# Patient Record
Sex: FEMALE | Race: WHITE | NOT HISPANIC OR LATINO | Employment: OTHER | URBAN - METROPOLITAN AREA
[De-identification: names, ages, dates, MRNs, and addresses within clinical notes are randomized per-mention and may not be internally consistent; named-entity substitution may affect disease eponyms.]

---

## 2017-01-03 ENCOUNTER — OFFICE VISIT (OUTPATIENT)
Dept: INTERNAL MEDICINE | Age: 77
End: 2017-01-03

## 2017-01-03 VITALS
HEIGHT: 67 IN | SYSTOLIC BLOOD PRESSURE: 140 MMHG | BODY MASS INDEX: 25.22 KG/M2 | OXYGEN SATURATION: 97 % | TEMPERATURE: 95.8 F | WEIGHT: 160.7 LBS | DIASTOLIC BLOOD PRESSURE: 70 MMHG | HEART RATE: 86 BPM

## 2017-01-03 DIAGNOSIS — R60.0 LOCALIZED EDEMA: Primary | ICD-10-CM

## 2017-01-03 DIAGNOSIS — G62.9 PERIPHERAL POLYNEUROPATHY: ICD-10-CM

## 2017-01-03 PROCEDURE — 99213 OFFICE O/P EST LOW 20 MIN: CPT | Performed by: INTERNAL MEDICINE

## 2017-01-03 RX ORDER — DILTIAZEM HYDROCHLORIDE 120 MG/1
120 CAPSULE, COATED, EXTENDED RELEASE ORAL DAILY
Qty: 90 CAPSULE | Refills: 3 | COMMUNITY
Start: 2017-01-03 | End: 2017-01-12 | Stop reason: DRUGHIGH

## 2017-01-03 RX ORDER — NORTRIPTYLINE HYDROCHLORIDE 10 MG/1
10 CAPSULE ORAL NIGHTLY
Qty: 30 CAPSULE | Refills: 3 | Status: SHIPPED | OUTPATIENT
Start: 2017-01-03 | End: 2017-02-02 | Stop reason: SDUPTHER

## 2017-01-03 RX ORDER — TRIAMTERENE AND HYDROCHLOROTHIAZIDE 75; 50 MG/1; MG/1
1 TABLET ORAL DAILY
COMMUNITY
Start: 2017-01-03 | End: 2017-01-16 | Stop reason: SDUPTHER

## 2017-01-03 NOTE — MR AVS SNAPSHOT
Reanna Higgins   1/3/2017 3:20 PM   Office Visit    Dept Phone:  [phone]   Encounter #:  [id number]    Provider:  Dylan Gannon MD   Department:  [facility] PRIMARY CARE                Your Full Care Plan              Today's Medication Changes          These changes are accurate as of: 1/3/17  3:48 PM.  If you have any questions, ask your nurse or doctor.               Medication(s)that have changed:     * diltiaZEM  MG 24 hr capsule   Commonly known as:  CARDIZEM CD   Take 1 capsule by mouth Daily.   What changed:  Another medication with the same name was removed. Continue taking this medication, and follow the directions you see here.   Changed by:  Maribeth Hameed MD       * CARDIZEM  MG 24 hr capsule   Generic drug:  diltiaZEM CD   Take 1 capsule by mouth Daily.   What changed:  Another medication with the same name was removed. Continue taking this medication, and follow the directions you see here.   Changed by:  Dylan Gannon MD       nortriptyline 10 MG capsule   Commonly known as:  PAMELOR   Take 1 capsule by mouth Every Night.   What changed:    - medication strength  - how much to take   Changed by:  Dylan Gannon MD       triamterene-hydrochlorothiazide 75-50 MG per tablet   Commonly known as:  MAXZIDE   Take 1 tablet by mouth Daily.   What changed:    - how much to take  - Another medication with the same name was removed. Continue taking this medication, and follow the directions you see here.   Changed by:  Dylan Gannon MD       * Notice:  This list has 2 medication(s) that are the same as other medications prescribed for you. Read the directions carefully, and ask your doctor or other care provider to review them with you.         Where to Get Your Medications      These medications were sent to [facility] [address] [place] [facility] [place], [place] - [address] [place] [address] [phone] [facility] [phone] [place] [address]    Phone:  [phone]     nortriptyline 10 MG capsule                  Your Updated Medication List          This list is accurate as of: 1/3/17  3:48 PM.  Always use your most recent med list.                cholecalciferol 1000 UNITS tablet   Commonly known as:  VITAMIN D3       cycloSPORINE 0.05 % ophthalmic emulsion   Commonly known as:  RESTASIS       * diltiaZEM  MG 24 hr capsule   Commonly known as:  CARDIZEM CD   Take 1 capsule by mouth Daily.       * CARDIZEM  MG 24 hr capsule   Generic drug:  diltiaZEM CD       GLUCOSAMINE COMPLEX PO       metoprolol succinate XL 25 MG 24 hr tablet   Commonly known as:  TOPROL-XL   Take 1 tablet by mouth Daily.       MULTIVITAL-M tablet       nortriptyline 10 MG capsule   Commonly known as:  PAMELOR   Take 1 capsule by mouth Every Night.       omeprazole 40 MG capsule   Commonly known as:  priLOSEC   TAKE ONE CAPSULE BY MOUTH ONE TIME DAILY       pravastatin 40 MG tablet   Commonly known as:  PRAVACHOL   TAKE ONE TABLET BY MOUTH EVERY OTHER DAY       PROBIOTIC DAILY PO       triamterene-hydrochlorothiazide 75-50 MG per tablet   Commonly known as:  MAXZIDE       vitamin E 400 UNIT capsule       * Notice:  This list has 2 medication(s) that are the same as other medications prescribed for you. Read the directions carefully, and ask your doctor or other care provider to review them with you.            You Were Diagnosed With        Codes Comments    Localized edema    -  Primary ICD-10-CM: R60.0  ICD-9-CM: 782.3     Peripheral polyneuropathy     ICD-10-CM: G62.9  ICD-9-CM: 356.9       Instructions     None    Patient Instructions History      Upcoming Appointments     Visit Type Date Time Department    OFFICE VISIT 1/3/2017  3:20 PM [facility]    SUBSEQUENT MEDICARE WELLNESS 5/22/2017  9:00 AM [facility]    OFFICE VISIT 5/31/2017  9:00 AM [facility] Signup     Our records [facility] that [facility]. If you would like to sign up for [employer], please email [email or web address] or call [phone] to obtain an activation code.             Other Info from Your Visit           Your Appointments     May 22, 2017  9:00 AM EDT   Subsequent Medicare Wellness with Dylan Gannon MD   [facility] PRIMARY CARE (--)    [address]  [facility] [address] [phone]            May 31, 2017  9:00 AM EDT   Office Visit with Torsten Benson Jr., MD   [facility] HEART SPECIALISTS (--)    [address]  [facility] [address] [phone]           Arrive 15 minutes prior to appointment.              Allergies     Ancef [Cefazolin]  Other (See Comments)    Hypotension/bradycardia    Codeine      Sulfa Antibiotics        Reason for Visit     Edema bilateral feet and legs; off and on for about 2 weeks; better by last friday, began again yesterday      Vital Signs     Blood Pressure Pulse Temperature Height Weight Oxygen Saturation    140/70 86 95.8 °F (35.4 °C) 67\" (170.2 cm) 160 lb 11.2 oz (72.9 kg) 97%    Body Mass Index Smoking Status                25.17 kg/m2 Former Smoker          Problems and Diagnoses Noted     Accumulation of fluid in tissues    Problem with function of peripheral nerve        "

## 2017-01-03 NOTE — PROGRESS NOTES
"Reanna Higgins / 76 y.o. / female  01/03/2017    VITALS    Visit Vitals   • /70   • Pulse 86   • Temp 95.8 °F (35.4 °C)   • Ht 67\" (170.2 cm)   • Wt 160 lb 11.2 oz (72.9 kg)   • SpO2 97%   • BMI 25.17 kg/m2     BP Readings from Last 3 Encounters:   01/03/17 140/70   11/30/16 144/74   10/18/16 155/70     Wt Readings from Last 3 Encounters:   01/03/17 160 lb 11.2 oz (72.9 kg)   11/30/16 152 lb (68.9 kg)   10/18/16 152 lb (68.9 kg)      Body mass index is 25.17 kg/(m^2).    CC:  Main reason(s) for today's visit: Edema (bilateral feet and legs; off and on for about 2 weeks; better by last friday, began again yesterday)      HPI:     She notes intermittent edema over the last week.  She notes no recent immobilization, no recent change in diet or salt intake.  Of note medication was increased by cardiology (diltiazem) from 240 mg per day to 360 mg per day at her last visit November 30, 2016.  Notes no leg pain but she does have some heaviness., Swelling today is improved compared to yesterday.  Swelling is improved today compared to yesterday .  Patient is leaving for [place] in one week, and would like to have this resolved before she leaves the area.  She'll be gone for 2 months . She has no physician in the [place] area     Constipation noted with use of nortriptyline.  Patient has tried using medication every other day and that has helped, but as far as improving his symptoms of the neuropathy, she see no significant improvement.  We did discuss the utility of using 10 mg every day as opposed to 25 mg every other day.    ______________________________________________________    ASSESSMENT & PLAN:    1. Localized edema    2. Peripheral polyneuropathy      No orders of the defined types were placed in this encounter.      Summary/Discussion:     ·   #1  localized edema I suspect is due to the use of the Cardizem.  I see no evidence of significant congestive heart failure based on physical exam or history..  I " recommend patient increase the diuretic back to tablet a day, and contact me by the end of the week with an update on her symptoms.    2 peripheral neuropathy remains symptomatic.  Change medication from 25 mg every other day to 10 mg daily.  Patient will follow-up by phone with me in approximately one month with an update on her symptoms.    No Follow-up on file.    Future Appointments  Date Time Provider Department Center   5/22/2017 9:00 AM MD [facility] None   5/31/2017 9:00 AM MD [facility] Gaston Jr[facility] None       ______________________________________________________    REVIEW OF SYSTEMS    Review of Systems   Constitutional: Negative for appetite change.   Cardiovascular:        No PND noted   Gastrointestinal: Negative for abdominal pain.   Genitourinary: Positive for frequency.        Nocturia ×1-2, this is not a new complaint.         PHYSICAL EXAMINATION    Physical Exam   Constitutional: She is oriented to person, place, and time. She appears well-developed and well-nourished. No distress.   Neck: No JVD present.   Cardiovascular: Normal rate, regular rhythm, normal heart sounds and intact distal pulses.  Exam reveals no gallop and no friction rub.    No murmur heard.  Pulmonary/Chest: Effort normal. She has no wheezes. She has rales in the right lower field.   Few fine crackles noted at the right base, did not clear with coughing.   Abdominal: There is no hepatosplenomegaly.   Musculoskeletal: She exhibits no edema.   Neurological: She is alert and oriented to person, place, and time.   Skin: Skin is warm and dry. No rash noted.   Psychiatric: She has a normal mood and affect. Her behavior is normal. Judgment and thought content normal.   Nursing note and vitals reviewed.      REVIEWED DATA:    Labs:   Lab Results   Component Value Date     10/11/2016    K 4.0 10/11/2016    AST 21 06/21/2016    ALT 16 06/21/2016    BUN 16 10/11/2016    CREATININE 0.79 10/11/2016     CREATININE 0.73 10/10/2016    CREATININE 0.74 10/07/2016    EGFRIFNONA 70 10/11/2016    EGFRIFAFRI >60 06/10/2015       Lab Results   Component Value Date     (H) 09/28/2016     (H) 06/10/2015    HGBA1C 5.90 (H) 09/28/2016       Lab Results   Component Value Date     (H) 07/07/2016     (H) 11/24/2015     (H) 09/14/2015    HDL 52 07/07/2016    TRIG 95 07/07/2016       Lab Results   Component Value Date    TSH 3.150 09/28/2016          Lab Results   Component Value Date    WBC 6.06 10/11/2016    HGB 13.3 10/11/2016     10/11/2016        Imaging:        Medical Tests:        Summary of old records / correspondence / consultant report:        Request outside records:          ALLERGIES:    Ancef [cefazolin]; Codeine; and Sulfa antibiotics    MEDICATIONS:    Current Outpatient Prescriptions on File Prior to Visit   Medication Sig Dispense Refill   • [place]-Glucosamine-Vit D (GLUCOSAMINE COMPLEX PO) Take 1 tablet by mouth Daily.     • cholecalciferol (VITAMIN D3) 1000 UNITS tablet Take 2,000 Units by mouth daily.     • cycloSPORINE (RESTASIS) 0.05 % ophthalmic emulsion Administer 1 drop to both eyes 2 (Two) Times a Day.     • diltiazem CD (CARDIZEM CD) 240 MG 24 hr capsule Take 1 capsule by mouth Daily. 60 capsule 2   • metoprolol succinate XL (TOPROL-XL) 25 MG 24 hr tablet Take 1 tablet by mouth Daily. 30 tablet 11   • Multiple Vitamins-Minerals (MULTIVITAL-M) tablet Take 1 tablet by mouth daily.     • omeprazole (priLOSEC) 40 MG capsule TAKE ONE CAPSULE BY MOUTH ONE TIME DAILY 90 capsule 1   • pravastatin (PRAVACHOL) 40 MG tablet TAKE ONE TABLET BY MOUTH EVERY OTHER DAY 45 tablet 1   • Probiotic Product (PROBIOTIC DAILY PO) Take 1 tablet by mouth Daily.     • triamterene-hydrochlorothiazide (MAXZIDE) 75-50 MG per tablet Take 0.5 tablets by mouth Daily.     • [DISCONTINUED] diltiaZEM CD (CARDIZEM CD) 120 MG 24 hr capsule Take 1 capsule by mouth Every Night. 30 capsule 6   •  [DISCONTINUED] nortriptyline (PAMELOR) 25 MG capsule Take 1 capsule by mouth Every Night. 30 capsule 1   • vitamin E 400 UNIT capsule Take 400 Units by mouth Daily.     • [DISCONTINUED] triamterene-hydrochlorothiazide (MAXZIDE) 75-50 MG per tablet TAKE 1 TABLET BY MOUTH DAILY. 30 tablet 1     No current facility-administered medications on file prior to visit.        [facility]    The following portions of the patient's history were reviewed and updated as appropriate: Allergies / Current Medications / Past Medical History / Surgical History / Social History / Family History    PROBLEM LIST:    Patient Active Problem List   Diagnosis   • Postartificial menopausal syndrome   • Colon polyp   • Gastroesophageal reflux disease   • Essential hypertension   • Hypercholesterolemia   • Ovarian failure   • Mitral and aortic valve disease   • Heart valve disease   • Abnormal electrocardiogram   • Routine health maintenance   • Syncope, effort   • Anxiety   • Peripheral neuropathy   • Syncope   • Palpitations   • Localized edema       PAST MEDICAL HX:    Past Medical History   Diagnosis Date   • Abdominal bruit      Ultrasound Normal 2015   • Abdominal pain    • Acute upper respiratory infection    • Arthralgia of multiple sites    • Carpal tunnel syndrome    • Carpal tunnel syndrome    • Choledocholithiasis    • Cystic fibroadenosis of breast    • Earache    • Gastritis    • Hypercholesterolemia    • Osteoarthritis of both hands    • Palpitations    • Peripheral neuropathy    • Sea sickness    • Second degree AV block, Mobitz type I    • Trigger finger        PAST SURGICAL HX:    Past Surgical History   Procedure Laterality Date   • Cataract extraction       NOVEMBER AND DECEMBER 2014   • Cholecystectomy     • Knee arthroscopy Bilateral 04/2014     MENISCAL TEAR   • Shoulder surgery Right      ROTATOR CUFF SURGERY JUNE 18, 2015   • Breast cyst excision Bilateral    • Tonsillectomy     • Trigger finger release       both hands   •  Cardiac electrophysiology procedure N/A 10/10/2016     Procedure: Pacemaker DC new  [place];  Surgeon: Wilfrido Hameed MD;  Location: [facility] INVASIVE LOCATION;  Service:    • Pacemaker implantation  10/10/2016       SOCIAL HX:    Social History     Social History   • Marital status:      Spouse name: N/A   • Number of children: N/A   • Years of education: N/A     Social History Main Topics   • Smoking status: Former Smoker     Years: 15.00   • Smokeless tobacco: Never Used      Comment: quit age 30   • Alcohol use Yes      Comment: RARE   • Drug use: No   • Sexual activity: Defer     Other Topics Concern   • None     Social History Narrative       FAMILY HX:    Family History   Problem Relation Age of Onset   • Thyroid disease Daughter    • Heart disease Brother

## 2017-01-09 ENCOUNTER — TELEPHONE (OUTPATIENT)
Dept: INTERNAL MEDICINE | Age: 77
End: 2017-01-09

## 2017-01-12 ENCOUNTER — TELEPHONE (OUTPATIENT)
Dept: CARDIOLOGY | Facility: CLINIC | Age: 77
End: 2017-01-12

## 2017-01-12 RX ORDER — DILTIAZEM HYDROCHLORIDE 360 MG/1
360 CAPSULE, EXTENDED RELEASE ORAL DAILY
Qty: 90 CAPSULE | Refills: 3 | Status: SHIPPED | OUTPATIENT
Start: 2017-01-12 | End: 2017-05-22

## 2017-01-16 ENCOUNTER — TELEPHONE (OUTPATIENT)
Dept: INTERNAL MEDICINE | Age: 77
End: 2017-01-16

## 2017-01-16 RX ORDER — TRIAMTERENE AND HYDROCHLOROTHIAZIDE 75; 50 MG/1; MG/1
1 TABLET ORAL DAILY
Qty: 30 TABLET | Refills: 2 | Status: SHIPPED | OUTPATIENT
Start: 2017-01-16 | End: 2017-01-17 | Stop reason: SDUPTHER

## 2017-01-16 RX ORDER — METOPROLOL SUCCINATE 25 MG/1
25 TABLET, EXTENDED RELEASE ORAL DAILY
Qty: 90 TABLET | Refills: 3 | Status: SHIPPED | OUTPATIENT
Start: 2017-01-16 | End: 2017-05-31 | Stop reason: SDUPTHER

## 2017-01-16 NOTE — TELEPHONE ENCOUNTER
Called and left message for patient with information and if any questions to please call the office.

## 2017-01-17 ENCOUNTER — TELEPHONE (OUTPATIENT)
Dept: INTERNAL MEDICINE | Age: 77
End: 2017-01-17

## 2017-01-17 RX ORDER — TRIAMTERENE AND HYDROCHLOROTHIAZIDE 75; 50 MG/1; MG/1
1 TABLET ORAL DAILY
Qty: 90 TABLET | Refills: 0 | Status: SHIPPED | OUTPATIENT
Start: 2017-01-17 | End: 2017-05-31 | Stop reason: SDUPTHER

## 2017-02-02 ENCOUNTER — TELEPHONE (OUTPATIENT)
Dept: INTERNAL MEDICINE | Age: 77
End: 2017-02-02

## 2017-02-02 DIAGNOSIS — G62.9 PERIPHERAL POLYNEUROPATHY: Primary | ICD-10-CM

## 2017-02-02 RX ORDER — NORTRIPTYLINE HYDROCHLORIDE 10 MG/1
10 CAPSULE ORAL NIGHTLY
Qty: 90 CAPSULE | Refills: 0 | Status: SHIPPED | OUTPATIENT
Start: 2017-02-02 | End: 2017-05-03 | Stop reason: SDUPTHER

## 2017-03-13 RX ORDER — DILTIAZEM HYDROCHLORIDE 240 MG/1
CAPSULE, EXTENDED RELEASE ORAL
Qty: 90 CAPSULE | Refills: 0 | Status: SHIPPED | OUTPATIENT
Start: 2017-03-13 | End: 2017-05-31 | Stop reason: SDUPTHER

## 2017-04-07 ENCOUNTER — TELEPHONE (OUTPATIENT)
Dept: INTERNAL MEDICINE | Age: 77
End: 2017-04-07

## 2017-04-07 DIAGNOSIS — N95.1 MENOPAUSAL STATE: Primary | ICD-10-CM

## 2017-04-07 NOTE — TELEPHONE ENCOUNTER
Request by the patient is to schedule a bone density study.  Her last one was on 06/15/2015.  My experience is that insurance companies and especially Medicare will not cover a bone density study if it is ordered and done any sooner than 2 years from the previous one.  Because of this I would suggest that she contact Dr. Gannon sometime after Bel 15 of this year and get him to schedule it then.

## 2017-04-19 ENCOUNTER — CLINICAL SUPPORT NO REQUIREMENTS (OUTPATIENT)
Dept: CARDIOLOGY | Facility: CLINIC | Age: 77
End: 2017-04-19

## 2017-04-19 DIAGNOSIS — Z45.018 FITTING AND ADJUSTMENT OF CARDIAC PACEMAKER: Primary | ICD-10-CM

## 2017-04-19 PROCEDURE — 93280 PM DEVICE PROGR EVAL DUAL: CPT | Performed by: INTERNAL MEDICINE

## 2017-04-22 ENCOUNTER — TELEPHONE (OUTPATIENT)
Dept: CARDIOLOGY | Facility: CLINIC | Age: 77
End: 2017-04-22

## 2017-04-23 NOTE — TELEPHONE ENCOUNTER
Patient typed a letter and delivered it to my office.  Here is my reply.      Status of heart is good, given normal stress test, unremarkable echo, and treatment of arrhythmia (tachy-lyn syndrome leading to syncope) with pacemaker and cardizem.      Last Cardiolite stress test in August 2016 was normal ejection fraction 69%. Last echo in October 2016 showed LVEF 61% with mild LVH, mild mitral regurgitation and mild tricuspid regurgitation. These do not need to be repeated.     Her only episode of syncope occurred when she was on [employer]. Told she can go roller skating again.     Two-week Holter monitor prior to pacemaker showed frequent episodes of type I second-degree AV block with 2-1 conduction resulting in pauses +2.1 seconds long. She was symptomatic with palpitations

## 2017-05-03 DIAGNOSIS — G62.9 PERIPHERAL POLYNEUROPATHY: ICD-10-CM

## 2017-05-03 RX ORDER — NORTRIPTYLINE HYDROCHLORIDE 10 MG/1
CAPSULE ORAL
Qty: 90 CAPSULE | Refills: 0 | Status: SHIPPED | OUTPATIENT
Start: 2017-05-03 | End: 2017-05-22

## 2017-05-16 DIAGNOSIS — K21.9 GASTROESOPHAGEAL REFLUX DISEASE WITHOUT ESOPHAGITIS: Primary | ICD-10-CM

## 2017-05-16 RX ORDER — OMEPRAZOLE 40 MG/1
CAPSULE, DELAYED RELEASE ORAL
Qty: 90 CAPSULE | Refills: 0 | OUTPATIENT
Start: 2017-05-16

## 2017-05-22 ENCOUNTER — OFFICE VISIT (OUTPATIENT)
Dept: INTERNAL MEDICINE | Age: 77
End: 2017-05-22

## 2017-05-22 VITALS
DIASTOLIC BLOOD PRESSURE: 70 MMHG | HEIGHT: 67 IN | HEART RATE: 72 BPM | BODY MASS INDEX: 24.45 KG/M2 | SYSTOLIC BLOOD PRESSURE: 140 MMHG | OXYGEN SATURATION: 96 % | WEIGHT: 155.8 LBS | TEMPERATURE: 98.2 F

## 2017-05-22 DIAGNOSIS — E78.00 HYPERCHOLESTEROLEMIA: ICD-10-CM

## 2017-05-22 DIAGNOSIS — I10 ESSENTIAL HYPERTENSION: ICD-10-CM

## 2017-05-22 DIAGNOSIS — Z00.00 MEDICARE ANNUAL WELLNESS VISIT, SUBSEQUENT: Primary | ICD-10-CM

## 2017-05-22 DIAGNOSIS — K21.9 GASTROESOPHAGEAL REFLUX DISEASE WITHOUT ESOPHAGITIS: ICD-10-CM

## 2017-05-22 PROBLEM — R73.09 ELEVATED GLUCOSE: Status: ACTIVE | Noted: 2017-05-22

## 2017-05-22 LAB
ALBUMIN SERPL-MCNC: 4.5 G/DL (ref 3.5–5.2)
ALBUMIN/GLOB SERPL: 1.6 G/DL
ALP SERPL-CCNC: 87 U/L (ref 39–117)
ALT SERPL-CCNC: 16 U/L (ref 1–33)
APPEARANCE UR: CLEAR
AST SERPL-CCNC: 19 U/L (ref 1–32)
BACTERIA #/AREA URNS HPF: ABNORMAL /HPF
BASOPHILS # BLD AUTO: 0.02 10*3/MM3 (ref 0–0.2)
BASOPHILS NFR BLD AUTO: 0.3 % (ref 0–1.5)
BILIRUB SERPL-MCNC: 0.4 MG/DL (ref 0.1–1.2)
BILIRUB UR QL STRIP: NEGATIVE
BUN SERPL-MCNC: 17 MG/DL (ref 8–23)
BUN/CREAT SERPL: 17.7 (ref 7–25)
CALCIUM SERPL-MCNC: 9.8 MG/DL (ref 8.6–10.5)
CASTS URNS MICRO: ABNORMAL
CHLORIDE SERPL-SCNC: 100 MMOL/L (ref 98–107)
CHOLEST SERPL-MCNC: 209 MG/DL (ref 0–200)
CO2 SERPL-SCNC: 27.3 MMOL/L (ref 22–29)
COLOR UR: YELLOW
CREAT SERPL-MCNC: 0.96 MG/DL (ref 0.57–1)
EOSINOPHIL # BLD AUTO: 0.09 10*3/MM3 (ref 0–0.7)
EOSINOPHIL NFR BLD AUTO: 1.5 % (ref 0.3–6.2)
EPI CELLS #/AREA URNS HPF: ABNORMAL /HPF
ERYTHROCYTE [DISTWIDTH] IN BLOOD BY AUTOMATED COUNT: 12.9 % (ref 11.7–13)
GLOBULIN SER CALC-MCNC: 2.8 GM/DL
GLUCOSE SERPL-MCNC: 116 MG/DL (ref 65–99)
GLUCOSE UR QL: NEGATIVE
HCT VFR BLD AUTO: 41.5 % (ref 35.6–45.5)
HDLC SERPL-MCNC: 47 MG/DL (ref 40–60)
HGB BLD-MCNC: 13.9 G/DL (ref 11.9–15.5)
HGB UR QL STRIP: NEGATIVE
IMM GRANULOCYTES # BLD: 0 10*3/MM3 (ref 0–0.03)
IMM GRANULOCYTES NFR BLD: 0 % (ref 0–0.5)
KETONES UR QL STRIP: NEGATIVE
LDLC SERPL CALC-MCNC: 119 MG/DL (ref 0–100)
LEUKOCYTE ESTERASE UR QL STRIP: ABNORMAL
LYMPHOCYTES # BLD AUTO: 1.7 10*3/MM3 (ref 0.9–4.8)
LYMPHOCYTES NFR BLD AUTO: 28.5 % (ref 19.6–45.3)
MCH RBC QN AUTO: 29.8 PG (ref 26.9–32)
MCHC RBC AUTO-ENTMCNC: 33.5 G/DL (ref 32.4–36.3)
MCV RBC AUTO: 89.1 FL (ref 80.5–98.2)
MONOCYTES # BLD AUTO: 0.33 10*3/MM3 (ref 0.2–1.2)
MONOCYTES NFR BLD AUTO: 5.5 % (ref 5–12)
NEUTROPHILS # BLD AUTO: 3.82 10*3/MM3 (ref 1.9–8.1)
NEUTROPHILS NFR BLD AUTO: 64.2 % (ref 42.7–76)
NITRITE UR QL STRIP: NEGATIVE
PH UR STRIP: 6.5 [PH] (ref 5–8)
PLATELET # BLD AUTO: 239 10*3/MM3 (ref 140–500)
POTASSIUM SERPL-SCNC: 3.8 MMOL/L (ref 3.5–5.2)
PROT SERPL-MCNC: 7.3 G/DL (ref 6–8.5)
PROT UR QL STRIP: NEGATIVE
RBC # BLD AUTO: 4.66 10*6/MM3 (ref 3.9–5.2)
RBC #/AREA URNS HPF: ABNORMAL /HPF
SODIUM SERPL-SCNC: 142 MMOL/L (ref 136–145)
SP GR UR: 1.02 (ref 1–1.03)
TRIGL SERPL-MCNC: 213 MG/DL (ref 0–150)
UROBILINOGEN UR STRIP-MCNC: ABNORMAL MG/DL
VLDLC SERPL CALC-MCNC: 42.6 MG/DL (ref 5–40)
WBC # BLD AUTO: 5.96 10*3/MM3 (ref 4.5–10.7)
WBC #/AREA URNS HPF: ABNORMAL /HPF

## 2017-05-22 PROCEDURE — G0439 PPPS, SUBSEQ VISIT: HCPCS | Performed by: INTERNAL MEDICINE

## 2017-05-22 PROCEDURE — 99214 OFFICE O/P EST MOD 30 MIN: CPT | Performed by: INTERNAL MEDICINE

## 2017-05-22 PROCEDURE — 96160 PT-FOCUSED HLTH RISK ASSMT: CPT | Performed by: INTERNAL MEDICINE

## 2017-05-22 RX ORDER — OMEPRAZOLE 40 MG/1
CAPSULE, DELAYED RELEASE ORAL
Qty: 90 CAPSULE | Refills: 1 | Status: SHIPPED | OUTPATIENT
Start: 2017-05-22 | End: 2017-11-13 | Stop reason: SDUPTHER

## 2017-05-22 RX ORDER — MELATONIN
1000 DAILY
COMMUNITY
Start: 2017-05-22

## 2017-05-22 RX ORDER — PRAVASTATIN SODIUM 40 MG
40 TABLET ORAL 3 TIMES WEEKLY
Qty: 45 TABLET | Refills: 1 | COMMUNITY
Start: 2017-05-22 | End: 2017-09-20

## 2017-05-31 ENCOUNTER — OFFICE VISIT (OUTPATIENT)
Dept: CARDIOLOGY | Facility: CLINIC | Age: 77
End: 2017-05-31

## 2017-05-31 VITALS
DIASTOLIC BLOOD PRESSURE: 69 MMHG | WEIGHT: 158 LBS | BODY MASS INDEX: 24.75 KG/M2 | SYSTOLIC BLOOD PRESSURE: 149 MMHG | HEART RATE: 85 BPM

## 2017-05-31 DIAGNOSIS — R60.0 LOCALIZED EDEMA: ICD-10-CM

## 2017-05-31 DIAGNOSIS — I34.0 NON-RHEUMATIC MITRAL REGURGITATION: ICD-10-CM

## 2017-05-31 DIAGNOSIS — I10 ESSENTIAL HYPERTENSION: Primary | ICD-10-CM

## 2017-05-31 DIAGNOSIS — R55 SYNCOPE, UNSPECIFIED SYNCOPE TYPE: ICD-10-CM

## 2017-05-31 DIAGNOSIS — R94.31 ABNORMAL ELECTROCARDIOGRAM: ICD-10-CM

## 2017-05-31 DIAGNOSIS — F41.9 ANXIETY: ICD-10-CM

## 2017-05-31 DIAGNOSIS — R00.2 PALPITATIONS: ICD-10-CM

## 2017-05-31 DIAGNOSIS — E78.00 HYPERCHOLESTEROLEMIA: ICD-10-CM

## 2017-05-31 PROBLEM — C76.0: Status: ACTIVE | Noted: 2017-05-31

## 2017-05-31 PROCEDURE — 99214 OFFICE O/P EST MOD 30 MIN: CPT | Performed by: INTERNAL MEDICINE

## 2017-05-31 PROCEDURE — 93000 ELECTROCARDIOGRAM COMPLETE: CPT | Performed by: INTERNAL MEDICINE

## 2017-05-31 RX ORDER — METOPROLOL SUCCINATE 25 MG/1
50 TABLET, EXTENDED RELEASE ORAL DAILY
Qty: 90 TABLET | Refills: 3 | Status: SHIPPED | OUTPATIENT
Start: 2017-05-31 | End: 2017-05-31 | Stop reason: SDUPTHER

## 2017-05-31 RX ORDER — DILTIAZEM HYDROCHLORIDE 120 MG/1
120 CAPSULE, COATED, EXTENDED RELEASE ORAL DAILY
Qty: 30 CAPSULE | Refills: 5 | Status: SHIPPED | OUTPATIENT
Start: 2017-05-31 | End: 2017-06-26 | Stop reason: SDUPTHER

## 2017-05-31 RX ORDER — METOPROLOL SUCCINATE 25 MG/1
50 TABLET, EXTENDED RELEASE ORAL DAILY
Qty: 180 TABLET | Refills: 3 | Status: SHIPPED | OUTPATIENT
Start: 2017-05-31 | End: 2017-09-20

## 2017-05-31 RX ORDER — TRIAMTERENE AND HYDROCHLOROTHIAZIDE 75; 50 MG/1; MG/1
TABLET ORAL
Qty: 30 TABLET | Refills: 2 | Status: SHIPPED | OUTPATIENT
Start: 2017-05-31 | End: 2017-09-03 | Stop reason: SDUPTHER

## 2017-06-09 DIAGNOSIS — E78.00 HYPERCHOLESTEROLEMIA: Primary | ICD-10-CM

## 2017-06-09 RX ORDER — PRAVASTATIN SODIUM 40 MG
TABLET ORAL
Qty: 45 TABLET | Refills: 1 | Status: SHIPPED | OUTPATIENT
Start: 2017-06-09 | End: 2017-12-02 | Stop reason: SDUPTHER

## 2017-06-16 ENCOUNTER — HOSPITAL ENCOUNTER (OUTPATIENT)
Dept: BONE DENSITY | Facility: HOSPITAL | Age: 77
Discharge: HOME | End: 2017-06-16
Admitting: INTERNAL MEDICINE

## 2017-06-16 DIAGNOSIS — N95.1 MENOPAUSAL STATE: ICD-10-CM

## 2017-06-16 PROCEDURE — 77080 DXA BONE DENSITY AXIAL: CPT

## 2017-06-17 PROBLEM — M85.80 OSTEOPENIA: Status: ACTIVE | Noted: 2017-06-17

## 2017-06-20 ENCOUNTER — PATIENT MESSAGE (OUTPATIENT)
Dept: INTERNAL MEDICINE | Age: 77
End: 2017-06-20

## 2017-06-26 ENCOUNTER — TELEPHONE (OUTPATIENT)
Dept: CARDIOLOGY | Facility: CLINIC | Age: 77
End: 2017-06-26

## 2017-06-26 RX ORDER — DILTIAZEM HYDROCHLORIDE 120 MG/1
120 CAPSULE, COATED, EXTENDED RELEASE ORAL DAILY
Qty: 90 CAPSULE | Refills: 3 | Status: SHIPPED | OUTPATIENT
Start: 2017-06-26 | End: 2017-10-31

## 2017-07-07 ENCOUNTER — TELEPHONE (OUTPATIENT)
Dept: INTERNAL MEDICINE | Age: 77
End: 2017-07-07

## 2017-07-07 DIAGNOSIS — Z95.0 HX OF CARDIAC PACEMAKER: ICD-10-CM

## 2017-07-07 DIAGNOSIS — I08.0 MITRAL AND AORTIC VALVE DISEASE: Primary | ICD-10-CM

## 2017-07-07 NOTE — TELEPHONE ENCOUNTER
"----- Message from Reanna Higgins sent at 7/7/2017 10:23 AM EDT -----  Regarding: Non-Urgent Medical Question  Contact: [phone]  Good morning---just received a letter from my cardiologists, Kelley Hanna and Dr. Hameed---[facility]--------------------------it seems this practice is going to be dissolved  as of Sept. 1, 2017.     There is one other doctor in this practice, that I have never seen or even heard of, and I could remain with him, however  they are still undecided about \"when and where\" he will be, as far as an office.      Since I MUST have a cardiologist, as I am a recipient of a pacemaker, as of last Oct. 10, 2016.    I would like to have Dr. Gannon's opinion  in this matter as to what \"should I do, moving forward\" in my health care.    Thanks in advance for your help in this matter,    Reanna Higgins  "

## 2017-08-16 ENCOUNTER — CLINICAL SUPPORT NO REQUIREMENTS (OUTPATIENT)
Dept: CARDIOLOGY | Facility: CLINIC | Age: 77
End: 2017-08-16

## 2017-08-16 DIAGNOSIS — Z95.0 CARDIAC PACEMAKER: Primary | ICD-10-CM

## 2017-08-16 PROCEDURE — 93288 INTERROG EVL PM/LDLS PM IP: CPT | Performed by: INTERNAL MEDICINE

## 2017-09-05 RX ORDER — TRIAMTERENE AND HYDROCHLOROTHIAZIDE 75; 50 MG/1; MG/1
TABLET ORAL
Qty: 90 TABLET | Refills: 0 | Status: SHIPPED | OUTPATIENT
Start: 2017-09-05 | End: 2017-12-04 | Stop reason: SDUPTHER

## 2017-09-20 ENCOUNTER — OFFICE VISIT (OUTPATIENT)
Dept: INTERNAL MEDICINE | Age: 77
End: 2017-09-20

## 2017-09-20 VITALS
HEART RATE: 85 BPM | OXYGEN SATURATION: 98 % | BODY MASS INDEX: 24.01 KG/M2 | SYSTOLIC BLOOD PRESSURE: 138 MMHG | TEMPERATURE: 98.2 F | WEIGHT: 153 LBS | DIASTOLIC BLOOD PRESSURE: 60 MMHG | HEIGHT: 67 IN

## 2017-09-20 DIAGNOSIS — F41.9 ANXIETY: Primary | ICD-10-CM

## 2017-09-20 PROCEDURE — 99213 OFFICE O/P EST LOW 20 MIN: CPT | Performed by: INTERNAL MEDICINE

## 2017-09-20 RX ORDER — LORAZEPAM 0.5 MG/1
TABLET ORAL
Qty: 4 TABLET | Refills: 0 | Status: SHIPPED | OUTPATIENT
Start: 2017-09-20 | End: 2017-10-31

## 2017-09-20 RX ORDER — SCOLOPAMINE TRANSDERMAL SYSTEM 1 MG/1
1 PATCH, EXTENDED RELEASE TRANSDERMAL
Qty: 1 PATCH | Refills: 0 | Status: SHIPPED | OUTPATIENT
Start: 2017-09-20 | End: 2017-10-31

## 2017-09-20 NOTE — PROGRESS NOTES
Subjective   Reanna Higgins is a 77 y.o. female.     History of Present Illness patient will be leaving on Thursday September for a trip to the [facility].  She is phobic regarding air travel and would like medication to eliminate this issue.  May have taken any Valium type medication with a trip to [place] several years ago.    In addition to this she notes over the past 3 months.  The darkness at night.  She is concerned with the onset of winter with darkness arriving earlier in the day.  She has never been on any psychotropic medication in the past.  We did discuss the possibility of using Zoloft on a daily basis only during the winter months, and when springtime arrives next year we can discontinue this medication.  She seems comfortable with this.  I will not start this now she'll be leaving on a cruise.  When she returns home, she can start this medication 50 mg once a day in the morning.  She is aware of the potential side effects include sexual dysfunction along with appetite stimulation.            The following portions of the patient's history were reviewed and updated as appropriate: allergies, current medications, past medical history, past social history and problem list.    Review of Systems   Psychiatric/Behavioral: Negative for sleep disturbance. The patient is nervous/anxious.        Objective   Physical Exam   Constitutional: She is oriented to person, place, and time. She appears well-developed and well-nourished. No distress.   Neurological: She is alert and oriented to person, place, and time.   Skin: Skin is warm and dry. She is not diaphoretic.   Psychiatric: She has a normal mood and affect. Her behavior is normal. Judgment and thought content normal.   Nursing note and vitals reviewed.      Assessment/Plan   Reanna was seen today for anxiety.    Diagnoses and all orders for this visit:    Anxiety  -     LORazepam (ATIVAN) 0.5 MG tablet; One tablet 30 minutes before plane flight, may  repeat times 1 in 4-6 hours.  -     sertraline (ZOLOFT) 50 MG tablet; Take 1 tablet by mouth Daily.      1 anxiety, will treat her preflight anxiety with Ativan as above.  We'll check [place] report, no contract for urine drug screen will be completed since this is a one-time short-term prescription..  Prescribed 4 tablets with no refill.  [place] report #[id number] data today shows no entries  for the past 12 months.    Patient has new onset of fear of the darkness.  When she returns from her trip, we will begin Zoloft at 50 mg once a day she'll contact me in one month with an update on her symptoms if this is hopeful or not.  I suspect we can discontinue this medication in the springtime of 2018     This visit involved 100% counseling and coordination of care.  Total face-to-face time 15 minutes.

## 2017-09-25 DIAGNOSIS — N32.89 BLADDER SPASM: Primary | ICD-10-CM

## 2017-09-25 RX ORDER — OXYBUTYNIN CHLORIDE 5 MG/1
10 TABLET, EXTENDED RELEASE ORAL DAILY
Qty: 30 TABLET | Refills: 1 | Status: SHIPPED | OUTPATIENT
Start: 2017-09-25 | End: 2018-06-05 | Stop reason: ALTCHOICE

## 2017-10-30 ENCOUNTER — TELEPHONE (OUTPATIENT)
Dept: CARDIOLOGY | Facility: CLINIC | Age: 77
End: 2017-10-30

## 2017-10-30 NOTE — TELEPHONE ENCOUNTER
This is a pt that is new to you with first apt jaswinder for 5/2018 with you.  See my below note about alert from her pacemaker due to VT.    Alert transmission received 10/29/17 and reviewed 10/30/17.  Alert is for vt episodes. There are a total of 3 vt episodes recorded, all on 10/28/17.  First one was at 1008 and egm shows 34 beats of nst vt with retrograde va conduction, broke then immediately followed by another 23 beats (egm starts and ends in vt, so unsure if pt was in vt prior to egm recording and after egm quit recording).  The 2nd episode occurred at 1013 and episode 2 and 3 overlap.  Looking at these 2 egms together 94 beats vt with, followed by 15, 3 and 4 beats of vt.  The last beat on egm shows ap/vs, but can't see the initiation of the vt.  All vt has retrograde va conduction and rate of vt is 140's.  She has never had her pacemaker checked in our office and she has yet to be seen by Dr. Murillo with her first apt in our office [facility] for 5/2018.  She does have a remote pacer check [facility] for next month.  Per device diagnostics she had her pacer checked last in 8/2017.  These are the first vt episodes that have ever been recorded by her pacer that was implanted 10/2016.  I called pt and she denied any unusual symptoms, stating she always has palpations.  She is not due for for a remote until 3 months from now, but she will be in [place] 1/11/18-3/9/18, so I jaswinder her next remote for 1/9/18 before she leaves.  She does not have a landline in [place].  I offered her Get Connected number with [employer] to see about a cellular adapter and she is not interested at this time.   Rhythm at time of transmission is as/vs 60's with occ atrial pacing at 60 (FFRW are noted).  All auto testing is wnl and lead trending is stable.  There is 1 atr, 3 sec and shows ffrw and not af.  Charges:  [id number], [id number].  kBR

## 2017-10-30 NOTE — TELEPHONE ENCOUNTER
Dr. Murillo pt, he is out, so sent to Dr. Kirk.  Dr. Kirk medical assistant said she would likely not have time to address this Dr. Murillo pt in a timely manner and suggest that it go to an MD in [place] area [place].  I verbally reviewed with Dr. Mccullough and he said she should be seen today and if no one had any openings that he would work her in.  She was worked in for 2pm, but I called her and man who answered said she would not be home until around 4pm today and he did not have any other number to reach her.  I let scheduling know and she removed today's apt.  I asked that he have the pt call me, so I could discuss getting her into the office to see cardiologist in the next 1-2 days.

## 2017-10-31 ENCOUNTER — OFFICE VISIT (OUTPATIENT)
Dept: CARDIOLOGY | Facility: CLINIC | Age: 77
End: 2017-10-31

## 2017-10-31 VITALS
DIASTOLIC BLOOD PRESSURE: 70 MMHG | BODY MASS INDEX: 23.48 KG/M2 | HEIGHT: 67 IN | WEIGHT: 149.6 LBS | HEART RATE: 69 BPM | SYSTOLIC BLOOD PRESSURE: 150 MMHG

## 2017-10-31 DIAGNOSIS — I47.20 VENTRICULAR TACHYCARDIA (HCC): ICD-10-CM

## 2017-10-31 DIAGNOSIS — R00.2 PALPITATIONS: Primary | ICD-10-CM

## 2017-10-31 PROCEDURE — 93000 ELECTROCARDIOGRAM COMPLETE: CPT | Performed by: INTERNAL MEDICINE

## 2017-10-31 PROCEDURE — 99215 OFFICE O/P EST HI 40 MIN: CPT | Performed by: INTERNAL MEDICINE

## 2017-10-31 RX ORDER — METOPROLOL TARTRATE 50 MG/1
50 TABLET, FILM COATED ORAL 2 TIMES DAILY
Qty: 60 TABLET | Refills: 6 | Status: SHIPPED | OUTPATIENT
Start: 2017-10-31 | End: 2017-12-21 | Stop reason: DRUGHIGH

## 2017-10-31 RX ORDER — METOPROLOL TARTRATE 50 MG/1
50 TABLET, FILM COATED ORAL DAILY
COMMUNITY
End: 2017-10-31

## 2017-10-31 NOTE — PROGRESS NOTES
Date of Office Visit: 10/31/2017  Encounter Provider: Francis Mccullough MD  Place of Service: [facility] CARDIOLOGY  Patient Name: Reanna Higgins  :1940  [id number]    Chief Complaint   Patient presents with   • Palpitations   :     HPI: Reanna Higgins is a 77 y.o. female  This is a lady who was urgently worked in to see me today because of ventricular tachycardia on her pacemaker monitoring.  We have never seen this lady before.  She was with another cardiologist in town who has left the [facility] practice.  Somehow, her monitoring was switched over to us and she had 90 beats of ventricular tachycardia on Saturday.  In speaking with her, she does not really remember any particular episode on Saturday, but she says she has daily palpitations.  They may last for up to one to five hours at a time.  Nothing seems to precipitate them.  Nothing seems to make it better or worse.  She has worn a Holter in the past and she had a brief period of 2:1 AV block and a 2.1 second pause and that was treated because she had a syncopal episode with a pacemaker a year ago.  That has not solved her palpitation issue, which continues onward.  She is very active.  She will have these palpitations with activity and at rest.  They wake her up at nighttime.  She has not had any more syncopal episodes and no shortness of breath or shortness of breath.  She does not have diabetes or hyperlipidemia.  She does not smoke.  The rest of her review of systems really is pretty negative.  She does not have any other significant past medical history for the most part.        Past Medical History:   Diagnosis Date   • Abdominal bruit     Ultrasound Normal    • Abdominal pain    • Acute upper respiratory infection    • Anxiety    • Arthralgia of multiple sites    • Cancer     skin   • Carpal tunnel syndrome    • Cataract    • Choledocholithiasis    • Cystic fibroadenosis of breast    • Earache    • Gastritis    •  GERD (gastroesophageal reflux disease)    • Hypercholesterolemia    • Hypertension    • Osteoarthritis of both hands    • Palpitations    • Peripheral neuropathy    • Sea sickness    • Second degree AV block, Mobitz type I    • Syncope    • Trigger finger        Past Surgical History:   Procedure Laterality Date   • BREAST CYST EXCISION Bilateral    • CARDIAC ELECTROPHYSIOLOGY PROCEDURE N/A 10/10/2016    Procedure: Pacemaker DC new  [place];  Surgeon: Wilfrido Hameed MD;  Location: [facility] INVASIVE LOCATION;  Service:    • CATARACT EXTRACTION      NOVEMBER AND DECEMBER 2014   • CHOLECYSTECTOMY     • HYSTERECTOMY     • KNEE ARTHROSCOPY Bilateral 04/2014    MENISCAL TEAR   • PACEMAKER IMPLANTATION  10/10/2016   • SHOULDER SURGERY Right     ROTATOR CUFF SURGERY JUNE 18, 2015   • TONSILLECTOMY     • TRIGGER FINGER RELEASE      both hands       Social History     Social History   • Marital status:      Spouse name: N/A   • Number of children: 2   • Years of education: N/A     Occupational History   • Retired      Social History Main Topics   • Smoking status: Former Smoker     Packs/day: 0.50     Years: 15.00     Quit date: 1970   • Smokeless tobacco: Never Used      Comment: quit age 30   • Alcohol use Yes      Comment: RARE   • Drug use: No   • Sexual activity: Defer     Other Topics Concern   • Not on file     Social History Narrative       Family History   Problem Relation Age of Onset   • Thyroid disease Daughter    • Heart disease Brother    • Hypertension Mother    • Hypertension Father    • Alcohol abuse Father    • Depression Father    • Anxiety disorder Father    • Diabetes Father        Review of Systems   Constitution: Negative for decreased appetite, fever, malaise/fatigue and weight loss.   HENT: Negative for nosebleeds.    Eyes: Negative for double vision.   Cardiovascular: Negative for chest pain, claudication, cyanosis, dyspnea on exertion, irregular heartbeat, leg swelling, near-syncope,  "orthopnea, palpitations, paroxysmal nocturnal dyspnea and syncope.   Respiratory: Negative for cough, hemoptysis and shortness of breath.    Hematologic/Lymphatic: Negative for bleeding problem.   Skin: Negative for rash.   Musculoskeletal: Negative for falls and myalgias.   Gastrointestinal: Negative for hematochezia, jaundice, melena, nausea and vomiting.   Genitourinary: Negative for hematuria.   Neurological: Negative for dizziness and seizures.   Psychiatric/Behavioral: Negative for altered mental status and memory loss.       Allergies   Allergen Reactions   • Ancef [Cefazolin] Other (See Comments)     Hypotension/bradycardia   • Codeine    • Sulfa Antibiotics          Current Outpatient Prescriptions:   •  BIOTIN 5000 PO, Take  by mouth., Disp: , Rfl:   •  Boswellia-Glucosamine-Vit D (GLUCOSAMINE COMPLEX PO), Take 1 tablet by mouth Daily., Disp: , Rfl:   •  cholecalciferol (VITAMIN D3) 1000 UNITS tablet, Take 1 tablet by mouth Daily., Disp: , Rfl:   •  cycloSPORINE (RESTASIS) 0.05 % ophthalmic emulsion, Administer 1 drop to both eyes 2 (Two) Times a Day., Disp: , Rfl:   •  metoprolol tartrate (LOPRESSOR) 50 MG tablet, Take 1 tablet by mouth 2 (Two) Times a Day., Disp: 60 tablet, Rfl: 6  •  Multiple Vitamins-Minerals (MULTIVITAL-M) tablet, Take 1 tablet by mouth daily., Disp: , Rfl:   •  omeprazole (priLOSEC) 40 MG capsule, TAKE ONE CAPSULE BY MOUTH ONE TIME DAILY, Disp: 90 capsule, Rfl: 1  •  oxybutynin XL (DITROPAN-XL) 5 MG 24 hr tablet, Take 2 tablets by mouth Daily., Disp: 30 tablet, Rfl: 1  •  pravastatin (PRAVACHOL) 40 MG tablet, TAKE ONE TABLET BY MOUTH EVERY OTHER DAY, Disp: 45 tablet, Rfl: 1  •  triamterene-hydrochlorothiazide (MAXZIDE) 75-50 MG per tablet, TAKE 1 TABLET BY MOUTH DAILY., Disp: 90 tablet, Rfl: 0     Objective:     Vitals:    10/31/17 1035   BP: 150/70   Pulse: 69   Weight: 149 lb 9.6 oz (67.9 kg)   Height: 67\" (170.2 cm)     Body mass index is 23.43 kg/(m^2).    Physical Exam "   Constitutional: She is oriented to person, place, and time. She appears well-developed and well-nourished.   HENT:   Head: Normocephalic.   Eyes: No scleral icterus.   Neck: No JVD present. No thyromegaly present.   Cardiovascular: Normal rate, regular rhythm and normal heart sounds.  Exam reveals no gallop and no friction rub.    No murmur heard.  Pulmonary/Chest: Effort normal and breath sounds normal. She has no wheezes. She has no rales.   Abdominal: Soft. There is no hepatosplenomegaly. There is no tenderness.   Musculoskeletal: Normal range of motion. She exhibits no edema.   Lymphadenopathy:     She has no cervical adenopathy.   Neurological: She is alert and oriented to person, place, and time.   Skin: Skin is warm and dry. No rash noted.   Psychiatric: She has a normal mood and affect.         ECG 12 Lead  Date/Time: 10/31/2017 11:25 AM  Performed by: SHAWANDA DUGGAN  Authorized by: SHAWANDA DUGGAN   Comparison: compared with previous ECG   Similar to previous ECG  Rhythm: sinus rhythm  Other findings: LVH  Comments: ns ST-T wave abnormality             Assessment:       Diagnosis Plan   1. Palpitations  Basic Metabolic Panel    Holter Monitor - 24 Hour    Ambulatory Referral to Cardiac Electrophysiology   2. Ventricular tachycardia  Basic Metabolic Panel    Holter Monitor - 24 Hour    Ambulatory Referral to Cardiac Electrophysiology          Plan:       This is a lady with palpitations of unclear etiology.  She had ventricular tachycardia on a monitor.  She had syncope.  She had a pacemaker implanted.  She had a normal echo and a normal stress test last year.  She really is a fairly low risk for coronary disease and does not give any symptoms to suggest that.  At this point I am stopping her diltiazem and am going to put her on 50 of metoprolol scheduled twice a day.  I am going to check her electrolytes.  I am going to put a Holter monitor on her but we need the arrhythmia service to see her and try to  help sort this out.  I do not suspect a traditional malignant ventricular tachycardia; if anything, I wonder about a RVOT ventricular tachycardia.          As always, it has been a pleasure to participate in your patient's care.      Sincerely,       Francis Mccullough MD

## 2017-11-01 ENCOUNTER — TELEPHONE (OUTPATIENT)
Dept: INTERNAL MEDICINE | Age: 77
End: 2017-11-01

## 2017-11-01 ENCOUNTER — APPOINTMENT (OUTPATIENT)
Dept: LAB | Facility: HOSPITAL | Age: 77
End: 2017-11-01
Attending: INTERNAL MEDICINE

## 2017-11-01 LAB
ANION GAP SERPL CALCULATED.3IONS-SCNC: 13.3 MMOL/L
BUN BLD-MCNC: 24 MG/DL (ref 8–23)
BUN/CREAT SERPL: 27.9 (ref 7–25)
CALCIUM SPEC-SCNC: 9.6 MG/DL (ref 8.6–10.5)
CHLORIDE SERPL-SCNC: 97 MMOL/L (ref 98–107)
CO2 SERPL-SCNC: 28.7 MMOL/L (ref 22–29)
CREAT BLD-MCNC: 0.86 MG/DL (ref 0.57–1)
GFR SERPL CREATININE-BSD FRML MDRD: 64 ML/MIN/1.73
GLUCOSE BLD-MCNC: 107 MG/DL (ref 65–99)
POTASSIUM BLD-SCNC: 3.6 MMOL/L (ref 3.5–5.2)
SODIUM BLD-SCNC: 139 MMOL/L (ref 136–145)

## 2017-11-01 PROCEDURE — 36415 COLL VENOUS BLD VENIPUNCTURE: CPT | Performed by: INTERNAL MEDICINE

## 2017-11-01 PROCEDURE — 80048 BASIC METABOLIC PNL TOTAL CA: CPT | Performed by: INTERNAL MEDICINE

## 2017-11-01 NOTE — TELEPHONE ENCOUNTER
Pt called asking for a pneumonia shot. Pt would be in the area and was asking if she could have this done at 3p today.  You can reach the pt at # [phone]  Thanks SP

## 2017-11-01 NOTE — TELEPHONE ENCOUNTER
My records show that she has received both pneumonia shots, consequently no additional shots are indicated.

## 2017-11-13 RX ORDER — OMEPRAZOLE 40 MG/1
CAPSULE, DELAYED RELEASE ORAL
Qty: 90 CAPSULE | Refills: 1 | Status: SHIPPED | OUTPATIENT
Start: 2017-11-13 | End: 2018-04-29 | Stop reason: SDUPTHER

## 2017-11-20 ENCOUNTER — OFFICE VISIT (OUTPATIENT)
Dept: INTERNAL MEDICINE | Age: 77
End: 2017-11-20

## 2017-11-20 VITALS
BODY MASS INDEX: 24.08 KG/M2 | HEIGHT: 67 IN | OXYGEN SATURATION: 96 % | HEART RATE: 74 BPM | TEMPERATURE: 98.5 F | DIASTOLIC BLOOD PRESSURE: 56 MMHG | WEIGHT: 153.4 LBS | SYSTOLIC BLOOD PRESSURE: 132 MMHG

## 2017-11-20 DIAGNOSIS — R30.0 DYSURIA: ICD-10-CM

## 2017-11-20 DIAGNOSIS — R10.30 LOWER ABDOMINAL PAIN: Primary | ICD-10-CM

## 2017-11-20 LAB
BILIRUB BLD-MCNC: NEGATIVE MG/DL
CLARITY, POC: CLEAR
COLOR UR: YELLOW
GLUCOSE UR STRIP-MCNC: NEGATIVE MG/DL
KETONES UR QL: ABNORMAL
LEUKOCYTE EST, POC: NEGATIVE
NITRITE UR-MCNC: NEGATIVE MG/ML
PH UR: 1 [PH] (ref 5–8)
PROT UR STRIP-MCNC: NEGATIVE MG/DL
RBC # UR STRIP: NEGATIVE /UL
SP GR UR: 1.02 (ref 1–1.03)
UROBILINOGEN UR QL: NORMAL

## 2017-11-20 PROCEDURE — 81003 URINALYSIS AUTO W/O SCOPE: CPT | Performed by: INTERNAL MEDICINE

## 2017-11-20 PROCEDURE — 99213 OFFICE O/P EST LOW 20 MIN: CPT | Performed by: INTERNAL MEDICINE

## 2017-11-20 NOTE — PROGRESS NOTES
Subjective   Reanna Higgins is a 77 y.o. female.     History of Present Illness patient presents with pressure in the hypogastric region since September of this year.  She was treated with antibiotics and then with oxybutynin for bladder spasm.  She denied any dysuria, she does note urinary frequency of these once an hour over the past several months.  She denied odor or blood in the urine, there has been no low back pain nausea, vomiting and fever, chills.  She does have chronic swelling which is due to menopause.    The following portions of the patient's history were reviewed and updated as appropriate: allergies, current medications, past medical history, past social history and problem list.    Review of Systems   Constitutional: Negative for appetite change.   Genitourinary: Negative for vaginal discharge and vaginal pain.       Objective   Physical Exam   Constitutional: She is oriented to person, place, and time. She appears well-developed and well-nourished. No distress.   Abdominal: Soft. There is no tenderness.   No suprapubic tenderness noted, urinary bladder was not distended.   Musculoskeletal:   No CVAT noted   Neurological: She is alert and oriented to person, place, and time.   Skin: Skin is warm and dry.   Psychiatric: She has a normal mood and affect. Her behavior is normal. Judgment and thought content normal.   Nursing note and vitals reviewed.      Assessment/Plan   Reanna was seen today for urinary tract infection.    Diagnoses and all orders for this visit:    Lower abdominal pain  -     Urine Culture - Urine, Urine, Clean Catch    Dysuria  -     POCT urinalysis dipstick, automated      Number-one bladder spasm, no evidence of infection on urinalysis.  Patient is currently taking anti-spasmodic medication without significant improvement, but she already has significant dry mouth.  At this point I suggest that we culture the urine from today to be sure there were not missing an infection.  If  the culture is positive, will treat with antibiotics.  If the culture is negative, would consider increasing the oxybutynin 15 mg per day.  Patient is aware that that will make a dry mouth worse.  Since she has not seen a urologist, I suggested that as the next step in the evaluation process as well.  Me by Wednesday 22 November if she has not heard back regarding the results of the urine culture.

## 2017-11-22 LAB
BACTERIA UR CULT: NORMAL
BACTERIA UR CULT: NORMAL

## 2017-12-02 DIAGNOSIS — E78.00 HYPERCHOLESTEROLEMIA: ICD-10-CM

## 2017-12-04 RX ORDER — TRIAMTERENE AND HYDROCHLOROTHIAZIDE 75; 50 MG/1; MG/1
TABLET ORAL
Qty: 90 TABLET | Refills: 0 | Status: SHIPPED | OUTPATIENT
Start: 2017-12-04 | End: 2018-03-11 | Stop reason: SDUPTHER

## 2017-12-04 RX ORDER — PRAVASTATIN SODIUM 40 MG
TABLET ORAL
Qty: 45 TABLET | Refills: 1 | Status: SHIPPED | OUTPATIENT
Start: 2017-12-04 | End: 2018-06-05 | Stop reason: SDUPTHER

## 2017-12-08 ENCOUNTER — CLINICAL SUPPORT NO REQUIREMENTS (OUTPATIENT)
Dept: CARDIOLOGY | Facility: CLINIC | Age: 77
End: 2017-12-08

## 2017-12-08 ENCOUNTER — OFFICE VISIT (OUTPATIENT)
Dept: CARDIOLOGY | Facility: CLINIC | Age: 77
End: 2017-12-08

## 2017-12-08 VITALS
WEIGHT: 150 LBS | BODY MASS INDEX: 23.54 KG/M2 | HEART RATE: 65 BPM | DIASTOLIC BLOOD PRESSURE: 68 MMHG | SYSTOLIC BLOOD PRESSURE: 144 MMHG | HEIGHT: 67 IN

## 2017-12-08 DIAGNOSIS — I47.1 SVT (SUPRAVENTRICULAR TACHYCARDIA) (HCC): Primary | ICD-10-CM

## 2017-12-08 DIAGNOSIS — I47.1 SVT (SUPRAVENTRICULAR TACHYCARDIA) (HCC): ICD-10-CM

## 2017-12-08 DIAGNOSIS — Z95.0 CARDIAC PACEMAKER IN SITU: ICD-10-CM

## 2017-12-08 DIAGNOSIS — I44.1 AV BLOCK, MOBITZ 1: ICD-10-CM

## 2017-12-08 DIAGNOSIS — I49.5 SICK SINUS SYNDROME (HCC): Primary | ICD-10-CM

## 2017-12-08 PROBLEM — I47.10 SVT (SUPRAVENTRICULAR TACHYCARDIA): Status: ACTIVE | Noted: 2017-12-08

## 2017-12-08 PROBLEM — I47.20 VENTRICULAR TACHYCARDIA (HCC): Status: RESOLVED | Noted: 2017-10-31 | Resolved: 2017-12-08

## 2017-12-08 PROCEDURE — 99204 OFFICE O/P NEW MOD 45 MIN: CPT | Performed by: NURSE PRACTITIONER

## 2017-12-08 PROCEDURE — 93000 ELECTROCARDIOGRAM COMPLETE: CPT | Performed by: NURSE PRACTITIONER

## 2017-12-08 PROCEDURE — 93280 PM DEVICE PROGR EVAL DUAL: CPT | Performed by: INTERNAL MEDICINE

## 2017-12-08 NOTE — PROGRESS NOTES
Date of Office Visit: 2017  Encounter Provider: LUIS A Saba  Place of Service: [facility] CARDIOLOGY  Patient Name: Reanna Higgins  :1940    Chief Complaint   Patient presents with   • Pacemaker Check   • Rapid Heart Rate     noted on pacemaker check   :     HPI: Reanna Higgins is a 77 y.o. female who is a new patient referred by Dr. Mccullough for what was thought to be VT on a pacemaker check. She was a previous patient of Dr. Headley . Dr. Mccullough saw her for the first time on 10/31/17 due to a pacer check diagnostics showed VT. She has a past medical history of palpitations, one episode of syncope and a monitor that showed intermittent 21 AV block and a 2 second pause for which she underwent permanent pacemaker implantation in .  She also has a history of hypertension and hyperlipidemia.  She has not had any further syncopal episodes since her pacemaker was implanted but she has continued to have palpitations.  On  her pacemaker diagnostics show that she had 3 episodes of tachycardia that it labeled as ventricular tachycardia, they were all less than a minute in duration, HR max 146 bpm and she was asymptomatic.  She was put in to see Dr. Mccullough on .  He switched her diltiazem to metoprolol and repeated a Holter monitor.  Her Holter showed sinus rhythm with just some rare APCs and PVCs.  She had a nuclear stress test in 2016 which was negative for ischemia and showed normal LV systolic function with an EF of 69%.  She had an echocardiogram in 2016 which showed normal LV systolic function with an EF of 60%, mild to moderate septal hypertrophy, mildly dilated RV, moderate to severe MR with grade 1 diastolic dysfunction.  Dr. Mccullough referred her to the EP service for further evaluation.    She is doing well she says that she has started to metoprolol her palpitations have significantly decreased.  She denies any chest pain, shortness  of breath, dizziness, edema or near syncope.    Maker interrogation today shows normal pacemaker testing and function with no further episodes.          Past Medical History:   Diagnosis Date   • Abdominal bruit     Ultrasound Normal 2015   • Abdominal pain    • Acute upper respiratory infection    • Anxiety    • Arthralgia of multiple sites    • Cancer     skin   • Carpal tunnel syndrome    • Cataract    • Choledocholithiasis    • Cystic fibroadenosis of breast    • Earache    • Gastritis    • GERD (gastroesophageal reflux disease)    • Hypercholesterolemia    • Hypertension    • Osteoarthritis of both hands    • Palpitations    • Peripheral neuropathy    • Sea sickness    • Second degree AV block, Mobitz type I    • Syncope    • Trigger finger        Past Surgical History:   Procedure Laterality Date   • BREAST CYST EXCISION Bilateral    • CARDIAC ELECTROPHYSIOLOGY PROCEDURE N/A 10/10/2016    Procedure: Pacemaker DC new  [place];  Surgeon: Wilfrido Hameed MD;  Location: [facility] INVASIVE LOCATION;  Service:    • CATARACT EXTRACTION      NOVEMBER AND DECEMBER 2014   • CHOLECYSTECTOMY     • HYSTERECTOMY     • KNEE ARTHROSCOPY Bilateral 04/2014    MENISCAL TEAR   • PACEMAKER IMPLANTATION  10/10/2016   • SHOULDER SURGERY Right     ROTATOR CUFF SURGERY JUNE 18, 2015   • TONSILLECTOMY     • TRIGGER FINGER RELEASE      both hands       Social History     Social History   • Marital status:      Spouse name: N/A   • Number of children: 2   • Years of education: N/A     Occupational History   • Retired      Social History Main Topics   • Smoking status: Former Smoker     Packs/day: 0.50     Years: 15.00     Quit date: 1970   • Smokeless tobacco: Never Used      Comment: quit age 30   • Alcohol use Yes      Comment: RARE   • Drug use: No   • Sexual activity: Defer     Other Topics Concern   • Not on file     Social History Narrative       Family History   Problem Relation Age of Onset   • Thyroid disease  Daughter    • Heart disease Brother    • Hypertension Mother    • Hypertension Father    • Alcohol abuse Father    • Depression Father    • Anxiety disorder Father    • Diabetes Father        Review of Systems   Constitution: Negative for chills, fever, malaise/fatigue, weight gain and weight loss.   HENT: Negative for ear pain, hearing loss, nosebleeds and sore throat.    Eyes: Negative for double vision, pain and visual disturbance.   Cardiovascular: Positive for palpitations. Negative for chest pain, dyspnea on exertion, irregular heartbeat, leg swelling, near-syncope, orthopnea, paroxysmal nocturnal dyspnea and syncope.   Respiratory: Negative for cough, shortness of breath, sleep disturbances due to breathing, snoring and wheezing.    Endocrine: Negative for cold intolerance, heat intolerance and polyuria.   Skin: Negative for itching and rash.   Musculoskeletal: Negative for joint pain, joint swelling and myalgias.   Gastrointestinal: Negative for abdominal pain, diarrhea, melena, nausea and vomiting.   Genitourinary: Negative for frequency, hematuria and hesitancy.   Neurological: Negative for excessive daytime sleepiness, headaches, light-headedness, numbness, paresthesias and seizures.   Psychiatric/Behavioral: Negative for altered mental status and depression.   Allergic/Immunologic: Negative.    All other systems reviewed and are negative.      Allergies   Allergen Reactions   • Ancef [Cefazolin] Other (See Comments)     Hypotension/bradycardia   • Codeine    • Sulfa Antibiotics          Current Outpatient Prescriptions:   •  BIOTIN 5000 PO, Take  by mouth., Disp: , Rfl:   •  Boswellia-Glucosamine-Vit D (GLUCOSAMINE COMPLEX PO), Take 1 tablet by mouth Daily., Disp: , Rfl:   •  cholecalciferol (VITAMIN D3) 1000 UNITS tablet, Take 1 tablet by mouth Daily., Disp: , Rfl:   •  cycloSPORINE (RESTASIS) 0.05 % ophthalmic emulsion, Administer 1 drop to both eyes 2 (Two) Times a Day., Disp: , Rfl:   •  metoprolol  "tartrate (LOPRESSOR) 50 MG tablet, Take 1 tablet by mouth 2 (Two) Times a Day., Disp: 60 tablet, Rfl: 6  •  Multiple Vitamins-Minerals (MULTIVITAL-M) tablet, Take 1 tablet by mouth daily., Disp: , Rfl:   •  omeprazole (priLOSEC) 40 MG capsule, TAKE ONE CAPSULE BY MOUTH ONE TIME DAILY, Disp: 90 capsule, Rfl: 1  •  oxybutynin XL (DITROPAN-XL) 5 MG 24 hr tablet, Take 2 tablets by mouth Daily., Disp: 30 tablet, Rfl: 1  •  pravastatin (PRAVACHOL) 40 MG tablet, TAKE ONE TABLET BY MOUTH EVERY OTHER DAY, Disp: 45 tablet, Rfl: 1  •  triamterene-hydrochlorothiazide (MAXZIDE) 75-50 MG per tablet, TAKE 1 TABLET BY MOUTH DAILY., Disp: 90 tablet, Rfl: 0     Objective:     Vitals:    12/08/17 0955   BP: 144/68   Pulse: 65   Weight: 68 kg (150 lb)   Height: 170.2 cm (67.01\")     Body mass index is 23.49 kg/(m^2).    PHYSICAL EXAM:    Vitals Reviewed.   General Appearance: No acute distress, well developed and well nourished.   Eyes: Conjunctiva and lids: No erythema, swelling, or discharge. Sclera non-icteric.   HENT: Atraumatic, normocephalic. External eyes, ears, and nose normal. No hearing loss noted. Mucous membranes normal. Lips not cyanotic. Neck supple with no tenderness.  Respiratory: No signs of respiratory distress. Respiration rhythm and depth normal.   Clear to auscultation. No rales, crackles, rhonchi, or wheezing auscultated.   Cardiovascular:  Jugular Venous Pressure: Normal  Heart Rate and Rhythm: Normal, Heart Sounds: Normal S1 and S2. No S3 or S4 noted.  Murmurs: No murmurs noted. No rubs, thrills, or gallops.   Arterial Pulses:  Posterior tibialis and dorsalis pedis pulses normal.   Lower Extremities: No edema noted.  Gastrointestinal:  Abdomen soft, non-distended, non-tender. Normal bowel sounds.  Musculoskeletal: Normal movement of extremities  Skin and Nails: General appearance normal. No pallor, cyanosis, diaphoresis. Skin temperature normal. No clubbing of fingernails.   Psychiatric: Patient alert and " oriented to person, place, and time. Speech and behavior appropriate. Normal mood and affect.       ECG 12 Lead  Date/Time: 12/8/2017 2:09 PM  Performed by: THEODORE KRAUSE  Authorized by: THEODORE KRAUSE   Comparison: compared with previous ECG from 10/31/2017  Similar to previous ECG  Rhythm: sinus rhythm  Conduction: 1st degree  Other findings: LVH  Clinical impression: non-specific ECG  Comments: Non-specific ST -T wave abnormality              Assessment:       Diagnosis Plan   1. SVT (supraventricular tachycardia)     2. AV block, Mobitz 1     3. Cardiac pacemaker in situ            Plan:       1. SVT. Dr. Adrian and I saw Ms. Higgins. We reviewed the pacemaker diagnostics and think it is SVT and not VT. Agree with changing her to metoprolol and she feels better on it as her palpitations have significantly decreased.    2. AV block, s/p PPM. Normal device function and testing. Remote check in 3 months.    Return to see Dr. Mccullough in 6 months or sooner should the need arise.     As always, it has been a pleasure to participate in your patient's care.      Sincerely,         LUIS A Mon

## 2017-12-20 ENCOUNTER — TELEPHONE (OUTPATIENT)
Dept: CARDIOLOGY | Facility: CLINIC | Age: 77
End: 2017-12-20

## 2017-12-20 NOTE — TELEPHONE ENCOUNTER
Pt's pharmacy called to see about changing pt's Metoprolol from Tartrate back to Succiunate. They said she has told them she is having episodes of palpitations since the med change. I called pt and verified with her that she is having the episodes.......Celeste

## 2017-12-20 NOTE — TELEPHONE ENCOUNTER
I did not changer her from succinate to tartrate-she came to me on tartrate from [facility]-not sure when or who had her on succinate I think in my note he changed her from diltiazem to metoprolol?? Not sure the real story- can you check to see if they meant diltiazem and who may have ordered the succinate??

## 2017-12-20 NOTE — TELEPHONE ENCOUNTER
Dr. Mccullough from what I can tell it looks like you were the one that changed the pt's Metoprolol. Can you please see message below and advise?.......Celeste

## 2017-12-21 RX ORDER — METOPROLOL SUCCINATE 50 MG/1
50 TABLET, EXTENDED RELEASE ORAL 2 TIMES DAILY
Qty: 180 TABLET | Refills: 3 | Status: SHIPPED | OUTPATIENT
Start: 2017-12-21 | End: 2018-12-08 | Stop reason: SDUPTHER

## 2017-12-21 RX ORDER — METOPROLOL SUCCINATE 50 MG/1
50 TABLET, EXTENDED RELEASE ORAL DAILY
Qty: 90 TABLET | Refills: 3 | Status: SHIPPED | OUTPATIENT
Start: 2017-12-21 | End: 2017-12-21 | Stop reason: SDUPTHER

## 2018-02-15 NOTE — TELEPHONE ENCOUNTER
----- Message from Lucie Ge sent at 6/23/2017  9:24 AM EDT -----  Regarding: refill  Contact: [phone]   place patient above on Diltiazem 120 mg Trail for  30 days and states for patient to call in office to see how patient is doing then patient should be able to get prescription for 90 day supply if approved    Please call patient at the number above, patient pharmacy is listed below along with number.     [facility]   [phone]   Potential for Falls     Patient will remain free of falls Progressing

## 2018-03-12 RX ORDER — TRIAMTERENE AND HYDROCHLOROTHIAZIDE 75; 50 MG/1; MG/1
TABLET ORAL
Qty: 90 TABLET | Refills: 0 | Status: SHIPPED | OUTPATIENT
Start: 2018-03-12 | End: 2018-06-15 | Stop reason: SDUPTHER

## 2018-04-30 RX ORDER — OMEPRAZOLE 40 MG/1
CAPSULE, DELAYED RELEASE ORAL
Qty: 90 CAPSULE | Refills: 0 | Status: SHIPPED | OUTPATIENT
Start: 2018-04-30 | End: 2018-08-08 | Stop reason: SDUPTHER

## 2018-06-05 ENCOUNTER — OFFICE VISIT (OUTPATIENT)
Dept: INTERNAL MEDICINE | Age: 78
End: 2018-06-05

## 2018-06-05 VITALS
DIASTOLIC BLOOD PRESSURE: 70 MMHG | WEIGHT: 155.8 LBS | OXYGEN SATURATION: 98 % | HEIGHT: 67 IN | HEART RATE: 60 BPM | SYSTOLIC BLOOD PRESSURE: 142 MMHG | BODY MASS INDEX: 24.45 KG/M2 | TEMPERATURE: 97.8 F

## 2018-06-05 DIAGNOSIS — Z00.00 MEDICARE ANNUAL WELLNESS VISIT, SUBSEQUENT: Primary | ICD-10-CM

## 2018-06-05 DIAGNOSIS — K21.9 GASTROESOPHAGEAL REFLUX DISEASE WITHOUT ESOPHAGITIS: ICD-10-CM

## 2018-06-05 DIAGNOSIS — E78.00 HYPERCHOLESTEROLEMIA: Primary | ICD-10-CM

## 2018-06-05 DIAGNOSIS — Z23 ENCOUNTER FOR IMMUNIZATION: ICD-10-CM

## 2018-06-05 DIAGNOSIS — I10 ESSENTIAL HYPERTENSION: ICD-10-CM

## 2018-06-05 DIAGNOSIS — E78.00 HYPERCHOLESTEROLEMIA: ICD-10-CM

## 2018-06-05 LAB
ALBUMIN SERPL-MCNC: 4.2 G/DL (ref 3.5–5.2)
ALBUMIN/GLOB SERPL: 1.7 G/DL
ALP SERPL-CCNC: 69 U/L (ref 39–117)
ALT SERPL-CCNC: 14 U/L (ref 1–33)
APPEARANCE UR: CLEAR
AST SERPL-CCNC: 15 U/L (ref 1–32)
BACTERIA #/AREA URNS HPF: NORMAL /HPF
BASOPHILS # BLD AUTO: 0.02 10*3/MM3 (ref 0–0.2)
BASOPHILS NFR BLD AUTO: 0.4 % (ref 0–1.5)
BILIRUB SERPL-MCNC: 0.3 MG/DL (ref 0.1–1.2)
BILIRUB UR QL STRIP: NEGATIVE
BUN SERPL-MCNC: 18 MG/DL (ref 8–23)
BUN/CREAT SERPL: 18.6 (ref 7–25)
CALCIUM SERPL-MCNC: 9.7 MG/DL (ref 8.6–10.5)
CASTS URNS MICRO: NORMAL
CHLORIDE SERPL-SCNC: 100 MMOL/L (ref 98–107)
CHOLEST SERPL-MCNC: 213 MG/DL (ref 0–200)
CO2 SERPL-SCNC: 28.9 MMOL/L (ref 22–29)
COLOR UR: YELLOW
CREAT SERPL-MCNC: 0.97 MG/DL (ref 0.57–1)
EOSINOPHIL # BLD AUTO: 0.13 10*3/MM3 (ref 0–0.7)
EOSINOPHIL NFR BLD AUTO: 2.5 % (ref 0.3–6.2)
EPI CELLS #/AREA URNS HPF: NORMAL /HPF
ERYTHROCYTE [DISTWIDTH] IN BLOOD BY AUTOMATED COUNT: 12.8 % (ref 11.7–13)
GFR SERPLBLD CREATININE-BSD FMLA CKD-EPI: 55 ML/MIN/1.73
GFR SERPLBLD CREATININE-BSD FMLA CKD-EPI: 67 ML/MIN/1.73
GLOBULIN SER CALC-MCNC: 2.5 GM/DL
GLUCOSE SERPL-MCNC: 105 MG/DL (ref 65–99)
GLUCOSE UR QL: NEGATIVE
HCT VFR BLD AUTO: 42.9 % (ref 35.6–45.5)
HDLC SERPL-MCNC: 47 MG/DL (ref 40–60)
HGB BLD-MCNC: 13.7 G/DL (ref 11.9–15.5)
HGB UR QL STRIP: NEGATIVE
IMM GRANULOCYTES # BLD: 0 10*3/MM3 (ref 0–0.03)
IMM GRANULOCYTES NFR BLD: 0 % (ref 0–0.5)
KETONES UR QL STRIP: NEGATIVE
LDLC SERPL CALC-MCNC: 139 MG/DL (ref 0–100)
LEUKOCYTE ESTERASE UR QL STRIP: ABNORMAL
LYMPHOCYTES # BLD AUTO: 2.07 10*3/MM3 (ref 0.9–4.8)
LYMPHOCYTES NFR BLD AUTO: 40.2 % (ref 19.6–45.3)
MCH RBC QN AUTO: 29.8 PG (ref 26.9–32)
MCHC RBC AUTO-ENTMCNC: 31.9 G/DL (ref 32.4–36.3)
MCV RBC AUTO: 93.5 FL (ref 80.5–98.2)
MONOCYTES # BLD AUTO: 0.45 10*3/MM3 (ref 0.2–1.2)
MONOCYTES NFR BLD AUTO: 8.7 % (ref 5–12)
NEUTROPHILS # BLD AUTO: 2.48 10*3/MM3 (ref 1.9–8.1)
NEUTROPHILS NFR BLD AUTO: 48.2 % (ref 42.7–76)
NITRITE UR QL STRIP: NEGATIVE
PH UR STRIP: 7.5 [PH] (ref 5–8)
PLATELET # BLD AUTO: 221 10*3/MM3 (ref 140–500)
POTASSIUM SERPL-SCNC: 4.1 MMOL/L (ref 3.5–5.2)
PROT SERPL-MCNC: 6.7 G/DL (ref 6–8.5)
PROT UR QL STRIP: NEGATIVE
RBC # BLD AUTO: 4.59 10*6/MM3 (ref 3.9–5.2)
RBC #/AREA URNS HPF: NORMAL /HPF
SODIUM SERPL-SCNC: 143 MMOL/L (ref 136–145)
SP GR UR: 1.01 (ref 1–1.03)
TRIGL SERPL-MCNC: 137 MG/DL (ref 0–150)
UROBILINOGEN UR STRIP-MCNC: ABNORMAL MG/DL
VLDLC SERPL CALC-MCNC: 27.4 MG/DL (ref 5–40)
WBC # BLD AUTO: 5.15 10*3/MM3 (ref 4.5–10.7)
WBC #/AREA URNS HPF: NORMAL /HPF

## 2018-06-05 PROCEDURE — G0009 ADMIN PNEUMOCOCCAL VACCINE: HCPCS | Performed by: INTERNAL MEDICINE

## 2018-06-05 PROCEDURE — 99214 OFFICE O/P EST MOD 30 MIN: CPT | Performed by: INTERNAL MEDICINE

## 2018-06-05 PROCEDURE — 96160 PT-FOCUSED HLTH RISK ASSMT: CPT | Performed by: INTERNAL MEDICINE

## 2018-06-05 PROCEDURE — 90732 PPSV23 VACC 2 YRS+ SUBQ/IM: CPT | Performed by: INTERNAL MEDICINE

## 2018-06-05 PROCEDURE — G0439 PPPS, SUBSEQ VISIT: HCPCS | Performed by: INTERNAL MEDICINE

## 2018-06-05 RX ORDER — PRAVASTATIN SODIUM 40 MG
80 TABLET ORAL EVERY OTHER DAY
Qty: 45 TABLET | Refills: 1
Start: 2018-06-05 | End: 2018-06-15 | Stop reason: DRUGHIGH

## 2018-06-05 NOTE — PROGRESS NOTES
QUICK REFERENCE INFORMATION:  The ABCs of the Annual Wellness Visit    Subsequent Medicare Wellness Visit    HEALTH RISK ASSESSMENT    1940    Recent Hospitalizations:  No hospitalization(s) within the last year..        Current Medical Providers:  Patient Care Team:  Dylan Gannon MD as PCP - General  Dylan Gannon MD as PCP - Family Medicine  To Rivera MD as Consulting Physician (General Surgery)  Torsten Benson Jr., MD as Consulting Physician (Cardiology)  Arsenio Witt MD as Consulting Physician (Ophthalmology)  Brown Beckman MD as Consulting Physician (Orthopedic Surgery)        Smoking Status:  History   Smoking Status   • Former Smoker   • Packs/day: 0.50   • Years: 15.00   • Quit date: 1970   Smokeless Tobacco   • Never Used     Comment: quit age 30       Alcohol Consumption:  History   Alcohol Use   • Yes     Comment: RARE       Depression Screen:   PHQ-2/PHQ-9 Depression Screening 6/5/2018   Little interest or pleasure in doing things 0   Feeling down, depressed, or hopeless 0   Total Score 0       Health Habits and Functional and Cognitive Screening:  Functional & Cognitive Status 6/5/2018   Do you have difficulty preparing food and eating? No   Do you have difficulty bathing yourself, getting dressed or grooming yourself? No   Do you have difficulty using the toilet? No   Do you have difficulty moving around from place to place? No   Do you have trouble with steps or getting out of a bed or a chair? No   In the past year have you fallen or experienced a near fall? No   Do you need help using the phone?  No   Are you deaf or do you have serious difficulty hearing?  No   Do you need help with transportation? No   Do you need help shopping? No   Do you need help preparing meals?  No   Do you need help with housework?  No   Do you need help with laundry? No   Do you need help taking your medications? No   Do you need help managing money? No   Do you ever drive or ride in a car  without wearing a seat belt? No           Does the patient have evidence of cognitive impairment? No    Aspirin use counseling: There is no clinical evidence that beginning primary prophylaxis with aspirin in this age group is clinically beneficial.    Recent Lab Results:  CMP:  Lab Results   Component Value Date     (H) 05/22/2017    BUN 24 (H) 11/01/2017    CREATININE 0.86 11/01/2017    EGFRIFNONA 64 11/01/2017    EGFRIFAFRI 68 05/22/2017    BCR 27.9 (H) 11/01/2017     11/01/2017    K 3.6 11/01/2017    CO2 28.7 11/01/2017    CALCIUM 9.6 11/01/2017    PROTENTOTREF 7.3 05/22/2017    ALBUMIN 4.50 05/22/2017    LABGLOBREF 2.8 05/22/2017    LABIL2 1.6 05/22/2017    BILITOT 0.4 05/22/2017    ALKPHOS 87 05/22/2017    AST 19 05/22/2017    ALT 16 05/22/2017     Lipid Panel:  Lab Results   Component Value Date    TRIG 213 (H) 05/22/2017    HDL 47 05/22/2017    VLDL 42.6 (H) 05/22/2017     HbA1c:  Lab Results   Component Value Date    HGBA1C 5.90 (H) 09/28/2016       Visual Acuity:  No exam data present    Age-appropriate Screening Schedule:  Refer to the list below for future screening recommendations based on patient's age, sex and/or medical conditions. Orders for these recommended tests are listed in the plan section. The patient has been provided with a written plan.    Health Maintenance   Topic Date Due   • ZOSTER VACCINE (2 of 3) 03/20/2010   • PNEUMOCOCCAL VACCINES (65+ LOW/MEDIUM RISK) (2 of 2 - PPSV23) 11/24/2016   • INFLUENZA VACCINE  08/01/2018   • MAMMOGRAM  05/05/2019   • LIPID PANEL  06/05/2019   • DXA SCAN  06/16/2019   • PAP SMEAR  06/26/2019   • TDAP/TD VACCINES (2 - Td) 09/15/2022        Subjective   History of Present Illness    Reanna Higgins is a 78 y.o. female who presents for an Subsequent Wellness Visit.    The following portions of the patient's history were reviewed and updated as appropriate: allergies, current medications, past family history, past medical history, past social  history, past surgical history and problem list.    Outpatient Medications Prior to Visit   Medication Sig Dispense Refill   • BIOTIN 5000 PO Take  by mouth.     • Boswellia-Glucosamine-Vit D (GLUCOSAMINE COMPLEX PO) Take 1 tablet by mouth Daily.     • cholecalciferol (VITAMIN D3) 1000 UNITS tablet Take 1 tablet by mouth Daily.     • cycloSPORINE (RESTASIS) 0.05 % ophthalmic emulsion Administer 1 drop to both eyes 2 (Two) Times a Day.     • metoprolol succinate XL (TOPROL-XL) 50 MG 24 hr tablet Take 1 tablet by mouth 2 (Two) Times a Day. 180 tablet 3   • Multiple Vitamins-Minerals (MULTIVITAL-M) tablet Take 1 tablet by mouth daily.     • omeprazole (priLOSEC) 40 MG capsule TAKE ONE CAPSULE BY MOUTH ONE TIME DAILY 90 capsule 0   • pravastatin (PRAVACHOL) 40 MG tablet TAKE ONE TABLET BY MOUTH EVERY OTHER DAY 45 tablet 1   • triamterene-hydrochlorothiazide (MAXZIDE) 75-50 MG per tablet TAKE 1 TABLET BY MOUTH DAILY. 90 tablet 0   • oxybutynin XL (DITROPAN-XL) 5 MG 24 hr tablet Take 2 tablets by mouth Daily. 30 tablet 1     No facility-administered medications prior to visit.        Patient Active Problem List   Diagnosis   • Postartificial menopausal syndrome   • Colon polyp   • Gastroesophageal reflux disease   • Essential hypertension   • Hypercholesterolemia   • Ovarian failure   • Mitral and aortic valve disease   • Heart valve disease   • Abnormal electrocardiogram   • Routine health maintenance   • Syncope, effort   • Anxiety   • Peripheral neuropathy   • Syncope   • Palpitations   • Localized edema   • Elevated glucose   • Malignant neoplasm of cheek   • Non-rheumatic mitral regurgitation   • Osteopenia   • Bladder spasm   • SVT (supraventricular tachycardia)   • AV block, Mobitz 1   • Cardiac pacemaker in situ       Advance Care Planning:  has an advance directive - a copy has been provided and is in file    Identification of Risk Factors:  Risk factors include: None noted.    Review of Systems    Compared to  "one year ago, the patient feels her physical health is the same.  Compared to one year ago, the patient feels her mental health is the same.    Objective     Physical Exam    Vitals:    06/05/18 0757   BP: 142/70   Pulse: 60   Temp: 97.8 °F (36.6 °C)   SpO2: 98%   Weight: 70.7 kg (155 lb 12.8 oz)   Height: 170.2 cm (67.01\")   PainSc: 0-No pain       Patient's Body mass index is 24.4 kg/m². BMI is within normal parameters. No follow-up required.      Assessment/Plan   Patient Self-Management and Personalized Health Advice  The patient has been provided with information about: NONE Noted and preventive services including:   · Patient will complete her hepatitis and shingles series in November of this year..    Visit Diagnoses:    ICD-10-CM ICD-9-CM   1. Medicare annual wellness visit, subsequent Z00.00 V70.0   2. Hypercholesterolemia E78.00 272.0   3. Essential hypertension I10 401.9   4. Encounter for immunization Z23 V03.89       Orders Placed This Encounter   Procedures   • Pneumococcal Polysaccharide Vaccine 23-Valent Greater Than or Equal To 3yo Subcutaneous / IM       Outpatient Encounter Prescriptions as of 6/5/2018   Medication Sig Dispense Refill   • BIOTIN 5000 PO Take  by mouth.     • Boswellia-Glucosamine-Vit D (GLUCOSAMINE COMPLEX PO) Take 1 tablet by mouth Daily.     • cholecalciferol (VITAMIN D3) 1000 UNITS tablet Take 1 tablet by mouth Daily.     • cycloSPORINE (RESTASIS) 0.05 % ophthalmic emulsion Administer 1 drop to both eyes 2 (Two) Times a Day.     • metoprolol succinate XL (TOPROL-XL) 50 MG 24 hr tablet Take 1 tablet by mouth 2 (Two) Times a Day. 180 tablet 3   • Multiple Vitamins-Minerals (MULTIVITAL-M) tablet Take 1 tablet by mouth daily.     • omeprazole (priLOSEC) 40 MG capsule TAKE ONE CAPSULE BY MOUTH ONE TIME DAILY 90 capsule 0   • pravastatin (PRAVACHOL) 40 MG tablet TAKE ONE TABLET BY MOUTH EVERY OTHER DAY 45 tablet 1   • triamterene-hydrochlorothiazide (MAXZIDE) 75-50 MG per tablet TAKE " 1 TABLET BY MOUTH DAILY. 90 tablet 0   • [DISCONTINUED] oxybutynin XL (DITROPAN-XL) 5 MG 24 hr tablet Take 2 tablets by mouth Daily. 30 tablet 1     No facility-administered encounter medications on file as of 6/5/2018.        Reviewed use of high risk medication in the elderly: yes  Reviewed for potential of harmful drug interactions in the elderly: yes    Follow Up:  1 year    An After Visit Summary and PPPS with all of these plans were given to the patient.

## 2018-06-05 NOTE — PATIENT INSTRUCTIONS
Medicare Wellness  Personal Prevention Plan of Service     Date of Office Visit:  2018  Encounter Provider:  Dylan Gannon MD  Place of Service:  [facility]  Patient Name: Reanna Higgins  :  1940    As part of the Medicare Wellness portion of your visit today, we are providing you with this personalized preventive plan of services (PPPS). This plan is based upon recommendations of the [place] Preventive Services Task Force (USPSTF) and the Advisory Committee on Immunization Practices (ACIP).    This lists the preventive care services that should be considered, and provides dates of when you are due. Items listed as completed are up-to-date and do not require any further intervention.    Health Maintenance   Topic Date Due   • ZOSTER VACCINE (2 of 3) 2010   • PNEUMOCOCCAL VACCINES (65+ LOW/MEDIUM RISK) (2 of 2 - PPSV23) 2016   • INFLUENZA VACCINE  2018   • MAMMOGRAM  2019   • MEDICARE ANNUAL WELLNESS  2019   • LIPID PANEL  2019   • DXA SCAN  2019   • PAP SMEAR  2019   • TDAP/TD VACCINES (2 - Td) 09/15/2022       Orders Placed This Encounter   Procedures   • Pneumococcal Polysaccharide Vaccine 23-Valent Greater Than or Equal To 1yo Subcutaneous / IM   • Comprehensive Metabolic Panel   • Lipid Panel   • Urinalysis With / Microscopic If Indicated (No Culture) - Urine, Clean Catch   • CBC & Differential     Order Specific Question:   Manual Differential     Answer:   No       No Follow-up on file.

## 2018-06-05 NOTE — PROGRESS NOTES
Subjective   Reanna Higgins is a 78 y.o. female.     History of Present Illness     Patient presents this morning for annual wellness visit.    Health maintenance: Last ophthalmology visit April 27 of this year, dentist March 26 of this year.  Exercise: Patient rides her bike 5 miles 5 days a week, skates twice weekly.    Hyperlipidemia: Patient's compliant with pravastatin 40 mg a day, and dietary fat restriction she is fasting for lab work today.    Hypertension: She is compliant with medication, exercise, dietary salt restriction, and home blood pressure monitoring.  Pressure has ranged from a low of 120/53 to a high of 142/62 over the past 3 months.  There are no medication side effects noted.    Gastroesophageal reflux disease.  Over the past month or so patient has had some reflux and regurgitation of food and fluids.  These seem to occur with ingestion of carbonated beverages.  We will continue to observe this, and if symptoms persist, we'll change medication from omeprazole to 1 times.  Patient was advised carbonated beverages in the cells may precipitate episodes of reflux.    Immunization update, patient will receive her Pneumovax today to complete her series.    The following portions of the patient's history were reviewed and updated as appropriate: allergies, current medications, past family history, past medical history, past social history, past surgical history and problem list.    Review of Systems   Constitutional: Negative.    HENT: Negative.    Eyes: Negative.    Respiratory: Negative.    Cardiovascular: Positive for palpitations.        Patient is taking metoprolol to control palpitations, sees cardiology on   Gastrointestinal: Negative.    Endocrine: Negative.    Genitourinary: Negative.    Musculoskeletal: Negative.    Skin: Negative.    Allergic/Immunologic: Negative for immunocompromised state.   Neurological: Negative.    Hematological: Negative.    Psychiatric/Behavioral: Negative.         Objective   Physical Exam   Constitutional: She is oriented to person, place, and time. She appears well-developed and well-nourished. No distress.   HENT:   Head: Normocephalic and atraumatic.   Right Ear: Tympanic membrane, external ear and ear canal normal.   Left Ear: Tympanic membrane, external ear and ear canal normal.   Mouth/Throat: Uvula is midline, oropharynx is clear and moist and mucous membranes are normal.   Eyes: Conjunctivae and EOM are normal. Pupils are equal, round, and reactive to light.   Neck: Normal range of motion. Neck supple. No JVD present. Carotid bruit is not present. No thyromegaly present.   Cardiovascular: Normal rate, regular rhythm, normal heart sounds and intact distal pulses.  Exam reveals no gallop and no friction rub.    No murmur heard.  Pulmonary/Chest: Effort normal and breath sounds normal. She has no wheezes. She has no rales.   Abdominal: Soft. Bowel sounds are normal. There is no hepatosplenomegaly. There is no tenderness.   Genitourinary:   Genitourinary Comments: defer   Musculoskeletal: Normal range of motion. She exhibits no edema or deformity.   Lymphadenopathy:     She has no cervical adenopathy.   Neurological: She is alert and oriented to person, place, and time. She has normal strength and normal reflexes. No cranial nerve deficit or sensory deficit. Coordination normal.   Skin: Skin is warm and dry. No rash noted.   Psychiatric: She has a normal mood and affect. Her speech is normal and behavior is normal. Judgment and thought content normal. Cognition and memory are normal.   Nursing note and vitals reviewed.      Assessment/Plan   Reanna was seen today for annual exam.    Diagnoses and all orders for this visit:    Medicare annual wellness visit, subsequent  -     CBC & Differential    Hypercholesterolemia  -     Lipid Panel    Essential hypertension  -     Comprehensive Metabolic Panel  -     Urinalysis With / Microscopic If Indicated (No Culture) -  Urine, Clean Catch    Encounter for immunization  -     Pneumococcal Polysaccharide Vaccine 23-Valent Greater Than or Equal To 3yo Subcutaneous / IM    Gastroesophageal reflux disease without esophagitis      Number-one subsequent annual Medicare wellness evaluation, clinically stable see comments above and below.  Patient will be asked to get an CBC today but will need to sign an ABN since is a noncovered benefit.  Repeat exam one year.    Hyperlipidemia on medication, status to be determined.  Plan: Continue meds, check lipids as above follow-up results online and adjust treatment if indicated.    #3 hypertension good control at home, today's eyes minimally elevated, I will continue same medication and have patient come back in about 4 months for blood pressure check.    Immunization update Pneumovax today.    #5 esophageal reflux, patient will observe for continued worsening of symptoms while avoiding carbonated beverages.  She will let me know if this is a problem and then if so we will need to change to Protonix since she's been on omeprazole for quite a few years.

## 2018-06-06 NOTE — PROGRESS NOTES
Here are your laboratory results. Alll are  acceptable with the exception of the lipid panel. I would suggest that we increase to Pravachol to 80 mg every other day and  repeat the lab in two months.

## 2018-06-07 ENCOUNTER — OFFICE VISIT (OUTPATIENT)
Dept: INTERNAL MEDICINE | Age: 78
End: 2018-06-07

## 2018-06-07 VITALS
SYSTOLIC BLOOD PRESSURE: 140 MMHG | TEMPERATURE: 98 F | OXYGEN SATURATION: 96 % | HEIGHT: 67 IN | BODY MASS INDEX: 24.61 KG/M2 | HEART RATE: 65 BPM | DIASTOLIC BLOOD PRESSURE: 56 MMHG | WEIGHT: 156.8 LBS

## 2018-06-07 DIAGNOSIS — L03.114 CELLULITIS OF LEFT UPPER EXTREMITY: Primary | ICD-10-CM

## 2018-06-07 PROCEDURE — 99213 OFFICE O/P EST LOW 20 MIN: CPT | Performed by: INTERNAL MEDICINE

## 2018-06-07 RX ORDER — AZITHROMYCIN 250 MG/1
TABLET, FILM COATED ORAL
Qty: 6 TABLET | Refills: 0 | Status: SHIPPED | OUTPATIENT
Start: 2018-06-07 | End: 2018-06-15

## 2018-06-07 NOTE — PROGRESS NOTES
"[facility]  QI KRAUSE MD      Reanna Higgins / 78 y.o. / female  06/07/2018      ASSESSMENT & PLAN:    Problem List Items Addressed This Visit     None      Visit Diagnoses     Cellulitis of left upper extremity    -  Primary    Relevant Medications    azithromycin (ZITHROMAX Z-SHAWN) 250 MG tablet        No orders of the defined types were placed in this encounter.      Summary/Discussion:  Number-one cellulitis left upper extremity, patient does not appear toxic, vital signs are stable and she is afebrile.  Recommend treatment with local heat 3-4 times a day for 20 minutes, will cover with Z-Shawn since she is allergic to penicillin and sulfa she will contact us on Monday with an update or sooner if condition worsens..     Return if symptoms worsen or fail to improve.    ____________________________________________________________________    VITALS    Visit Vitals  /56   Pulse 65   Temp 98 °F (36.7 °C)   Ht 170.2 cm (67.01\")   Wt 71.1 kg (156 lb 12.8 oz)   SpO2 96%   BMI 24.55 kg/m²       BP Readings from Last 3 Encounters:   06/07/18 140/56   06/05/18 142/70   12/08/17 144/68     Wt Readings from Last 3 Encounters:   06/07/18 71.1 kg (156 lb 12.8 oz)   06/05/18 70.7 kg (155 lb 12.8 oz)   12/08/17 68 kg (150 lb)      Body mass index is 24.55 kg/m².    CC:  Main reason(s) for today's visit: Arm Pain (L deltoid area)      HPI:     Patient was last seen in our office on June 5.  That time she received a Pneumovax shot to the left upper extremity.  Partially 24 hours afterwards, patient noted discomfort and redness developing over the left arm.  No discharge was noted.    Patient Care Team:  Qi Krause MD as PCP - General  Qi Krause MD as PCP - Family Medicine  To Rivera MD as Consulting Physician (General Surgery)  Torsten Benson Jr., MD as Consulting Physician (Cardiology)  Arsenio Witt MD as Consulting Physician (Ophthalmology)  Brown Beckman MD as Consulting " Physician (Orthopedic Surgery)  ____________________________________________________________________    REVIEW OF SYSTEMS    Review of Systems   Constitutional: Negative for appetite change, chills, diaphoresis and fever.         PHYSICAL EXAMINATION    Physical Exam   Constitutional: She appears well-developed and well-nourished. No distress.   Skin: Skin is warm and dry. She is not diaphoretic. There is erythema.        Erythema noted   Psychiatric: She has a normal mood and affect. Her behavior is normal. Judgment and thought content normal.   Nursing note and vitals reviewed.        REVIEWED DATA:    Labs:     Lab Results   Component Value Date     06/05/2018    K 4.1 06/05/2018    AST 15 06/05/2018    ALT 14 06/05/2018    BUN 18 06/05/2018    CREATININE 0.97 06/05/2018    CREATININE 0.86 11/01/2017    CREATININE 0.96 05/22/2017    EGFRIFNONA 55 (L) 06/05/2018    EGFRIFAFRI 67 06/05/2018       Lab Results   Component Value Date    HGBA1C 5.90 (H) 09/28/2016    GLUCOSE 107 (H) 11/01/2017    GLUCOSE 96 10/11/2016    GLUCOSE 148 (H) 10/10/2016       Lab Results   Component Value Date     (H) 06/05/2018     (H) 05/22/2017     (H) 07/07/2016    HDL 47 06/05/2018    TRIG 137 06/05/2018       Lab Results   Component Value Date    TSH 3.150 09/28/2016       Lab Results   Component Value Date    WBC 5.96 05/22/2017    HGB 13.7 06/05/2018    HGB 13.9 05/22/2017    HGB 13.3 10/11/2016     06/05/2018       No results found for: PSA      Imaging:         Medical Tests:         Summary of old records / correspondence / consultant report:         Request outside records:         ALLERGIES  Allergies   Allergen Reactions   • Ancef [Cefazolin] Other (See Comments)     Hypotension/bradycardia   • Codeine    • Sulfa Antibiotics         MEDICATIONS  Current Outpatient Prescriptions   Medication Sig Dispense Refill   • azithromycin (ZITHROMAX Z-DAWOOD) 250 MG tablet Take 2 tablets the first day, then 1  tablet daily for 4 days. 6 tablet 0   • BIOTIN 5000 PO Take  by mouth.     • Boswellia-Glucosamine-Vit D (GLUCOSAMINE COMPLEX PO) Take 1 tablet by mouth Daily.     • cholecalciferol (VITAMIN D3) 1000 UNITS tablet Take 1 tablet by mouth Daily.     • cycloSPORINE (RESTASIS) 0.05 % ophthalmic emulsion Administer 1 drop to both eyes 2 (Two) Times a Day.     • metoprolol succinate XL (TOPROL-XL) 50 MG 24 hr tablet Take 1 tablet by mouth 2 (Two) Times a Day. 180 tablet 3   • Multiple Vitamins-Minerals (MULTIVITAL-M) tablet Take 1 tablet by mouth daily.     • omeprazole (priLOSEC) 40 MG capsule TAKE ONE CAPSULE BY MOUTH ONE TIME DAILY 90 capsule 0   • pravastatin (PRAVACHOL) 40 MG tablet Take 2 tablets by mouth Every Other Day. 45 tablet 1   • triamterene-hydrochlorothiazide (MAXZIDE) 75-50 MG per tablet TAKE 1 TABLET BY MOUTH DAILY. 90 tablet 0     No current facility-administered medications for this visit.        PFSH:     The following portions of the patient's history were reviewed and updated as appropriate: Allergies / Current Medications / Past Medical History / Surgical History / Social History / Family History    PROBLEM LIST   Patient Active Problem List   Diagnosis   • Postartificial menopausal syndrome   • Colon polyp   • Gastroesophageal reflux disease   • Essential hypertension   • Hypercholesterolemia   • Ovarian failure   • Mitral and aortic valve disease   • Heart valve disease   • Abnormal electrocardiogram   • Routine health maintenance   • Syncope, effort   • Anxiety   • Peripheral neuropathy   • Syncope   • Palpitations   • Localized edema   • Elevated glucose   • Malignant neoplasm of cheek   • Non-rheumatic mitral regurgitation   • Osteopenia   • Bladder spasm   • SVT (supraventricular tachycardia)   • AV block, Mobitz 1   • Cardiac pacemaker in situ       PAST MEDICAL HISTORY  Past Medical History:   Diagnosis Date   • Abdominal bruit     Ultrasound Normal 2015   • Abdominal pain    • Acute upper  respiratory infection    • Anxiety    • Arthralgia of multiple sites    • Cancer     skin   • Carpal tunnel syndrome    • Cataract    • Choledocholithiasis    • Cystic fibroadenosis of breast    • Earache    • Gastritis    • GERD (gastroesophageal reflux disease)    • Hypercholesterolemia    • Hypertension    • Osteoarthritis of both hands    • Palpitations    • Peripheral neuropathy    • Sea sickness    • Second degree AV block, Mobitz type I    • Syncope    • Trigger finger        SURGICAL HISTORY  Past Surgical History:   Procedure Laterality Date   • BREAST CYST EXCISION Bilateral    • CARDIAC ELECTROPHYSIOLOGY PROCEDURE N/A 10/10/2016    Procedure: Pacemaker DC new  [place];  Surgeon: Wilfrido Hameed MD;  Location: [facility] INVASIVE LOCATION;  Service:    • CATARACT EXTRACTION      NOVEMBER AND DECEMBER 2014   • CHOLECYSTECTOMY     • HYSTERECTOMY     • KNEE ARTHROSCOPY Bilateral 04/2014    MENISCAL TEAR   • PACEMAKER IMPLANTATION  10/10/2016   • SHOULDER SURGERY Right     ROTATOR CUFF SURGERY JUNE 18, 2015   • TONSILLECTOMY     • TRIGGER FINGER RELEASE      both hands       SOCIAL HISTORY  Social History     Social History   • Marital status:    • Number of children: 2     Occupational History   • Retired      Social History Main Topics   • Smoking status: Former Smoker     Packs/day: 0.50     Years: 15.00     Quit date: 1970   • Smokeless tobacco: Never Used      Comment: quit age 30   • Alcohol use Yes      Comment: RARE   • Drug use: No   • Sexual activity: Defer     Other Topics Concern   • Not on file       FAMILY HISTORY  Family History   Problem Relation Age of Onset   • Thyroid disease Daughter    • Heart disease Brother    • Hypertension Mother    • Hypertension Father    • Alcohol abuse Father    • Depression Father    • Anxiety disorder Father    • Diabetes Father        There are no preventive care reminders to display for this patient.    Overdue health maintenance interventions   reviewed with patient.    Dictated utilizing Dragon voice recognition software

## 2018-06-15 ENCOUNTER — CLINICAL SUPPORT NO REQUIREMENTS (OUTPATIENT)
Dept: CARDIOLOGY | Facility: CLINIC | Age: 78
End: 2018-06-15

## 2018-06-15 ENCOUNTER — OFFICE VISIT (OUTPATIENT)
Dept: CARDIOLOGY | Facility: CLINIC | Age: 78
End: 2018-06-15

## 2018-06-15 VITALS
BODY MASS INDEX: 24.67 KG/M2 | WEIGHT: 157.2 LBS | DIASTOLIC BLOOD PRESSURE: 76 MMHG | HEIGHT: 67 IN | SYSTOLIC BLOOD PRESSURE: 146 MMHG | HEART RATE: 64 BPM

## 2018-06-15 DIAGNOSIS — I47.1 SVT (SUPRAVENTRICULAR TACHYCARDIA) (HCC): Primary | ICD-10-CM

## 2018-06-15 DIAGNOSIS — I49.5 SICK SINUS SYNDROME (HCC): Primary | ICD-10-CM

## 2018-06-15 DIAGNOSIS — I10 ESSENTIAL HYPERTENSION: ICD-10-CM

## 2018-06-15 PROCEDURE — 99213 OFFICE O/P EST LOW 20 MIN: CPT | Performed by: INTERNAL MEDICINE

## 2018-06-15 PROCEDURE — 93000 ELECTROCARDIOGRAM COMPLETE: CPT | Performed by: INTERNAL MEDICINE

## 2018-06-15 PROCEDURE — 93280 PM DEVICE PROGR EVAL DUAL: CPT | Performed by: INTERNAL MEDICINE

## 2018-06-15 RX ORDER — PRAVASTATIN SODIUM 40 MG
40 TABLET ORAL DAILY
COMMUNITY
End: 2018-08-09 | Stop reason: SDUPTHER

## 2018-06-15 RX ORDER — TRIAMTERENE AND HYDROCHLOROTHIAZIDE 75; 50 MG/1; MG/1
TABLET ORAL
Qty: 90 TABLET | Refills: 0 | Status: SHIPPED | OUTPATIENT
Start: 2018-06-15 | End: 2018-09-09 | Stop reason: SDUPTHER

## 2018-06-15 NOTE — PROGRESS NOTES
Date of Office Visit: 10/31/2017  Encounter Provider: Francis Mccullough MD  Place of Service: [facility]  Patient Name: Reanna Higgins  :1940  [id number]    Chief Complaint   Patient presents with   • Palpitations   :     HPI: Reanna Higgins is a 78 y.o. female  She is a lady who has a pacemaker implanted.  When we saw her the first time, and there was a concern she may have had VT.  She was evaluated by the arrhythmia service and they felt it was a short run of SVT.  We have put her on beta blockade and she is here for followup.  She is doing well.  She is not really having palpitations.  No PND.  No orthopnea.  No other symptoms.  She is very active; roller skates and other activity without limitation.              Past Medical History:   Diagnosis Date   • Abdominal bruit     Ultrasound Normal    • Abdominal pain    • Acute upper respiratory infection    • Anxiety    • Arthralgia of multiple sites    • Cancer     skin   • Carpal tunnel syndrome    • Cataract    • Choledocholithiasis    • Cystic fibroadenosis of breast    • Earache    • Gastritis    • GERD (gastroesophageal reflux disease)    • Hypercholesterolemia    • Hypertension    • Osteoarthritis of both hands    • Palpitations    • Peripheral neuropathy    • Sea sickness    • Second degree AV block, Mobitz type I    • Syncope    • Trigger finger        Past Surgical History:   Procedure Laterality Date   • BREAST CYST EXCISION Bilateral    • CARDIAC ELECTROPHYSIOLOGY PROCEDURE N/A 10/10/2016    Procedure: Pacemaker DC erlin SHERMAN;  Surgeon: Wilfrido Hameed MD;  Location: [facility] INVASIVE LOCATION;  Service:    • CATARACT EXTRACTION      NOVEMBER AND 2014   • CHOLECYSTECTOMY     • HYSTERECTOMY     • KNEE ARTHROSCOPY Bilateral 2014    MENISCAL TEAR   • PACEMAKER IMPLANTATION  10/10/2016   • SHOULDER SURGERY Right     ROTATOR CUFF SURGERY 2015   • TONSILLECTOMY     • TRIGGER FINGER  RELEASE      both hands       Social History     Social History   • Marital status:      Spouse name: N/A   • Number of children: 2   • Years of education: N/A     Occupational History   • Retired      Social History Main Topics   • Smoking status: Former Smoker     Packs/day: 0.50     Years: 15.00     Quit date: 1970   • Smokeless tobacco: Never Used      Comment: quit age 30   • Alcohol use Yes      Comment: RARE   • Drug use: No   • Sexual activity: Defer     Other Topics Concern   • Not on file     Social History Narrative   • No narrative on file       Family History   Problem Relation Age of Onset   • Thyroid disease Daughter    • Heart disease Brother    • Hypertension Mother    • Hypertension Father    • Alcohol abuse Father    • Depression Father    • Anxiety disorder Father    • Diabetes Father        Review of Systems   Constitution: Negative for decreased appetite, fever, malaise/fatigue and weight loss.   HENT: Negative for nosebleeds.    Eyes: Negative for double vision.   Cardiovascular: Negative for chest pain, claudication, cyanosis, dyspnea on exertion, irregular heartbeat, leg swelling, near-syncope, orthopnea, palpitations, paroxysmal nocturnal dyspnea and syncope.   Respiratory: Negative for cough, hemoptysis and shortness of breath.    Hematologic/Lymphatic: Negative for bleeding problem.   Skin: Negative for rash.   Musculoskeletal: Negative for falls and myalgias.   Gastrointestinal: Negative for hematochezia, jaundice, melena, nausea and vomiting.   Genitourinary: Negative for hematuria.   Neurological: Negative for dizziness and seizures.   Psychiatric/Behavioral: Negative for altered mental status and memory loss.       Allergies   Allergen Reactions   • Ancef [Cefazolin] Other (See Comments)     Hypotension/bradycardia   • Codeine    • Sulfa Antibiotics          Current Outpatient Prescriptions:   •  Boswellia-Glucosamine-Vit D (GLUCOSAMINE COMPLEX PO), Take 1 tablet by mouth  "Daily., Disp: , Rfl:   •  cholecalciferol (VITAMIN D3) 1000 UNITS tablet, Take 1 tablet by mouth Daily., Disp: , Rfl:   •  cycloSPORINE (RESTASIS) 0.05 % ophthalmic emulsion, Administer 1 drop to both eyes 2 (Two) Times a Day., Disp: , Rfl:   •  metoprolol succinate XL (TOPROL-XL) 50 MG 24 hr tablet, Take 1 tablet by mouth 2 (Two) Times a Day., Disp: 180 tablet, Rfl: 3  •  Multiple Vitamins-Minerals (MULTIVITAL-M) tablet, Take 1 tablet by mouth daily., Disp: , Rfl:   •  omeprazole (priLOSEC) 40 MG capsule, TAKE ONE CAPSULE BY MOUTH ONE TIME DAILY, Disp: 90 capsule, Rfl: 0  •  pravastatin (PRAVACHOL) 40 MG tablet, Take 40 mg by mouth Daily., Disp: , Rfl:   •  triamterene-hydrochlorothiazide (MAXZIDE) 75-50 MG per tablet, TAKE 1 TABLET BY MOUTH DAILY., Disp: 90 tablet, Rfl: 0     Objective:     Vitals:    06/15/18 1403   BP: 146/76   Pulse: 64   Weight: 71.3 kg (157 lb 3.2 oz)   Height: 170.2 cm (67\")     Body mass index is 24.62 kg/m².    Physical Exam   Constitutional: She is oriented to person, place, and time. She appears well-developed and well-nourished.   HENT:   Head: Normocephalic.   Eyes: No scleral icterus.   Neck: No JVD present. No thyromegaly present.   Cardiovascular: Normal rate, regular rhythm and normal heart sounds.  Exam reveals no gallop and no friction rub.    No murmur heard.  Pulmonary/Chest: Effort normal and breath sounds normal. She has no wheezes. She has no rales.       Abdominal: Soft. There is no hepatosplenomegaly. There is no tenderness.   Musculoskeletal: Normal range of motion. She exhibits no edema.   Lymphadenopathy:     She has no cervical adenopathy.   Neurological: She is alert and oriented to person, place, and time.   Skin: Skin is warm and dry. No rash noted.   Psychiatric: She has a normal mood and affect.         ECG 12 Lead  Date/Time: 6/15/2018 2:36 PM  Performed by: SHAWANDA DUGGAN  Authorized by: SHAWANDA DUGGAN   Comparison: compared with previous ECG   Similar to " previous ECG  Rhythm: sinus rhythm  Clinical impression: abnormal ECG  Comments: ns ST-T wave abnormality                 Assessment:       Diagnosis Plan   1. SVT (supraventricular tachycardia)     2. Essential hypertension            Plan:         I think she is doing very well.  I am not going to make any changes.  She is on metoprolol and her rhythm is stable.  I will have her come see Kamila Vicente in a year and see me in two.            As always, it has been a pleasure to participate in your patient's care.      Sincerely,       Francis Mccullough MD

## 2018-06-29 ENCOUNTER — TELEPHONE (OUTPATIENT)
Dept: CARDIOLOGY | Facility: CLINIC | Age: 78
End: 2018-06-29

## 2018-06-29 NOTE — TELEPHONE ENCOUNTER
----- Message from Reanna Higgins sent at 6/28/2018 10:09 PM EDT -----  Regarding: Non-Urgent Medical Question  Contact: [phone]  As of the last 2 days I have plugged my pacemaker device into a cell [employer] connection.  I am wondering if your office is receiving my data from my pacemaker.  In other words, I am trying to find out if this new device is transmitting to your office, properly?  Thanks, Reanna Higgins

## 2018-08-06 ENCOUNTER — RESULTS ENCOUNTER (OUTPATIENT)
Dept: INTERNAL MEDICINE | Age: 78
End: 2018-08-06

## 2018-08-06 DIAGNOSIS — E78.00 HYPERCHOLESTEROLEMIA: ICD-10-CM

## 2018-08-08 LAB
ALT SERPL-CCNC: 19 U/L (ref 1–33)
AST SERPL-CCNC: 18 U/L (ref 1–32)
CHOLEST SERPL-MCNC: 191 MG/DL (ref 0–200)
HDLC SERPL-MCNC: 42 MG/DL (ref 40–60)
LDLC SERPL CALC-MCNC: 116 MG/DL (ref 0–100)
TRIGL SERPL-MCNC: 166 MG/DL (ref 0–150)
VLDLC SERPL CALC-MCNC: 33.2 MG/DL (ref 5–40)

## 2018-08-08 RX ORDER — OMEPRAZOLE 40 MG/1
CAPSULE, DELAYED RELEASE ORAL
Qty: 90 CAPSULE | Refills: 1 | Status: SHIPPED | OUTPATIENT
Start: 2018-08-08 | End: 2019-01-15 | Stop reason: SDUPTHER

## 2018-08-09 RX ORDER — PRAVASTATIN SODIUM 40 MG
TABLET ORAL
Qty: 72 TABLET | Refills: 1 | Status: SHIPPED | OUTPATIENT
Start: 2018-08-09 | End: 2019-02-14 | Stop reason: SDUPTHER

## 2018-08-09 NOTE — PROGRESS NOTES
Liver tests/ lipid profile  Both within acceptable ranges. Continue all meds as they are currently being prescribed.Follow up with Dr Ganonn if any problems occur or persist or as per his discretionary advce regarding your next scheduled visit. If there are any questions or concerns I would encourage you to direct them to Dr Gannon during normal office hours.

## 2018-09-10 RX ORDER — TRIAMTERENE AND HYDROCHLOROTHIAZIDE 75; 50 MG/1; MG/1
TABLET ORAL
Qty: 90 TABLET | Refills: 1 | Status: SHIPPED | OUTPATIENT
Start: 2018-09-10 | End: 2019-03-15 | Stop reason: SDUPTHER

## 2018-09-12 DIAGNOSIS — E78.00 HYPERCHOLESTEROLEMIA: ICD-10-CM

## 2018-09-12 RX ORDER — PRAVASTATIN SODIUM 40 MG
TABLET ORAL
Qty: 45 TABLET | Refills: 0 | Status: SHIPPED | OUTPATIENT
Start: 2018-09-12 | End: 2018-12-28 | Stop reason: SDUPTHER

## 2018-09-18 ENCOUNTER — CLINICAL SUPPORT NO REQUIREMENTS (OUTPATIENT)
Dept: CARDIOLOGY | Facility: CLINIC | Age: 78
End: 2018-09-18

## 2018-09-18 DIAGNOSIS — I49.5 SICK SINUS SYNDROME (HCC): Primary | ICD-10-CM

## 2018-09-18 PROCEDURE — 93296 REM INTERROG EVL PM/IDS: CPT | Performed by: INTERNAL MEDICINE

## 2018-09-18 PROCEDURE — 93294 REM INTERROG EVL PM/LDLS PM: CPT | Performed by: INTERNAL MEDICINE

## 2018-11-21 ENCOUNTER — CLINICAL SUPPORT NO REQUIREMENTS (OUTPATIENT)
Dept: CARDIOLOGY | Facility: CLINIC | Age: 78
End: 2018-11-21

## 2018-11-21 DIAGNOSIS — I49.5 SICK SINUS SYNDROME (HCC): Primary | ICD-10-CM

## 2018-12-10 RX ORDER — METOPROLOL SUCCINATE 50 MG/1
50 TABLET, EXTENDED RELEASE ORAL 2 TIMES DAILY
Qty: 180 TABLET | Refills: 3 | Status: SHIPPED | OUTPATIENT
Start: 2018-12-10 | End: 2019-07-16

## 2018-12-11 ENCOUNTER — CLINICAL SUPPORT (OUTPATIENT)
Dept: INTERNAL MEDICINE | Age: 78
End: 2018-12-11

## 2018-12-11 DIAGNOSIS — Z23 NEED FOR SHINGLES VACCINE: Primary | ICD-10-CM

## 2018-12-11 PROCEDURE — 90750 HZV VACC RECOMBINANT IM: CPT | Performed by: INTERNAL MEDICINE

## 2018-12-11 PROCEDURE — 90471 IMMUNIZATION ADMIN: CPT | Performed by: INTERNAL MEDICINE

## 2018-12-20 ENCOUNTER — CLINICAL SUPPORT NO REQUIREMENTS (OUTPATIENT)
Dept: CARDIOLOGY | Facility: CLINIC | Age: 78
End: 2018-12-20

## 2018-12-20 DIAGNOSIS — I49.5 SICK SINUS SYNDROME (HCC): Primary | ICD-10-CM

## 2018-12-20 PROCEDURE — 93280 PM DEVICE PROGR EVAL DUAL: CPT | Performed by: INTERNAL MEDICINE

## 2018-12-21 ENCOUNTER — CLINICAL SUPPORT NO REQUIREMENTS (OUTPATIENT)
Dept: CARDIOLOGY | Facility: CLINIC | Age: 78
End: 2018-12-21

## 2018-12-21 DIAGNOSIS — I49.5 SICK SINUS SYNDROME (HCC): Primary | ICD-10-CM

## 2018-12-28 ENCOUNTER — OFFICE VISIT (OUTPATIENT)
Dept: INTERNAL MEDICINE | Age: 78
End: 2018-12-28

## 2018-12-28 VITALS
DIASTOLIC BLOOD PRESSURE: 62 MMHG | SYSTOLIC BLOOD PRESSURE: 126 MMHG | OXYGEN SATURATION: 97 % | HEIGHT: 67 IN | BODY MASS INDEX: 24.77 KG/M2 | TEMPERATURE: 98.6 F | HEART RATE: 89 BPM | WEIGHT: 157.8 LBS

## 2018-12-28 DIAGNOSIS — J01.90 ACUTE NON-RECURRENT SINUSITIS, UNSPECIFIED LOCATION: Primary | ICD-10-CM

## 2018-12-28 DIAGNOSIS — T36.95XA ANTIBIOTIC-INDUCED YEAST INFECTION: ICD-10-CM

## 2018-12-28 DIAGNOSIS — B37.9 ANTIBIOTIC-INDUCED YEAST INFECTION: ICD-10-CM

## 2018-12-28 PROCEDURE — 99213 OFFICE O/P EST LOW 20 MIN: CPT | Performed by: NURSE PRACTITIONER

## 2018-12-28 RX ORDER — AZITHROMYCIN 250 MG/1
TABLET, FILM COATED ORAL
Qty: 6 TABLET | Refills: 0 | Status: SHIPPED | OUTPATIENT
Start: 2018-12-28 | End: 2019-01-09

## 2018-12-28 RX ORDER — FLUCONAZOLE 150 MG/1
150 TABLET ORAL ONCE
Qty: 1 TABLET | Refills: 0 | Status: SHIPPED | OUTPATIENT
Start: 2018-12-28 | End: 2018-12-28

## 2018-12-28 NOTE — PATIENT INSTRUCTIONS

## 2018-12-28 NOTE — PROGRESS NOTES
Subjective   Reanna Higgins is a 78 y.o. female.     URI    This is a new problem. The current episode started 1 to 4 weeks ago. The problem has been gradually worsening. There has been no fever. Associated symptoms include congestion, coughing (productive) and rhinorrhea. Pertinent negatives include no ear pain, headaches, joint pain, sinus pain, sore throat or swollen glands. Treatments tried: Mucinex, cough syrup, salt water gargles.        The following portions of the patient's history were reviewed and updated as appropriate: allergies, current medications, past family history, past medical history, past social history, past surgical history and problem list.    Review of Systems   Constitutional: Positive for fatigue. Negative for chills and fever.   HENT: Positive for congestion and rhinorrhea. Negative for ear pain and sore throat.    Respiratory: Positive for cough (productive). Negative for shortness of breath.    Musculoskeletal: Negative for joint pain.       Objective   Physical Exam   Constitutional: She is oriented to person, place, and time. Vital signs are normal. She appears well-developed and well-nourished. She is active and cooperative. She does not appear ill. No distress.   HENT:   Head: Normocephalic and atraumatic.   Right Ear: Hearing, tympanic membrane, external ear and ear canal normal.   Left Ear: Hearing, external ear and ear canal normal. A middle ear effusion is present.   Nose: Nose normal. Right sinus exhibits no maxillary sinus tenderness and no frontal sinus tenderness. Left sinus exhibits no maxillary sinus tenderness and no frontal sinus tenderness.   Mouth/Throat: Uvula is midline, oropharynx is clear and moist and mucous membranes are normal. No tonsillar exudate.   Cardiovascular: Normal rate, regular rhythm and normal heart sounds.   No murmur heard.  Pulmonary/Chest: Effort normal and breath sounds normal. No respiratory distress. She has no wheezes. She has no rales.    Lymphadenopathy:        Head (right side): No submental, no submandibular, no tonsillar, no preauricular, no posterior auricular and no occipital adenopathy present.        Head (left side): No submental, no submandibular, no tonsillar, no preauricular, no posterior auricular and no occipital adenopathy present.     She has no cervical adenopathy.   Neurological: She is alert and oriented to person, place, and time.   Skin: Skin is warm, dry and intact.   Psychiatric: She has a normal mood and affect. Her speech is normal and behavior is normal. Thought content normal.   Nursing note and vitals reviewed.        Assessment/Plan   Problems Addressed this Visit     None      Visit Diagnoses     Acute non-recurrent sinusitis, unspecified location    -  Primary    Relevant Medications    azithromycin (ZITHROMAX Z-DAWOOD) 250 MG tablet    Antibiotic-induced yeast infection        Relevant Medications    fluconazole (DIFLUCAN) 150 MG tablet        1. Acute non-recurrent sinusitis, unspecified location  Discussed treatment with flonase, Azithromycin, increased PO fluids. Instructed to follow up if no improvement in 5 days, sooner if needed.     - azithromycin (ZITHROMAX Z-DAWOOD) 250 MG tablet; Take 2 tablets the first day, then 1 tablet daily for 4 days.  Dispense: 6 tablet; Refill: 0    2. Antibiotic-induced yeast infection  History of yeast infection from antibiotics, instructed not to use unless absolutely necessary after finishing antibiotic.     - fluconazole (DIFLUCAN) 150 MG tablet; Take 1 tablet by mouth 1 (One) Time for 1 dose.  Dispense: 1 tablet; Refill: 0

## 2019-01-04 ENCOUNTER — TELEPHONE (OUTPATIENT)
Dept: INTERNAL MEDICINE | Age: 79
End: 2019-01-04

## 2019-01-04 NOTE — TELEPHONE ENCOUNTER
Regarding: Referral Request  Contact: [phone]  ----- Message from Mychart, Generic sent at 1/4/2019  9:15 AM EST -----    I am a new patient of Dr. Li------I have been diagnosis with nephropathy in 2016, having been seen by Dr. Silvestre for EMG testing------this should be noted in my records.  No treatment was given.  I started out with numbness in 2 toes, but now both feet are affected and I am suffering sleepless nights, with terrible pain, burning, and feel like my legs are being affected, also.  I will   be leaving town Jan. 31 - Mar. 1 I feel I cannot wait until March 4--for my first visit with Dr. Li.  My questions is: if this something Dr. Li will treat or will I need to get another doctor to talk care and address this ongoing condition-----I definitely cannot wait until March 4 to see a doctor for this problem.  Sincerely, Reanna Higgins

## 2019-01-09 ENCOUNTER — OFFICE VISIT (OUTPATIENT)
Dept: INTERNAL MEDICINE | Age: 79
End: 2019-01-09

## 2019-01-09 VITALS
DIASTOLIC BLOOD PRESSURE: 62 MMHG | HEART RATE: 70 BPM | WEIGHT: 160 LBS | SYSTOLIC BLOOD PRESSURE: 134 MMHG | HEIGHT: 67 IN | TEMPERATURE: 97.8 F | OXYGEN SATURATION: 99 % | BODY MASS INDEX: 25.11 KG/M2

## 2019-01-09 DIAGNOSIS — G62.9 PERIPHERAL POLYNEUROPATHY: Primary | ICD-10-CM

## 2019-01-09 DIAGNOSIS — R94.6 ABNORMAL THYROID FUNCTION TEST: ICD-10-CM

## 2019-01-09 DIAGNOSIS — F40.243 PHOBIA, FLYING: ICD-10-CM

## 2019-01-09 DIAGNOSIS — G25.81 RLS (RESTLESS LEGS SYNDROME): ICD-10-CM

## 2019-01-09 PROCEDURE — 99214 OFFICE O/P EST MOD 30 MIN: CPT | Performed by: INTERNAL MEDICINE

## 2019-01-09 RX ORDER — GABAPENTIN 100 MG/1
100 CAPSULE ORAL 3 TIMES DAILY
Qty: 90 CAPSULE | Refills: 0 | Status: SHIPPED | OUTPATIENT
Start: 2019-01-09 | End: 2019-03-04 | Stop reason: SDUPTHER

## 2019-01-09 RX ORDER — LORAZEPAM 0.5 MG/1
TABLET ORAL
Qty: 8 TABLET | Refills: 0 | Status: SHIPPED | OUTPATIENT
Start: 2019-01-09 | End: 2020-01-30 | Stop reason: SDUPTHER

## 2019-01-09 NOTE — PROGRESS NOTES
[facility] [place]  FRANK HEMPHILL M.D.      Reanna Higgins / 78 y.o. / female  01/09/2019      ASSESSMENT & PLAN:    Problem List Items Addressed This Visit        High    Peripheral neuropathy - Primary (Chronic)    Current Assessment & Plan     Start gabapentin 100 mg and titrate as needed/tolerated up to 300 mg TID.   Rx sent electronically for #90 capsules, no refills. Consent / agreement signed. [place] requested. Follow-up in March.          Relevant Medications    gabapentin (NEURONTIN) 100 MG capsule    Other Relevant Orders    Basic Metabolic Panel    TSH+Free T4    RLS (restless legs syndrome)    Current Assessment & Plan     Possible RLS. Will try gabapentin. Consider pramipexole if needed.          Relevant Medications    gabapentin (NEURONTIN) 100 MG capsule       Low    Phobia, flying    Current Assessment & Plan     Consent / agreement signed. [place] requested.   Lorazepam 0.5 mg PRN #8, no refills.          Relevant Medications    LORazepam (ATIVAN) 0.5 MG tablet      Other Visit Diagnoses     Abnormal thyroid function test        Relevant Orders    TSH+Free T4           Summary/Discussion:      Return for Next scheduled follow up.    ____________________________________________________________________    MEDICATIONS  Current Outpatient Medications on File Prior to Visit   Medication Sig   • Boswellia-Glucosamine-Vit D (GLUCOSAMINE COMPLEX PO) Take 1 tablet by mouth Daily.   • cholecalciferol (VITAMIN D3) 1000 UNITS tablet Take 1 tablet by mouth Daily.   • cycloSPORINE (RESTASIS) 0.05 % ophthalmic emulsion Administer 1 drop to both eyes 2 (Two) Times a Day.   • metoprolol succinate XL (TOPROL-XL) 50 MG 24 hr tablet TAKE 1 TABLET BY MOUTH 2 (TWO) TIMES A DAY.   • Multiple Vitamins-Minerals (MULTIVITAL-M) tablet Take 1 tablet by mouth daily.   • omeprazole (priLOSEC) 40 MG capsule TAKE ONE CAPSULE BY MOUTH ONE TIME DAILY   • pravastatin (PRAVACHOL) 40 MG tablet Take two tablets every other day.   •  "triamterene-hydrochlorothiazide (MAXZIDE) 75-50 MG per tablet TAKE 1 TABLET BY MOUTH DAILY.         VITALS    Visit Vitals  /62   Pulse 70   Temp 97.8 °F (36.6 °C)   Ht 170.2 cm (67.01\")   Wt 72.6 kg (160 lb)   SpO2 99%   BMI 25.05 kg/m²       BP Readings from Last 3 Encounters:   01/09/19 134/62   12/28/18 126/62   06/15/18 146/76     Wt Readings from Last 3 Encounters:   01/09/19 72.6 kg (160 lb)   12/28/18 71.6 kg (157 lb 12.8 oz)   06/15/18 71.3 kg (157 lb 3.2 oz)      Body mass index is 25.05 kg/m².      CC:  Main reason(s) for today's visit: Foot Pain      HPI:     Complains of 2 years history of pain of her feet bilaterally. Initially started with two of her toes but now has progressed to involve the entire feet and at times lower legs. Complains of severe burning discomfort/pain that is particularly worse when sleeping or resting. Improves with walking/moving. Interferes with sleep. Previous B12 level was fine. Has never tried any specific therapy.     Patient Care Team:  Ted Li MD as PCP - General (Internal Medicine)  Dylan Gannon MD as PCP - Family Medicine  [place] MD as Consulting Physician (General Surgery)  Torsten Benson Jr., MD as Consulting Physician (Cardiology)  Arsenio Witt MD as Consulting Physician (Ophthalmology)  Brown Beckman MD as Consulting Physician (Orthopedic Surgery)  ____________________________________________________________________      REVIEW OF SYSTEMS    Review of Systems   Constitutional: Negative.    Cardiovascular: Negative for leg swelling (no change/new).        No claudications of legs   Endocrine: Positive for cold intolerance.   Neurological:        Peripheral neuropathy of feet   Psychiatric/Behavioral: The patient is nervous/anxious (phobia flying).          PHYSICAL EXAMINATION    Physical Exam   Constitutional: No distress.   Cardiovascular:   Pulses:       Dorsalis pedis pulses are 2+ on the right side, and 2+ on the left " side.        Posterior tibial pulses are 2+ on the right side, and 2+ on the left side.   Neurological: She is alert. She has normal strength. She displays normal reflexes. She exhibits normal muscle tone.   Reflex Scores:       Patellar reflexes are 2+ on the right side and 2+ on the left side.       Achilles reflexes are 2+ on the right side and 2+ on the left side.  Psychiatric: Judgment and thought content normal. Her mood appears anxious. Cognition and memory are normal.         REVIEWED DATA:    Labs:   Lab Results   Component Value Date    TSH 3.150 09/28/2016     Lab Results   Component Value Date    RKNTQHMM93 715 09/28/2016      Lab Results   Component Value Date     06/05/2018    K 4.1 06/05/2018    AST 18 08/08/2018    ALT 19 08/08/2018    BUN 18 06/05/2018    CREATININE 0.97 06/05/2018    CREATININE 0.86 11/01/2017    CREATININE 0.96 05/22/2017        Imaging:        Medical Tests:       Summary of old records / correspondence / consultant report:        Request outside records:       ALLERGIES  Allergies   Allergen Reactions   • Ancef [Cefazolin] Other (See Comments)     Hypotension/bradycardia   • Codeine    • Penicillins Other (See Comments)     Hypotension (Ancef allergy)    • Sulfa Antibiotics         PFSH:     The following portions of the patient's history were reviewed and updated as appropriate: Allergies / Current Medications / Past Medical History / Surgical History / Social History / Family History    PROBLEM LIST   Patient Active Problem List   Diagnosis   • Colon polyp   • Gastroesophageal reflux disease   • Essential hypertension   • Hypercholesterolemia   • Mitral and aortic valve disease   • Heart valve disease   • Anxiety   • Peripheral neuropathy   • Elevated glucose   • Malignant neoplasm of cheek (CMS/HCC)   • Non-rheumatic mitral regurgitation   • Osteopenia   • SVT (supraventricular tachycardia) (CMS/HCC)   • AV block, Mobitz 1   • Cardiac pacemaker in situ   • RLS (restless  legs syndrome)   • Phobia, flying       PAST MEDICAL HISTORY  Past Medical History:   Diagnosis Date   • Cancer (CMS/HCC)     skin   • Carpal tunnel syndrome    • Cataract    • Cystic fibroadenosis of breast    • GERD (gastroesophageal reflux disease)    • Hypercholesterolemia    • Hypertension    • Osteoarthritis of both hands    • Peripheral neuropathy    • Second degree AV block, Mobitz type I        SURGICAL HISTORY  Past Surgical History:   Procedure Laterality Date   • BREAST CYST EXCISION Bilateral    • CARDIAC ELECTROPHYSIOLOGY PROCEDURE N/A 10/10/2016    Procedure: Pacemaker DC new  [place];  Surgeon: Wilfrido Hameed MD;  Location: [facility] INVAS[facility];  Service:    • CATARACT EXTRACTION      NOVEMBER AND 2014   • CHOLECYSTECTOMY     • HYSTERECTOMY     • KNEE ARTHROSCOPY Bilateral 2014    MENISCAL TEAR   • PACEMAKER IMPLANTATION  10/10/2016   • SHOULDER SURGERY Right     ROTATOR CUFF SURGERY 2015   • TONSILLECTOMY     • TRIGGER FINGER RELEASE      both hands       SOCIAL HISTORY  Social History     Socioeconomic History   • Marital status:      Spouse name: Not on file   • Number of children: 2   • Years of education: Not on file   • Highest education level: Not on file   Occupational History   • Occupation: Retired   Tobacco Use   • Smoking status: Former Smoker     Packs/day: 0.50     Years: 15.00     Pack years: 7.50     Last attempt to quit: 1970     Years since quittin.0   • Smokeless tobacco: Never Used   • Tobacco comment: quit age 30   Substance and Sexual Activity   • Alcohol use: Yes     Comment: RARE   • Drug use: No   • Sexual activity: Defer       FAMILY HISTORY  Family History   Problem Relation Age of Onset   • Thyroid disease Daughter    • Heart disease Brother    • Hypertension Mother    • Hypertension Father    • Alcohol abuse Father    • Depression Father    • Anxiety disorder Father    • Diabetes Father          **Lore Disclaimer:   Much  of this encounter note is an electronic transcription/translation of spoken language to printed text. The electronic translation of spoken language may permit erroneous, or at times, nonsensical words or phrases to be inadvertently transcribed. Although I have reviewed the note for such errors, some may still exist.

## 2019-01-09 NOTE — ASSESSMENT & PLAN NOTE
Start gabapentin 100 mg and titrate as needed/tolerated up to 300 mg TID.   Rx sent electronically for #90 capsules, no refills. Consent / agreement signed. [place] requested. Follow-up in March.

## 2019-01-10 LAB
BUN SERPL-MCNC: 20 MG/DL (ref 8–23)
BUN/CREAT SERPL: 24.1 (ref 7–25)
CALCIUM SERPL-MCNC: 9.7 MG/DL (ref 8.6–10.5)
CHLORIDE SERPL-SCNC: 100 MMOL/L (ref 98–107)
CO2 SERPL-SCNC: 27.1 MMOL/L (ref 22–29)
CREAT SERPL-MCNC: 0.83 MG/DL (ref 0.57–1)
GLUCOSE SERPL-MCNC: 145 MG/DL (ref 65–99)
POTASSIUM SERPL-SCNC: 3.8 MMOL/L (ref 3.5–5.2)
SODIUM SERPL-SCNC: 141 MMOL/L (ref 136–145)
T4 FREE SERPL-MCNC: 1.15 NG/DL (ref 0.93–1.7)
TSH SERPL DL<=0.005 MIU/L-ACNC: 3.22 MIU/ML (ref 0.27–4.2)

## 2019-01-15 RX ORDER — OMEPRAZOLE 40 MG/1
CAPSULE, DELAYED RELEASE ORAL
Qty: 90 CAPSULE | Refills: 1 | Status: SHIPPED | OUTPATIENT
Start: 2019-01-15 | End: 2019-07-16 | Stop reason: SDUPTHER

## 2019-02-14 RX ORDER — PRAVASTATIN SODIUM 40 MG
TABLET ORAL
Qty: 72 TABLET | Refills: 0 | Status: SHIPPED | OUTPATIENT
Start: 2019-02-14 | End: 2019-06-26 | Stop reason: SDUPTHER

## 2019-03-04 ENCOUNTER — OFFICE VISIT (OUTPATIENT)
Dept: INTERNAL MEDICINE | Age: 79
End: 2019-03-04

## 2019-03-04 VITALS
HEART RATE: 64 BPM | BODY MASS INDEX: 25.11 KG/M2 | SYSTOLIC BLOOD PRESSURE: 124 MMHG | DIASTOLIC BLOOD PRESSURE: 64 MMHG | WEIGHT: 160 LBS | TEMPERATURE: 98.1 F | OXYGEN SATURATION: 99 % | HEIGHT: 67 IN

## 2019-03-04 DIAGNOSIS — E78.00 HYPERCHOLESTEROLEMIA: ICD-10-CM

## 2019-03-04 DIAGNOSIS — G25.81 RLS (RESTLESS LEGS SYNDROME): ICD-10-CM

## 2019-03-04 DIAGNOSIS — I10 ESSENTIAL HYPERTENSION: Chronic | ICD-10-CM

## 2019-03-04 DIAGNOSIS — Z51.81 THERAPEUTIC DRUG MONITORING: Primary | ICD-10-CM

## 2019-03-04 DIAGNOSIS — G62.9 PERIPHERAL POLYNEUROPATHY: ICD-10-CM

## 2019-03-04 PROCEDURE — 99214 OFFICE O/P EST MOD 30 MIN: CPT | Performed by: INTERNAL MEDICINE

## 2019-03-04 RX ORDER — GABAPENTIN 100 MG/1
200 CAPSULE ORAL 3 TIMES DAILY
Qty: 180 CAPSULE | Refills: 0 | Status: SHIPPED | OUTPATIENT
Start: 2019-03-04 | End: 2019-04-02 | Stop reason: SDUPTHER

## 2019-03-04 NOTE — ASSESSMENT & PLAN NOTE
Titrate gabapentin gradually as needed up to 100 mg caps 2 TID.   Rx sent for #180, no refill electronically.

## 2019-03-04 NOTE — PROGRESS NOTES
[facility] [place] [facility]  FRANK HEMPHILL M.D.      Reanna Higgins / 79 y.o. / female  03/04/2019      ASSESSMENT & PLAN:    Problem List Items Addressed This Visit        High    Peripheral neuropathy (Chronic)    Overview     **Approved for electronic prescription for gabapentin on 1.9.19**          Current Assessment & Plan     Titrate gabapentin gradually as needed up to 100 mg caps 2 TID.   Rx sent for #180, no refill electronically.          Relevant Medications    gabapentin (NEURONTIN) 100 MG capsule    RLS (restless legs syndrome) (Chronic)    Overview     **Approved for electronic prescription for gabapentin on 1.9.19**          Current Assessment & Plan     Gabapentin is helping. Titrate gradually as needed/tolerated.   Refer to section on peripheral neuropathy.          Relevant Medications    gabapentin (NEURONTIN) 100 MG capsule       Medium    Essential hypertension (Chronic)    Overview     Stable. Continue triamterene/hctz 75/50 qAM and metoprolol XL 50 mg BID.          Relevant Medications    triamterene-hydrochlorothiazide (MAXZIDE) 75-50 MG per tablet    metoprolol succinate XL (TOPROL-XL) 50 MG 24 hr tablet       Low    Hypercholesterolemia (Chronic)    Overview     Continue pravastatin 40 mg qHS.   Could not tolerate higher dose.          Relevant Medications    pravastatin (PRAVACHOL) 40 MG tablet      Other Visit Diagnoses     Therapeutic drug monitoring    -  Primary    Relevant Orders    Compliance Drug Analysis, Ur - Urine, Clean Catch        Orders Placed This Encounter   Procedures   • Compliance Drug Analysis, Ur - Urine, Clean Catch     New Medications Ordered This Visit   Medications   • gabapentin (NEURONTIN) 100 MG capsule     Sig: Take 2 capsules by mouth 3 (Three) Times a Day.     Dispense:  180 capsule     Refill:  0       Summary/Discussion:  ·     Return for Next scheduled follow up.    ____________________________________________________________________    MEDICATIONS  Current  "Outpatient Medications   Medication Sig Dispense Refill   • [place]-Glucosamine-Vit D (GLUCOSAMINE COMPLEX PO) Take 1 tablet by mouth Daily.     • cholecalciferol (VITAMIN D3) 1000 UNITS tablet Take 1 tablet by mouth Daily.     • cycloSPORINE (RESTASIS) 0.05 % ophthalmic emulsion Administer 1 drop to both eyes 2 (Two) Times a Day.     • gabapentin (NEURONTIN) 100 MG capsule Take 2 capsules by mouth daily 180 capsule 0   • LORazepam (ATIVAN) 0.5 MG tablet One tablet 30 minutes before plane flight, may repeat times 1 in 4-6 hours. 8 tablet 0   • metoprolol succinate XL (TOPROL-XL) 50 MG 24 hr tablet TAKE 1 TABLET BY MOUTH 2 (TWO) TIMES A DAY. 180 tablet 3   • Multiple Vitamins-Minerals (MULTIVITAL-M) tablet Take 1 tablet by mouth daily.     • omeprazole (priLOSEC) 40 MG capsule TAKE ONE CAPSULE BY MOUTH ONE TIME DAILY 90 capsule 1   • pravastatin (PRAVACHOL) 40 MG tablet Take two tablets every other day. 72 tablet 0   • triamterene-hydrochlorothiazide (MAXZIDE) 75-50 MG per tablet TAKE 1 TABLET BY MOUTH DAILY. 90 tablet 1     No current facility-administered medications for this visit.           VITALS:    Visit Vitals  /64   Pulse 64   Temp 98.1 °F (36.7 °C)   Ht 170.2 cm (67.01\")   Wt 72.6 kg (160 lb)   SpO2 99%   BMI 25.05 kg/m²       BP Readings from Last 3 Encounters:   03/04/19 124/64   01/09/19 134/62   12/28/18 126/62     Wt Readings from Last 3 Encounters:   03/04/19 72.6 kg (160 lb)   01/09/19 72.6 kg (160 lb)   12/28/18 71.6 kg (157 lb 12.8 oz)      Body mass index is 25.05 kg/m².    CC:  Main reason(s) for today's visit: Hypertension and Hyperlipidemia      HPI:     Gabapentin seems to help with RLS, taking 2 capsules in the evening. Denies significant side effects. Ongoing peripheral neuropathy discomfort.     Chronic essential hypertension:  Since prior visit: compliant with medication(s) and denies significant problems with medication(s).  Most recent in-office blood pressure readings:   BP " Readings from Last 3 Encounters:   03/04/19 124/64   01/09/19 134/62   12/28/18 126/62     Chronic hyperlipidemia:  Current therapy include pravastatin, denies problems with medication, could not tolerate higher dose in the past.    Most recent labs:   Lab Results   Component Value Date     (H) 08/08/2018     (H) 06/05/2018     (H) 05/22/2017    HDL 42 08/08/2018    HDL 47 06/05/2018    HDL 47 05/22/2017    TRIG 166 (H) 08/08/2018         Patient Care Team:  Ted Li MD as PCP - General (Internal Medicine)  Miguel, To Elkins MD as Consulting Physician (General Surgery)  Torsten Benson Jr., MD as Consulting Physician (Cardiology)  Arsenio Witt MD as Consulting Physician (Ophthalmology)  Brown Beckman MD as Consulting Physician (Orthopedic Surgery)    ____________________________________________________________________    REVIEW OF SYSTEMS    Review of Systems  Constitutional neg  Resp neg  CV neg  Neuro no change      PHYSICAL EXAMINATION    Physical Exam  Constitutional  No distress  Cardiovascular Rate  normal . Rhythm: regular . Heart sounds:  Normal  Pulmonary/Chest  Effort normal. Breath sounds:  Normal  Psychiatric  Alert. Judgment and thought content normal. Mood normal    REVIEWED DATA:    Labs:     Lab Results   Component Value Date     01/09/2019    K 3.8 01/09/2019    AST 18 08/08/2018    ALT 19 08/08/2018    BUN 20 01/09/2019    CREATININE 0.83 01/09/2019    CREATININE 0.97 06/05/2018    CREATININE 0.86 11/01/2017    EGFRIFNONA 66 01/09/2019    EGFRIFAFRI 80 01/09/2019       Lab Results   Component Value Date    HGBA1C 5.90 (H) 09/28/2016    GLUCOSE 107 (H) 11/01/2017    GLUCOSE 96 10/11/2016    GLUCOSE 148 (H) 10/10/2016       Lab Results   Component Value Date     (H) 08/08/2018     (H) 06/05/2018     (H) 05/22/2017    HDL 42 08/08/2018    HDL 47 06/05/2018    HDL 47 05/22/2017    TRIG 166 (H) 08/08/2018    TRIG 137 06/05/2018     TRIG 213 (H) 05/22/2017       Lab Results   Component Value Date    TSH 3.220 01/09/2019    FREET4 1.15 01/09/2019       Lab Results   Component Value Date    WBC 5.15 06/05/2018    HGB 13.7 06/05/2018    HGB 13.9 05/22/2017    HGB 13.3 10/11/2016     06/05/2018       Lab Results   Component Value Date    PROTEIN Negative 06/05/2018    GLUCOSEU Negative 06/05/2018    BLOODU Negative 06/05/2018    NITRITEU Negative 06/05/2018    LEUKOCYTESUR Trace (A) 06/05/2018       Imaging:         Medical Tests:         Summary of old records / correspondence / consultant report:         Request outside records:         ALLERGIES  Allergies   Allergen Reactions   • Ancef [Cefazolin] Other (See Comments)     Hypotension/bradycardia   • Codeine    • Penicillins Other (See Comments)     Hypotension (Ancef allergy)    • Sulfa Antibiotics         PFSH:     The following portions of the patient's history were reviewed and updated as appropriate: Allergies / Current Medications / Past Medical History / Surgical History / Social History / Family History    PROBLEM LIST   Patient Active Problem List   Diagnosis   • Colon polyp   • Gastroesophageal reflux disease   • Essential hypertension   • Hypercholesterolemia   • Mitral and aortic valve disease   • Heart valve disease   • Anxiety   • Peripheral neuropathy   • Elevated glucose   • Malignant neoplasm of cheek (CMS/HCC)   • Non-rheumatic mitral regurgitation   • Osteopenia   • SVT (supraventricular tachycardia) (CMS/HCC)   • AV block, Mobitz 1   • Cardiac pacemaker in situ   • RLS (restless legs syndrome)   • Phobia, flying       PAST MEDICAL HISTORY  Past Medical History:   Diagnosis Date   • Cancer (CMS/HCC)     skin   • Carpal tunnel syndrome    • Cataract    • Cystic fibroadenosis of breast    • GERD (gastroesophageal reflux disease)    • Hypercholesterolemia    • Hypertension    • Osteoarthritis of both hands    • Peripheral neuropathy    • Second degree AV block, Mobitz type  I        SURGICAL HISTORY  Past Surgical History:   Procedure Laterality Date   • BREAST CYST EXCISION Bilateral    • CARDIAC ELECTROPHYSIOLOGY PROCEDURE N/A 10/10/2016    Procedure: Pacemaker DC new  [place];  Surgeon: Wilfrido Hameed MD;  Location: [facility] INVASIVE LOC[facility];  Service:    • CATARACT EXTRACTION      NOVEMBER AND 2014   • CHOLECYSTECTOMY     • HYSTERECTOMY     • KNEE ARTHROSCOPY Bilateral 2014    MENISCAL TEAR   • PACEMAKER IMPLANTATION  10/10/2016   • SHOULDER SURGERY Right     ROTATOR CUFF SURGERY 2015   • TONSILLECTOMY     • TRIGGER FINGER RELEASE      both hands       SOCIAL HISTORY  Social History     Socioeconomic History   • Marital status:      Spouse name: Not on file   • Number of children: 2   • Years of education: Not on file   • Highest education level: Not on file   Occupational History   • Occupation: Retired   Tobacco Use   • Smoking status: Former Smoker     Packs/day: 0.50     Years: 15.00     Pack years: 7.50     Last attempt to quit: 1970     Years since quittin.2   • Smokeless tobacco: Never Used   • Tobacco comment: quit age 30   Substance and Sexual Activity   • Alcohol use: Yes     Comment: RARE   • Drug use: No   • Sexual activity: Defer       FAMILY HISTORY  Family History   Problem Relation Age of Onset   • Thyroid disease Daughter    • Heart disease Brother    • Hypertension Mother    • Hypertension Father    • Alcohol abuse Father    • Depression Father    • Anxiety disorder Father    • Diabetes Father    • Neuropathy Neg Hx          **Dragon Disclaimer:   Much of this encounter note is an electronic transcription/translation of spoken language to printed text. The electronic translation of spoken language may permit erroneous, or at times, nonsensical words or phrases to be inadvertently transcribed. Although I have reviewed the note for such errors, some may still exist.       Template created by Arabella Li MD

## 2019-03-04 NOTE — ASSESSMENT & PLAN NOTE
Gabapentin is helping. Titrate gradually as needed/tolerated.   Refer to section on peripheral neuropathy.

## 2019-03-15 RX ORDER — TRIAMTERENE AND HYDROCHLOROTHIAZIDE 75; 50 MG/1; MG/1
1 TABLET ORAL DAILY
Qty: 90 TABLET | Refills: 1 | Status: SHIPPED | OUTPATIENT
Start: 2019-03-15 | End: 2019-07-19 | Stop reason: HOSPADM

## 2019-03-26 ENCOUNTER — CLINICAL SUPPORT NO REQUIREMENTS (OUTPATIENT)
Dept: CARDIOLOGY | Facility: CLINIC | Age: 79
End: 2019-03-26

## 2019-03-26 DIAGNOSIS — I49.5 SICK SINUS SYNDROME (HCC): Primary | ICD-10-CM

## 2019-03-26 PROCEDURE — 93296 REM INTERROG EVL PM/IDS: CPT | Performed by: INTERNAL MEDICINE

## 2019-03-26 PROCEDURE — 93294 REM INTERROG EVL PM/LDLS PM: CPT | Performed by: INTERNAL MEDICINE

## 2019-04-01 ENCOUNTER — TELEPHONE (OUTPATIENT)
Dept: INTERNAL MEDICINE | Age: 79
End: 2019-04-01

## 2019-04-01 NOTE — TELEPHONE ENCOUNTER
Venita from from [facility] wanted to inquire about the Gabapentin refill that they sent multiple request for.

## 2019-04-02 DIAGNOSIS — G25.81 RLS (RESTLESS LEGS SYNDROME): ICD-10-CM

## 2019-04-02 DIAGNOSIS — G62.9 PERIPHERAL POLYNEUROPATHY: ICD-10-CM

## 2019-04-02 RX ORDER — GABAPENTIN 100 MG/1
200 CAPSULE ORAL 3 TIMES DAILY
Qty: 180 CAPSULE | Refills: 0 | Status: SHIPPED | OUTPATIENT
Start: 2019-04-02 | End: 2019-06-10

## 2019-04-02 NOTE — TELEPHONE ENCOUNTER
LOV 03.04.19  NOV 06.10.19  CONTRACT 01.09.19  CURRY 01.21.19  UDS * ORDERED FOR OV 03.04.19 BUT NOT COMPLETED   LAST RF 03.04.19 #180 NO REFILLS       Previously Escribed Prescription, approved & documented in chart.

## 2019-04-02 NOTE — TELEPHONE ENCOUNTER
Regarding: Non-Urgent Medical Question  Contact: [phone]  ----- Message from Mychart, Generic sent at 4/2/2019 10:04 AM EDT -----    Had some added thought concerning my taking Gabapentin-----------I am  guessing it may be helping, some, but my feet still continue to have very painful burning, several times a day, restless legs seem to still be a problem, especially while sitting in a resting position, like watching TV-------my feelings are, my problems are getting worse instead of attaining very much relief, that I had hoped for.

## 2019-04-02 NOTE — TELEPHONE ENCOUNTER
Regarding: Prescription Question  Contact: [phone]  ----- Message from Mychart, Generic sent at 4/2/2019  9:57 AM EDT -----    I was just reviewing the side effects of taking the drug, Gabapentin------and been on this drug since January ---per Dr. Li, I am now taking 3 capsules, daily.  On March 18, 2019, about 11:45 PM---after going to be I started with an attack of vertigo, that lasted about 1 hour. Finally went to sleep, noted in the morning a slight effect, but was able to do everything I normally that day, and have not any re-occurrences of this vertigo, since.  I have NEVER had vertigo in my lifetime.    Reanna Higgins 1940

## 2019-04-02 NOTE — TELEPHONE ENCOUNTER
Call patient:    Verify exactly how much gabapentin she is taking currently. If she is taking gabapentin 100 mg TID, have her titrate it up gradually higher to 2 capsules three times daily. That is still a low dose. Update me on how this works for her in couple weeks.     (REMINDER: Update med list, maintain dx association, and update change on CURRENT ASSESSMENT/PLAN)

## 2019-04-04 ENCOUNTER — PATIENT MESSAGE (OUTPATIENT)
Dept: INTERNAL MEDICINE | Age: 79
End: 2019-04-04

## 2019-04-04 ENCOUNTER — TELEPHONE (OUTPATIENT)
Dept: INTERNAL MEDICINE | Age: 79
End: 2019-04-04

## 2019-04-04 NOTE — TELEPHONE ENCOUNTER
Regarding: Non-Urgent Medical Question  Contact: [phone]  ----- Message from Mychart, Generic sent at 4/4/2019  9:18 AM EDT -----    Dr. Li, since I have some extra Gabapentin caps----I thought I would add 1 one to my daily intake of 3 caps. if this doesn't help, I will call for an appt. to see you, concerning my  pain in feet from nephropathy.    Thanks, Reanna Higgins 1940

## 2019-05-08 ENCOUNTER — TELEPHONE (OUTPATIENT)
Dept: INTERNAL MEDICINE | Age: 79
End: 2019-05-08

## 2019-05-08 ENCOUNTER — PATIENT MESSAGE (OUTPATIENT)
Dept: INTERNAL MEDICINE | Age: 79
End: 2019-05-08

## 2019-05-08 NOTE — TELEPHONE ENCOUNTER
Talk to her about DEXA. It has to be at least 1 day past 2 years for the DEXA. She may contact us and we can place the order for her.

## 2019-05-08 NOTE — TELEPHONE ENCOUNTER
Regarding: Non-Urgent Medical Question  Contact: [phone]  ----- Message from Mychart, Generic sent at 5/8/2019  8:10 AM EDT -----    I had my last dexascan June 16--2017--------since I get one every two years, I would like to get that scheduled. Do I need orders from this office to get the testing?  Do I have to wait the exact time, 2 years, from the last testing?  If I get the orders for the test I would like to make my own appointment, please.  Thanks, Reanna Higgins 1940

## 2019-05-17 DIAGNOSIS — M85.80 OSTEOPENIA, UNSPECIFIED LOCATION: Primary | ICD-10-CM

## 2019-05-17 DIAGNOSIS — Z78.0 POSTMENOPAUSAL: ICD-10-CM

## 2019-06-10 ENCOUNTER — OFFICE VISIT (OUTPATIENT)
Dept: INTERNAL MEDICINE | Age: 79
End: 2019-06-10

## 2019-06-10 VITALS
TEMPERATURE: 98.6 F | DIASTOLIC BLOOD PRESSURE: 68 MMHG | BODY MASS INDEX: 25.31 KG/M2 | SYSTOLIC BLOOD PRESSURE: 136 MMHG | HEIGHT: 67 IN | HEART RATE: 69 BPM | WEIGHT: 161.3 LBS | OXYGEN SATURATION: 95 %

## 2019-06-10 DIAGNOSIS — K21.9 GASTROESOPHAGEAL REFLUX DISEASE, ESOPHAGITIS PRESENCE NOT SPECIFIED: ICD-10-CM

## 2019-06-10 DIAGNOSIS — R73.09 ELEVATED HEMOGLOBIN A1C: ICD-10-CM

## 2019-06-10 DIAGNOSIS — E55.9 VITAMIN D DEFICIENCY: ICD-10-CM

## 2019-06-10 DIAGNOSIS — I10 ESSENTIAL HYPERTENSION: Chronic | ICD-10-CM

## 2019-06-10 DIAGNOSIS — G62.9 PERIPHERAL POLYNEUROPATHY: ICD-10-CM

## 2019-06-10 DIAGNOSIS — Z00.00 MEDICARE ANNUAL WELLNESS VISIT, SUBSEQUENT: Primary | ICD-10-CM

## 2019-06-10 DIAGNOSIS — E78.00 HYPERCHOLESTEROLEMIA: Chronic | ICD-10-CM

## 2019-06-10 DIAGNOSIS — G25.81 RLS (RESTLESS LEGS SYNDROME): ICD-10-CM

## 2019-06-10 DIAGNOSIS — Z23 NEED FOR VACCINATION FOR STREP PNEUMONIAE: ICD-10-CM

## 2019-06-10 LAB
25(OH)D3+25(OH)D2 SERPL-MCNC: 68.4 NG/ML (ref 30–100)
ALBUMIN SERPL-MCNC: 4.7 G/DL (ref 3.5–5.2)
ALBUMIN/GLOB SERPL: 2.1 G/DL
ALP SERPL-CCNC: 74 U/L (ref 39–117)
ALT SERPL-CCNC: 19 U/L (ref 1–33)
APPEARANCE UR: CLEAR
AST SERPL-CCNC: 20 U/L (ref 1–32)
BACTERIA #/AREA URNS HPF: NORMAL /HPF
BASOPHILS # BLD AUTO: 0.04 10*3/MM3 (ref 0–0.2)
BASOPHILS NFR BLD AUTO: 0.7 % (ref 0–1.5)
BILIRUB SERPL-MCNC: 0.4 MG/DL (ref 0.2–1.2)
BILIRUB UR QL STRIP: NEGATIVE
BUN SERPL-MCNC: 17 MG/DL (ref 8–23)
BUN/CREAT SERPL: 19.5 (ref 7–25)
CALCIUM SERPL-MCNC: 10.2 MG/DL (ref 8.6–10.5)
CASTS URNS MICRO: NORMAL
CHLORIDE SERPL-SCNC: 101 MMOL/L (ref 98–107)
CHOLEST SERPL-MCNC: 200 MG/DL (ref 0–200)
CHOLEST/HDLC SERPL: 4 {RATIO}
CO2 SERPL-SCNC: 30.3 MMOL/L (ref 22–29)
COLOR UR: YELLOW
CREAT SERPL-MCNC: 0.87 MG/DL (ref 0.57–1)
EOSINOPHIL # BLD AUTO: 0.08 10*3/MM3 (ref 0–0.4)
EOSINOPHIL NFR BLD AUTO: 1.4 % (ref 0.3–6.2)
EPI CELLS #/AREA URNS HPF: NORMAL /HPF
ERYTHROCYTE [DISTWIDTH] IN BLOOD BY AUTOMATED COUNT: 12.7 % (ref 12.3–15.4)
GLOBULIN SER CALC-MCNC: 2.2 GM/DL
GLUCOSE SERPL-MCNC: 117 MG/DL (ref 65–99)
GLUCOSE UR QL: NEGATIVE
HBA1C MFR BLD: 6.2 % (ref 4.8–5.6)
HCT VFR BLD AUTO: 44.5 % (ref 34–46.6)
HDLC SERPL-MCNC: 50 MG/DL (ref 40–60)
HGB BLD-MCNC: 14.2 G/DL (ref 12–15.9)
HGB UR QL STRIP: NEGATIVE
IMM GRANULOCYTES # BLD AUTO: 0.01 10*3/MM3 (ref 0–0.05)
IMM GRANULOCYTES NFR BLD AUTO: 0.2 % (ref 0–0.5)
KETONES UR QL STRIP: NEGATIVE
LDLC SERPL CALC-MCNC: 124 MG/DL (ref 0–100)
LEUKOCYTE ESTERASE UR QL STRIP: ABNORMAL
LYMPHOCYTES # BLD AUTO: 1.68 10*3/MM3 (ref 0.7–3.1)
LYMPHOCYTES NFR BLD AUTO: 30 % (ref 19.6–45.3)
MCH RBC QN AUTO: 28.6 PG (ref 26.6–33)
MCHC RBC AUTO-ENTMCNC: 31.9 G/DL (ref 31.5–35.7)
MCV RBC AUTO: 89.7 FL (ref 79–97)
MONOCYTES # BLD AUTO: 0.36 10*3/MM3 (ref 0.1–0.9)
MONOCYTES NFR BLD AUTO: 6.4 % (ref 5–12)
NEUTROPHILS # BLD AUTO: 3.43 10*3/MM3 (ref 1.7–7)
NEUTROPHILS NFR BLD AUTO: 61.3 % (ref 42.7–76)
NITRITE UR QL STRIP: NEGATIVE
NRBC BLD AUTO-RTO: 0 /100 WBC (ref 0–0.2)
PH UR STRIP: 6.5 [PH] (ref 5–8)
PLATELET # BLD AUTO: 225 10*3/MM3 (ref 140–450)
POTASSIUM SERPL-SCNC: 4 MMOL/L (ref 3.5–5.2)
PROT SERPL-MCNC: 6.9 G/DL (ref 6–8.5)
PROT UR QL STRIP: NEGATIVE
RBC # BLD AUTO: 4.96 10*6/MM3 (ref 3.77–5.28)
RBC #/AREA URNS HPF: NORMAL /HPF
SODIUM SERPL-SCNC: 143 MMOL/L (ref 136–145)
SP GR UR: 1.03 (ref 1–1.03)
T4 FREE SERPL-MCNC: 1.02 NG/DL (ref 0.93–1.7)
TRIGL SERPL-MCNC: 129 MG/DL (ref 0–150)
TSH SERPL DL<=0.005 MIU/L-ACNC: 4.29 MIU/ML (ref 0.27–4.2)
UROBILINOGEN UR STRIP-MCNC: ABNORMAL MG/DL
VLDLC SERPL CALC-MCNC: 25.8 MG/DL
WBC # BLD AUTO: 5.6 10*3/MM3 (ref 3.4–10.8)
WBC #/AREA URNS HPF: NORMAL /HPF

## 2019-06-10 PROCEDURE — G0439 PPPS, SUBSEQ VISIT: HCPCS | Performed by: INTERNAL MEDICINE

## 2019-06-10 PROCEDURE — 96160 PT-FOCUSED HLTH RISK ASSMT: CPT | Performed by: INTERNAL MEDICINE

## 2019-06-10 PROCEDURE — 99213 OFFICE O/P EST LOW 20 MIN: CPT | Performed by: INTERNAL MEDICINE

## 2019-06-10 RX ORDER — LANOLIN ALCOHOL/MO/W.PET/CERES
1000 CREAM (GRAM) TOPICAL DAILY
COMMUNITY
End: 2019-12-12

## 2019-06-10 RX ORDER — LANOLIN ALCOHOL/MO/W.PET/CERES
1500 CREAM (GRAM) TOPICAL
COMMUNITY
End: 2019-06-10 | Stop reason: SDUPTHER

## 2019-06-10 RX ORDER — GABAPENTIN 100 MG/1
200 CAPSULE ORAL 3 TIMES DAILY
Qty: 180 CAPSULE | Refills: 2 | Status: SHIPPED | OUTPATIENT
Start: 2019-06-10 | End: 2019-07-16

## 2019-06-10 NOTE — ASSESSMENT & PLAN NOTE
Lab Results   Component Value Date    HGBA1C 5.90 (H) 09/28/2016      Maintain a low sugar/starch/carbohydrate diet and exercise regularly.

## 2019-06-10 NOTE — PROGRESS NOTES
"06/10/2019      MEDICARE ANNUAL WELLNESS VISIT     Reanna Higgins is a 79 y.o. female who presents for Medicare Wellness-subsequent; Peripheral Neuropathy; Hypertension; and Hyperlipidemia      Patient's general assessment of her health since a year ago:     - Compared to one year ago, she feels her physical health is about the same without significant change.    - Compared to one year ago, she feels her mental health is about the same without significant change.      HPI for other active medical problems:     Peripheral neuropathy: she is interested in seeing a neurologist for this problem. Taking gabapentin at 100 mg qid without significant side effects. Has not tried to go higher on the dose.     RLS: seems little better on gabapentin.     Chronic essential hypertension:  Since prior visit: compliant with medication(s) and denies significant problems with medication(s).  Most recent in-office blood pressure readings:   BP Readings from Last 3 Encounters:   06/10/19 136/68   03/04/19 124/64   01/09/19 134/62     Chronic hyperlipidemia:  Current therapy include pravastatin, denies problems with medication.    Most recent labs:   Lab Results   Component Value Date     (H) 08/08/2018     (H) 06/05/2018     (H) 05/22/2017    HDL 42 08/08/2018    HDL 47 06/05/2018    HDL 47 05/22/2017    TRIG 166 (H) 08/08/2018          * The required components of Health Risk Assessment (HRA) that were completed by the patient and/or my staff are contained within this note and in the scanned documents titled \"Health Risk Assessment\" within the media section of the patient's chart in [employer].       HISTORY    Recent Hospitalizations:    Recent hospitalization?:     If YES, location, date, and diagnoses:     · Location:   · Date:   · Principle Discharge Dx:   · Secondary Dx:       Patient Care Team:    Patient Care Team:  Ted Li MD as PCP - General (Internal Medicine)  To Rivera MD as Consulting " Physician (General Surgery)  Torsten Benson Jr., MD as Consulting Physician (Cardiology)  Arsenio Witt MD as Consulting Physician (Ophthalmology)  Brown Beckman MD as Consulting Physician (Orthopedic Surgery)      Allergies:  Ancef [cefazolin]; Codeine; Penicillins; and Sulfa antibiotics    Medications:  Current Outpatient Medications   Medication Sig Dispense Refill   • Boswellia-Glucosamine-Vit D (GLUCOSAMINE COMPLEX PO) Take 1 tablet by mouth Daily.     • cholecalciferol (VITAMIN D3) 1000 UNITS tablet Take 1 tablet by mouth Daily.     • cycloSPORINE (RESTASIS) 0.05 % ophthalmic emulsion Administer 1 drop to both eyes 2 (Two) Times a Day.     • gabapentin (NEURONTIN) 100 MG capsule Take 2 capsules by mouth 3 (Three) Times a Day. 180 capsule 2   • LORazepam (ATIVAN) 0.5 MG tablet One tablet 30 minutes before plane flight, may repeat times 1 in 4-6 hours. 8 tablet 0   • metoprolol succinate XL (TOPROL-XL) 50 MG 24 hr tablet TAKE 1 TABLET BY MOUTH 2 (TWO) TIMES A DAY. 180 tablet 3   • Multiple Vitamins-Minerals (MULTIVITAL-M) tablet Take 1 tablet by mouth daily.     • omeprazole (priLOSEC) 40 MG capsule TAKE ONE CAPSULE BY MOUTH ONE TIME DAILY 90 capsule 1   • pravastatin (PRAVACHOL) 40 MG tablet Take two tablets every other day. 72 tablet 0   • triamterene-hydrochlorothiazide (MAXZIDE) 75-50 MG per tablet Take 1 tablet by mouth Daily. 90 tablet 1   • vitamin B-12 (CYANOCOBALAMIN) 1000 MCG tablet Take 1,000 mcg by mouth Daily.       No current facility-administered medications for this visit.         PFSH:     The following portions of the patient's history were reviewed and updated as appropriate: Allergies / Current Medications / Past Medical History / Surgical History / Social History / Family History    Problem List:  Patient Active Problem List   Diagnosis   • Colon polyp   • Gastroesophageal reflux disease   • Essential hypertension   • Hypercholesterolemia   • Mitral and aortic valve disease   •  Heart valve disease   • Anxiety   • Peripheral neuropathy   • Malignant neoplasm of cheek (CMS/HCC)   • Non-rheumatic mitral regurgitation   • Osteopenia   • SVT (supraventricular tachycardia) (CMS/HCC)   • AV block, Mobitz 1   • Cardiac pacemaker in situ   • RLS (restless legs syndrome)   • Phobia, flying   • Elevated hemoglobin A1c       Past Medical History:  Past Medical History:   Diagnosis Date   • Cancer (CMS/HCC)     skin   • Carpal tunnel syndrome    • Cataract    • Cystic fibroadenosis of breast    • GERD (gastroesophageal reflux disease)    • Hypercholesterolemia    • Hypertension    • Osteoarthritis of both hands    • Peripheral neuropathy    • Second degree AV block, Mobitz type I        Past Surgical History:  Past Surgical History:   Procedure Laterality Date   • BREAST CYST EXCISION Bilateral    • CARDIAC ELECTROPHYSIOLOGY PROCEDURE N/A 10/10/2016    Procedure: Pacemaker DC new  [facility];  Surgeon: Wilfrido Hameed MD;  Location: 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)   Tobacco Use   • Smoking status: Former Smoker     Packs/day: 0.50     Years: 15.00     Pack years: 7.50     Last attempt to quit: 1970     Years since quittin.4   • Smokeless tobacco: Never Used   • Tobacco comment: quit age 30   Substance and Sexual Activity   • Alcohol use: Yes     Frequency: Monthly or less     Drinks per  "session: 1 or 2   • Drug use: No   • Sexual activity: No   Lifestyle   • Physical activity:     Days per week: 5 days     Minutes per session: 30 min   • Stress: Not at all       Family History:  Family History   Problem Relation Age of Onset   • Thyroid disease Daughter    • Lung cancer Brother    • Hypertension Mother    • Aortic stenosis Mother    • Coronary artery disease Mother          78 (smoker)   • Hypertension Father    • Depression Father    • Anxiety disorder Father    • Diabetes Father    • Heart failure Father    • Cirrhosis Father         alcohol   • Alcohol abuse Sister    • Lupus Sister    • Heart disease Sister    • Diabetes type II Sister    • Alcohol abuse Brother    • Drug abuse Brother    • Heart disease Brother    • Cancer Brother         bladder (smoker)   • Heart disease Brother    • Breast cancer Paternal Aunt    • Neuropathy Neg Hx    • Colon cancer Neg Hx    • Dementia Neg Hx          PATIENT ASSESSMENT    Vitals:  /68   Pulse 69   Temp 98.6 °F (37 °C)   Ht 170.2 cm (67.01\")   Wt 73.2 kg (161 lb 4.8 oz)   SpO2 95%   BMI 25.26 kg/m²   BP Readings from Last 3 Encounters:   06/10/19 136/68   19 124/64   19 134/62     Wt Readings from Last 3 Encounters:   06/10/19 73.2 kg (161 lb 4.8 oz)   19 72.6 kg (160 lb)   19 72.6 kg (160 lb)      Body mass index is 25.26 kg/m².    There were no vitals filed for this visit.      Review of Systems:    Review of Systems  Constitutional neg  Resp neg  CV neg   GI neg   Neuro burning discomfort of feet/hands; RLS little better  Psych stable mood     Physical Exam:    Physical Exam   Neurological: She is alert. She has normal strength. Gait normal.   Reflex Scores:       Bicep reflexes are 2+ on the right side and 2+ on the left side.       Brachioradialis reflexes are 2+ on the right side and 2+ on the left side.       Patellar reflexes are 2+ on the right side and 2+ on the left side.    Constitutional  No " distress  Cardiovascular Rate  normal . Rhythm: regular . Heart sounds:  normal  Pulmonary/Chest  Effort normal. Breath sounds:  Normal   Psychiatric  Alert. Judgment and thought content normal. Mood normal     Reviewed Data:    Labs:   Lab Results   Component Value Date     01/09/2019    K 3.8 01/09/2019    CALCIUM 9.7 01/09/2019    AST 18 08/08/2018    ALT 19 08/08/2018    BUN 20 01/09/2019    CREATININE 0.83 01/09/2019    CREATININE 0.97 06/05/2018    CREATININE 0.86 11/01/2017    EGFRIFNONA 66 01/09/2019    EGFRIFAFRI 80 01/09/2019       Lab Results   Component Value Date     (H) 01/09/2019     (H) 06/05/2018     (H) 05/22/2017    HGBA1C 5.90 (H) 09/28/2016       Lab Results   Component Value Date     (H) 08/08/2018     (H) 06/05/2018     (H) 05/22/2017    HDL 42 08/08/2018    TRIG 166 (H) 08/08/2018       Lab Results   Component Value Date    TSH 3.220 01/09/2019    FREET4 1.15 01/09/2019          Lab Results   Component Value Date    WBC 5.15 06/05/2018    HGB 13.7 06/05/2018    HGB 13.9 05/22/2017    HGB 13.3 10/11/2016     06/05/2018              Lab Results   Component Value Date    [id number] 715 09/28/2016             Imaging:          Medical Tests:          Screening for Glaucoma:  Previous screening for glaucoma?: Yes      Hearing Loss Screen:  Finger Rub Hearing Test (right ear): passed  Finger Rub Hearing Test (left ear): passed      Urinary Incontinence Screen:  Episodes of urinary incontinence? : No      Depression Screen:  PHQ-2/PHQ-9 Depression Screening 6/10/2019   Little interest or pleasure in doing things 0   Feeling down, depressed, or hopeless 0   Total Score 0        PHQ-2: 0 (Not depressed)    PHQ-9: 0 (Negative screening for depression)       FUNCTIONAL, FALL RISK, & COGNITIVE SCREENING (Components below):    DATA:    Functional & Cognitive Status 6/10/2019   Do you have difficulty preparing food and eating? No   Do you have  difficulty bathing yourself, getting dressed or grooming yourself? No   Do you have difficulty using the toilet? No   Do you have difficulty moving around from place to place? No   Do you have trouble with steps or getting out of a bed or a chair? No   In the past year have you fallen or experienced a near fall? No   Do you need help using the phone?  No   Are you deaf or do you have serious difficulty hearing?  No   Do you need help with transportation? No   Do you need help shopping? No   Do you need help preparing meals?  No   Do you need help with housework?  No   Do you need help with laundry? No   Do you need help taking your medications? No   Do you need help managing money? No   Do you ever drive or ride in a car without wearing a seat belt? No   Have you felt unusual stress, anger or loneliness in the last month? No         A) Assessment of Functional Ability:  (Assessment of ability to perform ADL's (showering/bathing, using toilet, dressing, feeding self, moving self around) and IADL's (use telephone, shop, prepare food, housekeep, do laundry, transport independently, take medications independently, and handle finances)    Degree of functional impairment: MILD (based on assessment noted above)      B) Assessment of Fall Risk:       AMB Fall Risk Assessment 1/9/2019   Fallen in past 6 months 0--> No   Mental Status 0--> no mental status change   Mobility 0--> No mobility issues   Medications 0--> No meds   Total Fall Risk Score 2     STEADI Fall Risk Assessment was completed, and patient is at LOW risk for falls.     Need for further evaluation of gait, strength, and balance? : No    Timed Up and Go (TUG):   (>= 12 seconds indicates high risk for falling)    Observable abnormalities included: Normal gait pattern       C. Assessment of Cognitive Function:    Mini-Cog Test:     1) Registration (3 objects): Yes   2) Number of objects recalled: 3   3) Clock Draw: Passed? : N/A       Further evaluation  required? : No      COUNSELING    A. Identification of Health Risk Factors:    Risk factors include: cardiovascular risk factors and polypharmacy      B. Age-Appropriate Screening Schedule:  (Refer to the list below for future screening recommendations based on patient's age, sex and/or medical conditions. Orders for these recommended tests are listed in the plan section. The patient has been provided with a written plan)    Health Maintenance Topics  Health Maintenance   Topic Date Due   • DXA SCAN  06/16/2019   • INFLUENZA VACCINE  08/01/2019   • LIPID PANEL  08/08/2019   • MAMMOGRAM  04/30/2020   • COLONOSCOPY  11/14/2020   • TDAP/TD VACCINES (3 - Td) 09/15/2022   • PNEUMOCOCCAL VACCINES (65+ LOW/MEDIUM RISK)  Completed   • ZOSTER VACCINE  Completed       Health Maintenance Topics Due or Over-Due  There are no preventive care reminders to display for this patient.      C. Advanced Care Planning:    has an advanced directive which is on file and has a medical power of       D. Patient Self-Management and Personalized Health Advice:    She has been provided with personalized counseling/information (including brochures/handouts) about:     -- reducing risk for cardiovascular disease (heart, stroke, vascular), fall prevention and management of chronic pain with focus on minimizing use of narcotic pain medications      She has been recommended for the following preventative services which has been performed today, will be ordered today or ordered/performed on upcoming follow-up visit:     -- nutrition counseling provided, exercise counseling provided, counseling for cardiovascular disease risk reduction, fall risk assessment / plan of care completed, urinary incontinence assessment done, screening for diabetes performed (current/reviewed labs/lab ordered), vaccination for Prevnar-13 administered (or recommended at pharmacy)      E. Miscellaneous Items:    -Aspirin use counseling: Does not need ASA (and  currently is not on it)    -Discussed BMI with her. The BMI is in the acceptable range    -Reviewed use of high risk medication in the elderly: YES    -Reviewed for potential of harmful drug interactions in the elderly: YES      IV. WRAP-UP    Assessment & Plan:    1) MEDICARE ANNUAL WELLNESS VISIT    2) OTHER MEDICAL CONDITIONS ADDRESSED TODAY:              Problem List Items Addressed This Visit        High    Peripheral neuropathy (Chronic)    Overview     **Approved for electronic prescription for gabapentin on 1.9.19**          Current Assessment & Plan     Placed referral for neurologist. Recommended trying to titrate up on gabapentin.   New Rx refill sent to pharmacy electronically for 100 mg two TID #180, 2 refills.     Reviewed latest CURRY . Reviewed most recent UDS or ordered UDS. Patient is having medication refilled at expected intervals. Using medication appropriately without evidence of unusual increased use, abuse, or diverting.          Relevant Medications    gabapentin (NEURONTIN) 100 MG capsule    Other Relevant Orders    Ambulatory Referral to Neurology    TSH+Free T4    CBC & Differential       Medium    Essential hypertension (Chronic)    Overview     Stable. Continue triamterene/hctz 75/50 qAM and metoprolol XL 50 mg BID.          Relevant Medications    metoprolol succinate XL (TOPROL-XL) 50 MG 24 hr tablet    triamterene-hydrochlorothiazide (MAXZIDE) 75-50 MG per tablet    Other Relevant Orders    TSH+Free T4    Urinalysis With Microscopic If Indicated (No Culture) - Urine, Clean Catch    RLS (restless legs syndrome) (Chronic)    Overview     **Approved for electronic prescription for gabapentin on 1.9.19**          Relevant Medications    gabapentin (NEURONTIN) 100 MG capsule    Elevated hemoglobin A1c    Current Assessment & Plan     Lab Results   Component Value Date    HGBA1C 5.90 (H) 09/28/2016      Maintain a low sugar/starch/carbohydrate diet and exercise regularly.           Relevant Orders    Hemoglobin A1c       Low    Hypercholesterolemia (Chronic)    Overview     Continue pravastatin 40 mg qHS.   Could not tolerate higher dose.          Relevant Medications    pravastatin (PRAVACHOL) 40 MG tablet    Other Relevant Orders    Lipid Panel With / Chol / HDL Ratio    Comprehensive Metabolic Panel       Unprioritized    Gastroesophageal reflux disease    Relevant Medications    omeprazole (priLOSEC) 40 MG capsule      Other Visit Diagnoses     Medicare annual wellness visit, subsequent    -  Primary    Vitamin D deficiency        Relevant Orders    Vitamin D 25 Hydroxy    Need for vaccination for Strep pneumoniae        Relevant Orders    Pneumococcal Conjugate Vaccine 13-Valent All                    Orders Placed This Encounter   Procedures   • Pneumococcal Conjugate Vaccine 13-Valent All   • Hemoglobin A1c   • Lipid Panel With / Chol / HDL Ratio   • Comprehensive Metabolic Panel   • TSH+Free T4   • Urinalysis With Microscopic If Indicated (No Culture) - Urine, Clean Catch   • Vitamin D 25 Hydroxy   • Ambulatory Referral to Neurology   • CBC & Differential       Discussion / Summary:        Medications as of TODAY:              Current Outpatient Medications   Medication Sig Dispense Refill   • Boswellia-Glucosamine-Vit D (GLUCOSAMINE COMPLEX PO) Take 1 tablet by mouth Daily.     • cholecalciferol (VITAMIN D3) 1000 UNITS tablet Take 1 tablet by mouth Daily.     • cycloSPORINE (RESTASIS) 0.05 % ophthalmic emulsion Administer 1 drop to both eyes 2 (Two) Times a Day.     • gabapentin (NEURONTIN) 100 MG capsule Take 2 capsules by mouth 3 (Three) Times a Day. 180 capsule 2   • LORazepam (ATIVAN) 0.5 MG tablet One tablet 30 minutes before plane flight, may repeat times 1 in 4-6 hours. 8 tablet 0   • metoprolol succinate XL (TOPROL-XL) 50 MG 24 hr tablet TAKE 1 TABLET BY MOUTH 2 (TWO) TIMES A DAY. 180 tablet 3   • Multiple Vitamins-Minerals (MULTIVITAL-M) tablet Take 1 tablet by mouth daily.      • omeprazole (priLOSEC) 40 MG capsule TAKE ONE CAPSULE BY MOUTH ONE TIME DAILY 90 capsule 1   • pravastatin (PRAVACHOL) 40 MG tablet Take two tablets every other day. 72 tablet 0   • triamterene-hydrochlorothiazide (MAXZIDE) 75-50 MG per tablet Take 1 tablet by mouth Daily. 90 tablet 1   • vitamin B-12 (CYANOCOBALAMIN) 1000 MCG tablet Take 1,000 mcg by mouth Daily.       No current facility-administered medications for this visit.          FOLLOW-UP:            Return in about 6 months (around 12/10/2019) for Reassess chronic medical problems.              Future Appointments   Date Time Provider Department Center   6/19/2019  8:30 AM [facility] IN OFFICE, [facility] None   6/19/2019  9:00 AM Lindsey Farrell, LU[facility] A [facility] None   6/19/2019  3:00 PM Sadie Shine MD [facility] None   6/20/2019  2:00 PM [facility] 1  [facility]   12/9/2019  9:00 AM Ted Li MD [facility] None         ______________________________________________________________________      A printed After Visit Summary (AVS) including the Personalized Prevention  Plan Services (PPPS) was given to the patient at check-out today.     Educational Handouts    Strong & Stable / Balance / Physical Activity / Pain / Depression /  Forgetfulness / Healthy Eating / Alcohol / Smoking /  Medicine / Sexuality / Scams     **Dragon Disclaimer:   Much of this encounter note is an electronic transcription/translation of spoken language to printed text. The electronic translation of spoken language may permit erroneous, or at times, nonsensical words or phrases to be inadvertently transcribed. Although I have reviewed the note for such errors, some may still exist.     This template was created by Arabella Li MD.

## 2019-06-10 NOTE — ASSESSMENT & PLAN NOTE
Placed referral for neurologist. Recommended trying to titrate up on gabapentin.   New Rx refill sent to pharmacy electronically for 100 mg two TID #180, 2 refills.     Reviewed latest CURRY . Reviewed most recent UDS or ordered UDS. Patient is having medication refilled at expected intervals. Using medication appropriately without evidence of unusual increased use, abuse, or diverting.

## 2019-06-11 DIAGNOSIS — R73.03 PREDIABETES: Primary | ICD-10-CM

## 2019-06-11 DIAGNOSIS — E03.8 SUBCLINICAL HYPOTHYROIDISM: ICD-10-CM

## 2019-06-11 RX ORDER — LEVOTHYROXINE SODIUM 0.03 MG/1
25 TABLET ORAL DAILY
Qty: 30 TABLET | Refills: 3 | Status: SHIPPED | OUTPATIENT
Start: 2019-06-11 | End: 2019-09-07 | Stop reason: SDUPTHER

## 2019-06-11 RX ORDER — METFORMIN HYDROCHLORIDE 500 MG/1
500 TABLET, EXTENDED RELEASE ORAL
Qty: 30 TABLET | Refills: 2 | Status: SHIPPED | OUTPATIENT
Start: 2019-06-11 | End: 2019-07-19 | Stop reason: HOSPADM

## 2019-06-11 NOTE — PROGRESS NOTES
Call patient with her test result(s) and mail the results to her if MyChart is NOT active.    1. A1c for sugar average is higher than last time and is in prediabetes range. Start metformin  mg 1 tablet with supper daily (link to Dx: Prediabetes)  2. Thyroid is marginally low. Start levothyroxine 25 mcg qAM (linke to Dx: Subclinical hypothyroidism)  3. LDL cholesterol is a bit higher but HDL good cholesterol is better. Continue pravastatin.     ** Above two problems could contribute to neuropathy. Will see if treating them helps your symptoms.     See me in 3 months for these two issues. Also keep future appointments.       (REMINDER: Update med list, maintain dx association, and update change on CURRENT ASSESSMENT/PLAN)

## 2019-06-12 ENCOUNTER — TELEPHONE (OUTPATIENT)
Dept: INTERNAL MEDICINE | Age: 79
End: 2019-06-12

## 2019-06-19 ENCOUNTER — CLINICAL SUPPORT NO REQUIREMENTS (OUTPATIENT)
Dept: CARDIOLOGY | Facility: CLINIC | Age: 79
End: 2019-06-19

## 2019-06-19 ENCOUNTER — OFFICE VISIT (OUTPATIENT)
Dept: CARDIOLOGY | Facility: CLINIC | Age: 79
End: 2019-06-19

## 2019-06-19 VITALS
HEART RATE: 60 BPM | OXYGEN SATURATION: 100 % | DIASTOLIC BLOOD PRESSURE: 80 MMHG | SYSTOLIC BLOOD PRESSURE: 160 MMHG | WEIGHT: 158.4 LBS | BODY MASS INDEX: 24.86 KG/M2 | HEIGHT: 67 IN

## 2019-06-19 DIAGNOSIS — Z95.0 CARDIAC PACEMAKER IN SITU: ICD-10-CM

## 2019-06-19 DIAGNOSIS — I49.5 SICK SINUS SYNDROME (HCC): Primary | ICD-10-CM

## 2019-06-19 DIAGNOSIS — I10 ESSENTIAL HYPERTENSION: Primary | Chronic | ICD-10-CM

## 2019-06-19 DIAGNOSIS — I47.1 SVT (SUPRAVENTRICULAR TACHYCARDIA) (HCC): ICD-10-CM

## 2019-06-19 PROCEDURE — 99214 OFFICE O/P EST MOD 30 MIN: CPT | Performed by: NURSE PRACTITIONER

## 2019-06-19 PROCEDURE — 93000 ELECTROCARDIOGRAM COMPLETE: CPT | Performed by: NURSE PRACTITIONER

## 2019-06-19 PROCEDURE — 93280 PM DEVICE PROGR EVAL DUAL: CPT | Performed by: INTERNAL MEDICINE

## 2019-06-19 NOTE — PROGRESS NOTES
Date of Office Visit: 2019  Encounter Provider: LUIS A Hurt  Place of Service: [facility] CARDIOLOGY  Patient Name: Reanna Higgins  :1940    Chief Complaint   Patient presents with   • Follow-up     Yearly    :     HPI: Reanna Higgins is a 79 y.o. female, new to me, who presents today for follow-up.  Old records have been obtained and reviewed by me.  She is a patient of Dr. Mccullough's with a past cardiac history significant for palpitations, paroxysmal SVT, and hypertension.  In , she had an episode of syncope and intermittent 2-1 AV block along with a 2-second pause for which she underwent permanent pacemaker implantation.  She also underwent a nuclear stress test at that time which revealed normal LV function with an EF of 69% and no evidence of ischemia.  In 2016, an echocardiogram revealed mild LVH, an estimated EF of 61%, and no significant valvular abnormalities.  She was first evaluated by Dr. Mccullough in 2017 after her pacemaker diagnostics revealed 3 episodes of what was labeled as ventricular tachycardia.  Dr. Mccullough switched her diltiazem to metoprolol and repeated a Holter monitor which revealed sinus rhythm with just some rare APCs and PVCs.  She was evaluated by the electrophysiology team who felt that the rhythm was SVT and not VT.  Changing her to metoprolol helped improve her symptoms and her palpitations significantly decreased.  She was last seen in the office on 2018 at which time she was doing well with no complaints of angina or heart failure.  No changes were made to her medical regimen and she was instructed to follow-up in 1 year.  She had an in clinic pacemaker check today which revealed normal dual-chamber pacemaker function, presenting EGM of sinus AP/VS at 60 bpm.  Since the last remote check on 3/26/2018 there have been 0 episodes.   Over the past year she has been doing well from a cardiac standpoint.  She  denies any chest pain, edema, dizziness, or syncope.  She does get a little short of breath with heavy exertion.  However, she is a very active lady and exercises almost every day.  She rides a stationary bike for 20 minutes 5 times a week and roller skates 2 days a week and is able to do the these things without any difficulty.  She is very mindful of her diet.  She recently got diagnosed with prediabetes and hypothyroidism and seems pretty upset about it.  Her PCP placed her on metformin and levothyroxine.    Past Medical History:   Diagnosis Date   • Cancer (CMS/HCC)     skin   • Carpal tunnel syndrome    • Cataract    • Cystic fibroadenosis of breast    • GERD (gastroesophageal reflux disease)    • Hypercholesterolemia    • Hypertension    • Osteoarthritis of both hands    • Peripheral neuropathy    • Second degree AV block, Mobitz type I        Past Surgical History:   Procedure Laterality Date   • BREAST CYST EXCISION Bilateral    • CARDIAC ELECTROPHYSIOLOGY PROCEDURE N/A 10/10/2016    Procedure: Pacemaker DC new  [employer];  Surgeon: Wilfrido Hameed MD;  Location: [facility] INVASIVE LOCATION;  Service:    • CATARACT EXTRACTION      NOVEMBER AND 2014   • CHOLECYSTECTOMY     • HYSTERECTOMY     • KNEE ARTHROSCOPY Bilateral 2014    MENISCAL TEAR   • PACEMAKER IMPLANTATION  10/10/2016   • SHOULDER SURGERY Right     ROTATOR CUFF SURGERY 2015   • TONSILLECTOMY     • TRIGGER FINGER RELEASE      both hands       Social History     Socioeconomic History   • Marital status:      Spouse name: Rishabh*   • Number of children: 2   • Years of education: Not on file   • Highest education level: Not on file   Occupational History   • 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)   Tobacco Use   • Smoking status: Former Smoker     Packs/day: 0.50     Years: 15.00     Pack years: 7.50     Last attempt to quit: 1970     Years since quittin.4   • Smokeless tobacco: Never Used   • Tobacco comment:  quit age 30   Substance and Sexual Activity   • Alcohol use: Yes     Alcohol/week: 1.2 oz     Types: 1 Glasses of wine, 1 Cans of beer per week     Frequency: Monthly or less     Drinks per session: 1 or 2     Comment: rare glass of wine or beer   • Drug use: No   • Sexual activity: No   Lifestyle   • Physical activity:     Days per week: 5 days     Minutes per session: 30 min   • Stress: Not at all       Family History   Problem Relation Age of Onset   • Thyroid disease Daughter    • Lung cancer Brother    • Hypertension Mother    • Aortic stenosis Mother    • Coronary artery disease Mother          78 (smoker)   • Hypertension Father    • Depression Father    • Anxiety disorder Father    • Diabetes Father    • Heart failure Father    • Cirrhosis Father         alcohol   • Alcohol abuse Sister    • Lupus Sister    • Heart disease Sister    • Diabetes type II Sister    • Alcohol abuse Brother    • Drug abuse Brother    • Heart disease Brother    • Cancer Brother         bladder (smoker)   • Heart disease Brother    • Breast cancer Paternal Aunt    • Neuropathy Neg Hx    • Colon cancer Neg Hx    • Dementia Neg Hx        Review of Systems   Constitution: Negative for chills, fever and malaise/fatigue.   Cardiovascular: Positive for dyspnea on exertion and palpitations. Negative for chest pain, leg swelling, near-syncope, orthopnea, paroxysmal nocturnal dyspnea and syncope.   Respiratory: Negative for cough and shortness of breath.    Musculoskeletal: Negative for joint pain, joint swelling and myalgias.   Gastrointestinal: Negative for abdominal pain, diarrhea, melena, nausea and vomiting.   Genitourinary: Negative for frequency and hematuria.   Neurological: Positive for numbness. Negative for light-headedness, paresthesias and seizures.   Allergic/Immunologic: Negative.    All other systems reviewed and are negative.      Allergies   Allergen Reactions   • Ancef [Cefazolin] Other (See Comments)      "Hypotension/bradycardia   • Codeine    • Penicillins Other (See Comments)     Hypotension (Ancef allergy)    • Sulfa Antibiotics          Current Outpatient Medications:   •  Boswellia-Glucosamine-Vit D (GLUCOSAMINE COMPLEX PO), Take 1 tablet by mouth Daily., Disp: , Rfl:   •  cholecalciferol (VITAMIN D3) 1000 UNITS tablet, Take 1 tablet by mouth Daily., Disp: , Rfl:   •  cycloSPORINE (RESTASIS) 0.05 % ophthalmic emulsion, Administer 1 drop to both eyes 2 (Two) Times a Day., Disp: , Rfl:   •  gabapentin (NEURONTIN) 100 MG capsule, Take 2 capsules by mouth 3 (Three) Times a Day., Disp: 180 capsule, Rfl: 2  •  levothyroxine (SYNTHROID, LEVOTHROID) 25 MCG tablet, Take 1 tablet by mouth Daily., Disp: 30 tablet, Rfl: 3  •  LORazepam (ATIVAN) 0.5 MG tablet, One tablet 30 minutes before plane flight, may repeat times 1 in 4-6 hours., Disp: 8 tablet, Rfl: 0  •  metFORMIN ER (GLUCOPHAGE-XR) 500 MG 24 hr tablet, Take 1 tablet by mouth Daily With Breakfast., Disp: 30 tablet, Rfl: 2  •  metoprolol succinate XL (TOPROL-XL) 50 MG 24 hr tablet, TAKE 1 TABLET BY MOUTH 2 (TWO) TIMES A DAY., Disp: 180 tablet, Rfl: 3  •  Multiple Vitamins-Minerals (MULTIVITAL-M) tablet, Take 1 tablet by mouth daily., Disp: , Rfl:   •  omeprazole (priLOSEC) 40 MG capsule, TAKE ONE CAPSULE BY MOUTH ONE TIME DAILY, Disp: 90 capsule, Rfl: 1  •  pravastatin (PRAVACHOL) 40 MG tablet, Take two tablets every other day., Disp: 72 tablet, Rfl: 0  •  triamterene-hydrochlorothiazide (MAXZIDE) 75-50 MG per tablet, Take 1 tablet by mouth Daily., Disp: 90 tablet, Rfl: 1  •  vitamin B-12 (CYANOCOBALAMIN) 1000 MCG tablet, Take 1,000 mcg by mouth Daily., Disp: , Rfl:       Objective:     Vitals:    06/19/19 0858 06/19/19 0859   BP: 152/68 160/80   BP Location: Right arm Left arm   Pulse: 60    SpO2: 100%    Weight: 71.8 kg (158 lb 6.4 oz)    Height: 170.2 cm (67\")      Body mass index is 24.81 kg/m².    PHYSICAL EXAM:    Physical Exam   Constitutional: She is oriented " to person, place, and time. She appears well-developed and well-nourished. No distress.   HENT:   Head: Normocephalic and atraumatic.   Eyes: Pupils are equal, round, and reactive to light.   Neck: No JVD present. No thyromegaly present.   Cardiovascular: Normal rate, regular rhythm, normal heart sounds and intact distal pulses.   No murmur heard.  Pulmonary/Chest: Effort normal and breath sounds normal. No respiratory distress.   Abdominal: Soft. Bowel sounds are normal. She exhibits no distension. There is no splenomegaly or hepatomegaly. There is no tenderness.   Musculoskeletal: Normal range of motion. She exhibits no edema.   Neurological: She is alert and oriented to person, place, and time.   Skin: Skin is warm and dry. She is not diaphoretic. No erythema.   Psychiatric: She has a normal mood and affect. Her behavior is normal. Judgment normal.         ECG 12 Lead  Date/Time: 6/19/2019 9:02 AM  Performed by: Lindsey Farrell APRN  Authorized by: Lindsey Farrell APRN   Comparison: compared with previous ECG from 6/15/2018  Similar to previous ECG  Rhythm: sinus rhythm and paced  Rate: normal  BPM: 60  Conduction: 1st degree AV block    Clinical impression: abnormal EKG  Comments: Indication: Permanent pacemaker              Assessment:       Diagnosis Plan   1. Essential hypertension     2. SVT (supraventricular tachycardia) (CMS/HCC)     3. Cardiac pacemaker in situ  ECG 12 Lead     Orders Placed This Encounter   Procedures   • ECG 12 Lead     This order was created via procedure documentation          Plan:       1.  Hypertension.  Her blood pressure is elevated today.  However, she reports that at home and is normally in the 120s to 130 systolic and she has not taken any of her medications yet today.  I am not going to make any changes.      Overall from a cardiac standpoint I think she is doing well.  She denies any symptoms of angina or heart failure.  I am not going to make any changes to her  medical regimen and she will follow-up with Dr. Mccullough in 1 year or sooner if needed.      As always, it has been a pleasure to participate in your patient's care.      Sincerely,         LUIS A Parikh

## 2019-06-20 ENCOUNTER — HOSPITAL ENCOUNTER (OUTPATIENT)
Dept: BONE DENSITY | Facility: HOSPITAL | Age: 79
Discharge: HOME | End: 2019-06-20
Admitting: INTERNAL MEDICINE

## 2019-06-20 DIAGNOSIS — Z78.0 POSTMENOPAUSAL: ICD-10-CM

## 2019-06-20 DIAGNOSIS — M85.80 OSTEOPENIA, UNSPECIFIED LOCATION: ICD-10-CM

## 2019-06-20 PROCEDURE — 77080 DXA BONE DENSITY AXIAL: CPT

## 2019-06-26 RX ORDER — PRAVASTATIN SODIUM 40 MG
TABLET ORAL
Qty: 72 TABLET | Refills: 0 | Status: SHIPPED | OUTPATIENT
Start: 2019-06-26 | End: 2019-07-16

## 2019-07-03 ENCOUNTER — DOCUMENTATION (OUTPATIENT)
Dept: CARDIOLOGY | Facility: CLINIC | Age: 79
End: 2019-07-03

## 2019-07-03 ENCOUNTER — TELEPHONE (OUTPATIENT)
Dept: CARDIOLOGY | Facility: CLINIC | Age: 79
End: 2019-07-03

## 2019-07-03 NOTE — TELEPHONE ENCOUNTER
[facility] Dept left message for Jaimie requesting cardiac clearance for patient. She was last seen in the office by Lindsey 6/19/19.     Clearance/EKG has been faxed to [phone]

## 2019-07-16 ENCOUNTER — APPOINTMENT (OUTPATIENT)
Dept: CT IMAGING | Facility: HOSPITAL | Age: 79
End: 2019-07-16

## 2019-07-16 ENCOUNTER — TELEPHONE (OUTPATIENT)
Dept: INTERNAL MEDICINE | Age: 79
End: 2019-07-16

## 2019-07-16 ENCOUNTER — HOSPITAL ENCOUNTER (INPATIENT)
Facility: HOSPITAL | Age: 79
LOS: 3 days | Discharge: HOME | End: 2019-07-19
Attending: EMERGENCY MEDICINE | Admitting: HOSPITALIST

## 2019-07-16 DIAGNOSIS — K85.90 ACUTE PANCREATITIS WITHOUT INFECTION OR NECROSIS, UNSPECIFIED PANCREATITIS TYPE: Primary | ICD-10-CM

## 2019-07-16 DIAGNOSIS — K21.9 GASTROESOPHAGEAL REFLUX DISEASE, ESOPHAGITIS PRESENCE NOT SPECIFIED: ICD-10-CM

## 2019-07-16 LAB
ALBUMIN SERPL-MCNC: 4 G/DL (ref 3.5–5.2)
ALBUMIN/GLOB SERPL: 1.4 G/DL
ALP SERPL-CCNC: 69 U/L (ref 39–117)
ALT SERPL W P-5'-P-CCNC: 45 U/L (ref 1–33)
ANION GAP SERPL CALCULATED.3IONS-SCNC: 12.7 MMOL/L (ref 5–15)
AST SERPL-CCNC: 87 U/L (ref 1–32)
BACTERIA UR QL AUTO: ABNORMAL /HPF
BASOPHILS # BLD AUTO: 0.01 10*3/MM3 (ref 0–0.2)
BASOPHILS NFR BLD AUTO: 0.2 % (ref 0–1.5)
BILIRUB SERPL-MCNC: 0.2 MG/DL (ref 0.2–1.2)
BILIRUB UR QL STRIP: NEGATIVE
BUN BLD-MCNC: 19 MG/DL (ref 8–23)
BUN/CREAT SERPL: 25.3 (ref 7–25)
CALCIUM SPEC-SCNC: 9.6 MG/DL (ref 8.6–10.5)
CHLORIDE SERPL-SCNC: 98 MMOL/L (ref 98–107)
CLARITY UR: CLEAR
CO2 SERPL-SCNC: 28.3 MMOL/L (ref 22–29)
COLOR UR: YELLOW
CREAT BLD-MCNC: 0.75 MG/DL (ref 0.57–1)
DEPRECATED RDW RBC AUTO: 40.3 FL (ref 37–54)
EOSINOPHIL # BLD AUTO: 0.05 10*3/MM3 (ref 0–0.4)
EOSINOPHIL NFR BLD AUTO: 1 % (ref 0.3–6.2)
ERYTHROCYTE [DISTWIDTH] IN BLOOD BY AUTOMATED COUNT: 12.4 % (ref 12.3–15.4)
GFR SERPL CREATININE-BSD FRML MDRD: 74 ML/MIN/1.73
GLOBULIN UR ELPH-MCNC: 2.8 GM/DL
GLUCOSE BLD-MCNC: 139 MG/DL (ref 65–99)
GLUCOSE UR STRIP-MCNC: NEGATIVE MG/DL
HCT VFR BLD AUTO: 39 % (ref 34–46.6)
HGB BLD-MCNC: 12.8 G/DL (ref 12–15.9)
HGB UR QL STRIP.AUTO: NEGATIVE
HYALINE CASTS UR QL AUTO: ABNORMAL /LPF
IMM GRANULOCYTES # BLD AUTO: 0 10*3/MM3 (ref 0–0.05)
IMM GRANULOCYTES NFR BLD AUTO: 0 % (ref 0–0.5)
KETONES UR QL STRIP: NEGATIVE
LEUKOCYTE ESTERASE UR QL STRIP.AUTO: ABNORMAL
LIPASE SERPL-CCNC: 2594 U/L (ref 13–60)
LYMPHOCYTES # BLD AUTO: 1.45 10*3/MM3 (ref 0.7–3.1)
LYMPHOCYTES NFR BLD AUTO: 29.4 % (ref 19.6–45.3)
MCH RBC QN AUTO: 29.1 PG (ref 26.6–33)
MCHC RBC AUTO-ENTMCNC: 32.8 G/DL (ref 31.5–35.7)
MCV RBC AUTO: 88.6 FL (ref 79–97)
MONOCYTES # BLD AUTO: 0.15 10*3/MM3 (ref 0.1–0.9)
MONOCYTES NFR BLD AUTO: 3 % (ref 5–12)
NEUTROPHILS # BLD AUTO: 3.27 10*3/MM3 (ref 1.7–7)
NEUTROPHILS NFR BLD AUTO: 66.4 % (ref 42.7–76)
NITRITE UR QL STRIP: NEGATIVE
NRBC BLD AUTO-RTO: 0 /100 WBC (ref 0–0.2)
PH UR STRIP.AUTO: 7.5 [PH] (ref 5–8)
PLATELET # BLD AUTO: 198 10*3/MM3 (ref 140–450)
PMV BLD AUTO: 11.5 FL (ref 6–12)
POTASSIUM BLD-SCNC: 3.4 MMOL/L (ref 3.5–5.2)
PROT SERPL-MCNC: 6.8 G/DL (ref 6–8.5)
PROT UR QL STRIP: NEGATIVE
RBC # BLD AUTO: 4.4 10*6/MM3 (ref 3.77–5.28)
RBC # UR: ABNORMAL /HPF
REF LAB TEST METHOD: ABNORMAL
SODIUM BLD-SCNC: 139 MMOL/L (ref 136–145)
SP GR UR STRIP: 1.02 (ref 1–1.03)
SQUAMOUS #/AREA URNS HPF: ABNORMAL /HPF
TROPONIN T SERPL-MCNC: <0.01 NG/ML (ref 0–0.03)
UROBILINOGEN UR QL STRIP: ABNORMAL
WBC NRBC COR # BLD: 4.93 10*3/MM3 (ref 3.4–10.8)
WBC UR QL AUTO: ABNORMAL /HPF

## 2019-07-16 PROCEDURE — 84484 ASSAY OF TROPONIN QUANT: CPT | Performed by: EMERGENCY MEDICINE

## 2019-07-16 PROCEDURE — 93005 ELECTROCARDIOGRAM TRACING: CPT | Performed by: EMERGENCY MEDICINE

## 2019-07-16 PROCEDURE — 85025 COMPLETE CBC W/AUTO DIFF WBC: CPT | Performed by: EMERGENCY MEDICINE

## 2019-07-16 PROCEDURE — 93005 ELECTROCARDIOGRAM TRACING: CPT

## 2019-07-16 PROCEDURE — 93010 ELECTROCARDIOGRAM REPORT: CPT | Performed by: INTERNAL MEDICINE

## 2019-07-16 PROCEDURE — 80053 COMPREHEN METABOLIC PANEL: CPT | Performed by: EMERGENCY MEDICINE

## 2019-07-16 PROCEDURE — 25010000002 IOPAMIDOL 61 % SOLUTION: Performed by: EMERGENCY MEDICINE

## 2019-07-16 PROCEDURE — 25010000002 LORAZEPAM PER 2 MG: Performed by: EMERGENCY MEDICINE

## 2019-07-16 PROCEDURE — 83690 ASSAY OF LIPASE: CPT | Performed by: EMERGENCY MEDICINE

## 2019-07-16 PROCEDURE — 99284 EMERGENCY DEPT VISIT MOD MDM: CPT

## 2019-07-16 PROCEDURE — 81001 URINALYSIS AUTO W/SCOPE: CPT | Performed by: EMERGENCY MEDICINE

## 2019-07-16 PROCEDURE — 74177 CT ABD & PELVIS W/CONTRAST: CPT

## 2019-07-16 RX ORDER — PRAVASTATIN SODIUM 40 MG
40 TABLET ORAL
COMMUNITY
End: 2019-09-12

## 2019-07-16 RX ORDER — METOPROLOL SUCCINATE 50 MG/1
50 TABLET, EXTENDED RELEASE ORAL 2 TIMES DAILY
Status: DISCONTINUED | OUTPATIENT
Start: 2019-07-17 | End: 2019-07-19 | Stop reason: HOSPADM

## 2019-07-16 RX ORDER — METOPROLOL SUCCINATE 50 MG/1
50 TABLET, EXTENDED RELEASE ORAL 2 TIMES DAILY
COMMUNITY
End: 2019-10-11

## 2019-07-16 RX ORDER — GABAPENTIN 100 MG/1
200 CAPSULE ORAL NIGHTLY
COMMUNITY
End: 2019-09-12 | Stop reason: SDUPTHER

## 2019-07-16 RX ORDER — LORAZEPAM 2 MG/ML
0.5 INJECTION INTRAMUSCULAR ONCE
Status: COMPLETED | OUTPATIENT
Start: 2019-07-16 | End: 2019-07-16

## 2019-07-16 RX ORDER — GABAPENTIN 100 MG/1
100 CAPSULE ORAL 3 TIMES DAILY
COMMUNITY
End: 2019-09-12 | Stop reason: SDUPTHER

## 2019-07-16 RX ORDER — LORAZEPAM 2 MG/ML
1 INJECTION INTRAMUSCULAR ONCE
Status: DISCONTINUED | OUTPATIENT
Start: 2019-07-16 | End: 2019-07-16

## 2019-07-16 RX ORDER — GABAPENTIN 100 MG/1
200 CAPSULE ORAL NIGHTLY
Status: DISCONTINUED | OUTPATIENT
Start: 2019-07-17 | End: 2019-07-19 | Stop reason: HOSPADM

## 2019-07-16 RX ORDER — CYCLOSPORINE 0.5 MG/ML
1 EMULSION OPHTHALMIC 2 TIMES DAILY
Status: DISCONTINUED | OUTPATIENT
Start: 2019-07-17 | End: 2019-07-19 | Stop reason: HOSPADM

## 2019-07-16 RX ORDER — LEVOTHYROXINE SODIUM 0.03 MG/1
25 TABLET ORAL DAILY
Status: DISCONTINUED | OUTPATIENT
Start: 2019-07-17 | End: 2019-07-19 | Stop reason: HOSPADM

## 2019-07-16 RX ORDER — OMEPRAZOLE 40 MG/1
40 CAPSULE, DELAYED RELEASE ORAL DAILY
Qty: 90 CAPSULE | Refills: 1 | Status: SHIPPED | OUTPATIENT
Start: 2019-07-16 | End: 2020-01-28

## 2019-07-16 RX ORDER — SODIUM CHLORIDE 9 MG/ML
125 INJECTION, SOLUTION INTRAVENOUS CONTINUOUS
Status: DISCONTINUED | OUTPATIENT
Start: 2019-07-16 | End: 2019-07-19 | Stop reason: HOSPADM

## 2019-07-16 RX ORDER — ACETAMINOPHEN 325 MG/1
650 TABLET ORAL EVERY 4 HOURS PRN
Status: DISCONTINUED | OUTPATIENT
Start: 2019-07-16 | End: 2019-07-19 | Stop reason: HOSPADM

## 2019-07-16 RX ORDER — SODIUM PHOSPHATE,MONO-DIBASIC 19G-7G/118
1 ENEMA (ML) RECTAL DAILY
COMMUNITY
End: 2019-07-19 | Stop reason: HOSPADM

## 2019-07-16 RX ORDER — SODIUM CHLORIDE 0.9 % (FLUSH) 0.9 %
10 SYRINGE (ML) INJECTION AS NEEDED
Status: DISCONTINUED | OUTPATIENT
Start: 2019-07-16 | End: 2019-07-16

## 2019-07-16 RX ORDER — GABAPENTIN 100 MG/1
100 CAPSULE ORAL 3 TIMES DAILY
Status: DISCONTINUED | OUTPATIENT
Start: 2019-07-17 | End: 2019-07-19 | Stop reason: HOSPADM

## 2019-07-16 RX ADMIN — IOPAMIDOL 85 ML: 612 INJECTION, SOLUTION INTRAVENOUS at 21:49

## 2019-07-16 RX ADMIN — LORAZEPAM 0.5 MG: 2 INJECTION INTRAMUSCULAR; INTRAVENOUS at 21:11

## 2019-07-16 RX ADMIN — LORAZEPAM 0.5 MG: 2 INJECTION INTRAMUSCULAR; INTRAVENOUS at 21:33

## 2019-07-16 RX ADMIN — SODIUM CHLORIDE 125 ML/HR: 9 INJECTION, SOLUTION INTRAVENOUS at 21:17

## 2019-07-16 NOTE — TELEPHONE ENCOUNTER
Pt is requesting a rx refill of Omeprazole 40mg.    [facility]/ [address] #[phone].

## 2019-07-16 NOTE — ED TRIAGE NOTES
Pt reports upper abdominal pain but it went away. Pt reports now she feels drained. Pt reports generalized weakness.

## 2019-07-16 NOTE — ED PROVIDER NOTES
EMERGENCY DEPARTMENT ENCOUNTER    CHIEF COMPLAINT  Chief Complaint: Abd pain  History given by: Pt  History limited by: none  Room Number: 37/37  PMD: Ted Li MD      HPI:  Pt is a 79 y.o. female who presents complaining of diffuse upper abd pain that began 45 minutes ago just after eating. She states the pain lasted 10 minutes and feels similar to her gallbladder issues in the past. She denies eating any new foods. Pt reports nausea and generalized weakness but denies vomiting, diarrhea, chest pain, SOA, or constipation. Pt states she is scheduled to see her PCP in two days. Pt reports Hx of hysterectomy and cholecystectomy.     Duration:  10 minutes  Onset: gradual  Timing: constant  Location: upper abd  Radiation: none  Quality: pain  Intensity/Severity: moderate  Progression: resolved  Associated Symptoms: nausea, generalized weakness  Aggravating Factors: none  Alleviating Factors: none  Previous Episodes: none  Treatment before arrival: none    PAST MEDICAL HISTORY  Active Ambulatory Problems     Diagnosis Date Noted   • Colon polyp 03/18/2016   • Gastroesophageal reflux disease 03/18/2016   • Essential hypertension 03/18/2016   • Hypercholesterolemia 03/18/2016   • Mitral and aortic valve disease 03/18/2016   • Heart valve disease 03/18/2016   • Anxiety 07/07/2016   • Peripheral neuropathy    • Malignant neoplasm of cheek (CMS/HCC) 05/31/2017   • Non-rheumatic mitral regurgitation 05/31/2017   • Osteopenia 06/17/2017   • SVT (supraventricular tachycardia) (CMS/HCC) 12/08/2017   • AV block, Mobitz 1 12/08/2017   • Cardiac pacemaker in situ 12/08/2017   • RLS (restless legs syndrome) 01/09/2019   • Phobia, flying 01/09/2019   • Elevated hemoglobin A1c 06/10/2019     Resolved Ambulatory Problems     Diagnosis Date Noted   • Abdominal bruit 03/18/2016   • Gastric polyp 03/18/2016   • Ventricular tachycardia (CMS/HCC) 10/31/2017     Past Medical History:   Diagnosis Date   • Cancer (CMS/HCC)    • Carpal  tunnel syndrome    • Cataract    • Cystic fibroadenosis of breast    • GERD (gastroesophageal reflux disease)    • Hypercholesterolemia    • Hypertension    • Osteoarthritis of both hands    • Peripheral neuropathy    • Second degree AV block, Mobitz type I        PAST SURGICAL HISTORY  Past Surgical History:   Procedure Laterality Date   • BREAST CYST EXCISION Bilateral    • CARDIAC ELECTROPHYSIOLOGY PROCEDURE N/A 10/10/2016    Procedure: Pacemaker DC erlin SHERMAN;  Surgeon: Wilfrido Hameed MD;  Location: [facility] INVASIVE LOCATION;  Service:    • CATARACT EXTRACTION      NOVEMBER AND 2014   • CHOLECYSTECTOMY     • HYSTERECTOMY     • KNEE ARTHROSCOPY Bilateral 2014    MENISCAL TEAR   • PACEMAKER IMPLANTATION  10/10/2016   • SHOULDER SURGERY Right     ROTATOR CUFF SURGERY 2015   • TONSILLECTOMY     • TRIGGER FINGER RELEASE      both hands       FAMILY HISTORY  Family History   Problem Relation Age of Onset   • Thyroid disease Daughter    • Lung cancer Brother    • Hypertension Mother    • Aortic stenosis Mother    • Coronary artery disease Mother          78 (smoker)   • Hypertension Father    • Depression Father    • Anxiety disorder Father    • Diabetes Father    • Heart failure Father    • Cirrhosis Father         alcohol   • Alcohol abuse Sister    • Lupus Sister    • Heart disease Sister    • Diabetes type II Sister    • Alcohol abuse Brother    • Drug abuse Brother    • Heart disease Brother    • Cancer Brother         bladder (smoker)   • Heart disease Brother    • Breast cancer Paternal Aunt    • Neuropathy Neg Hx    • Colon cancer Neg Hx    • Dementia Neg Hx        SOCIAL HISTORY  Social History     Socioeconomic History   • Marital status:      Spouse name: Rishabh*   • Number of children: 2   • Years of education: Not on file   • Highest education level: Not on file   Occupational History   • 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)   Tobacco Use   • Smoking  status: Former Smoker     Packs/day: 0.50     Years: 15.00     Pack years: 7.50     Last attempt to quit: 1970     Years since quittin.5   • Smokeless tobacco: Never Used   • Tobacco comment: quit age 30   Substance and Sexual Activity   • Alcohol use: Yes     Alcohol/week: 1.2 oz     Types: 1 Glasses of wine, 1 Cans of beer per week     Frequency: Monthly or less     Drinks per session: 1 or 2     Comment: rare glass of wine or beer   • Drug use: No   • Sexual activity: No   Lifestyle   • Physical activity:     Days per week: 5 days     Minutes per session: 30 min   • Stress: Not at all       ALLERGIES  Ancef [cefazolin]; Codeine; Penicillins; and Sulfa antibiotics    REVIEW OF SYSTEMS  Review of Systems   Constitutional: Negative for fever.   HENT: Negative for sore throat.    Eyes: Negative.    Respiratory: Negative for cough and shortness of breath.    Cardiovascular: Negative for chest pain.   Gastrointestinal: Positive for abdominal pain and nausea. Negative for diarrhea and vomiting.   Genitourinary: Negative for dysuria.   Musculoskeletal: Negative for neck pain.   Skin: Negative for rash.   Allergic/Immunologic: Negative.    Neurological: Positive for weakness (generalized). Negative for numbness and headaches.   Hematological: Negative.    Psychiatric/Behavioral: Negative.    All other systems reviewed and are negative.      PHYSICAL EXAM  ED Triage Vitals [19 1856]   Temp Heart Rate Resp BP SpO2   97.6 °F (36.4 °C) 63 18 -- 98 %      Temp src Heart Rate Source Patient Position BP Location FiO2 (%)   Tympanic Monitor -- -- --       Physical Exam   Constitutional: She is oriented to person, place, and time. No distress.   HENT:   Head: Normocephalic and atraumatic.   Eyes: EOM are normal. Pupils are equal, round, and reactive to light.   Neck: Normal range of motion. Neck supple.   Cardiovascular: Normal rate, regular rhythm and normal heart sounds.   Pulmonary/Chest: Effort normal and breath  sounds normal. No respiratory distress. She has no wheezes. She has no rales.   Abdominal: Soft. She exhibits no distension. There is tenderness (mild upper). There is no rebound and no guarding.   Musculoskeletal: Normal range of motion. She exhibits no edema.   Neurological: She is alert and oriented to person, place, and time. She has normal sensation and normal strength.   Skin: Skin is warm and dry. No rash noted.   Psychiatric: Mood and affect normal.   Nursing note and vitals reviewed.      LAB RESULTS  Lab Results (last 24 hours)     Procedure Component Value Units Date/Time    Urinalysis With Microscopic If Indicated (No Culture) - Urine, Clean Catch [[id number]]  (Abnormal) Collected:  07/16/19 1959    Specimen:  Urine, Clean Catch Updated:  07/16/19 2028     Color, UA Yellow     Appearance, UA Clear     pH, UA 7.5     Specific Gravity, UA 1.016     Glucose, UA Negative     Ketones, UA Negative     Bilirubin, UA Negative     Blood, UA Negative     Protein, UA Negative     Leuk Esterase, UA Moderate (2+)     Nitrite, UA Negative     Urobilinogen, UA 1.0 E.U./dL    Urinalysis, Microscopic Only - Urine, Clean Catch [[id number]]  (Abnormal) Collected:  07/16/19 1959    Specimen:  Urine, Clean Catch Updated:  07/16/19 2028     RBC, UA 0-2 /HPF      WBC, UA 3-5 /HPF      Bacteria, UA None Seen /HPF      Squamous Epithelial Cells, UA 0-2 /HPF      Hyaline Casts, UA None Seen /LPF      Methodology Automated Microscopy    CBC & Differential [[id number]] Collected:  07/16/19 2006    Specimen:  Blood Updated:  07/16/19 2018    Narrative:       The following orders were created for panel order CBC & Differential.  Procedure                               Abnormality         Status                     ---------                               -----------         ------                     CBC Auto Differential[[id number]]        Abnormal            Final result                 Please view results for these tests on the  individual orders.    Comprehensive Metabolic Panel [[id number]]  (Abnormal) Collected:  07/16/19 2006    Specimen:  Blood Updated:  07/16/19 2042     Glucose 139 mg/dL      BUN 19 mg/dL      Creatinine 0.75 mg/dL      Sodium 139 mmol/L      Potassium 3.4 mmol/L      Chloride 98 mmol/L      CO2 28.3 mmol/L      Calcium 9.6 mg/dL      Total Protein 6.8 g/dL      Albumin 4.00 g/dL      ALT (SGPT) 45 U/L      AST (SGOT) 87 U/L      Alkaline Phosphatase 69 U/L      Total Bilirubin 0.2 mg/dL      eGFR Non African Amer 74 mL/min/1.73      Globulin 2.8 gm/dL      A/G Ratio 1.4 g/dL      BUN/Creatinine Ratio 25.3     Anion Gap 12.7 mmol/L     Narrative:       GFR Normal >60  Chronic Kidney Disease <60  Kidney Failure <15    Lipase [[id number]]  (Abnormal) Collected:  07/16/19 2006    Specimen:  Blood Updated:  07/16/19 2055     Lipase 2,594 U/L     Troponin [[id number]]  (Normal) Collected:  07/16/19 2006    Specimen:  Blood Updated:  07/16/19 2059     Troponin T <0.010 ng/mL     Narrative:       Troponin T Reference Range:  <= 0.03 ng/mL-   Negative for AMI  >0.03 ng/mL-     Abnormal for myocardial necrosis.  Clinicians would have to utilize clinical acumen, EKG, Troponin and serial changes to determine if it is an Acute Myocardial Infarction or myocardial injury due to an underlying chronic condition.     CBC Auto Differential [[id number]]  (Abnormal) Collected:  07/16/19 2006    Specimen:  Blood Updated:  07/16/19 2018     WBC 4.93 10*3/mm3      RBC 4.40 10*6/mm3      Hemoglobin 12.8 g/dL      Hematocrit 39.0 %      MCV 88.6 fL      MCH 29.1 pg      MCHC 32.8 g/dL      RDW 12.4 %      RDW-SD 40.3 fl      MPV 11.5 fL      Platelets 198 10*3/mm3      Neutrophil % 66.4 %      Lymphocyte % 29.4 %      Monocyte % 3.0 %      Eosinophil % 1.0 %      Basophil % 0.2 %      Immature Grans % 0.0 %      Neutrophils, Absolute 3.27 10*3/mm3      Lymphocytes, Absolute 1.45 10*3/mm3      Monocytes, Absolute 0.15 10*3/mm3      Eosinophils,  Absolute 0.05 10*3/mm3      Basophils, Absolute 0.01 10*3/mm3      Immature Grans, Absolute 0.00 10*3/mm3      nRBC 0.0 /100 WBC           I ordered the above labs and reviewed the results    RADIOLOGY  CT Abdomen Pelvis With Contrast    (Results Pending)        I ordered the above noted radiological studies. Interpreted by radiologist.. Reviewed by me in PACS.       PROCEDURES  Procedures    EKG          EKG time: 1900  Rhythm/Rate: atrial paced, 60  P waves and AR: nml  QRS, axis: nml axis, PRWP anteriorly  ST and T waves: nonspecific changes    Interpreted Contemporaneously by me, independently viewed  No sig changes compared to prior 10/7/16      PROGRESS AND CONSULTS       Labs and CT ordered.    Given IV Ativan for sedation for CT at patient request.     Labs consistent with pancreatitis.     Call placed to [facility] NP returned and will admit for continued care.    Discussed all results and need for admission with patient and spouse.  They agree with admission  .   MEDICAL DECISION MAKING  Results were reviewed/discussed with the patient and they were also made aware of online access. Pt also made aware that some labs, such as cultures, will not be resulted during ER visit and follow up with PMD is necessary.     MDM  Number of Diagnoses or Management Options     Amount and/or Complexity of Data Reviewed  Tests in the medicine section of CPT®: ordered and reviewed (See EKG results in procedure. )           DIAGNOSIS  Final diagnoses:   Acute pancreatitis without infection or necrosis, unspecified pancreatitis type       DISPOSITION  admission    Latest Documented Vital Signs:  As of 10:16 PM  BP- 134/75 HR- 63 Temp- 97.6 °F (36.4 °C) (Tympanic) O2 sat- 97%    --  Documentation assistance provided by sony Rainey for Dr. Zuñiga.  Information recorded by the sony was done at my direction and has been verified and validated by me.          Dylan Rainey  07/16/19 2016       Jovan Zuñiga,  MD  07/16/19 3121

## 2019-07-17 LAB
ALBUMIN SERPL-MCNC: 3.7 G/DL (ref 3.5–5.2)
ALBUMIN/GLOB SERPL: 1.5 G/DL
ALP SERPL-CCNC: 64 U/L (ref 39–117)
ALT SERPL W P-5'-P-CCNC: 32 U/L (ref 1–33)
ANION GAP SERPL CALCULATED.3IONS-SCNC: 9.2 MMOL/L (ref 5–15)
AST SERPL-CCNC: 36 U/L (ref 1–32)
BILIRUB SERPL-MCNC: 0.4 MG/DL (ref 0.2–1.2)
BUN BLD-MCNC: 15 MG/DL (ref 8–23)
BUN/CREAT SERPL: 21.4 (ref 7–25)
CALCIUM SPEC-SCNC: 9.1 MG/DL (ref 8.6–10.5)
CHLORIDE SERPL-SCNC: 102 MMOL/L (ref 98–107)
CHOLEST SERPL-MCNC: 136 MG/DL (ref 0–200)
CO2 SERPL-SCNC: 27.8 MMOL/L (ref 22–29)
CREAT BLD-MCNC: 0.7 MG/DL (ref 0.57–1)
DEPRECATED RDW RBC AUTO: 41.1 FL (ref 37–54)
ERYTHROCYTE [DISTWIDTH] IN BLOOD BY AUTOMATED COUNT: 12.5 % (ref 12.3–15.4)
GFR SERPL CREATININE-BSD FRML MDRD: 80 ML/MIN/1.73
GLOBULIN UR ELPH-MCNC: 2.5 GM/DL
GLUCOSE BLD-MCNC: 103 MG/DL (ref 65–99)
HCT VFR BLD AUTO: 36.8 % (ref 34–46.6)
HDLC SERPL-MCNC: 39 MG/DL (ref 40–60)
HGB BLD-MCNC: 12 G/DL (ref 12–15.9)
LDLC SERPL CALC-MCNC: 78 MG/DL (ref 0–100)
LDLC/HDLC SERPL: 2.01 {RATIO}
LIPASE SERPL-CCNC: 739 U/L (ref 13–60)
MCH RBC QN AUTO: 29.3 PG (ref 26.6–33)
MCHC RBC AUTO-ENTMCNC: 32.6 G/DL (ref 31.5–35.7)
MCV RBC AUTO: 89.8 FL (ref 79–97)
PLATELET # BLD AUTO: 187 10*3/MM3 (ref 140–450)
PMV BLD AUTO: 11.5 FL (ref 6–12)
POTASSIUM BLD-SCNC: 3.2 MMOL/L (ref 3.5–5.2)
PROT SERPL-MCNC: 6.2 G/DL (ref 6–8.5)
RBC # BLD AUTO: 4.1 10*6/MM3 (ref 3.77–5.28)
SODIUM BLD-SCNC: 139 MMOL/L (ref 136–145)
TRIGL SERPL-MCNC: 92 MG/DL (ref 0–150)
TRIGL SERPL-MCNC: 94 MG/DL (ref 0–150)
VLDLC SERPL-MCNC: 18.8 MG/DL (ref 5–40)
WBC NRBC COR # BLD: 4.18 10*3/MM3 (ref 3.4–10.8)

## 2019-07-17 PROCEDURE — 83690 ASSAY OF LIPASE: CPT | Performed by: HOSPITALIST

## 2019-07-17 PROCEDURE — 80061 LIPID PANEL: CPT | Performed by: HOSPITALIST

## 2019-07-17 PROCEDURE — 99223 1ST HOSP IP/OBS HIGH 75: CPT | Performed by: INTERNAL MEDICINE

## 2019-07-17 PROCEDURE — 80053 COMPREHEN METABOLIC PANEL: CPT | Performed by: HOSPITALIST

## 2019-07-17 PROCEDURE — 84478 ASSAY OF TRIGLYCERIDES: CPT | Performed by: HOSPITALIST

## 2019-07-17 PROCEDURE — 25010000002 ENOXAPARIN PER 10 MG: Performed by: HOSPITALIST

## 2019-07-17 PROCEDURE — 85027 COMPLETE CBC AUTOMATED: CPT | Performed by: HOSPITALIST

## 2019-07-17 RX ADMIN — GABAPENTIN 100 MG: 100 CAPSULE ORAL at 08:16

## 2019-07-17 RX ADMIN — GABAPENTIN 200 MG: 100 CAPSULE ORAL at 22:03

## 2019-07-17 RX ADMIN — METOPROLOL SUCCINATE 50 MG: 50 TABLET, FILM COATED, EXTENDED RELEASE ORAL at 02:01

## 2019-07-17 RX ADMIN — GABAPENTIN 100 MG: 100 CAPSULE ORAL at 18:23

## 2019-07-17 RX ADMIN — METOPROLOL SUCCINATE 50 MG: 50 TABLET, FILM COATED, EXTENDED RELEASE ORAL at 22:02

## 2019-07-17 RX ADMIN — CYCLOSPORINE 1 DROP: 0.5 EMULSION OPHTHALMIC at 08:16

## 2019-07-17 RX ADMIN — SODIUM CHLORIDE 125 ML/HR: 9 INJECTION, SOLUTION INTRAVENOUS at 09:15

## 2019-07-17 RX ADMIN — LEVOTHYROXINE SODIUM 25 MCG: 25 TABLET ORAL at 08:16

## 2019-07-17 RX ADMIN — GABAPENTIN 200 MG: 100 CAPSULE ORAL at 02:01

## 2019-07-17 RX ADMIN — METOPROLOL SUCCINATE 50 MG: 50 TABLET, FILM COATED, EXTENDED RELEASE ORAL at 08:16

## 2019-07-17 RX ADMIN — CYCLOSPORINE 1 DROP: 0.5 EMULSION OPHTHALMIC at 02:01

## 2019-07-17 RX ADMIN — SODIUM CHLORIDE 125 ML/HR: 9 INJECTION, SOLUTION INTRAVENOUS at 16:58

## 2019-07-17 RX ADMIN — SODIUM CHLORIDE 125 ML/HR: 9 INJECTION, SOLUTION INTRAVENOUS at 00:26

## 2019-07-17 RX ADMIN — ENOXAPARIN SODIUM 40 MG: 40 INJECTION SUBCUTANEOUS at 02:01

## 2019-07-17 RX ADMIN — GABAPENTIN 100 MG: 100 CAPSULE ORAL at 12:48

## 2019-07-17 NOTE — PROGRESS NOTES
Clinical Pharmacy Services: Medication History    Reanna Higgins is a 79 y.o. female presenting to [facility] for   Chief Complaint   Patient presents with   • Abdominal Pain       She  has a past medical history of Cancer (CMS/[facility]), Carpal tunnel syndrome, Cataract, Cystic fibroadenosis of breast, GERD (gastroesophageal reflux disease), Hypercholesterolemia, Hypertension, Osteoarthritis of both hands, Peripheral neuropathy, and Second degree AV block, Mobitz type I.    Allergies as of 07/16/2019 - Reviewed 07/16/2019   Allergen Reaction Noted   • Ancef [cefazolin] Other (See Comments) 10/10/2016   • Codeine  03/16/2016   • Penicillins Other (See Comments) 12/28/2018   • Sulfa antibiotics  03/16/2016       Medication information was obtained from: Patient, medication list  Pharmacy and Phone Number: [facility] [phone]    Prior to Admission Medications     Prescriptions Last Dose Informant Patient Reported? Taking?    cholecalciferol (VITAMIN D3) 1000 UNITS tablet  Self Yes Yes    Take 1 tablet by mouth Daily.    cycloSPORINE (RESTASIS) 0.05 % ophthalmic emulsion  Self Yes Yes    Administer 1 drop to both eyes 2 (Two) Times a Day.    gabapentin (NEURONTIN) 100 MG capsule  Self Yes Yes    Take 100 mg by mouth 3 (Three) Times a Day.    gabapentin (NEURONTIN) 100 MG capsule  Self Yes Yes    Take 200 mg by mouth Every Night.    glucosamine-chondroitin 500-400 MG capsule capsule  Self Yes Yes    Take 1 capsule by mouth Daily.    levothyroxine (SYNTHROID, LEVOTHROID) 25 MCG tablet  Self No Yes    Take 1 tablet by mouth Daily.    metFORMIN ER (GLUCOPHAGE-XR) 500 MG 24 hr tablet  Self No Yes    Take 1 tablet by mouth Daily With Breakfast.    metoprolol succinate XL (TOPROL-XL) 50 MG 24 hr tablet  Self Yes Yes    Take 50 mg by mouth 2 (Two) Times a Day.    Multiple Vitamins-Minerals (CENTRUM SILVER 50+WOMEN) tablet  Self Yes Yes    Take 1 tablet by mouth Daily.    omeprazole (priLOSEC) 40 MG capsule  Self No Yes     Take 1 capsule by mouth Daily.    pravastatin (PRAVACHOL) 40 MG tablet  Self Yes Yes    Take 40 mg by mouth 4 (Four) Times a Week. Mon, Wed, Fri, Sat    triamterene-hydrochlorothiazide (MAXZIDE) 75-50 MG per tablet  Self No Yes    Take 1 tablet by mouth Daily.    vitamin B-12 (CYANOCOBALAMIN) 1000 MCG tablet  Self Yes Yes    Take 1,000 mcg by mouth Daily.    LORazepam (ATIVAN) 0.5 MG tablet  Self No No    One tablet 30 minutes before plane flight, may repeat times 1 in 4-6 hours.            Medication notes: None    This medication list is complete to the best of my knowledge as of 7/16/2019    Please call if questions.    Linda Arteaga, Medication History Technician  7/16/2019 9:33 PM

## 2019-07-17 NOTE — NURSING NOTE
"Pt expressed concern that she was feeling \"weak\" due to not eating. Nurse collected glucometer supplies and returned to room. Pt refused blood sugar check. Educated, pt still refused.   "

## 2019-07-17 NOTE — H&P
HISTORY AND PHYSICAL   [facility]        Patient Identification:  Name: Reanna Higgins  Age: 79 y.o.  Sex: female  :  1940  MRN: [id number]                     Primary Care Physician: Ted Li MD    Chief Complaint:  Abdominal pain    History of Present Illness:         The patient is an 79-year-old white female with history of skin cancer, GERD, hypertension, hyperlipidemia, osteoarthritis and pacemaker for heart block who is admitted with about a month history of having some upper abdominal pain intermittently with some nausea but no vomiting.  She been told recently she had prediabetes and was started on some metformin which she thought was upsetting her stomach.  She has been losing some weight.  She has not had any chest pain or shortness of air.  The patient was evaluated in the ER and felt to have pancreatitis and admitted for further evaluation treatment.  She had previous cholecystectomy for stones in .  The patient was started on some IV fluids and given some pain and nausea medicine and admitted for further work-up.    Past Medical History:  Past Medical History:   Diagnosis Date   • Cancer (CMS/HCC)     skin   • Carpal tunnel syndrome    • Cataract    • Cystic fibroadenosis of breast    • GERD (gastroesophageal reflux disease)    • Hypercholesterolemia    • Hypertension    • Osteoarthritis of both hands    • Peripheral neuropathy    • Second degree AV block, Mobitz type I      Past Surgical History:  Past Surgical History:   Procedure Laterality Date   • BREAST CYST EXCISION Bilateral    • CARDIAC ELECTROPHYSIOLOGY PROCEDURE N/A 10/10/2016    Procedure: Pacemaker JOHN SHERMAN;  Surgeon: Wilfrido Hameed MD;  Location: [facility] INVASIVE LOCATION;  Service:    • CATARACT EXTRACTION      NOVEMBER AND 2014   • CHOLECYSTECTOMY     • HYSTERECTOMY     • KNEE ARTHROSCOPY Bilateral 2014    MENISCAL TEAR   • PACEMAKER IMPLANTATION  10/10/2016   • SHOULDER SURGERY  Right     ROTATOR CUFF SURGERY JUNE 18, 2015   • TONSILLECTOMY     • TRIGGER FINGER RELEASE      both hands      Home Meds:  Medications Prior to Admission   Medication Sig Dispense Refill Last Dose   • cholecalciferol (VITAMIN D3) 1000 UNITS tablet Take 1 tablet by mouth Daily.   Taking   • cycloSPORINE (RESTASIS) 0.05 % ophthalmic emulsion Administer 1 drop to both eyes 2 (Two) Times a Day.   Taking   • gabapentin (NEURONTIN) 100 MG capsule Take 100 mg by mouth 3 (Three) Times a Day.      • gabapentin (NEURONTIN) 100 MG capsule Take 200 mg by mouth Every Night.      • glucosamine-chondroitin 500-400 MG capsule capsule Take 1 capsule by mouth Daily.      • levothyroxine (SYNTHROID, LEVOTHROID) 25 MCG tablet Take 1 tablet by mouth Daily. 30 tablet 3 Taking   • metFORMIN ER (GLUCOPHAGE-XR) 500 MG 24 hr tablet Take 1 tablet by mouth Daily With Breakfast. 30 tablet 2 Taking   • metoprolol succinate XL (TOPROL-XL) 50 MG 24 hr tablet Take 50 mg by mouth 2 (Two) Times a Day.      • Multiple Vitamins-Minerals (CENTRUM SILVER 50+WOMEN) tablet Take 1 tablet by mouth Daily.      • omeprazole (priLOSEC) 40 MG capsule Take 1 capsule by mouth Daily. 90 capsule 1    • pravastatin (PRAVACHOL) 40 MG tablet Take 40 mg by mouth 4 (Four) Times a Week. Mon, Wed, Fri, Sat      • triamterene-hydrochlorothiazide (MAXZIDE) 75-50 MG per tablet Take 1 tablet by mouth Daily. 90 tablet 1 Taking   • vitamin B-12 (CYANOCOBALAMIN) 1000 MCG tablet Take 1,000 mcg by mouth Daily.   Taking   • LORazepam (ATIVAN) 0.5 MG tablet One tablet 30 minutes before plane flight, may repeat times 1 in 4-6 hours. 8 tablet 0 Taking     Current meds    Current Facility-Administered Medications:   •  acetaminophen (TYLENOL) tablet 650 mg, 650 mg, Oral, Q4H PRN, Lyle Holm MD  •  cycloSPORINE (RESTASIS) 0.05 % ophthalmic emulsion 1 drop, 1 drop, Both Eyes, BID, Lyle Holm MD, 1 drop at 07/17/19 0816  •  enoxaparin (LOVENOX) syringe 40 mg, 40 mg, Subcutaneous,  Q24H, Lyle Holm MD, 40 mg at 07/17/19 0201  •  gabapentin (NEURONTIN) capsule 100 mg, 100 mg, Oral, TID, Lyle Holm MD, 100 mg at 07/17/19 1248  •  gabapentin (NEURONTIN) capsule 200 mg, 200 mg, Oral, Nightly, Lyle Holm MD, 200 mg at 07/17/19 0201  •  levothyroxine (SYNTHROID, LEVOTHROID) tablet 25 mcg, 25 mcg, Oral, Daily, Lyle Holm MD, 25 mcg at 07/17/19 0816  •  metoprolol succinate XL (TOPROL-XL) 24 hr tablet 50 mg, 50 mg, Oral, BID, Lyle Holm MD, 50 mg at 07/17/19 0816  •  sodium chloride 0.9 % infusion, 125 mL/hr, Intravenous, Continuous, Jovan Zuñiga MD, Last Rate: 125 mL/hr at 07/17/19 0915, 125 mL/hr at 07/17/19 0915  Allergies:  Allergies   Allergen Reactions   • Ancef [Cefazolin] Other (See Comments)     Hypotension/bradycardia   • Codeine    • Penicillins Other (See Comments)     Hypotension (Ancef allergy)    • Sulfa Antibiotics      Immunizations:  Immunization History   Administered Date(s) Administered   • DTaP 06/08/2012   • Flu Mist 10/23/2012, 10/27/2015   • Flu Vaccine High Dose PF 65YR+ 10/23/2017   • Flu Vaccine Quad PF >18YRS 10/20/2016   • Hepatitis A 05/08/2018, 11/08/2018   • Influenza TIV (IM) 11/10/2011   • Influenza, Unspecified 10/15/2018   • Pneumococcal Polysaccharide (PPSV23) 06/05/2018   • Pneumococcal, Unspecified 04/01/2003, 05/23/2008   • Shingrix 05/08/2018, 12/11/2018   • Td, Not Adsorbed 04/01/2002   • Tdap 06/08/2012, 09/15/2012   • Zostavax 01/01/2008, 01/23/2010     Social History:   Social History     Social History Narrative   • Not on file     Social History     Socioeconomic History   • Marital status:      Spouse name: Rishabh*   • Number of children: 2   • Years of education: Not on file   • Highest education level: Not on file   Occupational History   • 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)   Tobacco Use   • Smoking status: Former Smoker     Packs/day: 0.50     Years: 15.00     Pack years: 7.50     Last attempt to quit: 1970      Years since quittin.5   • Smokeless tobacco: Never Used   • Tobacco comment: quit age 30   Substance and Sexual Activity   • Alcohol use: Yes     Alcohol/week: 1.2 oz     Types: 1 Glasses of wine, 1 Cans of beer per week     Frequency: Monthly or less     Drinks per session: 1 or 2     Comment: rare glass of wine or beer   • Drug use: No   • Sexual activity: No   Lifestyle   • Physical activity:     Days per week: 5 days     Minutes per session: 30 min   • Stress: Not at all       Family History:  Family History   Problem Relation Age of Onset   • Thyroid disease Daughter    • Lung cancer Brother    • Hypertension Mother    • Aortic stenosis Mother    • Coronary artery disease Mother          78 (smoker)   • Hypertension Father    • Depression Father    • Anxiety disorder Father    • Diabetes Father    • Heart failure Father    • Cirrhosis Father         alcohol   • Alcohol abuse Sister    • Lupus Sister    • Heart disease Sister    • Diabetes type II Sister    • Alcohol abuse Brother    • Drug abuse Brother    • Heart disease Brother    • Cancer Brother         bladder (smoker)   • Heart disease Brother    • Breast cancer Paternal Aunt    • Neuropathy Neg Hx    • Colon cancer Neg Hx    • Dementia Neg Hx         Review of Systems  See history of present illness and past medical history.  Patient denies headache, dizziness, syncope, falls, trauma, change in vision, change in hearing, change in taste, changes in weight, changes in appetite, focal weakness, numbness, or paresthesia.  Patient denies chest pain, palpitations, dyspnea, orthopnea, PND, cough, sinus pressure, rhinorrhea, epistaxis, hemoptysis,  vomiting, hematemesis, diarrhea, constipation or hematchezia.  Denies cold or heat intolerance, polydipsia, polyuria, polyphagia. Denies hematuria, pyuria, dysuria, hesitancy, frequency or urgency.  Denies fever, chills, sweats, night sweats.  Denies missing any routine medications. Remainder of ROS is  "negative.    Objective:  tMax 24 hrs: Temp (24hrs), Av.7 °F (36.5 °C), Min:97.5 °F (36.4 °C), Max:98.2 °F (36.8 °C)    Vitals Ranges:   Temp:  [97.5 °F (36.4 °C)-98.2 °F (36.8 °C)] 98.2 °F (36.8 °C)  Heart Rate:  [59-70] 59  Resp:  [8-18] 16  BP: (109-144)/(52-75) 144/69      Exam:  /69 (BP Location: Left arm, Patient Position: Lying)   Pulse 59   Temp 98.2 °F (36.8 °C) (Oral)   Resp 16   Ht 170.2 cm (67\")   Wt 69.6 kg (153 lb 6.4 oz)   SpO2 94%   BMI 24.03 kg/m²     General Appearance:    Alert, cooperative, no distress, appears stated age   Head:    Normocephalic, without obvious abnormality, atraumatic   Eyes:    PERRL, conjunctiva/corneas clear, EOM's intact, both eyes   Ears:    Normal external ear canals, both ears   Nose:   Nares normal, septum midline, mucosa normal, no drainage    or sinus tenderness   Throat:   Lips, mucosa, and tongue normal   Neck:   Supple, symmetrical, trachea midline, no adenopathy;     thyroid:  no enlargement/tenderness/nodules; no carotid    bruit or JVD   Back:     Symmetric, no curvature, ROM normal, no CVA tenderness   Lungs:     Clear to auscultation bilaterally, respirations unlabored   Chest Wall:    No tenderness or deformity    Heart:    Regular rate and rhythm, S1 and S2 normal, no murmur, rub   or gallop   Abdomen:     Soft, mild upper abdominal tenderness, bowel sounds active all four quadrants,     no masses, no hepatomegaly, no splenomegaly   Extremities:   Extremities normal, atraumatic, no cyanosis or edema   Pulses:   2+ and symmetric all extremities   Skin:   Skin color, texture, turgor normal, no rashes or lesions   Lymph nodes:   Cervical, supraclavicular, and axillary nodes normal   Neurologic:   CNII-XII intact, normal strength, sensation intact throughout      .    Data Review:  Lab Results (last 72 hours)     Procedure Component Value Units Date/Time    Lipase [[id number]]  (Abnormal) Collected:  19 0458    Specimen:  Blood Updated:  " 07/17/19 0608     Lipase 739 U/L     Comprehensive Metabolic Panel [[id number]]  (Abnormal) Collected:  07/17/19 0458    Specimen:  Blood Updated:  07/17/19 0603     Glucose 103 mg/dL      BUN 15 mg/dL      Creatinine 0.70 mg/dL      Sodium 139 mmol/L      Potassium 3.2 mmol/L      Chloride 102 mmol/L      CO2 27.8 mmol/L      Calcium 9.1 mg/dL      Total Protein 6.2 g/dL      Albumin 3.70 g/dL      ALT (SGPT) 32 U/L      AST (SGOT) 36 U/L      Alkaline Phosphatase 64 U/L      Total Bilirubin 0.4 mg/dL      eGFR Non African Amer 80 mL/min/1.73      Globulin 2.5 gm/dL      A/G Ratio 1.5 g/dL      BUN/Creatinine Ratio 21.4     Anion Gap 9.2 mmol/L     Narrative:       GFR Normal >60  Chronic Kidney Disease <60  Kidney Failure <15    Lipid Panel [[id number]]  (Abnormal) Collected:  07/17/19 0458    Specimen:  Blood Updated:  07/17/19 0601     Total Cholesterol 136 mg/dL      Triglycerides 94 mg/dL      HDL Cholesterol 39 mg/dL      LDL Cholesterol  78 mg/dL      VLDL Cholesterol 18.8 mg/dL      LDL/HDL Ratio 2.01    Narrative:       Cholesterol Reference Ranges  (U.S. Department of Health and Human Services ATP III Classifications)    Desirable          <200 mg/dL  Borderline High    200-239 mg/dL  High Risk          >240 mg/dL      Triglyceride Reference Ranges  (U.S. Department of Health and Human Services ATP III Classifications)    Normal           <150 mg/dL  Borderline High  150-199 mg/dL  High             200-499 mg/dL  Very High        >500 mg/dL    HDL Reference Ranges  (U.S. Department of Health and Human Services ATP III Classifcations)    Low     <40 mg/dl (major risk factor for CHD)  High    >60 mg/dl ('negative' risk factor for CHD)        LDL Reference Ranges  (U.S. Department of Health and Human Services ATP III Classifcations)    Optimal          <100 mg/dL  Near Optimal     100-129 mg/dL  Borderline High  130-159 mg/dL  High             160-189 mg/dL  Very High        >189 mg/dL    CBC (No Diff)  [[id number]]  (Normal) Collected:  07/17/19 0458    Specimen:  Blood Updated:  07/17/19 0539     WBC 4.18 10*3/mm3      RBC 4.10 10*6/mm3      Hemoglobin 12.0 g/dL      Hematocrit 36.8 %      MCV 89.8 fL      MCH 29.3 pg      MCHC 32.6 g/dL      RDW 12.5 %      RDW-SD 41.1 fl      MPV 11.5 fL      Platelets 187 10*3/mm3     Troponin [[id number]]  (Normal) Collected:  07/16/19 2006    Specimen:  Blood Updated:  07/16/19 2059     Troponin T <0.010 ng/mL     Narrative:       Troponin T Reference Range:  <= 0.03 ng/mL-   Negative for AMI  >0.03 ng/mL-     Abnormal for myocardial necrosis.  Clinicians would have to utilize clinical acumen, EKG, Troponin and serial changes to determine if it is an Acute Myocardial Infarction or myocardial injury due to an underlying chronic condition.     Lipase [[id number]]  (Abnormal) Collected:  07/16/19 2006    Specimen:  Blood Updated:  07/16/19 2055     Lipase 2,594 U/L     Comprehensive Metabolic Panel [[id number]]  (Abnormal) Collected:  07/16/19 2006    Specimen:  Blood Updated:  07/16/19 2042     Glucose 139 mg/dL      BUN 19 mg/dL      Creatinine 0.75 mg/dL      Sodium 139 mmol/L      Potassium 3.4 mmol/L      Chloride 98 mmol/L      CO2 28.3 mmol/L      Calcium 9.6 mg/dL      Total Protein 6.8 g/dL      Albumin 4.00 g/dL      ALT (SGPT) 45 U/L      AST (SGOT) 87 U/L      Alkaline Phosphatase 69 U/L      Total Bilirubin 0.2 mg/dL      eGFR Non African Amer 74 mL/min/1.73      Globulin 2.8 gm/dL      A/G Ratio 1.4 g/dL      BUN/Creatinine Ratio 25.3     Anion Gap 12.7 mmol/L     Narrative:       GFR Normal >60  Chronic Kidney Disease <60  Kidney Failure <15    Urinalysis With Microscopic If Indicated (No Culture) - Urine, Clean Catch [[id number]]  (Abnormal) Collected:  07/16/19 1959    Specimen:  Urine, Clean Catch Updated:  07/16/19 2028     Color, UA Yellow     Appearance, UA Clear     pH, UA 7.5     Specific Gravity, UA 1.016     Glucose, UA Negative     Ketones, UA Negative      Bilirubin, UA Negative     Blood, UA Negative     Protein, UA Negative     Leuk Esterase, UA Moderate (2+)     Nitrite, UA Negative     Urobilinogen, UA 1.0 E.U./dL    Urinalysis, Microscopic Only - Urine, Clean Catch [[id number]]  (Abnormal) Collected:  07/16/19 1959    Specimen:  Urine, Clean Catch Updated:  07/16/19 2028     RBC, UA 0-2 /HPF      WBC, UA 3-5 /HPF      Bacteria, UA None Seen /HPF      Squamous Epithelial Cells, UA 0-2 /HPF      Hyaline Casts, UA None Seen /LPF      Methodology Automated Microscopy    CBC & Differential [[id number]] Collected:  07/16/19 2006    Specimen:  Blood Updated:  07/16/19 2018    Narrative:       The following orders were created for panel order CBC & Differential.  Procedure                               Abnormality         Status                     ---------                               -----------         ------                     CBC Auto Differential[[id number]]        Abnormal            Final result                 Please view results for these tests on the individual orders.    CBC Auto Differential [[id number]]  (Abnormal) Collected:  07/16/19 2006    Specimen:  Blood Updated:  07/16/19 2018     WBC 4.93 10*3/mm3      RBC 4.40 10*6/mm3      Hemoglobin 12.8 g/dL      Hematocrit 39.0 %      MCV 88.6 fL      MCH 29.1 pg      MCHC 32.8 g/dL      RDW 12.4 %      RDW-SD 40.3 fl      MPV 11.5 fL      Platelets 198 10*3/mm3      Neutrophil % 66.4 %      Lymphocyte % 29.4 %      Monocyte % 3.0 %      Eosinophil % 1.0 %      Basophil % 0.2 %      Immature Grans % 0.0 %      Neutrophils, Absolute 3.27 10*3/mm3      Lymphocytes, Absolute 1.45 10*3/mm3      Monocytes, Absolute 0.15 10*3/mm3      Eosinophils, Absolute 0.05 10*3/mm3      Basophils, Absolute 0.01 10*3/mm3      Immature Grans, Absolute 0.00 10*3/mm3      nRBC 0.0 /100 WBC                    Imaging Results (all)     Procedure Component Value Units Date/Time    CT Abdomen Pelvis With Contrast [[id number]] Collected:   07/16/19 2248     Updated:  07/16/19 2258    Narrative:       CT OF THE ABDOMEN AND PELVIS WITH CONTRAST     HISTORY: Upper abdominal pain and nausea     COMPARISON: None available.     TECHNIQUE: Axial CT imaging was obtained from the dome the diaphragm to  the symphysis pubis. IV contrast was administered.     FINDINGS:  Images through the lung bases demonstrate some bibasilar atelectasis.  Atelectatic The gallbladder is surgically absent. No focal hepatic  lesions are seen. The spleen appears unremarkable, as are the adrenal  glands. The pancreas is within normal limits. The antrum of the stomach  appears somewhat thick walled and distended. Correlation with any  evidence of gastritis is recommended. There is some mild soft tissue  stranding adjacent to the distal stomach and proximal duodenum. Again,  correlation with any evidence of gastritis/peptic ulcer disease is  recommended. Tiny cortical cyst is seen within the right kidney. Left  kidney appears unremarkable. No distal ureteral or bladder stones are  seen. Uterus is surgically absent. There is no evidence of mechanical  small bowel obstruction. There is atherosclerotic involvement of the  abdominal aorta. The appendix is not clearly identified, but I don't see  dilated tubular structure within the right lower quadrant suggest acute  appendicitis. No free fluid or adenopathy is seen within the pelvis. No  aggressive osseous abnormalities are seen.       Impression:       Somewhat distended and thick-walled appearance to the antrum of the  stomach. There may be some adjacent perigastric soft tissue stranding.  Correlation with any evidence of gastritis/peptic ulcer disease is  recommended. No pneumatosis, free air, or free fluid is seen.     Radiation dose reduction techniques were utilized, including automated  exposure control and exposure modulation based on body size.     This report was finalized on 7/16/2019 10:54 PM by Dr. Umu Ziegler M.D.            Past Medical History:   Diagnosis Date   • Cancer (CMS/HCC)     skin   • Carpal tunnel syndrome    • Cataract    • Cystic fibroadenosis of breast    • GERD (gastroesophageal reflux disease)    • Hypercholesterolemia    • Hypertension    • Osteoarthritis of both hands    • Peripheral neuropathy    • Second degree AV block, Mobitz type I        Assessment:  Active Hospital Problems    Diagnosis  POA   • **Acute pancreatitis without infection or necrosis [K85.90]  Yes   • Elevated hemoglobin A1c [R73.09]  Yes   • SVT (supraventricular tachycardia) (CMS/HCC) [I47.1]  Yes   • Essential hypertension [I10]  Yes   • Hypercholesterolemia [E78.00]  Yes   • Gastroesophageal reflux disease [K21.9]  Yes   • Heart valve disease [I38]  Yes      Resolved Hospital Problems   No resolved problems to display.       Plan:  The patient is admitted to the hospital and will give some IV fluid for hydration and some pain and nausea medicine.  Will consult her gastroenterologist for further evaluation treatment.    Nelson Pineda MD  7/17/2019  2:56 PM

## 2019-07-17 NOTE — CONSULTS
[facility]              Initial Inpatient Consult Note  Referring Provider: Miriam  Reason for Consultation: Abdominal pain    Subjective     History of present illness: Over the past month the patient simply has not felt well, really cannot put her finger on exactly why but has had a loss of her sense of well-being.  However, on the day of admission she experienced onset of upper abdominal pain which was moderately severe and depressive.  She was brought in through the emergency department at which time she was noted to have an elevated lipase highly suggestive of pancreatitis, but also a CT scan which demonstrated a normal pancreas and an abnormal looking stomach.  The patient has no history of pancreatitis.  She rarely if ever consumes alcohol.  Her lipids are under good control.  The only changes she has had to her medications recently have been the addition of thyroid supplements and metformin.  She does take thiazide diuretics.  Has a history of neuropathy, this evidently is considered idiopathic.  She is feeling better today and is having no particular abdominal discomfort other than some mild residual ache primarily in the left upper quadrant.    Past Medical History:  Past Medical History:   Diagnosis Date   • Cancer (CMS/HCC)     skin   • Carpal tunnel syndrome    • Cataract    • Cystic fibroadenosis of breast    • GERD (gastroesophageal reflux disease)    • Hypercholesterolemia    • Hypertension    • Osteoarthritis of both hands    • Peripheral neuropathy    • Second degree AV block, Mobitz type I        Past Surgical History:  Past Surgical History:   Procedure Laterality Date   • BREAST CYST EXCISION Bilateral    • CARDIAC ELECTROPHYSIOLOGY PROCEDURE N/A 10/10/2016    Procedure: Pacemaker DC new  [place];  Surgeon: Wilfrido Hameed MD;  Location: [facility] INVASIVE LOCATION;  Service:    • CATARACT EXTRACTION      NOVEMBER AND DECEMBER 2014   • CHOLECYSTECTOMY      • HYSTERECTOMY     • KNEE ARTHROSCOPY Bilateral 2014    MENISCAL TEAR   • PACEMAKER IMPLANTATION  10/10/2016   • SHOULDER SURGERY Right     ROTATOR CUFF SURGERY 2015   • TONSILLECTOMY     • TRIGGER FINGER RELEASE      both hands        Social History:   Social History     Tobacco Use   • Smoking status: Former Smoker     Packs/day: 0.50     Years: 15.00     Pack years: 7.50     Last attempt to quit: 1970     Years since quittin.5   • Smokeless tobacco: Never Used   • Tobacco comment: quit age 30   Substance Use Topics   • Alcohol use: Yes     Alcohol/week: 1.2 oz     Types: 1 Glasses of wine, 1 Cans of beer per week     Frequency: Monthly or less     Drinks per session: 1 or 2     Comment: rare glass of wine or beer        Family History:  Family History   Problem Relation Age of Onset   • Thyroid disease Daughter    • Lung cancer Brother    • Hypertension Mother    • Aortic stenosis Mother    • Coronary artery disease Mother          78 (smoker)   • Hypertension Father    • Depression Father    • Anxiety disorder Father    • Diabetes Father    • Heart failure Father    • Cirrhosis Father         alcohol   • Alcohol abuse Sister    • Lupus Sister    • Heart disease Sister    • Diabetes type II Sister    • Alcohol abuse Brother    • Drug abuse Brother    • Heart disease Brother    • Cancer Brother         bladder (smoker)   • Heart disease Brother    • Breast cancer Paternal Aunt    • Neuropathy Neg Hx    • Colon cancer Neg Hx    • Dementia Neg Hx        Home Meds:  Medications Prior to Admission   Medication Sig Dispense Refill Last Dose   • cholecalciferol (VITAMIN D3) 1000 UNITS tablet Take 1 tablet by mouth Daily.   Taking   • cycloSPORINE (RESTASIS) 0.05 % ophthalmic emulsion Administer 1 drop to both eyes 2 (Two) Times a Day.   Taking   • gabapentin (NEURONTIN) 100 MG capsule Take 100 mg by mouth 3 (Three) Times a Day.      • gabapentin (NEURONTIN) 100 MG capsule Take 200 mg by mouth  Every Night.      • glucosamine-chondroitin 500-400 MG capsule capsule Take 1 capsule by mouth Daily.      • levothyroxine (SYNTHROID, LEVOTHROID) 25 MCG tablet Take 1 tablet by mouth Daily. 30 tablet 3 Taking   • metFORMIN ER (GLUCOPHAGE-XR) 500 MG 24 hr tablet Take 1 tablet by mouth Daily With Breakfast. 30 tablet 2 Taking   • metoprolol succinate XL (TOPROL-XL) 50 MG 24 hr tablet Take 50 mg by mouth 2 (Two) Times a Day.      • Multiple Vitamins-Minerals (CENTRUM SILVER 50+WOMEN) tablet Take 1 tablet by mouth Daily.      • omeprazole (priLOSEC) 40 MG capsule Take 1 capsule by mouth Daily. 90 capsule 1    • pravastatin (PRAVACHOL) 40 MG tablet Take 40 mg by mouth 4 (Four) Times a Week. Mon, Wed, Fri, Sat      • triamterene-hydrochlorothiazide (MAXZIDE) 75-50 MG per tablet Take 1 tablet by mouth Daily. 90 tablet 1 Taking   • vitamin B-12 (CYANOCOBALAMIN) 1000 MCG tablet Take 1,000 mcg by mouth Daily.   Taking   • LORazepam (ATIVAN) 0.5 MG tablet One tablet 30 minutes before plane flight, may repeat times 1 in 4-6 hours. 8 tablet 0 Taking       Current Meds:     cycloSPORINE 1 drop Both Eyes BID   enoxaparin 40 mg Subcutaneous Q24H   gabapentin 100 mg Oral TID   gabapentin 200 mg Oral Nightly   levothyroxine 25 mcg Oral Daily   metoprolol succinate XL 50 mg Oral BID       Allergies:  Allergies   Allergen Reactions   • Ancef [Cefazolin] Other (See Comments)     Hypotension/bradycardia   • Codeine    • Penicillins Other (See Comments)     Hypotension (Ancef allergy)    • Sulfa Antibiotics        Review of Systems  Pertinent items are noted in HPI, all other systems reviewed and negative    Objective     Vital Signs  Temp:  [97.5 °F (36.4 °C)-98.2 °F (36.8 °C)] 98.2 °F (36.8 °C)  Heart Rate:  [59-70] 59  Resp:  [8-18] 16  BP: (109-144)/(52-75) 144/69    Physical Exam:     General Appearance:    Alert, cooperative, in no acute distress   Head:    Normocephalic, without obvious abnormality, atraumatic   Eyes:             Lids and lashes normal, conjunctivae and sclerae normal, no   icterus, no pallor, corneas clear, PERRLA   Ears:    Ears appear intact with no abnormalities noted   Throat:   No oral lesions, no thrush, oral mucosa moist   Neck:   No adenopathy, supple, trachea midline, no thyromegaly, no     carotid bruit, no JVD   Back:     No kyphosis present, no scoliosis present, no skin lesions,       erythema or scars, no tenderness to percussion or                   palpation,   range of motion normal   Lungs:     Clear to auscultation,respirations regular, even and                   unlabored    Heart:    Regular rhythm and normal rate, normal S1 and S2, no            murmur, no gallop, no rub, no click   Breast Exam:    Deferred   Abdomen:     Normal bowel sounds, no masses, no organomegaly, soft        non-tender, non-distended, no guarding, no rebound                 tenderness   Genitalia:    Deferred   Extremities:   Moves all extremities well, no edema, no cyanosis, no              redness   Pulses:   Pulses palpable and equal bilaterally   Skin:   No bleeding, bruising or rash   Lymph nodes:   No palpable adenopathy   Neurologic:   Cranial nerves 2 - 12 grossly intact, sensation intact, DTR        present and equal bilaterally       Results Review:   I reviewed the patient's new clinical results.    WBC No results found for: WBCS   HGB Hemoglobin   Date Value Ref Range Status   07/17/2019 12.0 12.0 - 15.9 g/dL Final   07/16/2019 12.8 12.0 - 15.9 g/dL Final      HCT Hematocrit   Date Value Ref Range Status   07/17/2019 36.8 34.0 - 46.6 % Final   07/16/2019 39.0 34.0 - 46.6 % Final      Platelets No results found for: LABPLAT   MCV MCV   Date Value Ref Range Status   07/17/2019 89.8 79.0 - 97.0 fL Final   07/16/2019 88.6 79.0 - 97.0 fL Final          Sodium Sodium   Date Value Ref Range Status   07/17/2019 139 136 - 145 mmol/L Final   07/16/2019 139 136 - 145 mmol/L Final      Potassium Potassium   Date Value Ref Range  Status   07/17/2019 3.2 (L) 3.5 - 5.2 mmol/L Final   07/16/2019 3.4 (L) 3.5 - 5.2 mmol/L Final      Chloride Chloride   Date Value Ref Range Status   07/17/2019 102 98 - 107 mmol/L Final   07/16/2019 98 98 - 107 mmol/L Final      CO2 CO2   Date Value Ref Range Status   07/17/2019 27.8 22.0 - 29.0 mmol/L Final   07/16/2019 28.3 22.0 - 29.0 mmol/L Final      BUN BUN   Date Value Ref Range Status   07/17/2019 15 8 - 23 mg/dL Final   07/16/2019 19 8 - 23 mg/dL Final      Creatinine Creatinine   Date Value Ref Range Status   07/17/2019 0.70 0.57 - 1.00 mg/dL Final   07/16/2019 0.75 0.57 - 1.00 mg/dL Final      Calcium Calcium   Date Value Ref Range Status   07/17/2019 9.1 8.6 - 10.5 mg/dL Final   07/16/2019 9.6 8.6 - 10.5 mg/dL Final      Albumin Albumin   Date Value Ref Range Status   07/17/2019 3.70 3.50 - 5.20 g/dL Final   07/16/2019 4.00 3.50 - 5.20 g/dL Final      AST  ALT  PT/INR:   AST (SGOT)   Date Value Ref Range Status   07/17/2019 36 (H) 1 - 32 U/L Final   07/16/2019 87 (H) 1 - 32 U/L Final     ALT (SGPT)   Date Value Ref Range Status   07/17/2019 32 1 - 33 U/L Final   07/16/2019 45 (H) 1 - 33 U/L Final     No results found for: PROTIME/No results found for: INR         Imaging Results (last 72 hours)     Procedure Component Value Units Date/Time    CT Abdomen Pelvis With Contrast [[id number]] Collected:  07/16/19 2248     Updated:  07/16/19 2258    Narrative:       CT OF THE ABDOMEN AND PELVIS WITH CONTRAST     HISTORY: Upper abdominal pain and nausea     COMPARISON: None available.     TECHNIQUE: Axial CT imaging was obtained from the dome the diaphragm to  the symphysis pubis. IV contrast was administered.     FINDINGS:  Images through the lung bases demonstrate some bibasilar atelectasis.  Atelectatic The gallbladder is surgically absent. No focal hepatic  lesions are seen. The spleen appears unremarkable, as are the adrenal  glands. The pancreas is within normal limits. The antrum of the stomach  appears  somewhat thick walled and distended. Correlation with any  evidence of gastritis is recommended. There is some mild soft tissue  stranding adjacent to the distal stomach and proximal duodenum. Again,  correlation with any evidence of gastritis/peptic ulcer disease is  recommended. Tiny cortical cyst is seen within the right kidney. Left  kidney appears unremarkable. No distal ureteral or bladder stones are  seen. Uterus is surgically absent. There is no evidence of mechanical  small bowel obstruction. There is atherosclerotic involvement of the  abdominal aorta. The appendix is not clearly identified, but I don't see  dilated tubular structure within the right lower quadrant suggest acute  appendicitis. No free fluid or adenopathy is seen within the pelvis. No  aggressive osseous abnormalities are seen.       Impression:       Somewhat distended and thick-walled appearance to the antrum of the  stomach. There may be some adjacent perigastric soft tissue stranding.  Correlation with any evidence of gastritis/peptic ulcer disease is  recommended. No pneumatosis, free air, or free fluid is seen.     Radiation dose reduction techniques were utilized, including automated  exposure control and exposure modulation based on body size.     This report was finalized on 7/16/2019 10:54 PM by Dr. Umu Ziegler M.D.             Assessment/Plan       Acute pancreatitis without infection or necrosis    Gastroesophageal reflux disease    Essential hypertension    Hypercholesterolemia    Heart valve disease    SVT (supraventricular tachycardia) (CMS/HCC)    Elevated hemoglobin A1c      This is an interesting presentation.  A lipase that is elevated to the extent seen on admission is almost always seen with pancreatitis, yet she has no evidence of such on a CT scan.  The absence of gross biliary ductal dilation and with very unimpressive looking liver chemistries would tend to rule against primary duct stones.  The abnormal  stomach noted on the CT scan of course is very relevant and needs to be further evaluated with an EGD which will be performed tomorrow.  One thing also to keep in the mind is a possibility of chronic mesenteric ischemia, given the severity of her vascular calcifications noted on CT scan.    I discussed the patients findings and my recommendations with patient and family    Ricky Chew MD  [facility]  07/17/19  6:40 PM

## 2019-07-18 ENCOUNTER — ANESTHESIA (OUTPATIENT)
Dept: GASTROENTEROLOGY | Facility: HOSPITAL | Age: 79
End: 2019-07-18

## 2019-07-18 ENCOUNTER — ANESTHESIA EVENT (OUTPATIENT)
Dept: GASTROENTEROLOGY | Facility: HOSPITAL | Age: 79
End: 2019-07-18

## 2019-07-18 LAB
ALBUMIN SERPL-MCNC: 3.7 G/DL (ref 3.5–5.2)
ALBUMIN/GLOB SERPL: 1.4 G/DL
ALP SERPL-CCNC: 64 U/L (ref 39–117)
ALT SERPL W P-5'-P-CCNC: 26 U/L (ref 1–33)
ANION GAP SERPL CALCULATED.3IONS-SCNC: 12.6 MMOL/L (ref 5–15)
AST SERPL-CCNC: 28 U/L (ref 1–32)
BASOPHILS # BLD AUTO: 0.02 10*3/MM3 (ref 0–0.2)
BASOPHILS NFR BLD AUTO: 0.4 % (ref 0–1.5)
BILIRUB SERPL-MCNC: 0.6 MG/DL (ref 0.2–1.2)
BUN BLD-MCNC: 11 MG/DL (ref 8–23)
BUN/CREAT SERPL: 16.2 (ref 7–25)
CALCIUM SPEC-SCNC: 9 MG/DL (ref 8.6–10.5)
CHLORIDE SERPL-SCNC: 105 MMOL/L (ref 98–107)
CO2 SERPL-SCNC: 23.4 MMOL/L (ref 22–29)
CREAT BLD-MCNC: 0.68 MG/DL (ref 0.57–1)
DEPRECATED RDW RBC AUTO: 41.2 FL (ref 37–54)
EOSINOPHIL # BLD AUTO: 0.07 10*3/MM3 (ref 0–0.4)
EOSINOPHIL NFR BLD AUTO: 1.5 % (ref 0.3–6.2)
ERYTHROCYTE [DISTWIDTH] IN BLOOD BY AUTOMATED COUNT: 12.6 % (ref 12.3–15.4)
GFR SERPL CREATININE-BSD FRML MDRD: 83 ML/MIN/1.73
GLOBULIN UR ELPH-MCNC: 2.7 GM/DL
GLUCOSE BLD-MCNC: 69 MG/DL (ref 65–99)
HCT VFR BLD AUTO: 38.3 % (ref 34–46.6)
HGB BLD-MCNC: 12.4 G/DL (ref 12–15.9)
IMM GRANULOCYTES # BLD AUTO: 0.01 10*3/MM3 (ref 0–0.05)
IMM GRANULOCYTES NFR BLD AUTO: 0.2 % (ref 0–0.5)
LIPASE SERPL-CCNC: 77 U/L (ref 13–60)
LYMPHOCYTES # BLD AUTO: 1.91 10*3/MM3 (ref 0.7–3.1)
LYMPHOCYTES NFR BLD AUTO: 41.1 % (ref 19.6–45.3)
MCH RBC QN AUTO: 28.8 PG (ref 26.6–33)
MCHC RBC AUTO-ENTMCNC: 32.4 G/DL (ref 31.5–35.7)
MCV RBC AUTO: 88.9 FL (ref 79–97)
MONOCYTES # BLD AUTO: 0.35 10*3/MM3 (ref 0.1–0.9)
MONOCYTES NFR BLD AUTO: 7.5 % (ref 5–12)
NEUTROPHILS # BLD AUTO: 2.29 10*3/MM3 (ref 1.7–7)
NEUTROPHILS NFR BLD AUTO: 49.3 % (ref 42.7–76)
NRBC BLD AUTO-RTO: 0 /100 WBC (ref 0–0.2)
PLATELET # BLD AUTO: 204 10*3/MM3 (ref 140–450)
PMV BLD AUTO: 12.4 FL (ref 6–12)
POTASSIUM BLD-SCNC: 3.9 MMOL/L (ref 3.5–5.2)
PROT SERPL-MCNC: 6.4 G/DL (ref 6–8.5)
RBC # BLD AUTO: 4.31 10*6/MM3 (ref 3.77–5.28)
SODIUM BLD-SCNC: 141 MMOL/L (ref 136–145)
WBC NRBC COR # BLD: 4.65 10*3/MM3 (ref 3.4–10.8)

## 2019-07-18 PROCEDURE — 80053 COMPREHEN METABOLIC PANEL: CPT | Performed by: HOSPITALIST

## 2019-07-18 PROCEDURE — 83690 ASSAY OF LIPASE: CPT | Performed by: HOSPITALIST

## 2019-07-18 PROCEDURE — 0DB68ZX EXCISION OF STOMACH, VIA NATURAL OR ARTIFICIAL OPENING ENDOSCOPIC, DIAGNOSTIC: ICD-10-PCS | Performed by: INTERNAL MEDICINE

## 2019-07-18 PROCEDURE — 88305 TISSUE EXAM BY PATHOLOGIST: CPT | Performed by: INTERNAL MEDICINE

## 2019-07-18 PROCEDURE — 43239 EGD BIOPSY SINGLE/MULTIPLE: CPT | Performed by: INTERNAL MEDICINE

## 2019-07-18 PROCEDURE — 25010000002 PROPOFOL 10 MG/ML EMULSION: Performed by: ANESTHESIOLOGY

## 2019-07-18 PROCEDURE — 85025 COMPLETE CBC W/AUTO DIFF WBC: CPT | Performed by: HOSPITALIST

## 2019-07-18 RX ORDER — PANTOPRAZOLE SODIUM 40 MG/10ML
40 INJECTION, POWDER, LYOPHILIZED, FOR SOLUTION INTRAVENOUS
Status: DISCONTINUED | OUTPATIENT
Start: 2019-07-18 | End: 2019-07-19 | Stop reason: HOSPADM

## 2019-07-18 RX ORDER — LIDOCAINE HYDROCHLORIDE 20 MG/ML
INJECTION, SOLUTION INFILTRATION; PERINEURAL AS NEEDED
Status: DISCONTINUED | OUTPATIENT
Start: 2019-07-18 | End: 2019-07-18 | Stop reason: SURG

## 2019-07-18 RX ORDER — SODIUM CHLORIDE 9 MG/ML
30 INJECTION, SOLUTION INTRAVENOUS CONTINUOUS PRN
Status: DISCONTINUED | OUTPATIENT
Start: 2019-07-18 | End: 2019-07-19 | Stop reason: HOSPADM

## 2019-07-18 RX ORDER — PROPOFOL 10 MG/ML
VIAL (ML) INTRAVENOUS AS NEEDED
Status: DISCONTINUED | OUTPATIENT
Start: 2019-07-18 | End: 2019-07-18 | Stop reason: SURG

## 2019-07-18 RX ORDER — 0.9 % SODIUM CHLORIDE 0.9 %
10 VIAL (ML) INJECTION EVERY 12 HOURS SCHEDULED
Status: DISCONTINUED | OUTPATIENT
Start: 2019-07-18 | End: 2019-07-18 | Stop reason: HOSPADM

## 2019-07-18 RX ORDER — SODIUM CHLORIDE, SODIUM LACTATE, POTASSIUM CHLORIDE, CALCIUM CHLORIDE 600; 310; 30; 20 MG/100ML; MG/100ML; MG/100ML; MG/100ML
INJECTION, SOLUTION INTRAVENOUS CONTINUOUS PRN
Status: DISCONTINUED | OUTPATIENT
Start: 2019-07-18 | End: 2019-07-18 | Stop reason: SURG

## 2019-07-18 RX ADMIN — SODIUM CHLORIDE, POTASSIUM CHLORIDE, SODIUM LACTATE AND CALCIUM CHLORIDE: 600; 310; 30; 20 INJECTION, SOLUTION INTRAVENOUS at 11:10

## 2019-07-18 RX ADMIN — GABAPENTIN 200 MG: 100 CAPSULE ORAL at 22:15

## 2019-07-18 RX ADMIN — CYCLOSPORINE 1 DROP: 0.5 EMULSION OPHTHALMIC at 08:13

## 2019-07-18 RX ADMIN — PROPOFOL 160 MG: 10 INJECTION, EMULSION INTRAVENOUS at 11:10

## 2019-07-18 RX ADMIN — GABAPENTIN 100 MG: 100 CAPSULE ORAL at 08:14

## 2019-07-18 RX ADMIN — METOPROLOL SUCCINATE 50 MG: 50 TABLET, FILM COATED, EXTENDED RELEASE ORAL at 22:14

## 2019-07-18 RX ADMIN — PANTOPRAZOLE SODIUM 40 MG: 40 INJECTION, POWDER, FOR SOLUTION INTRAVENOUS at 18:48

## 2019-07-18 RX ADMIN — SODIUM CHLORIDE 30 ML/HR: 9 INJECTION, SOLUTION INTRAVENOUS at 10:46

## 2019-07-18 RX ADMIN — METOPROLOL SUCCINATE 50 MG: 50 TABLET, FILM COATED, EXTENDED RELEASE ORAL at 08:14

## 2019-07-18 RX ADMIN — LIDOCAINE HYDROCHLORIDE 80 MG: 20 INJECTION, SOLUTION INFILTRATION; PERINEURAL at 11:10

## 2019-07-18 RX ADMIN — GABAPENTIN 100 MG: 100 CAPSULE ORAL at 18:48

## 2019-07-18 RX ADMIN — CYCLOSPORINE 1 DROP: 0.5 EMULSION OPHTHALMIC at 22:14

## 2019-07-18 RX ADMIN — SODIUM CHLORIDE 125 ML/HR: 9 INJECTION, SOLUTION INTRAVENOUS at 00:48

## 2019-07-18 RX ADMIN — SODIUM CHLORIDE, PRESERVATIVE FREE 10 ML: 5 INJECTION INTRAVENOUS at 10:45

## 2019-07-18 RX ADMIN — LEVOTHYROXINE SODIUM 25 MCG: 25 TABLET ORAL at 08:14

## 2019-07-18 RX ADMIN — GABAPENTIN 100 MG: 100 CAPSULE ORAL at 13:48

## 2019-07-18 NOTE — PLAN OF CARE
Problem: Patient Care Overview  Goal: Plan of Care Review  Outcome: Ongoing (interventions implemented as appropriate)      Problem: Pain, Acute (Adult)  Goal: Acceptable Pain Control/Comfort Level  Outcome: Ongoing (interventions implemented as appropriate)

## 2019-07-18 NOTE — PLAN OF CARE
Problem: Patient Care Overview  Goal: Plan of Care Review  Outcome: Ongoing (interventions implemented as appropriate)   07/18/19 1383   Coping/Psychosocial   Plan of Care Reviewed With patient;spouse   Plan of Care Review   Progress improving   OTHER   Outcome Summary VSS. Went for EGD today. Clear liquids ordered. Pt to be NPO after MN. Remains A&Ox4. Pt very anxious to be discharged. Will CTM.       Problem: Fall Risk (Adult)  Goal: Absence of Fall  Outcome: Ongoing (interventions implemented as appropriate)      Problem: Pain, Acute (Adult)  Goal: Acceptable Pain Control/Comfort Level  Outcome: Ongoing (interventions implemented as appropriate)      Problem: Skin Injury Risk (Adult)  Goal: Identify Related Risk Factors and Signs and Symptoms  Outcome: Ongoing (interventions implemented as appropriate)    Goal: Skin Health and Integrity  Outcome: Ongoing (interventions implemented as appropriate)

## 2019-07-18 NOTE — ANESTHESIA PREPROCEDURE EVALUATION
Anesthesia Evaluation     NPO Solid Status: > 8 hours  NPO Liquid Status: > 4 hours           Airway   Mallampati: II  TM distance: >3 FB  Neck ROM: full  no difficulty expected  Dental - normal exam     Pulmonary - normal exam   (+) a smoker Former,   Cardiovascular - normal exam    (+) hypertension, valvular problems/murmurs MR, dysrhythmias, hyperlipidemia,       Neuro/Psych  (+) numbness, psychiatric history Anxiety,     GI/Hepatic/Renal/Endo    (+)  GERD,      Musculoskeletal     Abdominal  - normal exam   Substance History      OB/GYN          Other   (+) arthritis   history of cancer                    Anesthesia Plan    ASA 3     MAC     Anesthetic plan, all risks, benefits, and alternatives have been provided, discussed and informed consent has been obtained with: patient.

## 2019-07-18 NOTE — PROGRESS NOTES
"DAILY PROGRESS NOTE  [facility]    Patient Identification:  Name: Reanna Higgins  Age: 79 y.o.  Sex: female  :  1940  MRN: [id number]         Primary Care Physician: Ted Li MD    Subjective:  Interval History:She feels better.    Objective:    Scheduled Meds:  cycloSPORINE 1 drop Both Eyes BID   enoxaparin 40 mg Subcutaneous Q24H   gabapentin 100 mg Oral TID   gabapentin 200 mg Oral Nightly   levothyroxine 25 mcg Oral Daily   metoprolol succinate XL 50 mg Oral BID     Continuous Infusions:  sodium chloride 125 mL/hr Last Rate: 125 mL/hr (19 0048)   sodium chloride 30 mL/hr Last Rate: 30 mL/hr (19 1046)       Vital signs in last 24 hours:  Temp:  [97.5 °F (36.4 °C)-98.2 °F (36.8 °C)] 97.9 °F (36.6 °C)  Heart Rate:  [59-76] 63  Resp:  [12-18] 18  BP: (120-149)/(44-67) 125/62    Intake/Output:    Intake/Output Summary (Last 24 hours) at 2019 1532  Last data filed at 2019 1125  Gross per 24 hour   Intake 1350 ml   Output 1900 ml   Net -550 ml       Exam:  /62 (BP Location: Left arm, Patient Position: Lying)   Pulse 63   Temp 97.9 °F (36.6 °C) (Oral)   Resp 18   Ht 170.2 cm (67\")   Wt 69.6 kg (153 lb 6.4 oz)   SpO2 97%   BMI 24.03 kg/m²     General Appearance:    Alert, cooperative, no distress   Head:    Normocephalic, without obvious abnormality, atraumatic   Eyes:       Throat:   Lips, tongue, gums normal   Neck:   Supple, symmetrical, trachea midline, no JVD   Lungs:     Clear to auscultation bilaterally, respirations unlabored   Chest Wall:    No tenderness or deformity    Heart:    Regular rate and rhythm, S1 and S2 normal, no murmur,no  Rub or gallop   Abdomen:     Soft, non-tender, bowel sounds active, no masses, no organomegaly    Extremities:   Extremities normal, atraumatic, no cyanosis or edema   Pulses:      Skin:   Skin is warm and dry,  no rashes or palpable lesions   Neurologic:   no focal deficits noted      Lab Results (last 72 hours)     " Procedure Component Value Units Date/Time    Tissue Pathology Exam [[id number]] Collected:  07/18/19 1123    Specimen:  Tissue from Stomach Updated:  07/18/19 1201    Comprehensive Metabolic Panel [[id number]] Collected:  07/18/19 0619    Specimen:  Blood from Arm, Right Updated:  07/18/19 0740     Glucose 69 mg/dL      BUN 11 mg/dL      Creatinine 0.68 mg/dL      Sodium 141 mmol/L      Potassium 3.9 mmol/L      Chloride 105 mmol/L      CO2 23.4 mmol/L      Calcium 9.0 mg/dL      Total Protein 6.4 g/dL      Albumin 3.70 g/dL      ALT (SGPT) 26 U/L      AST (SGOT) 28 U/L      Comment: Specimen hemolyzed.  Results may be affected.        Alkaline Phosphatase 64 U/L      Total Bilirubin 0.6 mg/dL      eGFR Non African Amer 83 mL/min/1.73      Globulin 2.7 gm/dL      A/G Ratio 1.4 g/dL      BUN/Creatinine Ratio 16.2     Anion Gap 12.6 mmol/L     Narrative:       GFR Normal >60  Chronic Kidney Disease <60  Kidney Failure <15    Lipase [[id number]]  (Abnormal) Collected:  07/18/19 0619    Specimen:  Blood from Arm, Right Updated:  07/18/19 0734     Lipase 77 U/L     CBC & Differential [[id number]] Collected:  07/18/19 0619    Specimen:  Blood Updated:  07/18/19 0701    Narrative:       The following orders were created for panel order CBC & Differential.  Procedure                               Abnormality         Status                     ---------                               -----------         ------                     CBC Auto Differential[[id number]]        Abnormal            Final result                 Please view results for these tests on the individual orders.    CBC Auto Differential [[id number]]  (Abnormal) Collected:  07/18/19 0619    Specimen:  Blood from Arm, Right Updated:  07/18/19 0701     WBC 4.65 10*3/mm3      RBC 4.31 10*6/mm3      Hemoglobin 12.4 g/dL      Hematocrit 38.3 %      MCV 88.9 fL      MCH 28.8 pg      MCHC 32.4 g/dL      RDW 12.6 %      RDW-SD 41.2 fl      MPV 12.4 fL      Platelets 204  10*3/mm3      Neutrophil % 49.3 %      Lymphocyte % 41.1 %      Monocyte % 7.5 %      Eosinophil % 1.5 %      Basophil % 0.4 %      Immature Grans % 0.2 %      Neutrophils, Absolute 2.29 10*3/mm3      Lymphocytes, Absolute 1.91 10*3/mm3      Monocytes, Absolute 0.35 10*3/mm3      Eosinophils, Absolute 0.07 10*3/mm3      Basophils, Absolute 0.02 10*3/mm3      Immature Grans, Absolute 0.01 10*3/mm3      nRBC 0.0 /100 WBC     Triglycerides [[id number]]  (Normal) Collected:  07/17/19 0458    Specimen:  Blood Updated:  07/17/19 1526     Triglycerides 92 mg/dL     Lipase [[id number]]  (Abnormal) Collected:  07/17/19 0458    Specimen:  Blood Updated:  07/17/19 0608     Lipase 739 U/L     Comprehensive Metabolic Panel [[id number]]  (Abnormal) Collected:  07/17/19 0458    Specimen:  Blood Updated:  07/17/19 0603     Glucose 103 mg/dL      BUN 15 mg/dL      Creatinine 0.70 mg/dL      Sodium 139 mmol/L      Potassium 3.2 mmol/L      Chloride 102 mmol/L      CO2 27.8 mmol/L      Calcium 9.1 mg/dL      Total Protein 6.2 g/dL      Albumin 3.70 g/dL      ALT (SGPT) 32 U/L      AST (SGOT) 36 U/L      Alkaline Phosphatase 64 U/L      Total Bilirubin 0.4 mg/dL      eGFR Non African Amer 80 mL/min/1.73      Globulin 2.5 gm/dL      A/G Ratio 1.5 g/dL      BUN/Creatinine Ratio 21.4     Anion Gap 9.2 mmol/L     Narrative:       GFR Normal >60  Chronic Kidney Disease <60  Kidney Failure <15    Lipid Panel [[id number]]  (Abnormal) Collected:  07/17/19 0458    Specimen:  Blood Updated:  07/17/19 0601     Total Cholesterol 136 mg/dL      Triglycerides 94 mg/dL      HDL Cholesterol 39 mg/dL      LDL Cholesterol  78 mg/dL      VLDL Cholesterol 18.8 mg/dL      LDL/HDL Ratio 2.01    Narrative:       Cholesterol Reference Ranges  (U.S. Department of Health and Human Services ATP III Classifications)    Desirable          <200 mg/dL  Borderline High    200-239 mg/dL  High Risk          >240 mg/dL      Triglyceride Reference Ranges  (U.S. Department of  Health and Human Services ATP III Classifications)    Normal           <150 mg/dL  Borderline High  150-199 mg/dL  High             200-499 mg/dL  Very High        >500 mg/dL    HDL Reference Ranges  (U.S. Department of Health and Human Services ATP III Classifcations)    Low     <40 mg/dl (major risk factor for CHD)  High    >60 mg/dl ('negative' risk factor for CHD)        LDL Reference Ranges  (U.S. Department of Health and Human Services ATP III Classifcations)    Optimal          <100 mg/dL  Near Optimal     100-129 mg/dL  Borderline High  130-159 mg/dL  High             160-189 mg/dL  Very High        >189 mg/dL    CBC (No Diff) [[id number]]  (Normal) Collected:  07/17/19 0458    Specimen:  Blood Updated:  07/17/19 0539     WBC 4.18 10*3/mm3      RBC 4.10 10*6/mm3      Hemoglobin 12.0 g/dL      Hematocrit 36.8 %      MCV 89.8 fL      MCH 29.3 pg      MCHC 32.6 g/dL      RDW 12.5 %      RDW-SD 41.1 fl      MPV 11.5 fL      Platelets 187 10*3/mm3     Troponin [[id number]]  (Normal) Collected:  07/16/19 2006    Specimen:  Blood Updated:  07/16/19 2059     Troponin T <0.010 ng/mL     Narrative:       Troponin T Reference Range:  <= 0.03 ng/mL-   Negative for AMI  >0.03 ng/mL-     Abnormal for myocardial necrosis.  Clinicians would have to utilize clinical acumen, EKG, Troponin and serial changes to determine if it is an Acute Myocardial Infarction or myocardial injury due to an underlying chronic condition.     Lipase [[id number]]  (Abnormal) Collected:  07/16/19 2006    Specimen:  Blood Updated:  07/16/19 2055     Lipase 2,594 U/L     Comprehensive Metabolic Panel [[id number]]  (Abnormal) Collected:  07/16/19 2006    Specimen:  Blood Updated:  07/16/19 2042     Glucose 139 mg/dL      BUN 19 mg/dL      Creatinine 0.75 mg/dL      Sodium 139 mmol/L      Potassium 3.4 mmol/L      Chloride 98 mmol/L      CO2 28.3 mmol/L      Calcium 9.6 mg/dL      Total Protein 6.8 g/dL      Albumin 4.00 g/dL      ALT (SGPT) 45 U/L       AST (SGOT) 87 U/L      Alkaline Phosphatase 69 U/L      Total Bilirubin 0.2 mg/dL      eGFR Non African Amer 74 mL/min/1.73      Globulin 2.8 gm/dL      A/G Ratio 1.4 g/dL      BUN/Creatinine Ratio 25.3     Anion Gap 12.7 mmol/L     Narrative:       GFR Normal >60  Chronic Kidney Disease <60  Kidney Failure <15    Urinalysis With Microscopic If Indicated (No Culture) - Urine, Clean Catch [[id number]]  (Abnormal) Collected:  07/16/19 1959    Specimen:  Urine, Clean Catch Updated:  07/16/19 2028     Color, UA Yellow     Appearance, UA Clear     pH, UA 7.5     Specific Gravity, UA 1.016     Glucose, UA Negative     Ketones, UA Negative     Bilirubin, UA Negative     Blood, UA Negative     Protein, UA Negative     Leuk Esterase, UA Moderate (2+)     Nitrite, UA Negative     Urobilinogen, UA 1.0 E.U./dL    Urinalysis, Microscopic Only - Urine, Clean Catch [[id number]]  (Abnormal) Collected:  07/16/19 1959    Specimen:  Urine, Clean Catch Updated:  07/16/19 2028     RBC, UA 0-2 /HPF      WBC, UA 3-5 /HPF      Bacteria, UA None Seen /HPF      Squamous Epithelial Cells, UA 0-2 /HPF      Hyaline Casts, UA None Seen /LPF      Methodology Automated Microscopy    CBC & Differential [[id number]] Collected:  07/16/19 2006    Specimen:  Blood Updated:  07/16/19 2018    Narrative:       The following orders were created for panel order CBC & Differential.  Procedure                               Abnormality         Status                     ---------                               -----------         ------                     CBC Auto Differential[[id number]]        Abnormal            Final result                 Please view results for these tests on the individual orders.    CBC Auto Differential [[id number]]  (Abnormal) Collected:  07/16/19 2006    Specimen:  Blood Updated:  07/16/19 2018     WBC 4.93 10*3/mm3      RBC 4.40 10*6/mm3      Hemoglobin 12.8 g/dL      Hematocrit 39.0 %      MCV 88.6 fL      MCH 29.1 pg      MCHC 32.8  g/dL      RDW 12.4 %      RDW-SD 40.3 fl      MPV 11.5 fL      Platelets 198 10*3/mm3      Neutrophil % 66.4 %      Lymphocyte % 29.4 %      Monocyte % 3.0 %      Eosinophil % 1.0 %      Basophil % 0.2 %      Immature Grans % 0.0 %      Neutrophils, Absolute 3.27 10*3/mm3      Lymphocytes, Absolute 1.45 10*3/mm3      Monocytes, Absolute 0.15 10*3/mm3      Eosinophils, Absolute 0.05 10*3/mm3      Basophils, Absolute 0.01 10*3/mm3      Immature Grans, Absolute 0.00 10*3/mm3      nRBC 0.0 /100 WBC         Data Review:  Results from last 7 days   Lab Units 07/18/19 0619 07/17/19 0458 07/16/19 2006   SODIUM mmol/L 141 139 139   POTASSIUM mmol/L 3.9 3.2* 3.4*   CHLORIDE mmol/L 105 102 98   CO2 mmol/L 23.4 27.8 28.3   BUN mg/dL 11 15 19   CREATININE mg/dL 0.68 0.70 0.75   GLUCOSE mg/dL 69 103* 139*   CALCIUM mg/dL 9.0 9.1 9.6     Results from last 7 days   Lab Units 07/18/19 0619 07/17/19 0458 07/16/19 2006   WBC 10*3/mm3 4.65 4.18 4.93   HEMOGLOBIN g/dL 12.4 12.0 12.8   HEMATOCRIT % 38.3 36.8 39.0   PLATELETS 10*3/mm3 204 187 198             Lab Results   Lab Value Date/Time    TROPONINT <0.010 07/16/2019 2006    TROPONINT <0.010 06/21/2016 1607     Results from last 7 days   Lab Units 07/17/19 0458   CHOLESTEROL mg/dL 136   TRIGLYCERIDES mg/dL 92  94   HDL CHOL mg/dL 39*   LDL CHOL mg/dL 78     Results from last 7 days   Lab Units 07/18/19 0619 07/17/19 0458 07/16/19 2006   ALK PHOS U/L 64 64 69   BILIRUBIN mg/dL 0.6 0.4 0.2   ALT (SGPT) U/L 26 32 45*   AST (SGOT) U/L 28 36* 87*             No results found for: POCGLU        Past Medical History:   Diagnosis Date   • Cancer (CMS/HCC)     skin   • Carpal tunnel syndrome    • Cataract    • Cystic fibroadenosis of breast    • GERD (gastroesophageal reflux disease)    • Hypercholesterolemia    • Hypertension    • Osteoarthritis of both hands    • Peripheral neuropathy    • Second degree AV block, Mobitz type I        Assessment:  Active Hospital Problems     Diagnosis  POA   • **Acute pancreatitis without infection or necrosis [K85.90]  Yes   • Elevated hemoglobin A1c [R73.09]  Yes   • SVT (supraventricular tachycardia) (CMS/HCC) [I47.1]  Yes   • Essential hypertension [I10]  Yes   • Hypercholesterolemia [E78.00]  Yes   • Gastroesophageal reflux disease [K21.9]  Yes   • Heart valve disease [I38]  Yes      Resolved Hospital Problems   No resolved problems to display.       Plan:  EGD report noted. Await BX. To get mesenteric doppler US    Nelson Pineda MD  7/18/2019  3:32 PM

## 2019-07-18 NOTE — ANESTHESIA POSTPROCEDURE EVALUATION
"Patient: Reanna Higgins    Procedure Summary     Date:  07/18/19 Room / Location:   [facility]    Anesthesia Start:  1107 Anesthesia Stop:  1129    Procedure:  ESOPHAGOGASTRODUODENOSCOPY with biopsy (N/A Esophagus) Diagnosis:       Gastroesophageal reflux disease, esophagitis presence not specified      (Gastroesophageal reflux disease, esophagitis presence not specified [K21.9])    Surgeon:  Ricky Chew MD Provider:  Arsenio Mueller MD    Anesthesia Type:  MAC ASA Status:  3          Anesthesia Type: MAC  Last vitals  BP   120/44 (07/18/19 1149)   Temp   36.7 °C (98 °F) (07/18/19 1041)   Pulse   63 (07/18/19 1149)   Resp   14 (07/18/19 1149)     SpO2   98 % (07/18/19 1149)     Post Anesthesia Care and Evaluation    Patient location during evaluation: bedside  Patient participation: complete - patient participated  Level of consciousness: awake and alert  Pain management: adequate  Airway patency: patent  Anesthetic complications: No anesthetic complications    Cardiovascular status: acceptable  Respiratory status: acceptable  Hydration status: acceptable    Comments: /44 (BP Location: Left arm, Patient Position: Lying)   Pulse 63   Temp 36.7 °C (98 °F) (Oral)   Resp 14   Ht 170.2 cm (67\")   Wt 69.6 kg (153 lb 6.4 oz)   SpO2 98%   BMI 24.03 kg/m²       "

## 2019-07-18 NOTE — PLAN OF CARE
Problem: Patient Care Overview  Goal: Plan of Care Review   07/18/19 0224   Coping/Psychosocial   Plan of Care Reviewed With patient   Plan of Care Review   Progress improving   OTHER   Outcome Summary Up ad chaka in room/gait steady/On IVF's/VSS/NPO except ice sips with meds/Scheduled for EGD on Thursday/has dsg to right hand from previous carpal tunnel surgery/POD on monitor/for am labs/will continue to monitor

## 2019-07-18 NOTE — PROGRESS NOTES
Brief (courtesy) procedure note:    Procedure: EGD    Pre-op diagnosis: Abnormal CT.    Post-op diagnosis: Mild gastritis, biopsied.     Recommendations: Mesenteric duplex.     Please refer to the Provation-generated note for photos and further details.     iRcky Chew MD

## 2019-07-18 NOTE — PROGRESS NOTES
Discharge Planning Assessment  [facility]     Patient Name: Reanna Higgins  MRN: [id number]  Today's Date: 7/18/2019    Admit Date: 7/16/2019    Discharge Needs Assessment     Row Name 07/18/19 1508       Living Environment    Lives With  spouse    Current Living Arrangements  home/apartment/condo    Primary Care Provided by  self    Family Caregiver if Needed  spouse    Quality of Family Relationships  involved;supportive;helpful    Able to Return to Prior Arrangements  yes       Resource/Environmental Concerns    Resource/Environmental Concerns  none       Transition Planning    Patient/Family Anticipates Transition to  home with family    Patient/Family Anticipated Services at Transition  none    Transportation Anticipated  family or friend will provide;car, drives self       Discharge Needs Assessment    Readmission Within the Last 30 Days  no previous admission in last 30 days    Equipment Currently Used at Home  none    Equipment Needed After Discharge  none        Discharge Plan     Row Name 07/18/19 1509       Plan    Plan  Plan is to return home with her  upon DC.  Denies needs at this time.     Plan Comments  Spoke with pt at bedside.  Facesheet and pharmacy info confirmed.  Pt lives in a house with her , is IADLs and can drive.  She has home DME including a walker and BSC if needed, but does not use them.  No hx of HH or SNF.  Advance directive is on file. ........sk        Destination      No service coordination in this encounter.      Durable Medical Equipment      No service coordination in this encounter.      Dialysis/Infusion      No service coordination in this encounter.      Home Medical Care      No service coordination in this encounter.      Therapy      No service coordination in this encounter.      Community Resources      No service coordination in this encounter.          Demographic Summary     Row Name 07/18/19 1508       General Information    Admission Type   inpatient    Arrived From  home    Referral Source  admission list    Reason for Consult  discharge planning    Preferred Language  English        Functional Status     Row Name 07/18/19 1508       Functional Status    Usual Activity Tolerance  good    Current Activity Tolerance  good       Functional Status, IADL    Medications  independent    Meal Preparation  independent    Housekeeping  independent    Laundry  independent    Shopping  independent       Mental Status    General Appearance WDL  WDL       Mental Status Summary    Recent Changes in Mental Status/Cognitive Functioning  no changes       Employment/    Employment Status  retired        Psychosocial    No documentation.       Abuse/Neglect    No documentation.       Legal    No documentation.       Substance Abuse    No documentation.       Patient Forms    No documentation.           Neida Foy RN

## 2019-07-19 ENCOUNTER — APPOINTMENT (OUTPATIENT)
Dept: CARDIOLOGY | Facility: HOSPITAL | Age: 79
End: 2019-07-19

## 2019-07-19 VITALS
SYSTOLIC BLOOD PRESSURE: 132 MMHG | BODY MASS INDEX: 24.08 KG/M2 | OXYGEN SATURATION: 95 % | WEIGHT: 153.4 LBS | RESPIRATION RATE: 18 BRPM | DIASTOLIC BLOOD PRESSURE: 65 MMHG | HEIGHT: 67 IN | HEART RATE: 60 BPM | TEMPERATURE: 98.2 F

## 2019-07-19 PROBLEM — K55.1 MESENTERIC ISCHEMIA DUE TO ARTERIAL INSUFFICIENCY: Status: ACTIVE | Noted: 2019-07-19

## 2019-07-19 LAB
ALBUMIN SERPL-MCNC: 3.6 G/DL (ref 3.5–5.2)
ALBUMIN/GLOB SERPL: 1.6 G/DL
ALP SERPL-CCNC: 58 U/L (ref 39–117)
ALT SERPL W P-5'-P-CCNC: 31 U/L (ref 1–33)
ANION GAP SERPL CALCULATED.3IONS-SCNC: 8.6 MMOL/L (ref 5–15)
AST SERPL-CCNC: 25 U/L (ref 1–32)
BASOPHILS # BLD AUTO: 0.02 10*3/MM3 (ref 0–0.2)
BASOPHILS NFR BLD AUTO: 0.4 % (ref 0–1.5)
BH CV VAS SMA 1ST PP TIME: 15 MIN
BH CV VAS SMA 2ND PP TIME: 30 MIN
BH CV VAS SMA 3RD PP TIME: 45 MIN
BH CV VAS SMA AORTA EDV: 0 CM/S
BH CV VAS SMA AORTA PSV: 104 CM/S
BH CV VAS SMA CELIAC DIST EDV: 58 CM/S
BH CV VAS SMA CELIAC DIST PSV: 279 CM/S
BH CV VAS SMA CELIAC MID EDV: 46 CM/S
BH CV VAS SMA CELIAC MID PSV: 261 CM/S
BH CV VAS SMA CELIAC ORIGIN EDV: 30 CM/S
BH CV VAS SMA CELIAC ORIGIN PSV: 179 CM/S
BH CV VAS SMA CELIAC PROX EDV: 62 CM/S
BH CV VAS SMA CELIAC PROX PSV: 375 CM/S
BH CV VAS SMA HEPATIC EDV: 19 CM/S
BH CV VAS SMA HEPATIC PSV: 105 CM/S
BH CV VAS SMA ORIGIN EDV: 0 CM/S
BH CV VAS SMA ORIGIN PSV: 276 CM/S
BH CV VAS SMA SMA DIST EDV: 0 CM/S
BH CV VAS SMA SMA DIST PSV: 193 CM/S
BH CV VAS SMA SMA MID EDV: 0 CM/S
BH CV VAS SMA SMA MID PSV: 214 CM/S
BH CV VAS SMA SMA PROX EDV: 26 CM/S
BH CV VAS SMA SMA PROX PSV: 293 CM/S
BH CV VAS SMA SPLENIC EDV: 27 CM/S
BH CV VAS SMA SPLENIC PSV: 107 CM/S
BILIRUB SERPL-MCNC: 0.5 MG/DL (ref 0.2–1.2)
BUN BLD-MCNC: 9 MG/DL (ref 8–23)
BUN/CREAT SERPL: 13.6 (ref 7–25)
CALCIUM SPEC-SCNC: 8.7 MG/DL (ref 8.6–10.5)
CHLORIDE SERPL-SCNC: 108 MMOL/L (ref 98–107)
CO2 SERPL-SCNC: 25.4 MMOL/L (ref 22–29)
CREAT BLD-MCNC: 0.66 MG/DL (ref 0.57–1)
CYTO UR: NORMAL
DEPRECATED RDW RBC AUTO: 41.7 FL (ref 37–54)
EOSINOPHIL # BLD AUTO: 0.09 10*3/MM3 (ref 0–0.4)
EOSINOPHIL NFR BLD AUTO: 1.8 % (ref 0.3–6.2)
ERYTHROCYTE [DISTWIDTH] IN BLOOD BY AUTOMATED COUNT: 12.6 % (ref 12.3–15.4)
GFR SERPL CREATININE-BSD FRML MDRD: 86 ML/MIN/1.73
GLOBULIN UR ELPH-MCNC: 2.2 GM/DL
GLUCOSE BLD-MCNC: 79 MG/DL (ref 65–99)
HCT VFR BLD AUTO: 35.1 % (ref 34–46.6)
HGB BLD-MCNC: 11.5 G/DL (ref 12–15.9)
IMM GRANULOCYTES # BLD AUTO: 0.01 10*3/MM3 (ref 0–0.05)
IMM GRANULOCYTES NFR BLD AUTO: 0.2 % (ref 0–0.5)
LAB AP CASE REPORT: NORMAL
LIPASE SERPL-CCNC: 45 U/L (ref 13–60)
LYMPHOCYTES # BLD AUTO: 1.88 10*3/MM3 (ref 0.7–3.1)
LYMPHOCYTES NFR BLD AUTO: 38.5 % (ref 19.6–45.3)
MCH RBC QN AUTO: 29.5 PG (ref 26.6–33)
MCHC RBC AUTO-ENTMCNC: 32.8 G/DL (ref 31.5–35.7)
MCV RBC AUTO: 90 FL (ref 79–97)
MONOCYTES # BLD AUTO: 0.38 10*3/MM3 (ref 0.1–0.9)
MONOCYTES NFR BLD AUTO: 7.8 % (ref 5–12)
NEUTROPHILS # BLD AUTO: 2.5 10*3/MM3 (ref 1.7–7)
NEUTROPHILS NFR BLD AUTO: 51.3 % (ref 42.7–76)
NRBC BLD AUTO-RTO: 0 /100 WBC (ref 0–0.2)
PATH REPORT.FINAL DX SPEC: NORMAL
PATH REPORT.GROSS SPEC: NORMAL
PLATELET # BLD AUTO: 179 10*3/MM3 (ref 140–450)
PMV BLD AUTO: 12.4 FL (ref 6–12)
POTASSIUM BLD-SCNC: 3.6 MMOL/L (ref 3.5–5.2)
PROT SERPL-MCNC: 5.8 G/DL (ref 6–8.5)
RBC # BLD AUTO: 3.9 10*6/MM3 (ref 3.77–5.28)
SODIUM BLD-SCNC: 142 MMOL/L (ref 136–145)
WBC NRBC COR # BLD: 4.88 10*3/MM3 (ref 3.4–10.8)

## 2019-07-19 PROCEDURE — 99232 SBSQ HOSP IP/OBS MODERATE 35: CPT | Performed by: INTERNAL MEDICINE

## 2019-07-19 PROCEDURE — 83690 ASSAY OF LIPASE: CPT | Performed by: HOSPITALIST

## 2019-07-19 PROCEDURE — 80053 COMPREHEN METABOLIC PANEL: CPT | Performed by: HOSPITALIST

## 2019-07-19 PROCEDURE — 85025 COMPLETE CBC W/AUTO DIFF WBC: CPT | Performed by: HOSPITALIST

## 2019-07-19 PROCEDURE — 25010000002 ENOXAPARIN PER 10 MG: Performed by: HOSPITALIST

## 2019-07-19 PROCEDURE — 93975 VASCULAR STUDY: CPT

## 2019-07-19 RX ORDER — ASPIRIN 81 MG/1
81 TABLET ORAL DAILY
Start: 2019-07-19 | End: 2019-09-12 | Stop reason: SDUPTHER

## 2019-07-19 RX ADMIN — GABAPENTIN 100 MG: 100 CAPSULE ORAL at 08:53

## 2019-07-19 RX ADMIN — LEVOTHYROXINE SODIUM 25 MCG: 25 TABLET ORAL at 08:53

## 2019-07-19 RX ADMIN — GABAPENTIN 100 MG: 100 CAPSULE ORAL at 12:27

## 2019-07-19 RX ADMIN — METOPROLOL SUCCINATE 50 MG: 50 TABLET, FILM COATED, EXTENDED RELEASE ORAL at 08:53

## 2019-07-19 RX ADMIN — SODIUM CHLORIDE 125 ML/HR: 9 INJECTION, SOLUTION INTRAVENOUS at 02:12

## 2019-07-19 RX ADMIN — PANTOPRAZOLE SODIUM 40 MG: 40 INJECTION, POWDER, FOR SOLUTION INTRAVENOUS at 07:00

## 2019-07-19 RX ADMIN — ENOXAPARIN SODIUM 40 MG: 40 INJECTION SUBCUTANEOUS at 01:03

## 2019-07-19 RX ADMIN — CYCLOSPORINE 1 DROP: 0.5 EMULSION OPHTHALMIC at 08:53

## 2019-07-19 NOTE — PROGRESS NOTES
[facility]     Inpatient Follow Up Note    Patient Identification:  Name: Reanna Higgins  Age: 79 y.o.  Sex: female  :  1940  MRN: [id number]    Information from:patient and family     CC: Abdominal pain    History:   Patient is free of symptoms at this point.  She is having no diarrhea or abdominal pain and is tolerating a light diet well.  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  It was stressed to the patient that the significance of this is not clear but in my opinion it requires further evaluation.    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]   • Non-rheumatic mitral regurgitation [I34.0]   • Peripheral neuropathy [G62.9]   • Anxiety [F41.9]   • Colon polyp [K63.5]   • Gastroesophageal reflux disease [K21.9]   • Essential hypertension [I10]   • Hypercholesterolemia [E78.00]   • 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]   • Heart valve disease [I38]     Current Meds:  MAR Reviewed  Scheduled Meds:  cycloSPORINE 1 drop Both Eyes BID   enoxaparin 40 mg Subcutaneous Q24H   gabapentin 100 mg Oral TID   gabapentin 200 mg Oral Nightly   levothyroxine 25 mcg Oral Daily   metoprolol succinate XL 50 mg Oral BID   pantoprazole 40 mg Intravenous BID AC     Continuous Infusions:  sodium chloride 125 mL/hr Last Rate: 125 mL/hr (19 0212)   sodium chloride 30 mL/hr Last Rate: 30 mL/hr (19 1046)     PRN Meds:.•  acetaminophen  •  sodium  "chloride  Allergies:  Allergies   Allergen Reactions   • Ancef [Cefazolin] Other (See Comments)     Hypotension/bradycardia   • Codeine    • Penicillins Other (See Comments)     Hypotension (Ancef allergy)    • Sulfa Antibiotics        Intake/Output:     Intake/Output Summary (Last 24 hours) at 2019 1728  Last data filed at 2019 1404  Gross per 24 hour   Intake 1257 ml   Output 1903 ml   Net -646 ml     New allergies/reactions:  None    Physical Exam:  Vitals:   Temp (24hrs), Av.1 °F (36.7 °C), Min:97.4 °F (36.3 °C), Max:98.6 °F (37 °C)    Temp:  [97.4 °F (36.3 °C)-98.6 °F (37 °C)] 98.2 °F (36.8 °C)  Heart Rate:  [60] 60  Resp:  [18] 18  BP: (107-135)/(57-65) 132/65  /65 (BP Location: Left arm, Patient Position: Lying)   Pulse 60   Temp 98.2 °F (36.8 °C) (Oral)   Resp 18   Ht 170.2 cm (67\")   Wt 69.6 kg (153 lb 6.4 oz)   SpO2 95%   BMI 24.03 kg/m²     Exam:  NAD  PERRLA. Sclerae and conjunctivae normal  HENT: external inspection normal. Hearing intact.  No respiratory distress.  Alert, oriented, normal affect.         DATA:  Radiology and Labs:   Recent Results (from the past 24 hour(s))   Comprehensive Metabolic Panel    Collection Time: 19  5:38 AM   Result Value Ref Range    Glucose 79 65 - 99 mg/dL    BUN 9 8 - 23 mg/dL    Creatinine 0.66 0.57 - 1.00 mg/dL    Sodium 142 136 - 145 mmol/L    Potassium 3.6 3.5 - 5.2 mmol/L    Chloride 108 (H) 98 - 107 mmol/L    CO2 25.4 22.0 - 29.0 mmol/L    Calcium 8.7 8.6 - 10.5 mg/dL    Total Protein 5.8 (L) 6.0 - 8.5 g/dL    Albumin 3.60 3.50 - 5.20 g/dL    ALT (SGPT) 31 1 - 33 U/L    AST (SGOT) 25 1 - 32 U/L    Alkaline Phosphatase 58 39 - 117 U/L    Total Bilirubin 0.5 0.2 - 1.2 mg/dL    eGFR Non African Amer 86 >60 mL/min/1.73    Globulin 2.2 gm/dL    A/G Ratio 1.6 g/dL    BUN/Creatinine Ratio 13.6 7.0 - 25.0    Anion Gap 8.6 5.0 - 15.0 mmol/L   Lipase    Collection Time: 19  5:38 AM   Result Value Ref Range    Lipase 45 13 - 60 U/L "   CBC Auto Differential    Collection Time: 07/19/19  5:38 AM   Result Value Ref Range    WBC 4.88 3.40 - 10.80 10*3/mm3    RBC 3.90 3.77 - 5.28 10*6/mm3    Hemoglobin 11.5 (L) 12.0 - 15.9 g/dL    Hematocrit 35.1 34.0 - 46.6 %    MCV 90.0 79.0 - 97.0 fL    MCH 29.5 26.6 - 33.0 pg    MCHC 32.8 31.5 - 35.7 g/dL    RDW 12.6 12.3 - 15.4 %    RDW-SD 41.7 37.0 - 54.0 fl    MPV 12.4 (H) 6.0 - 12.0 fL    Platelets 179 140 - 450 10*3/mm3    Neutrophil % 51.3 42.7 - 76.0 %    Lymphocyte % 38.5 19.6 - 45.3 %    Monocyte % 7.8 5.0 - 12.0 %    Eosinophil % 1.8 0.3 - 6.2 %    Basophil % 0.4 0.0 - 1.5 %    Immature Grans % 0.2 0.0 - 0.5 %    Neutrophils, Absolute 2.50 1.70 - 7.00 10*3/mm3    Lymphocytes, Absolute 1.88 0.70 - 3.10 10*3/mm3    Monocytes, Absolute 0.38 0.10 - 0.90 10*3/mm3    Eosinophils, Absolute 0.09 0.00 - 0.40 10*3/mm3    Basophils, Absolute 0.02 0.00 - 0.20 10*3/mm3    Immature Grans, Absolute 0.01 0.00 - 0.05 10*3/mm3    nRBC 0.0 0.0 - 0.2 /100 WBC   Duplex Mesenteric Complete CAR    Collection Time: 07/19/19 10:38 AM   Result Value Ref Range    SMA origin EDV 0 cm/s    SMA origin  cm/s    SMA Aorta  cm/s    SMA Aorta EDV 0 cm/s    SMA Celiac Origin  cm/s    SMA Celiac Origin EDV 30 cm/s    SMA Celiac Prox  cm/s    SMA Celiac Prox EDV 62 cm/s    SMA Celiac Mid  cm/s    SMA Celiac Mid EDV 46 cm/s    SMA Celiac Dist  cm/s    SMA Celiac Dist EDV 58 cm/s    SMA SMA Prox  cm/s    SMA SMA Prox EDV 26 cm/s    SMA SMA Mid  cm/s    SMA SMA Mid EDV 0 cm/s    SMA SMA Dist  cm/s    SMA SMA Dist EDV 0 cm/s    SMA Hepatic  cm/s    SMA Hepatic EDV 19 cm/s    SMA Splenic  cm/s    SMA Splenic EDV 27 cm/s    SMA 1st PP time 15 min    SMA 2nd PP time 30 min    SMA 3rd PP time 45 min       Assessment:   Problem List:     Acute pancreatitis without infection or necrosis    Gastroesophageal reflux disease    Essential hypertension    Hypercholesterolemia     Heart valve disease    SVT (supraventricular tachycardia) (CMS/HCC)    Elevated hemoglobin A1c    Patient had abdominal pain and high lipase suggestive of pancreatitis but her pancreas appeared completely normal on CT scan.  On the other hand, heavy arterial calcifications were noted and a duplex study suggests at least some measure of obstruction to to the major arteries of the gut.    Plan:   Vascular surgery consult.  The patient is anxious to go home today and already knows 1 of the vascular surgeons so I think this is a reasonable request on her part.  She promises to follow through and get further evaluation.  Otherwise she is to keep her diet light and stay away from fatty foods.      Ricky Chew MD  [facility]  7/19/2019

## 2019-07-19 NOTE — PROGRESS NOTES
"DAILY PROGRESS NOTE  [facility]    Patient Identification:  Name: Reanna Higgins  Age: 79 y.o.  Sex: female  :  1940  MRN: [id number]         Primary Care Physician: Ted Li MD    Subjective:  Interval History:She feels better.    Objective:    Scheduled Meds:    cycloSPORINE 1 drop Both Eyes BID   enoxaparin 40 mg Subcutaneous Q24H   gabapentin 100 mg Oral TID   gabapentin 200 mg Oral Nightly   levothyroxine 25 mcg Oral Daily   metoprolol succinate XL 50 mg Oral BID   pantoprazole 40 mg Intravenous BID AC     Continuous Infusions:    sodium chloride 125 mL/hr Last Rate: 125 mL/hr (19 0212)   sodium chloride 30 mL/hr Last Rate: 30 mL/hr (19 1046)       Vital signs in last 24 hours:  Temp:  [97.4 °F (36.3 °C)-98.6 °F (37 °C)] 98.2 °F (36.8 °C)  Heart Rate:  [60] 60  Resp:  [18] 18  BP: (107-135)/(57-65) 132/65    Intake/Output:    Intake/Output Summary (Last 24 hours) at 2019 1556  Last data filed at 2019 1404  Gross per 24 hour   Intake 1497 ml   Output 1903 ml   Net -406 ml       Exam:  /65 (BP Location: Left arm, Patient Position: Lying)   Pulse 60   Temp 98.2 °F (36.8 °C) (Oral)   Resp 18   Ht 170.2 cm (67\")   Wt 69.6 kg (153 lb 6.4 oz)   SpO2 95%   BMI 24.03 kg/m²     General Appearance:    Alert, cooperative, no distress   Head:    Normocephalic, without obvious abnormality, atraumatic   Eyes:       Throat:   Lips, tongue, gums normal   Neck:   Supple, symmetrical, trachea midline, no JVD   Lungs:     Clear to auscultation bilaterally, respirations unlabored   Chest Wall:    No tenderness or deformity    Heart:    Regular rate and rhythm, S1 and S2 normal, no murmur,no  Rub or gallop   Abdomen:     Soft, non-tender, bowel sounds active, no masses, no organomegaly    Extremities:   Extremities normal, atraumatic, no cyanosis or edema   Pulses:      Skin:   Skin is warm and dry,  no rashes or palpable lesions   Neurologic:   no focal deficits noted "      Lab Results (last 72 hours)     Procedure Component Value Units Date/Time    Tissue Pathology Exam [[id number]] Collected:  07/18/19 1123    Specimen:  Tissue from Stomach Updated:  07/18/19 1201    Comprehensive Metabolic Panel [[id number]] Collected:  07/18/19 0619    Specimen:  Blood from Arm, Right Updated:  07/18/19 0740     Glucose 69 mg/dL      BUN 11 mg/dL      Creatinine 0.68 mg/dL      Sodium 141 mmol/L      Potassium 3.9 mmol/L      Chloride 105 mmol/L      CO2 23.4 mmol/L      Calcium 9.0 mg/dL      Total Protein 6.4 g/dL      Albumin 3.70 g/dL      ALT (SGPT) 26 U/L      AST (SGOT) 28 U/L      Comment: Specimen hemolyzed.  Results may be affected.        Alkaline Phosphatase 64 U/L      Total Bilirubin 0.6 mg/dL      eGFR Non African Amer 83 mL/min/1.73      Globulin 2.7 gm/dL      A/G Ratio 1.4 g/dL      BUN/Creatinine Ratio 16.2     Anion Gap 12.6 mmol/L     Narrative:       GFR Normal >60  Chronic Kidney Disease <60  Kidney Failure <15    Lipase [[id number]]  (Abnormal) Collected:  07/18/19 0619    Specimen:  Blood from Arm, Right Updated:  07/18/19 0734     Lipase 77 U/L     CBC & Differential [[id number]] Collected:  07/18/19 0619    Specimen:  Blood Updated:  07/18/19 0701    Narrative:       The following orders were created for panel order CBC & Differential.  Procedure                               Abnormality         Status                     ---------                               -----------         ------                     CBC Auto Differential[[id number]]        Abnormal            Final result                 Please view results for these tests on the individual orders.    CBC Auto Differential [[id number]]  (Abnormal) Collected:  07/18/19 0619    Specimen:  Blood from Arm, Right Updated:  07/18/19 0701     WBC 4.65 10*3/mm3      RBC 4.31 10*6/mm3      Hemoglobin 12.4 g/dL      Hematocrit 38.3 %      MCV 88.9 fL      MCH 28.8 pg      MCHC 32.4 g/dL      RDW 12.6 %      RDW-SD 41.2 fl       MPV 12.4 fL      Platelets 204 10*3/mm3      Neutrophil % 49.3 %      Lymphocyte % 41.1 %      Monocyte % 7.5 %      Eosinophil % 1.5 %      Basophil % 0.4 %      Immature Grans % 0.2 %      Neutrophils, Absolute 2.29 10*3/mm3      Lymphocytes, Absolute 1.91 10*3/mm3      Monocytes, Absolute 0.35 10*3/mm3      Eosinophils, Absolute 0.07 10*3/mm3      Basophils, Absolute 0.02 10*3/mm3      Immature Grans, Absolute 0.01 10*3/mm3      nRBC 0.0 /100 WBC     Triglycerides [[id number]]  (Normal) Collected:  07/17/19 0458    Specimen:  Blood Updated:  07/17/19 1526     Triglycerides 92 mg/dL     Lipase [[id number]]  (Abnormal) Collected:  07/17/19 0458    Specimen:  Blood Updated:  07/17/19 0608     Lipase 739 U/L     Comprehensive Metabolic Panel [[id number]]  (Abnormal) Collected:  07/17/19 0458    Specimen:  Blood Updated:  07/17/19 0603     Glucose 103 mg/dL      BUN 15 mg/dL      Creatinine 0.70 mg/dL      Sodium 139 mmol/L      Potassium 3.2 mmol/L      Chloride 102 mmol/L      CO2 27.8 mmol/L      Calcium 9.1 mg/dL      Total Protein 6.2 g/dL      Albumin 3.70 g/dL      ALT (SGPT) 32 U/L      AST (SGOT) 36 U/L      Alkaline Phosphatase 64 U/L      Total Bilirubin 0.4 mg/dL      eGFR Non African Amer 80 mL/min/1.73      Globulin 2.5 gm/dL      A/G Ratio 1.5 g/dL      BUN/Creatinine Ratio 21.4     Anion Gap 9.2 mmol/L     Narrative:       GFR Normal >60  Chronic Kidney Disease <60  Kidney Failure <15    Lipid Panel [[id number]]  (Abnormal) Collected:  07/17/19 0458    Specimen:  Blood Updated:  07/17/19 0601     Total Cholesterol 136 mg/dL      Triglycerides 94 mg/dL      HDL Cholesterol 39 mg/dL      LDL Cholesterol  78 mg/dL      VLDL Cholesterol 18.8 mg/dL      LDL/HDL Ratio 2.01    Narrative:       Cholesterol Reference Ranges  (U.S. Department of Health and Human Services ATP III Classifications)    Desirable          <200 mg/dL  Borderline High    200-239 mg/dL  High Risk          >240 mg/dL      Triglyceride  Reference Ranges  (U.S. Department of Health and Human Services ATP III Classifications)    Normal           <150 mg/dL  Borderline High  150-199 mg/dL  High             200-499 mg/dL  Very High        >500 mg/dL    HDL Reference Ranges  (U.S. Department of Health and Human Services ATP III Classifcations)    Low     <40 mg/dl (major risk factor for CHD)  High    >60 mg/dl ('negative' risk factor for CHD)        LDL Reference Ranges  (U.S. Department of Health and Human Services ATP III Classifcations)    Optimal          <100 mg/dL  Near Optimal     100-129 mg/dL  Borderline High  130-159 mg/dL  High             160-189 mg/dL  Very High        >189 mg/dL    CBC (No Diff) [[id number]]  (Normal) Collected:  07/17/19 0458    Specimen:  Blood Updated:  07/17/19 0539     WBC 4.18 10*3/mm3      RBC 4.10 10*6/mm3      Hemoglobin 12.0 g/dL      Hematocrit 36.8 %      MCV 89.8 fL      MCH 29.3 pg      MCHC 32.6 g/dL      RDW 12.5 %      RDW-SD 41.1 fl      MPV 11.5 fL      Platelets 187 10*3/mm3     Troponin [[id number]]  (Normal) Collected:  07/16/19 2006    Specimen:  Blood Updated:  07/16/19 2059     Troponin T <0.010 ng/mL     Narrative:       Troponin T Reference Range:  <= 0.03 ng/mL-   Negative for AMI  >0.03 ng/mL-     Abnormal for myocardial necrosis.  Clinicians would have to utilize clinical acumen, EKG, Troponin and serial changes to determine if it is an Acute Myocardial Infarction or myocardial injury due to an underlying chronic condition.     Lipase [[id number]]  (Abnormal) Collected:  07/16/19 2006    Specimen:  Blood Updated:  07/16/19 2055     Lipase 2,594 U/L     Comprehensive Metabolic Panel [[id number]]  (Abnormal) Collected:  07/16/19 2006    Specimen:  Blood Updated:  07/16/19 2042     Glucose 139 mg/dL      BUN 19 mg/dL      Creatinine 0.75 mg/dL      Sodium 139 mmol/L      Potassium 3.4 mmol/L      Chloride 98 mmol/L      CO2 28.3 mmol/L      Calcium 9.6 mg/dL      Total Protein 6.8 g/dL      Albumin  4.00 g/dL      ALT (SGPT) 45 U/L      AST (SGOT) 87 U/L      Alkaline Phosphatase 69 U/L      Total Bilirubin 0.2 mg/dL      eGFR Non African Amer 74 mL/min/1.73      Globulin 2.8 gm/dL      A/G Ratio 1.4 g/dL      BUN/Creatinine Ratio 25.3     Anion Gap 12.7 mmol/L     Narrative:       GFR Normal >60  Chronic Kidney Disease <60  Kidney Failure <15    Urinalysis With Microscopic If Indicated (No Culture) - Urine, Clean Catch [[id number]]  (Abnormal) Collected:  07/16/19 1959    Specimen:  Urine, Clean Catch Updated:  07/16/19 2028     Color, UA Yellow     Appearance, UA Clear     pH, UA 7.5     Specific Gravity, UA 1.016     Glucose, UA Negative     Ketones, UA Negative     Bilirubin, UA Negative     Blood, UA Negative     Protein, UA Negative     Leuk Esterase, UA Moderate (2+)     Nitrite, UA Negative     Urobilinogen, UA 1.0 E.U./dL    Urinalysis, Microscopic Only - Urine, Clean Catch [[id number]]  (Abnormal) Collected:  07/16/19 1959    Specimen:  Urine, Clean Catch Updated:  07/16/19 2028     RBC, UA 0-2 /HPF      WBC, UA 3-5 /HPF      Bacteria, UA None Seen /HPF      Squamous Epithelial Cells, UA 0-2 /HPF      Hyaline Casts, UA None Seen /LPF      Methodology Automated Microscopy    CBC & Differential [[id number]] Collected:  07/16/19 2006    Specimen:  Blood Updated:  07/16/19 2018    Narrative:       The following orders were created for panel order CBC & Differential.  Procedure                               Abnormality         Status                     ---------                               -----------         ------                     CBC Auto Differential[[id number]]        Abnormal            Final result                 Please view results for these tests on the individual orders.    CBC Auto Differential [[id number]]  (Abnormal) Collected:  07/16/19 2006    Specimen:  Blood Updated:  07/16/19 2018     WBC 4.93 10*3/mm3      RBC 4.40 10*6/mm3      Hemoglobin 12.8 g/dL      Hematocrit 39.0 %      MCV 88.6  fL      MCH 29.1 pg      MCHC 32.8 g/dL      RDW 12.4 %      RDW-SD 40.3 fl      MPV 11.5 fL      Platelets 198 10*3/mm3      Neutrophil % 66.4 %      Lymphocyte % 29.4 %      Monocyte % 3.0 %      Eosinophil % 1.0 %      Basophil % 0.2 %      Immature Grans % 0.0 %      Neutrophils, Absolute 3.27 10*3/mm3      Lymphocytes, Absolute 1.45 10*3/mm3      Monocytes, Absolute 0.15 10*3/mm3      Eosinophils, Absolute 0.05 10*3/mm3      Basophils, Absolute 0.01 10*3/mm3      Immature Grans, Absolute 0.00 10*3/mm3      nRBC 0.0 /100 WBC         Data Review:  Results from last 7 days   Lab Units 07/19/19 0538 07/18/19 0619 07/17/19 0458   SODIUM mmol/L 142 141 139   POTASSIUM mmol/L 3.6 3.9 3.2*   CHLORIDE mmol/L 108* 105 102   CO2 mmol/L 25.4 23.4 27.8   BUN mg/dL 9 11 15   CREATININE mg/dL 0.66 0.68 0.70   GLUCOSE mg/dL 79 69 103*   CALCIUM mg/dL 8.7 9.0 9.1     Results from last 7 days   Lab Units 07/19/19 0538 07/18/19 0619 07/17/19 0458   WBC 10*3/mm3 4.88 4.65 4.18   HEMOGLOBIN g/dL 11.5* 12.4 12.0   HEMATOCRIT % 35.1 38.3 36.8   PLATELETS 10*3/mm3 179 204 187             Lab Results   Lab Value Date/Time    TROPONINT <0.010 07/16/2019 2006    TROPONINT <0.010 06/21/2016 1607     Results from last 7 days   Lab Units 07/17/19  0458   CHOLESTEROL mg/dL 136   TRIGLYCERIDES mg/dL 92  94   HDL CHOL mg/dL 39*   LDL CHOL mg/dL 78     Results from last 7 days   Lab Units 07/19/19 0538 07/18/19 0619 07/17/19  0458   ALK PHOS U/L 58 64 64   BILIRUBIN mg/dL 0.5 0.6 0.4   ALT (SGPT) U/L 31 26 32   AST (SGOT) U/L 25 28 36*     Imaging Results (last 24 hours)     ** No results found for the last 24 hours. **                  No results found for: POCGLU        Past Medical History:   Diagnosis Date   • Cancer (CMS/HCC)     skin   • Carpal tunnel syndrome    • Cataract    • Cystic fibroadenosis of breast    • GERD (gastroesophageal reflux disease)    • Hypercholesterolemia    • Hypertension    • Osteoarthritis of both hands     • Peripheral neuropathy    • Second degree AV block, Mobitz type I        Assessment:  Active Hospital Problems    Diagnosis  POA   • **Acute pancreatitis without infection or necrosis [K85.90]  Yes   • Elevated hemoglobin A1c [R73.09]  Yes   • SVT (supraventricular tachycardia) (CMS/HCC) [I47.1]  Yes   • Essential hypertension [I10]  Yes   • Hypercholesterolemia [E78.00]  Yes   • Gastroesophageal reflux disease [K21.9]  Yes   • Heart valve disease [I38]  Yes      Resolved Hospital Problems   No resolved problems to display.       Plan:  EGD report noted. Await BX.  mesenteric doppler US Report noted. Await plans from GI. 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

## 2019-07-19 NOTE — PLAN OF CARE
Problem: Patient Care Overview  Goal: Plan of Care Review  Outcome: Ongoing (interventions implemented as appropriate)   07/19/19 0122   Coping/Psychosocial   Plan of Care Reviewed With patient   Plan of Care Review   Progress improving   OTHER   Outcome Summary Had EGD on Thursday-per report essentially normal/Was on Clears unill MN then NPO for Mesenteric doppler test/Up ad chaka without distress/gait steady/Up in chair earlier/Upset having diarrhea, but has been NPO then on Clears/emotional support given/will continue to monitor/On IV Protonix        Problem: Pain, Acute (Adult)  Goal: Acceptable Pain Control/Comfort Level  Outcome: Ongoing (interventions implemented as appropriate)      Problem: Anxiety (Adult)  Goal: Reduction/Resolution  Outcome: Ongoing (interventions implemented as appropriate)

## 2019-07-19 NOTE — DISCHARGE SUMMARY
PHYSICIAN DISCHARGE SUMMARY                                                                        [facility]    Patient Identification:  Name: Reanna Higgins  Age: 79 y.o.  Sex: female  :  1940  MRN: [id number]  Primary Care Physician: Ted Li MD    Admit date: 2019  Discharge date and time:2019  Discharged Condition: good    Discharge Diagnoses:  Active Hospital Problems    Diagnosis  POA   • **Mesenteric ischemia due to arterial insufficiency (CMS/HCC) [K55.059]  Unknown   • Acute pancreatitis without infection or necrosis [K85.90]  Yes   • Elevated hemoglobin A1c [R73.09]  Yes   • SVT (supraventricular tachycardia) (CMS/HCC) [I47.1]  Yes   • Essential hypertension [I10]  Yes   • Hypercholesterolemia [E78.00]  Yes   • Gastroesophageal reflux disease [K21.9]  Yes   • Heart valve disease [I38]  Yes      Resolved Hospital Problems   No resolved problems to display.          PMHX:   Past Medical History:   Diagnosis Date   • Cancer (CMS/HCC)     skin   • Carpal tunnel syndrome    • Cataract    • Cystic fibroadenosis of breast    • GERD (gastroesophageal reflux disease)    • Hypercholesterolemia    • Hypertension    • Osteoarthritis of both hands    • Peripheral neuropathy    • Second degree AV block, Mobitz type I      PSHX:   Past Surgical History:   Procedure Laterality Date   • BILATERAL OOPHORECTOMY Bilateral    • BREAST CYST EXCISION Bilateral     4 BREAST SURGERIES   • CARDIAC ELECTROPHYSIOLOGY PROCEDURE N/A 10/10/2016    Procedure: Pacemaker JOHN SHERMAN;  Surgeon: Wilfrido Hameed MD;  Location: [facility] INVASIVE LOCATION;  Service:    • CATARACT EXTRACTION      NOVEMBER AND 2014   • CHOLECYSTECTOMY     • HYSTERECTOMY     • KNEE ARTHROSCOPY Bilateral 2014    MENISCAL TEAR   • PACEMAKER IMPLANTATION  10/10/2016   • SHOULDER SURGERY Right     ROTATOR CUFF SURGERY 2015   •  TONSILLECTOMY     • TRIGGER FINGER RELEASE      both hands       Hospital Course: Reanna Higgins  is an 79-year-old white female with history of skin cancer, GERD, hypertension, hyperlipidemia, osteoarthritis and pacemaker for heart block who is admitted with about a month history of having some upper abdominal pain intermittently with some nausea but no vomiting. She been told recently she had prediabetes and was started on some metformin which she thought was upsetting her stomach. She has been losing some weight. She has not had any chest pain or shortness of air. The patient was evaluated in the ER and felt to have pancreatitis and admitted for further evaluation treatment. She had previous cholecystectomy for stones in 2012. The patient was started on some IV fluids and given some pain and nausea medicine and admitted for further work-up.          The patient was admitted to the hospital for further work-up and seen by GI medicine.  EGD suggested some gastritis but biopsy was negative.  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  GI felt patient was probably having some GI ischemia due to vascular narrowing.  The patient was feeling better and her pain was not severe and she looked well enough to go home.  She is going to follow-up with Dr. Velazco for further evaluation as outpatient.  We will also have her start on a baby aspirin daily for general purposes with suspected vascular disease.    Consults:     Consults     Date and Time Order Name Status Description    7/17/2019 1502 Inpatient Gastroenterology Consult Completed     7/16/2019 3287 [facility] (on-call MD unless specified) Details Completed         Results from last 7 days   Lab Units 07/19/19  0538   WBC 10*3/mm3 4.88   HEMOGLOBIN g/dL 11.5*   HEMATOCRIT % 35.1   PLATELETS 10*3/mm3 179     Results from last 7 days   Lab Units 07/19/19  0538   SODIUM  mmol/L 142   POTASSIUM mmol/L 3.6   CHLORIDE mmol/L 108*   CO2 mmol/L 25.4   BUN mg/dL 9   CREATININE mg/dL 0.66   GLUCOSE mg/dL 79   CALCIUM mg/dL 8.7     Significant Diagnostic Studies:   Lab Results   Component Value Date    WBC 4.88 07/19/2019    HGB 11.5 (L) 07/19/2019    HCT 35.1 07/19/2019     07/19/2019     Lab Results   Component Value Date     07/19/2019    K 3.6 07/19/2019     (H) 07/19/2019    CO2 25.4 07/19/2019    BUN 9 07/19/2019    CREATININE 0.66 07/19/2019    GLUCOSE 79 07/19/2019     Lab Results   Component Value Date    CALCIUM 8.7 07/19/2019     Lab Results   Component Value Date    AST 25 07/19/2019    ALT 31 07/19/2019    ALKPHOS 58 07/19/2019     No results found for: APTT, INR  Lab Results   Component Value Date    COLORU Yellow 07/16/2019    CLARITYU Clear 07/16/2019    PHUR 7.5 07/16/2019    GLUCOSEU Negative 07/16/2019    KETONESU Negative 07/16/2019    BLOODU Negative 07/16/2019    LEUKOCYTESUR Moderate (2+) (A) 07/16/2019    BILIRUBINUR Negative 07/16/2019    UROBILINOGEN 1.0 E.U./dL 07/16/2019    RBCUA 0-2 07/16/2019    WBCUA 3-5 (A) 07/16/2019    BACTERIA None Seen 07/16/2019     Lab Results   Component Value Date    TROPONINT <0.010 07/16/2019     No components found for: HGBA1C;2  No components found for: TSH;2  Imaging Results (all)     Procedure Component Value Units Date/Time    CT Abdomen Pelvis With Contrast [[id number]] Collected:  07/16/19 2248     Updated:  07/16/19 2258    Narrative:       CT OF THE ABDOMEN AND PELVIS WITH CONTRAST     HISTORY: Upper abdominal pain and nausea     COMPARISON: None available.     TECHNIQUE: Axial CT imaging was obtained from the dome the diaphragm to  the symphysis pubis. IV contrast was administered.     FINDINGS:  Images through the lung bases demonstrate some bibasilar atelectasis.  Atelectatic The gallbladder is surgically absent. No focal hepatic  lesions are seen. The spleen appears unremarkable, as are the  adrenal  glands. The pancreas is within normal limits. The antrum of the stomach  appears somewhat thick walled and distended. Correlation with any  evidence of gastritis is recommended. There is some mild soft tissue  stranding adjacent to the distal stomach and proximal duodenum. Again,  correlation with any evidence of gastritis/peptic ulcer disease is  recommended. Tiny cortical cyst is seen within the right kidney. Left  kidney appears unremarkable. No distal ureteral or bladder stones are  seen. Uterus is surgically absent. There is no evidence of mechanical  small bowel obstruction. There is atherosclerotic involvement of the  abdominal aorta. The appendix is not clearly identified, but I don't see  dilated tubular structure within the right lower quadrant suggest acute  appendicitis. No free fluid or adenopathy is seen within the pelvis. No  aggressive osseous abnormalities are seen.       Impression:       Somewhat distended and thick-walled appearance to the antrum of the  stomach. There may be some adjacent perigastric soft tissue stranding.  Correlation with any evidence of gastritis/peptic ulcer disease is  recommended. No pneumatosis, free air, or free fluid is seen.     Radiation dose reduction techniques were utilized, including automated  exposure control and exposure modulation based on body size.     This report was finalized on 7/16/2019 10:54 PM by Dr. Umu Ziegler M.D.           Lab Results (last 7 days)     Procedure Component Value Units Date/Time    Tissue Pathology Exam [[id number]] Collected:  07/18/19 1123    Specimen:  Tissue from Stomach Updated:  07/19/19 1221     Case Report --     Surgical Pathology Report                         Case: [id number]                                  Authorizing Provider:  Ricky Chew MD       Collected:           07/18/2019 11:23 AM          Ordering Location:     [facility]  Received:            07/18/2019 12:01 PM                                  ENDO SUITES                                                                  Pathologist:           David Sharpe MD                                                     Specimen:    Stomach, antrum                                                                             Final Diagnosis --     1. Gastric Antral Biopsy:   A. Benign gastric mucosa with no evidence of active gastritis nor neoplasm identified.   B. No metaplastic nor dysplastic change is identified.   C. Negative for Helicobacter.    silva/viral        Gross Description --     1.  The specimen is received in formalin labeled with the patient's name and further designated 'gastric antrum biopsy' are multiple small fragments of gray-tan tissue. The specimen is submitted for embedding as received.         Microscopic Description --     Performed, incorporated in diagnosis.       Comprehensive Metabolic Panel [[id number]]  (Abnormal) Collected:  07/19/19 0538    Specimen:  Blood Updated:  07/19/19 0648     Glucose 79 mg/dL      BUN 9 mg/dL      Creatinine 0.66 mg/dL      Sodium 142 mmol/L      Potassium 3.6 mmol/L      Chloride 108 mmol/L      CO2 25.4 mmol/L      Calcium 8.7 mg/dL      Total Protein 5.8 g/dL      Albumin 3.60 g/dL      ALT (SGPT) 31 U/L      AST (SGOT) 25 U/L      Alkaline Phosphatase 58 U/L      Total Bilirubin 0.5 mg/dL      eGFR Non African Amer 86 mL/min/1.73      Globulin 2.2 gm/dL      A/G Ratio 1.6 g/dL      BUN/Creatinine Ratio 13.6     Anion Gap 8.6 mmol/L     Narrative:       GFR Normal >60  Chronic Kidney Disease <60  Kidney Failure <15    Lipase [[id number]]  (Normal) Collected:  07/19/19 0538    Specimen:  Blood Updated:  07/19/19 0648     Lipase 45 U/L     CBC & Differential [[id number]] Collected:  07/19/19 0538    Specimen:  Blood Updated:  07/19/19 0629    Narrative:       The following orders were created for panel order CBC & Differential.  Procedure                               Abnormality          Status                     ---------                               -----------         ------                     CBC Auto Differential[[id number]]        Abnormal            Final result                 Please view results for these tests on the individual orders.    CBC Auto Differential [[id number]]  (Abnormal) Collected:  07/19/19 0538    Specimen:  Blood Updated:  07/19/19 0629     WBC 4.88 10*3/mm3      RBC 3.90 10*6/mm3      Hemoglobin 11.5 g/dL      Hematocrit 35.1 %      MCV 90.0 fL      MCH 29.5 pg      MCHC 32.8 g/dL      RDW 12.6 %      RDW-SD 41.7 fl      MPV 12.4 fL      Platelets 179 10*3/mm3      Neutrophil % 51.3 %      Lymphocyte % 38.5 %      Monocyte % 7.8 %      Eosinophil % 1.8 %      Basophil % 0.4 %      Immature Grans % 0.2 %      Neutrophils, Absolute 2.50 10*3/mm3      Lymphocytes, Absolute 1.88 10*3/mm3      Monocytes, Absolute 0.38 10*3/mm3      Eosinophils, Absolute 0.09 10*3/mm3      Basophils, Absolute 0.02 10*3/mm3      Immature Grans, Absolute 0.01 10*3/mm3      nRBC 0.0 /100 WBC     Comprehensive Metabolic Panel [[id number]] Collected:  07/18/19 0619    Specimen:  Blood from Arm, Right Updated:  07/18/19 0740     Glucose 69 mg/dL      BUN 11 mg/dL      Creatinine 0.68 mg/dL      Sodium 141 mmol/L      Potassium 3.9 mmol/L      Chloride 105 mmol/L      CO2 23.4 mmol/L      Calcium 9.0 mg/dL      Total Protein 6.4 g/dL      Albumin 3.70 g/dL      ALT (SGPT) 26 U/L      AST (SGOT) 28 U/L      Comment: Specimen hemolyzed.  Results may be affected.        Alkaline Phosphatase 64 U/L      Total Bilirubin 0.6 mg/dL      eGFR Non African Amer 83 mL/min/1.73      Globulin 2.7 gm/dL      A/G Ratio 1.4 g/dL      BUN/Creatinine Ratio 16.2     Anion Gap 12.6 mmol/L     Narrative:       GFR Normal >60  Chronic Kidney Disease <60  Kidney Failure <15    Lipase [[id number]]  (Abnormal) Collected:  07/18/19 0619    Specimen:  Blood from Arm, Right Updated:  07/18/19 0734     Lipase 77 U/L     CBC &  Differential [[id number]] Collected:  07/18/19 0619    Specimen:  Blood Updated:  07/18/19 0701    Narrative:       The following orders were created for panel order CBC & Differential.  Procedure                               Abnormality         Status                     ---------                               -----------         ------                     CBC Auto Differential[[id number]]        Abnormal            Final result                 Please view results for these tests on the individual orders.    CBC Auto Differential [[id number]]  (Abnormal) Collected:  07/18/19 0619    Specimen:  Blood from Arm, Right Updated:  07/18/19 0701     WBC 4.65 10*3/mm3      RBC 4.31 10*6/mm3      Hemoglobin 12.4 g/dL      Hematocrit 38.3 %      MCV 88.9 fL      MCH 28.8 pg      MCHC 32.4 g/dL      RDW 12.6 %      RDW-SD 41.2 fl      MPV 12.4 fL      Platelets 204 10*3/mm3      Neutrophil % 49.3 %      Lymphocyte % 41.1 %      Monocyte % 7.5 %      Eosinophil % 1.5 %      Basophil % 0.4 %      Immature Grans % 0.2 %      Neutrophils, Absolute 2.29 10*3/mm3      Lymphocytes, Absolute 1.91 10*3/mm3      Monocytes, Absolute 0.35 10*3/mm3      Eosinophils, Absolute 0.07 10*3/mm3      Basophils, Absolute 0.02 10*3/mm3      Immature Grans, Absolute 0.01 10*3/mm3      nRBC 0.0 /100 WBC     Triglycerides [[id number]]  (Normal) Collected:  07/17/19 0458    Specimen:  Blood Updated:  07/17/19 1526     Triglycerides 92 mg/dL     Lipase [[id number]]  (Abnormal) Collected:  07/17/19 0458    Specimen:  Blood Updated:  07/17/19 0608     Lipase 739 U/L     Comprehensive Metabolic Panel [[id number]]  (Abnormal) Collected:  07/17/19 0458    Specimen:  Blood Updated:  07/17/19 0603     Glucose 103 mg/dL      BUN 15 mg/dL      Creatinine 0.70 mg/dL      Sodium 139 mmol/L      Potassium 3.2 mmol/L      Chloride 102 mmol/L      CO2 27.8 mmol/L      Calcium 9.1 mg/dL      Total Protein 6.2 g/dL      Albumin 3.70 g/dL      ALT (SGPT) 32 U/L      AST  (SGOT) 36 U/L      Alkaline Phosphatase 64 U/L      Total Bilirubin 0.4 mg/dL      eGFR Non African Amer 80 mL/min/1.73      Globulin 2.5 gm/dL      A/G Ratio 1.5 g/dL      BUN/Creatinine Ratio 21.4     Anion Gap 9.2 mmol/L     Narrative:       GFR Normal >60  Chronic Kidney Disease <60  Kidney Failure <15    Lipid Panel [[id number]]  (Abnormal) Collected:  07/17/19 0458    Specimen:  Blood Updated:  07/17/19 0601     Total Cholesterol 136 mg/dL      Triglycerides 94 mg/dL      HDL Cholesterol 39 mg/dL      LDL Cholesterol  78 mg/dL      VLDL Cholesterol 18.8 mg/dL      LDL/HDL Ratio 2.01    Narrative:       Cholesterol Reference Ranges  (U.S. Department of Health and Human Services ATP III Classifications)    Desirable          <200 mg/dL  Borderline High    200-239 mg/dL  High Risk          >240 mg/dL      Triglyceride Reference Ranges  (U.S. Department of Health and Human Services ATP III Classifications)    Normal           <150 mg/dL  Borderline High  150-199 mg/dL  High             200-499 mg/dL  Very High        >500 mg/dL    HDL Reference Ranges  (U.S. Department of Health and Human Services ATP III Classifcations)    Low     <40 mg/dl (major risk factor for CHD)  High    >60 mg/dl ('negative' risk factor for CHD)        LDL Reference Ranges  (U.S. Department of Health and Human Services ATP III Classifcations)    Optimal          <100 mg/dL  Near Optimal     100-129 mg/dL  Borderline High  130-159 mg/dL  High             160-189 mg/dL  Very High        >189 mg/dL    CBC (No Diff) [[id number]]  (Normal) Collected:  07/17/19 0458    Specimen:  Blood Updated:  07/17/19 0539     WBC 4.18 10*3/mm3      RBC 4.10 10*6/mm3      Hemoglobin 12.0 g/dL      Hematocrit 36.8 %      MCV 89.8 fL      MCH 29.3 pg      MCHC 32.6 g/dL      RDW 12.5 %      RDW-SD 41.1 fl      MPV 11.5 fL      Platelets 187 10*3/mm3     Troponin [[id number]]  (Normal) Collected:  07/16/19 2006    Specimen:  Blood Updated:  07/16/19 2059      Troponin T <0.010 ng/mL     Narrative:       Troponin T Reference Range:  <= 0.03 ng/mL-   Negative for AMI  >0.03 ng/mL-     Abnormal for myocardial necrosis.  Clinicians would have to utilize clinical acumen, EKG, Troponin and serial changes to determine if it is an Acute Myocardial Infarction or myocardial injury due to an underlying chronic condition.     Lipase [[id number]]  (Abnormal) Collected:  07/16/19 2006    Specimen:  Blood Updated:  07/16/19 2055     Lipase 2,594 U/L     Comprehensive Metabolic Panel [[id number]]  (Abnormal) Collected:  07/16/19 2006    Specimen:  Blood Updated:  07/16/19 2042     Glucose 139 mg/dL      BUN 19 mg/dL      Creatinine 0.75 mg/dL      Sodium 139 mmol/L      Potassium 3.4 mmol/L      Chloride 98 mmol/L      CO2 28.3 mmol/L      Calcium 9.6 mg/dL      Total Protein 6.8 g/dL      Albumin 4.00 g/dL      ALT (SGPT) 45 U/L      AST (SGOT) 87 U/L      Alkaline Phosphatase 69 U/L      Total Bilirubin 0.2 mg/dL      eGFR Non African Amer 74 mL/min/1.73      Globulin 2.8 gm/dL      A/G Ratio 1.4 g/dL      BUN/Creatinine Ratio 25.3     Anion Gap 12.7 mmol/L     Narrative:       GFR Normal >60  Chronic Kidney Disease <60  Kidney Failure <15    Urinalysis With Microscopic If Indicated (No Culture) - Urine, Clean Catch [[id number]]  (Abnormal) Collected:  07/16/19 1959    Specimen:  Urine, Clean Catch Updated:  07/16/19 2028     Color, UA Yellow     Appearance, UA Clear     pH, UA 7.5     Specific Gravity, UA 1.016     Glucose, UA Negative     Ketones, UA Negative     Bilirubin, UA Negative     Blood, UA Negative     Protein, UA Negative     Leuk Esterase, UA Moderate (2+)     Nitrite, UA Negative     Urobilinogen, UA 1.0 E.U./dL    Urinalysis, Microscopic Only - Urine, Clean Catch [[id number]]  (Abnormal) Collected:  07/16/19 1959    Specimen:  Urine, Clean Catch Updated:  07/16/19 2028     RBC, UA 0-2 /HPF      WBC, UA 3-5 /HPF      Bacteria, UA None Seen /HPF      Squamous Epithelial  "Cells, UA 0-2 /HPF      Hyaline Casts, UA None Seen /LPF      Methodology Automated Microscopy    CBC & Differential [[id number]] Collected:  07/16/19 2006    Specimen:  Blood Updated:  07/16/19 2018    Narrative:       The following orders were created for panel order CBC & Differential.  Procedure                               Abnormality         Status                     ---------                               -----------         ------                     CBC Auto Differential[[id number]]        Abnormal            Final result                 Please view results for these tests on the individual orders.    CBC Auto Differential [[id number]]  (Abnormal) Collected:  07/16/19 2006    Specimen:  Blood Updated:  07/16/19 2018     WBC 4.93 10*3/mm3      RBC 4.40 10*6/mm3      Hemoglobin 12.8 g/dL      Hematocrit 39.0 %      MCV 88.6 fL      MCH 29.1 pg      MCHC 32.8 g/dL      RDW 12.4 %      RDW-SD 40.3 fl      MPV 11.5 fL      Platelets 198 10*3/mm3      Neutrophil % 66.4 %      Lymphocyte % 29.4 %      Monocyte % 3.0 %      Eosinophil % 1.0 %      Basophil % 0.2 %      Immature Grans % 0.0 %      Neutrophils, Absolute 3.27 10*3/mm3      Lymphocytes, Absolute 1.45 10*3/mm3      Monocytes, Absolute 0.15 10*3/mm3      Eosinophils, Absolute 0.05 10*3/mm3      Basophils, Absolute 0.01 10*3/mm3      Immature Grans, Absolute 0.00 10*3/mm3      nRBC 0.0 /100 WBC         /65 (BP Location: Left arm, Patient Position: Lying)   Pulse 60   Temp 98.2 °F (36.8 °C) (Oral)   Resp 18   Ht 170.2 cm (67\")   Wt 69.6 kg (153 lb 6.4 oz)   SpO2 95%   BMI 24.03 kg/m²     Discharge Exam:  General Appearance:    Alert, cooperative, no distress                          Head:    Normocephalic, without obvious abnormality, atraumatic                          Eyes:                            Throat:   Lips, tongue, gums normal                          Neck:   Supple, symmetrical, trachea midline, no JVD                        Lungs:  "    Clear to auscultation bilaterally, respirations unlabored                Chest Wall:    No tenderness or deformity                        Heart:    Regular rate and rhythm, S1 and S2 normal, no murmur,no  Rub                                       or gallop                  Abdomen:     Soft, non-tender, bowel sounds active, no masses, no                                                        organomegaly                  Extremities:   Extremities normal, atraumatic, no cyanosis or edema                             Skin:   Skin is warm and dry,  no rashes or palpable lesions                  Neurologic:   no focal deficits noted     Disposition:  Home    Patient Instructions:      Discharge Medications      New Medications      Instructions Start Date   aspirin 81 MG EC tablet   81 mg, Oral, Daily         Continue These Medications      Instructions Start Date   CENTRUM SILVER 50+WOMEN tablet   1 tablet, Oral, Daily      cholecalciferol 1000 units tablet  Commonly known as:  VITAMIN D3   1,000 Units, Oral, Daily      cycloSPORINE 0.05 % ophthalmic emulsion  Commonly known as:  RESTASIS   1 drop, Both Eyes, 2 Times Daily      gabapentin 100 MG capsule  Commonly known as:  NEURONTIN   100 mg, Oral, 3 Times Daily      gabapentin 100 MG capsule  Commonly known as:  NEURONTIN   200 mg, Oral, Nightly      levothyroxine 25 MCG tablet  Commonly known as:  SYNTHROID, LEVOTHROID   25 mcg, Oral, Daily      LORazepam 0.5 MG tablet  Commonly known as:  ATIVAN   One tablet 30 minutes before plane flight, may repeat times 1 in 4-6 hours.      metoprolol succinate XL 50 MG 24 hr tablet  Commonly known as:  TOPROL-XL   50 mg, Oral, 2 Times Daily      omeprazole 40 MG capsule  Commonly known as:  priLOSEC   40 mg, Oral, Daily      pravastatin 40 MG tablet  Commonly known as:  PRAVACHOL   40 mg, Oral, 4 Times Weekly, Mon, Wed, Fri, Sat      vitamin B-12 1000 MCG tablet  Commonly known as:  CYANOCOBALAMIN   1,000 mcg, Oral, Daily          Stop These Medications    glucosamine-chondroitin 500-400 MG capsule capsule     metFORMIN  MG 24 hr tablet  Commonly known as:  GLUCOPHAGE-XR     triamterene-hydrochlorothiazide 75-50 MG per tablet  Commonly known as:  MAXZIDE          Future Appointments   Date Time Provider Department Center   8/26/2019  8:30 AM [facility] None   9/3/2019  3:30 PM Ted Li MD [facility] None   9/23/2019 12:00 AM [facility] None   12/9/2019  9:00 AM Ted Li MD [facility] None   6/15/2020  9:00 AM Ted Li MD [facility] None     Follow-up Information     Ted Li MD Follow up in 1 week(s).    Specialty:  Internal Medicine  Contact information:  [address]  [facility] [address]  [place] [address]  [phone]             Ricky Chew MD Follow up.    Specialty:  Gastroenterology  Contact information:  [address]  [facility] [address]  [place] [address]  [phone]             Joaquin Velazco MD Follow up.    Specialty:  Vascular Surgery  Contact information:  [address]  [facility] [address]  [place] [address]  [phone]                 Discharge Order (From admission, onward)    Start     Ordered    07/19/19 1738  Discharge patient  Once     Expected Discharge Date:  07/19/19    Discharge Disposition:  Home or Self Care    Physician of Record for Attribution - Please select from Treatment Team:  NELSON PINEDA [[id number]]    Review needed by CMO to determine Physician of Record:  No       Question Answer Comment   Physician of Record for Attribution - Please select from Treatment Team NELSON PINEDA    Review needed by CMO to determine Physician of Record No        07/19/19 1739          Total time spent discharging patient including evaluation,post hospitalization follow up,  medication and post hospitalization instructions and education total time exceeds 30 minutes.    Signed:  Nelson Pineda MD  7/19/2019  5:42 PM

## 2019-07-20 ENCOUNTER — READMISSION MANAGEMENT (OUTPATIENT)
Dept: CALL CENTER | Facility: HOSPITAL | Age: 79
End: 2019-07-20

## 2019-07-20 NOTE — OUTREACH NOTE
Prep Survey      Responses   Facility patient discharged from?  [facility]   Is patient eligible?  Yes   Discharge diagnosis  Acute pancreatitis Mesenteric ischemia    Does the patient have one of the following disease processes/diagnoses(primary or secondary)?  Other   Does the patient have Home health ordered?  No   Is there a DME ordered?  No   Prep survey completed?  Yes          Yessi Randall RN

## 2019-07-22 ENCOUNTER — TELEPHONE (OUTPATIENT)
Dept: CARDIOLOGY | Facility: CLINIC | Age: 79
End: 2019-07-22

## 2019-07-22 ENCOUNTER — TRANSCRIBE ORDERS (OUTPATIENT)
Dept: ADMINISTRATIVE | Facility: HOSPITAL | Age: 79
End: 2019-07-22

## 2019-07-22 ENCOUNTER — READMISSION MANAGEMENT (OUTPATIENT)
Dept: CALL CENTER | Facility: HOSPITAL | Age: 79
End: 2019-07-22

## 2019-07-22 ENCOUNTER — HOSPITAL ENCOUNTER (OUTPATIENT)
Dept: CT IMAGING | Facility: HOSPITAL | Age: 79
Discharge: HOME | End: 2019-07-22
Admitting: SURGERY

## 2019-07-22 DIAGNOSIS — R10.13 EPIGASTRIC PAIN: Primary | ICD-10-CM

## 2019-07-22 DIAGNOSIS — R10.13 EPIGASTRIC PAIN: ICD-10-CM

## 2019-07-22 DIAGNOSIS — K55.1 MESENTERIC ARTERY STENOSIS (HCC): ICD-10-CM

## 2019-07-22 PROCEDURE — 82565 ASSAY OF CREATININE: CPT

## 2019-07-22 PROCEDURE — 74174 CTA ABD&PLVS W/CONTRAST: CPT

## 2019-07-22 PROCEDURE — 25010000002 IOPAMIDOL 61 % SOLUTION: Performed by: SURGERY

## 2019-07-22 RX ADMIN — IOPAMIDOL 95 ML: 612 INJECTION, SOLUTION INTRAVENOUS at 12:50

## 2019-07-22 NOTE — OUTREACH NOTE
Medical Week 1 Survey      Responses   Facility patient discharged from?  [facility]   Does the patient have one of the following disease processes/diagnoses(primary or secondary)?  Other   Is there a successful TCM telephone encounter documented?  No   Week 1 attempt successful?  Yes   Call start time  0919   Call end time  0926   Discharge diagnosis  Acute pancreatitis Mesenteric ischemia    Meds reviewed with patient/caregiver?  Yes   Is the patient having any side effects they believe may be caused by any medication additions or changes?  No   Does the patient have all medications ordered at discharge?  Yes   Is the patient taking all medications as directed (includes completed medication regime)?  Yes   Does the patient have a primary care provider?   Yes   Does the patient have an appointment with their PCP within 7 days of discharge?  Greater than 7 days   Comments regarding PCP  Tello Borja - has an appt in Sept with PCP- encouraged her to see PCP sooner.    Nursing Interventions  Verified appointment date/time/provider, Educated patient on importance of making appointment, Advised patient to make appointment   Has the patient kept scheduled appointments due by today?  N/A   Comments  Has CT of abdomen scheduled this am at [facility]. (07/22/2019)   Has home health visited the patient within 72 hours of discharge?  N/A   Has all DME been delivered?  No   Psychosocial issues?  No   Comments  Patient report sshe is feeling better. Going for CT scan today 07/22/2019. She denies chest pain or SOB.     Did the patient receive a copy of their discharge instructions?  Yes   Nursing interventions  Reviewed instructions with patient   What is the patient's perception of their health status since discharge?  Improving   Is the patient/caregiver able to teach back signs and symptoms related to disease process for when to call PCP?  Yes   Is the patient/caregiver able to teach back signs and symptoms related  to disease process for when to call 911?  Yes   Is the patient/caregiver able to teach back the hierarchy of who to call/visit for symptoms/problems? PCP, Specialist, Home health nurse, Urgent Care, ED, 911  Yes   Week 1 call completed?  Yes          Justin Mccoy RN

## 2019-07-22 NOTE — TELEPHONE ENCOUNTER
I spoke with the patient and she states that she was in the ED last week and they did an ECG. She stated that Dr Velazco stated that he has some concerns about her ECG and that she needed to follow up with you and discuss the ECG with you.

## 2019-07-23 LAB — CREAT BLDA-MCNC: 0.7 MG/DL (ref 0.6–1.3)

## 2019-07-26 ENCOUNTER — OFFICE VISIT (OUTPATIENT)
Dept: INTERNAL MEDICINE | Age: 79
End: 2019-07-26

## 2019-07-26 ENCOUNTER — TELEPHONE (OUTPATIENT)
Dept: INTERNAL MEDICINE | Age: 79
End: 2019-07-26

## 2019-07-26 VITALS
WEIGHT: 155 LBS | DIASTOLIC BLOOD PRESSURE: 70 MMHG | OXYGEN SATURATION: 98 % | HEART RATE: 64 BPM | TEMPERATURE: 96.6 F | SYSTOLIC BLOOD PRESSURE: 130 MMHG | BODY MASS INDEX: 24.33 KG/M2 | HEIGHT: 67 IN

## 2019-07-26 DIAGNOSIS — K55.1 MESENTERIC ISCHEMIA DUE TO ARTERIAL INSUFFICIENCY (HCC): Primary | ICD-10-CM

## 2019-07-26 DIAGNOSIS — E78.00 HYPERCHOLESTEROLEMIA: Chronic | ICD-10-CM

## 2019-07-26 DIAGNOSIS — I10 ESSENTIAL HYPERTENSION: Chronic | ICD-10-CM

## 2019-07-26 PROCEDURE — 99214 OFFICE O/P EST MOD 30 MIN: CPT | Performed by: INTERNAL MEDICINE

## 2019-07-26 NOTE — PROGRESS NOTES
"[facility] [place] INTERNAL MEDICINE  FRANK LI M.D.    Reanna Higgins / 79 y.o. / female  07/26/2019      CC:     Chief Complaint   Patient presents with   • Hospital follow up     Mesenteric ischemia due to arterial insufficiency         VITALS    Visit Vitals  /70   Pulse 64   Temp 96.6 °F (35.9 °C) (Temporal)   Ht 170.1 cm (66.98\")   Wt 70.3 kg (155 lb)   SpO2 98%   BMI 24.29 kg/m²       BP Readings from Last 3 Encounters:   07/26/19 130/70   07/19/19 132/65   06/19/19 160/80     Wt Readings from Last 3 Encounters:   07/26/19 70.3 kg (155 lb)   07/17/19 69.6 kg (153 lb 6.4 oz)   06/19/19 71.8 kg (158 lb 6.4 oz)      Body mass index is 24.29 kg/m².    HPI:     Date of admission/discharge: 7/16-7/19  Hospital: [facility]  Principle Dx: Mesenteric ischemia  Secondary Dx: Acute pancreatitis, dilated CBC  History prior to hospitalization: GI problems leading 1 month leading up; thought was related to start of metformin. No vomiting, diarrhea, blood in stool. Had nausea with upper abdominal discomfort.   Evaluation/Treatment: EGD: gastritis. Mesenteric duplex showed >70% stenosis of celiac/SMA. CT abdomen did not show inflammation of pancreas but noted dilation of CBT and arranged for outpatient evaluation by GI. Discharged on ASA 81 mg qd.   Course: denies abdominal pain or vomiting. BM's are normal. Saw vascular but no further intervention recommended. She is taking ASA 81 mg qd and takes pravastatin ? 20 or 40 mg four days of week.     Patient Care Team:  Ted Li MD as PCP - General (Internal Medicine)  To Rivera MD as Consulting Physician (General Surgery)  Torsten Benson Jr., MD as Consulting Physician (Cardiology)  Arsenio Witt MD as Consulting Physician (Ophthalmology)  Brown Beckman MD as Consulting Physician (Orthopedic Surgery)  ____________________________________________________________________    ASSESSMENT & PLAN:    Problem List Items Addressed This Visit        " High    Mesenteric ischemia due to arterial insufficiency (CMS/HCC) - Primary (Chronic)    Current Assessment & Plan     This is a new problem to this examiner.   Continue ASA 81 mg qd. Continue pravastatin for goal LDL of < 70.             Medium    Essential hypertension (Chronic)    Current Assessment & Plan     Triamterene/hctz was discontinued in the hospital. On metoprolol XL 50 mg BID and blood pressure remains stable. Monitor home blood pressure readings.           Relevant Medications    metoprolol succinate XL (TOPROL-XL) 50 MG 24 hr tablet    Hypercholesterolemia (Chronic)    Current Assessment & Plan     Verify current dose of pravastatin (20 or 40 mg). If taking 40 mg, take it every day (has been taking medication 4 days of week). If taking 20 mg, increase to 40 mg 4 days a week. Recheck labs on 2 months follow-up.          Relevant Medications    pravastatin (PRAVACHOL) 40 MG tablet         No orders of the defined types were placed in this encounter.      Summary/Discussion:      Return in about 2 months (around 9/26/2019) for Reassess today's problem(s).    ____________________________________________________________________    REVIEW OF SYSTEMS    Review of Systems  No fever  Had isolated episode of anterior chest discomfort few days ago (she was instructed to call Dr. Mccullough's office about this today)  Denies shortness of breath  Denies melena or blood in stool. No active nausea/vomiting/diarrhea. Mild left epigastric discomfort.       PHYSICAL EXAMINATION    Physical Exam   Constitutional: No distress.   Cardiovascular: Normal rate and regular rhythm.   Pulmonary/Chest: Effort normal.   Abdominal: Soft. She exhibits no pulsatile midline mass.         REVIEWED DATA:    Labs:   Hospital Outpatient Visit on 07/22/2019   Component Date Value Ref Range Status   • Creatinine 07/22/2019 0.70  0.60 - 1.30 mg/dL Final    Serial Number: [id number]:  [id number]   Admission on 07/16/2019, Discharged on  07/19/2019   No results displayed because visit has over 200 results.          Imaging:   Ct Angiogram Abdomen Pelvis With & Without Contrast    Result Date: 7/24/2019  Narrative: CT ANGIOGRAM OF THE ABDOMEN AND PELVIS WITH AND WITHOUT CONTRAST  HISTORY: To evaluate mesenteric arteries. Abdominal pain.  TECHNIQUE: Axial CT images of the abdomen and pelvis were obtained in the angiographic phase following administration of intravenous contrast. Coronal, sagittal and 3D volume rendered images were then obtained.  COMPARISON: CT abdomen and pelvis from 07/16/2019.  FINDINGS: The abdominal aorta is well opacified with intravenous contrast. Moderate calcified atherosclerotic plaque is seen throughout the abdominal aorta and its branches. There is severe narrowing of the celiac axis approximately 1.1 cm following its origin after it dips down and the findings are likely related to compression by the median arcuate ligament. There is a large ostial calcification at the origin of the superior mesenteric artery causing mild narrowing. Eccentric focal noncalcified plaque seen essentially along the superior margin of the superior mesenteric artery just following its origin causing mild luminal narrowing. Following this, the superior mesenteric artery demonstrates normal caliber. The inferior mesenteric artery is patent. Ostial calcification at the origin of bilateral renal arteries with no significant narrowing on the right and mild narrowing on the left. Bilateral common iliac arteries show normal origin without significant stenosis. Bilateral external iliac artery, common femoral and visualized superficial femoral artery do not demonstrate any significant luminal narrowing.  Angiographic phase evaluation of the liver is unremarkable. The gallbladder is surgically absent. There is dilatation of the main pancreatic duct within the head of the pancreas, which measures up to 6 mm.  Bilateral adrenal gland and kidneys are normal.  The urinary bladder is partially distended and normal. The uterus is not identified and is likely surgically absent. No evidence of bowel obstruction. Moderate amount of formed stool is seen within the colon. No ascites is seen. There is no pathological retroperitoneal lymphadenopathy.      Impression: 1. Severe luminal narrowing of the celiac axis about 1 cm following its origin, likely secondary to compression by median arcuate ligament. Following this, it returns to normal caliber. Focal eccentric noncalcified plaque causing mild luminal narrowing of the superior mesenteric artery at its origin following which it regains normal caliber. 2. Dilatation of the pancreatic duct within the head of the pancreas. This should be further evaluated by MRCP.  Radiation dose reduction techniques were utilized, including automated exposure control and exposure modulation based on body size.  This report was finalized on 7/24/2019 9:18 AM by Dr. Fatuma Grayson M.D.      Ct Abdomen Pelvis With Contrast    Result Date: 7/16/2019  Narrative: CT OF THE ABDOMEN AND PELVIS WITH CONTRAST  HISTORY: Upper abdominal pain and nausea  COMPARISON: None available.  TECHNIQUE: Axial CT imaging was obtained from the dome the diaphragm to the symphysis pubis. IV contrast was administered.  FINDINGS: Images through the lung bases demonstrate some bibasilar atelectasis. Atelectatic The gallbladder is surgically absent. No focal hepatic lesions are seen. The spleen appears unremarkable, as are the adrenal glands. The pancreas is within normal limits. The antrum of the stomach appears somewhat thick walled and distended. Correlation with any evidence of gastritis is recommended. There is some mild soft tissue stranding adjacent to the distal stomach and proximal duodenum. Again, correlation with any evidence of gastritis/peptic ulcer disease is recommended. Tiny cortical cyst is seen within the right kidney. Left kidney appears unremarkable. No  distal ureteral or bladder stones are seen. Uterus is surgically absent. There is no evidence of mechanical small bowel obstruction. There is atherosclerotic involvement of the abdominal aorta. The appendix is not clearly identified, but I don't see dilated tubular structure within the right lower quadrant suggest acute appendicitis. No free fluid or adenopathy is seen within the pelvis. No aggressive osseous abnormalities are seen.      Impression: Somewhat distended and thick-walled appearance to the antrum of the stomach. There may be some adjacent perigastric soft tissue stranding. Correlation with any evidence of gastritis/peptic ulcer disease is recommended. No pneumatosis, free air, or free fluid is seen.  Radiation dose reduction techniques were utilized, including automated exposure control and exposure modulation based on body size.  This report was finalized on 7/16/2019 10:54 PM by Dr. Umu Ziegler M.D.         Medical Tests:        Summary of old records / correspondence / consultant report:   DC summary re: issues addressed on HPI    Request outside records:       ALLERGIES  Allergies   Allergen Reactions   • Ancef [Cefazolin] Other (See Comments)     Hypotension/bradycardia   • Codeine    • Penicillins Other (See Comments)     Hypotension (Ancef allergy)    • Sulfa Antibiotics         MEDICATIONS   Current Outpatient Medications   Medication Sig Dispense Refill   • aspirin 81 MG EC tablet Take 1 tablet by mouth Daily.     • cholecalciferol (VITAMIN D3) 1000 UNITS tablet Take 1 tablet by mouth Daily.     • cycloSPORINE (RESTASIS) 0.05 % ophthalmic emulsion Administer 1 drop to both eyes 2 (Two) Times a Day.     • gabapentin (NEURONTIN) 100 MG capsule Take 100 mg by mouth 3 (Three) Times a Day.     • gabapentin (NEURONTIN) 100 MG capsule Take 200 mg by mouth Every Night.     • levothyroxine (SYNTHROID, LEVOTHROID) 25 MCG tablet Take 1 tablet by mouth Daily. 30 tablet 3   • LORazepam (ATIVAN) 0.5 MG  tablet One tablet 30 minutes before plane flight, may repeat times 1 in 4-6 hours. 8 tablet 0   • metoprolol succinate XL (TOPROL-XL) 50 MG 24 hr tablet Take 50 mg by mouth 2 (Two) Times a Day.     • Multiple Vitamins-Minerals (CENTRUM SILVER 50+WOMEN) tablet Take 1 tablet by mouth Daily.     • omeprazole (priLOSEC) 40 MG capsule Take 1 capsule by mouth Daily. 90 capsule 1   • pravastatin (PRAVACHOL) 40 MG tablet Take 40 mg by mouth 4 (Four) Times a Week. Mon, Wed, Fri, Sat     • vitamin B-12 (CYANOCOBALAMIN) 1000 MCG tablet Take 1,000 mcg by mouth Daily.       No current facility-administered medications for this visit.        Current outpatient and discharge medications have been reconciled for the patient.  Reviewed by: Ted Li MD       [facility]:     The following portions of the patient's history were reviewed and updated as appropriate: Allergies / Current Medications / Past Medical History / Surgical History / Social History / Family History    PROBLEM LIST   Patient Active Problem List   Diagnosis   • Colon polyp   • Gastroesophageal reflux disease   • Essential hypertension   • Hypercholesterolemia   • Mitral and aortic valve disease   • Heart valve disease   • Anxiety   • Peripheral neuropathy   • Malignant neoplasm of cheek (CMS/HCC)   • Non-rheumatic mitral regurgitation   • Osteopenia   • SVT (supraventricular tachycardia) (CMS/HCC)   • AV block, Mobitz 1   • Cardiac pacemaker in situ   • RLS (restless legs syndrome)   • Phobia, flying   • Elevated hemoglobin A1c   • Acute pancreatitis without infection or necrosis   • Mesenteric ischemia due to arterial insufficiency (CMS/HCC)       PAST MEDICAL HISTORY  Past Medical History:   Diagnosis Date   • Cancer (CMS/HCC)     skin   • Carpal tunnel syndrome    • Cataract    • Cystic fibroadenosis of breast    • GERD (gastroesophageal reflux disease)    • Hypercholesterolemia    • Hypertension    • Osteoarthritis of both hands    • Peripheral neuropathy    •  Second degree AV block, Mobitz type I        SURGICAL HISTORY  Past Surgical History:   Procedure Laterality Date   • BILATERAL OOPHORECTOMY Bilateral    • BREAST CYST EXCISION Bilateral     4 BREAST SURGERIES   • CARDIAC ELECTROPHYSIOLOGY PROCEDURE N/A 10/10/2016    Procedure: Pacemaker DC new  [place];  Surgeon: Wilfrido Hameed MD;  Location: [facility] CATH INVASIVE LOCATION;  Service:    • CATARACT EXTRACTION      NOVEMBER AND 2014   • CHOLECYSTECTOMY     • ENDOSCOPY N/A 2019    Procedure: ESOPHAGOGASTRODUODENOSCOPY with biopsy;  Surgeon: Ricky Chew MD;  Location: [facility] ENDOSCOPY;  Service: Gastroenterology   • HYSTERECTOMY     • KNEE ARTHROSCOPY Bilateral 2014    MENISCAL TEAR   • PACEMAKER IMPLANTATION  10/10/2016   • SHOULDER SURGERY Right     ROTATOR CUFF SURGERY 2015   • TONSILLECTOMY     • TRIGGER FINGER RELEASE      both hands       SOCIAL HISTORY  Social History     Socioeconomic History   • Marital status:      Spouse name: Rishabh*   • Number of children: 2   • Years of education: Not on file   • Highest education level: Not on file   Occupational History   • 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)   Tobacco Use   • Smoking status: Former Smoker     Packs/day: 0.50     Years: 15.00     Pack years: 7.50     Last attempt to quit: 1970     Years since quittin.5   • Smokeless tobacco: Never Used   • Tobacco comment: quit age 30   Substance and Sexual Activity   • Alcohol use: Yes     Alcohol/week: 1.2 oz     Types: 1 Glasses of wine, 1 Cans of beer per week     Frequency: Monthly or less     Drinks per session: 1 or 2     Comment: rare glass of wine or beer   • Drug use: No   • Sexual activity: No   Lifestyle   • Physical activity:     Days per week: 5 days     Minutes per session: 30 min   • Stress: Not at all       FAMILY HISTORY  Family History   Problem Relation Age of Onset   • Thyroid disease Daughter    • Lung cancer Brother    • Hypertension  Mother    • Aortic stenosis Mother    • Coronary artery disease Mother          78 (smoker)   • Hypertension Father    • Depression Father    • Anxiety disorder Father    • Diabetes Father    • Heart failure Father    • Cirrhosis Father         alcohol   • Alcohol abuse Sister    • Lupus Sister    • Heart disease Sister    • Diabetes type II Sister    • Alcohol abuse Brother    • Drug abuse Brother    • Heart disease Brother    • Cancer Brother         bladder (smoker)   • Heart disease Brother    • Breast cancer Paternal Aunt    • Neuropathy Neg Hx    • Colon cancer Neg Hx    • Dementia Neg Hx          **Dragon Disclaimer:   Much of this encounter note is an electronic transcription/translation of spoken language to printed text. The electronic translation of spoken language may permit erroneous, or at times, nonsensical words or phrases to be inadvertently transcribed. Although I have reviewed the note for such errors, some may still exist.       Template created by Arabella Li MD

## 2019-07-26 NOTE — ASSESSMENT & PLAN NOTE
This is a new problem to this examiner.   Continue ASA 81 mg qd. Continue pravastatin for goal LDL of < 70.

## 2019-07-26 NOTE — ASSESSMENT & PLAN NOTE
Triamterene/hctz was discontinued in the hospital. On metoprolol XL 50 mg BID and blood pressure remains stable. Monitor home blood pressure readings.

## 2019-07-26 NOTE — TELEPHONE ENCOUNTER
Pt is demanding an early morning appt due to she has to fast. I offered her an 11:30AM but she stated it was too late and she could not fast that long. Does Dr. Li want to work her in the schedule for an early morning?

## 2019-07-26 NOTE — ASSESSMENT & PLAN NOTE
Verify current dose of pravastatin (20 or 40 mg). If taking 40 mg, take it every day (has been taking medication 4 days of week). If taking 20 mg, increase to 40 mg 4 days a week. Recheck labs on 2 months follow-up.

## 2019-07-29 ENCOUNTER — TELEPHONE (OUTPATIENT)
Dept: CARDIOLOGY | Facility: CLINIC | Age: 79
End: 2019-07-29

## 2019-07-29 NOTE — TELEPHONE ENCOUNTER
----- Message from Sveta Allison sent at 7/29/2019  8:30 AM EDT -----  Pt called in stating she went to see her PCP for on and off chest pain and weakness and was told to see her cardiologist. Pt refuse the nurse practitioner. Can you help me with this please. Thanks

## 2019-07-31 ENCOUNTER — READMISSION MANAGEMENT (OUTPATIENT)
Dept: CALL CENTER | Facility: HOSPITAL | Age: 79
End: 2019-07-31

## 2019-07-31 NOTE — OUTREACH NOTE
Medical Week 2 Survey      Responses   Facility patient discharged from?  [facility]   Does the patient have one of the following disease processes/diagnoses(primary or secondary)?  Other   Week 2 attempt successful?  No   Unsuccessful attempts  Attempt 1          George Alfaro RN

## 2019-08-01 ENCOUNTER — READMISSION MANAGEMENT (OUTPATIENT)
Dept: CALL CENTER | Facility: HOSPITAL | Age: 79
End: 2019-08-01

## 2019-08-01 ENCOUNTER — OFFICE VISIT (OUTPATIENT)
Dept: CARDIOLOGY | Facility: CLINIC | Age: 79
End: 2019-08-01

## 2019-08-01 VITALS
HEIGHT: 67 IN | BODY MASS INDEX: 24.58 KG/M2 | HEART RATE: 67 BPM | SYSTOLIC BLOOD PRESSURE: 130 MMHG | WEIGHT: 156.6 LBS | DIASTOLIC BLOOD PRESSURE: 54 MMHG

## 2019-08-01 DIAGNOSIS — R07.9 CHEST PAIN, UNSPECIFIED TYPE: ICD-10-CM

## 2019-08-01 DIAGNOSIS — I47.1 SVT (SUPRAVENTRICULAR TACHYCARDIA) (HCC): ICD-10-CM

## 2019-08-01 DIAGNOSIS — I44.1 AV BLOCK, MOBITZ 1: Primary | ICD-10-CM

## 2019-08-01 DIAGNOSIS — K85.80 OTHER ACUTE PANCREATITIS WITHOUT INFECTION OR NECROSIS: ICD-10-CM

## 2019-08-01 DIAGNOSIS — R73.09 ELEVATED HEMOGLOBIN A1C: ICD-10-CM

## 2019-08-01 DIAGNOSIS — K55.1 MESENTERIC ISCHEMIA DUE TO ARTERIAL INSUFFICIENCY (HCC): Chronic | ICD-10-CM

## 2019-08-01 DIAGNOSIS — Z95.0 CARDIAC PACEMAKER IN SITU: ICD-10-CM

## 2019-08-01 DIAGNOSIS — E78.00 HYPERCHOLESTEROLEMIA: Chronic | ICD-10-CM

## 2019-08-01 PROCEDURE — 93000 ELECTROCARDIOGRAM COMPLETE: CPT | Performed by: NURSE PRACTITIONER

## 2019-08-01 PROCEDURE — 99214 OFFICE O/P EST MOD 30 MIN: CPT | Performed by: NURSE PRACTITIONER

## 2019-08-01 NOTE — PROGRESS NOTES
Date of Office Visit: 2019  Encounter Provider: LUIS A William  Place of Service: [facility] CARDIOLOGY  Patient Name: Reanna Higgins  :1940      Subjective:     Chief Complaint:  Abnormal EKG and intermittent chest pain    History of Present Illness:  Reanna Higgins is a pleasant 79 y.o. female who is new to me.  Outside records have been obtained and reviewed by me.     This is a patient with history of block status post pacemaker, skin cancer, GERD, hypertension, hyperlipidemia, osteoarthritis.    She has a past history significant for palpitations, paroxysmal SVT, hypertension, syncopal episode with intermittent 2-1 AV block with 2-second pause for which permanent pacemaker was implanted in .    In  following syncopal episode and permanent pacemaker implantation she also had a nuclear stress at that time which revealed normal LV systolic function with a EF of 69% and no evidence of ischemia.  She had an echo 2016 which revealed mild LVH, EF of 61% and no significant valvular abnormalities.    2017 on device check she was noted to have 3 episodes of what was labeled as ventricular tachycardia so diltiazem was switched to metoprolol.  Holter was repeated and showed sinus rhythm with some rare PACs and PVCs.  She was evaluated by electrophysiology team who felt that the rhythm was SVT and not ventricular tachycardia.  The change of metoprolol improves her symptoms and palpitations significantly.    2018 she was doing well no changes were made pacemaker was functioning normally with no episodes.    2019 she was last in the office the LUIS A Parikh for yearly follow-up.  Is doing well without chest pain edema dizziness or syncope.  She was having some shortness of breath with heavy exertion but was very active and exercising almost every day.  She was mindful of her diet and was upset about new diagnosis of prediabetes and  hypothyroidism for which she was started on metformin and levothyroxine.  Her blood pressure was elevated in the office but reported it was controlled at home and she had not taken any of her medications at day so no changes were made.  She was advised to follow-up in 1 year.    7/16/2019- 7/19/2019 patient was admitted to [facility] with upper abdominal pain and some nausea without vomiting.  She had been recently started on metformin for prediabetes and was losing weight.  Is reported at that time she had not had any chest pain or shortness of breath.  The patient was evaluated in the ER and felt to have pancreatitis as she had previous cholecystectomy for stones in 2012.  She was given some pain medicine nausea medicine and IV fluids.  GI did an EGD which suggested some gastritis but biopsy was negative.  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  Was felt some of her pain was related to GI ischemia due to vascular narrowing.  She was advised to follow-up with Dr. Velazco for further evaluation as outpatient and was started on baby aspirin daily for vascular disease.    7/22/2019 patient reported Dr. Velazco was concerned about her ECG in the ED.  Dr. Mccullough reviewed it and did not feel there was a significant change.    7/29/2019 the patient called wanting an appointment to be seen for intermittent chest pain and weakness.     At today's visit she is reporting intermittent exertional midsternal nonradiating chest pain.  The pain has mainly started in the last several weeks since her last appointment.  It is not associated with any food.  It resolves quickly after she has ceased activity.  She does not have significant shortness of breath with it.  It is not associated with palpitations, dizziness, diaphoresis or nausea.  The abdominal pain that she was experiencing when she went to the ED recently  has resolved and not recurred.  She does have an upcoming appointment with GI for follow-up.  She will see Dr. Velazco with vascular  again for repeat testing in a couple months.  She also reported that she had been having weakness and shakiness that would start in the morning time and could occur for a few hours during the day.  Her PCP had her stop her triamterene-HCTZ and she had resolution of the weakness and shakiness.  She does have a baseline abnormal EKG that is not changed.  She does have occasional intermittent palpitations that are not new or worse in duration, intensity or frequency.  She denies any PND, orthopnea, syncope, near syncope or dizziness.  Her blood pressure at home when she has checked it periodically at home has been under good control.    Past Medical History:   Diagnosis Date   • Cancer (CMS/HCC)     skin   • Cardiac pacemaker in situ    • Carpal tunnel syndrome    • Cataract    • Cystic fibroadenosis of breast    • GERD (gastroesophageal reflux disease)    • Hypercholesterolemia    • Hypertension    • Osteoarthritis of both hands    • Peripheral neuropathy    • Second degree AV block, Mobitz type I    • SVT (supraventricular tachycardia) (CMS/HCC)      Past Surgical History:   Procedure Laterality Date   • BILATERAL OOPHORECTOMY Bilateral 1988   • BREAST CYST EXCISION Bilateral     4 BREAST SURGERIES   • CARDIAC ELECTROPHYSIOLOGY PROCEDURE N/A 10/10/2016    Procedure: Pacemaker DC new  [place];  Surgeon: Wilfrido Hameed MD;  Location: [facility] CATH INVASIVE LOCATION;  Service:    • CATARACT EXTRACTION      NOVEMBER AND DECEMBER 2014   • CHOLECYSTECTOMY     • ENDOSCOPY N/A 7/18/2019    Procedure: ESOPHAGOGASTRODUODENOSCOPY with biopsy;  Surgeon: Ricky Chew MD;  Location: [facility] ENDOSCOPY;  Service: Gastroenterology   • HYSTERECTOMY     • KNEE ARTHROSCOPY Bilateral 04/2014    MENISCAL TEAR   • PACEMAKER IMPLANTATION  10/10/2016   • SHOULDER SURGERY Right     ROTATOR CUFF  SURGERY JUNE 18, 2015   • TONSILLECTOMY     • TRIGGER FINGER RELEASE      both hands     Outpatient Medications Prior to Visit   Medication Sig Dispense Refill   • aspirin 81 MG EC tablet Take 1 tablet by mouth Daily.     • cholecalciferol (VITAMIN D3) 1000 UNITS tablet Take 1 tablet by mouth Daily.     • cycloSPORINE (RESTASIS) 0.05 % ophthalmic emulsion Administer 1 drop to both eyes 2 (Two) Times a Day.     • gabapentin (NEURONTIN) 100 MG capsule Take 100 mg by mouth 3 (Three) Times a Day.     • gabapentin (NEURONTIN) 100 MG capsule Take 200 mg by mouth Every Night.     • levothyroxine (SYNTHROID, LEVOTHROID) 25 MCG tablet Take 1 tablet by mouth Daily. 30 tablet 3   • LORazepam (ATIVAN) 0.5 MG tablet One tablet 30 minutes before plane flight, may repeat times 1 in 4-6 hours. 8 tablet 0   • metoprolol succinate XL (TOPROL-XL) 50 MG 24 hr tablet Take 50 mg by mouth 2 (Two) Times a Day.     • Multiple Vitamins-Minerals (CENTRUM SILVER 50+WOMEN) tablet Take 1 tablet by mouth Daily.     • omeprazole (priLOSEC) 40 MG capsule Take 1 capsule by mouth Daily. 90 capsule 1   • pravastatin (PRAVACHOL) 40 MG tablet Take 40 mg by mouth 4 (Four) Times a Week. Mon, Wed, Fri, Sat     • vitamin B-12 (CYANOCOBALAMIN) 1000 MCG tablet Take 1,000 mcg by mouth Daily.       No facility-administered medications prior to visit.        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)   Tobacco Use   • Smoking status: Former Smoker     Packs/day: 0.50     Years: 15.00     Pack years: 7.50      Last attempt to quit: 1970     Years since quittin.6   • Smokeless tobacco: Never Used   • Tobacco comment: quit age 30   Substance and Sexual Activity   • Alcohol use: Yes     Alcohol/week: 1.2 oz     Types: 1 Glasses of wine, 1 Cans of beer per week     Frequency: Monthly or less     Drinks per session: 1 or 2     Comment: rare glass of wine or beer   • Drug use: No   • Sexual activity: No   Lifestyle   • Physical activity:     Days per week: 5 days     Minutes per session: 30 min   • Stress: Not at all     Family History   Problem Relation Age of Onset   • Thyroid disease Daughter    • Lung cancer Brother    • Hypertension Mother    • Aortic stenosis Mother    • Coronary artery disease Mother          78 (smoker)   • Hypertension Father    • Depression Father    • Anxiety disorder Father    • Diabetes Father    • Heart failure Father    • Cirrhosis Father         alcohol   • Alcohol abuse Sister    • Lupus Sister    • Heart disease Sister    • Diabetes type II Sister    • Alcohol abuse Brother    • Drug abuse Brother    • Heart disease Brother    • Cancer Brother         bladder (smoker)   • Heart disease Brother    • Breast cancer Paternal Aunt    • Neuropathy Neg Hx    • Colon cancer Neg Hx    • Dementia Neg Hx      Review of Systems   Constitution: Positive for weakness (shaky and weak in AM). Negative for chills, fever and malaise/fatigue.   HENT: Negative for ear pain, hearing loss, nosebleeds and sore throat.    Eyes: Negative for double vision, pain, vision loss in left eye and vision loss in right eye.   Cardiovascular: Positive for chest pain (see HPI) and dyspnea on exertion. Negative for claudication, irregular heartbeat, leg swelling, near-syncope, orthopnea, palpitations, paroxysmal nocturnal dyspnea and syncope.   Respiratory: Negative for cough, shortness of breath, snoring and wheezing.    Endocrine: Negative for cold intolerance and heat intolerance.   Hematologic/Lymphatic: Negative for  "bleeding problem.   Skin: Negative for color change, itching, rash and unusual hair distribution.   Musculoskeletal: Negative for joint pain and joint swelling.   Gastrointestinal: Negative for abdominal pain, diarrhea, hematochezia, melena, nausea and vomiting.   Genitourinary: Negative for decreased libido, frequency, hematuria, hesitancy and incomplete emptying.   Neurological: Negative for excessive daytime sleepiness, dizziness, headaches, light-headedness, loss of balance, numbness, paresthesias and seizures.   Psychiatric/Behavioral: Negative for depression.          Objective:     Vitals:    08/01/19 1412   BP: 130/54   BP Location: Right arm   Patient Position: Sitting   Pulse: 67   Weight: 71 kg (156 lb 9.6 oz)   Height: 170.2 cm (67\")     Body mass index is 24.53 kg/m².    PHYSICAL EXAM:  Physical Exam   Constitutional: She is oriented to person, place, and time. She appears well-developed and well-nourished. No distress.   HENT:   Head: Normocephalic and atraumatic.   Eyes: Conjunctivae and EOM are normal. Pupils are equal, round, and reactive to light.   Neck: No JVD present. Carotid bruit is not present.   Cardiovascular: Normal rate, regular rhythm, normal heart sounds and intact distal pulses.   No murmur heard.  Pulses:       Radial pulses are 2+ on the right side, and 2+ on the left side.        Dorsalis pedis pulses are 2+ on the right side, and 2+ on the left side.        Posterior tibial pulses are 2+ on the right side, and 2+ on the left side.   Pulmonary/Chest: Effort normal and breath sounds normal. No accessory muscle usage. No tachypnea. No respiratory distress. She has no decreased breath sounds. She has no wheezes. She has no rhonchi. She has no rales. She exhibits no tenderness.   Abdominal: Soft. Bowel sounds are normal. She exhibits no distension. There is no tenderness. There is no rebound and no guarding.   Musculoskeletal: Normal range of motion. She exhibits no edema. "   Neurological: She is alert and oriented to person, place, and time.   Skin: Skin is warm, dry and intact. She is not diaphoretic. No erythema.   Psychiatric: She has a normal mood and affect. Her speech is normal and behavior is normal. Judgment and thought content normal. Cognition and memory are normal.   Nursing note and vitals reviewed.        ECG 12 Lead  Date/Time: 8/1/2019 2:18 PM  Performed by: Aleah Robles APRN  Authorized by: Aleah Robles APRN   Comparison: compared with previous ECG from 7/16/2019  Similar to previous ECG  Comparison to previous ECG: Also reviewed with 6/19/19 and 2018 ECG and unchanged  Rhythm: sinus rhythm  Rate: normal  BPM: 67  Conduction: 1st degree AV block  Q waves: V1 and V2    QRS axis: normal  Other findings: non-specific ST-T wave changes    Clinical impression: abnormal EKG  Comments: Diffuse nonspecific ST-T wave abnormalities unchanged  Indication: Exertional chest pain            Assessment:       Diagnosis Plan   1. AV block, Mobitz 1  Adult Stress Echo W/ Cont or Stress Agent if Necessary Per Protocol   2. Cardiac pacemaker in situ  Adult Stress Echo W/ Cont or Stress Agent if Necessary Per Protocol   3. SVT (supraventricular tachycardia) (CMS/HCC)  Adult Stress Echo W/ Cont or Stress Agent if Necessary Per Protocol   4. Other acute pancreatitis without infection or necrosis  Adult Stress Echo W/ Cont or Stress Agent if Necessary Per Protocol   5. Mesenteric ischemia due to arterial insufficiency (CMS/HCC)  Adult Stress Echo W/ Cont or Stress Agent if Necessary Per Protocol   6. Hypercholesterolemia  Adult Stress Echo W/ Cont or Stress Agent if Necessary Per Protocol   7. Elevated hemoglobin A1c  Adult Stress Echo W/ Cont or Stress Agent if Necessary Per Protocol   8. Chest pain, unspecified type   Adult Stress Echo W/ Cont or Stress Agent if Necessary Per Protocol       Plan:     1.  Intermittent chest pain: Normal stress test August 2016.  Echocardiogram July  2016 showed normal EF of 60% with no wall motion abnormalities.  There is an exertional component to her symptoms.  2. Heart block: Status post permanent pacemaker implant.  Last device check June 2019 showed normal functioning and no episodes.  3.  Paroxysmal SVT: Symptoms improved with diltiazem being switched to metoprolol.  No episodes reported on June 2019 device check.  4.  Hypertension: Appears well controlled on current regimen.   5.   Hyperlipidemia: Statin managed by PCP  6.  Diabetes: Managed by PCP  7.  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  Now following with Dr. Velazco.  On aspirin and statin which is managed by PCP  9.Dilation of pancreatic duct: No further abdominal pain. Defer to GI  10. Mitral regurgitation: Moderate to severe on June 2016 echocardiogram with mild to moderate septal hypertrophy.      She is normally very active. Her symptoms are certainly concerning for angina and she does have documented vascular disease.  She does have a chronically abnormal baseline ECG.  She is really adamant about not wanting to do another nuclear stress test.  I recommend a stress echocardiogram as she has a history of moderate to severe mitral regurgitation with moderate septal hypertrophy.    Will determine follow-up with Dr. Mccullough after her testing is complete  I advised the patient to contact our office with any questions or concerns.  The patient was also educated to contact the office if she is not heard about her stress echocardiogram results within 2 days after completing testing.  She demonstrated understanding.     It has been a pleasure to participate in this patient's care. Please feel free to contact me with any questions or concerns.     Aleah Robles, LUIS A  08/01/2019            Your medication list            Accurate as of 8/1/19  6:01 PM. If you have any questions, ask your nurse or doctor.               CONTINUE taking these medications      Instructions Last Dose Given Next Dose Due   aspirin 81 MG EC tablet      Take 1 tablet by mouth Daily.       CENTRUM SILVER 50+WOMEN tablet      Take 1 tablet by mouth Daily.       cholecalciferol 1000 units tablet  Commonly known as:  VITAMIN D3      Take 1 tablet by mouth Daily.       cycloSPORINE 0.05 % ophthalmic emulsion  Commonly known as:  RESTASIS      Administer 1 drop to both eyes 2 (Two) Times a Day.       gabapentin 100 MG capsule  Commonly known as:  NEURONTIN      Take 100 mg by mouth 3 (Three) Times a Day.       gabapentin 100 MG capsule  Commonly known as:  NEURONTIN      Take 200 mg by mouth Every Night.       levothyroxine 25 MCG tablet  Commonly known as:  SYNTHROID, LEVOTHROID      Take 1 tablet by mouth Daily.       LORazepam 0.5 MG tablet  Commonly known as:  ATIVAN      One tablet 30 minutes before plane flight, may repeat times 1 in 4-6 hours.       metoprolol succinate XL 50 MG 24 hr tablet  Commonly known as:  TOPROL-XL      Take 50 mg by mouth 2 (Two) Times a Day.       omeprazole 40 MG capsule  Commonly known as:  priLOSEC      Take 1 capsule by mouth Daily.       pravastatin 40 MG tablet  Commonly known as:  PRAVACHOL      Take 40 mg by mouth 4 (Four) Times a Week. Mon, Wed, Fri, Sat       vitamin B-12 1000 MCG tablet  Commonly known as:  CYANOCOBALAMIN      Take 1,000 mcg by mouth Daily.              The above medication changes may not have been made by this provider.  Medication list was updated to reflect medications patient is currently taking including medication changes and discontinuations made by other healthcare providers.     Dictated utilizing Dragon Dictation System.

## 2019-08-01 NOTE — OUTREACH NOTE
Medical Week 2 Survey      Responses   Facility patient discharged from?  [facility]   Does the patient have one of the following disease processes/diagnoses(primary or secondary)?  Other   Week 2 attempt successful?  Yes   Call start time  1619   Discharge diagnosis  Acute pancreatitis Mesenteric ischemia    Call end time  1621   Meds reviewed with patient/caregiver?  Yes   Is the patient taking all medications as directed (includes completed medication regime)?  Yes   Has the patient kept scheduled appointments due by today?  Yes   Comments  Pt just saw her cardiologist today   What is the patient's perception of their health status since discharge?  Improving   Is the patient/caregiver able to teach back the hierarchy of who to call/visit for symptoms/problems? PCP, Specialist, Home health nurse, Urgent Care, ED, 911  Yes   Additional teach back comments  Pt says she is doing well, she is going to her follow up appts, no questions or concerns at this time.   Week 2 Call Completed?  Yes          Dorina Felix RN

## 2019-08-08 ENCOUNTER — HOSPITAL ENCOUNTER (OUTPATIENT)
Dept: CARDIOLOGY | Facility: HOSPITAL | Age: 79
Discharge: HOME | End: 2019-08-08
Admitting: NURSE PRACTITIONER

## 2019-08-08 ENCOUNTER — OFFICE VISIT (OUTPATIENT)
Dept: GASTROENTEROLOGY | Facility: CLINIC | Age: 79
End: 2019-08-08

## 2019-08-08 ENCOUNTER — TELEPHONE (OUTPATIENT)
Dept: CARDIOLOGY | Facility: CLINIC | Age: 79
End: 2019-08-08

## 2019-08-08 ENCOUNTER — READMISSION MANAGEMENT (OUTPATIENT)
Dept: CALL CENTER | Facility: HOSPITAL | Age: 79
End: 2019-08-08

## 2019-08-08 VITALS
HEART RATE: 81 BPM | WEIGHT: 156 LBS | HEIGHT: 67 IN | DIASTOLIC BLOOD PRESSURE: 80 MMHG | BODY MASS INDEX: 24.48 KG/M2 | SYSTOLIC BLOOD PRESSURE: 152 MMHG

## 2019-08-08 VITALS
WEIGHT: 152 LBS | DIASTOLIC BLOOD PRESSURE: 71 MMHG | SYSTOLIC BLOOD PRESSURE: 146 MMHG | HEIGHT: 68 IN | HEART RATE: 67 BPM | BODY MASS INDEX: 23.04 KG/M2

## 2019-08-08 DIAGNOSIS — E78.00 HYPERCHOLESTEROLEMIA: ICD-10-CM

## 2019-08-08 DIAGNOSIS — R93.3 ABNORMAL FINDING ON GI TRACT IMAGING: ICD-10-CM

## 2019-08-08 DIAGNOSIS — Z95.0 CARDIAC PACEMAKER IN SITU: ICD-10-CM

## 2019-08-08 DIAGNOSIS — I44.1 AV BLOCK, MOBITZ 1: ICD-10-CM

## 2019-08-08 DIAGNOSIS — R10.13 EPIGASTRIC PAIN: Primary | ICD-10-CM

## 2019-08-08 DIAGNOSIS — R07.9 CHEST PAIN, UNSPECIFIED TYPE: ICD-10-CM

## 2019-08-08 DIAGNOSIS — K85.80 OTHER ACUTE PANCREATITIS WITHOUT INFECTION OR NECROSIS: ICD-10-CM

## 2019-08-08 DIAGNOSIS — R73.09 ELEVATED HEMOGLOBIN A1C: ICD-10-CM

## 2019-08-08 DIAGNOSIS — K55.1 MESENTERIC ISCHEMIA DUE TO ARTERIAL INSUFFICIENCY (HCC): ICD-10-CM

## 2019-08-08 DIAGNOSIS — I47.1 SVT (SUPRAVENTRICULAR TACHYCARDIA) (HCC): ICD-10-CM

## 2019-08-08 LAB
AORTIC ROOT ANNULUS: 1.8 CM
ASCENDING AORTA: 2.8 CM
BH CV ECHO MEAS - ACS: 1.6 CM
BH CV ECHO MEAS - AO MAX PG: 6.5 MMHG
BH CV ECHO MEAS - AO V2 MAX: 127.6 CM/SEC
BH CV ECHO MEAS - ASC AORTA: 2.8 CM
BH CV ECHO MEAS - BSA(HAYCOCK): 1.8 M^2
BH CV ECHO MEAS - BSA: 1.8 M^2
BH CV ECHO MEAS - BZI_BMI: 24.4 KILOGRAMS/M^2
BH CV ECHO MEAS - BZI_METRIC_HEIGHT: 170.2 CM
BH CV ECHO MEAS - BZI_METRIC_WEIGHT: 70.8 KG
BH CV ECHO MEAS - EDV(MOD-SP2): 70 ML
BH CV ECHO MEAS - EDV(MOD-SP4): 64 ML
BH CV ECHO MEAS - EDV(TEICH): 117.9 ML
BH CV ECHO MEAS - EF(CUBED): 74 %
BH CV ECHO MEAS - EF(MOD-BP): 60 %
BH CV ECHO MEAS - EF(MOD-SP2): 60 %
BH CV ECHO MEAS - EF(MOD-SP4): 68.8 %
BH CV ECHO MEAS - EF(TEICH): 65.6 %
BH CV ECHO MEAS - ESV(MOD-SP2): 28 ML
BH CV ECHO MEAS - ESV(MOD-SP4): 20 ML
BH CV ECHO MEAS - ESV(TEICH): 40.6 ML
BH CV ECHO MEAS - IVS/LVPW: 1
BH CV ECHO MEAS - IVSD: 1 CM
BH CV ECHO MEAS - LAT PEAK E' VEL: 8 CM/SEC
BH CV ECHO MEAS - LV DIASTOLIC VOL/BSA (35-75): 35.2 ML/M^2
BH CV ECHO MEAS - LV MASS(C)D: 181.3 GRAMS
BH CV ECHO MEAS - LV MASS(C)DI: 99.6 GRAMS/M^2
BH CV ECHO MEAS - LV SYSTOLIC VOL/BSA (12-30): 11 ML/M^2
BH CV ECHO MEAS - LVIDD: 5 CM
BH CV ECHO MEAS - LVIDS: 3.2 CM
BH CV ECHO MEAS - LVLD AP2: 7 CM
BH CV ECHO MEAS - LVLD AP4: 7.6 CM
BH CV ECHO MEAS - LVLS AP2: 6.8 CM
BH CV ECHO MEAS - LVLS AP4: 6.5 CM
BH CV ECHO MEAS - LVPWD: 1 CM
BH CV ECHO MEAS - MED PEAK E' VEL: 8 CM/SEC
BH CV ECHO MEAS - MR MAX PG: 93 MMHG
BH CV ECHO MEAS - MR MAX VEL: 482.1 CM/SEC
BH CV ECHO MEAS - MV A DUR: 0.15 SEC
BH CV ECHO MEAS - MV A MAX VEL: 94.7 CM/SEC
BH CV ECHO MEAS - MV DEC SLOPE: 277.6 CM/SEC^2
BH CV ECHO MEAS - MV DEC TIME: 0.27 SEC
BH CV ECHO MEAS - MV E MAX VEL: 60.4 CM/SEC
BH CV ECHO MEAS - MV E/A: 0.64
BH CV ECHO MEAS - MV P1/2T MAX VEL: 63.4 CM/SEC
BH CV ECHO MEAS - MV P1/2T: 66.9 MSEC
BH CV ECHO MEAS - MVA P1/2T LCG: 3.5 CM^2
BH CV ECHO MEAS - MVA(P1/2T): 3.3 CM^2
BH CV ECHO MEAS - PULM A REVS DUR: 0.11 SEC
BH CV ECHO MEAS - PULM A REVS VEL: 36.5 CM/SEC
BH CV ECHO MEAS - PULM DIAS VEL: 31.8 CM/SEC
BH CV ECHO MEAS - PULM S/D: 1.3
BH CV ECHO MEAS - PULM SYS VEL: 40.3 CM/SEC
BH CV ECHO MEAS - SI(CUBED): 50.6 ML/M^2
BH CV ECHO MEAS - SI(MOD-SP2): 23.1 ML/M^2
BH CV ECHO MEAS - SI(MOD-SP4): 24.2 ML/M^2
BH CV ECHO MEAS - SI(TEICH): 42.5 ML/M^2
BH CV ECHO MEAS - SV(CUBED): 92.1 ML
BH CV ECHO MEAS - SV(MOD-SP2): 42 ML
BH CV ECHO MEAS - SV(MOD-SP4): 44 ML
BH CV ECHO MEAS - SV(TEICH): 77.3 ML
BH CV ECHO MEAS - TAPSE (>1.6): 2.7 CM2
BH CV ECHO MEAS - TR MAX VEL: 298.6 CM/SEC
BH CV ECHO MEASUREMENTS AVERAGE E/E' RATIO: 7.55
BH CV STRESS BP STAGE 1: NORMAL
BH CV STRESS BP STAGE 2: NORMAL
BH CV STRESS DURATION MIN STAGE 1: 3
BH CV STRESS DURATION MIN STAGE 2: 1
BH CV STRESS DURATION SEC STAGE 1: 0
BH CV STRESS DURATION SEC STAGE 2: 30
BH CV STRESS ECHO POST STRESS EJECTION FRACTION EF: 69 %
BH CV STRESS GRADE STAGE 1: 10
BH CV STRESS GRADE STAGE 2: 12
BH CV STRESS HR STAGE 1: 126
BH CV STRESS HR STAGE 2: 133
BH CV STRESS METS STAGE 1: 5
BH CV STRESS METS STAGE 2: 7.5
BH CV STRESS PROTOCOL 1: NORMAL
BH CV STRESS SPEED STAGE 1: 1.7
BH CV STRESS SPEED STAGE 2: 2.5
BH CV STRESS STAGE 1: 1
BH CV STRESS STAGE 2: 2
BH CV XLRA - RV BASE: 2.7 CM
BH CV XLRA - TDI S': 11 CM/SEC
LEFT ATRIUM VOLUME INDEX: 25 ML/M2
MAXIMAL PREDICTED HEART RATE: 139 BPM
PERCENT MAX PREDICTED HR: 95.68 %
SINUS: 3 CM
STJ: 2.6 CM
STRESS BASELINE BP: NORMAL MMHG
STRESS BASELINE HR: 81 BPM
STRESS PERCENT HR: 113 %
STRESS POST ESTIMATED WORKLOAD: 5 METS
STRESS POST EXERCISE DUR MIN: 4 MIN
STRESS POST EXERCISE DUR SEC: 30 SEC
STRESS POST PEAK BP: NORMAL MMHG
STRESS POST PEAK HR: 133 BPM
STRESS TARGET HR: 118 BPM

## 2019-08-08 PROCEDURE — 93325 DOPPLER ECHO COLOR FLOW MAPG: CPT

## 2019-08-08 PROCEDURE — 93320 DOPPLER ECHO COMPLETE: CPT | Performed by: INTERNAL MEDICINE

## 2019-08-08 PROCEDURE — 93016 CV STRESS TEST SUPVJ ONLY: CPT | Performed by: INTERNAL MEDICINE

## 2019-08-08 PROCEDURE — 93320 DOPPLER ECHO COMPLETE: CPT

## 2019-08-08 PROCEDURE — 93350 STRESS TTE ONLY: CPT

## 2019-08-08 PROCEDURE — 93325 DOPPLER ECHO COLOR FLOW MAPG: CPT | Performed by: INTERNAL MEDICINE

## 2019-08-08 PROCEDURE — 93350 STRESS TTE ONLY: CPT | Performed by: INTERNAL MEDICINE

## 2019-08-08 PROCEDURE — 99213 OFFICE O/P EST LOW 20 MIN: CPT | Performed by: INTERNAL MEDICINE

## 2019-08-08 PROCEDURE — 93018 CV STRESS TEST I&R ONLY: CPT | Performed by: INTERNAL MEDICINE

## 2019-08-08 PROCEDURE — 93017 CV STRESS TEST TRACING ONLY: CPT

## 2019-08-08 NOTE — PROGRESS NOTES
"Subjective   Reanna Higgins is a 79 y.o.. female is here today for follow-up.    Chief Complaint   Patient presents with   • Abnormal Imaging     Pancreatic Duct    • Abdominal Pain     Left Side     History of Present Illness  Patient recently presented with a very odd history.  She developed severe abdominal pain and her lipase was literally \"off the charts\".  However, a CT scan obtained at that time so that the pancreas was normal, although the antrum of the stomach was abnormal.  She was also noted to have diffuse calcifications of the abdominal vasculature.  She substernally had Doppler flow studies and these were abnormal.  She underwent CT angiography recently and this demonstrated luminal narrowing of the great vessels.  She was seen by Dr. Kearney who recommended conservative management at this point.  The CT angiogram also commented that the pancreatic duct in the head of the pancreas is normal and MRI was advised.  However, the patient has a pacemaker and is not able to have an MRI performed.  She is asymptomatic but she is very concerned that she may have another attack and has no idea what to do about it.    The following portions of the patient's history were reviewed and updated as appropriate: allergies, current medications, past family history, past medical history, past social history, past surgical history and problem list.      Current Outpatient Medications:   •  aspirin 81 MG EC tablet, Take 1 tablet by mouth Daily., Disp: , Rfl:   •  cholecalciferol (VITAMIN D3) 1000 UNITS tablet, Take 1 tablet by mouth Daily., Disp: , Rfl:   •  cycloSPORINE (RESTASIS) 0.05 % ophthalmic emulsion, Administer 1 drop to both eyes 2 (Two) Times a Day., Disp: , Rfl:   •  gabapentin (NEURONTIN) 100 MG capsule, Take 100 mg by mouth 3 (Three) Times a Day., Disp: , Rfl:   •  gabapentin (NEURONTIN) 100 MG capsule, Take 200 mg by mouth Every Night., Disp: , Rfl:   •  levothyroxine (SYNTHROID, LEVOTHROID) 25 MCG tablet, " Take 1 tablet by mouth Daily., Disp: 30 tablet, Rfl: 3  •  LORazepam (ATIVAN) 0.5 MG tablet, One tablet 30 minutes before plane flight, may repeat times 1 in 4-6 hours., Disp: 8 tablet, Rfl: 0  •  metoprolol succinate XL (TOPROL-XL) 50 MG 24 hr tablet, Take 50 mg by mouth 2 (Two) Times a Day., Disp: , Rfl:   •  Multiple Vitamins-Minerals (CENTRUM SILVER 50+WOMEN) tablet, Take 1 tablet by mouth Daily., Disp: , Rfl:   •  omeprazole (priLOSEC) 40 MG capsule, Take 1 capsule by mouth Daily., Disp: 90 capsule, Rfl: 1  •  pravastatin (PRAVACHOL) 40 MG tablet, Take 40 mg by mouth 4 (Four) Times a Week. Mon, Wed, Fri, Sat, Disp: , Rfl:   •  vitamin B-12 (CYANOCOBALAMIN) 1000 MCG tablet, Take 1,000 mcg by mouth Daily., Disp: , Rfl:     Family History   Problem Relation Age of Onset   • Thyroid disease Daughter    • Lung cancer Brother    • Hypertension Mother    • Aortic stenosis Mother    • Coronary artery disease Mother          78 (smoker)   • Hypertension Father    • Depression Father    • Anxiety disorder Father    • Diabetes Father    • Heart failure Father    • Cirrhosis Father         alcohol   • Alcohol abuse Sister    • Lupus Sister    • Heart disease Sister    • Diabetes type II Sister    • Alcohol abuse Brother    • Drug abuse Brother    • Heart disease Brother    • Cancer Brother         bladder (smoker)   • Heart disease Brother    • Breast cancer Paternal Aunt    • Neuropathy Neg Hx    • Colon cancer Neg Hx    • Dementia Neg Hx        Review of Systems   Respiratory: Negative for shortness of breath.    Cardiovascular: Negative for chest pain.   All other systems reviewed and are negative.      Objective   Physical Exam   Constitutional: She is oriented to person, place, and time. She appears well-developed and well-nourished.   HENT:   Head: Normocephalic and atraumatic.   Right Ear: External ear normal.   Left Ear: External ear normal.   Eyes: Conjunctivae and EOM are normal. Pupils are equal, round, and  reactive to light.   Pulmonary/Chest: Effort normal.   Neurological: She is alert and oriented to person, place, and time.   Psychiatric: She has a normal mood and affect. Her behavior is normal. Judgment and thought content normal.   Nursing note and vitals reviewed.      Pertinent laboratory results were reviewed. , Pertinent old records were reviewed.  and Pertinent radiology results were reviewed.     Assessment/Plan   Problems Addressed this Visit        Nervous and Auditory    Epigastric pain - Primary    Relevant Orders    CBC & Differential    Comprehensive Metabolic Panel      Other Visit Diagnoses     Abnormal finding on GI tract imaging        Relevant Orders    CBC & Differential    Comprehensive Metabolic Panel        This of course is a very bothersome case.  She has a variety of abnormalities that she has accumulated but they do not fit into one unifying process or diagnosis.  The CT of the pancreas is concerning.  I think the best course of events is to get a pancreatic specific CT scan in 6 weeks and then see her back afterwards.

## 2019-08-08 NOTE — OUTREACH NOTE
Medical Week 3 Survey      Responses   Facility patient discharged from?  [place]   Does the patient have one of the following disease processes/diagnoses(primary or secondary)?  Other   Week 3 attempt successful?  No   Unsuccessful attempts  Attempt 1          Monik Whitmore RN

## 2019-08-09 ENCOUNTER — TELEPHONE (OUTPATIENT)
Dept: CARDIOLOGY | Facility: CLINIC | Age: 79
End: 2019-08-09

## 2019-08-09 ENCOUNTER — READMISSION MANAGEMENT (OUTPATIENT)
Dept: CALL CENTER | Facility: HOSPITAL | Age: 79
End: 2019-08-09

## 2019-08-09 NOTE — TELEPHONE ENCOUNTER
I called and spoke with the patient regarding her stress test results.  No echocardiographic evidence of ischemia but she does have ST changes starting at 2 minutes of rest and recovers after 5 to 9 minutes.  I informed her that I was going to discuss this further with Dr. Mccullough given her exertional chest pain starting to limit her exercising.  Also of note she mentions she saw Dr. Chew with GI today who plans on repeating a CTA to reassess her mesenteric stenosis about 6 weeks as she has intermittent nausea.    Dr. Mccullough- can we please discuss her stress echo results?

## 2019-08-09 NOTE — OUTREACH NOTE
Medical Week 3 Survey      Responses   Facility patient discharged from?  [facility]   Does the patient have one of the following disease processes/diagnoses(primary or secondary)?  Other   Week 3 attempt successful?  Yes   Call start time  0811   Call end time  0813   Meds reviewed with patient/caregiver?  Yes   Is the patient taking all medications as directed (includes completed medication regime)?  Yes   Has the patient kept scheduled appointments due by today?  Yes   What is the patient's perception of their health status since discharge?  Improving   Week 3 Call Completed?  Yes   Graduated  Yes   Did the patient feel the follow up calls were helpful during their recovery period?  Yes   Was the number of calls appropriate?  Yes   Graduated/Revoked comments  Patient states that she is doing well.  All goals met.          Neida Gomes RN

## 2019-08-12 ENCOUNTER — TELEPHONE (OUTPATIENT)
Dept: CARDIOLOGY | Facility: CLINIC | Age: 79
End: 2019-08-12

## 2019-08-12 DIAGNOSIS — R07.9 CHEST PAIN, UNSPECIFIED TYPE: Primary | ICD-10-CM

## 2019-08-12 DIAGNOSIS — R94.39: ICD-10-CM

## 2019-08-12 DIAGNOSIS — I10 ESSENTIAL HYPERTENSION: ICD-10-CM

## 2019-08-12 DIAGNOSIS — K55.1 MESENTERIC ISCHEMIA DUE TO ARTERIAL INSUFFICIENCY (HCC): ICD-10-CM

## 2019-08-12 DIAGNOSIS — I49.5 SICK SINUS SYNDROME (HCC): ICD-10-CM

## 2019-08-12 NOTE — TELEPHONE ENCOUNTER
Called and spoke with the patient regarding her stress echocardiogram results that are reviewed with Dr. Mccullough.  Due to her exertional symptoms and abnormal stress echo arctic catheterization was discussed.  I reviewed the risks versus benefits with her in detail.  I answered all of her questions and she wishes to proceed with cardiac catheterization.  I placed a referral for cardiac catheterization to be done with Dr. Mccullough.  I have asked her to contact the office if she has not heard about scheduling this procedure by Wednesday, 8/14/2019. She demonstrated understanding.

## 2019-08-14 PROBLEM — R94.39 ABNORMAL DOBUTAMINE STRESS ECHOCARDIOGRAPHY: Status: ACTIVE | Noted: 2019-08-14

## 2019-08-14 PROBLEM — R07.9 CHEST PAIN: Status: ACTIVE | Noted: 2019-08-14

## 2019-08-14 PROBLEM — I49.5 SICK SINUS SYNDROME: Status: ACTIVE | Noted: 2019-08-14

## 2019-08-16 ENCOUNTER — TRANSCRIBE ORDERS (OUTPATIENT)
Dept: CARDIOLOGY | Facility: CLINIC | Age: 79
End: 2019-08-16

## 2019-08-16 DIAGNOSIS — R07.9 CHEST PAIN, UNSPECIFIED TYPE: ICD-10-CM

## 2019-08-16 DIAGNOSIS — Z01.810 PREOP CARDIOVASCULAR EXAM: Primary | ICD-10-CM

## 2019-08-16 DIAGNOSIS — Z13.6 SCREENING FOR CARDIOVASCULAR CONDITION: ICD-10-CM

## 2019-08-19 ENCOUNTER — APPOINTMENT (OUTPATIENT)
Dept: LAB | Facility: HOSPITAL | Age: 79
End: 2019-08-19

## 2019-08-19 LAB
ALBUMIN SERPL-MCNC: 4.1 G/DL (ref 3.5–5.2)
ALBUMIN/GLOB SERPL: 1.4 G/DL
ALP SERPL-CCNC: 66 U/L (ref 39–117)
ALT SERPL W P-5'-P-CCNC: 14 U/L (ref 1–33)
ANION GAP SERPL CALCULATED.3IONS-SCNC: 12.9 MMOL/L (ref 5–15)
AST SERPL-CCNC: 19 U/L (ref 1–32)
BASOPHILS # BLD AUTO: 0.03 10*3/MM3 (ref 0–0.2)
BASOPHILS NFR BLD AUTO: 0.6 % (ref 0–1.5)
BILIRUB SERPL-MCNC: 0.3 MG/DL (ref 0.2–1.2)
BUN BLD-MCNC: 20 MG/DL (ref 8–23)
BUN/CREAT SERPL: 22.7 (ref 7–25)
CALCIUM SPEC-SCNC: 9.6 MG/DL (ref 8.6–10.5)
CHLORIDE SERPL-SCNC: 105 MMOL/L (ref 98–107)
CO2 SERPL-SCNC: 25.1 MMOL/L (ref 22–29)
CREAT BLD-MCNC: 0.88 MG/DL (ref 0.57–1)
DEPRECATED RDW RBC AUTO: 41.3 FL (ref 37–54)
EOSINOPHIL # BLD AUTO: 0.13 10*3/MM3 (ref 0–0.4)
EOSINOPHIL NFR BLD AUTO: 2.5 % (ref 0.3–6.2)
ERYTHROCYTE [DISTWIDTH] IN BLOOD BY AUTOMATED COUNT: 12.6 % (ref 12.3–15.4)
GFR SERPL CREATININE-BSD FRML MDRD: 62 ML/MIN/1.73
GLOBULIN UR ELPH-MCNC: 3 GM/DL
GLUCOSE BLD-MCNC: 153 MG/DL (ref 65–99)
HCT VFR BLD AUTO: 41.7 % (ref 34–46.6)
HGB BLD-MCNC: 13.5 G/DL (ref 12–15.9)
IMM GRANULOCYTES # BLD AUTO: 0.01 10*3/MM3 (ref 0–0.05)
IMM GRANULOCYTES NFR BLD AUTO: 0.2 % (ref 0–0.5)
LYMPHOCYTES # BLD AUTO: 1.5 10*3/MM3 (ref 0.7–3.1)
LYMPHOCYTES NFR BLD AUTO: 29 % (ref 19.6–45.3)
MCH RBC QN AUTO: 29.2 PG (ref 26.6–33)
MCHC RBC AUTO-ENTMCNC: 32.4 G/DL (ref 31.5–35.7)
MCV RBC AUTO: 90.1 FL (ref 79–97)
MONOCYTES # BLD AUTO: 0.27 10*3/MM3 (ref 0.1–0.9)
MONOCYTES NFR BLD AUTO: 5.2 % (ref 5–12)
NEUTROPHILS # BLD AUTO: 3.24 10*3/MM3 (ref 1.7–7)
NEUTROPHILS NFR BLD AUTO: 62.5 % (ref 42.7–76)
NRBC BLD AUTO-RTO: 0 /100 WBC (ref 0–0.2)
PLATELET # BLD AUTO: 199 10*3/MM3 (ref 140–450)
PMV BLD AUTO: 12 FL (ref 6–12)
POTASSIUM BLD-SCNC: 4.1 MMOL/L (ref 3.5–5.2)
PROT SERPL-MCNC: 7.1 G/DL (ref 6–8.5)
RBC # BLD AUTO: 4.63 10*6/MM3 (ref 3.77–5.28)
SODIUM BLD-SCNC: 143 MMOL/L (ref 136–145)
WBC NRBC COR # BLD: 5.18 10*3/MM3 (ref 3.4–10.8)

## 2019-08-19 PROCEDURE — 36415 COLL VENOUS BLD VENIPUNCTURE: CPT | Performed by: INTERNAL MEDICINE

## 2019-08-19 PROCEDURE — 80053 COMPREHEN METABOLIC PANEL: CPT | Performed by: INTERNAL MEDICINE

## 2019-08-19 PROCEDURE — 85025 COMPLETE CBC W/AUTO DIFF WBC: CPT | Performed by: INTERNAL MEDICINE

## 2019-08-21 ENCOUNTER — TELEPHONE (OUTPATIENT)
Dept: GASTROENTEROLOGY | Facility: CLINIC | Age: 79
End: 2019-08-21

## 2019-08-21 NOTE — TELEPHONE ENCOUNTER
----- Message from Ricky Chew MD sent at 8/20/2019  9:10 PM EDT -----  Advise patient the results were normal, except for the elevated blood glucose.

## 2019-08-21 NOTE — TELEPHONE ENCOUNTER
Pt notified of lab results (cbc,cmp) as reviewed by Dr Chew:  All normal except for elevated glucose (153).  Pt states she has hx of elevated glucose and is working with her PCP on this.

## 2019-08-22 ENCOUNTER — APPOINTMENT (OUTPATIENT)
Dept: CARDIOLOGY | Facility: HOSPITAL | Age: 79
End: 2019-08-22

## 2019-08-22 ENCOUNTER — HOSPITAL ENCOUNTER (INPATIENT)
Facility: HOSPITAL | Age: 79
LOS: 13 days | Discharge: HOME HEALTH | End: 2019-09-04
Attending: INTERNAL MEDICINE | Admitting: INTERNAL MEDICINE

## 2019-08-22 ENCOUNTER — ANESTHESIA EVENT (OUTPATIENT)
Dept: PERIOP | Facility: HOSPITAL | Age: 79
End: 2019-08-22

## 2019-08-22 ENCOUNTER — APPOINTMENT (OUTPATIENT)
Dept: GENERAL RADIOLOGY | Facility: HOSPITAL | Age: 79
End: 2019-08-22

## 2019-08-22 DIAGNOSIS — R07.9 CHEST PAIN, UNSPECIFIED TYPE: ICD-10-CM

## 2019-08-22 DIAGNOSIS — K55.1 MESENTERIC ISCHEMIA DUE TO ARTERIAL INSUFFICIENCY (HCC): ICD-10-CM

## 2019-08-22 DIAGNOSIS — I10 ESSENTIAL HYPERTENSION: ICD-10-CM

## 2019-08-22 DIAGNOSIS — I49.5 SICK SINUS SYNDROME (HCC): ICD-10-CM

## 2019-08-22 DIAGNOSIS — R94.39: ICD-10-CM

## 2019-08-22 DIAGNOSIS — I25.119 CORONARY ARTERY DISEASE INVOLVING NATIVE CORONARY ARTERY OF NATIVE HEART WITH ANGINA PECTORIS (HCC): Primary | ICD-10-CM

## 2019-08-22 LAB
ABO GROUP BLD: NORMAL
ANION GAP SERPL CALCULATED.3IONS-SCNC: 10.6 MMOL/L (ref 5–15)
APTT PPP: 30.7 SECONDS (ref 22.7–35.4)
ARTERIAL PATENCY WRIST A: ABNORMAL
ATMOSPHERIC PRESS: 750.1 MMHG
BASE EXCESS BLDA CALC-SCNC: 2.8 MMOL/L (ref 0–2)
BASOPHILS # BLD AUTO: 0.03 10*3/MM3 (ref 0–0.2)
BASOPHILS NFR BLD AUTO: 0.5 % (ref 0–1.5)
BDY SITE: ABNORMAL
BH CV XLRA MEAS - DIST GSV CALF DIST LEFT: 0.19 CM
BH CV XLRA MEAS - DIST GSV CALF DIST RIGHT: 0.15 CM
BH CV XLRA MEAS - DIST GSV THIGH DIST LEFT: 0.3 CM
BH CV XLRA MEAS - DIST GSV THIGH DIST RIGHT: 0.24 CM
BH CV XLRA MEAS - GSV ANKLE DIST LEFT: 0.23 CM
BH CV XLRA MEAS - GSV ANKLE DIST RIGHT: 0.18 CM
BH CV XLRA MEAS - GSV KNEE DIST LEFT: 0.29 CM
BH CV XLRA MEAS - GSV KNEE DIST RIGHT: 0.21 CM
BH CV XLRA MEAS - GSV ORIGIN DIST LEFT: 0.67 CM
BH CV XLRA MEAS - GSV ORIGIN DIST RIGHT: 0.78 CM
BH CV XLRA MEAS - MID GSV CALF LEFT: 0.2 CM
BH CV XLRA MEAS - MID GSV CALF RIGHT: 0.07 CM
BH CV XLRA MEAS - MID GSV THIGH  LEFT: 0.34 CM
BH CV XLRA MEAS - MID GSV THIGH  RIGHT: 0.24 CM
BH CV XLRA MEAS - PROX GSV CALF DIST LEFT: 0.25 CM
BH CV XLRA MEAS - PROX GSV CALF DIST RIGHT: 0.19 CM
BH CV XLRA MEAS - PROX GSV THIGH  LEFT: 0.32 CM
BH CV XLRA MEAS - PROX GSV THIGH  RIGHT: 0.2 CM
BH CV XLRA MEAS LEFT DIST CCA EDV: 15.8 CM/SEC
BH CV XLRA MEAS LEFT DIST CCA PSV: 73.3 CM/SEC
BH CV XLRA MEAS LEFT DIST ICA EDV: -17.7 CM/SEC
BH CV XLRA MEAS LEFT DIST ICA PSV: -70.7 CM/SEC
BH CV XLRA MEAS LEFT ICA/CCA RATIO: 1.54
BH CV XLRA MEAS LEFT MID ICA EDV: -19.3 CM/SEC
BH CV XLRA MEAS LEFT MID ICA PSV: -113 CM/SEC
BH CV XLRA MEAS LEFT PROX CCA EDV: 13.5 CM/SEC
BH CV XLRA MEAS LEFT PROX CCA PSV: 73.3 CM/SEC
BH CV XLRA MEAS LEFT PROX ECA PSV: -97.4 CM/SEC
BH CV XLRA MEAS LEFT PROX ICA EDV: -24.6 CM/SEC
BH CV XLRA MEAS LEFT PROX ICA PSV: -71.5 CM/SEC
BH CV XLRA MEAS LEFT PROX SCLA PSV: 153 CM/SEC
BH CV XLRA MEAS LEFT VERTEBRAL A EDV: -12.2 CM/SEC
BH CV XLRA MEAS LEFT VERTEBRAL A PSV: -50.3 CM/SEC
BH CV XLRA MEAS RIGHT DIST CCA EDV: -12.3 CM/SEC
BH CV XLRA MEAS RIGHT DIST CCA PSV: -70.9 CM/SEC
BH CV XLRA MEAS RIGHT DIST ICA EDV: 9.8 CM/SEC
BH CV XLRA MEAS RIGHT DIST ICA PSV: 48.3 CM/SEC
BH CV XLRA MEAS RIGHT ICA/CCA RATIO: 0.98
BH CV XLRA MEAS RIGHT MID ICA EDV: -15.2 CM/SEC
BH CV XLRA MEAS RIGHT MID ICA PSV: -62.7 CM/SEC
BH CV XLRA MEAS RIGHT PROX CCA EDV: -10 CM/SEC
BH CV XLRA MEAS RIGHT PROX CCA PSV: -76.8 CM/SEC
BH CV XLRA MEAS RIGHT PROX ECA PSV: -107 CM/SEC
BH CV XLRA MEAS RIGHT PROX ICA EDV: -13.5 CM/SEC
BH CV XLRA MEAS RIGHT PROX ICA PSV: -69.8 CM/SEC
BH CV XLRA MEAS RIGHT PROX SCLA PSV: 195 CM/SEC
BH CV XLRA MEAS RIGHT VERTEBRAL A PSV: -42.6 CM/SEC
BLD GP AB SCN SERPL QL: NEGATIVE
BUN BLD-MCNC: 15 MG/DL (ref 8–23)
BUN/CREAT SERPL: 22.1 (ref 7–25)
CALCIUM SPEC-SCNC: 9 MG/DL (ref 8.6–10.5)
CHLORIDE SERPL-SCNC: 107 MMOL/L (ref 98–107)
CLOSE TME COLL+ADP + EPINEP PNL BLD: 90 %
CO2 SERPL-SCNC: 26.4 MMOL/L (ref 22–29)
CREAT BLD-MCNC: 0.68 MG/DL (ref 0.57–1)
DEPRECATED RDW RBC AUTO: 42.1 FL (ref 37–54)
EOSINOPHIL # BLD AUTO: 0.09 10*3/MM3 (ref 0–0.4)
EOSINOPHIL NFR BLD AUTO: 1.5 % (ref 0.3–6.2)
ERYTHROCYTE [DISTWIDTH] IN BLOOD BY AUTOMATED COUNT: 12.7 % (ref 12.3–15.4)
GFR SERPL CREATININE-BSD FRML MDRD: 83 ML/MIN/1.73
GLUCOSE BLD-MCNC: 92 MG/DL (ref 65–99)
HCO3 BLDA-SCNC: 27.2 MMOL/L (ref 22–28)
HCT VFR BLD AUTO: 38.9 % (ref 34–46.6)
HGB BLD-MCNC: 12.6 G/DL (ref 12–15.9)
IMM GRANULOCYTES # BLD AUTO: 0.01 10*3/MM3 (ref 0–0.05)
IMM GRANULOCYTES NFR BLD AUTO: 0.2 % (ref 0–0.5)
INR PPP: 1.06 (ref 0.9–1.1)
LEFT ARM BP: NORMAL MMHG
LYMPHOCYTES # BLD AUTO: 1.93 10*3/MM3 (ref 0.7–3.1)
LYMPHOCYTES NFR BLD AUTO: 32.4 % (ref 19.6–45.3)
MCH RBC QN AUTO: 29.6 PG (ref 26.6–33)
MCHC RBC AUTO-ENTMCNC: 32.4 G/DL (ref 31.5–35.7)
MCV RBC AUTO: 91.5 FL (ref 79–97)
MODALITY: ABNORMAL
MONOCYTES # BLD AUTO: 0.32 10*3/MM3 (ref 0.1–0.9)
MONOCYTES NFR BLD AUTO: 5.4 % (ref 5–12)
NEUTROPHILS # BLD AUTO: 3.57 10*3/MM3 (ref 1.7–7)
NEUTROPHILS NFR BLD AUTO: 60 % (ref 42.7–76)
NRBC BLD AUTO-RTO: 0 /100 WBC (ref 0–0.2)
PCO2 BLDA: 39.9 MM HG (ref 35–45)
PH BLDA: 7.44 PH UNITS (ref 7.35–7.45)
PLATELET # BLD AUTO: 169 10*3/MM3 (ref 140–450)
PMV BLD AUTO: 12 FL (ref 6–12)
PO2 BLDA: 80.5 MM HG (ref 80–100)
POTASSIUM BLD-SCNC: 4.1 MMOL/L (ref 3.5–5.2)
PROTHROMBIN TIME: 13.6 SECONDS (ref 11.7–14.2)
RBC # BLD AUTO: 4.25 10*6/MM3 (ref 3.77–5.28)
RH BLD: POSITIVE
RIGHT ARM BP: NORMAL MMHG
SAO2 % BLDCOA: 96.2 % (ref 92–99)
SET MECH RESP RATE: 18
SODIUM BLD-SCNC: 144 MMOL/L (ref 136–145)
T&S EXPIRATION DATE: NORMAL
WBC NRBC COR # BLD: 5.95 10*3/MM3 (ref 3.4–10.8)

## 2019-08-22 PROCEDURE — 93005 ELECTROCARDIOGRAM TRACING: CPT | Performed by: THORACIC SURGERY (CARDIOTHORACIC VASCULAR SURGERY)

## 2019-08-22 PROCEDURE — C1894 INTRO/SHEATH, NON-LASER: HCPCS | Performed by: INTERNAL MEDICINE

## 2019-08-22 PROCEDURE — 86901 BLOOD TYPING SEROLOGIC RH(D): CPT

## 2019-08-22 PROCEDURE — 25010000002 HEPARIN (PORCINE) PER 1000 UNITS: Performed by: INTERNAL MEDICINE

## 2019-08-22 PROCEDURE — 25010000002 FENTANYL CITRATE (PF) 100 MCG/2ML SOLUTION: Performed by: INTERNAL MEDICINE

## 2019-08-22 PROCEDURE — 25010000002 MIDAZOLAM PER 1 MG: Performed by: INTERNAL MEDICINE

## 2019-08-22 PROCEDURE — 80048 BASIC METABOLIC PNL TOTAL CA: CPT | Performed by: THORACIC SURGERY (CARDIOTHORACIC VASCULAR SURGERY)

## 2019-08-22 PROCEDURE — 82803 BLOOD GASES ANY COMBINATION: CPT

## 2019-08-22 PROCEDURE — 36600 WITHDRAWAL OF ARTERIAL BLOOD: CPT

## 2019-08-22 PROCEDURE — 86900 BLOOD TYPING SEROLOGIC ABO: CPT | Performed by: THORACIC SURGERY (CARDIOTHORACIC VASCULAR SURGERY)

## 2019-08-22 PROCEDURE — 0 IOPAMIDOL PER 1 ML: Performed by: INTERNAL MEDICINE

## 2019-08-22 PROCEDURE — 93458 L HRT ARTERY/VENTRICLE ANGIO: CPT | Performed by: INTERNAL MEDICINE

## 2019-08-22 PROCEDURE — 71046 X-RAY EXAM CHEST 2 VIEWS: CPT

## 2019-08-22 PROCEDURE — 93970 EXTREMITY STUDY: CPT

## 2019-08-22 PROCEDURE — B2111ZZ FLUOROSCOPY OF MULTIPLE CORONARY ARTERIES USING LOW OSMOLAR CONTRAST: ICD-10-PCS | Performed by: INTERNAL MEDICINE

## 2019-08-22 PROCEDURE — 85730 THROMBOPLASTIN TIME PARTIAL: CPT | Performed by: THORACIC SURGERY (CARDIOTHORACIC VASCULAR SURGERY)

## 2019-08-22 PROCEDURE — 86923 COMPATIBILITY TEST ELECTRIC: CPT

## 2019-08-22 PROCEDURE — 86900 BLOOD TYPING SEROLOGIC ABO: CPT

## 2019-08-22 PROCEDURE — 93880 EXTRACRANIAL BILAT STUDY: CPT

## 2019-08-22 PROCEDURE — 4A023N7 MEASUREMENT OF CARDIAC SAMPLING AND PRESSURE, LEFT HEART, PERCUTANEOUS APPROACH: ICD-10-PCS | Performed by: INTERNAL MEDICINE

## 2019-08-22 PROCEDURE — C1769 GUIDE WIRE: HCPCS | Performed by: INTERNAL MEDICINE

## 2019-08-22 PROCEDURE — B2151ZZ FLUOROSCOPY OF LEFT HEART USING LOW OSMOLAR CONTRAST: ICD-10-PCS | Performed by: INTERNAL MEDICINE

## 2019-08-22 PROCEDURE — 85610 PROTHROMBIN TIME: CPT | Performed by: THORACIC SURGERY (CARDIOTHORACIC VASCULAR SURGERY)

## 2019-08-22 PROCEDURE — 93010 ELECTROCARDIOGRAM REPORT: CPT | Performed by: INTERNAL MEDICINE

## 2019-08-22 PROCEDURE — 85576 BLOOD PLATELET AGGREGATION: CPT | Performed by: THORACIC SURGERY (CARDIOTHORACIC VASCULAR SURGERY)

## 2019-08-22 PROCEDURE — 86850 RBC ANTIBODY SCREEN: CPT | Performed by: THORACIC SURGERY (CARDIOTHORACIC VASCULAR SURGERY)

## 2019-08-22 PROCEDURE — 85025 COMPLETE CBC W/AUTO DIFF WBC: CPT | Performed by: THORACIC SURGERY (CARDIOTHORACIC VASCULAR SURGERY)

## 2019-08-22 PROCEDURE — 86901 BLOOD TYPING SEROLOGIC RH(D): CPT | Performed by: THORACIC SURGERY (CARDIOTHORACIC VASCULAR SURGERY)

## 2019-08-22 RX ORDER — METOPROLOL SUCCINATE 50 MG/1
50 TABLET, EXTENDED RELEASE ORAL 2 TIMES DAILY
Status: DISCONTINUED | OUTPATIENT
Start: 2019-08-22 | End: 2019-08-23

## 2019-08-22 RX ORDER — SODIUM CHLORIDE 9 MG/ML
125 INJECTION, SOLUTION INTRAVENOUS CONTINUOUS
Status: ACTIVE | OUTPATIENT
Start: 2019-08-22 | End: 2019-08-22

## 2019-08-22 RX ORDER — CHLORHEXIDINE GLUCONATE 500 MG/1
1 CLOTH TOPICAL EVERY 12 HOURS
Status: COMPLETED | OUTPATIENT
Start: 2019-08-22 | End: 2019-08-23

## 2019-08-22 RX ORDER — SODIUM CHLORIDE 0.9 % (FLUSH) 0.9 %
3 SYRINGE (ML) INJECTION EVERY 12 HOURS SCHEDULED
Status: DISCONTINUED | OUTPATIENT
Start: 2019-08-22 | End: 2019-08-22 | Stop reason: HOSPADM

## 2019-08-22 RX ORDER — TEMAZEPAM 7.5 MG/1
7.5 CAPSULE ORAL NIGHTLY PRN
Status: DISCONTINUED | OUTPATIENT
Start: 2019-08-22 | End: 2019-08-23

## 2019-08-22 RX ORDER — GABAPENTIN 100 MG/1
200 CAPSULE ORAL NIGHTLY
Status: DISCONTINUED | OUTPATIENT
Start: 2019-08-22 | End: 2019-08-23

## 2019-08-22 RX ORDER — FAMOTIDINE 10 MG/ML
20 INJECTION, SOLUTION INTRAVENOUS ONCE
Status: CANCELLED | OUTPATIENT
Start: 2019-08-23 | End: 2019-08-22

## 2019-08-22 RX ORDER — GABAPENTIN 300 MG/1
300 CAPSULE ORAL ONCE
Status: CANCELLED | OUTPATIENT
Start: 2019-08-23

## 2019-08-22 RX ORDER — VANCOMYCIN HYDROCHLORIDE 1 G/200ML
15 INJECTION, SOLUTION INTRAVENOUS
Status: COMPLETED | OUTPATIENT
Start: 2019-08-23 | End: 2019-08-23

## 2019-08-22 RX ORDER — POTASSIUM CHLORIDE 750 MG/1
40 CAPSULE, EXTENDED RELEASE ORAL AS NEEDED
Status: DISCONTINUED | OUTPATIENT
Start: 2019-08-22 | End: 2019-08-23

## 2019-08-22 RX ORDER — POTASSIUM CHLORIDE 1.5 G/1.77G
40 POWDER, FOR SOLUTION ORAL AS NEEDED
Status: DISCONTINUED | OUTPATIENT
Start: 2019-08-22 | End: 2019-08-23

## 2019-08-22 RX ORDER — ALPRAZOLAM 0.25 MG/1
0.25 TABLET ORAL EVERY 8 HOURS PRN
Status: DISCONTINUED | OUTPATIENT
Start: 2019-08-22 | End: 2019-08-23

## 2019-08-22 RX ORDER — DIPHENHYDRAMINE HCL 25 MG
25 CAPSULE ORAL NIGHTLY PRN
Status: DISCONTINUED | OUTPATIENT
Start: 2019-08-22 | End: 2019-08-23

## 2019-08-22 RX ORDER — LIDOCAINE HYDROCHLORIDE 10 MG/ML
0.1 INJECTION, SOLUTION EPIDURAL; INFILTRATION; INTRACAUDAL; PERINEURAL ONCE AS NEEDED
Status: DISCONTINUED | OUTPATIENT
Start: 2019-08-22 | End: 2019-08-22 | Stop reason: HOSPADM

## 2019-08-22 RX ORDER — LORAZEPAM 1 MG/1
1 TABLET ORAL
Status: CANCELLED | OUTPATIENT
Start: 2019-08-23 | End: 2019-09-02

## 2019-08-22 RX ORDER — LEVOTHYROXINE SODIUM 0.03 MG/1
25 TABLET ORAL
Status: DISCONTINUED | OUTPATIENT
Start: 2019-08-22 | End: 2019-09-04 | Stop reason: HOSPADM

## 2019-08-22 RX ORDER — ASPIRIN 81 MG/1
81 TABLET ORAL DAILY
Status: DISCONTINUED | OUTPATIENT
Start: 2019-08-22 | End: 2019-08-29

## 2019-08-22 RX ORDER — NITROGLYCERIN 0.4 MG/1
0.4 TABLET SUBLINGUAL
Status: DISCONTINUED | OUTPATIENT
Start: 2019-08-22 | End: 2019-08-23

## 2019-08-22 RX ORDER — PRAVASTATIN SODIUM 40 MG
40 TABLET ORAL
Status: DISCONTINUED | OUTPATIENT
Start: 2019-08-23 | End: 2019-08-26

## 2019-08-22 RX ORDER — FENTANYL CITRATE 50 UG/ML
INJECTION, SOLUTION INTRAMUSCULAR; INTRAVENOUS AS NEEDED
Status: DISCONTINUED | OUTPATIENT
Start: 2019-08-22 | End: 2019-08-22 | Stop reason: HOSPADM

## 2019-08-22 RX ORDER — CHOLECALCIFEROL (VITAMIN D3) 125 MCG
1000 CAPSULE ORAL DAILY
Status: DISCONTINUED | OUTPATIENT
Start: 2019-08-22 | End: 2019-08-23

## 2019-08-22 RX ORDER — GABAPENTIN 100 MG/1
100 CAPSULE ORAL 3 TIMES DAILY
Status: DISCONTINUED | OUTPATIENT
Start: 2019-08-22 | End: 2019-08-31

## 2019-08-22 RX ORDER — SODIUM CHLORIDE 0.9 % (FLUSH) 0.9 %
1-10 SYRINGE (ML) INJECTION AS NEEDED
Status: CANCELLED | OUTPATIENT
Start: 2019-08-23

## 2019-08-22 RX ORDER — MIDAZOLAM HYDROCHLORIDE 1 MG/ML
INJECTION INTRAMUSCULAR; INTRAVENOUS AS NEEDED
Status: DISCONTINUED | OUTPATIENT
Start: 2019-08-22 | End: 2019-08-22 | Stop reason: HOSPADM

## 2019-08-22 RX ORDER — LIDOCAINE HYDROCHLORIDE 20 MG/ML
INJECTION, SOLUTION INFILTRATION; PERINEURAL AS NEEDED
Status: DISCONTINUED | OUTPATIENT
Start: 2019-08-22 | End: 2019-08-22 | Stop reason: HOSPADM

## 2019-08-22 RX ORDER — SODIUM CHLORIDE 0.9 % (FLUSH) 0.9 %
3-10 SYRINGE (ML) INJECTION AS NEEDED
Status: DISCONTINUED | OUTPATIENT
Start: 2019-08-22 | End: 2019-08-22 | Stop reason: HOSPADM

## 2019-08-22 RX ORDER — ACETAMINOPHEN 500 MG
1000 TABLET ORAL ONCE
Status: CANCELLED | OUTPATIENT
Start: 2019-08-23

## 2019-08-22 RX ORDER — CHLORHEXIDINE GLUCONATE 500 MG/1
1 CLOTH TOPICAL EVERY 12 HOURS PRN
Status: DISCONTINUED | OUTPATIENT
Start: 2019-08-22 | End: 2019-08-22 | Stop reason: SDUPTHER

## 2019-08-22 RX ORDER — VANCOMYCIN HYDROCHLORIDE 1 G/200ML
15 INJECTION, SOLUTION INTRAVENOUS
Status: DISCONTINUED | OUTPATIENT
Start: 2019-08-23 | End: 2019-08-22 | Stop reason: SDUPTHER

## 2019-08-22 RX ORDER — SODIUM CHLORIDE 9 MG/ML
125 INJECTION, SOLUTION INTRAVENOUS CONTINUOUS
Status: DISCONTINUED | OUTPATIENT
Start: 2019-08-22 | End: 2019-08-24

## 2019-08-22 RX ORDER — MELATONIN
1000 DAILY
Status: DISCONTINUED | OUTPATIENT
Start: 2019-08-22 | End: 2019-09-04 | Stop reason: HOSPADM

## 2019-08-22 RX ORDER — ACETAMINOPHEN 325 MG/1
650 TABLET ORAL EVERY 4 HOURS PRN
Status: DISCONTINUED | OUTPATIENT
Start: 2019-08-22 | End: 2019-08-23

## 2019-08-22 RX ORDER — POTASSIUM CHLORIDE 29.8 MG/ML
20 INJECTION INTRAVENOUS
Status: DISCONTINUED | OUTPATIENT
Start: 2019-08-22 | End: 2019-08-23

## 2019-08-22 RX ORDER — CHLORHEXIDINE GLUCONATE 0.12 MG/ML
15 RINSE ORAL ONCE
Status: DISCONTINUED | OUTPATIENT
Start: 2019-08-22 | End: 2019-08-22

## 2019-08-22 RX ORDER — CHLORHEXIDINE GLUCONATE 0.12 MG/ML
15 RINSE ORAL EVERY 12 HOURS SCHEDULED
Status: COMPLETED | OUTPATIENT
Start: 2019-08-22 | End: 2019-08-23

## 2019-08-22 RX ORDER — GABAPENTIN 100 MG/1
100 CAPSULE ORAL 3 TIMES DAILY
Status: DISCONTINUED | OUTPATIENT
Start: 2019-08-22 | End: 2019-08-22

## 2019-08-22 RX ADMIN — CHLORHEXIDINE GLUCONATE 15 ML: 1.2 RINSE ORAL at 21:08

## 2019-08-22 RX ADMIN — SODIUM CHLORIDE 125 ML/HR: 9 INJECTION, SOLUTION INTRAVENOUS at 10:00

## 2019-08-22 RX ADMIN — LEVOTHYROXINE SODIUM 25 MCG: 25 TABLET ORAL at 13:46

## 2019-08-22 RX ADMIN — SODIUM CHLORIDE 125 ML/HR: 9 INJECTION, SOLUTION INTRAVENOUS at 12:42

## 2019-08-22 RX ADMIN — CHLORHEXIDINE GLUCONATE 1 APPLICATION: 500 CLOTH TOPICAL at 21:08

## 2019-08-22 RX ADMIN — METOPROLOL SUCCINATE 50 MG: 50 TABLET, FILM COATED, EXTENDED RELEASE ORAL at 21:08

## 2019-08-22 RX ADMIN — GABAPENTIN 100 MG: 100 CAPSULE ORAL at 13:46

## 2019-08-22 RX ADMIN — Medication 3 ML: at 12:12

## 2019-08-22 RX ADMIN — GABAPENTIN 200 MG: 100 CAPSULE ORAL at 21:08

## 2019-08-22 RX ADMIN — ASPIRIN 81 MG: 81 TABLET, COATED ORAL at 13:46

## 2019-08-22 RX ADMIN — Medication 1000 MCG: at 13:46

## 2019-08-22 RX ADMIN — MUPIROCIN 1 APPLICATION: 20 OINTMENT TOPICAL at 21:08

## 2019-08-22 NOTE — ANESTHESIA PREPROCEDURE EVALUATION
Anesthesia Evaluation                  Airway   Mallampati: II  TM distance: >3 FB  Neck ROM: full  Dental - normal exam     Pulmonary - normal exam   Cardiovascular - normal exam    (+) pacemaker pacemaker, hypertension, valvular problems/murmurs AS and MR, CAD, dysrhythmias, hyperlipidemia,       Neuro/Psych  (+) psychiatric history Anxiety,     GI/Hepatic/Renal/Endo      Musculoskeletal     Abdominal    Substance History      OB/GYN          Other   (+) arthritis                     Anesthesia Plan    ASA 4     general     Anesthetic plan, all risks, benefits, and alternatives have been provided, discussed and informed consent has been obtained with: patient.

## 2019-08-23 ENCOUNTER — ANCILLARY PROCEDURE (OUTPATIENT)
Dept: PERIOP | Facility: HOSPITAL | Age: 79
End: 2019-08-23

## 2019-08-23 ENCOUNTER — APPOINTMENT (OUTPATIENT)
Dept: GENERAL RADIOLOGY | Facility: HOSPITAL | Age: 79
End: 2019-08-23

## 2019-08-23 ENCOUNTER — ANESTHESIA (OUTPATIENT)
Dept: PERIOP | Facility: HOSPITAL | Age: 79
End: 2019-08-23

## 2019-08-23 LAB
ACT BLD: 125 SECONDS (ref 82–152)
ACT BLD: 356 SECONDS (ref 82–152)
ACT BLD: 389 SECONDS (ref 82–152)
ACT BLD: 422 SECONDS (ref 82–152)
ACT BLD: 472 SECONDS (ref 82–152)
ALBUMIN SERPL-MCNC: 4.1 G/DL (ref 3.5–5.2)
ALBUMIN SERPL-MCNC: 4.8 G/DL (ref 3.5–5.2)
ANION GAP SERPL CALCULATED.3IONS-SCNC: 13.6 MMOL/L (ref 5–15)
ANION GAP SERPL CALCULATED.3IONS-SCNC: 8.9 MMOL/L (ref 5–15)
ANION GAP SERPL CALCULATED.3IONS-SCNC: 9.8 MMOL/L (ref 5–15)
APTT PPP: 32.2 SECONDS (ref 22.7–35.4)
ARTERIAL PATENCY WRIST A: ABNORMAL
ATMOSPHERIC PRESS: 752.5 MMHG
ATMOSPHERIC PRESS: 753.5 MMHG
ATMOSPHERIC PRESS: 753.8 MMHG
BASE EXCESS BLDA CALC-SCNC: -0.4 MMOL/L (ref 0–2)
BASE EXCESS BLDA CALC-SCNC: -1.3 MMOL/L (ref 0–2)
BASE EXCESS BLDA CALC-SCNC: 1 MMOL/L (ref -5–5)
BASE EXCESS BLDA CALC-SCNC: 1.4 MMOL/L (ref 0–2)
BASE EXCESS BLDA CALC-SCNC: 2 MMOL/L (ref -5–5)
BASE EXCESS BLDA CALC-SCNC: 4 MMOL/L (ref -5–5)
BASE EXCESS BLDA CALC-SCNC: 5 MMOL/L (ref -5–5)
BASE EXCESS BLDA CALC-SCNC: 6 MMOL/L (ref -5–5)
BASOPHILS # BLD AUTO: 0.02 10*3/MM3 (ref 0–0.2)
BASOPHILS NFR BLD AUTO: 0.2 % (ref 0–1.5)
BDY SITE: ABNORMAL
BUN BLD-MCNC: 12 MG/DL (ref 8–23)
BUN BLD-MCNC: 12 MG/DL (ref 8–23)
BUN BLD-MCNC: 9 MG/DL (ref 8–23)
BUN/CREAT SERPL: 13.4 (ref 7–25)
BUN/CREAT SERPL: 15.4 (ref 7–25)
BUN/CREAT SERPL: 15.8 (ref 7–25)
CA-I BLD-MCNC: 4.9 MG/DL (ref 4.6–5.4)
CA-I SERPL ISE-MCNC: 1.22 MMOL/L (ref 1.15–1.35)
CALCIUM SPEC-SCNC: 8.3 MG/DL (ref 8.6–10.5)
CALCIUM SPEC-SCNC: 8.7 MG/DL (ref 8.6–10.5)
CALCIUM SPEC-SCNC: 8.9 MG/DL (ref 8.6–10.5)
CHLORIDE SERPL-SCNC: 102 MMOL/L (ref 98–107)
CHLORIDE SERPL-SCNC: 110 MMOL/L (ref 98–107)
CHLORIDE SERPL-SCNC: 112 MMOL/L (ref 98–107)
CO2 BLDA-SCNC: 28 MMOL/L (ref 24–29)
CO2 BLDA-SCNC: 29 MMOL/L (ref 24–29)
CO2 BLDA-SCNC: 29 MMOL/L (ref 24–29)
CO2 BLDA-SCNC: 30 MMOL/L (ref 24–29)
CO2 BLDA-SCNC: 30 MMOL/L (ref 24–29)
CO2 BLDA-SCNC: 31 MMOL/L (ref 24–29)
CO2 BLDA-SCNC: 32 MMOL/L (ref 24–29)
CO2 SERPL-SCNC: 23.4 MMOL/L (ref 22–29)
CO2 SERPL-SCNC: 25.2 MMOL/L (ref 22–29)
CO2 SERPL-SCNC: 28.1 MMOL/L (ref 22–29)
CREAT BLD-MCNC: 0.67 MG/DL (ref 0.57–1)
CREAT BLD-MCNC: 0.76 MG/DL (ref 0.57–1)
CREAT BLD-MCNC: 0.78 MG/DL (ref 0.57–1)
DEPRECATED RDW RBC AUTO: 41.4 FL (ref 37–54)
DEPRECATED RDW RBC AUTO: 41.7 FL (ref 37–54)
DEPRECATED RDW RBC AUTO: 42.9 FL (ref 37–54)
EOSINOPHIL # BLD AUTO: 0.09 10*3/MM3 (ref 0–0.4)
EOSINOPHIL NFR BLD AUTO: 1 % (ref 0.3–6.2)
ERYTHROCYTE [DISTWIDTH] IN BLOOD BY AUTOMATED COUNT: 12.5 % (ref 12.3–15.4)
ERYTHROCYTE [DISTWIDTH] IN BLOOD BY AUTOMATED COUNT: 12.6 % (ref 12.3–15.4)
ERYTHROCYTE [DISTWIDTH] IN BLOOD BY AUTOMATED COUNT: 12.9 % (ref 12.3–15.4)
FIBRINOGEN PPP-MCNC: 214 MG/DL (ref 219–464)
GFR SERPL CREATININE-BSD FRML MDRD: 71 ML/MIN/1.73
GFR SERPL CREATININE-BSD FRML MDRD: 73 ML/MIN/1.73
GFR SERPL CREATININE-BSD FRML MDRD: 84 ML/MIN/1.73
GLUCOSE BLD-MCNC: 113 MG/DL (ref 65–99)
GLUCOSE BLD-MCNC: 115 MG/DL (ref 65–99)
GLUCOSE BLD-MCNC: 93 MG/DL (ref 65–99)
GLUCOSE BLDC GLUCOMTR-MCNC: 101 MG/DL (ref 70–130)
GLUCOSE BLDC GLUCOMTR-MCNC: 106 MG/DL (ref 70–130)
GLUCOSE BLDC GLUCOMTR-MCNC: 110 MG/DL (ref 70–130)
GLUCOSE BLDC GLUCOMTR-MCNC: 116 MG/DL (ref 70–130)
GLUCOSE BLDC GLUCOMTR-MCNC: 117 MG/DL (ref 70–130)
GLUCOSE BLDC GLUCOMTR-MCNC: 125 MG/DL (ref 70–130)
GLUCOSE BLDC GLUCOMTR-MCNC: 126 MG/DL (ref 70–130)
GLUCOSE BLDC GLUCOMTR-MCNC: 128 MG/DL (ref 70–130)
GLUCOSE BLDC GLUCOMTR-MCNC: 133 MG/DL (ref 70–130)
GLUCOSE BLDC GLUCOMTR-MCNC: 141 MG/DL (ref 70–130)
GLUCOSE BLDC GLUCOMTR-MCNC: 149 MG/DL (ref 70–130)
GLUCOSE BLDC GLUCOMTR-MCNC: 150 MG/DL (ref 70–130)
GLUCOSE BLDC GLUCOMTR-MCNC: 151 MG/DL (ref 70–130)
GLUCOSE BLDC GLUCOMTR-MCNC: 151 MG/DL (ref 70–130)
GLUCOSE BLDC GLUCOMTR-MCNC: 152 MG/DL (ref 70–130)
GLUCOSE BLDC GLUCOMTR-MCNC: 153 MG/DL (ref 70–130)
GLUCOSE BLDC GLUCOMTR-MCNC: 174 MG/DL (ref 70–130)
GLUCOSE BLDC GLUCOMTR-MCNC: 214 MG/DL (ref 70–130)
GLUCOSE BLDC GLUCOMTR-MCNC: 92 MG/DL (ref 70–130)
HCO3 BLDA-SCNC: 22.5 MMOL/L (ref 22–28)
HCO3 BLDA-SCNC: 25.4 MMOL/L (ref 22–28)
HCO3 BLDA-SCNC: 25.5 MMOL/L (ref 22–28)
HCO3 BLDA-SCNC: 26.2 MMOL/L (ref 22–26)
HCO3 BLDA-SCNC: 27.4 MMOL/L (ref 22–26)
HCO3 BLDA-SCNC: 28.1 MMOL/L (ref 22–26)
HCO3 BLDA-SCNC: 28.5 MMOL/L (ref 22–26)
HCO3 BLDA-SCNC: 29.1 MMOL/L (ref 22–26)
HCO3 BLDA-SCNC: 29.1 MMOL/L (ref 22–26)
HCO3 BLDA-SCNC: 30.3 MMOL/L (ref 22–26)
HCT VFR BLD AUTO: 30.8 % (ref 34–46.6)
HCT VFR BLD AUTO: 33 % (ref 34–46.6)
HCT VFR BLD AUTO: 37.9 % (ref 34–46.6)
HCT VFR BLDA CALC: 26 % (ref 38–51)
HCT VFR BLDA CALC: 27 % (ref 38–51)
HCT VFR BLDA CALC: 32 % (ref 38–51)
HCT VFR BLDA CALC: 35 % (ref 38–51)
HGB BLD-MCNC: 10.8 G/DL (ref 12–15.9)
HGB BLD-MCNC: 12.5 G/DL (ref 12–15.9)
HGB BLD-MCNC: 9.8 G/DL (ref 12–15.9)
HGB BLDA-MCNC: 10.9 G/DL (ref 12–17)
HGB BLDA-MCNC: 11.9 G/DL (ref 12–17)
HGB BLDA-MCNC: 8.8 G/DL (ref 12–17)
HGB BLDA-MCNC: 9.2 G/DL (ref 12–17)
HOROWITZ INDEX BLD+IHG-RTO: 100 %
HOROWITZ INDEX BLD+IHG-RTO: 40 %
HOROWITZ INDEX BLD+IHG-RTO: 40 %
IMM GRANULOCYTES # BLD AUTO: 0.06 10*3/MM3 (ref 0–0.05)
IMM GRANULOCYTES NFR BLD AUTO: 0.7 % (ref 0–0.5)
INR PPP: 1.38 (ref 0.9–1.1)
LYMPHOCYTES # BLD AUTO: 1.29 10*3/MM3 (ref 0.7–3.1)
LYMPHOCYTES NFR BLD AUTO: 15 % (ref 19.6–45.3)
MAGNESIUM SERPL-MCNC: 2.2 MG/DL (ref 1.6–2.4)
MAGNESIUM SERPL-MCNC: 2.5 MG/DL (ref 1.6–2.4)
MCH RBC QN AUTO: 28.9 PG (ref 26.6–33)
MCH RBC QN AUTO: 29.6 PG (ref 26.6–33)
MCH RBC QN AUTO: 29.8 PG (ref 26.6–33)
MCHC RBC AUTO-ENTMCNC: 31.8 G/DL (ref 31.5–35.7)
MCHC RBC AUTO-ENTMCNC: 32.7 G/DL (ref 31.5–35.7)
MCHC RBC AUTO-ENTMCNC: 33 G/DL (ref 31.5–35.7)
MCV RBC AUTO: 90.4 FL (ref 79–97)
MCV RBC AUTO: 90.5 FL (ref 79–97)
MCV RBC AUTO: 90.9 FL (ref 79–97)
MODALITY: ABNORMAL
MONOCYTES # BLD AUTO: 0.25 10*3/MM3 (ref 0.1–0.9)
MONOCYTES NFR BLD AUTO: 2.9 % (ref 5–12)
NEUTROPHILS # BLD AUTO: 6.87 10*3/MM3 (ref 1.7–7)
NEUTROPHILS NFR BLD AUTO: 80.2 % (ref 42.7–76)
NRBC BLD AUTO-RTO: 0 /100 WBC (ref 0–0.2)
O2 A-A PPRESDIFF RESPIRATORY: 0.4 MMHG
O2 A-A PPRESDIFF RESPIRATORY: 0.5 MMHG
O2 A-A PPRESDIFF RESPIRATORY: 0.6 MMHG
PCO2 BLDA: 33.7 MM HG (ref 35–45)
PCO2 BLDA: 37.6 MM HG (ref 35–45)
PCO2 BLDA: 42.7 MM HG (ref 35–45)
PCO2 BLDA: 43.2 MM HG (ref 35–45)
PCO2 BLDA: 43.2 MM HG (ref 35–45)
PCO2 BLDA: 44.3 MM HG (ref 35–45)
PCO2 BLDA: 45.4 MM HG (ref 35–45)
PCO2 BLDA: 45.9 MM HG (ref 35–45)
PCO2 BLDA: 48 MM HG (ref 35–45)
PCO2 BLDA: 49.9 MM HG (ref 35–45)
PEEP RESPIRATORY: 5 CM[H2O]
PH BLDA: 7.35 PH UNITS (ref 7.35–7.45)
PH BLDA: 7.36 PH UNITS (ref 7.35–7.6)
PH BLDA: 7.38 PH UNITS (ref 7.35–7.6)
PH BLDA: 7.39 PH UNITS (ref 7.35–7.6)
PH BLDA: 7.4 PH UNITS (ref 7.35–7.6)
PH BLDA: 7.42 PH UNITS (ref 7.35–7.6)
PH BLDA: 7.43 PH UNITS (ref 7.35–7.45)
PH BLDA: 7.44 PH UNITS (ref 7.35–7.45)
PH BLDA: 7.44 PH UNITS (ref 7.35–7.6)
PH BLDA: 7.44 PH UNITS (ref 7.35–7.6)
PHOSPHATE SERPL-MCNC: 1.7 MG/DL (ref 2.5–4.5)
PHOSPHATE SERPL-MCNC: 2.2 MG/DL (ref 2.5–4.5)
PLATELET # BLD AUTO: 111 10*3/MM3 (ref 140–450)
PLATELET # BLD AUTO: 157 10*3/MM3 (ref 140–450)
PLATELET # BLD AUTO: 93 10*3/MM3 (ref 140–450)
PMV BLD AUTO: 11.7 FL (ref 6–12)
PMV BLD AUTO: 12 FL (ref 6–12)
PMV BLD AUTO: 12.2 FL (ref 6–12)
PO2 BLDA: 109.8 MM HG (ref 80–100)
PO2 BLDA: 112.2 MM HG (ref 80–100)
PO2 BLDA: 395 MMHG (ref 80–105)
PO2 BLDA: 399 MMHG (ref 80–105)
PO2 BLDA: 42 MMHG (ref 80–105)
PO2 BLDA: 437 MMHG (ref 80–105)
PO2 BLDA: 449.5 MM HG (ref 80–100)
PO2 BLDA: 515 MMHG (ref 80–105)
PO2 BLDA: 520 MMHG (ref 80–105)
PO2 BLDA: 54 MMHG (ref 80–105)
POTASSIUM BLD-SCNC: 3.8 MMOL/L (ref 3.5–5.2)
POTASSIUM BLD-SCNC: 3.9 MMOL/L (ref 3.5–5.2)
POTASSIUM BLD-SCNC: 4 MMOL/L (ref 3.5–5.2)
POTASSIUM BLD-SCNC: 4 MMOL/L (ref 3.5–5.2)
POTASSIUM BLDA-SCNC: 3.6 MMOL/L (ref 3.5–4.9)
POTASSIUM BLDA-SCNC: 3.7 MMOL/L (ref 3.5–4.9)
POTASSIUM BLDA-SCNC: 4 MMOL/L (ref 3.5–4.9)
POTASSIUM BLDA-SCNC: 4.3 MMOL/L (ref 3.5–4.9)
POTASSIUM BLDA-SCNC: 4.6 MMOL/L (ref 3.5–4.9)
POTASSIUM BLDA-SCNC: 5 MMOL/L (ref 3.5–4.9)
POTASSIUM BLDA-SCNC: 5 MMOL/L (ref 3.5–4.9)
PROTHROMBIN TIME: 16.6 SECONDS (ref 11.7–14.2)
PSV: 8 CMH2O
RBC # BLD AUTO: 3.39 10*6/MM3 (ref 3.77–5.28)
RBC # BLD AUTO: 3.65 10*6/MM3 (ref 3.77–5.28)
RBC # BLD AUTO: 4.19 10*6/MM3 (ref 3.77–5.28)
SAO2 % BLDA: 100 % (ref 95–98)
SAO2 % BLDA: 76 % (ref 95–98)
SAO2 % BLDA: 86 % (ref 95–98)
SAO2 % BLDCOA: 100 % (ref 92–99)
SAO2 % BLDCOA: 98 % (ref 92–99)
SAO2 % BLDCOA: 98.6 % (ref 92–99)
SET MECH RESP RATE: 12
SET MECH RESP RATE: 14
SET MECH RESP RATE: 20
SODIUM BLD-SCNC: 139 MMOL/L (ref 136–145)
SODIUM BLD-SCNC: 147 MMOL/L (ref 136–145)
SODIUM BLD-SCNC: 147 MMOL/L (ref 136–145)
TOTAL RATE: 12 BREATHS/MINUTE
TOTAL RATE: 14 BREATHS/MINUTE
TOTAL RATE: 20 BREATHS/MINUTE
VENTILATOR MODE: ABNORMAL
VT ON VENT VENT: 534 ML
VT ON VENT VENT: 550 ML
VT ON VENT VENT: 550 ML
WBC NRBC COR # BLD: 5.25 10*3/MM3 (ref 3.4–10.8)
WBC NRBC COR # BLD: 6.65 10*3/MM3 (ref 3.4–10.8)
WBC NRBC COR # BLD: 8.58 10*3/MM3 (ref 3.4–10.8)

## 2019-08-23 PROCEDURE — 94799 UNLISTED PULMONARY SVC/PX: CPT

## 2019-08-23 PROCEDURE — 33508 ENDOSCOPIC VEIN HARVEST: CPT | Performed by: THORACIC SURGERY (CARDIOTHORACIC VASCULAR SURGERY)

## 2019-08-23 PROCEDURE — 06BQ4ZZ EXCISION OF LEFT SAPHENOUS VEIN, PERCUTANEOUS ENDOSCOPIC APPROACH: ICD-10-PCS | Performed by: THORACIC SURGERY (CARDIOTHORACIC VASCULAR SURGERY)

## 2019-08-23 PROCEDURE — 80069 RENAL FUNCTION PANEL: CPT | Performed by: THORACIC SURGERY (CARDIOTHORACIC VASCULAR SURGERY)

## 2019-08-23 PROCEDURE — 25010000002 HEPARIN (PORCINE) PER 1000 UNITS: Performed by: ANESTHESIOLOGY

## 2019-08-23 PROCEDURE — 85384 FIBRINOGEN ACTIVITY: CPT | Performed by: THORACIC SURGERY (CARDIOTHORACIC VASCULAR SURGERY)

## 2019-08-23 PROCEDURE — 25010000002 HEPARIN (PORCINE) PER 1000 UNITS: Performed by: THORACIC SURGERY (CARDIOTHORACIC VASCULAR SURGERY)

## 2019-08-23 PROCEDURE — 25010000002 ONDANSETRON PER 1 MG: Performed by: ANESTHESIOLOGY

## 2019-08-23 PROCEDURE — C1751 CATH, INF, PER/CENT/MIDLINE: HCPCS | Performed by: ANESTHESIOLOGY

## 2019-08-23 PROCEDURE — 25010000002 HEPARIN (PORCINE) PER 1000 UNITS

## 2019-08-23 PROCEDURE — A4648 IMPLANTABLE TISSUE MARKER: HCPCS | Performed by: THORACIC SURGERY (CARDIOTHORACIC VASCULAR SURGERY)

## 2019-08-23 PROCEDURE — 25010000003 PROTAMINE SULFATE PER 10 MG: Performed by: THORACIC SURGERY (CARDIOTHORACIC VASCULAR SURGERY)

## 2019-08-23 PROCEDURE — C1729 CATH, DRAINAGE: HCPCS | Performed by: THORACIC SURGERY (CARDIOTHORACIC VASCULAR SURGERY)

## 2019-08-23 PROCEDURE — 25010000002 PROPOFOL 1000 MG/ML EMULSION: Performed by: THORACIC SURGERY (CARDIOTHORACIC VASCULAR SURGERY)

## 2019-08-23 PROCEDURE — 25010000002 PHENYLEPHRINE PER 1 ML: Performed by: ANESTHESIOLOGY

## 2019-08-23 PROCEDURE — 82962 GLUCOSE BLOOD TEST: CPT

## 2019-08-23 PROCEDURE — 94002 VENT MGMT INPAT INIT DAY: CPT

## 2019-08-23 PROCEDURE — 83735 ASSAY OF MAGNESIUM: CPT | Performed by: THORACIC SURGERY (CARDIOTHORACIC VASCULAR SURGERY)

## 2019-08-23 PROCEDURE — C1713 ANCHOR/SCREW BN/BN,TIS/BN: HCPCS | Performed by: THORACIC SURGERY (CARDIOTHORACIC VASCULAR SURGERY)

## 2019-08-23 PROCEDURE — 25010000002 FENTANYL CITRATE (PF) 100 MCG/2ML SOLUTION: Performed by: ANESTHESIOLOGY

## 2019-08-23 PROCEDURE — P9041 ALBUMIN (HUMAN),5%, 50ML: HCPCS | Performed by: THORACIC SURGERY (CARDIOTHORACIC VASCULAR SURGERY)

## 2019-08-23 PROCEDURE — 85730 THROMBOPLASTIN TIME PARTIAL: CPT | Performed by: THORACIC SURGERY (CARDIOTHORACIC VASCULAR SURGERY)

## 2019-08-23 PROCEDURE — 85610 PROTHROMBIN TIME: CPT | Performed by: THORACIC SURGERY (CARDIOTHORACIC VASCULAR SURGERY)

## 2019-08-23 PROCEDURE — 25010000002 ALBUMIN HUMAN 5% PER 50 ML: Performed by: THORACIC SURGERY (CARDIOTHORACIC VASCULAR SURGERY)

## 2019-08-23 PROCEDURE — 25010000002 MIDAZOLAM PER 1 MG: Performed by: ANESTHESIOLOGY

## 2019-08-23 PROCEDURE — 25010000003 POTASSIUM CHLORIDE PER 2 MEQ: Performed by: THORACIC SURGERY (CARDIOTHORACIC VASCULAR SURGERY)

## 2019-08-23 PROCEDURE — 93318 ECHO TRANSESOPHAGEAL INTRAOP: CPT

## 2019-08-23 PROCEDURE — 93010 ELECTROCARDIOGRAM REPORT: CPT | Performed by: INTERNAL MEDICINE

## 2019-08-23 PROCEDURE — 85014 HEMATOCRIT: CPT

## 2019-08-23 PROCEDURE — 5A1221Z PERFORMANCE OF CARDIAC OUTPUT, CONTINUOUS: ICD-10-PCS | Performed by: THORACIC SURGERY (CARDIOTHORACIC VASCULAR SURGERY)

## 2019-08-23 PROCEDURE — 93005 ELECTROCARDIOGRAM TRACING: CPT | Performed by: THORACIC SURGERY (CARDIOTHORACIC VASCULAR SURGERY)

## 2019-08-23 PROCEDURE — 25010000002 ALBUMIN HUMAN 25% PER 50 ML

## 2019-08-23 PROCEDURE — 82803 BLOOD GASES ANY COMBINATION: CPT

## 2019-08-23 PROCEDURE — 3E080GC INTRODUCTION OF OTHER THERAPEUTIC SUBSTANCE INTO HEART, OPEN APPROACH: ICD-10-PCS | Performed by: THORACIC SURGERY (CARDIOTHORACIC VASCULAR SURGERY)

## 2019-08-23 PROCEDURE — P9047 ALBUMIN (HUMAN), 25%, 50ML: HCPCS

## 2019-08-23 PROCEDURE — 82947 ASSAY GLUCOSE BLOOD QUANT: CPT

## 2019-08-23 PROCEDURE — 25010000002 VANCOMYCIN PER 500 MG: Performed by: THORACIC SURGERY (CARDIOTHORACIC VASCULAR SURGERY)

## 2019-08-23 PROCEDURE — 71045 X-RAY EXAM CHEST 1 VIEW: CPT

## 2019-08-23 PROCEDURE — 33522 CABG ARTERY-VEIN FIVE: CPT | Performed by: THORACIC SURGERY (CARDIOTHORACIC VASCULAR SURGERY)

## 2019-08-23 PROCEDURE — 63710000001 INSULIN REGULAR HUMAN PER 5 UNITS: Performed by: ANESTHESIOLOGY

## 2019-08-23 PROCEDURE — 80048 BASIC METABOLIC PNL TOTAL CA: CPT | Performed by: INTERNAL MEDICINE

## 2019-08-23 PROCEDURE — 25010000002 MAGNESIUM SULFATE IN D5W 1G/100ML (PREMIX) 1-5 GM/100ML-% SOLUTION: Performed by: THORACIC SURGERY (CARDIOTHORACIC VASCULAR SURGERY)

## 2019-08-23 PROCEDURE — 25010000002 PAPAVERINE PER 60 MG: Performed by: THORACIC SURGERY (CARDIOTHORACIC VASCULAR SURGERY)

## 2019-08-23 PROCEDURE — 85347 COAGULATION TIME ACTIVATED: CPT

## 2019-08-23 PROCEDURE — 25010000002 VANCOMYCIN 1 G RECONSTITUTED SOLUTION

## 2019-08-23 PROCEDURE — 25010000002 METOCLOPRAMIDE PER 10 MG: Performed by: THORACIC SURGERY (CARDIOTHORACIC VASCULAR SURGERY)

## 2019-08-23 PROCEDURE — 0213099 BYPASS CORONARY ARTERY, FOUR OR MORE ARTERIES FROM LEFT INTERNAL MAMMARY WITH AUTOLOGOUS VENOUS TISSUE, OPEN APPROACH: ICD-10-PCS | Performed by: THORACIC SURGERY (CARDIOTHORACIC VASCULAR SURGERY)

## 2019-08-23 PROCEDURE — 84132 ASSAY OF SERUM POTASSIUM: CPT | Performed by: THORACIC SURGERY (CARDIOTHORACIC VASCULAR SURGERY)

## 2019-08-23 PROCEDURE — 85027 COMPLETE CBC AUTOMATED: CPT | Performed by: THORACIC SURGERY (CARDIOTHORACIC VASCULAR SURGERY)

## 2019-08-23 PROCEDURE — 33533 CABG ARTERIAL SINGLE: CPT | Performed by: THORACIC SURGERY (CARDIOTHORACIC VASCULAR SURGERY)

## 2019-08-23 PROCEDURE — 25010000002 PROPOFOL 10 MG/ML EMULSION: Performed by: ANESTHESIOLOGY

## 2019-08-23 PROCEDURE — 85027 COMPLETE CBC AUTOMATED: CPT | Performed by: INTERNAL MEDICINE

## 2019-08-23 PROCEDURE — 85018 HEMOGLOBIN: CPT

## 2019-08-23 PROCEDURE — 85025 COMPLETE CBC W/AUTO DIFF WBC: CPT | Performed by: THORACIC SURGERY (CARDIOTHORACIC VASCULAR SURGERY)

## 2019-08-23 PROCEDURE — 82330 ASSAY OF CALCIUM: CPT | Performed by: THORACIC SURGERY (CARDIOTHORACIC VASCULAR SURGERY)

## 2019-08-23 DEVICE — SS SUTURE, 3 PER SLEEVE
Type: IMPLANTABLE DEVICE | Site: STERNUM | Status: FUNCTIONAL
Brand: MYO/WIRE II

## 2019-08-23 DEVICE — SS SUTURE, 4 PER SLEEVE
Type: IMPLANTABLE DEVICE | Site: STERNUM | Status: FUNCTIONAL
Brand: MYO/WIRE II

## 2019-08-23 RX ORDER — POTASSIUM CHLORIDE 29.8 MG/ML
20 INJECTION INTRAVENOUS
Status: DISCONTINUED | OUTPATIENT
Start: 2019-08-23 | End: 2019-09-04 | Stop reason: HOSPADM

## 2019-08-23 RX ORDER — PROMETHAZINE HYDROCHLORIDE 25 MG/1
12.5 TABLET ORAL EVERY 6 HOURS PRN
Status: DISCONTINUED | OUTPATIENT
Start: 2019-08-23 | End: 2019-09-03

## 2019-08-23 RX ORDER — MEPERIDINE HYDROCHLORIDE 25 MG/ML
25 INJECTION INTRAMUSCULAR; INTRAVENOUS; SUBCUTANEOUS EVERY 4 HOURS PRN
Status: ACTIVE | OUTPATIENT
Start: 2019-08-23 | End: 2019-08-23

## 2019-08-23 RX ORDER — METOCLOPRAMIDE HYDROCHLORIDE 5 MG/ML
10 INJECTION INTRAMUSCULAR; INTRAVENOUS EVERY 6 HOURS
Status: ACTIVE | OUTPATIENT
Start: 2019-08-23 | End: 2019-08-24

## 2019-08-23 RX ORDER — SODIUM CHLORIDE 9 MG/ML
30 INJECTION, SOLUTION INTRAVENOUS CONTINUOUS
Status: DISCONTINUED | OUTPATIENT
Start: 2019-08-23 | End: 2019-08-29

## 2019-08-23 RX ORDER — PANTOPRAZOLE SODIUM 40 MG/1
40 TABLET, DELAYED RELEASE ORAL
Status: DISCONTINUED | OUTPATIENT
Start: 2019-08-24 | End: 2019-09-04 | Stop reason: HOSPADM

## 2019-08-23 RX ORDER — MIDAZOLAM HYDROCHLORIDE 1 MG/ML
INJECTION INTRAMUSCULAR; INTRAVENOUS AS NEEDED
Status: DISCONTINUED | OUTPATIENT
Start: 2019-08-23 | End: 2019-08-23 | Stop reason: SURG

## 2019-08-23 RX ORDER — MORPHINE SULFATE 2 MG/ML
4 INJECTION, SOLUTION INTRAMUSCULAR; INTRAVENOUS
Status: DISCONTINUED | OUTPATIENT
Start: 2019-08-23 | End: 2019-08-24

## 2019-08-23 RX ORDER — NOREPINEPHRINE BIT/0.9 % NACL 8 MG/250ML
.02-.3 INFUSION BOTTLE (ML) INTRAVENOUS CONTINUOUS PRN
Status: DISCONTINUED | OUTPATIENT
Start: 2019-08-23 | End: 2019-08-24

## 2019-08-23 RX ORDER — POTASSIUM CHLORIDE 7.45 MG/ML
10 INJECTION INTRAVENOUS
Status: DISCONTINUED | OUTPATIENT
Start: 2019-08-23 | End: 2019-09-04 | Stop reason: HOSPADM

## 2019-08-23 RX ORDER — SENNA AND DOCUSATE SODIUM 50; 8.6 MG/1; MG/1
2 TABLET, FILM COATED ORAL NIGHTLY
Status: DISCONTINUED | OUTPATIENT
Start: 2019-08-24 | End: 2019-09-04 | Stop reason: HOSPADM

## 2019-08-23 RX ORDER — BISACODYL 10 MG
10 SUPPOSITORY, RECTAL RECTAL DAILY PRN
Status: DISCONTINUED | OUTPATIENT
Start: 2019-08-24 | End: 2019-09-04 | Stop reason: HOSPADM

## 2019-08-23 RX ORDER — MILRINONE LACTATE 0.2 MG/ML
.25-.75 INJECTION, SOLUTION INTRAVENOUS CONTINUOUS PRN
Status: DISCONTINUED | OUTPATIENT
Start: 2019-08-23 | End: 2019-08-24

## 2019-08-23 RX ORDER — ACETAMINOPHEN 325 MG/1
650 TABLET ORAL EVERY 4 HOURS PRN
Status: DISCONTINUED | OUTPATIENT
Start: 2019-08-24 | End: 2019-09-04 | Stop reason: HOSPADM

## 2019-08-23 RX ORDER — CYCLOBENZAPRINE HCL 10 MG
10 TABLET ORAL EVERY 8 HOURS PRN
Status: DISCONTINUED | OUTPATIENT
Start: 2019-08-24 | End: 2019-09-04 | Stop reason: HOSPADM

## 2019-08-23 RX ORDER — FUROSEMIDE 10 MG/ML
40 INJECTION INTRAMUSCULAR; INTRAVENOUS EVERY 6 HOURS PRN
Status: DISCONTINUED | OUTPATIENT
Start: 2019-08-23 | End: 2019-09-03

## 2019-08-23 RX ORDER — NALOXONE HCL 0.4 MG/ML
0.4 VIAL (ML) INJECTION
Status: DISCONTINUED | OUTPATIENT
Start: 2019-08-23 | End: 2019-09-03

## 2019-08-23 RX ORDER — SODIUM CHLORIDE 0.9 % (FLUSH) 0.9 %
30 SYRINGE (ML) INJECTION ONCE AS NEEDED
Status: DISCONTINUED | OUTPATIENT
Start: 2019-08-23 | End: 2019-09-04 | Stop reason: HOSPADM

## 2019-08-23 RX ORDER — PROTAMINE SULFATE 10 MG/ML
INJECTION, SOLUTION INTRAVENOUS AS NEEDED
Status: DISCONTINUED | OUTPATIENT
Start: 2019-08-23 | End: 2019-08-23 | Stop reason: HOSPADM

## 2019-08-23 RX ORDER — HEPARIN SODIUM 1000 [USP'U]/ML
INJECTION, SOLUTION INTRAVENOUS; SUBCUTANEOUS AS NEEDED
Status: DISCONTINUED | OUTPATIENT
Start: 2019-08-23 | End: 2019-08-23 | Stop reason: SURG

## 2019-08-23 RX ORDER — AMINOCAPROIC ACID 250 MG/ML
INJECTION, SOLUTION INTRAVENOUS AS NEEDED
Status: DISCONTINUED | OUTPATIENT
Start: 2019-08-23 | End: 2019-08-23 | Stop reason: SURG

## 2019-08-23 RX ORDER — POTASSIUM CHLORIDE 1.5 G/1.77G
40 POWDER, FOR SOLUTION ORAL AS NEEDED
Status: DISCONTINUED | OUTPATIENT
Start: 2019-08-23 | End: 2019-09-04 | Stop reason: HOSPADM

## 2019-08-23 RX ORDER — SUFENTANIL CITRATE 50 UG/ML
INJECTION EPIDURAL; INTRAVENOUS AS NEEDED
Status: DISCONTINUED | OUTPATIENT
Start: 2019-08-23 | End: 2019-08-23 | Stop reason: SURG

## 2019-08-23 RX ORDER — HYDROCODONE BITARTRATE AND ACETAMINOPHEN 5; 325 MG/1; MG/1
2 TABLET ORAL EVERY 4 HOURS PRN
Status: DISPENSED | OUTPATIENT
Start: 2019-08-23 | End: 2019-09-02

## 2019-08-23 RX ORDER — OXYCODONE HYDROCHLORIDE 5 MG/1
10 TABLET ORAL EVERY 4 HOURS PRN
Status: ACTIVE | OUTPATIENT
Start: 2019-08-23 | End: 2019-09-02

## 2019-08-23 RX ORDER — PROMETHAZINE HYDROCHLORIDE 25 MG/ML
12.5 INJECTION, SOLUTION INTRAMUSCULAR; INTRAVENOUS EVERY 6 HOURS PRN
Status: DISCONTINUED | OUTPATIENT
Start: 2019-08-23 | End: 2019-09-03

## 2019-08-23 RX ORDER — POTASSIUM CHLORIDE 750 MG/1
40 CAPSULE, EXTENDED RELEASE ORAL AS NEEDED
Status: DISCONTINUED | OUTPATIENT
Start: 2019-08-23 | End: 2019-09-04 | Stop reason: HOSPADM

## 2019-08-23 RX ORDER — MIDAZOLAM HYDROCHLORIDE 1 MG/ML
2 INJECTION INTRAMUSCULAR; INTRAVENOUS
Status: DISCONTINUED | OUTPATIENT
Start: 2019-08-23 | End: 2019-08-24

## 2019-08-23 RX ORDER — ALPRAZOLAM 0.25 MG/1
0.25 TABLET ORAL EVERY 8 HOURS PRN
Status: DISCONTINUED | OUTPATIENT
Start: 2019-08-23 | End: 2019-08-24

## 2019-08-23 RX ORDER — NITROGLYCERIN 20 MG/100ML
5-200 INJECTION INTRAVENOUS
Status: DISCONTINUED | OUTPATIENT
Start: 2019-08-23 | End: 2019-08-24

## 2019-08-23 RX ORDER — ONDANSETRON 2 MG/ML
4 INJECTION INTRAMUSCULAR; INTRAVENOUS EVERY 6 HOURS PRN
Status: DISCONTINUED | OUTPATIENT
Start: 2019-08-23 | End: 2019-09-04 | Stop reason: HOSPADM

## 2019-08-23 RX ORDER — MAGNESIUM SULFATE 1 G/100ML
1 INJECTION INTRAVENOUS EVERY 8 HOURS
Status: DISPENSED | OUTPATIENT
Start: 2019-08-23 | End: 2019-08-24

## 2019-08-23 RX ORDER — MORPHINE SULFATE 2 MG/ML
1 INJECTION, SOLUTION INTRAMUSCULAR; INTRAVENOUS EVERY 4 HOURS PRN
Status: DISPENSED | OUTPATIENT
Start: 2019-08-23 | End: 2019-09-02

## 2019-08-23 RX ORDER — BISACODYL 5 MG/1
10 TABLET, DELAYED RELEASE ORAL DAILY PRN
Status: DISCONTINUED | OUTPATIENT
Start: 2019-08-23 | End: 2019-09-04 | Stop reason: HOSPADM

## 2019-08-23 RX ORDER — POTASSIUM CHLORIDE 29.8 MG/ML
20 INJECTION INTRAVENOUS
Status: COMPLETED | OUTPATIENT
Start: 2019-08-23 | End: 2019-08-23

## 2019-08-23 RX ORDER — PAPAVERINE HYDROCHLORIDE 30 MG/ML
INJECTION INTRAMUSCULAR; INTRAVENOUS AS NEEDED
Status: DISCONTINUED | OUTPATIENT
Start: 2019-08-23 | End: 2019-08-23 | Stop reason: HOSPADM

## 2019-08-23 RX ORDER — PROPOFOL 10 MG/ML
VIAL (ML) INTRAVENOUS AS NEEDED
Status: DISCONTINUED | OUTPATIENT
Start: 2019-08-23 | End: 2019-08-23 | Stop reason: SURG

## 2019-08-23 RX ORDER — VANCOMYCIN HYDROCHLORIDE 1 G/200ML
15 INJECTION, SOLUTION INTRAVENOUS EVERY 12 HOURS
Status: COMPLETED | OUTPATIENT
Start: 2019-08-23 | End: 2019-08-24

## 2019-08-23 RX ORDER — SODIUM CHLORIDE 9 MG/ML
INJECTION, SOLUTION INTRAVENOUS CONTINUOUS PRN
Status: DISCONTINUED | OUTPATIENT
Start: 2019-08-23 | End: 2019-08-23 | Stop reason: SURG

## 2019-08-23 RX ORDER — ROCURONIUM BROMIDE 10 MG/ML
INJECTION, SOLUTION INTRAVENOUS AS NEEDED
Status: DISCONTINUED | OUTPATIENT
Start: 2019-08-23 | End: 2019-08-23 | Stop reason: SURG

## 2019-08-23 RX ORDER — FENTANYL CITRATE 50 UG/ML
INJECTION, SOLUTION INTRAMUSCULAR; INTRAVENOUS AS NEEDED
Status: DISCONTINUED | OUTPATIENT
Start: 2019-08-23 | End: 2019-08-23 | Stop reason: SURG

## 2019-08-23 RX ORDER — ONDANSETRON 2 MG/ML
INJECTION INTRAMUSCULAR; INTRAVENOUS AS NEEDED
Status: DISCONTINUED | OUTPATIENT
Start: 2019-08-23 | End: 2019-08-23 | Stop reason: SURG

## 2019-08-23 RX ORDER — ATORVASTATIN CALCIUM 20 MG/1
40 TABLET, FILM COATED ORAL NIGHTLY
Status: DISCONTINUED | OUTPATIENT
Start: 2019-08-23 | End: 2019-08-23

## 2019-08-23 RX ORDER — DOPAMINE HYDROCHLORIDE 160 MG/100ML
2-20 INJECTION, SOLUTION INTRAVENOUS CONTINUOUS PRN
Status: DISCONTINUED | OUTPATIENT
Start: 2019-08-23 | End: 2019-08-24

## 2019-08-23 RX ORDER — SODIUM CHLORIDE 9 MG/ML
30 INJECTION, SOLUTION INTRAVENOUS CONTINUOUS PRN
Status: DISCONTINUED | OUTPATIENT
Start: 2019-08-23 | End: 2019-08-24

## 2019-08-23 RX ORDER — ALBUMIN, HUMAN INJ 5% 5 %
1500 SOLUTION INTRAVENOUS AS NEEDED
Status: DISPENSED | OUTPATIENT
Start: 2019-08-23 | End: 2019-08-24

## 2019-08-23 RX ORDER — CHLORHEXIDINE GLUCONATE 0.12 MG/ML
15 RINSE ORAL EVERY 12 HOURS
Status: DISCONTINUED | OUTPATIENT
Start: 2019-08-23 | End: 2019-08-26

## 2019-08-23 RX ORDER — ACETAMINOPHEN 650 MG/1
650 SUPPOSITORY RECTAL EVERY 4 HOURS PRN
Status: DISCONTINUED | OUTPATIENT
Start: 2019-08-24 | End: 2019-09-04 | Stop reason: HOSPADM

## 2019-08-23 RX ORDER — HEPARIN SODIUM 5000 [USP'U]/ML
INJECTION, SOLUTION INTRAVENOUS; SUBCUTANEOUS AS NEEDED
Status: DISCONTINUED | OUTPATIENT
Start: 2019-08-23 | End: 2019-08-23 | Stop reason: HOSPADM

## 2019-08-23 RX ORDER — ACETAMINOPHEN 160 MG/5ML
650 SOLUTION ORAL EVERY 4 HOURS PRN
Status: DISCONTINUED | OUTPATIENT
Start: 2019-08-24 | End: 2019-09-04 | Stop reason: HOSPADM

## 2019-08-23 RX ORDER — PROPOFOL 10 MG/ML
VIAL (ML) INTRAVENOUS CONTINUOUS PRN
Status: DISCONTINUED | OUTPATIENT
Start: 2019-08-23 | End: 2019-08-23 | Stop reason: SURG

## 2019-08-23 RX ORDER — ASPIRIN 81 MG/1
81 TABLET ORAL DAILY
Status: DISCONTINUED | OUTPATIENT
Start: 2019-08-23 | End: 2019-08-23 | Stop reason: SDUPTHER

## 2019-08-23 RX ADMIN — FENTANYL CITRATE 50 MCG: 50 INJECTION INTRAMUSCULAR; INTRAVENOUS at 06:56

## 2019-08-23 RX ADMIN — MUPIROCIN 1 APPLICATION: 20 OINTMENT TOPICAL at 20:10

## 2019-08-23 RX ADMIN — PROPOFOL 50 MCG/KG/MIN: 10 INJECTION, EMULSION INTRAVENOUS at 08:49

## 2019-08-23 RX ADMIN — Medication 1 MG: at 07:46

## 2019-08-23 RX ADMIN — SODIUM CHLORIDE 30 ML/HR: 9 INJECTION, SOLUTION INTRAVENOUS at 11:47

## 2019-08-23 RX ADMIN — SUFENTANIL CITRATE 25 MCG: 50 INJECTION, SOLUTION EPIDURAL; INTRAVENOUS at 07:54

## 2019-08-23 RX ADMIN — METOCLOPRAMIDE 10 MG: 5 INJECTION, SOLUTION INTRAMUSCULAR; INTRAVENOUS at 12:39

## 2019-08-23 RX ADMIN — SUFENTANIL CITRATE 25 MCG: 50 INJECTION, SOLUTION EPIDURAL; INTRAVENOUS at 10:19

## 2019-08-23 RX ADMIN — SODIUM CHLORIDE: 9 INJECTION, SOLUTION INTRAVENOUS at 06:53

## 2019-08-23 RX ADMIN — ALBUMIN (HUMAN) 1500 ML: 12.5 SOLUTION INTRAVENOUS at 20:11

## 2019-08-23 RX ADMIN — SODIUM CHLORIDE 1 MCG/KG/HR: 900 INJECTION, SOLUTION INTRAVENOUS at 07:06

## 2019-08-23 RX ADMIN — CHLORHEXIDINE GLUCONATE 15 ML: 1.2 RINSE ORAL at 18:15

## 2019-08-23 RX ADMIN — ROCURONIUM BROMIDE 50 MG: 10 INJECTION INTRAVENOUS at 07:08

## 2019-08-23 RX ADMIN — Medication 2 MG: at 09:59

## 2019-08-23 RX ADMIN — MUPIROCIN 1 APPLICATION: 20 OINTMENT TOPICAL at 06:09

## 2019-08-23 RX ADMIN — Medication 1 MG: at 07:00

## 2019-08-23 RX ADMIN — POTASSIUM CHLORIDE 20 MEQ: 29.8 INJECTION, SOLUTION INTRAVENOUS at 18:14

## 2019-08-23 RX ADMIN — AMINOCAPROIC ACID 10 G: 250 INJECTION, SOLUTION INTRAVENOUS at 07:43

## 2019-08-23 RX ADMIN — SUFENTANIL CITRATE 25 MCG: 50 INJECTION, SOLUTION EPIDURAL; INTRAVENOUS at 09:31

## 2019-08-23 RX ADMIN — METOPROLOL TARTRATE 12.5 MG: 25 TABLET ORAL at 06:09

## 2019-08-23 RX ADMIN — VANCOMYCIN HYDROCHLORIDE 1 G: 1 INJECTION, SOLUTION INTRAVENOUS at 07:06

## 2019-08-23 RX ADMIN — SUFENTANIL CITRATE 25 MCG: 50 INJECTION, SOLUTION EPIDURAL; INTRAVENOUS at 10:09

## 2019-08-23 RX ADMIN — ALBUMIN (HUMAN) 500 ML: 12.5 SOLUTION INTRAVENOUS at 14:21

## 2019-08-23 RX ADMIN — POTASSIUM CHLORIDE 20 MEQ: 29.8 INJECTION, SOLUTION INTRAVENOUS at 13:26

## 2019-08-23 RX ADMIN — PROPOFOL 40 MCG/KG/MIN: 10 INJECTION, EMULSION INTRAVENOUS at 12:25

## 2019-08-23 RX ADMIN — SUFENTANIL CITRATE 25 MCG: 50 INJECTION, SOLUTION EPIDURAL; INTRAVENOUS at 08:10

## 2019-08-23 RX ADMIN — VANCOMYCIN HYDROCHLORIDE 1000 MG: 1 INJECTION, SOLUTION INTRAVENOUS at 18:59

## 2019-08-23 RX ADMIN — DEXMEDETOMIDINE HYDROCHLORIDE 0.5 MCG/KG/HR: 100 INJECTION, SOLUTION, CONCENTRATE INTRAVENOUS at 14:33

## 2019-08-23 RX ADMIN — SODIUM CHLORIDE: 9 INJECTION, SOLUTION INTRAVENOUS at 07:29

## 2019-08-23 RX ADMIN — Medication 1 MG: at 06:56

## 2019-08-23 RX ADMIN — CHLORHEXIDINE GLUCONATE 1 APPLICATION: 500 CLOTH TOPICAL at 06:00

## 2019-08-23 RX ADMIN — PROPOFOL 150 MG: 10 INJECTION, EMULSION INTRAVENOUS at 07:08

## 2019-08-23 RX ADMIN — GABAPENTIN 100 MG: 100 CAPSULE ORAL at 20:10

## 2019-08-23 RX ADMIN — SUFENTANIL CITRATE 25 MCG: 50 INJECTION, SOLUTION EPIDURAL; INTRAVENOUS at 07:46

## 2019-08-23 RX ADMIN — LEVOTHYROXINE SODIUM 25 MCG: 25 TABLET ORAL at 06:09

## 2019-08-23 RX ADMIN — ALBUMIN (HUMAN) 500 ML: 12.5 SOLUTION INTRAVENOUS at 12:47

## 2019-08-23 RX ADMIN — ONDANSETRON 4 MG: 2 INJECTION INTRAMUSCULAR; INTRAVENOUS at 10:46

## 2019-08-23 RX ADMIN — HEPARIN SODIUM 22000 UNITS: 1000 INJECTION, SOLUTION INTRAVENOUS; SUBCUTANEOUS at 08:47

## 2019-08-23 RX ADMIN — MAGNESIUM SULFATE 1 G: 1 INJECTION INTRAVENOUS at 20:10

## 2019-08-23 RX ADMIN — CHLORHEXIDINE GLUCONATE 15 ML: 1.2 RINSE ORAL at 06:09

## 2019-08-23 RX ADMIN — AMINOCAPROIC ACID 10 G: 250 INJECTION, SOLUTION INTRAVENOUS at 10:26

## 2019-08-23 RX ADMIN — HYDROCODONE BITARTRATE AND ACETAMINOPHEN 2 TABLET: 5; 325 TABLET ORAL at 20:06

## 2019-08-23 RX ADMIN — SUFENTANIL CITRATE 25 MCG: 50 INJECTION, SOLUTION EPIDURAL; INTRAVENOUS at 10:31

## 2019-08-23 RX ADMIN — SODIUM CHLORIDE 2 MG/HR: 9 INJECTION, SOLUTION INTRAVENOUS at 10:12

## 2019-08-23 RX ADMIN — ALBUMIN (HUMAN) 250 ML: 12.5 SOLUTION INTRAVENOUS at 22:09

## 2019-08-23 RX ADMIN — SODIUM CHLORIDE 2.8 UNITS/HR: 900 INJECTION, SOLUTION INTRAVENOUS at 08:08

## 2019-08-23 RX ADMIN — SUFENTANIL CITRATE 25 MCG: 50 INJECTION, SOLUTION EPIDURAL; INTRAVENOUS at 08:06

## 2019-08-23 RX ADMIN — FENTANYL CITRATE 50 MCG: 50 INJECTION INTRAMUSCULAR; INTRAVENOUS at 07:06

## 2019-08-23 RX ADMIN — PHENYLEPHRINE HYDROCHLORIDE 0.5 MCG/KG/MIN: 10 INJECTION, SOLUTION INTRAMUSCULAR; INTRAVENOUS; SUBCUTANEOUS at 07:06

## 2019-08-23 NOTE — ANESTHESIA PROCEDURE NOTES
Arterial Line      Patient reassessed immediately prior to procedure    Patient location during procedure: OR  Start time: 8/23/2019 7:02 AM  Stop Time:8/23/2019 7:04 AM       Line placed for hemodynamic monitoring.  Performed By   Anesthesiologist: George Blair MD  Preanesthetic Checklist  Completed: patient identified, site marked, surgical consent, pre-op evaluation, timeout performed, IV checked, risks and benefits discussed and monitors and equipment checked  Arterial Line Prep   Sterile Tech: cap, gloves and sterile barriers  Prep: ChloraPrep  Patient monitoring: EKG, continuous pulse oximetry and blood pressure monitoring  Arterial Line Procedure   Laterality:left  Location:  radial artery  Catheter size: 20 G   Guidance: landmark technique  Number of attempts: 1  Successful placement: yes  Post Assessment   Dressing Type: wrist guard applied, secured with tape and occlusive dressing applied.   Complications no  Circ/Move/Sens Assessment: normal and unchanged.   Patient Tolerance: patient tolerated the procedure well with no apparent complications

## 2019-08-23 NOTE — ANESTHESIA PROCEDURE NOTES
Airway  Urgency: elective      General Information and Staff    Patient location during procedure: OR  Anesthesiologist: George Blair MD    Indications and Patient Condition    Preoxygenated: yes      Final Airway Details  Final airway type: endotracheal airway      Successful airway: ETT  Cuffed: yes   Successful intubation technique: direct laryngoscopy  Endotracheal tube insertion site: oral  Blade: Lexis  Blade size: 3  ETT size (mm): 7.5  Cormack-Lehane Classification: grade I - full view of glottis  Placement verified by: chest auscultation   Number of attempts at approach: 1

## 2019-08-23 NOTE — ANESTHESIA PROCEDURE NOTES
Central Line      Patient reassessed immediately prior to procedure    Patient location during procedure: OR  Stop Time:8/23/2019 7:29 AM  Indications: vascular access, MD/Surgeon request and central pressure monitoring  Staff  Anesthesiologist: George Blair MD  Preanesthetic Checklist  Completed: patient identified, surgical consent, pre-op evaluation, timeout performed, IV checked, risks and benefits discussed and monitors and equipment checked  Central Line Prep  Sterile Tech:cap, gloves, gown, mask and sterile barriers  Prep: chloraprep  Patient monitoring: blood pressure monitoring, continuous pulse oximetry and EKG  Central Line Procedure  Laterality:right  Location:internal jugular  Catheter Type:Brawley-Jordan  Guidance:landmark technique  Assessment  Post procedure:biopatch applied, line sutured and occlusive dressing applied  Assessement:blood return through all ports, free fluid flow and Kei Test  Complications:no  Patient Tolerance:patient tolerated the procedure well with no apparent complications

## 2019-08-23 NOTE — ANESTHESIA PROCEDURE NOTES
Central Line      Patient reassessed immediately prior to procedure    Patient location during procedure: OR  Start time: 8/23/2019 7:20 AM  Stop Time:8/23/2019 7:39 AM  Indications: vascular access, MD/Surgeon request and central pressure monitoring  Staff  Anesthesiologist: George Blair MD  Preanesthetic Checklist  Completed: patient identified, surgical consent, pre-op evaluation, timeout performed, IV checked, risks and benefits discussed and monitors and equipment checked  Central Line Prep  Sterile Tech:cap, gloves, gown, mask and sterile barriers  Prep: chloraprep  Patient monitoring: blood pressure monitoring, continuous pulse oximetry and EKG  Central Line Procedure  Laterality:right  Location:internal jugular  Catheter Type:Cordis  Catheter Size:9 Fr  Guidance:landmark technique  Assessment  Post procedure:biopatch applied, line sutured and occlusive dressing applied  Assessement:blood return through all ports, free fluid flow and Kei Test  Complications:no  Patient Tolerance:patient tolerated the procedure well with no apparent complications

## 2019-08-23 NOTE — ANESTHESIA PROCEDURE NOTES
Procedure Performed: Emergent/Open-Heart Anesthesia SUMMER     Start Time:        End Time:      Preanesthesia Checklist:  Patient identified, IV assessed, risks and benefits discussed, monitors and equipment assessed, procedure being performed at surgeon's request and anesthesia consent obtained.    General Procedure Information  SUMMER Placed for monitoring purposes only -- This is not a diagnostic SUMMER          Anesthesia Information      Echocardiogram Comments:       SUMMER placed easily x 1 attempt  DMP

## 2019-08-24 ENCOUNTER — APPOINTMENT (OUTPATIENT)
Dept: GENERAL RADIOLOGY | Facility: HOSPITAL | Age: 79
End: 2019-08-24

## 2019-08-24 LAB
ALBUMIN SERPL-MCNC: 4.6 G/DL (ref 3.5–5.2)
ANION GAP SERPL CALCULATED.3IONS-SCNC: 11.7 MMOL/L (ref 5–15)
ANION GAP SERPL CALCULATED.3IONS-SCNC: 12.9 MMOL/L (ref 5–15)
BASOPHILS # BLD AUTO: 0.02 10*3/MM3 (ref 0–0.2)
BASOPHILS NFR BLD AUTO: 0.2 % (ref 0–1.5)
BUN BLD-MCNC: 15 MG/DL (ref 8–23)
BUN BLD-MCNC: 17 MG/DL (ref 8–23)
BUN/CREAT SERPL: 20.3 (ref 7–25)
BUN/CREAT SERPL: 21 (ref 7–25)
CALCIUM SPEC-SCNC: 8.5 MG/DL (ref 8.6–10.5)
CALCIUM SPEC-SCNC: 9.4 MG/DL (ref 8.6–10.5)
CHLORIDE SERPL-SCNC: 112 MMOL/L (ref 98–107)
CHLORIDE SERPL-SCNC: 113 MMOL/L (ref 98–107)
CO2 SERPL-SCNC: 23.1 MMOL/L (ref 22–29)
CO2 SERPL-SCNC: 23.3 MMOL/L (ref 22–29)
CREAT BLD-MCNC: 0.74 MG/DL (ref 0.57–1)
CREAT BLD-MCNC: 0.81 MG/DL (ref 0.57–1)
DEPRECATED RDW RBC AUTO: 44.6 FL (ref 37–54)
EOSINOPHIL # BLD AUTO: 0.01 10*3/MM3 (ref 0–0.4)
EOSINOPHIL NFR BLD AUTO: 0.1 % (ref 0.3–6.2)
ERYTHROCYTE [DISTWIDTH] IN BLOOD BY AUTOMATED COUNT: 13 % (ref 12.3–15.4)
GFR SERPL CREATININE-BSD FRML MDRD: 68 ML/MIN/1.73
GFR SERPL CREATININE-BSD FRML MDRD: 75 ML/MIN/1.73
GLUCOSE BLD-MCNC: 136 MG/DL (ref 65–99)
GLUCOSE BLD-MCNC: 162 MG/DL (ref 65–99)
GLUCOSE BLDC GLUCOMTR-MCNC: 136 MG/DL (ref 70–130)
GLUCOSE BLDC GLUCOMTR-MCNC: 150 MG/DL (ref 70–130)
GLUCOSE BLDC GLUCOMTR-MCNC: 164 MG/DL (ref 70–130)
GLUCOSE BLDC GLUCOMTR-MCNC: 92 MG/DL (ref 70–130)
HCT VFR BLD AUTO: 31.5 % (ref 34–46.6)
HGB BLD-MCNC: 10 G/DL (ref 12–15.9)
IMM GRANULOCYTES # BLD AUTO: 0.04 10*3/MM3 (ref 0–0.05)
IMM GRANULOCYTES NFR BLD AUTO: 0.5 % (ref 0–0.5)
INR PPP: 1.33 (ref 0.9–1.1)
LYMPHOCYTES # BLD AUTO: 0.53 10*3/MM3 (ref 0.7–3.1)
LYMPHOCYTES NFR BLD AUTO: 6.4 % (ref 19.6–45.3)
MAGNESIUM SERPL-MCNC: 2.4 MG/DL (ref 1.6–2.4)
MCH RBC QN AUTO: 29.9 PG (ref 26.6–33)
MCHC RBC AUTO-ENTMCNC: 31.7 G/DL (ref 31.5–35.7)
MCV RBC AUTO: 94 FL (ref 79–97)
MONOCYTES # BLD AUTO: 0.62 10*3/MM3 (ref 0.1–0.9)
MONOCYTES NFR BLD AUTO: 7.5 % (ref 5–12)
NEUTROPHILS # BLD AUTO: 7.08 10*3/MM3 (ref 1.7–7)
NEUTROPHILS NFR BLD AUTO: 85.3 % (ref 42.7–76)
NRBC BLD AUTO-RTO: 0 /100 WBC (ref 0–0.2)
PHOSPHATE SERPL-MCNC: 3.1 MG/DL (ref 2.5–4.5)
PLATELET # BLD AUTO: 87 10*3/MM3 (ref 140–450)
PMV BLD AUTO: 12.9 FL (ref 6–12)
POTASSIUM BLD-SCNC: 4 MMOL/L (ref 3.5–5.2)
POTASSIUM BLD-SCNC: 4.1 MMOL/L (ref 3.5–5.2)
PROTHROMBIN TIME: 16.2 SECONDS (ref 11.7–14.2)
RBC # BLD AUTO: 3.35 10*6/MM3 (ref 3.77–5.28)
SODIUM BLD-SCNC: 148 MMOL/L (ref 136–145)
SODIUM BLD-SCNC: 148 MMOL/L (ref 136–145)
WBC NRBC COR # BLD: 8.3 10*3/MM3 (ref 3.4–10.8)

## 2019-08-24 PROCEDURE — 83735 ASSAY OF MAGNESIUM: CPT | Performed by: THORACIC SURGERY (CARDIOTHORACIC VASCULAR SURGERY)

## 2019-08-24 PROCEDURE — 93005 ELECTROCARDIOGRAM TRACING: CPT | Performed by: THORACIC SURGERY (CARDIOTHORACIC VASCULAR SURGERY)

## 2019-08-24 PROCEDURE — 93010 ELECTROCARDIOGRAM REPORT: CPT | Performed by: INTERNAL MEDICINE

## 2019-08-24 PROCEDURE — 25010000002 MORPHINE PER 10 MG: Performed by: THORACIC SURGERY (CARDIOTHORACIC VASCULAR SURGERY)

## 2019-08-24 PROCEDURE — 99231 SBSQ HOSP IP/OBS SF/LOW 25: CPT | Performed by: INTERNAL MEDICINE

## 2019-08-24 PROCEDURE — 97110 THERAPEUTIC EXERCISES: CPT

## 2019-08-24 PROCEDURE — 99024 POSTOP FOLLOW-UP VISIT: CPT | Performed by: NURSE PRACTITIONER

## 2019-08-24 PROCEDURE — 85610 PROTHROMBIN TIME: CPT | Performed by: THORACIC SURGERY (CARDIOTHORACIC VASCULAR SURGERY)

## 2019-08-24 PROCEDURE — 25010000002 ONDANSETRON PER 1 MG: Performed by: THORACIC SURGERY (CARDIOTHORACIC VASCULAR SURGERY)

## 2019-08-24 PROCEDURE — 71045 X-RAY EXAM CHEST 1 VIEW: CPT

## 2019-08-24 PROCEDURE — 85025 COMPLETE CBC W/AUTO DIFF WBC: CPT | Performed by: THORACIC SURGERY (CARDIOTHORACIC VASCULAR SURGERY)

## 2019-08-24 PROCEDURE — 97162 PT EVAL MOD COMPLEX 30 MIN: CPT

## 2019-08-24 PROCEDURE — 25010000002 VANCOMYCIN PER 500 MG: Performed by: THORACIC SURGERY (CARDIOTHORACIC VASCULAR SURGERY)

## 2019-08-24 PROCEDURE — 25010000002 ENOXAPARIN PER 10 MG: Performed by: THORACIC SURGERY (CARDIOTHORACIC VASCULAR SURGERY)

## 2019-08-24 PROCEDURE — 80069 RENAL FUNCTION PANEL: CPT | Performed by: THORACIC SURGERY (CARDIOTHORACIC VASCULAR SURGERY)

## 2019-08-24 PROCEDURE — 82962 GLUCOSE BLOOD TEST: CPT

## 2019-08-24 PROCEDURE — 80048 BASIC METABOLIC PNL TOTAL CA: CPT | Performed by: NURSE PRACTITIONER

## 2019-08-24 RX ORDER — CALCIUM CHLORIDE 100 MG/ML
0.5 INJECTION INTRAVENOUS; INTRAVENTRICULAR ONCE
Status: COMPLETED | OUTPATIENT
Start: 2019-08-24 | End: 2019-08-24

## 2019-08-24 RX ADMIN — GABAPENTIN 100 MG: 100 CAPSULE ORAL at 20:21

## 2019-08-24 RX ADMIN — MUPIROCIN 1 APPLICATION: 20 OINTMENT TOPICAL at 09:25

## 2019-08-24 RX ADMIN — CYCLOBENZAPRINE 10 MG: 10 TABLET, FILM COATED ORAL at 12:43

## 2019-08-24 RX ADMIN — VANCOMYCIN HYDROCHLORIDE 1000 MG: 1 INJECTION, SOLUTION INTRAVENOUS at 20:21

## 2019-08-24 RX ADMIN — ENOXAPARIN SODIUM 40 MG: 40 INJECTION SUBCUTANEOUS at 17:07

## 2019-08-24 RX ADMIN — CALCIUM CHLORIDE 0.5 G: 100 INJECTION INTRAVENOUS; INTRAVENTRICULAR at 08:07

## 2019-08-24 RX ADMIN — SENNOSIDES AND DOCUSATE SODIUM 2 TABLET: 8.6; 5 TABLET ORAL at 20:21

## 2019-08-24 RX ADMIN — ASPIRIN 81 MG: 81 TABLET, COATED ORAL at 09:25

## 2019-08-24 RX ADMIN — MUPIROCIN: 20 OINTMENT TOPICAL at 20:21

## 2019-08-24 RX ADMIN — LEVOTHYROXINE SODIUM 25 MCG: 25 TABLET ORAL at 05:38

## 2019-08-24 RX ADMIN — METOPROLOL TARTRATE 12.5 MG: 25 TABLET ORAL at 20:21

## 2019-08-24 RX ADMIN — CHLORHEXIDINE GLUCONATE 15 ML: 1.2 RINSE ORAL at 17:07

## 2019-08-24 RX ADMIN — HYDROCODONE BITARTRATE AND ACETAMINOPHEN 2 TABLET: 5; 325 TABLET ORAL at 23:46

## 2019-08-24 RX ADMIN — VITAMIN D, TAB 1000IU (100/BT) 1000 UNITS: 25 TAB at 09:25

## 2019-08-24 RX ADMIN — HYDROCODONE BITARTRATE AND ACETAMINOPHEN 2 TABLET: 5; 325 TABLET ORAL at 04:49

## 2019-08-24 RX ADMIN — HYDROCODONE BITARTRATE AND ACETAMINOPHEN 2 TABLET: 5; 325 TABLET ORAL at 12:43

## 2019-08-24 RX ADMIN — ALPRAZOLAM 0.25 MG: 0.25 TABLET ORAL at 04:49

## 2019-08-24 RX ADMIN — VANCOMYCIN HYDROCHLORIDE 1000 MG: 1 INJECTION, SOLUTION INTRAVENOUS at 06:24

## 2019-08-24 RX ADMIN — PANTOPRAZOLE SODIUM 40 MG: 40 TABLET, DELAYED RELEASE ORAL at 05:38

## 2019-08-24 RX ADMIN — MORPHINE SULFATE 1 MG: 2 INJECTION, SOLUTION INTRAMUSCULAR; INTRAVENOUS at 01:47

## 2019-08-24 RX ADMIN — ACETAMINOPHEN 650 MG: 325 TABLET, FILM COATED ORAL at 11:36

## 2019-08-24 RX ADMIN — CHLORHEXIDINE GLUCONATE 15 ML: 1.2 RINSE ORAL at 05:38

## 2019-08-24 RX ADMIN — GABAPENTIN 100 MG: 100 CAPSULE ORAL at 16:31

## 2019-08-24 RX ADMIN — CYCLOBENZAPRINE 10 MG: 10 TABLET, FILM COATED ORAL at 22:02

## 2019-08-24 RX ADMIN — ONDANSETRON 4 MG: 2 INJECTION INTRAMUSCULAR; INTRAVENOUS at 23:47

## 2019-08-24 RX ADMIN — GABAPENTIN 100 MG: 100 CAPSULE ORAL at 09:25

## 2019-08-24 RX ADMIN — PRAVASTATIN SODIUM 40 MG: 40 TABLET ORAL at 09:25

## 2019-08-25 ENCOUNTER — APPOINTMENT (OUTPATIENT)
Dept: GENERAL RADIOLOGY | Facility: HOSPITAL | Age: 79
End: 2019-08-25

## 2019-08-25 LAB
ANION GAP SERPL CALCULATED.3IONS-SCNC: 10.3 MMOL/L (ref 5–15)
BUN BLD-MCNC: 21 MG/DL (ref 8–23)
BUN/CREAT SERPL: 28.8 (ref 7–25)
CALCIUM SPEC-SCNC: 9 MG/DL (ref 8.6–10.5)
CHLORIDE SERPL-SCNC: 107 MMOL/L (ref 98–107)
CO2 SERPL-SCNC: 23.7 MMOL/L (ref 22–29)
CREAT BLD-MCNC: 0.73 MG/DL (ref 0.57–1)
DEPRECATED RDW RBC AUTO: 47 FL (ref 37–54)
ERYTHROCYTE [DISTWIDTH] IN BLOOD BY AUTOMATED COUNT: 13.5 % (ref 12.3–15.4)
GFR SERPL CREATININE-BSD FRML MDRD: 77 ML/MIN/1.73
GLUCOSE BLD-MCNC: 154 MG/DL (ref 65–99)
GLUCOSE BLDC GLUCOMTR-MCNC: 114 MG/DL (ref 70–130)
GLUCOSE BLDC GLUCOMTR-MCNC: 136 MG/DL (ref 70–130)
HCT VFR BLD AUTO: 30.9 % (ref 34–46.6)
HGB BLD-MCNC: 9.5 G/DL (ref 12–15.9)
MCH RBC QN AUTO: 29.2 PG (ref 26.6–33)
MCHC RBC AUTO-ENTMCNC: 30.7 G/DL (ref 31.5–35.7)
MCV RBC AUTO: 95.1 FL (ref 79–97)
PLATELET # BLD AUTO: 92 10*3/MM3 (ref 140–450)
PMV BLD AUTO: 12.8 FL (ref 6–12)
POTASSIUM BLD-SCNC: 4.2 MMOL/L (ref 3.5–5.2)
RBC # BLD AUTO: 3.25 10*6/MM3 (ref 3.77–5.28)
SODIUM BLD-SCNC: 141 MMOL/L (ref 136–145)
WBC NRBC COR # BLD: 9.51 10*3/MM3 (ref 3.4–10.8)

## 2019-08-25 PROCEDURE — 97110 THERAPEUTIC EXERCISES: CPT

## 2019-08-25 PROCEDURE — 82962 GLUCOSE BLOOD TEST: CPT

## 2019-08-25 PROCEDURE — 85027 COMPLETE CBC AUTOMATED: CPT | Performed by: THORACIC SURGERY (CARDIOTHORACIC VASCULAR SURGERY)

## 2019-08-25 PROCEDURE — 93010 ELECTROCARDIOGRAM REPORT: CPT | Performed by: INTERNAL MEDICINE

## 2019-08-25 PROCEDURE — 93005 ELECTROCARDIOGRAM TRACING: CPT | Performed by: THORACIC SURGERY (CARDIOTHORACIC VASCULAR SURGERY)

## 2019-08-25 PROCEDURE — 99232 SBSQ HOSP IP/OBS MODERATE 35: CPT | Performed by: INTERNAL MEDICINE

## 2019-08-25 PROCEDURE — 71045 X-RAY EXAM CHEST 1 VIEW: CPT

## 2019-08-25 PROCEDURE — 80048 BASIC METABOLIC PNL TOTAL CA: CPT | Performed by: THORACIC SURGERY (CARDIOTHORACIC VASCULAR SURGERY)

## 2019-08-25 RX ADMIN — MUPIROCIN 1 APPLICATION: 20 OINTMENT TOPICAL at 10:03

## 2019-08-25 RX ADMIN — GABAPENTIN 100 MG: 100 CAPSULE ORAL at 20:29

## 2019-08-25 RX ADMIN — CHLORHEXIDINE GLUCONATE 15 ML: 1.2 RINSE ORAL at 07:57

## 2019-08-25 RX ADMIN — METOPROLOL TARTRATE 25 MG: 25 TABLET ORAL at 20:29

## 2019-08-25 RX ADMIN — PANTOPRAZOLE SODIUM 40 MG: 40 TABLET, DELAYED RELEASE ORAL at 06:31

## 2019-08-25 RX ADMIN — METOPROLOL TARTRATE 12.5 MG: 25 TABLET ORAL at 10:03

## 2019-08-25 RX ADMIN — GABAPENTIN 100 MG: 100 CAPSULE ORAL at 10:03

## 2019-08-25 RX ADMIN — HYDROCODONE BITARTRATE AND ACETAMINOPHEN 2 TABLET: 5; 325 TABLET ORAL at 23:34

## 2019-08-25 RX ADMIN — SENNOSIDES AND DOCUSATE SODIUM 2 TABLET: 8.6; 5 TABLET ORAL at 20:29

## 2019-08-25 RX ADMIN — HYDROCODONE BITARTRATE AND ACETAMINOPHEN 2 TABLET: 5; 325 TABLET ORAL at 06:31

## 2019-08-25 RX ADMIN — LEVOTHYROXINE SODIUM 25 MCG: 25 TABLET ORAL at 07:58

## 2019-08-25 RX ADMIN — HYDROCODONE BITARTRATE AND ACETAMINOPHEN 2 TABLET: 5; 325 TABLET ORAL at 15:15

## 2019-08-25 RX ADMIN — GABAPENTIN 100 MG: 100 CAPSULE ORAL at 16:21

## 2019-08-25 RX ADMIN — MUPIROCIN: 20 OINTMENT TOPICAL at 20:29

## 2019-08-25 RX ADMIN — VITAMIN D, TAB 1000IU (100/BT) 1000 UNITS: 25 TAB at 10:03

## 2019-08-26 ENCOUNTER — APPOINTMENT (OUTPATIENT)
Dept: GENERAL RADIOLOGY | Facility: HOSPITAL | Age: 79
End: 2019-08-26

## 2019-08-26 LAB
ABO + RH BLD: NORMAL
ABO + RH BLD: NORMAL
ANION GAP SERPL CALCULATED.3IONS-SCNC: 11.2 MMOL/L (ref 5–15)
BH BB BLOOD EXPIRATION DATE: NORMAL
BH BB BLOOD EXPIRATION DATE: NORMAL
BH BB BLOOD TYPE BARCODE: 6200
BH BB BLOOD TYPE BARCODE: 6200
BH BB DISPENSE STATUS: NORMAL
BH BB DISPENSE STATUS: NORMAL
BH BB PRODUCT CODE: NORMAL
BH BB PRODUCT CODE: NORMAL
BH BB UNIT NUMBER: NORMAL
BH BB UNIT NUMBER: NORMAL
BUN BLD-MCNC: 21 MG/DL (ref 8–23)
BUN/CREAT SERPL: 33.9 (ref 7–25)
CALCIUM SPEC-SCNC: 9 MG/DL (ref 8.6–10.5)
CHLORIDE SERPL-SCNC: 101 MMOL/L (ref 98–107)
CO2 SERPL-SCNC: 24.8 MMOL/L (ref 22–29)
CREAT BLD-MCNC: 0.62 MG/DL (ref 0.57–1)
DEPRECATED RDW RBC AUTO: 44.4 FL (ref 37–54)
ERYTHROCYTE [DISTWIDTH] IN BLOOD BY AUTOMATED COUNT: 13.1 % (ref 12.3–15.4)
GFR SERPL CREATININE-BSD FRML MDRD: 92 ML/MIN/1.73
GLUCOSE BLD-MCNC: 129 MG/DL (ref 65–99)
GLUCOSE BLDC GLUCOMTR-MCNC: 120 MG/DL (ref 70–130)
GLUCOSE BLDC GLUCOMTR-MCNC: 120 MG/DL (ref 70–130)
HCT VFR BLD AUTO: 33.3 % (ref 34–46.6)
HGB BLD-MCNC: 10.8 G/DL (ref 12–15.9)
MCH RBC QN AUTO: 30.2 PG (ref 26.6–33)
MCHC RBC AUTO-ENTMCNC: 32.4 G/DL (ref 31.5–35.7)
MCV RBC AUTO: 93 FL (ref 79–97)
PLATELET # BLD AUTO: 114 10*3/MM3 (ref 140–450)
PMV BLD AUTO: 12.2 FL (ref 6–12)
POTASSIUM BLD-SCNC: 3.7 MMOL/L (ref 3.5–5.2)
RBC # BLD AUTO: 3.58 10*6/MM3 (ref 3.77–5.28)
SODIUM BLD-SCNC: 137 MMOL/L (ref 136–145)
UNIT  ABO: NORMAL
UNIT  ABO: NORMAL
UNIT  RH: NORMAL
UNIT  RH: NORMAL
WBC NRBC COR # BLD: 3.78 10*3/MM3 (ref 3.4–10.8)

## 2019-08-26 PROCEDURE — 80048 BASIC METABOLIC PNL TOTAL CA: CPT | Performed by: THORACIC SURGERY (CARDIOTHORACIC VASCULAR SURGERY)

## 2019-08-26 PROCEDURE — 25010000002 ONDANSETRON PER 1 MG: Performed by: THORACIC SURGERY (CARDIOTHORACIC VASCULAR SURGERY)

## 2019-08-26 PROCEDURE — 99232 SBSQ HOSP IP/OBS MODERATE 35: CPT | Performed by: PHYSICIAN ASSISTANT

## 2019-08-26 PROCEDURE — 71046 X-RAY EXAM CHEST 2 VIEWS: CPT

## 2019-08-26 PROCEDURE — 25010000002 FUROSEMIDE PER 20 MG: Performed by: INTERNAL MEDICINE

## 2019-08-26 PROCEDURE — 94762 N-INVAS EAR/PLS OXIMTRY CONT: CPT

## 2019-08-26 PROCEDURE — 82962 GLUCOSE BLOOD TEST: CPT

## 2019-08-26 PROCEDURE — 97110 THERAPEUTIC EXERCISES: CPT

## 2019-08-26 PROCEDURE — 71045 X-RAY EXAM CHEST 1 VIEW: CPT

## 2019-08-26 PROCEDURE — 25010000002 ENOXAPARIN PER 10 MG: Performed by: THORACIC SURGERY (CARDIOTHORACIC VASCULAR SURGERY)

## 2019-08-26 PROCEDURE — 85027 COMPLETE CBC AUTOMATED: CPT | Performed by: THORACIC SURGERY (CARDIOTHORACIC VASCULAR SURGERY)

## 2019-08-26 RX ORDER — FUROSEMIDE 40 MG/1
40 TABLET ORAL DAILY
Status: DISCONTINUED | OUTPATIENT
Start: 2019-08-26 | End: 2019-08-26

## 2019-08-26 RX ORDER — ATORVASTATIN CALCIUM 20 MG/1
40 TABLET, FILM COATED ORAL DAILY
Status: DISCONTINUED | OUTPATIENT
Start: 2019-08-26 | End: 2019-08-29

## 2019-08-26 RX ORDER — LOSARTAN POTASSIUM 25 MG/1
25 TABLET ORAL DAILY
Status: DISCONTINUED | OUTPATIENT
Start: 2019-08-26 | End: 2019-08-29

## 2019-08-26 RX ORDER — FUROSEMIDE 10 MG/ML
40 INJECTION INTRAMUSCULAR; INTRAVENOUS EVERY 12 HOURS
Status: DISCONTINUED | OUTPATIENT
Start: 2019-08-26 | End: 2019-08-27

## 2019-08-26 RX ORDER — FERROUS SULFATE 325(65) MG
325 TABLET ORAL
Status: DISCONTINUED | OUTPATIENT
Start: 2019-08-26 | End: 2019-08-29

## 2019-08-26 RX ORDER — DIPHENOXYLATE HYDROCHLORIDE AND ATROPINE SULFATE 2.5; .025 MG/1; MG/1
1 TABLET ORAL DAILY
Status: DISCONTINUED | OUTPATIENT
Start: 2019-08-26 | End: 2019-08-29

## 2019-08-26 RX ORDER — POTASSIUM CHLORIDE 750 MG/1
20 CAPSULE, EXTENDED RELEASE ORAL DAILY
Status: DISCONTINUED | OUTPATIENT
Start: 2019-08-26 | End: 2019-08-29

## 2019-08-26 RX ADMIN — MUPIROCIN 1 APPLICATION: 20 OINTMENT TOPICAL at 10:13

## 2019-08-26 RX ADMIN — PANTOPRAZOLE SODIUM 40 MG: 40 TABLET, DELAYED RELEASE ORAL at 05:02

## 2019-08-26 RX ADMIN — LEVOTHYROXINE SODIUM 25 MCG: 25 TABLET ORAL at 06:33

## 2019-08-26 RX ADMIN — VITAMIN D, TAB 1000IU (100/BT) 1000 UNITS: 25 TAB at 10:11

## 2019-08-26 RX ADMIN — FUROSEMIDE 40 MG: 10 INJECTION, SOLUTION INTRAMUSCULAR; INTRAVENOUS at 10:12

## 2019-08-26 RX ADMIN — FUROSEMIDE 40 MG: 10 INJECTION, SOLUTION INTRAMUSCULAR; INTRAVENOUS at 23:00

## 2019-08-26 RX ADMIN — ASPIRIN 81 MG: 81 TABLET, COATED ORAL at 10:11

## 2019-08-26 RX ADMIN — ONDANSETRON 4 MG: 2 INJECTION INTRAMUSCULAR; INTRAVENOUS at 16:14

## 2019-08-26 RX ADMIN — ENOXAPARIN SODIUM 40 MG: 40 INJECTION SUBCUTANEOUS at 18:25

## 2019-08-26 RX ADMIN — LOSARTAN POTASSIUM 25 MG: 25 TABLET, FILM COATED ORAL at 13:40

## 2019-08-26 RX ADMIN — METOPROLOL TARTRATE 25 MG: 25 TABLET ORAL at 10:11

## 2019-08-26 RX ADMIN — MUPIROCIN 1 APPLICATION: 20 OINTMENT TOPICAL at 20:38

## 2019-08-26 RX ADMIN — METOPROLOL TARTRATE 25 MG: 25 TABLET ORAL at 20:39

## 2019-08-26 RX ADMIN — GABAPENTIN 100 MG: 100 CAPSULE ORAL at 20:38

## 2019-08-26 RX ADMIN — GABAPENTIN 100 MG: 100 CAPSULE ORAL at 10:12

## 2019-08-26 RX ADMIN — HYDROCODONE BITARTRATE AND ACETAMINOPHEN 2 TABLET: 5; 325 TABLET ORAL at 05:02

## 2019-08-26 RX ADMIN — ATORVASTATIN CALCIUM 40 MG: 20 TABLET, FILM COATED ORAL at 20:38

## 2019-08-26 RX ADMIN — FERROUS SULFATE TAB 325 MG (65 MG ELEMENTAL FE) 325 MG: 325 (65 FE) TAB at 13:40

## 2019-08-26 RX ADMIN — Medication 1 TABLET: at 13:40

## 2019-08-26 RX ADMIN — CYCLOBENZAPRINE 10 MG: 10 TABLET, FILM COATED ORAL at 01:52

## 2019-08-26 RX ADMIN — POTASSIUM CHLORIDE 20 MEQ: 750 CAPSULE, EXTENDED RELEASE ORAL at 10:12

## 2019-08-26 RX ADMIN — SENNOSIDES AND DOCUSATE SODIUM 2 TABLET: 8.6; 5 TABLET ORAL at 20:38

## 2019-08-27 ENCOUNTER — APPOINTMENT (OUTPATIENT)
Dept: GENERAL RADIOLOGY | Facility: HOSPITAL | Age: 79
End: 2019-08-27

## 2019-08-27 LAB
AMYLASE SERPL-CCNC: 55 U/L (ref 28–100)
ANION GAP SERPL CALCULATED.3IONS-SCNC: 14.3 MMOL/L (ref 5–15)
BUN BLD-MCNC: 28 MG/DL (ref 8–23)
BUN/CREAT SERPL: 34.6 (ref 7–25)
CALCIUM SPEC-SCNC: 9.4 MG/DL (ref 8.6–10.5)
CHLORIDE SERPL-SCNC: 97 MMOL/L (ref 98–107)
CO2 SERPL-SCNC: 24.7 MMOL/L (ref 22–29)
CREAT BLD-MCNC: 0.81 MG/DL (ref 0.57–1)
DEPRECATED RDW RBC AUTO: 43 FL (ref 37–54)
ERYTHROCYTE [DISTWIDTH] IN BLOOD BY AUTOMATED COUNT: 13 % (ref 12.3–15.4)
GFR SERPL CREATININE-BSD FRML MDRD: 68 ML/MIN/1.73
GLUCOSE BLD-MCNC: 113 MG/DL (ref 65–99)
GLUCOSE BLDC GLUCOMTR-MCNC: 103 MG/DL (ref 70–130)
GLUCOSE BLDC GLUCOMTR-MCNC: 106 MG/DL (ref 70–130)
HCT VFR BLD AUTO: 33.5 % (ref 34–46.6)
HGB BLD-MCNC: 10.8 G/DL (ref 12–15.9)
LIPASE SERPL-CCNC: 25 U/L (ref 13–60)
MCH RBC QN AUTO: 29.4 PG (ref 26.6–33)
MCHC RBC AUTO-ENTMCNC: 32.2 G/DL (ref 31.5–35.7)
MCV RBC AUTO: 91.3 FL (ref 79–97)
PLATELET # BLD AUTO: 193 10*3/MM3 (ref 140–450)
PMV BLD AUTO: 11.9 FL (ref 6–12)
POTASSIUM BLD-SCNC: 3.8 MMOL/L (ref 3.5–5.2)
RBC # BLD AUTO: 3.67 10*6/MM3 (ref 3.77–5.28)
SODIUM BLD-SCNC: 136 MMOL/L (ref 136–145)
WBC NRBC COR # BLD: 2.99 10*3/MM3 (ref 3.4–10.8)

## 2019-08-27 PROCEDURE — 0D9670Z DRAINAGE OF STOMACH WITH DRAINAGE DEVICE, VIA NATURAL OR ARTIFICIAL OPENING: ICD-10-PCS | Performed by: INTERNAL MEDICINE

## 2019-08-27 PROCEDURE — 82150 ASSAY OF AMYLASE: CPT | Performed by: NURSE PRACTITIONER

## 2019-08-27 PROCEDURE — 99221 1ST HOSP IP/OBS SF/LOW 40: CPT | Performed by: SURGERY

## 2019-08-27 PROCEDURE — 80048 BASIC METABOLIC PNL TOTAL CA: CPT | Performed by: THORACIC SURGERY (CARDIOTHORACIC VASCULAR SURGERY)

## 2019-08-27 PROCEDURE — 94799 UNLISTED PULMONARY SVC/PX: CPT

## 2019-08-27 PROCEDURE — 82962 GLUCOSE BLOOD TEST: CPT

## 2019-08-27 PROCEDURE — 85027 COMPLETE CBC AUTOMATED: CPT | Performed by: THORACIC SURGERY (CARDIOTHORACIC VASCULAR SURGERY)

## 2019-08-27 PROCEDURE — 74018 RADEX ABDOMEN 1 VIEW: CPT

## 2019-08-27 PROCEDURE — 25010000002 PROMETHAZINE PER 50 MG: Performed by: THORACIC SURGERY (CARDIOTHORACIC VASCULAR SURGERY)

## 2019-08-27 PROCEDURE — 74019 RADEX ABDOMEN 2 VIEWS: CPT

## 2019-08-27 PROCEDURE — 25010000002 ENOXAPARIN PER 10 MG: Performed by: THORACIC SURGERY (CARDIOTHORACIC VASCULAR SURGERY)

## 2019-08-27 PROCEDURE — 25010000002 METOCLOPRAMIDE PER 10 MG: Performed by: NURSE PRACTITIONER

## 2019-08-27 PROCEDURE — 83690 ASSAY OF LIPASE: CPT | Performed by: NURSE PRACTITIONER

## 2019-08-27 PROCEDURE — 97110 THERAPEUTIC EXERCISES: CPT

## 2019-08-27 RX ORDER — BISACODYL 10 MG
10 SUPPOSITORY, RECTAL RECTAL ONCE
Status: COMPLETED | OUTPATIENT
Start: 2019-08-27 | End: 2019-08-27

## 2019-08-27 RX ORDER — GUAIFENESIN 600 MG/1
1200 TABLET, EXTENDED RELEASE ORAL EVERY 12 HOURS SCHEDULED
Status: DISCONTINUED | OUTPATIENT
Start: 2019-08-27 | End: 2019-09-04 | Stop reason: HOSPADM

## 2019-08-27 RX ORDER — SODIUM CHLORIDE AND POTASSIUM CHLORIDE 150; 450 MG/100ML; MG/100ML
50 INJECTION, SOLUTION INTRAVENOUS CONTINUOUS
Status: DISCONTINUED | OUTPATIENT
Start: 2019-08-27 | End: 2019-08-29

## 2019-08-27 RX ORDER — HYDROCODONE BITARTRATE AND ACETAMINOPHEN 5; 325 MG/1; MG/1
1 TABLET ORAL EVERY 4 HOURS PRN
Qty: 42 TABLET | Refills: 0 | Status: SHIPPED | OUTPATIENT
Start: 2019-08-27 | End: 2019-09-03

## 2019-08-27 RX ORDER — POLYETHYLENE GLYCOL 3350 17 G/17G
17 POWDER, FOR SOLUTION ORAL 2 TIMES DAILY
Status: DISCONTINUED | OUTPATIENT
Start: 2019-08-27 | End: 2019-09-04 | Stop reason: HOSPADM

## 2019-08-27 RX ORDER — FUROSEMIDE 40 MG/1
40 TABLET ORAL DAILY
Status: DISCONTINUED | OUTPATIENT
Start: 2019-08-27 | End: 2019-08-29

## 2019-08-27 RX ORDER — SCOLOPAMINE TRANSDERMAL SYSTEM 1 MG/1
1 PATCH, EXTENDED RELEASE TRANSDERMAL
Status: DISCONTINUED | OUTPATIENT
Start: 2019-08-27 | End: 2019-08-31

## 2019-08-27 RX ORDER — METOCLOPRAMIDE HYDROCHLORIDE 5 MG/ML
5 INJECTION INTRAMUSCULAR; INTRAVENOUS EVERY 6 HOURS
Status: DISCONTINUED | OUTPATIENT
Start: 2019-08-27 | End: 2019-08-28

## 2019-08-27 RX ORDER — POTASSIUM CHLORIDE 750 MG/1
20 CAPSULE, EXTENDED RELEASE ORAL DAILY
Status: DISCONTINUED | OUTPATIENT
Start: 2019-08-27 | End: 2019-08-29

## 2019-08-27 RX ADMIN — POLYETHYLENE GLYCOL 3350 17 G: 17 POWDER, FOR SOLUTION ORAL at 20:39

## 2019-08-27 RX ADMIN — MUPIROCIN 1 APPLICATION: 20 OINTMENT TOPICAL at 08:36

## 2019-08-27 RX ADMIN — ACETAMINOPHEN 650 MG: 325 TABLET, FILM COATED ORAL at 17:51

## 2019-08-27 RX ADMIN — GABAPENTIN 100 MG: 100 CAPSULE ORAL at 20:39

## 2019-08-27 RX ADMIN — METOCLOPRAMIDE 5 MG: 5 INJECTION, SOLUTION INTRAMUSCULAR; INTRAVENOUS at 12:27

## 2019-08-27 RX ADMIN — METOCLOPRAMIDE 5 MG: 5 INJECTION, SOLUTION INTRAMUSCULAR; INTRAVENOUS at 17:52

## 2019-08-27 RX ADMIN — FERROUS SULFATE TAB 325 MG (65 MG ELEMENTAL FE) 325 MG: 325 (65 FE) TAB at 08:37

## 2019-08-27 RX ADMIN — METOPROLOL TARTRATE 25 MG: 25 TABLET ORAL at 20:38

## 2019-08-27 RX ADMIN — BISACODYL 10 MG: 10 SUPPOSITORY RECTAL at 12:39

## 2019-08-27 RX ADMIN — ENOXAPARIN SODIUM 40 MG: 40 INJECTION SUBCUTANEOUS at 17:51

## 2019-08-27 RX ADMIN — GABAPENTIN 100 MG: 100 CAPSULE ORAL at 08:37

## 2019-08-27 RX ADMIN — SENNOSIDES AND DOCUSATE SODIUM 2 TABLET: 8.6; 5 TABLET ORAL at 20:38

## 2019-08-27 RX ADMIN — GUAIFENESIN 1200 MG: 600 TABLET, EXTENDED RELEASE ORAL at 20:37

## 2019-08-27 RX ADMIN — PROMETHAZINE HYDROCHLORIDE 12.5 MG: 25 INJECTION INTRAMUSCULAR; INTRAVENOUS at 13:02

## 2019-08-27 RX ADMIN — ASPIRIN 81 MG: 81 TABLET, COATED ORAL at 08:37

## 2019-08-27 RX ADMIN — LOSARTAN POTASSIUM 25 MG: 25 TABLET, FILM COATED ORAL at 08:37

## 2019-08-27 RX ADMIN — SCOPALAMINE 1 PATCH: 1 PATCH, EXTENDED RELEASE TRANSDERMAL at 12:39

## 2019-08-27 RX ADMIN — LEVOTHYROXINE SODIUM 25 MCG: 25 TABLET ORAL at 05:14

## 2019-08-27 RX ADMIN — MUPIROCIN 1 APPLICATION: 20 OINTMENT TOPICAL at 20:37

## 2019-08-27 RX ADMIN — POTASSIUM CHLORIDE 20 MEQ: 750 CAPSULE, EXTENDED RELEASE ORAL at 08:37

## 2019-08-27 RX ADMIN — VITAMIN D, TAB 1000IU (100/BT) 1000 UNITS: 25 TAB at 08:37

## 2019-08-27 RX ADMIN — POTASSIUM CHLORIDE AND SODIUM CHLORIDE 50 ML/HR: 450; 150 INJECTION, SOLUTION INTRAVENOUS at 15:48

## 2019-08-27 RX ADMIN — PANTOPRAZOLE SODIUM 40 MG: 40 TABLET, DELAYED RELEASE ORAL at 05:14

## 2019-08-27 RX ADMIN — METOPROLOL TARTRATE 25 MG: 25 TABLET ORAL at 08:37

## 2019-08-27 RX ADMIN — HYDROCODONE BITARTRATE AND ACETAMINOPHEN 2 TABLET: 5; 325 TABLET ORAL at 03:08

## 2019-08-27 RX ADMIN — Medication 1 TABLET: at 08:37

## 2019-08-27 RX ADMIN — ATORVASTATIN CALCIUM 40 MG: 20 TABLET, FILM COATED ORAL at 20:38

## 2019-08-28 ENCOUNTER — APPOINTMENT (OUTPATIENT)
Dept: GENERAL RADIOLOGY | Facility: HOSPITAL | Age: 79
End: 2019-08-28

## 2019-08-28 LAB
ANION GAP SERPL CALCULATED.3IONS-SCNC: 14.2 MMOL/L (ref 5–15)
BUN BLD-MCNC: 36 MG/DL (ref 8–23)
BUN/CREAT SERPL: 53.7 (ref 7–25)
CALCIUM SPEC-SCNC: 9 MG/DL (ref 8.6–10.5)
CHLORIDE SERPL-SCNC: 98 MMOL/L (ref 98–107)
CO2 SERPL-SCNC: 24.8 MMOL/L (ref 22–29)
CREAT BLD-MCNC: 0.67 MG/DL (ref 0.57–1)
DEPRECATED RDW RBC AUTO: 42.4 FL (ref 37–54)
EOSINOPHIL # BLD MANUAL: 0.15 10*3/MM3 (ref 0–0.4)
EOSINOPHIL NFR BLD MANUAL: 4.3 % (ref 0.3–6.2)
ERYTHROCYTE [DISTWIDTH] IN BLOOD BY AUTOMATED COUNT: 12.9 % (ref 12.3–15.4)
GFR SERPL CREATININE-BSD FRML MDRD: 84 ML/MIN/1.73
GLUCOSE BLD-MCNC: 102 MG/DL (ref 65–99)
HCT VFR BLD AUTO: 34.6 % (ref 34–46.6)
HGB BLD-MCNC: 11.1 G/DL (ref 12–15.9)
LIPASE SERPL-CCNC: 15 U/L (ref 13–60)
LYMPHOCYTES # BLD MANUAL: 0.76 10*3/MM3 (ref 0.7–3.1)
LYMPHOCYTES NFR BLD MANUAL: 12.8 % (ref 5–12)
LYMPHOCYTES NFR BLD MANUAL: 21.3 % (ref 19.6–45.3)
MCH RBC QN AUTO: 29 PG (ref 26.6–33)
MCHC RBC AUTO-ENTMCNC: 32.1 G/DL (ref 31.5–35.7)
MCV RBC AUTO: 90.3 FL (ref 79–97)
MONOCYTES # BLD AUTO: 0.45 10*3/MM3 (ref 0.1–0.9)
NEUTROPHILS # BLD AUTO: 2.19 10*3/MM3 (ref 1.7–7)
NEUTROPHILS NFR BLD MANUAL: 61.7 % (ref 42.7–76)
PLAT MORPH BLD: NORMAL
PLATELET # BLD AUTO: 237 10*3/MM3 (ref 140–450)
PMV BLD AUTO: 11.5 FL (ref 6–12)
POTASSIUM BLD-SCNC: 3.5 MMOL/L (ref 3.5–5.2)
RBC # BLD AUTO: 3.83 10*6/MM3 (ref 3.77–5.28)
RBC MORPH BLD: NORMAL
SODIUM BLD-SCNC: 137 MMOL/L (ref 136–145)
WBC MORPH BLD: NORMAL
WBC NRBC COR # BLD: 3.55 10*3/MM3 (ref 3.4–10.8)

## 2019-08-28 PROCEDURE — 97110 THERAPEUTIC EXERCISES: CPT

## 2019-08-28 PROCEDURE — 74019 RADEX ABDOMEN 2 VIEWS: CPT

## 2019-08-28 PROCEDURE — 85025 COMPLETE CBC W/AUTO DIFF WBC: CPT | Performed by: NURSE PRACTITIONER

## 2019-08-28 PROCEDURE — 99232 SBSQ HOSP IP/OBS MODERATE 35: CPT | Performed by: SURGERY

## 2019-08-28 PROCEDURE — 99232 SBSQ HOSP IP/OBS MODERATE 35: CPT | Performed by: INTERNAL MEDICINE

## 2019-08-28 PROCEDURE — 25010000002 METOCLOPRAMIDE PER 10 MG: Performed by: NURSE PRACTITIONER

## 2019-08-28 PROCEDURE — 74018 RADEX ABDOMEN 1 VIEW: CPT

## 2019-08-28 PROCEDURE — 85007 BL SMEAR W/DIFF WBC COUNT: CPT | Performed by: NURSE PRACTITIONER

## 2019-08-28 PROCEDURE — 80048 BASIC METABOLIC PNL TOTAL CA: CPT | Performed by: THORACIC SURGERY (CARDIOTHORACIC VASCULAR SURGERY)

## 2019-08-28 PROCEDURE — 25010000003 POTASSIUM CHLORIDE 10 MEQ/100ML SOLUTION: Performed by: THORACIC SURGERY (CARDIOTHORACIC VASCULAR SURGERY)

## 2019-08-28 PROCEDURE — 83690 ASSAY OF LIPASE: CPT | Performed by: NURSE PRACTITIONER

## 2019-08-28 PROCEDURE — 25010000002 ENOXAPARIN PER 10 MG: Performed by: THORACIC SURGERY (CARDIOTHORACIC VASCULAR SURGERY)

## 2019-08-28 RX ORDER — DIPHENHYDRAMINE HYDROCHLORIDE, ZINC ACETATE 2; .1 G/100G; G/100G
CREAM TOPICAL 3 TIMES DAILY PRN
Status: DISCONTINUED | OUTPATIENT
Start: 2019-08-28 | End: 2019-09-04 | Stop reason: HOSPADM

## 2019-08-28 RX ORDER — METOCLOPRAMIDE HYDROCHLORIDE 5 MG/ML
10 INJECTION INTRAMUSCULAR; INTRAVENOUS EVERY 6 HOURS
Status: DISPENSED | OUTPATIENT
Start: 2019-08-28 | End: 2019-08-30

## 2019-08-28 RX ADMIN — VITAMIN D, TAB 1000IU (100/BT) 1000 UNITS: 25 TAB at 08:21

## 2019-08-28 RX ADMIN — POTASSIUM CHLORIDE AND SODIUM CHLORIDE 50 ML/HR: 450; 150 INJECTION, SOLUTION INTRAVENOUS at 12:54

## 2019-08-28 RX ADMIN — GUAIFENESIN 1200 MG: 600 TABLET, EXTENDED RELEASE ORAL at 21:05

## 2019-08-28 RX ADMIN — ACETAMINOPHEN 650 MG: 325 TABLET, FILM COATED ORAL at 21:06

## 2019-08-28 RX ADMIN — ATORVASTATIN CALCIUM 40 MG: 20 TABLET, FILM COATED ORAL at 21:05

## 2019-08-28 RX ADMIN — GABAPENTIN 100 MG: 100 CAPSULE ORAL at 08:22

## 2019-08-28 RX ADMIN — DIPHENHYDRAMINE HYDROCHLORIDE, ZINC ACETATE 1 APPLICATION: 2; .1 CREAM TOPICAL at 22:26

## 2019-08-28 RX ADMIN — METOPROLOL TARTRATE 25 MG: 25 TABLET ORAL at 21:06

## 2019-08-28 RX ADMIN — SENNOSIDES AND DOCUSATE SODIUM 2 TABLET: 8.6; 5 TABLET ORAL at 21:05

## 2019-08-28 RX ADMIN — METOPROLOL TARTRATE 25 MG: 25 TABLET ORAL at 08:21

## 2019-08-28 RX ADMIN — METOCLOPRAMIDE 10 MG: 5 INJECTION, SOLUTION INTRAMUSCULAR; INTRAVENOUS at 18:44

## 2019-08-28 RX ADMIN — GABAPENTIN 100 MG: 100 CAPSULE ORAL at 21:05

## 2019-08-28 RX ADMIN — Medication 1 TABLET: at 08:21

## 2019-08-28 RX ADMIN — ASPIRIN 81 MG: 81 TABLET, COATED ORAL at 08:22

## 2019-08-28 RX ADMIN — LOSARTAN POTASSIUM 25 MG: 25 TABLET, FILM COATED ORAL at 08:21

## 2019-08-28 RX ADMIN — POTASSIUM CHLORIDE 10 MEQ: 7.46 INJECTION, SOLUTION INTRAVENOUS at 12:52

## 2019-08-28 RX ADMIN — LEVOTHYROXINE SODIUM 25 MCG: 25 TABLET ORAL at 08:21

## 2019-08-28 RX ADMIN — POTASSIUM CHLORIDE 10 MEQ: 7.46 INJECTION, SOLUTION INTRAVENOUS at 10:15

## 2019-08-28 RX ADMIN — GABAPENTIN 100 MG: 100 CAPSULE ORAL at 16:11

## 2019-08-28 RX ADMIN — GUAIFENESIN 1200 MG: 600 TABLET, EXTENDED RELEASE ORAL at 08:22

## 2019-08-28 RX ADMIN — FUROSEMIDE 40 MG: 40 TABLET ORAL at 08:22

## 2019-08-28 RX ADMIN — MUPIROCIN 1 APPLICATION: 20 OINTMENT TOPICAL at 08:22

## 2019-08-28 RX ADMIN — ACETAMINOPHEN 650 MG: 325 TABLET, FILM COATED ORAL at 04:31

## 2019-08-28 RX ADMIN — METOCLOPRAMIDE 5 MG: 5 INJECTION, SOLUTION INTRAMUSCULAR; INTRAVENOUS at 00:04

## 2019-08-28 RX ADMIN — DIPHENHYDRAMINE HYDROCHLORIDE, ZINC ACETATE: 2; .1 CREAM TOPICAL at 18:44

## 2019-08-28 RX ADMIN — PANTOPRAZOLE SODIUM 40 MG: 40 TABLET, DELAYED RELEASE ORAL at 08:21

## 2019-08-28 RX ADMIN — POTASSIUM CHLORIDE 10 MEQ: 7.46 INJECTION, SOLUTION INTRAVENOUS at 16:55

## 2019-08-28 RX ADMIN — METOCLOPRAMIDE 5 MG: 5 INJECTION, SOLUTION INTRAMUSCULAR; INTRAVENOUS at 06:37

## 2019-08-28 RX ADMIN — METOCLOPRAMIDE 10 MG: 5 INJECTION, SOLUTION INTRAMUSCULAR; INTRAVENOUS at 12:51

## 2019-08-28 RX ADMIN — POLYETHYLENE GLYCOL 3350 17 G: 17 POWDER, FOR SOLUTION ORAL at 21:06

## 2019-08-28 RX ADMIN — ENOXAPARIN SODIUM 40 MG: 40 INJECTION SUBCUTANEOUS at 18:44

## 2019-08-28 RX ADMIN — POTASSIUM CHLORIDE 10 MEQ: 7.46 INJECTION, SOLUTION INTRAVENOUS at 07:53

## 2019-08-29 ENCOUNTER — APPOINTMENT (OUTPATIENT)
Dept: CT IMAGING | Facility: HOSPITAL | Age: 79
End: 2019-08-29

## 2019-08-29 LAB
ANION GAP SERPL CALCULATED.3IONS-SCNC: 12 MMOL/L (ref 5–15)
BUN BLD-MCNC: 24 MG/DL (ref 8–23)
BUN/CREAT SERPL: 39.3 (ref 7–25)
CALCIUM SPEC-SCNC: 9 MG/DL (ref 8.6–10.5)
CHLORIDE SERPL-SCNC: 97 MMOL/L (ref 98–107)
CO2 SERPL-SCNC: 22 MMOL/L (ref 22–29)
CREAT BLD-MCNC: 0.61 MG/DL (ref 0.57–1)
GFR SERPL CREATININE-BSD FRML MDRD: 94 ML/MIN/1.73
GLUCOSE BLD-MCNC: 126 MG/DL (ref 65–99)
POTASSIUM BLD-SCNC: 3.6 MMOL/L (ref 3.5–5.2)
SODIUM BLD-SCNC: 131 MMOL/L (ref 136–145)

## 2019-08-29 PROCEDURE — 99232 SBSQ HOSP IP/OBS MODERATE 35: CPT | Performed by: SURGERY

## 2019-08-29 PROCEDURE — 25010000002 IOPAMIDOL 61 % SOLUTION: Performed by: INTERNAL MEDICINE

## 2019-08-29 PROCEDURE — 25010000002 METOCLOPRAMIDE PER 10 MG: Performed by: NURSE PRACTITIONER

## 2019-08-29 PROCEDURE — 0 DIATRIZOATE MEGLUMINE & SODIUM PER 1 ML: Performed by: SURGERY

## 2019-08-29 PROCEDURE — 97110 THERAPEUTIC EXERCISES: CPT

## 2019-08-29 PROCEDURE — 74177 CT ABD & PELVIS W/CONTRAST: CPT

## 2019-08-29 PROCEDURE — 80048 BASIC METABOLIC PNL TOTAL CA: CPT | Performed by: THORACIC SURGERY (CARDIOTHORACIC VASCULAR SURGERY)

## 2019-08-29 PROCEDURE — 99232 SBSQ HOSP IP/OBS MODERATE 35: CPT | Performed by: INTERNAL MEDICINE

## 2019-08-29 PROCEDURE — 25010000002 ENOXAPARIN PER 10 MG: Performed by: THORACIC SURGERY (CARDIOTHORACIC VASCULAR SURGERY)

## 2019-08-29 RX ORDER — POTASSIUM CHLORIDE, DEXTROSE MONOHYDRATE AND SODIUM CHLORIDE 300; 5; 900 MG/100ML; G/100ML; MG/100ML
50 INJECTION, SOLUTION INTRAVENOUS CONTINUOUS
Status: DISCONTINUED | OUTPATIENT
Start: 2019-08-29 | End: 2019-08-30

## 2019-08-29 RX ADMIN — PANTOPRAZOLE SODIUM 40 MG: 40 TABLET, DELAYED RELEASE ORAL at 06:44

## 2019-08-29 RX ADMIN — ENOXAPARIN SODIUM 40 MG: 40 INJECTION SUBCUTANEOUS at 18:13

## 2019-08-29 RX ADMIN — METOPROLOL TARTRATE 5 MG: 5 INJECTION INTRAVENOUS at 09:14

## 2019-08-29 RX ADMIN — METOPROLOL TARTRATE 5 MG: 5 INJECTION INTRAVENOUS at 14:47

## 2019-08-29 RX ADMIN — MUPIROCIN 1 APPLICATION: 20 OINTMENT TOPICAL at 09:14

## 2019-08-29 RX ADMIN — GABAPENTIN 100 MG: 100 CAPSULE ORAL at 18:06

## 2019-08-29 RX ADMIN — METOCLOPRAMIDE 10 MG: 5 INJECTION, SOLUTION INTRAMUSCULAR; INTRAVENOUS at 01:12

## 2019-08-29 RX ADMIN — POLYETHYLENE GLYCOL 3350 17 G: 17 POWDER, FOR SOLUTION ORAL at 09:13

## 2019-08-29 RX ADMIN — METOCLOPRAMIDE 10 MG: 5 INJECTION, SOLUTION INTRAMUSCULAR; INTRAVENOUS at 14:23

## 2019-08-29 RX ADMIN — GABAPENTIN 100 MG: 100 CAPSULE ORAL at 21:49

## 2019-08-29 RX ADMIN — METOPROLOL TARTRATE 5 MG: 5 INJECTION INTRAVENOUS at 21:49

## 2019-08-29 RX ADMIN — DIATRIZOATE MEGLUMINE AND DIATRIZOATE SODIUM 30 ML: 600; 100 SOLUTION ORAL; RECTAL at 12:48

## 2019-08-29 RX ADMIN — IOPAMIDOL 85 ML: 612 INJECTION, SOLUTION INTRAVENOUS at 16:31

## 2019-08-29 RX ADMIN — METOCLOPRAMIDE 10 MG: 5 INJECTION, SOLUTION INTRAMUSCULAR; INTRAVENOUS at 06:44

## 2019-08-29 RX ADMIN — LEVOTHYROXINE SODIUM 25 MCG: 25 TABLET ORAL at 06:44

## 2019-08-29 RX ADMIN — GABAPENTIN 100 MG: 100 CAPSULE ORAL at 09:14

## 2019-08-30 ENCOUNTER — TELEPHONE (OUTPATIENT)
Dept: GASTROENTEROLOGY | Facility: CLINIC | Age: 79
End: 2019-08-30

## 2019-08-30 ENCOUNTER — APPOINTMENT (OUTPATIENT)
Dept: GENERAL RADIOLOGY | Facility: HOSPITAL | Age: 79
End: 2019-08-30

## 2019-08-30 LAB
ANION GAP SERPL CALCULATED.3IONS-SCNC: 12.8 MMOL/L (ref 5–15)
BUN BLD-MCNC: 17 MG/DL (ref 8–23)
BUN/CREAT SERPL: 29.8 (ref 7–25)
CALCIUM SPEC-SCNC: 8.9 MG/DL (ref 8.6–10.5)
CHLORIDE SERPL-SCNC: 103 MMOL/L (ref 98–107)
CO2 SERPL-SCNC: 25.2 MMOL/L (ref 22–29)
CREAT BLD-MCNC: 0.57 MG/DL (ref 0.57–1)
GFR SERPL CREATININE-BSD FRML MDRD: 102 ML/MIN/1.73
GLUCOSE BLD-MCNC: 125 MG/DL (ref 65–99)
POTASSIUM BLD-SCNC: 3.6 MMOL/L (ref 3.5–5.2)
SODIUM BLD-SCNC: 141 MMOL/L (ref 136–145)

## 2019-08-30 PROCEDURE — 99232 SBSQ HOSP IP/OBS MODERATE 35: CPT | Performed by: SURGERY

## 2019-08-30 PROCEDURE — 25010000002 ENOXAPARIN PER 10 MG: Performed by: THORACIC SURGERY (CARDIOTHORACIC VASCULAR SURGERY)

## 2019-08-30 PROCEDURE — 99232 SBSQ HOSP IP/OBS MODERATE 35: CPT | Performed by: INTERNAL MEDICINE

## 2019-08-30 PROCEDURE — 80048 BASIC METABOLIC PNL TOTAL CA: CPT | Performed by: THORACIC SURGERY (CARDIOTHORACIC VASCULAR SURGERY)

## 2019-08-30 PROCEDURE — 97110 THERAPEUTIC EXERCISES: CPT

## 2019-08-30 PROCEDURE — 74018 RADEX ABDOMEN 1 VIEW: CPT

## 2019-08-30 RX ADMIN — SCOPALAMINE 1 PATCH: 1 PATCH, EXTENDED RELEASE TRANSDERMAL at 13:14

## 2019-08-30 RX ADMIN — GABAPENTIN 100 MG: 100 CAPSULE ORAL at 18:12

## 2019-08-30 RX ADMIN — PANTOPRAZOLE SODIUM 40 MG: 40 TABLET, DELAYED RELEASE ORAL at 06:35

## 2019-08-30 RX ADMIN — GABAPENTIN 100 MG: 100 CAPSULE ORAL at 20:54

## 2019-08-30 RX ADMIN — ENOXAPARIN SODIUM 40 MG: 40 INJECTION SUBCUTANEOUS at 18:12

## 2019-08-30 RX ADMIN — GABAPENTIN 100 MG: 100 CAPSULE ORAL at 09:15

## 2019-08-30 RX ADMIN — GUAIFENESIN 1200 MG: 600 TABLET, EXTENDED RELEASE ORAL at 23:48

## 2019-08-30 RX ADMIN — LEVOTHYROXINE SODIUM 25 MCG: 25 TABLET ORAL at 06:35

## 2019-08-30 NOTE — TELEPHONE ENCOUNTER
Patient called she has been in the hospital. She had Open Heart surgery. She canceled CT Scan scheduled for 9-16-19. Also canceling appointment to see Dr. Chew 9-24-19. She will call back when she needs to be seen.

## 2019-08-31 LAB
ANION GAP SERPL CALCULATED.3IONS-SCNC: 7.1 MMOL/L (ref 5–15)
BUN BLD-MCNC: 15 MG/DL (ref 8–23)
BUN/CREAT SERPL: 23.4 (ref 7–25)
CALCIUM SPEC-SCNC: 9 MG/DL (ref 8.6–10.5)
CHLORIDE SERPL-SCNC: 103 MMOL/L (ref 98–107)
CO2 SERPL-SCNC: 27.9 MMOL/L (ref 22–29)
CREAT BLD-MCNC: 0.64 MG/DL (ref 0.57–1)
GFR SERPL CREATININE-BSD FRML MDRD: 89 ML/MIN/1.73
GLUCOSE BLD-MCNC: 102 MG/DL (ref 65–99)
POTASSIUM BLD-SCNC: 3.8 MMOL/L (ref 3.5–5.2)
SODIUM BLD-SCNC: 138 MMOL/L (ref 136–145)

## 2019-08-31 PROCEDURE — 99231 SBSQ HOSP IP/OBS SF/LOW 25: CPT | Performed by: SURGERY

## 2019-08-31 PROCEDURE — 80048 BASIC METABOLIC PNL TOTAL CA: CPT | Performed by: THORACIC SURGERY (CARDIOTHORACIC VASCULAR SURGERY)

## 2019-08-31 PROCEDURE — 94799 UNLISTED PULMONARY SVC/PX: CPT | Performed by: NURSE PRACTITIONER

## 2019-08-31 PROCEDURE — 97110 THERAPEUTIC EXERCISES: CPT

## 2019-08-31 PROCEDURE — 99232 SBSQ HOSP IP/OBS MODERATE 35: CPT | Performed by: NURSE PRACTITIONER

## 2019-08-31 PROCEDURE — 93010 ELECTROCARDIOGRAM REPORT: CPT | Performed by: INTERNAL MEDICINE

## 2019-08-31 PROCEDURE — 93005 ELECTROCARDIOGRAM TRACING: CPT | Performed by: INTERNAL MEDICINE

## 2019-08-31 PROCEDURE — 25010000002 ENOXAPARIN PER 10 MG: Performed by: THORACIC SURGERY (CARDIOTHORACIC VASCULAR SURGERY)

## 2019-08-31 RX ORDER — PRAVASTATIN SODIUM 40 MG
40 TABLET ORAL
Status: DISCONTINUED | OUTPATIENT
Start: 2019-08-31 | End: 2019-09-04

## 2019-08-31 RX ORDER — ASPIRIN 81 MG/1
81 TABLET ORAL DAILY
Status: DISCONTINUED | OUTPATIENT
Start: 2019-08-31 | End: 2019-09-04 | Stop reason: HOSPADM

## 2019-08-31 RX ORDER — METOPROLOL SUCCINATE 25 MG/1
25 TABLET, EXTENDED RELEASE ORAL ONCE
Status: COMPLETED | OUTPATIENT
Start: 2019-08-31 | End: 2019-08-31

## 2019-08-31 RX ORDER — GABAPENTIN 100 MG/1
200 CAPSULE ORAL NIGHTLY
Status: DISCONTINUED | OUTPATIENT
Start: 2019-08-31 | End: 2019-09-04 | Stop reason: HOSPADM

## 2019-08-31 RX ORDER — CYCLOSPORINE 0.5 MG/ML
1 EMULSION OPHTHALMIC 2 TIMES DAILY
Status: DISCONTINUED | OUTPATIENT
Start: 2019-08-31 | End: 2019-09-04 | Stop reason: HOSPADM

## 2019-08-31 RX ORDER — METOPROLOL SUCCINATE 25 MG/1
25 TABLET, EXTENDED RELEASE ORAL DAILY
Status: DISCONTINUED | OUTPATIENT
Start: 2019-08-31 | End: 2019-08-31

## 2019-08-31 RX ORDER — METOPROLOL SUCCINATE 50 MG/1
50 TABLET, EXTENDED RELEASE ORAL DAILY
Status: DISCONTINUED | OUTPATIENT
Start: 2019-09-01 | End: 2019-09-04 | Stop reason: HOSPADM

## 2019-08-31 RX ORDER — GABAPENTIN 100 MG/1
100 CAPSULE ORAL 3 TIMES DAILY
Status: DISCONTINUED | OUTPATIENT
Start: 2019-08-31 | End: 2019-09-04 | Stop reason: HOSPADM

## 2019-08-31 RX ADMIN — GUAIFENESIN 1200 MG: 600 TABLET, EXTENDED RELEASE ORAL at 21:06

## 2019-08-31 RX ADMIN — PRAVASTATIN SODIUM 40 MG: 40 TABLET ORAL at 21:06

## 2019-08-31 RX ADMIN — METOPROLOL SUCCINATE 25 MG: 25 TABLET, FILM COATED, EXTENDED RELEASE ORAL at 18:01

## 2019-08-31 RX ADMIN — LEVOTHYROXINE SODIUM 25 MCG: 25 TABLET ORAL at 06:10

## 2019-08-31 RX ADMIN — METOPROLOL TARTRATE 5 MG: 5 INJECTION, SOLUTION INTRAVENOUS at 14:41

## 2019-08-31 RX ADMIN — ACETAMINOPHEN 325 MG: 325 TABLET, FILM COATED ORAL at 09:51

## 2019-08-31 RX ADMIN — METOPROLOL SUCCINATE 25 MG: 25 TABLET, FILM COATED, EXTENDED RELEASE ORAL at 09:51

## 2019-08-31 RX ADMIN — ENOXAPARIN SODIUM 40 MG: 40 INJECTION SUBCUTANEOUS at 18:01

## 2019-08-31 RX ADMIN — CYCLOSPORINE 1 DROP: 0.5 EMULSION OPHTHALMIC at 21:07

## 2019-08-31 RX ADMIN — CYCLOSPORINE 1 DROP: 0.5 EMULSION OPHTHALMIC at 11:40

## 2019-08-31 RX ADMIN — GUAIFENESIN 1200 MG: 600 TABLET, EXTENDED RELEASE ORAL at 09:51

## 2019-08-31 RX ADMIN — GABAPENTIN 100 MG: 100 CAPSULE ORAL at 16:27

## 2019-08-31 RX ADMIN — PANTOPRAZOLE SODIUM 40 MG: 40 TABLET, DELAYED RELEASE ORAL at 06:10

## 2019-08-31 RX ADMIN — GABAPENTIN 100 MG: 100 CAPSULE ORAL at 09:51

## 2019-08-31 RX ADMIN — GABAPENTIN 200 MG: 100 CAPSULE ORAL at 21:06

## 2019-08-31 RX ADMIN — VITAMIN D, TAB 1000IU (100/BT) 1000 UNITS: 25 TAB at 09:51

## 2019-08-31 RX ADMIN — METOPROLOL TARTRATE 5 MG: 5 INJECTION, SOLUTION INTRAVENOUS at 14:36

## 2019-08-31 RX ADMIN — METOPROLOL TARTRATE 5 MG: 5 INJECTION, SOLUTION INTRAVENOUS at 14:46

## 2019-08-31 RX ADMIN — ASPIRIN 81 MG: 81 TABLET, COATED ORAL at 09:51

## 2019-08-31 RX ADMIN — ACETAMINOPHEN 650 MG: 325 TABLET, FILM COATED ORAL at 16:27

## 2019-09-01 LAB
ANION GAP SERPL CALCULATED.3IONS-SCNC: 11.7 MMOL/L (ref 5–15)
BUN BLD-MCNC: 13 MG/DL (ref 8–23)
BUN/CREAT SERPL: 21.7 (ref 7–25)
CALCIUM SPEC-SCNC: 8.6 MG/DL (ref 8.6–10.5)
CHLORIDE SERPL-SCNC: 100 MMOL/L (ref 98–107)
CO2 SERPL-SCNC: 24.3 MMOL/L (ref 22–29)
CREAT BLD-MCNC: 0.6 MG/DL (ref 0.57–1)
GFR SERPL CREATININE-BSD FRML MDRD: 96 ML/MIN/1.73
GLUCOSE BLD-MCNC: 114 MG/DL (ref 65–99)
POTASSIUM BLD-SCNC: 3.2 MMOL/L (ref 3.5–5.2)
POTASSIUM BLD-SCNC: 4.1 MMOL/L (ref 3.5–5.2)
SODIUM BLD-SCNC: 136 MMOL/L (ref 136–145)

## 2019-09-01 PROCEDURE — 80048 BASIC METABOLIC PNL TOTAL CA: CPT | Performed by: THORACIC SURGERY (CARDIOTHORACIC VASCULAR SURGERY)

## 2019-09-01 PROCEDURE — 63710000001 PROMETHAZINE PER 25 MG: Performed by: THORACIC SURGERY (CARDIOTHORACIC VASCULAR SURGERY)

## 2019-09-01 PROCEDURE — 93005 ELECTROCARDIOGRAM TRACING: CPT | Performed by: NURSE PRACTITIONER

## 2019-09-01 PROCEDURE — 97110 THERAPEUTIC EXERCISES: CPT

## 2019-09-01 PROCEDURE — 25010000002 PROMETHAZINE PER 50 MG: Performed by: THORACIC SURGERY (CARDIOTHORACIC VASCULAR SURGERY)

## 2019-09-01 PROCEDURE — 99232 SBSQ HOSP IP/OBS MODERATE 35: CPT | Performed by: NURSE PRACTITIONER

## 2019-09-01 PROCEDURE — 25010000002 ENOXAPARIN PER 10 MG: Performed by: THORACIC SURGERY (CARDIOTHORACIC VASCULAR SURGERY)

## 2019-09-01 PROCEDURE — 93010 ELECTROCARDIOGRAM REPORT: CPT | Performed by: INTERNAL MEDICINE

## 2019-09-01 PROCEDURE — 84132 ASSAY OF SERUM POTASSIUM: CPT | Performed by: INTERNAL MEDICINE

## 2019-09-01 RX ORDER — BENZONATATE 100 MG/1
200 CAPSULE ORAL EVERY 4 HOURS PRN
Status: DISCONTINUED | OUTPATIENT
Start: 2019-09-01 | End: 2019-09-04 | Stop reason: HOSPADM

## 2019-09-01 RX ORDER — POTASSIUM CHLORIDE 750 MG/1
20 CAPSULE, EXTENDED RELEASE ORAL 2 TIMES DAILY WITH MEALS
Status: DISCONTINUED | OUTPATIENT
Start: 2019-09-01 | End: 2019-09-04 | Stop reason: HOSPADM

## 2019-09-01 RX ORDER — FUROSEMIDE 40 MG/1
40 TABLET ORAL DAILY
Status: DISCONTINUED | OUTPATIENT
Start: 2019-09-01 | End: 2019-09-04 | Stop reason: HOSPADM

## 2019-09-01 RX ADMIN — ASPIRIN 81 MG: 81 TABLET, COATED ORAL at 10:03

## 2019-09-01 RX ADMIN — LEVOTHYROXINE SODIUM 25 MCG: 25 TABLET ORAL at 07:57

## 2019-09-01 RX ADMIN — BENZONATATE 200 MG: 100 CAPSULE ORAL at 22:02

## 2019-09-01 RX ADMIN — CYCLOSPORINE 1 DROP: 0.5 EMULSION OPHTHALMIC at 10:08

## 2019-09-01 RX ADMIN — PROMETHAZINE HYDROCHLORIDE 12.5 MG: 25 TABLET ORAL at 21:56

## 2019-09-01 RX ADMIN — BENZONATATE 200 MG: 100 CAPSULE ORAL at 11:43

## 2019-09-01 RX ADMIN — GUAIFENESIN 1200 MG: 600 TABLET, EXTENDED RELEASE ORAL at 21:56

## 2019-09-01 RX ADMIN — POTASSIUM CHLORIDE 40 MEQ: 1.5 POWDER, FOR SOLUTION ORAL at 07:58

## 2019-09-01 RX ADMIN — VITAMIN D, TAB 1000IU (100/BT) 1000 UNITS: 25 TAB at 10:03

## 2019-09-01 RX ADMIN — FUROSEMIDE 40 MG: 40 TABLET ORAL at 11:42

## 2019-09-01 RX ADMIN — GABAPENTIN 200 MG: 100 CAPSULE ORAL at 21:56

## 2019-09-01 RX ADMIN — CYCLOSPORINE 1 DROP: 0.5 EMULSION OPHTHALMIC at 21:57

## 2019-09-01 RX ADMIN — GABAPENTIN 100 MG: 100 CAPSULE ORAL at 12:51

## 2019-09-01 RX ADMIN — GABAPENTIN 100 MG: 100 CAPSULE ORAL at 08:03

## 2019-09-01 RX ADMIN — GABAPENTIN 100 MG: 100 CAPSULE ORAL at 17:59

## 2019-09-01 RX ADMIN — DIPHENHYDRAMINE HYDROCHLORIDE, ZINC ACETATE 1 APPLICATION: 2; .1 CREAM TOPICAL at 18:04

## 2019-09-01 RX ADMIN — BENZONATATE 200 MG: 100 CAPSULE ORAL at 17:59

## 2019-09-01 RX ADMIN — ENOXAPARIN SODIUM 40 MG: 40 INJECTION SUBCUTANEOUS at 18:00

## 2019-09-01 RX ADMIN — METOPROLOL SUCCINATE 50 MG: 50 TABLET, FILM COATED, EXTENDED RELEASE ORAL at 10:02

## 2019-09-01 RX ADMIN — POTASSIUM CHLORIDE 40 MEQ: 750 CAPSULE, EXTENDED RELEASE ORAL at 12:51

## 2019-09-01 RX ADMIN — PROMETHAZINE HYDROCHLORIDE 12.5 MG: 25 INJECTION INTRAMUSCULAR; INTRAVENOUS at 01:55

## 2019-09-01 RX ADMIN — PANTOPRAZOLE SODIUM 40 MG: 40 TABLET, DELAYED RELEASE ORAL at 07:57

## 2019-09-01 NOTE — PROGRESS NOTES
Coughing at night.  This may be fluid overload so we will diurese.  Tessalon Perles.  She really think she needs to go to rehab but because of the weekend and the holiday we will not be able to get a precertification until Tuesday.  Tolerating diet well.

## 2019-09-01 NOTE — PLAN OF CARE
Problem: Patient Care Overview  Goal: Plan of Care Review  Outcome: Ongoing (interventions implemented as appropriate)   09/01/19 4168   Coping/Psychosocial   Plan of Care Reviewed With patient   OTHER   Outcome Summary VSS, Pt had np cough, tessalon perles and lasix. Pt c/o fatique. Will continue to monitor.        Problem: Cardiac Catheterization (Diagnostic/Interventional) (Adult)  Goal: Signs and Symptoms of Listed Potential Problems Will be Absent, Minimized or Managed (Cardiac Catheterization)  Outcome: Ongoing (interventions implemented as appropriate)      Problem: Cardiac: ACS (Acute Coronary Syndrome) (Adult)  Goal: Signs and Symptoms of Listed Potential Problems Will be Absent, Minimized or Managed (Cardiac: ACS)  Outcome: Ongoing (interventions implemented as appropriate)      Problem: Cardiac Surgery (Adult)  Goal: Signs and Symptoms of Listed Potential Problems Will be Absent, Minimized or Managed (Cardiac Surgery)  Outcome: Ongoing (interventions implemented as appropriate)      Problem: Skin Injury Risk (Adult)  Goal: Skin Health and Integrity  Outcome: Ongoing (interventions implemented as appropriate)      Problem: Fall Risk (Adult)  Goal: Absence of Fall  Outcome: Ongoing (interventions implemented as appropriate)

## 2019-09-01 NOTE — THERAPY TREATMENT NOTE
Acute Care - Physical Therapy Treatment Note  [facility]     Patient Name: Reanna Higgins  : 1940  MRN: [id number]  Today's Date: 2019             Admit Date: 2019    Visit Dx:    ICD-10-CM ICD-9-CM   1. Coronary artery disease involving native coronary artery of native heart with angina pectoris (CMS/HCC) I25.119 414.01     413.9   2. Chest pain, unspecified type R07.9 786.50   3. Sick sinus syndrome (CMS/HCC) I49.5 427.81   4. Mesenteric ischemia due to arterial insufficiency (CMS/HCC) K55.059 557.9   5. Essential hypertension I10 401.9   6. Abnormal dobutamine stress echocardiography R94.39 794.30     Patient Active Problem List   Diagnosis   • Colon polyp   • Gastroesophageal reflux disease   • Essential hypertension   • Hypercholesterolemia   • Mitral and aortic valve disease   • Heart valve disease   • Anxiety   • Peripheral neuropathy   • Malignant neoplasm of cheek (CMS/HCC)   • Non-rheumatic mitral regurgitation   • Osteopenia   • SVT (supraventricular tachycardia) (CMS/HCC)   • AV block, Mobitz 1   • Cardiac pacemaker in situ   • RLS (restless legs syndrome)   • Phobia, flying   • Elevated hemoglobin A1c   • Acute pancreatitis without infection or necrosis   • Mesenteric ischemia due to arterial insufficiency (CMS/HCC)   • Epigastric pain   • Chest pain   • Sick sinus syndrome (CMS/HCC)   • Abnormal dobutamine stress echocardiography   • Coronary artery disease involving native coronary artery of native heart with angina pectoris (CMS/HCC)       Therapy Treatment    Rehabilitation Treatment Summary     Row Name 19 1057             Treatment Time/Intention    Discipline  physical therapy assistant  -      Document Type  therapy note (daily note)  -OLAF      Subjective Information  complains of;weakness;fatigue;pain  -OLAF      Mode of Treatment  physical therapy;individual therapy  -      Care Plan Review  patient/other agree to care plan  -      Care Plan Review, Other  Participant(s)  family;spouse  -      Comment  pt less anxious but still concerned abut her safety at home as she lives in tri-level home   -JM2      Existing Precautions/Restrictions  fall;cardiac  -      Treatment Considerations/Comments  cues to remember cardiac prec  -JM2      Recorded by [] Deborah Keenan, [facility] 09/01/19 1104  [JM2] Deborah Keenan, [facility] 09/01/19 1108      Row Name 09/01/19 1057             Bed Mobility Assessment/Treatment    Comment (Bed Mobility)  in chair  -      Recorded by [] Deborah Keenan, [facility] 09/01/19 1104      Row Name 09/01/19 1057             Gait/Stairs Assessment/Training    [place] Level (Gait)  contact guard;verbal cues  -      Assistive Device (Gait)  -- HHA  -      Distance in Feet (Gait)  100  -      Deviations/Abnormal Patterns (Gait)  librado decreased  -      Comment (Gait/Stairs)  fatigue limiting, some c/o SOA but SATS 96% w/amb  -JM      Recorded by [JM] Deborah Keenan, [facility] 09/01/19 1104      Row Name 09/01/19 1057             Therapeutic Exercise    Comment (Therapeutic Exercise)  cardiac protocol -5-10 reps  -JM      Recorded by [] Deborah Keenan, [facility] 09/01/19 1104      Row Name 09/01/19 1057             Positioning and Restraints    Pre-Treatment Position  sitting in chair/recliner  -JM      In Chair  reclined;call light within reach;encouraged to call for assist;with family/caregiver no alarm upon entry  -JM      Recorded by [JM] Deborah Keenan, [facility] 09/01/19 1104      Row Name 09/01/19 1057             Pain Scale: Numbers Pre/Post-Treatment    Pain Scale: Numbers, Post-Treatment  5/10  -      Pain Location - Orientation  incisional  -      Pain Intervention(s)  Ambulation/increased activity;Repositioned  -      Recorded by [] Deborah Keenan, [facility] 09/01/19 1104      Row Name                Wound 07/19/19 0600 Right posterior wrist incision    Wound - Properties Group Date first assessed: 07/19/19 [KS] Time first assessed:  0600 [KS] Side: Right [KS] Orientation: posterior [KS] Location: wrist [KS] Type: incision [KS] Recorded by:  [KS] Pinky Washington RN 07/19/19 0622    Row Name                Wound 08/23/19 1029 chest Incision    Wound - Properties Group Date first assessed: 08/23/19 [OD] Time first assessed: 1029 [OD] Location: chest [OD] Primary Wound Type: Incision [OD] Recorded by:  [OD] Jay Jay Bruce Jr., RN 08/23/19 1029    Row Name                Wound 08/23/19 1029 leg Incision    Wound - Properties Group Date first assessed: 08/23/19 [OD] Time first assessed: 1029 [OD] Location: leg [OD] Primary Wound Type: Incision [OD] Recorded by:  [OD] Jay Jay Bruce Jr., RN 08/23/19 1029      User Key  (r) = Recorded By, (t) = Taken By, (c) = Cosigned By    Initials Name Effective Dates Discipline    OD Jay Jay Bruce Jr., RN 06/16/16 -  Nurse    Deborah Escobar, [facility] 03/07/18 -  PT    Pinky Wahl RN 06/16/16 -  Nurse          Wound 07/19/19 0600 Right posterior wrist incision (Active)   Dressing Appearance open to air 9/1/2019 12:20 AM   Closure Approximated 9/1/2019 12:20 AM   Drainage Amount none 9/1/2019 12:20 AM   Dressing Care, Wound open to air 8/31/2019  4:00 PM       Wound 08/23/19 1029 chest Incision (Active)   Dressing Appearance open to air 9/1/2019 12:20 AM   Closure Approximated 9/1/2019 12:20 AM   Base pink;scab 9/1/2019 12:20 AM   Drainage Amount none 9/1/2019 12:20 AM   Dressing Care, Wound open to air 8/31/2019  4:00 PM       Wound 08/23/19 1029 leg Incision (Active)   Dressing Appearance open to air 9/1/2019 12:20 AM   Closure Liquid skin adhesive 9/1/2019 12:20 AM   Base pink 9/1/2019 12:20 AM   Drainage Amount none 9/1/2019 12:20 AM   Dressing Care, Wound open to air 8/31/2019  4:00 PM           Physical Therapy Education     Title: PT OT SLP Therapies (Done)     Topic: Physical Therapy (Done)     Point: Mobility training (Done)     Learning Progress Summary           Patient Acceptance, E,TB, VU by OLAF  at 9/1/2019 11:06 AM    Acceptance, E,TB,D, VU,NR by OLAF at 8/31/2019  9:53 AM    Comment:  pt very anxious , cardiac ed provided    Acceptance, E, NR by EM at 8/30/2019  9:56 AM    Acceptance, E,TB,D, VU,NR by SV at 8/29/2019 12:00 PM    Acceptance, E,D, NR by PC at 8/28/2019  5:20 PM    Acceptance, E, NR by MA at 8/27/2019  9:18 AM    Acceptance, E,D, NR by PC at 8/26/2019 11:32 AM    Acceptance, E,TB, VU,NR by CS at 8/25/2019 10:01 AM    Acceptance, E,TB, VU,NR by CS at 8/24/2019  9:52 AM   Family Acceptance, E,TB, VU by OLAF at 9/1/2019 11:06 AM    Acceptance, E,TB,D, VU,NR by OLAF at 8/31/2019  9:53 AM    Comment:  pt very anxious , cardiac ed provided   Significant Other Acceptance, E,TB,D, VU,NR by BRUNA at 8/29/2019 12:00 PM                   Point: Home exercise program (Done)     Learning Progress Summary           Patient Acceptance, E,TB, VU by OLAF at 9/1/2019 11:06 AM    Acceptance, E,TB,D, VU,NR by OLAF at 8/31/2019  9:53 AM    Comment:  pt very anxious , cardiac ed provided    Acceptance, E, NR by EM at 8/30/2019  9:56 AM    Acceptance, E,TB,D, VU,NR by SV at 8/29/2019 12:00 PM    Acceptance, E,D, NR by PC at 8/28/2019  5:20 PM    Acceptance, E, NR by MA at 8/27/2019  9:18 AM    Acceptance, E,D, NR by PC at 8/26/2019 11:32 AM    Acceptance, E,TB, VU,NR by CS at 8/25/2019 10:01 AM    Acceptance, E,TB, VU,NR by CS at 8/24/2019  9:52 AM   Family Acceptance, E,TB, VU by OLAF at 9/1/2019 11:06 AM    Acceptance, E,TB,D, VU,NR by OLAF at 8/31/2019  9:53 AM    Comment:  pt very anxious , cardiac ed provided   Significant Other Acceptance, E,TB,D, VU,NR by BRUNA at 8/29/2019 12:00 PM                   Point: Body mechanics (Done)     Learning Progress Summary           Patient Acceptance, E,TB, VU by OLAF at 9/1/2019 11:06 AM    Acceptance, E,TB,D, VU,NR by OLAF at 8/31/2019  9:53 AM    Comment:  pt very anxious , cardiac ed provided    Acceptance, E,TB,D, VU,NR by SV at 8/29/2019 12:00 PM    Acceptance, E,D, NR by GIUSEPPE at 8/28/2019   5:20 PM    Acceptance, E, NR by MA at 8/27/2019  9:18 AM    Acceptance, E,D, NR by PC at 8/26/2019 11:32 AM    Acceptance, E,TB, VU,NR by CS at 8/25/2019 10:01 AM    Acceptance, E,TB, VU,NR by CS at 8/24/2019  9:52 AM   Family Acceptance, E,TB, VU by  at 9/1/2019 11:06 AM    Acceptance, E,TB,D, VU,NR by  at 8/31/2019  9:53 AM    Comment:  pt very anxious , cardiac ed provided   Significant Other Acceptance, E,TB,D, VU,NR by  at 8/29/2019 12:00 PM                   Point: Precautions (Done)     Learning Progress Summary           Patient Acceptance, E,TB, VU by  at 9/1/2019 11:06 AM    Acceptance, E,TB,D, VU,NR by  at 8/31/2019  9:53 AM    Comment:  pt very anxious , cardiac ed provided    Acceptance, E,TB,D, VU,NR by  at 8/29/2019 12:00 PM    Acceptance, E,D, NR by PC at 8/28/2019  5:20 PM    Acceptance, E, NR by MA at 8/27/2019  9:18 AM    Acceptance, E,D, NR by PC at 8/26/2019 11:32 AM    Acceptance, E,TB, VU,NR by CS at 8/25/2019 10:01 AM    Acceptance, E,TB, VU,NR by CS at 8/24/2019  9:52 AM   Family Acceptance, E,TB, VU by  at 9/1/2019 11:06 AM    Acceptance, E,TB,D, VU,NR by  at 8/31/2019  9:53 AM    Comment:  pt very anxious , cardiac ed provided   Significant Other Acceptance, E,TB,D, VU,NR by  at 8/29/2019 12:00 PM                               User Key     Initials Effective Dates Name Provider Type Discipline    PC 04/03/18 -  Lenka Brown, PT Physical Therapist PT    EM 04/03/18 -  Shell Liu, PT Physical Therapist PT    JM 03/07/18 -  Deborah Keenan, PTA Physical Therapy Assistant PT    SV 04/03/18 -  Carmen Hernández, PT Physical Therapist PT    MA 10/19/18 -  Vianca Muse, PT Physical Therapist PT    MISSY 05/14/18 -  Connor Chaudhary PT Physical Therapist PT                PT Recommendation and Plan     Plan of Care Reviewed With: patient, spouse, family  Outcome Summary: pt amb today w/assist of 1, no standing rest req; does fatigue quickly but improving; plans SNU as  she lives in [facility]-level and is concerned about endurance and safety  Outcome Measures     Row Name 08/29/19 1200             How much help from another person do you currently need...    Turning from your back to your side while in flat bed without using bedrails?  4  -SV      Moving from lying on back to sitting on the side of a flat bed without bedrails?  3  -SV      Moving to and from a bed to a chair (including a wheelchair)?  3  -SV      Standing up from a chair using your arms (e.g., wheelchair, bedside chair)?  3  -SV      Climbing 3-5 steps with a railing?  2  -SV      To walk in hospital room?  3  -SV      AM-PAC 6 Clicks Score (PT)  18  -SV        User Key  (r) = Recorded By, (t) = Taken By, (c) = Cosigned By    Initials Name Provider Type    Carmen Romero, PT Physical Therapist         Time Calculation:   PT Charges     Row Name 09/01/19 1108             Time Calculation    Start Time  1040  -      Stop Time  1108  -      Time Calculation (min)  28 min  -        User Key  (r) = Recorded By, (t) = Taken By, (c) = Cosigned By    Initials Name Provider Type    Deborah Escobar PTA Physical Therapy Assistant        Therapy Charges for Today     Code Description Service Date Service Provider Modifiers Qty    67487480990 HC PT THER PROC EA 15 MIN 8/31/2019 Deborah Keenan PTA GP 2    07101678118 HC PT THER PROC EA 15 MIN 9/1/2019 Deborah Keenan PTA GP 2          PT G-Codes  Outcome Measure Options: AM-PAC 6 Clicks Basic Mobility (PT)  AM-PAC 6 Clicks Score (PT): 19    Deborah Keenan PTA  9/1/2019

## 2019-09-02 LAB
ANION GAP SERPL CALCULATED.3IONS-SCNC: 12.8 MMOL/L (ref 5–15)
BUN BLD-MCNC: 10 MG/DL (ref 8–23)
BUN/CREAT SERPL: 14.5 (ref 7–25)
CALCIUM SPEC-SCNC: 8.8 MG/DL (ref 8.6–10.5)
CHLORIDE SERPL-SCNC: 100 MMOL/L (ref 98–107)
CO2 SERPL-SCNC: 25.2 MMOL/L (ref 22–29)
CREAT BLD-MCNC: 0.69 MG/DL (ref 0.57–1)
GFR SERPL CREATININE-BSD FRML MDRD: 82 ML/MIN/1.73
GLUCOSE BLD-MCNC: 105 MG/DL (ref 65–99)
POTASSIUM BLD-SCNC: 3.9 MMOL/L (ref 3.5–5.2)
SODIUM BLD-SCNC: 138 MMOL/L (ref 136–145)

## 2019-09-02 PROCEDURE — 25010000002 ENOXAPARIN PER 10 MG: Performed by: THORACIC SURGERY (CARDIOTHORACIC VASCULAR SURGERY)

## 2019-09-02 PROCEDURE — 63710000001 PROMETHAZINE PER 25 MG: Performed by: THORACIC SURGERY (CARDIOTHORACIC VASCULAR SURGERY)

## 2019-09-02 PROCEDURE — 80048 BASIC METABOLIC PNL TOTAL CA: CPT | Performed by: THORACIC SURGERY (CARDIOTHORACIC VASCULAR SURGERY)

## 2019-09-02 PROCEDURE — 97110 THERAPEUTIC EXERCISES: CPT

## 2019-09-02 PROCEDURE — 99232 SBSQ HOSP IP/OBS MODERATE 35: CPT | Performed by: NURSE PRACTITIONER

## 2019-09-02 RX ADMIN — POLYETHYLENE GLYCOL 3350 17 G: 17 POWDER, FOR SOLUTION ORAL at 11:55

## 2019-09-02 RX ADMIN — ENOXAPARIN SODIUM 40 MG: 40 INJECTION SUBCUTANEOUS at 17:23

## 2019-09-02 RX ADMIN — GABAPENTIN 200 MG: 100 CAPSULE ORAL at 21:23

## 2019-09-02 RX ADMIN — GABAPENTIN 100 MG: 100 CAPSULE ORAL at 08:19

## 2019-09-02 RX ADMIN — POTASSIUM CHLORIDE 20 MEQ: 750 CAPSULE, EXTENDED RELEASE ORAL at 08:19

## 2019-09-02 RX ADMIN — CYCLOSPORINE 1 DROP: 0.5 EMULSION OPHTHALMIC at 08:19

## 2019-09-02 RX ADMIN — POTASSIUM CHLORIDE 20 MEQ: 750 CAPSULE, EXTENDED RELEASE ORAL at 17:23

## 2019-09-02 RX ADMIN — ASPIRIN 81 MG: 81 TABLET, COATED ORAL at 08:19

## 2019-09-02 RX ADMIN — METOPROLOL SUCCINATE 50 MG: 50 TABLET, FILM COATED, EXTENDED RELEASE ORAL at 08:19

## 2019-09-02 RX ADMIN — CYCLOSPORINE 1 DROP: 0.5 EMULSION OPHTHALMIC at 21:23

## 2019-09-02 RX ADMIN — GABAPENTIN 100 MG: 100 CAPSULE ORAL at 11:58

## 2019-09-02 RX ADMIN — VITAMIN D, TAB 1000IU (100/BT) 1000 UNITS: 25 TAB at 08:19

## 2019-09-02 RX ADMIN — FUROSEMIDE 40 MG: 40 TABLET ORAL at 08:19

## 2019-09-02 RX ADMIN — BENZONATATE 200 MG: 100 CAPSULE ORAL at 21:23

## 2019-09-02 RX ADMIN — PRAVASTATIN SODIUM 40 MG: 40 TABLET ORAL at 21:23

## 2019-09-02 RX ADMIN — BENZONATATE 200 MG: 100 CAPSULE ORAL at 06:47

## 2019-09-02 RX ADMIN — PANTOPRAZOLE SODIUM 40 MG: 40 TABLET, DELAYED RELEASE ORAL at 06:47

## 2019-09-02 RX ADMIN — GUAIFENESIN 1200 MG: 600 TABLET, EXTENDED RELEASE ORAL at 21:23

## 2019-09-02 RX ADMIN — LEVOTHYROXINE SODIUM 25 MCG: 25 TABLET ORAL at 06:47

## 2019-09-02 RX ADMIN — GUAIFENESIN 1200 MG: 600 TABLET, EXTENDED RELEASE ORAL at 08:19

## 2019-09-02 RX ADMIN — GABAPENTIN 100 MG: 100 CAPSULE ORAL at 17:23

## 2019-09-02 RX ADMIN — PROMETHAZINE HYDROCHLORIDE 12.5 MG: 25 TABLET ORAL at 21:23

## 2019-09-02 NOTE — PLAN OF CARE
Problem: Patient Care Overview  Goal: Plan of Care Review  Outcome: Ongoing (interventions implemented as appropriate)    Goal: Discharge Needs Assessment  Outcome: Ongoing (interventions implemented as appropriate)      Problem: Skin Injury Risk (Adult)  Goal: Skin Health and Integrity  Outcome: Ongoing (interventions implemented as appropriate)      Problem: Fall Risk (Adult)  Goal: Absence of Fall  Outcome: Ongoing (interventions implemented as appropriate)

## 2019-09-02 NOTE — PROGRESS NOTES
Normal course.  Cough is better.  Waiting on the pre-CERT since there is no one here over the weekend to do it.

## 2019-09-02 NOTE — THERAPY TREATMENT NOTE
Acute Care - Physical Therapy Treatment Note  [facility]     Patient Name: Reanna Higgins  : 1940  MRN: [id number]  Today's Date: 2019             Admit Date: 2019    Visit Dx:    ICD-10-CM ICD-9-CM   1. Coronary artery disease involving native coronary artery of native heart with angina pectoris (CMS/HCC) I25.119 414.01     413.9   2. Chest pain, unspecified type R07.9 786.50   3. Sick sinus syndrome (CMS/HCC) I49.5 427.81   4. Mesenteric ischemia due to arterial insufficiency (CMS/HCC) K55.059 557.9   5. Essential hypertension I10 401.9   6. Abnormal dobutamine stress echocardiography R94.39 794.30     Patient Active Problem List   Diagnosis   • Colon polyp   • Gastroesophageal reflux disease   • Essential hypertension   • Hypercholesterolemia   • Mitral and aortic valve disease   • Heart valve disease   • Anxiety   • Peripheral neuropathy   • Malignant neoplasm of cheek (CMS/HCC)   • Non-rheumatic mitral regurgitation   • Osteopenia   • SVT (supraventricular tachycardia) (CMS/HCC)   • AV block, Mobitz 1   • Cardiac pacemaker in situ   • RLS (restless legs syndrome)   • Phobia, flying   • Elevated hemoglobin A1c   • Acute pancreatitis without infection or necrosis   • Mesenteric ischemia due to arterial insufficiency (CMS/HCC)   • Epigastric pain   • Chest pain   • Sick sinus syndrome (CMS/HCC)   • Abnormal dobutamine stress echocardiography   • Coronary artery disease involving native coronary artery of native heart with angina pectoris (CMS/HCC)       Therapy Treatment    Rehabilitation Treatment Summary     Row Name 19 1125             Treatment Time/Intention    Discipline  physical therapy assistant  -      Document Type  therapy note (daily note)  -OLAF      Subjective Information  complains of;weakness;fatigue;dyspnea  -OLAF      Mode of Treatment  physical therapy;individual therapy  -      Care Plan Review  patient/other agree to care plan  -      Care Plan Review, Other  Participant(s)  daughter;spouse  -      Comment  pt had busy morning, incr fatigue  -JM      Existing Precautions/Restrictions  fall;cardiac  -      Recorded by [] Deborah Keenan [facility] 09/02/19 1132      Row Name 09/02/19 1125             Bed Mobility Assessment/Treatment    Supine-Sit [place] (Bed Mobility)  contact guard;verbal cues  -JM      Comment (Bed Mobility)  one brief rest during tfer due to slight dizziness  -JM      Recorded by [] Deborah Keenna, [facility] 09/02/19 1132      Row Name 09/02/19 1125             Sit-Stand Transfer    Sit-Stand [place] (Transfers)  contact guard;verbal cues post lean initially  -      Assistive Device (Sit-Stand Transfers)  -- HHA  -JM      Recorded by [] Deborah Keenan [facility] 09/02/19 1132      Row Name 09/02/19 1125             Gait/Stairs Assessment/Training    [place] Level (Gait)  contact guard;verbal cues  -      Assistive Device (Gait)  -- HHA  -JM      Distance in Feet (Gait)  70 req standing rest , then amb another 50 ft, SATS 84 % w/amb  -JM      Deviations/Abnormal Patterns (Gait)  librado decreased  -JM      Comment (Gait/Stairs)  standing rest x2 req due to wkness, O2 dropped on RA  -JM      Recorded by [] Deborah Keenan, [facility] 09/02/19 1132      Row Name 09/02/19 1125             Therapeutic Exercise    Comment (Therapeutic Exercise)  -- ed to perf exer w/fam when in chair later  -JM      Recorded by [] Deborah Keenan [facility] 09/02/19 1132      Row Name 09/02/19 1125             Positioning and Restraints    Pre-Treatment Position  in bed  -JM      In Bed  sitting;call light within reach;encouraged to call for assist;with family/caregiver no alarm upon entry  -JM      Recorded by [] Deborah Keenan, [facility] 09/02/19 1132      Row Name 09/02/19 1125             Pain Scale: Numbers Pre/Post-Treatment    Pain Scale: Numbers, Post-Treatment  2/10  -      Pain Location - Orientation  incisional  -JM      Recorded by [JM] Shlomo  Deborah, [facility] 09/02/19 1132      Row Name                Wound 07/19/19 0600 Right posterior wrist incision    Wound - Properties Group Date first assessed: 07/19/19 [KS] Time first assessed: 0600 [KS] Side: Right [KS] Orientation: posterior [KS] Location: wrist [KS] Type: incision [KS] Recorded by:  [KS] Pinky Washington RN 07/19/19 0622    Row Name                Wound 08/23/19 1029 chest Incision    Wound - Properties Group Date first assessed: 08/23/19 [OD] Time first assessed: 1029 [OD] Location: chest [OD] Primary Wound Type: Incision [OD] Recorded by:  [OD] Jay Jay Bruce Jr., RN 08/23/19 1029    Row Name                Wound 08/23/19 1029 leg Incision    Wound - Properties Group Date first assessed: 08/23/19 [OD] Time first assessed: 1029 [OD] Location: leg [OD] Primary Wound Type: Incision [OD] Recorded by:  [OD] Jay Jay Bruce Jr., RN 08/23/19 1029      User Key  (r) = Recorded By, (t) = Taken By, (c) = Cosigned By    Initials Name Effective Dates Discipline    OD Jay Jay Bruce Jr., RN 06/16/16 -  Nurse    Deborah Escobar, [facility] 03/07/18 -  PT    Pinky Wahl RN 06/16/16 -  Nurse          Wound 07/19/19 0600 Right posterior wrist incision (Active)   Dressing Appearance open to air 9/2/2019  8:30 AM   Closure Approximated 9/2/2019  8:30 AM   Base dressing in place, unable to visualize 9/2/2019  8:30 AM   Drainage Amount none 9/2/2019  8:30 AM       Wound 08/23/19 1029 chest Incision (Active)   Dressing Appearance open to air 9/2/2019  8:30 AM   Closure Approximated 9/2/2019  8:30 AM   Base pink;scab 9/2/2019  8:30 AM   Drainage Amount none 9/2/2019  8:30 AM       Wound 08/23/19 1029 leg Incision (Active)   Dressing Appearance open to air 9/2/2019  8:30 AM   Closure Liquid skin adhesive 9/2/2019  8:30 AM   Base pink 9/2/2019  8:30 AM   Drainage Amount none 9/2/2019  8:30 AM           Physical Therapy Education     Title: PT OT SLP Therapies (Done)     Topic: Physical Therapy (Done)     Point:  Mobility training (Done)     Learning Progress Summary           Patient Acceptance, E,TB,D, VU by OLAF at 9/2/2019 11:35 AM    Acceptance, E,TB, VU by OLAF at 9/1/2019 11:06 AM    Acceptance, E,TB,D, VU,NR by OLAF at 8/31/2019  9:53 AM    Comment:  pt very anxious , cardiac ed provided    Acceptance, E, NR by EM at 8/30/2019  9:56 AM    Acceptance, E,TB,D, VU,NR by SV at 8/29/2019 12:00 PM    Acceptance, E,D, NR by PC at 8/28/2019  5:20 PM    Acceptance, E, NR by MA at 8/27/2019  9:18 AM    Acceptance, E,D, NR by PC at 8/26/2019 11:32 AM    Acceptance, E,TB, VU,NR by CS at 8/25/2019 10:01 AM    Acceptance, E,TB, VU,NR by MISSY at 8/24/2019  9:52 AM   Family Acceptance, E,TB,D, VU by OLAF at 9/2/2019 11:35 AM    Acceptance, E,TB, VU by OLAF at 9/1/2019 11:06 AM    Acceptance, E,TB,D, VU,NR by OLAF at 8/31/2019  9:53 AM    Comment:  pt very anxious , cardiac ed provided   Significant Other Acceptance, E,TB,D, VU,NR by BRUNA at 8/29/2019 12:00 PM                   Point: Home exercise program (Done)     Learning Progress Summary           Patient Acceptance, E,TB,D, VU by OLAF at 9/2/2019 11:35 AM    Acceptance, E,TB, VU by OLAF at 9/1/2019 11:06 AM    Acceptance, E,TB,D, VU,NR by OLAF at 8/31/2019  9:53 AM    Comment:  pt very anxious , cardiac ed provided    Acceptance, E, NR by EM at 8/30/2019  9:56 AM    Acceptance, E,TB,D, VU,NR by SV at 8/29/2019 12:00 PM    Acceptance, E,D, NR by PC at 8/28/2019  5:20 PM    Acceptance, E, NR by MA at 8/27/2019  9:18 AM    Acceptance, E,D, NR by PC at 8/26/2019 11:32 AM    Acceptance, E,TB, VU,NR by CS at 8/25/2019 10:01 AM    Acceptance, E,TB, VU,NR by CS at 8/24/2019  9:52 AM   Family Acceptance, E,TB,D, VU by OLAF at 9/2/2019 11:35 AM    Acceptance, E,TB, VU by OLAF at 9/1/2019 11:06 AM    Acceptance, E,TB,D, VU,NR by OLAF at 8/31/2019  9:53 AM    Comment:  pt very anxious , cardiac ed provided   Significant Other Acceptance, E,TB,D, VU,NR by SV at 8/29/2019 12:00 PM                   Point: Body  mechanics (Done)     Learning Progress Summary           Patient Acceptance, E,TB,D, VU by OLAF at 9/2/2019 11:35 AM    Acceptance, E,TB, VU by OLAF at 9/1/2019 11:06 AM    Acceptance, E,TB,D, VU,NR by OLAF at 8/31/2019  9:53 AM    Comment:  pt very anxious , cardiac ed provided    Acceptance, E,TB,D, VU,NR by SV at 8/29/2019 12:00 PM    Acceptance, E,D, NR by PC at 8/28/2019  5:20 PM    Acceptance, E, NR by MA at 8/27/2019  9:18 AM    Acceptance, E,D, NR by PC at 8/26/2019 11:32 AM    Acceptance, E,TB, VU,NR by CS at 8/25/2019 10:01 AM    Acceptance, E,TB, VU,NR by MISSY at 8/24/2019  9:52 AM   Family Acceptance, E,TB,D, VU by OLAF at 9/2/2019 11:35 AM    Acceptance, E,TB, VU by OLAF at 9/1/2019 11:06 AM    Acceptance, E,TB,D, VU,NR by OLAF at 8/31/2019  9:53 AM    Comment:  pt very anxious , cardiac ed provided   Significant Other Acceptance, E,TB,D, VU,NR by BRUAN at 8/29/2019 12:00 PM                   Point: Precautions (Done)     Learning Progress Summary           Patient Acceptance, E,TB,D, VU by OLAF at 9/2/2019 11:35 AM    Acceptance, E,TB, VU by OLAF at 9/1/2019 11:06 AM    Acceptance, E,TB,D, VU,NR by OLAF at 8/31/2019  9:53 AM    Comment:  pt very anxious , cardiac ed provided    Acceptance, E,TB,D, VU,NR by SV at 8/29/2019 12:00 PM    Acceptance, E,D, NR by PC at 8/28/2019  5:20 PM    Acceptance, E, NR by MA at 8/27/2019  9:18 AM    Acceptance, E,D, NR by PC at 8/26/2019 11:32 AM    Acceptance, E,TB, VU,NR by CS at 8/25/2019 10:01 AM    Acceptance, E,TB, VU,NR by CS at 8/24/2019  9:52 AM   Family Acceptance, E,TB,D, VU by OLAF at 9/2/2019 11:35 AM    Acceptance, E,TB, VU by OLAF at 9/1/2019 11:06 AM    Acceptance, E,TB,D, VU,NR by OLAF at 8/31/2019  9:53 AM    Comment:  pt very anxious , cardiac ed provided   Significant Other Acceptance, E,TB,D, VU,NR by BRUNA at 8/29/2019 12:00 PM                               User Key     Initials Effective Dates Name Provider Type Discipline    PC 04/03/18 -  Lenka Brown, PT Physical Therapist  PT    EM 04/03/18 -  Shell Liu, PT Physical Therapist PT    JM 03/07/18 -  Deborah Keenan PTA Physical Therapy Assistant PT    SV 04/03/18 -  Carmen Hernández, PT Physical Therapist PT    MA 10/19/18 -  Vianca Muse, PT Physical Therapist PT    CS 05/14/18 -  Connor Chaudhary, PT Physical Therapist PT                PT Recommendation and Plan     Plan of Care Reviewed With: patient, spouse, daughter  Outcome Summary: pt req rest breaks to complete PT session; SATS dropped to 84% during amb in hatch, improved once back to bed 95%; dizziness and wkness reported, but very agreeable  to PT; plans SNU as pt still not indep w/amb on level ground and lives in a tri-level home; decr endurance and strength, fearful of safety at home   Outcome Measures     Row Name 09/02/19 1100             How much help from another person do you currently need...    Turning from your back to your side while in flat bed without using bedrails?  4  -JM      Moving from lying on back to sitting on the side of a flat bed without bedrails?  3  -JM      Moving to and from a bed to a chair (including a wheelchair)?  3  -JM      Standing up from a chair using your arms (e.g., wheelchair, bedside chair)?  3  -JM      Climbing 3-5 steps with a railing?  3  -JM      To walk in hospital room?  3  -JM      AM-PAC 6 Clicks Score (PT)  19  -        User Key  (r) = Recorded By, (t) = Taken By, (c) = Cosigned By    Initials Name Provider Type    Deborah Escobar PTA Physical Therapy Assistant         Time Calculation:   PT Charges     Row Name 09/02/19 1136             Time Calculation    Start Time  1101  -OLAF      Stop Time  1112  -      Time Calculation (min)  11 min  -OLAF      PT Received On  09/02/19  -OLAF      PT - Next Appointment  09/03/19  -OLAF        User Key  (r) = Recorded By, (t) = Taken By, (c) = Cosigned By    Initials Name Provider Type    Deborah Escobar PTA Physical Therapy Assistant        Therapy Charges for Today      Code Description Service Date Service Provider Modifiers Qty    11657403121 HC PT THER PROC EA 15 MIN 9/1/2019 Deborah Keenan PTA GP 2    06253100449 HC PT THER PROC EA 15 MIN 9/2/2019 Deborah Keenan PTA GP 1          PT G-Codes  Outcome Measure Options: AM-PAC 6 Clicks Basic Mobility (PT)  AM-PAC 6 Clicks Score (PT): 19    Deborah Keenan PTA  9/2/2019

## 2019-09-02 NOTE — PROGRESS NOTES
Cardiology Follow-Up Note      Patient Name: Reanna Higgins  Age/Sex: 79 y.o. female  : 1940  MRN: [id number]      Day of Service: 19       Chief Complaint/Follow-up: CAD, status post CABG, postop ileus    S: Slept well last night, cough resolved.      Temp:  [97.5 °F (36.4 °C)-98.8 °F (37.1 °C)] 98.1 °F (36.7 °C)  Heart Rate:  [79-98] 98  Resp:  [16] 16  BP: (128-142)/(57-78) 131/57     PHYSICAL EXAM:    General Appearance: No acute distress, well developed and well nourished.   Eyes: Conjunctiva and lids: No erythema, swelling, or discharge. Sclera non-icteric.   HENT: Atraumatic, normocephalic. External eyes, ears, and nose normal.   Respiratory: No signs of respiratory distress. Respiration rhythm and depth normal.   Clear to auscultation. No rales, crackles, rhonchi, or wheezing auscultated.   Cardiovascular:  Heart Rate and Rhythm: Normal, Heart Sounds: Normal S1 and S2. No S3 or S4 noted.  Murmurs: No murmurs noted. No rubs, thrills, or gallops.   Arterial Pulses: Posterior tibialis and dorsalis pedis pulses normal.   Lower Extremities: No edema noted.  Gastrointestinal:  Abdomen soft, non-distended, non-tender.  Musculoskeletal: Normal movement of extremities  Skin: Warm and dry.   Psychiatric: Patient alert and oriented to person, place, and time. Speech and behavior appropriate. Normal mood and affect.       ECG/TELE:           Results from last 7 days   Lab Units 19  0440 19  1722 19  0459 19  0437   SODIUM mmol/L 138  --  136 138   POTASSIUM mmol/L 3.9 4.1 3.2* 3.8   CHLORIDE mmol/L 100  --  100 103   CO2 mmol/L 25.2  --  24.3 27.9   BUN mg/dL 10  --  13 15   CREATININE mg/dL 0.69  --  0.60 0.64   GLUCOSE mg/dL 105*  --  114* 102*   CALCIUM mg/dL 8.8  --  8.6 9.0     Results from last 7 days   Lab Units 19  0323 19  0413   WBC 10*3/mm3 3.55 2.99*   HEMOGLOBIN g/dL 11.1* 10.8*   HEMATOCRIT % 34.6 33.5*   PLATELETS 10*3/mm3 237 193                        Current Medications:   Scheduled Meds:  aspirin 81 mg Oral Daily   cholecalciferol 1,000 Units Oral Daily   cycloSPORINE 1 drop Both Eyes BID   enoxaparin 40 mg Subcutaneous Daily   furosemide 40 mg Oral Daily   gabapentin 100 mg Oral TID   gabapentin 200 mg Oral Nightly   guaiFENesin 1,200 mg Oral Q12H   levothyroxine 25 mcg Oral Q AM   metoprolol succinate XL 50 mg Oral Daily   pantoprazole 40 mg Oral Q AM   polyethylene glycol 17 g Oral BID   potassium chloride 20 mEq Oral BID With Meals   pravastatin 40 mg Oral Once per day on Mon Wed Fri Sat   sennosides-docusate sodium 2 tablet Oral Nightly           Coronary artery disease involving native coronary artery of native heart with angina pectoris (CMS/HCC)    Essential hypertension    Mesenteric ischemia due to arterial insufficiency (CMS/HCC)    Chest pain    Sick sinus syndrome (CMS/HCC)    Abnormal dobutamine stress echocardiography       Plan:     -CAD, s/p CABG x 6, POD #8  -EF normal by stress echo 8/8/19  -HTN, controlled  -HLD--restarted pravastatin  -Hx AV block, s/p PPM ([place]-2016)  -Post op paralytic ileus/partial SBO, resolved, tolerating regular diet.  -Brief episode of PAF on 8/31, only a couple hours in duration, resolved with beta-blocker, continue to monitor.    She really thinks that she needs to go to rehab because she has no help at home.  Her  is there and she has to take care of him.  Again a pre-CERT cannot be started until tomorrow however I am not sure that she is going to qualify for rehab by that time as she is making great progress.      Ana Gannon, APRN  09/02/19  8:33 AM

## 2019-09-02 NOTE — PLAN OF CARE
Problem: Patient Care Overview  Goal: Plan of Care Review  Outcome: Ongoing (interventions implemented as appropriate)   09/02/19 1132   Coping/Psychosocial   Plan of Care Reviewed With patient;spouse;daughter   OTHER   Outcome Summary pt req rest breaks to complete PT session; SATS dropped to 84% during amb in hatch, improved once back to bed 95%; dizziness and wkness reported, but very agreeable to PT; plans SNU as pt still not indep w/amb on level ground and lives in a tri-level home; decr endurance and strength, fearful of safety at home

## 2019-09-02 NOTE — PLAN OF CARE
Problem: Patient Care Overview  Goal: Plan of Care Review   09/02/19 1602   Coping/Psychosocial   Plan of Care Reviewed With patient   Plan of Care Review   Progress improving   OTHER   Outcome Summary pt ambulated around the hallway and tolerated well. vitals stable .no pain will cont to monitor

## 2019-09-03 LAB
ANION GAP SERPL CALCULATED.3IONS-SCNC: 12 MMOL/L (ref 5–15)
BUN BLD-MCNC: 9 MG/DL (ref 8–23)
BUN/CREAT SERPL: 13.2 (ref 7–25)
CALCIUM SPEC-SCNC: 8.9 MG/DL (ref 8.6–10.5)
CHLORIDE SERPL-SCNC: 96 MMOL/L (ref 98–107)
CO2 SERPL-SCNC: 25 MMOL/L (ref 22–29)
CREAT BLD-MCNC: 0.68 MG/DL (ref 0.57–1)
GFR SERPL CREATININE-BSD FRML MDRD: 83 ML/MIN/1.73
GLUCOSE BLD-MCNC: 107 MG/DL (ref 65–99)
POTASSIUM BLD-SCNC: 4.6 MMOL/L (ref 3.5–5.2)
SODIUM BLD-SCNC: 133 MMOL/L (ref 136–145)

## 2019-09-03 PROCEDURE — 97110 THERAPEUTIC EXERCISES: CPT

## 2019-09-03 PROCEDURE — 99232 SBSQ HOSP IP/OBS MODERATE 35: CPT | Performed by: INTERNAL MEDICINE

## 2019-09-03 PROCEDURE — 25010000002 ENOXAPARIN PER 10 MG: Performed by: THORACIC SURGERY (CARDIOTHORACIC VASCULAR SURGERY)

## 2019-09-03 PROCEDURE — 80048 BASIC METABOLIC PNL TOTAL CA: CPT | Performed by: THORACIC SURGERY (CARDIOTHORACIC VASCULAR SURGERY)

## 2019-09-03 RX ORDER — HYDROCODONE BITARTRATE AND ACETAMINOPHEN 5; 325 MG/1; MG/1
1 TABLET ORAL EVERY 4 HOURS PRN
Qty: 42 TABLET | Refills: 0 | Status: SHIPPED | OUTPATIENT
Start: 2019-09-03 | End: 2019-09-09

## 2019-09-03 RX ORDER — PRAVASTATIN SODIUM 40 MG
TABLET ORAL
Qty: 72 TABLET | Refills: 0 | OUTPATIENT
Start: 2019-09-03 | End: 2019-09-04 | Stop reason: HOSPADM

## 2019-09-03 RX ORDER — HYDROCODONE BITARTRATE AND ACETAMINOPHEN 5; 325 MG/1; MG/1
1 TABLET ORAL EVERY 4 HOURS PRN
Qty: 42 TABLET | Refills: 0 | Status: SHIPPED | OUTPATIENT
Start: 2019-09-03 | End: 2019-09-03

## 2019-09-03 RX ADMIN — ASPIRIN 81 MG: 81 TABLET, COATED ORAL at 08:09

## 2019-09-03 RX ADMIN — PANTOPRAZOLE SODIUM 40 MG: 40 TABLET, DELAYED RELEASE ORAL at 06:42

## 2019-09-03 RX ADMIN — POLYETHYLENE GLYCOL 3350 17 G: 17 POWDER, FOR SOLUTION ORAL at 08:15

## 2019-09-03 RX ADMIN — POTASSIUM CHLORIDE 20 MEQ: 750 CAPSULE, EXTENDED RELEASE ORAL at 17:33

## 2019-09-03 RX ADMIN — BENZONATATE 200 MG: 100 CAPSULE ORAL at 06:42

## 2019-09-03 RX ADMIN — ACETAMINOPHEN 650 MG: 325 TABLET, FILM COATED ORAL at 20:58

## 2019-09-03 RX ADMIN — GABAPENTIN 100 MG: 100 CAPSULE ORAL at 12:03

## 2019-09-03 RX ADMIN — GABAPENTIN 100 MG: 100 CAPSULE ORAL at 08:15

## 2019-09-03 RX ADMIN — METOPROLOL SUCCINATE 50 MG: 50 TABLET, FILM COATED, EXTENDED RELEASE ORAL at 08:09

## 2019-09-03 RX ADMIN — BENZONATATE 200 MG: 100 CAPSULE ORAL at 22:57

## 2019-09-03 RX ADMIN — CYCLOSPORINE 1 DROP: 0.5 EMULSION OPHTHALMIC at 20:52

## 2019-09-03 RX ADMIN — ENOXAPARIN SODIUM 40 MG: 40 INJECTION SUBCUTANEOUS at 17:33

## 2019-09-03 RX ADMIN — POTASSIUM CHLORIDE 20 MEQ: 750 CAPSULE, EXTENDED RELEASE ORAL at 08:09

## 2019-09-03 RX ADMIN — GABAPENTIN 100 MG: 100 CAPSULE ORAL at 17:33

## 2019-09-03 RX ADMIN — POLYETHYLENE GLYCOL 3350 17 G: 17 POWDER, FOR SOLUTION ORAL at 20:51

## 2019-09-03 RX ADMIN — VITAMIN D, TAB 1000IU (100/BT) 1000 UNITS: 25 TAB at 08:09

## 2019-09-03 RX ADMIN — CYCLOSPORINE 1 DROP: 0.5 EMULSION OPHTHALMIC at 08:09

## 2019-09-03 RX ADMIN — ACETAMINOPHEN 650 MG: 325 TABLET, FILM COATED ORAL at 12:09

## 2019-09-03 RX ADMIN — SENNOSIDES AND DOCUSATE SODIUM 2 TABLET: 8.6; 5 TABLET ORAL at 20:51

## 2019-09-03 RX ADMIN — GUAIFENESIN 1200 MG: 600 TABLET, EXTENDED RELEASE ORAL at 08:09

## 2019-09-03 RX ADMIN — FUROSEMIDE 40 MG: 40 TABLET ORAL at 08:09

## 2019-09-03 RX ADMIN — LEVOTHYROXINE SODIUM 25 MCG: 25 TABLET ORAL at 06:42

## 2019-09-03 RX ADMIN — GABAPENTIN 200 MG: 100 CAPSULE ORAL at 20:52

## 2019-09-03 RX ADMIN — GUAIFENESIN 1200 MG: 600 TABLET, EXTENDED RELEASE ORAL at 20:51

## 2019-09-03 NOTE — PLAN OF CARE
Problem: Patient Care Overview  Goal: Plan of Care Review   09/03/19 1143   Coping/Psychosocial   Plan of Care Reviewed With patient   OTHER   Outcome Summary Limited progress towards goals during PT today due to fatigue and SOB. Able to ambulate short distance in room and perform standing and sitting strengthening exercises w/ ed for HEP. Required CGA and UE support on walker due to balance deficits. Currently recommend DC to SNU.

## 2019-09-03 NOTE — PROGRESS NOTES
Adult Nutrition  Assessment/PES    Patient Name:  Reanna Higgins  YOB: 1940  MRN: [id number]  Admit Date:  8/22/2019    Assessment Date:  9/3/2019    Reason for Assessment     Row Name 09/03/19 1330          Reason for Assessment    Reason For Assessment  follow-up protocol         Nutrition/Diet History     Row Name 09/03/19 1331          Nutrition/Diet History    Typical Food/Fluid Intake Ileus resolved. Eating % of meals. Plan is d/c to NH for rehab.            Labs/Tests/Procedures/Meds     Row Name 09/03/19 1331          Labs/Procedures/Meds    Lab Results Reviewed  reviewed     Lab Results Comments  Na 133, Glu low 100s        Diagnostic Tests/Procedures    Diagnostic Test/Procedure Reviewed  reviewed        Medications    Pertinent Medications Reviewed  reviewed         Physical Findings     Row Name 09/03/19 1333          Physical Findings    Skin  other (see comments) chest incision           Nutrition Prescription Ordered     Row Name 09/03/19 1334          Nutrition Prescription PO    Current PO Diet  Regular             Problem/Interventions:  Problem 1     Row Name 09/03/19 1334          Nutrition Diagnoses Problem 1    Resolved?  Yes             Intervention Goal     Row Name 09/03/19 1334          Intervention Goal    General  Maintain nutrition     PO  Continue positive trend;Maintain intake         Nutrition Intervention     Row Name 09/03/19 1334          Nutrition Intervention    RD/Tech Action  Interview for preference;Encourage intake;Follow Tx progress           Education/Evaluation     Row Name 09/03/19 1334          Education    Education  No discharge needs identified at this time        Monitor/Evaluation    Monitor  Per protocol           Electronically signed by:  Vilma Tavarez RD  09/03/19 1:34 PM

## 2019-09-03 NOTE — PROGRESS NOTES
Cardiology Follow-Up Note      Patient Name: Reanna Higgins  Age/Sex: 79 y.o. female  : 1940  MRN: [id number]      Day of Service: 19       Chief Complaint/Follow-up: CAD, status post CABG, postop ileus    S: Slept well last night, feels tired today, no SOB      Temp:  [98.1 °F (36.7 °C)-98.9 °F (37.2 °C)] 98.9 °F (37.2 °C)  Heart Rate:  [81-88] 81  Resp:  [16] 16  BP: (110-124)/(58-72) 117/65     PHYSICAL EXAM:    General Appearance: No acute distress, well developed and well nourished.   Eyes: Conjunctiva and lids: No erythema, swelling, or discharge. Sclera non-icteric.   HENT: Atraumatic, normocephalic. External eyes, ears, and nose normal.   Respiratory: No signs of respiratory distress. Respiration rhythm and depth normal.   Clear to auscultation. No rales, crackles, rhonchi, or wheezing auscultated.   Cardiovascular:  Heart Rate and Rhythm: Normal, Heart Sounds: Normal S1 and S2. No S3 or S4 noted.  Murmurs: No murmurs noted. No rubs, thrills, or gallops.   Arterial Pulses: Posterior tibialis and dorsalis pedis pulses normal.   Lower Extremities: No edema noted.  Gastrointestinal:  Abdomen soft, non-distended, non-tender.  Musculoskeletal: Normal movement of extremities  Skin: Warm and dry.   Psychiatric: Patient alert and oriented to person, place, and time. Speech and behavior appropriate. Normal mood and affect.       ECG/TELE:           Results from last 7 days   Lab Units 19  0429 19  0440 19  1722 19  0459   SODIUM mmol/L 133* 138  --  136   POTASSIUM mmol/L 4.6 3.9 4.1 3.2*   CHLORIDE mmol/L 96* 100  --  100   CO2 mmol/L 25.0 25.2  --  24.3   BUN mg/dL 9 10  --  13   CREATININE mg/dL 0.68 0.69  --  0.60   GLUCOSE mg/dL 107* 105*  --  114*   CALCIUM mg/dL 8.9 8.8  --  8.6     Results from last 7 days   Lab Units 19  0323   WBC 10*3/mm3 3.55   HEMOGLOBIN g/dL 11.1*   HEMATOCRIT % 34.6   PLATELETS 10*3/mm3 237                       Current Medications:    Scheduled Meds:    aspirin 81 mg Oral Daily   cholecalciferol 1,000 Units Oral Daily   cycloSPORINE 1 drop Both Eyes BID   enoxaparin 40 mg Subcutaneous Daily   furosemide 40 mg Oral Daily   gabapentin 100 mg Oral TID   gabapentin 200 mg Oral Nightly   guaiFENesin 1,200 mg Oral Q12H   levothyroxine 25 mcg Oral Q AM   metoprolol succinate XL 50 mg Oral Daily   pantoprazole 40 mg Oral Q AM   polyethylene glycol 17 g Oral BID   potassium chloride 20 mEq Oral BID With Meals   pravastatin 40 mg Oral Once per day on Mon Wed Fri Sat   sennosides-docusate sodium 2 tablet Oral Nightly           Coronary artery disease involving native coronary artery of native heart with angina pectoris (CMS/HCC)    Essential hypertension    Mesenteric ischemia due to arterial insufficiency (CMS/HCC)    Chest pain    Sick sinus syndrome (CMS/HCC)    Abnormal dobutamine stress echocardiography       Plan:     -CAD, s/p CABG x 6, POD #8  -EF normal by stress echo 8/8/19  -HTN, controlled  -HLD--restarted pravastatin  -Hx AV block, s/p PPM ([place]-2016)  -Post op paralytic ileus/partial SBO, resolved, tolerating regular diet.  -Brief episode of PAF on 8/31, only a couple hours in duration, resolved with beta-blocker, continue to monitor.    Patient has requested analysis for rehab because she has no help at home and feels like she would be better off in rehab.  Her  is at home and she typically has to take care of him.  They were unable to perform a precertification over the weekend.  That evaluation will occur today.  She will continue to work on ambulation.    Félix Chapman III, MD  09/03/19  8:11 AM

## 2019-09-03 NOTE — PROGRESS NOTES
Continued Stay Note  [facility]     Patient Name: Reanna Higgins  MRN: [id number]  Today's Date: 9/3/2019    Admit Date: 8/22/2019    Discharge Plan     Row Name 09/03/19 1029       Plan    Plan  9/3- Precert started today for [facility]    Plan Comments  Per [facility] PRECERT started this morning.  Packet on CCP desk.  MARIMAR URIOSTEGUI/CCP        Discharge Codes    No documentation.       Expected Discharge Date and Time     Expected Discharge Date Expected Discharge Time    Aug 27, 2019             Celeste Paulino RN

## 2019-09-03 NOTE — PROGRESS NOTES
" LOS: 12 days   Patient Care Team:  Ted Li MD as PCP - General (Internal Medicine)  To Rivera MD as Consulting Physician (General Surgery)  Torsten Benson Jr., MD as Consulting Physician (Cardiology)  Arsenio Witt MD as Consulting Physician (Ophthalmology)  Brown Beckman MD as Consulting Physician (Orthopedic Surgery)    Chief Complaint: post op    Subjective:  Symptoms:  Resolved.  No shortness of breath or chest pain.    Diet:  Adequate intake.  No nausea or vomiting.    Activity level: Normal.    Pain:  She reports no pain.          Vital Signs  Temp:  [98.1 °F (36.7 °C)-98.9 °F (37.2 °C)] 98.7 °F (37.1 °C)  Heart Rate:  [81-95] 95  Resp:  [16] 16  BP: (110-124)/(54-72) 120/54  Body mass index is 24.07 kg/m².    Intake/Output Summary (Last 24 hours) at 9/3/2019 1115  Last data filed at 9/3/2019 0900  Gross per 24 hour   Intake 450 ml   Output --   Net 450 ml     I/O this shift:  In: 240 [P.O.:240]  Out: -             08/30/19  0300 09/01/19  0647 09/02/19  0500   Weight: 68.9 kg (152 lb) 69.4 kg (152 lb 14.4 oz) 69.7 kg (153 lb 10.6 oz)         Objective:  General Appearance:  Comfortable and well-appearing.    Vital signs: (most recent): Blood pressure 120/54, pulse 95, temperature 98.7 °F (37.1 °C), temperature source Oral, resp. rate 16, height 170.2 cm (67\"), weight 69.7 kg (153 lb 10.6 oz), SpO2 91 %.  Vital signs are normal.  No fever.    Output: Producing urine and producing stool.    Lungs:  Normal effort.  Breath sounds clear to auscultation.    Heart: Normal rate.  Regular rhythm.  S1 normal and S2 normal.    Abdomen: Abdomen is soft and non-distended.  Bowel sounds are normal.     Pulses: Distal pulses are intact.    Neurological: Patient is alert and oriented to person, place and time.    Skin:  Warm and dry.              Results Review:        WBC No results found for: WBC   HGB No results found for: HGB   HCT No results found for: HCT   Platelets No results found " for: PLT     PT/INR:  No results found for: PROTIME/No results found for: INR    Sodium Sodium   Date Value Ref Range Status   09/03/2019 133 (L) 136 - 145 mmol/L Final   09/02/2019 138 136 - 145 mmol/L Final   09/01/2019 136 136 - 145 mmol/L Final      Potassium Potassium   Date Value Ref Range Status   09/03/2019 4.6 3.5 - 5.2 mmol/L Final   09/02/2019 3.9 3.5 - 5.2 mmol/L Final   09/01/2019 4.1 3.5 - 5.2 mmol/L Final   09/01/2019 3.2 (L) 3.5 - 5.2 mmol/L Final      Chloride Chloride   Date Value Ref Range Status   09/03/2019 96 (L) 98 - 107 mmol/L Final   09/02/2019 100 98 - 107 mmol/L Final   09/01/2019 100 98 - 107 mmol/L Final      Bicarbonate CO2   Date Value Ref Range Status   09/03/2019 25.0 22.0 - 29.0 mmol/L Final   09/02/2019 25.2 22.0 - 29.0 mmol/L Final   09/01/2019 24.3 22.0 - 29.0 mmol/L Final      BUN BUN   Date Value Ref Range Status   09/03/2019 9 8 - 23 mg/dL Final   09/02/2019 10 8 - 23 mg/dL Final   09/01/2019 13 8 - 23 mg/dL Final      Creatinine Creatinine   Date Value Ref Range Status   09/03/2019 0.68 0.57 - 1.00 mg/dL Final   09/02/2019 0.69 0.57 - 1.00 mg/dL Final   09/01/2019 0.60 0.57 - 1.00 mg/dL Final      Calcium Calcium   Date Value Ref Range Status   09/03/2019 8.9 8.6 - 10.5 mg/dL Final   09/02/2019 8.8 8.6 - 10.5 mg/dL Final   09/01/2019 8.6 8.6 - 10.5 mg/dL Final      Magnesium No results found for: MG         aspirin 81 mg Oral Daily   cholecalciferol 1,000 Units Oral Daily   cycloSPORINE 1 drop Both Eyes BID   enoxaparin 40 mg Subcutaneous Daily   furosemide 40 mg Oral Daily   gabapentin 100 mg Oral TID   gabapentin 200 mg Oral Nightly   guaiFENesin 1,200 mg Oral Q12H   levothyroxine 25 mcg Oral Q AM   metoprolol succinate XL 50 mg Oral Daily   pantoprazole 40 mg Oral Q AM   polyethylene glycol 17 g Oral BID   potassium chloride 20 mEq Oral BID With Meals   pravastatin 40 mg Oral Once per day on Mon Wed Fri Sat   sennosides-docusate sodium 2 tablet Oral Nightly               Patient Active Problem List   Diagnosis Code   • Colon polyp K63.5   • Gastroesophageal reflux disease K21.9   • Essential hypertension I10   • Hypercholesterolemia E78.00   • Mitral and aortic valve disease I08.0   • Heart valve disease I38   • Anxiety F41.9   • Peripheral neuropathy G62.9   • 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   • Non-rheumatic mitral regurgitation I34.0   • Osteopenia M85.80   • SVT (supraventricular tachycardia) (CMS/[facility]) I47.1   • AV block, Mobitz 1 I44.1   • Cardiac pacemaker in situ Z95.0   • RLS (restless legs syndrome) G25.81   • Phobia, 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   • Elevated hemoglobin A1c R73.09   • Acute pancreatitis without infection or necrosis K85.90   • Mesenteric ischemia due to arterial insufficiency (CMS/[facility]) K55.059   • Epigastric pain R10.13   • Chest pain R07.9   • Sick sinus syndrome (CMS/[facility]) I49.5   • Abnormal dobutamine stress echocardiography R94.39   • Coronary artery disease involving native coronary artery of native heart with angina pectoris (CMS/[facility]) I25.119       Assessment & Plan      -CAD s/p CABGx6, LIMA, LSVG POD #11 ([facility])--on asa/bb/statin  -Hypertension--stable  -Hypercholesterolemia--statin  -AV block & Sick Sinus Syndrome with PPM--stable  -post op anemia- expected post op blood loss--stable  -Paralytic ileus--resolved     Routine care  Awaiting precert for rehab at [facility]         Sheila Mullins, LUIS A  09/03/19  11:15 AM

## 2019-09-03 NOTE — THERAPY PROGRESS REPORT/RE-CERT
Patient Name: Reanna Higgins  : 1940    MRN: [id number]                              Today's Date: 9/3/2019       Admit Date: 2019    Visit Dx:     ICD-10-CM ICD-9-CM   1. Coronary artery disease involving native coronary artery of native heart with angina pectoris (CMS/HCC) I25.119 414.01     413.9   2. Chest pain, unspecified type R07.9 786.50   3. Sick sinus syndrome (CMS/HCC) I49.5 427.81   4. Mesenteric ischemia due to arterial insufficiency (CMS/HCC) K55.059 557.9   5. Essential hypertension I10 401.9   6. Abnormal dobutamine stress echocardiography R94.39 794.30     Patient Active Problem List   Diagnosis   • Colon polyp   • Gastroesophageal reflux disease   • Essential hypertension   • Hypercholesterolemia   • Mitral and aortic valve disease   • Heart valve disease   • Anxiety   • Peripheral neuropathy   • Malignant neoplasm of cheek (CMS/HCC)   • Non-rheumatic mitral regurgitation   • Osteopenia   • SVT (supraventricular tachycardia) (CMS/HCC)   • AV block, Mobitz 1   • Cardiac pacemaker in situ   • RLS (restless legs syndrome)   • Phobia, flying   • Elevated hemoglobin A1c   • Acute pancreatitis without infection or necrosis   • Mesenteric ischemia due to arterial insufficiency (CMS/HCC)   • Epigastric pain   • Chest pain   • Sick sinus syndrome (CMS/HCC)   • Abnormal dobutamine stress echocardiography   • Coronary artery disease involving native coronary artery of native heart with angina pectoris (CMS/HCC)     Past Medical History:   Diagnosis Date   • Cancer (CMS/HCC)     skin   • Cardiac pacemaker in situ    • Carpal tunnel syndrome    • Cataract    • Cystic fibroadenosis of breast    • GERD (gastroesophageal reflux disease)    • Hypercholesterolemia    • Hypertension    • Osteoarthritis of both hands    • Pancreatitis    • Peripheral neuropathy    • Restless leg syndrome    • Second degree AV block, Mobitz type I    • SVT (supraventricular tachycardia) (CMS/HCC)      Past Surgical History:    Procedure Laterality Date   • APPENDECTOMY     • BILATERAL OOPHORECTOMY Bilateral 1988   • BREAST CYST EXCISION Bilateral     4 BREAST SURGERIES   • CARDIAC CATHETERIZATION N/A 8/22/2019    Procedure: Left Heart Cath;  Surgeon: Francis Mccullough MD;  Location: [facility] CATH INVASIVE LOCATION;  Service: Cardiology   • CARDIAC CATHETERIZATION N/A 8/22/2019    Procedure: Left ventriculography;  Surgeon: Francis Mccullough MD;  Location: [facility] CATH INVASIVE LOCATION;  Service: Cardiology   • CARDIAC CATHETERIZATION N/A 8/22/2019    Procedure: Coronary angiography;  Surgeon: Francis Mccullough MD;  Location: [facility] CATH INVASIVE LOCATION;  Service: Cardiology   • CARDIAC ELECTROPHYSIOLOGY PROCEDURE N/A 10/10/2016    Procedure: Pacemaker DC new  [place];  Surgeon: Wilfrido Hameed MD;  Location: [facility] CATH INVASIVE LOCATION;  Service:    • CARPAL TUNNEL RELEASE Right    • CATARACT EXTRACTION      NOVEMBER AND DECEMBER 2014   • CHOLECYSTECTOMY     • COLONOSCOPY  2015   • CORONARY ARTERY BYPASS GRAFT N/A 8/23/2019    Procedure: SUMMER STERNOTOMY CORONARY ARTERY BYPASS GRAFT TIMES 6 USING LEFT INTERNAL MAMMARY ARTERY AND LEFT GREATER SAPHENOUS VEIN GRAFT PER ENDOSCOPIC VEIN HARVESTING AND PRP;  Surgeon: Kem Hawk MD;  Location: [facility] OR;  Service: Cardiothoracic   • ENDOSCOPY N/A 7/18/2019    Procedure: ESOPHAGOGASTRODUODENOSCOPY with biopsy;  Surgeon: Ricky Chew MD;  Location: [facility] ENDOSCOPY;  Service: Gastroenterology   • HYSTERECTOMY     • KNEE ARTHROSCOPY Bilateral 04/2014    MENISCAL TEAR   • PACEMAKER IMPLANTATION  10/10/2016   • SHOULDER SURGERY Right     ROTATOR CUFF SURGERY JUNE 18, 2015   • TONSILLECTOMY     • TRIGGER FINGER RELEASE      both hands     General Information     Row Name 09/03/19 1142 09/03/19 1138       PT Evaluation Time/Intention    Document Type  progress note/recertification  -AR  therapy note (daily note)  -AR    Mode of Treatment  --  physical therapy  -AR    Row Name  09/03/19 1138          General Information    Patient Profile Reviewed?  yes  -AR     Prior Level of Function  independent:  -AR     Existing Precautions/Restrictions  fall  -AR     Row Name 09/03/19 1138          Relationship/Environment    Lives With  spouse  -AR     Row Name 09/03/19 1138          Resource/Environmental Concerns    Current Living Arrangements  home/apartment/condo  -AR     Row Name 09/03/19 1138          Cognitive Assessment/Intervention- PT/OT    Orientation Status (Cognition)  oriented x 4  -AR     Row Name 09/03/19 1138          Safety Issues, Functional Mobility    Impairments Affecting Function (Mobility)  endurance/activity tolerance;balance;shortness of breath  -AR       User Key  (r) = Recorded By, (t) = Taken By, (c) = Cosigned By    Initials Name Provider Type    AR Magaly Friedman, PT Physical Therapist        Mobility     Row Name 09/03/19 1139          Bed Mobility Assessment/Treatment    Supine-Sit Hamel (Bed Mobility)  not tested  -AR     Sit-Supine Hamel (Bed Mobility)  not tested  -AR     Row Name 09/03/19 1139          Sit-Stand Transfer    Sit-Stand Hamel (Transfers)  contact guard  -AR     Assistive Device (Sit-Stand Transfers)  walker, front-wheeled  -AR     Row Name 09/03/19 1139          Gait/Stairs Assessment/Training    Gait/Stairs Assessment/Training  gait/ambulation independence  -AR     Hamel Level (Gait)  contact guard  -AR     Assistive Device (Gait)  walker, front-wheeled  -AR     Distance in Feet (Gait)  5 ft, requesting to focus on exercises due to increased fatgue today  -AR     Deviations/Abnormal Patterns (Gait)  festinating/shuffling;librado decreased  -AR     Bilateral Gait Deviations  heel strike decreased;forward flexed posture  -AR       User Key  (r) = Recorded By, (t) = Taken By, (c) = Cosigned By    Initials Name Provider Type    AR Magaly Friedman, PT Physical Therapist        Obj/Interventions     Row Name 09/03/19 1140           Therapeutic Exercise    Comment (Therapeutic Exercise)  B Ap, LAQ, marches, standing hip abd, heel raises and standing marches.  B UE punches.  sitting rest break, limited by fatigue/SOB  -AR     Row Name 09/03/19 1140          Static Standing Balance    Level of Rankin (Supported Standing, Static Balance)  contact guard assist  -AR     Time Able to Maintain Position (Supported Standing, Static Balance)  45 to 60 seconds  -AR     Assistive Device Utilized (Supported Standing, Static Balance)  walker, rolling  -AR       User Key  (r) = Recorded By, (t) = Taken By, (c) = Cosigned By    Initials Name Provider Type    AR Magaly Friedman, PT Physical Therapist        Goals/Plan     Row Name 09/03/19 1142          Bed Mobility Goal 1 (PT)    Time Frame (Bed Mobility Goal 1, PT)  1 week  -AR     Progress/Outcomes (Bed Mobility Goal 1, PT)  goal ongoing  -AR     [facility] Name 09/03/19 1142          Transfer Goal 1 (PT)    Time Frame (Transfer Goal 1, PT)  1 week  -AR     Progress/Outcome (Transfer Goal 1, PT)  goal ongoing  -AR     [facility] Name 09/03/19 1142          Gait Training Goal 1 (PT)    Time Frame (Gait Training Goal 1, PT)  1 week  -AR     Progress/Outcome (Gait Training Goal 1, PT)  goal ongoing  -AR       User Key  (r) = Recorded By, (t) = Taken By, (c) = Cosigned By    Initials Name Provider Type    Magaly Quijano, PT Physical Therapist        Clinical Impression     Row Name 09/03/19 1141          Pain Assessment    Additional Documentation  Pain Scale: Numbers Pre/Post-Treatment (Group)  -AR     [facility] Name 09/03/19 1141          Pain Scale: Numbers Pre/Post-Treatment    Pain Scale: Numbers, Pretreatment  0/10 - no pain  -AR     Pain Scale: Numbers, Post-Treatment  0/10 - no pain  -AR     Pain Intervention(s)  Medication (See MAR);Repositioned  -AR     Row Name 09/03/19 1141          Physical Therapy Clinical Impression    Criteria for Skilled Interventions Met (PT Clinical Impression)  yes  -AR      Rehab Potential (PT Clinical Summary)  good, to achieve stated therapy goals  -AR     Row Name 09/03/19 1141          Vital Signs    Pre SpO2 (%)  91  -AR     O2 Delivery Pre Treatment  room air  -AR     Intra SpO2 (%)  89  -AR     O2 Delivery Intra Treatment  room air  -AR     Post SpO2 (%)  92  -AR     O2 Delivery Post Treatment  room air  -AR     Row Name 09/03/19 1141          Positioning and Restraints    Pre-Treatment Position  sitting in chair/recliner no alarm on arrival  -AR     Post Treatment Position  chair  -AR     In Chair  sitting;reclined;call light within reach;encouraged to call for assist  -AR       User Key  (r) = Recorded By, (t) = Taken By, (c) = Cosigned By    Initials Name Provider Type    Magaly Quijano PT Physical Therapist        Outcome Measures     Row Name 09/03/19 1142          How much help from another person do you currently need...    Turning from your back to your side while in flat bed without using bedrails?  4  -AR     Moving from lying on back to sitting on the side of a flat bed without bedrails?  3  -AR     Moving to and from a bed to a chair (including a wheelchair)?  3  -AR     Standing up from a chair using your arms (e.g., wheelchair, bedside chair)?  3  -AR     Climbing 3-5 steps with a railing?  2  -AR     To walk in hospital room?  3  -AR     AM-PAC 6 Clicks Score (PT)  18  -AR       User Key  (r) = Recorded By, (t) = Taken By, (c) = Cosigned By    Initials Name Provider Type    Magaly Quijano PT Physical Therapist        Physical Therapy Education     Title: PT OT SLP Therapies (Done)     Topic: Physical Therapy (Done)     Point: Mobility training (Done)     Learning Progress Summary           Patient Acceptance, E, VU by AR at 9/3/2019 11:43 AM    Acceptance, E,TB,D, VU by OLAF at 9/2/2019 11:35 AM    Acceptance, E,TB, VU by OLAF at 9/1/2019 11:06 AM    Acceptance, E,TB,D, VU,NR by OLAF at 8/31/2019  9:53 AM    Comment:  pt very anxious , cardiac ed provided     Acceptance, E, NR by EM at 8/30/2019  9:56 AM    Acceptance, E,TB,D, VU,NR by SV at 8/29/2019 12:00 PM    Acceptance, E,D, NR by PC at 8/28/2019  5:20 PM    Acceptance, E, NR by MA at 8/27/2019  9:18 AM    Acceptance, E,D, NR by PC at 8/26/2019 11:32 AM    Acceptance, E,TB, VU,NR by CS at 8/25/2019 10:01 AM    Acceptance, E,TB, VU,NR by CS at 8/24/2019  9:52 AM   Family Acceptance, E,TB,D, VU by OLAF at 9/2/2019 11:35 AM    Acceptance, E,TB, VU by OLAF at 9/1/2019 11:06 AM    Acceptance, E,TB,D, VU,NR by OLAF at 8/31/2019  9:53 AM    Comment:  pt very anxious , cardiac ed provided   Significant Other Acceptance, E,TB,D, VU,NR by SV at 8/29/2019 12:00 PM                   Point: Home exercise program (Done)     Learning Progress Summary           Patient Acceptance, E, VU by AR at 9/3/2019 11:43 AM    Acceptance, E,TB,D, VU by OLAF at 9/2/2019 11:35 AM    Acceptance, E,TB, VU by OLAF at 9/1/2019 11:06 AM    Acceptance, E,TB,D, VU,NR by OLAF at 8/31/2019  9:53 AM    Comment:  pt very anxious , cardiac ed provided    Acceptance, E, NR by EM at 8/30/2019  9:56 AM    Acceptance, E,TB,D, VU,NR by SV at 8/29/2019 12:00 PM    Acceptance, E,D, NR by PC at 8/28/2019  5:20 PM    Acceptance, E, NR by MA at 8/27/2019  9:18 AM    Acceptance, E,D, NR by GIUSEPPE at 8/26/2019 11:32 AM    Acceptance, E,TB, VU,NR by MISSY at 8/25/2019 10:01 AM    Acceptance, E,TB, VU,NR by CS at 8/24/2019  9:52 AM   Family Acceptance, E,TB,D, VU by OLAF at 9/2/2019 11:35 AM    Acceptance, E,TB, VU by OLAF at 9/1/2019 11:06 AM    Acceptance, E,TB,D, VU,NR by OLAF at 8/31/2019  9:53 AM    Comment:  pt very anxious , cardiac ed provided   Significant Other Acceptance, E,TB,D, VU,NR by BRUNA at 8/29/2019 12:00 PM                   Point: Body mechanics (Done)     Learning Progress Summary           Patient Acceptance, E, VU by AR at 9/3/2019 11:43 AM    Acceptance, E,TB,D, VU by OLAF at 9/2/2019 11:35 AM    Acceptance, E,TB, VU by OALF at 9/1/2019 11:06 AM    Acceptance, E,TB,D, VU,NR by  OLAF at 8/31/2019  9:53 AM    Comment:  pt very anxious , cardiac ed provided    Acceptance, E,TB,D, VU,NR by SV at 8/29/2019 12:00 PM    Acceptance, E,D, NR by GIUSEPPE at 8/28/2019  5:20 PM    Acceptance, E, NR by MA at 8/27/2019  9:18 AM    Acceptance, E,D, NR by PC at 8/26/2019 11:32 AM    Acceptance, E,TB, VU,NR by CS at 8/25/2019 10:01 AM    Acceptance, E,TB, VU,NR by CS at 8/24/2019  9:52 AM   Family Acceptance, E,TB,D, VU by OLAF at 9/2/2019 11:35 AM    Acceptance, E,TB, VU by OLAF at 9/1/2019 11:06 AM    Acceptance, E,TB,D, VU,NR by OLAF at 8/31/2019  9:53 AM    Comment:  pt very anxious , cardiac ed provided   Significant Other Acceptance, E,TB,D, VU,NR by SV at 8/29/2019 12:00 PM                   Point: Precautions (Done)     Learning Progress Summary           Patient Acceptance, E, VU by AR at 9/3/2019 11:43 AM    Acceptance, E,TB,D, VU by OLAF at 9/2/2019 11:35 AM    Acceptance, E,TB, VU by OLAF at 9/1/2019 11:06 AM    Acceptance, E,TB,D, VU,NR by OLAF at 8/31/2019  9:53 AM    Comment:  pt very anxious , cardiac ed provided    Acceptance, E,TB,D, VU,NR by BRUNA at 8/29/2019 12:00 PM    Acceptance, E,D, NR by GIUSEPPE at 8/28/2019  5:20 PM    Acceptance, E, NR by MA at 8/27/2019  9:18 AM    Acceptance, E,D, NR by GIUSEPPE at 8/26/2019 11:32 AM    Acceptance, E,TB, VU,NR by MISSY at 8/25/2019 10:01 AM    Acceptance, E,TB, VU,NR by MISSY at 8/24/2019  9:52 AM   Family Acceptance, E,TB,D, VU by OLAF at 9/2/2019 11:35 AM    Acceptance, E,TB, VU by OLAF at 9/1/2019 11:06 AM    Acceptance, E,TB,D, VU,NR by OLAF at 8/31/2019  9:53 AM    Comment:  pt very anxious , cardiac ed provided   Significant Other Acceptance, BELINDA MAC D, JOYCE,NR by  at 8/29/2019 12:00 PM                               User Key     Initials Effective Dates Name Provider Type Discipline    PC 04/03/18 -  Lenka Brown, PT Physical Therapist PT    EM 04/03/18 -  Shell Liu PT Physical Therapist PT    JM 03/07/18 -  Deborah Keenan PTA Physical Therapy Assistant PT    AR  04/03/18 -  Magaly Friedman, PT Physical Therapist PT    SV 04/03/18 -  Carmen Hernández, PT Physical Therapist PT    MA 10/19/18 -  Vianca Muse, PT Physical Therapist PT    CS 05/14/18 -  Connor Chaudhary, PT Physical Therapist PT              PT Recommendation and Plan     Outcome Summary/Treatment Plan (PT)  Anticipated Discharge Disposition (PT): skilled nursing facility  Plan of Care Reviewed With: patient  Outcome Summary: Limited progress towards goals during PT today due to fatigue and SOB.  Able to ambulate short distance in room and perform standing and sitting strengthening exercises w/ ed for HEP.  Required CGA and UE support on walker due to balance deficits.  Currently recommend DC to SNU.       Time Calculation:   PT Charges     Row Name 09/03/19 1142             Time Calculation    Start Time  1019  -AR      Stop Time  1034  -AR      Time Calculation (min)  15 min  -AR      PT Received On  09/03/19  -AR      PT - Next Appointment  09/04/19  -AR      PT Goal Re-Cert Due Date  09/10/19  -AR        User Key  (r) = Recorded By, (t) = Taken By, (c) = Cosigned By    Initials Name Provider Type    AR Magaly Friedman, PT Physical Therapist        Therapy Charges for Today     Code Description Service Date Service Provider Modifiers Qty    75201349840 HC PT THER PROC EA 15 MIN 9/3/2019 Magaly Friedman, PT GP 1          PT G-Codes  Outcome Measure Options: AM-PAC 6 Clicks Basic Mobility (PT)  AM-PAC 6 Clicks Score (PT): 18    Magaly Friedman PT  9/3/2019

## 2019-09-03 NOTE — PROGRESS NOTES
Continued Stay Note  [facility]     Patient Name: Reanna Higgins  MRN: [id number]  Today's Date: 9/3/2019    Admit Date: 2019    Discharge Plan     Row Name 19 1433       Plan    Plan Pt [facility] RODRIGUE[facility] precert denied.  Awaiting to see if MD wishes to do a peer to peer with Janki.  P2P [phone] Option 5.  Will need name &  when calling.  Noon tomorrow is deadline.      Plan Comments 13:17 PM Received a message from [employer]/RenettaOgone that [facility] RODRIGUE[facility] has denied Pt for rehab.  CCP called MARIMAR Pal w/ [facility] to advise and let him know that Md can do a peer to peer with the Janki MD if he wanted to appeal their denial.  Demarco advised he will forward Peer to Peer information to Dr. Mccullough in am.  CCP following.  MARIMAR URIOSTEGUI/CCP         Discharge Codes    No documentation.       Expected Discharge Date and Time     Expected Discharge Date Expected Discharge Time    Sep 4, 2019             Celeste Paulino RN

## 2019-09-04 ENCOUNTER — APPOINTMENT (OUTPATIENT)
Dept: GENERAL RADIOLOGY | Facility: HOSPITAL | Age: 79
End: 2019-09-04

## 2019-09-04 VITALS
TEMPERATURE: 98.1 F | RESPIRATION RATE: 16 BRPM | OXYGEN SATURATION: 92 % | HEIGHT: 67 IN | HEART RATE: 88 BPM | SYSTOLIC BLOOD PRESSURE: 119 MMHG | DIASTOLIC BLOOD PRESSURE: 60 MMHG | BODY MASS INDEX: 23.1 KG/M2 | WEIGHT: 147.2 LBS

## 2019-09-04 LAB
ANION GAP SERPL CALCULATED.3IONS-SCNC: 10.2 MMOL/L (ref 5–15)
BUN BLD-MCNC: 9 MG/DL (ref 8–23)
BUN/CREAT SERPL: 15.8 (ref 7–25)
CALCIUM SPEC-SCNC: 9.1 MG/DL (ref 8.6–10.5)
CHLORIDE SERPL-SCNC: 100 MMOL/L (ref 98–107)
CO2 SERPL-SCNC: 27.8 MMOL/L (ref 22–29)
CREAT BLD-MCNC: 0.57 MG/DL (ref 0.57–1)
DEPRECATED RDW RBC AUTO: 43.4 FL (ref 37–54)
ERYTHROCYTE [DISTWIDTH] IN BLOOD BY AUTOMATED COUNT: 13.1 % (ref 12.3–15.4)
GFR SERPL CREATININE-BSD FRML MDRD: 102 ML/MIN/1.73
GLUCOSE BLD-MCNC: 117 MG/DL (ref 65–99)
HCT VFR BLD AUTO: 33.4 % (ref 34–46.6)
HGB BLD-MCNC: 10.6 G/DL (ref 12–15.9)
MCH RBC QN AUTO: 29 PG (ref 26.6–33)
MCHC RBC AUTO-ENTMCNC: 31.7 G/DL (ref 31.5–35.7)
MCV RBC AUTO: 91.3 FL (ref 79–97)
PLATELET # BLD AUTO: 339 10*3/MM3 (ref 140–450)
PMV BLD AUTO: 11.1 FL (ref 6–12)
POTASSIUM BLD-SCNC: 4.1 MMOL/L (ref 3.5–5.2)
RBC # BLD AUTO: 3.66 10*6/MM3 (ref 3.77–5.28)
SODIUM BLD-SCNC: 138 MMOL/L (ref 136–145)
WBC NRBC COR # BLD: 9.96 10*3/MM3 (ref 3.4–10.8)

## 2019-09-04 PROCEDURE — 85027 COMPLETE CBC AUTOMATED: CPT | Performed by: THORACIC SURGERY (CARDIOTHORACIC VASCULAR SURGERY)

## 2019-09-04 PROCEDURE — 94760 N-INVAS EAR/PLS OXIMETRY 1: CPT

## 2019-09-04 PROCEDURE — 97110 THERAPEUTIC EXERCISES: CPT

## 2019-09-04 PROCEDURE — 99238 HOSP IP/OBS DSCHRG MGMT 30/<: CPT | Performed by: INTERNAL MEDICINE

## 2019-09-04 PROCEDURE — 71046 X-RAY EXAM CHEST 2 VIEWS: CPT

## 2019-09-04 PROCEDURE — 80048 BASIC METABOLIC PNL TOTAL CA: CPT | Performed by: THORACIC SURGERY (CARDIOTHORACIC VASCULAR SURGERY)

## 2019-09-04 RX ORDER — ATORVASTATIN CALCIUM 20 MG/1
40 TABLET, FILM COATED ORAL DAILY
Status: DISCONTINUED | OUTPATIENT
Start: 2019-09-04 | End: 2019-09-04 | Stop reason: HOSPADM

## 2019-09-04 RX ORDER — LOSARTAN POTASSIUM 25 MG/1
25 TABLET ORAL DAILY
Status: DISCONTINUED | OUTPATIENT
Start: 2019-09-04 | End: 2019-09-04 | Stop reason: HOSPADM

## 2019-09-04 RX ORDER — FUROSEMIDE 40 MG/1
40 TABLET ORAL DAILY
Qty: 29 TABLET | Refills: 0 | Status: SHIPPED | OUTPATIENT
Start: 2019-09-05 | End: 2019-10-04

## 2019-09-04 RX ORDER — POTASSIUM CHLORIDE 750 MG/1
20 CAPSULE, EXTENDED RELEASE ORAL 2 TIMES DAILY WITH MEALS
Qty: 120 CAPSULE | Refills: 0 | Status: SHIPPED | OUTPATIENT
Start: 2019-09-04 | End: 2019-09-27 | Stop reason: SDUPTHER

## 2019-09-04 RX ADMIN — LEVOTHYROXINE SODIUM 25 MCG: 25 TABLET ORAL at 05:47

## 2019-09-04 RX ADMIN — GABAPENTIN 100 MG: 100 CAPSULE ORAL at 18:49

## 2019-09-04 RX ADMIN — GUAIFENESIN 1200 MG: 600 TABLET, EXTENDED RELEASE ORAL at 09:20

## 2019-09-04 RX ADMIN — ASPIRIN 81 MG: 81 TABLET, COATED ORAL at 09:20

## 2019-09-04 RX ADMIN — FUROSEMIDE 40 MG: 40 TABLET ORAL at 09:20

## 2019-09-04 RX ADMIN — GABAPENTIN 100 MG: 100 CAPSULE ORAL at 14:13

## 2019-09-04 RX ADMIN — GABAPENTIN 100 MG: 100 CAPSULE ORAL at 09:19

## 2019-09-04 RX ADMIN — CYCLOSPORINE 1 DROP: 0.5 EMULSION OPHTHALMIC at 09:20

## 2019-09-04 RX ADMIN — VITAMIN D, TAB 1000IU (100/BT) 1000 UNITS: 25 TAB at 09:20

## 2019-09-04 RX ADMIN — PANTOPRAZOLE SODIUM 40 MG: 40 TABLET, DELAYED RELEASE ORAL at 05:47

## 2019-09-04 RX ADMIN — LOSARTAN POTASSIUM 25 MG: 25 TABLET, FILM COATED ORAL at 09:20

## 2019-09-04 RX ADMIN — ACETAMINOPHEN 650 MG: 325 TABLET, FILM COATED ORAL at 02:23

## 2019-09-04 RX ADMIN — POTASSIUM CHLORIDE 20 MEQ: 750 CAPSULE, EXTENDED RELEASE ORAL at 09:19

## 2019-09-04 RX ADMIN — METOPROLOL SUCCINATE 50 MG: 50 TABLET, FILM COATED, EXTENDED RELEASE ORAL at 09:20

## 2019-09-04 NOTE — PROGRESS NOTES
" LOS: 13 days   Patient Care Team:  Ted Li MD as PCP - General (Internal Medicine)  To Rivera MD as Consulting Physician (General Surgery)  Torsten Benson Jr., MD as Consulting Physician (Cardiology)  Arsenio Witt MD as Consulting Physician (Ophthalmology)  Brown Beckman MD as Consulting Physician (Orthopedic Surgery)    Chief Complaint: post op    Subjective:  Symptoms:  Resolved.  She reports shortness of breath.  No chest pain.    Diet:  Adequate intake.  No nausea or vomiting.    Activity level: Normal.    Pain:  She reports no pain.      Pt reports SOA with any movement--this is new c/o  She is also very concerned that she has no one to help her at home, her  is 86 y/o and she lives in a tri-level home  She walked 3 times yest    Vital Signs  Temp:  [97.7 °F (36.5 °C)-98.8 °F (37.1 °C)] 98.5 °F (36.9 °C)  Heart Rate:  [78-97] 87  Resp:  [17-18] 17  BP: (110-143)/(64-74) 126/70  Body mass index is 23.05 kg/m².    Intake/Output Summary (Last 24 hours) at 9/4/2019 0922  Last data filed at 9/4/2019 0223  Gross per 24 hour   Intake 820 ml   Output 200 ml   Net 620 ml     No intake/output data recorded.            09/01/19  0647 09/02/19  0500 09/04/19  0654   Weight: 69.4 kg (152 lb 14.4 oz) 69.7 kg (153 lb 10.6 oz) 66.8 kg (147 lb 3.2 oz)         Objective:  General Appearance:  Comfortable and well-appearing.    Vital signs: (most recent): Blood pressure 126/70, pulse 87, temperature 98.5 °F (36.9 °C), temperature source Oral, resp. rate 17, height 170.2 cm (67\"), weight 66.8 kg (147 lb 3.2 oz), SpO2 93 %.  Vital signs are normal.  No fever.    Output: Producing urine and producing stool.    Lungs:  Normal effort.  Breath sounds clear to auscultation.    Heart: Normal rate.  Regular rhythm.  S1 normal and S2 normal.    Abdomen: Abdomen is soft and non-distended.  Bowel sounds are normal.     Pulses: Distal pulses are intact.    Neurological: Patient is alert and oriented to " person, place and time.    Skin:  Warm and dry.              Results Review:        WBC WBC   Date Value Ref Range Status   09/04/2019 9.96 3.40 - 10.80 10*3/mm3 Final      HGB Hemoglobin   Date Value Ref Range Status   09/04/2019 10.6 (L) 12.0 - 15.9 g/dL Final      HCT Hematocrit   Date Value Ref Range Status   09/04/2019 33.4 (L) 34.0 - 46.6 % Final      Platelets Platelets   Date Value Ref Range Status   09/04/2019 339 140 - 450 10*3/mm3 Final        PT/INR:  No results found for: PROTIME/No results found for: INR    Sodium Sodium   Date Value Ref Range Status   09/04/2019 138 136 - 145 mmol/L Final   09/03/2019 133 (L) 136 - 145 mmol/L Final   09/02/2019 138 136 - 145 mmol/L Final      Potassium Potassium   Date Value Ref Range Status   09/04/2019 4.1 3.5 - 5.2 mmol/L Final   09/03/2019 4.6 3.5 - 5.2 mmol/L Final   09/02/2019 3.9 3.5 - 5.2 mmol/L Final   09/01/2019 4.1 3.5 - 5.2 mmol/L Final      Chloride Chloride   Date Value Ref Range Status   09/04/2019 100 98 - 107 mmol/L Final   09/03/2019 96 (L) 98 - 107 mmol/L Final   09/02/2019 100 98 - 107 mmol/L Final      Bicarbonate CO2   Date Value Ref Range Status   09/04/2019 27.8 22.0 - 29.0 mmol/L Final   09/03/2019 25.0 22.0 - 29.0 mmol/L Final   09/02/2019 25.2 22.0 - 29.0 mmol/L Final      BUN BUN   Date Value Ref Range Status   09/04/2019 9 8 - 23 mg/dL Final   09/03/2019 9 8 - 23 mg/dL Final   09/02/2019 10 8 - 23 mg/dL Final      Creatinine Creatinine   Date Value Ref Range Status   09/04/2019 0.57 0.57 - 1.00 mg/dL Final   09/03/2019 0.68 0.57 - 1.00 mg/dL Final   09/02/2019 0.69 0.57 - 1.00 mg/dL Final      Calcium Calcium   Date Value Ref Range Status   09/04/2019 9.1 8.6 - 10.5 mg/dL Final   09/03/2019 8.9 8.6 - 10.5 mg/dL Final   09/02/2019 8.8 8.6 - 10.5 mg/dL Final      Magnesium No results found for: MG         aspirin 81 mg Oral Daily   atorvastatin 40 mg Oral Daily   cholecalciferol 1,000 Units Oral Daily   cycloSPORINE 1 drop Both Eyes BID    enoxaparin 40 mg Subcutaneous Daily   furosemide 40 mg Oral Daily   gabapentin 100 mg Oral TID   gabapentin 200 mg Oral Nightly   guaiFENesin 1,200 mg Oral Q12H   levothyroxine 25 mcg Oral Q AM   losartan 25 mg Oral Daily   metoprolol succinate XL 50 mg Oral Daily   pantoprazole 40 mg Oral Q AM   polyethylene glycol 17 g Oral BID   potassium chloride 20 mEq Oral BID With Meals   sennosides-docusate sodium 2 tablet Oral Nightly              Patient Active Problem List   Diagnosis Code   • Colon polyp K63.5   • Gastroesophageal reflux disease K21.9   • Essential hypertension I10   • Hypercholesterolemia E78.00   • Mitral and aortic valve disease I08.0   • Heart valve disease I38   • Anxiety F41.9   • Peripheral neuropathy G62.9   • Malignant neoplasm of cheek (CMS/HCC) 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5   • Abnormal dobutamine stress echocardiography R94.39   • Coronary artery disease involving native coronary artery of native heart with angina pectoris (CMS/[facility]) I25.119       Assessment & Plan      -CAD s/p CABGx6, LIMA, LSVG POD #12 (Kenn)--on asa/bb/statin  -Hypertension--stable  -Hypercholesterolemia--statin  -AV block & Sick Sinus Syndrome with PPM--stable  -post op anemia- expected post op blood loss--stable  -Paralytic ileus--resolved     Routine care--increase activity  Check PA/LAT  Awaiting precert for rehab at [facility]--concerned that patient is doing too well for rehab         LUIS A Irvin  09/04/19  9:22  AM

## 2019-09-04 NOTE — PLAN OF CARE
Problem: Patient Care Overview  Goal: Plan of Care Review  Outcome: Ongoing (interventions implemented as appropriate)   09/04/19 0541   Coping/Psychosocial   Plan of Care Reviewed With patient;daughter   Plan of Care Review   Progress improving   OTHER   Outcome Summary Vitals stable. No falls. Pt c/o pain in neck and head, medicated per MAR. Tessalon pearls given for cough. Overnight oximetry complete. Possible d/c today. Resting comfortably. Monitoring closely.        Problem: Cardiac Surgery (Adult)  Goal: Signs and Symptoms of Listed Potential Problems Will be Absent, Minimized or Managed (Cardiac Surgery)  Outcome: Ongoing (interventions implemented as appropriate)   09/04/19 0541   Goal/Outcome Evaluation   Problems Assessed (Cardiac Surgery) all   Problems Present (Cardiac Surgery) respiratory compromise;situational response       Problem: Skin Injury Risk (Adult)  Goal: Skin Health and Integrity  Outcome: Ongoing (interventions implemented as appropriate)   09/04/19 0541   Skin Injury Risk (Adult)   Skin Health and Integrity making progress toward outcome       Problem: Fall Risk (Adult)  Goal: Absence of Fall  Outcome: Ongoing (interventions implemented as appropriate)   09/04/19 0541   Fall Risk (Adult)   Absence of Fall making progress toward outcome

## 2019-09-04 NOTE — THERAPY DISCHARGE NOTE
Patient Name: Reanna Higgins  : 1940    MRN: [id number]                              Today's Date: 2019       Admit Date: 2019    Visit Dx:     ICD-10-CM ICD-9-CM   1. Coronary artery disease involving native coronary artery of native heart with angina pectoris (CMS/HCC) I25.119 414.01     413.9   2. Chest pain, unspecified type R07.9 786.50   3. Sick sinus syndrome (CMS/HCC) I49.5 427.81   4. Mesenteric ischemia due to arterial insufficiency (CMS/HCC) K55.059 557.9   5. Essential hypertension I10 401.9   6. Abnormal dobutamine stress echocardiography R94.39 794.30     Patient Active Problem List   Diagnosis   • Colon polyp   • Gastroesophageal reflux disease   • Essential hypertension   • Hypercholesterolemia   • Mitral and aortic valve disease   • Heart valve disease   • Anxiety   • Peripheral neuropathy   • Malignant neoplasm of cheek (CMS/HCC)   • Non-rheumatic mitral regurgitation   • Osteopenia   • SVT (supraventricular tachycardia) (CMS/HCC)   • AV block, Mobitz 1   • Cardiac pacemaker in situ   • RLS (restless legs syndrome)   • Phobia, flying   • Elevated hemoglobin A1c   • Acute pancreatitis without infection or necrosis   • Mesenteric ischemia due to arterial insufficiency (CMS/HCC)   • Epigastric pain   • Chest pain   • Sick sinus syndrome (CMS/HCC)   • Abnormal dobutamine stress echocardiography   • Coronary artery disease involving native coronary artery of native heart with angina pectoris (CMS/HCC)     Past Medical History:   Diagnosis Date   • Cancer (CMS/HCC)     skin   • Cardiac pacemaker in situ    • Carpal tunnel syndrome    • Cataract    • Cystic fibroadenosis of breast    • GERD (gastroesophageal reflux disease)    • Hypercholesterolemia    • Hypertension    • Osteoarthritis of both hands    • Pancreatitis    • Peripheral neuropathy    • Restless leg syndrome    • Second degree AV block, Mobitz type I    • SVT (supraventricular tachycardia) (CMS/HCC)      Past Surgical History:    Procedure Laterality Date   • APPENDECTOMY     • BILATERAL OOPHORECTOMY Bilateral 1988   • BREAST CYST EXCISION Bilateral     4 BREAST SURGERIES   • CARDIAC CATHETERIZATION N/A 8/22/2019    Procedure: Left Heart Cath;  Surgeon: Francis Mccullough MD;  Location: [facility] CATH INVASIVE LOCATION;  Service: Cardiology   • CARDIAC CATHETERIZATION N/A 8/22/2019    Procedure: Left ventriculography;  Surgeon: Francis Mccullough MD;  Location: [facility] CATH INVASIVE LOCATION;  Service: Cardiology   • CARDIAC CATHETERIZATION N/A 8/22/2019    Procedure: Coronary angiography;  Surgeon: Francis Mccullough MD;  Location: [facility] CATH INVASIVE LOCATION;  Service: Cardiology   • CARDIAC ELECTROPHYSIOLOGY PROCEDURE N/A 10/10/2016    Procedure: Pacemaker DC new  [place];  Surgeon: Wilfrido Hameed MD;  Location: [facility] CATH INVASIVE LOCATION;  Service:    • CARPAL TUNNEL RELEASE Right    • CATARACT EXTRACTION      NOVEMBER AND DECEMBER 2014   • CHOLECYSTECTOMY     • COLONOSCOPY  2015   • CORONARY ARTERY BYPASS GRAFT N/A 8/23/2019    Procedure: SUMMER STERNOTOMY CORONARY ARTERY BYPASS GRAFT TIMES 6 USING LEFT INTERNAL MAMMARY ARTERY AND LEFT GREATER SAPHENOUS VEIN GRAFT PER ENDOSCOPIC VEIN HARVESTING AND PRP;  Surgeon: Kem Hawk MD;  Location: [facility] MAIN OR;  Service: Cardiothoracic   • ENDOSCOPY N/A 7/18/2019    Procedure: ESOPHAGOGASTRODUODENOSCOPY with biopsy;  Surgeon: Ricky Chew MD;  Location: [facility] ENDOSCOPY;  Service: Gastroenterology   • HYSTERECTOMY     • KNEE ARTHROSCOPY Bilateral 04/2014    MENISCAL TEAR   • PACEMAKER IMPLANTATION  10/10/2016   • SHOULDER SURGERY Right     ROTATOR CUFF SURGERY JUNE 18, 2015   • TONSILLECTOMY     • TRIGGER FINGER RELEASE      both hands     General Information     Row Name 09/04/19 1523          PT Evaluation Time/Intention    Document Type  therapy note (daily note);discharge treatment  -EM     Mode of Treatment  individual therapy;physical therapy  -EM       User Key  (r) =  Recorded By, (t) = Taken By, (c) = Cosigned By    Initials Name Provider Type    EM Shell Liu PT Physical Therapist        Mobility     Row Name 09/04/19 1523          Bed Mobility Assessment/Treatment    Supine-Sit [place] (Bed Mobility)  supervision  -EM     Sit-Supine [place] (Bed Mobility)  supervision  -EM     Assistive Device (Bed Mobility)  head of bed elevated  -EM     Row Name 09/04/19 1523          Sit-Stand Transfer    Sit-Stand [place] (Transfers)  supervision  -EM     Assistive Device (Sit-Stand Transfers)  walker, front-wheeled  -EM     Row Name 09/04/19 1523          Gait/Stairs Assessment/Training    [place] Level (Gait)  contact guard  -EM     Assistive Device (Gait)  walker, front-wheeled  -EM     Distance in Feet (Gait)  125  -EM     Bilateral Gait Deviations  forward flexed posture  -EM     [place] Level (Stairs)  contact guard  -EM     Handrail Location (Stairs)  both sides  -EM     Number of Steps (Stairs)  8  -EM     Ascending Technique (Stairs)  step-over-step  -EM     Descending Technique (Stairs)  step-to-step  -EM       User Key  (r) = Recorded By, (t) = Taken By, (c) = Cosigned By    Initials Name Provider Type    EM Shell Liu PT Physical Therapist        Obj/Interventions    No documentation.       Goals/Plan     Row Name 09/04/19 1526          Bed Mobility Goal 1 (PT)    Progress/Outcomes (Bed Mobility Goal 1, PT)  goal met  -EM     Row Name 09/04/19 1526          Transfer Goal 1 (PT)    Progress/Outcome (Transfer Goal 1, PT)  goal met  -EM     Row Name 09/04/19 1526          Gait Training Goal 1 (PT)    Progress/Outcome (Gait Training Goal 1, PT)  goal partially met  -EM       User Key  (r) = Recorded By, (t) = Taken By, (c) = Cosigned By    Initials Name Provider Type    Shell March PT Physical Therapist        Clinical Impression     Row Name 09/04/19 1526          Pain Scale: Numbers Pre/Post-Treatment    Pain Scale:  Numbers, Pretreatment  0/10 - no pain  -EM     Row Name 09/04/19 1526          Positioning and Restraints    Pre-Treatment Position  in bed  -EM     Post Treatment Position  bed  -EM     In Bed  sitting;call light within reach  -EM       User Key  (r) = Recorded By, (t) = Taken By, (c) = Cosigned By    Initials Name Provider Type    Shell March PT Physical Therapist        Outcome Measures     Row Name 09/04/19 1529          How much help from another person do you currently need...    Turning from your back to your side while in flat bed without using bedrails?  4  -EM     Moving from lying on back to sitting on the side of a flat bed without bedrails?  3  -EM     Moving to and from a bed to a chair (including a wheelchair)?  3  -EM     Standing up from a chair using your arms (e.g., wheelchair, bedside chair)?  3  -EM     Climbing 3-5 steps with a railing?  3  -EM     To walk in hospital room?  3  -EM     AM-PAC 6 Clicks Score (PT)  19  -EM       User Key  (r) = Recorded By, (t) = Taken By, (c) = Cosigned By    Initials Name Provider Type    Shell March PT Physical Therapist        Physical Therapy Education     Title: PT OT SLP Therapies (Resolved)     Topic: Physical Therapy (Resolved)     Point: Mobility training (Resolved)     Learning Progress Summary           Patient Acceptance, E, VU,DU by DREA at 9/4/2019  3:28 PM    Acceptance, E, VU by AR at 9/3/2019 11:43 AM    Acceptance, E,TB,D, VU by OLAF at 9/2/2019 11:35 AM    Acceptance, E,TB, VU by OLAF at 9/1/2019 11:06 AM    Acceptance, E,TB,D, VU,NR by OLAF at 8/31/2019  9:53 AM    Comment:  pt very anxious , cardiac ed provided    Acceptance, E, NR by EM at 8/30/2019  9:56 AM    Acceptance, E,TB,D, VU,NR by BRUNA at 8/29/2019 12:00 PM    Acceptance, E,D, NR by GIUSEPPE at 8/28/2019  5:20 PM    Acceptance, E, NR by MA at 8/27/2019  9:18 AM    Acceptance, E,D, NR by GIUSEPPE at 8/26/2019 11:32 AM    Acceptance, EBELINDA, VU,NR by CS at 8/25/2019 10:01 AM     Acceptance, E,TB, VU,NR by CS at 8/24/2019  9:52 AM   Family Acceptance, E,TB,D, VU by OLAF at 9/2/2019 11:35 AM    Acceptance, E,TB, VU by OLAF at 9/1/2019 11:06 AM    Acceptance, E,TB,D, VU,NR by JM at 8/31/2019  9:53 AM    Comment:  pt very anxious , cardiac ed provided   Significant Other Acceptance, E,TB,D, VU,NR by SV at 8/29/2019 12:00 PM                   Point: Home exercise program (Resolved)     Learning Progress Summary           Patient Acceptance, E, VU,DU by EM at 9/4/2019  3:28 PM    Acceptance, E, VU by AR at 9/3/2019 11:43 AM    Acceptance, E,TB,D, VU by OLAF at 9/2/2019 11:35 AM    Acceptance, E,TB, VU by OLAF at 9/1/2019 11:06 AM    Acceptance, E,TB,D, VU,NR by OLAF at 8/31/2019  9:53 AM    Comment:  pt very anxious , cardiac ed provided    Acceptance, E, NR by EM at 8/30/2019  9:56 AM    Acceptance, E,TB,D, VU,NR by SV at 8/29/2019 12:00 PM    Acceptance, E,D, NR by PC at 8/28/2019  5:20 PM    Acceptance, E, NR by MA at 8/27/2019  9:18 AM    Acceptance, E,D, NR by PC at 8/26/2019 11:32 AM    Acceptance, E,TB, VU,NR by CS at 8/25/2019 10:01 AM    Acceptance, E,TB, VU,NR by CS at 8/24/2019  9:52 AM   Family Acceptance, E,TB,D, VU by JM at 9/2/2019 11:35 AM    Acceptance, E,TB, VU by OLAF at 9/1/2019 11:06 AM    Acceptance, E,TB,D, VU,NR by JM at 8/31/2019  9:53 AM    Comment:  pt very anxious , cardiac ed provided   Significant Other Acceptance, E,TB,D, VU,NR by SV at 8/29/2019 12:00 PM                   Point: Body mechanics (Resolved)     Learning Progress Summary           Patient Acceptance, E, VU by AR at 9/3/2019 11:43 AM    Acceptance, E,TB,D, VU by OLAF at 9/2/2019 11:35 AM    Acceptance, E,TB, VU by OLAF at 9/1/2019 11:06 AM    Acceptance, E,TB,D, VU,NR by OLAF at 8/31/2019  9:53 AM    Comment:  pt very anxious , cardiac ed provided    Acceptance, E,TB,D, VU,NR by BRUNA at 8/29/2019 12:00 PM    Acceptance, E,D, NR by GIUSEPPE at 8/28/2019  5:20 PM    Acceptance, E, NR by MA at 8/27/2019  9:18 AM    Acceptance, E,D,  NR by PC at 8/26/2019 11:32 AM    Acceptance, E,TB, VU,NR by CS at 8/25/2019 10:01 AM    Acceptance, E,TB, VU,NR by CS at 8/24/2019  9:52 AM   Family Acceptance, E,TB,D, VU by JM at 9/2/2019 11:35 AM    Acceptance, E,TB, VU by JM at 9/1/2019 11:06 AM    Acceptance, E,TB,D, VU,NR by JM at 8/31/2019  9:53 AM    Comment:  pt very anxious , cardiac ed provided   Significant Other Acceptance, E,TB,D, VU,NR by SV at 8/29/2019 12:00 PM                   Point: Precautions (Resolved)     Learning Progress Summary           Patient Acceptance, E, VU by AR at 9/3/2019 11:43 AM    Acceptance, E,TB,D, VU by OLAF at 9/2/2019 11:35 AM    Acceptance, E,TB, VU by OLAF at 9/1/2019 11:06 AM    Acceptance, E,TB,D, VU,NR by OLAF at 8/31/2019  9:53 AM    Comment:  pt very anxious , cardiac ed provided    Acceptance, E,TB,D, VU,NR by SV at 8/29/2019 12:00 PM    Acceptance, E,D, NR by PC at 8/28/2019  5:20 PM    Acceptance, E, NR by MA at 8/27/2019  9:18 AM    Acceptance, E,D, NR by PC at 8/26/2019 11:32 AM    Acceptance, E,TB, VU,NR by CS at 8/25/2019 10:01 AM    Acceptance, E,TB, VU,NR by MISSY at 8/24/2019  9:52 AM   Family Acceptance, E,TB,D, VU by  at 9/2/2019 11:35 AM    Acceptance, E,TB, VU by JM at 9/1/2019 11:06 AM    Acceptance, E,TB,D, VU,NR by  at 8/31/2019  9:53 AM    Comment:  pt very anxious , cardiac ed provided   Significant Other Acceptance, E,TB,D, VU,NR by  at 8/29/2019 12:00 PM                               User Key     Initials Effective Dates Name Provider Type Discipline    PC 04/03/18 -  Lenka Brown, PT Physical Therapist PT    EM 04/03/18 -  Shell Liu, PT Physical Therapist PT    JM 03/07/18 -  Deborah Keenan, PTA Physical Therapy Assistant PT    AR 04/03/18 -  Magaly Friedman, PT Physical Therapist PT    SV 04/03/18 -  Carmen Hernández, PT Physical Therapist PT    MA 10/19/18 -  Vianca Muse, PT Physical Therapist PT    CS 05/14/18 -  Connor Chaudhary, PT Physical Therapist PT              PT  Recommendation and Plan     Outcome Summary/Treatment Plan (PT)  Anticipated Discharge Disposition (PT): home with home health  Plan of Care Reviewed With: patient  Outcome Summary: patient steady with use of rwx, able to ambulate 125 feet, up and down a flight of stairs with CGA only. Anticipate d/c home with HH today      Time Calculation:   PT Charges     Row Name 09/04/19 1529             Time Calculation    Start Time  1435  -EM      Stop Time  1445  -EM      Time Calculation (min)  10 min  -EM      PT Received On  09/04/19  -EM         Time Calculation- PT    Total Timed Code Minutes- PT  10 minute(s)  -EM        User Key  (r) = Recorded By, (t) = Taken By, (c) = Cosigned By    Initials Name Provider Type    EM Shell Liu PT Physical Therapist        Therapy Charges for Today     Code Description Service Date Service Provider Modifiers Qty    23950948741 HC PT THER PROC EA 15 MIN 9/4/2019 Shell Liu PT GP 1          PT G-Codes  Outcome Measure Options: AM-PAC 6 Clicks Basic Mobility (PT)  AM-PAC 6 Clicks Score (PT): 19    PT Discharge Summary  Anticipated Discharge Disposition (PT): home with home health    Shell Liu, PT  9/4/2019

## 2019-09-04 NOTE — DISCHARGE PLACEMENT REQUEST
"Kurt Vergara (79 y.o. Female)     Date of Birth Social Security Number Address Home Phone MRN    1940  [id number]  [facility] [id number] [phone] [id number]    [employer] Marital Status          [place]        Admission Date Admission Type Admitting Provider Attending Provider Department, Room/Bed    8/22/19 Elective Francis Mccullough MD Dillon, William, MD [address] CVI, 2207/1    Discharge Date Discharge Disposition Discharge Destination                       Attending Provider:  Francis Mccullough MD    Allergies:  Ancef [Cefazolin], Codeine, Penicillins, Sulfa Antibiotics    Isolation:  None   Infection:  None   Code Status:  CPR    Ht:  170.2 cm (67\")   Wt:  66.8 kg (147 lb 3.2 oz)    Admission Cmt:  None   Principal Problem:  Coronary artery disease involving native coronary artery of native heart with angina pectoris (CMS/[facility]) [I25.119] More...                 Active Insurance as of 8/22/2019     Primary Coverage     Payor Plan Insurance Group Employer/Plan Group    HUMANA MEDICARE REPLACEMENT HUMANA MEDICARE REPL [id number]     Payor Plan Address Payor Plan Phone Number Payor Plan Fax Number Effective Dates    [address] [address] [phone]  1/1/2018 - None Entered    [facility] [id number]       Subscriber Name Subscriber Birth Date Member ID       KURT VERGARA 1940 [id number]                 Emergency Contacts      (Rel.) Home Phone Work Phone Mobile Phone    Rishabh Vergara (Spouse) [phone] -- --    Antonio Vergara (Son) [phone] -- --    Amaury Vergara (Daughter) [phone]              "

## 2019-09-04 NOTE — DISCHARGE SUMMARY
Reanna Higgins  [id number]    Date of Admit: 8/22/2019  Date of Discharge:  9/4/2019    Discharge Diagnosis:  Active Hospital Problems    Diagnosis  POA   • **Coronary artery disease involving native coronary artery of native heart with angina pectoris (CMS/HCC) [I25.119]  Yes     Priority: High   • Chest pain [R07.9]  Unknown   • Sick sinus syndrome (CMS/HCC) [I49.5]  Unknown   • Abnormal dobutamine stress echocardiography [R94.39]  Unknown   • Mesenteric ischemia due to arterial insufficiency (CMS/HCC) [K55.059]  Unknown   • Essential hypertension [I10]  Unknown      Resolved Hospital Problems   No resolved problems to display.       Hospital Course: She has had recent onset of unstable angina.  She underwent cardiac catheterization which revealed three-vessel coronary disease and normal LV function.  She was admitted and underwent a 6 vessel bypass by Dr. Alvin Hawk with a LIMA to the LAD vein to the RCA vein to the OM to a vein to the OM 3 and a sequential vein graft to the ramus and first diagonal.    She developed a small bowel obstruction and had to have decompression with an NG tube for several days eventually that did resolve but it has weakened her.  She is done very well postoperatively without any complications and now is ready for discharge.  We will plan on keeping her on 1 month of Lasix and potassium as well as 1 month of iron and a multivitamin.  She is going to go to a rehab facility but the follow-up with my nurse practitioner cardiac rehab and see me in about a month    Procedures Performed  Procedure(s):  SUMMER STERNOTOMY CORONARY ARTERY BYPASS GRAFT TIMES 6 USING LEFT INTERNAL MAMMARY ARTERY AND LEFT GREATER SAPHENOUS VEIN GRAFT PER ENDOSCOPIC VEIN HARVESTING AND PRP       Consults     Date and Time Order Name Status Description    8/27/2019 1353 Inpatient General Surgery Consult Completed     8/22/2019 1314 Inpatient Consult to Anesthesiology      8/22/2019 1111 Inpatient Cardiothoracic Surgery  Consult Completed           Discharge Medications     Your medication list      START taking these medications      Instructions Last Dose Given Next Dose Due   HYDROcodone-acetaminophen 5-325 MG per tablet  Commonly known as:  NORCO      Take 1 tablet by mouth Every 4 (Four) Hours As Needed for Moderate Pain  for up to 6 days.          CONTINUE taking these medications      Instructions Last Dose Given Next Dose Due   aspirin 81 MG EC tablet      Take 1 tablet by mouth Daily.       CENTRUM SILVER 50+WOMEN tablet      Take 1 tablet by mouth Daily.       cholecalciferol 1000 units tablet  Commonly known as:  VITAMIN D3      Take 1 tablet by mouth Daily.       cycloSPORINE 0.05 % ophthalmic emulsion  Commonly known as:  RESTASIS      Administer 1 drop to both eyes 2 (Two) Times a Day.       gabapentin 100 MG capsule  Commonly known as:  NEURONTIN      Take 100 mg by mouth 3 (Three) Times a Day.       gabapentin 100 MG capsule  Commonly known as:  NEURONTIN      Take 200 mg by mouth Every Night.       levothyroxine 25 MCG tablet  Commonly known as:  SYNTHROID, LEVOTHROID      Take 1 tablet by mouth Daily.       LORazepam 0.5 MG tablet  Commonly known as:  ATIVAN      One tablet 30 minutes before plane flight, may repeat times 1 in 4-6 hours.       metoprolol succinate XL 50 MG 24 hr tablet  Commonly known as:  TOPROL-XL      Take 50 mg by mouth 2 (Two) Times a Day.       omeprazole 40 MG capsule  Commonly known as:  priLOSEC      Take 1 capsule by mouth Daily.       vitamin B-12 1000 MCG tablet  Commonly known as:  CYANOCOBALAMIN      Take 1,000 mcg by mouth Daily.          ASK your doctor about these medications      Instructions Last Dose Given Next Dose Due   pravastatin 40 MG tablet  Commonly known as:  PRAVACHOL  Ask about: Which instructions should I use?      Take 40 mg by mouth 4 (Four) Times a Week. Mon, Wed, Fri, Sat       pravastatin 40 MG tablet  Commonly known as:  PRAVACHOL  Ask about: Which instructions  should I use?      TAKE 2 TABLETS BY MOUTH EVERY OTHER DAY             Where to Get Your Medications      These medications were sent to [facility] - [place], [place] - [address] [place] [address] - [phone]  - [phone] [place] [address]    Phone:  [phone]   · pravastatin 40 MG tablet     You can get these medications from any pharmacy    Bring a paper prescription for each of these medications  · HYDROcodone-acetaminophen 5-325 MG per tablet         Discharge Diet:     Activity at Discharge:     Discharge disposition: TCC    Condition on Discharge: stable    Follow-up Appointments  Future Appointments   Date Time Provider Department Center   9/5/2019 10:30 AM Kamila Rai PA [facility] None   9/23/2019 12:00 AM SONJA HINKLE [facility] None   10/10/2019 11:15 AM Kem Hawk MD [facility] None   10/11/2019  3:00 PM Francis Mccullough MD [facility] None   10/14/2019  8:10 AM [facility] None   10/21/2019  3:30 PM Ted Li MD [facility] None   6/15/2020  9:00 AM Ted Li MD [facility] None     [unfilled]    Test Results Pending at Discharge  [unfilled]     Francis Mccullough MD  09/04/19  9:14 AM

## 2019-09-04 NOTE — PROGRESS NOTES
Continued Stay Note  [facility]     Patient Name: Reanna Higgins  MRN: [id number]  Today's Date: 9/4/2019    Admit Date: 8/22/2019    Discharge Plan     Row Name 09/04/19 1255       Plan    Plan  Home w/ [employer] for nocturnal oxygen.    Plan Comments  CCP S/W [facility] RN at 12:18 PM.  Demarco advised Dr. Mccullough had completed the Peer to Peer with [employer] for SNF admission and the denial was [facility]HELD.  CCP went to Pt room to advise.  Pt off unit in Xray.  CCP discussed with Pt spouse and will repeat to Pt once she returns to unit.  Pt is already got [facility] on Board following her.  CCP called and gave [facility] the heads up that Pt will most likely d/c home today.  MARIMAR Portillo advised Pt will need nocturnal oxygen at home.  Pt spouse agreeable to [facility] as supplier.  [employer] notified.  CCP following.  MARIMAR URIOSTEGUI/CCP        Discharge Codes    No documentation.       Expected Discharge Date and Time     Expected Discharge Date Expected Discharge Time    Sep 4, 2019             Celeste Paulino RN

## 2019-09-04 NOTE — PLAN OF CARE
Problem: Patient Care Overview  Goal: Plan of Care Review   09/04/19 1528   OTHER   Outcome Summary patient steady with use of rwx, able to ambulate 125 feet, up and down a flight of stairs with CGA only. Anticipate d/c home with HH today

## 2019-09-04 NOTE — DISCHARGE INSTR - ACTIVITY
Continue to use your incentive spirometer for an additional 2 weeks.  Continue to wear your TELLY hose for an additional 2 weeks. You may remove them at night.  Walk 10 minutes at a time at least 3 times a day.  Do not drive for 2 weeks and no longer taking narcotics.  Do not lift, push or pull greater than 10 pounds for 6 weeks.  You may shower, but do not submerge your incisions until your surgeon approves (i.e., no baths, pools, hot tubs, etc.).  Clean your incision daily in the shower with Dial or Ivory soap. Do not put any additional lotions, creams, or any other substance on your incision without your surgeon's approval.

## 2019-09-05 ENCOUNTER — READMISSION MANAGEMENT (OUTPATIENT)
Dept: CALL CENTER | Facility: HOSPITAL | Age: 79
End: 2019-09-05

## 2019-09-05 ENCOUNTER — TELEPHONE (OUTPATIENT)
Dept: CARDIOLOGY | Facility: CLINIC | Age: 79
End: 2019-09-05

## 2019-09-05 NOTE — TELEPHONE ENCOUNTER
Called patient and told her per Dr. Mccullough patient can take  She has been on them while in hospital and has done fine

## 2019-09-05 NOTE — OUTREACH NOTE
Prep Survey      Responses   Facility patient discharged from?  [facility]   Is patient eligible?  Yes   Discharge diagnosis  CAD, s/p CABG x 6 with LIMA and LGSVG per [facility]H and PRP, chest pain, SSS, abnormal dobutamine stress echo, mesenteric ischemia d/t arterial insufficiency, essential HTN   Does the patient have one of the following disease processes/diagnoses(primary or secondary)?  Cardiothoracic surgery   Does the patient have Home health ordered?  Yes   What is the Home health agency?   [facility]   Is there a DME ordered?  Yes   What DME was ordered?  [employer] for home O2   Comments regarding appointments  Pt to schedule follow up with PCP   Prep survey completed?  Yes          Yessi Grey RN

## 2019-09-05 NOTE — PROGRESS NOTES
Case Management Discharge Note    Final Note: pt dc'd to home 9/4/19 with [employer] for DME    Destination      No service has been selected for the patient.      Durable Medical Equipment - Selection Complete      Service Provider Request Status Selected Services Address Phone Number Fax Number    [facility] [place] Selected Durable Medical Equipment [address], [facility] [id number] [phone]      Dialysis/Infusion      No service has been selected for the patient.      Home Medical Care - Selection Complete      Service Provider Request Status Selected Services Address Phone Number Fax Number    [facility] [place] Selected Home Health Services [address], [facility] [address] [phone]      Therapy      No service has been selected for the patient.      Community Resources      No service has been selected for the patient.        Transportation Services  Private: Car    Final Discharge Disposition Code: 06 - home with home health care

## 2019-09-05 NOTE — TELEPHONE ENCOUNTER
Patient was D/C from hosp. Today. Pharm. Told her that Lasix and the potassium   Pharm.has sulfa compound and she is allergic to sulfa and   Potassium can raise your potassium levels to high.  Not taking until she knows from you this is ok    [phone]

## 2019-09-06 DIAGNOSIS — R73.03 PREDIABETES: ICD-10-CM

## 2019-09-07 DIAGNOSIS — E03.8 SUBCLINICAL HYPOTHYROIDISM: ICD-10-CM

## 2019-09-09 ENCOUNTER — TELEPHONE (OUTPATIENT)
Dept: CARDIOLOGY | Facility: CLINIC | Age: 79
End: 2019-09-09

## 2019-09-09 ENCOUNTER — READMISSION MANAGEMENT (OUTPATIENT)
Dept: CALL CENTER | Facility: HOSPITAL | Age: 79
End: 2019-09-09

## 2019-09-09 RX ORDER — TRIAMTERENE AND HYDROCHLOROTHIAZIDE 75; 50 MG/1; MG/1
TABLET ORAL
Qty: 90 TABLET | Refills: 1 | OUTPATIENT
Start: 2019-09-09

## 2019-09-09 RX ORDER — METFORMIN HYDROCHLORIDE 500 MG/1
TABLET, EXTENDED RELEASE ORAL
Qty: 90 TABLET | Refills: 0 | OUTPATIENT
Start: 2019-09-09

## 2019-09-09 RX ORDER — LEVOTHYROXINE SODIUM 0.03 MG/1
TABLET ORAL
Qty: 90 TABLET | Refills: 1 | Status: SHIPPED | OUTPATIENT
Start: 2019-09-09 | End: 2020-02-04

## 2019-09-09 NOTE — TELEPHONE ENCOUNTER
[facility] lolly wanting to let you know that Reanna's weight if fluctuating  No SOB, No swelling   [address], [phone],144    [facility]  [phone]

## 2019-09-09 NOTE — OUTREACH NOTE
CT Surgery Week 1 Survey      Responses   Facility patient discharged from?  [facility]   Does the patient have one of the following disease processes/diagnoses(primary or secondary)?  Cardiothoracic surgery   Is there a successful TCM telephone encounter documented?  No   Week 1 attempt successful?  Yes   Call start time  1340   Call end time  1349   Discharge diagnosis  CAD, s/p CABG x 6 with LIMA and LGSVG per EVH and PRP, chest pain, SSS, abnormal dobutamine stress echo, mesenteric ischemia d/t arterial insufficiency, essential HTN   Is patient permission given to speak with other caregiver?  Yes   List who call center can speak with  spouse- Rishabh   Person spoke with today (if not patient) and relationship  spouse- Rishabh/ Patient   Meds reviewed with patient/caregiver?  Yes   Is the patient having any side effects they believe may be caused by any medication additions or changes?  No   Does the patient have all medications related to this admission filled (includes all antibiotics, pain medications, cardiac medications, etc.)  Yes   Is the patient taking all medications as directed (includes completed medication regime)?  Yes   Does the patient have a primary care provider?   Yes   Does the patient have an appointment scheduled with their C/T surgeon?  Yes   Comments regarding PCP  will see PCP on 9/12   Has the patient kept scheduled appointments due by today?  N/A   Comments  Has a post-op appt with surgeon on Oct. 10   What is the Home health agency?   [facility]   Has home health visited the patient within 72 hours of discharge?  Yes   What DME was ordered?  [facility] for home O2   Has all DME been delivered?  Yes   Psychosocial issues?  No   Did the patient receive a copy of their discharge instructions?  Yes   Nursing interventions  Reviewed instructions with patient   What is the patient's perception of their health status since discharge?  Improving   Nursing interventions  Nurse provided patient education   Is  the patient/caregiver able to teach back normal signs of recovery?  Depression or irritability, Pain or discomfort at incisional site, Constipation, Nausea and lack of appetite   Is the patient /caregiver able to teach back basic post-op care?  Continue use of incentive spirometry at least 1 week post discharge, Practice 'cough and deep breath', Drive as instructed by MD in discharge instructions, Take showers only when approved by MD-sponge bathe until then, Keep incision areas clean, dry and protected, No tub bath, swimming, or hot tub until instructed by MD, Do not remove steri-strips, Lifting as instructed by MD in discharge instructions   Is the patient/caregiver able to teach back signs and symptoms of incisional infection?  Increased redness, swelling or pain at the incisonal site, Increased drainage or bleeding, Pus or odor from incision, Incisional warmth, Fever   Is the patient/caregiver able to teach back steps to recovery at home?  Set small, achievable goals for return to baseline health, Rest and rebuild strength, gradually increase activity, Eat a well-balance diet   Is the patient/caregiver able to teach back the hierarchy of who to call/visit for symptoms/problems? PCP, Specialist, Home health nurse, Urgent Care, ED, 911  Yes   Additional teach back comments  Pt says she is doing really well, she is closely following her discharge instructions, no questions or concerns at this time.   Week 1 call completed?  Yes            Dorina Bolanos RN

## 2019-09-11 DIAGNOSIS — G25.81 RLS (RESTLESS LEGS SYNDROME): ICD-10-CM

## 2019-09-11 DIAGNOSIS — G62.9 PERIPHERAL POLYNEUROPATHY: ICD-10-CM

## 2019-09-12 ENCOUNTER — OFFICE VISIT (OUTPATIENT)
Dept: INTERNAL MEDICINE | Age: 79
End: 2019-09-12

## 2019-09-12 ENCOUNTER — OFFICE VISIT (OUTPATIENT)
Dept: CARDIOLOGY | Facility: CLINIC | Age: 79
End: 2019-09-12

## 2019-09-12 VITALS
HEIGHT: 67 IN | SYSTOLIC BLOOD PRESSURE: 130 MMHG | DIASTOLIC BLOOD PRESSURE: 70 MMHG | BODY MASS INDEX: 22.6 KG/M2 | HEART RATE: 89 BPM | WEIGHT: 144 LBS

## 2019-09-12 VITALS
DIASTOLIC BLOOD PRESSURE: 58 MMHG | BODY MASS INDEX: 22.76 KG/M2 | HEIGHT: 67 IN | TEMPERATURE: 97 F | SYSTOLIC BLOOD PRESSURE: 110 MMHG | WEIGHT: 145 LBS | HEART RATE: 86 BPM | OXYGEN SATURATION: 98 %

## 2019-09-12 DIAGNOSIS — I25.119 CORONARY ARTERY DISEASE INVOLVING NATIVE CORONARY ARTERY OF NATIVE HEART WITH ANGINA PECTORIS (HCC): Primary | Chronic | ICD-10-CM

## 2019-09-12 DIAGNOSIS — I25.119 CORONARY ARTERY DISEASE INVOLVING NATIVE CORONARY ARTERY OF NATIVE HEART WITH ANGINA PECTORIS (HCC): Primary | ICD-10-CM

## 2019-09-12 DIAGNOSIS — G62.9 PERIPHERAL POLYNEUROPATHY: Chronic | ICD-10-CM

## 2019-09-12 DIAGNOSIS — E78.5 DYSLIPIDEMIA (HIGH LDL; LOW HDL): ICD-10-CM

## 2019-09-12 DIAGNOSIS — K55.1 MESENTERIC ISCHEMIA DUE TO ARTERIAL INSUFFICIENCY (HCC): Chronic | ICD-10-CM

## 2019-09-12 DIAGNOSIS — Z51.81 ENCOUNTER FOR THERAPEUTIC DRUG MONITORING: ICD-10-CM

## 2019-09-12 DIAGNOSIS — I10 ESSENTIAL HYPERTENSION: Chronic | ICD-10-CM

## 2019-09-12 LAB
ALBUMIN SERPL-MCNC: 4.5 G/DL (ref 3.5–5.2)
ALBUMIN/GLOB SERPL: 1.7 G/DL
ALP SERPL-CCNC: 91 U/L (ref 39–117)
ALT SERPL-CCNC: 21 U/L (ref 1–33)
AST SERPL-CCNC: 17 U/L (ref 1–32)
BILIRUB SERPL-MCNC: 0.3 MG/DL (ref 0.2–1.2)
BUN SERPL-MCNC: 17 MG/DL (ref 8–23)
BUN/CREAT SERPL: 21.5 (ref 7–25)
CALCIUM SERPL-MCNC: 9.9 MG/DL (ref 8.6–10.5)
CHLORIDE SERPL-SCNC: 97 MMOL/L (ref 98–107)
CHOLEST SERPL-MCNC: 144 MG/DL (ref 0–200)
CHOLEST/HDLC SERPL: 3.89 {RATIO}
CO2 SERPL-SCNC: 28.4 MMOL/L (ref 22–29)
CREAT SERPL-MCNC: 0.79 MG/DL (ref 0.57–1)
GLOBULIN SER CALC-MCNC: 2.7 GM/DL
GLUCOSE SERPL-MCNC: 132 MG/DL (ref 65–99)
HDLC SERPL-MCNC: 37 MG/DL (ref 40–60)
LDLC SERPL CALC-MCNC: 81 MG/DL (ref 0–100)
POTASSIUM SERPL-SCNC: 4.2 MMOL/L (ref 3.5–5.2)
PROT SERPL-MCNC: 7.2 G/DL (ref 6–8.5)
SODIUM SERPL-SCNC: 141 MMOL/L (ref 136–145)
TRIGL SERPL-MCNC: 129 MG/DL (ref 0–150)
VLDLC SERPL CALC-MCNC: 25.8 MG/DL

## 2019-09-12 PROCEDURE — 99213 OFFICE O/P EST LOW 20 MIN: CPT | Performed by: INTERNAL MEDICINE

## 2019-09-12 PROCEDURE — 99213 OFFICE O/P EST LOW 20 MIN: CPT | Performed by: PHYSICIAN ASSISTANT

## 2019-09-12 PROCEDURE — 93000 ELECTROCARDIOGRAM COMPLETE: CPT | Performed by: PHYSICIAN ASSISTANT

## 2019-09-12 RX ORDER — GABAPENTIN 100 MG/1
200 CAPSULE ORAL 3 TIMES DAILY
Qty: 180 CAPSULE | Refills: 0 | Status: SHIPPED | OUTPATIENT
Start: 2019-09-12 | End: 2019-11-08 | Stop reason: SDUPTHER

## 2019-09-12 RX ORDER — PRAVASTATIN SODIUM 40 MG
TABLET ORAL
COMMUNITY
Start: 2019-09-12 | End: 2019-09-12 | Stop reason: SDUPTHER

## 2019-09-12 RX ORDER — PRAVASTATIN SODIUM 40 MG
40 TABLET ORAL NIGHTLY
Qty: 90 TABLET | Refills: 3 | Status: SHIPPED | OUTPATIENT
Start: 2019-09-12 | End: 2020-09-21

## 2019-09-12 RX ORDER — GABAPENTIN 100 MG/1
200 CAPSULE ORAL 3 TIMES DAILY
Qty: 180 CAPSULE | Refills: 2 | OUTPATIENT
Start: 2019-09-12

## 2019-09-12 RX ORDER — ASPIRIN 81 MG/1
81 TABLET ORAL DAILY
COMMUNITY
Start: 2019-09-12 | End: 2023-01-17 | Stop reason: HOSPADM

## 2019-09-12 NOTE — PROGRESS NOTES
Date of Office Visit: 2019  Encounter Provider: JOANNA Walsh  Place of Service: [facility] CARDIOLOGY  Patient Name: Reanna Higgins  :1940    Chief Complaint   Patient presents with   • AV block   :     HPI: Reanna Higgins is a 79 y.o. female who presents today for follow-up.  Old records have been obtained and reviewed by me.  She is a patient of Dr. Adames with coronary disease.  She had unstable angina and was found to have multivessel coronary disease.  She underwent a CABG x6 by Dr. Hawk.  Postoperatively she developed a small bowel obstruction and underwent decompression with an NG tube for several days.  This resolved without surgery.  She was finally discharged from the hospital on 2019 in stable condition.  The plan is to keep her on Lasix and potassium for 1 month as well as 1 month of iron and a multivitamin.   Since she is been home she is been doing well.  She denies any chest pain, shortness of breath, edema, dizziness, or syncope.  She still feels pretty weak and fatigued but overall she is slowly improving.  Physical therapy and home health have been coming out to the house.  She is still taking the Lasix and potassium.  She has been weighing herself daily and her daily weights have remained stable.      Past Medical History:   Diagnosis Date   • Cancer (CMS/HCC)     skin   • Cardiac pacemaker in situ    • Carpal tunnel syndrome    • Cataract    • Cystic fibroadenosis of breast    • GERD (gastroesophageal reflux disease)    • Hypercholesterolemia    • Hypertension    • Osteoarthritis of both hands    • Pancreatitis    • Peripheral neuropathy    • Restless leg syndrome    • Second degree AV block, Mobitz type I    • SVT (supraventricular tachycardia) (CMS/HCC)        Past Surgical History:   Procedure Laterality Date   • APPENDECTOMY     • BILATERAL OOPHORECTOMY Bilateral    • BREAST CYST EXCISION Bilateral     4 BREAST SURGERIES   •  CARDIAC CATHETERIZATION N/A 8/22/2019    Procedure: Left Heart Cath;  Surgeon: Francis Mccullough MD;  Location: [facility] CATH INVASIVE LOCATION;  Service: Cardiology   • CARDIAC CATHETERIZATION N/A 8/22/2019    Procedure: Left ventriculography;  Surgeon: Francis Mccullough MD;  Location: [facility] CATH INVASIVE LOCATION;  Service: Cardiology   • CARDIAC CATHETERIZATION N/A 8/22/2019    Procedure: Coronary angiography;  Surgeon: Francis Mccullough MD;  Location: [facility] CATH INVASIVE LOCATION;  Service: Cardiology   • CARDIAC ELECTROPHYSIOLOGY PROCEDURE N/A 10/10/2016    Procedure: Pacemaker DC new  [place];  Surgeon: Wilfrido Hameed MD;  Location: [facility] CATH INVASIVE LOCATION;  Service:    • CARPAL TUNNEL RELEASE Right    • CATARACT EXTRACTION      NOVEMBER AND DECEMBER 2014   • CHOLECYSTECTOMY     • COLONOSCOPY  2015   • CORONARY ARTERY BYPASS GRAFT N/A 8/23/2019    Procedure: SUMMER STERNOTOMY CORONARY ARTERY BYPASS GRAFT TIMES 6 USING LEFT INTERNAL MAMMARY ARTERY AND LEFT GREATER SAPHENOUS VEIN GRAFT PER ENDOSCOPIC VEIN HARVESTING AND PRP;  Surgeon: Kem Hawk MD;  Location: [facility] OR;  Service: Cardiothoracic   • ENDOSCOPY N/A 7/18/2019    Procedure: ESOPHAGOGASTRODUODENOSCOPY with biopsy;  Surgeon: Ricky Chew MD;  Location: [facility] ENDOSCOPY;  Service: Gastroenterology   • HYSTERECTOMY     • KNEE ARTHROSCOPY Bilateral 04/2014    MENISCAL TEAR   • PACEMAKER IMPLANTATION  10/10/2016   • SHOULDER SURGERY Right     ROTATOR CUFF SURGERY JUNE 18, 2015   • TONSILLECTOMY     • TRIGGER FINGER RELEASE      both hands       Social History     Socioeconomic History   • Marital status:      Spouse name: Rishabh*   • Number of children: 2   • Years of education: Not on file   • Highest education level: Not on file   Occupational History   • 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)   Tobacco Use   • Smoking status: Former Smoker     Packs/day: 0.50     Years: 15.00     Pack years: 7.50     Last attempt to  quit: 1970     Years since quittin.7   • Smokeless tobacco: Never Used   • Tobacco comment: quit age 30   Substance and Sexual Activity   • Alcohol use: Yes     Alcohol/week: 1.2 oz     Types: 1 Glasses of wine, 1 Cans of beer per week     Frequency: Monthly or less     Drinks per session: 1 or 2     Comment: rare glass of wine or beer rarely/  No caffeine use   • Drug use: No   • Sexual activity: No   Lifestyle   • Physical activity:     Days per week: 5 days     Minutes per session: 30 min   • Stress: Not at all       Family History   Problem Relation Age of Onset   • Thyroid disease Daughter    • Lung cancer Brother    • Hypertension Mother    • Aortic stenosis Mother    • Coronary artery disease Mother          78 (smoker)   • Hypertension Father    • Depression Father    • Anxiety disorder Father    • Diabetes Father    • Heart failure Father    • Cirrhosis Father         alcohol   • Alcohol abuse Sister    • Lupus Sister    • Heart disease Sister    • Diabetes type II Sister    • Alcohol abuse Brother    • Drug abuse Brother    • Heart disease Brother    • Cancer Brother         bladder (smoker)   • Heart disease Brother    • Breast cancer Paternal Aunt    • Neuropathy Neg Hx    • Colon cancer Neg Hx    • Dementia Neg Hx        Review of Systems   Constitution: Positive for weakness and malaise/fatigue. Negative for chills and fever.   Cardiovascular: Negative for chest pain, dyspnea on exertion, leg swelling, near-syncope, orthopnea, palpitations, paroxysmal nocturnal dyspnea and syncope.   Respiratory: Negative for cough and shortness of breath.    Musculoskeletal: Negative for joint pain, joint swelling and myalgias.   Gastrointestinal: Negative for abdominal pain, diarrhea, melena, nausea and vomiting.   Genitourinary: Negative for frequency and hematuria.   Neurological: Negative for light-headedness, numbness, paresthesias and seizures.   Allergic/Immunologic: Negative.    All other systems  "reviewed and are negative.      Allergies   Allergen Reactions   • Ancef [Cefazolin] Other (See Comments)     Hypotension/bradycardia   • Codeine Hives   • Penicillins Other (See Comments)     Hypotension (Ancef allergy)    • Sulfa Antibiotics          Current Outpatient Medications:   •  aspirin 81 MG EC tablet, Take 1 tablet by mouth Daily., Disp: , Rfl:   •  cholecalciferol (VITAMIN D3) 1000 UNITS tablet, Take 1 tablet by mouth Daily., Disp: , Rfl:   •  cycloSPORINE (RESTASIS) 0.05 % ophthalmic emulsion, Administer 1 drop to both eyes 2 (Two) Times a Day., Disp: , Rfl:   •  furosemide (LASIX) 40 MG tablet, Take 1 tablet by mouth Daily for 29 days., Disp: 29 tablet, Rfl: 0  •  gabapentin (NEURONTIN) 100 MG capsule, Take 2 capsules by mouth 3 (Three) Times a Day., Disp: 180 capsule, Rfl: 0  •  levothyroxine (SYNTHROID, LEVOTHROID) 25 MCG tablet, TAKE 1 TABLET BY MOUTH EVERY DAY, Disp: 90 tablet, Rfl: 1  •  LORazepam (ATIVAN) 0.5 MG tablet, One tablet 30 minutes before plane flight, may repeat times 1 in 4-6 hours., Disp: 8 tablet, Rfl: 0  •  metoprolol succinate XL (TOPROL-XL) 50 MG 24 hr tablet, Take 50 mg by mouth 2 (Two) Times a Day., Disp: , Rfl:   •  Multiple Vitamins-Minerals (CENTRUM SILVER 50+WOMEN) tablet, Take 1 tablet by mouth Daily., Disp: , Rfl:   •  omeprazole (priLOSEC) 40 MG capsule, Take 1 capsule by mouth Daily., Disp: 90 capsule, Rfl: 1  •  potassium chloride (MICRO-K) 10 MEQ CR capsule, Take 2 capsules by mouth 2 (Two) Times a Day With Meals for 30 days., Disp: 120 capsule, Rfl: 0  •  pravastatin (PRAVACHOL) 40 MG tablet, Take 1 tablet by mouth Every Night., Disp: 90 tablet, Rfl: 3  •  vitamin B-12 (CYANOCOBALAMIN) 1000 MCG tablet, Take 1,000 mcg by mouth Daily., Disp: , Rfl:       Objective:     Vitals:    09/12/19 1308   BP: 130/70   Pulse: 89   Weight: 65.3 kg (144 lb)   Height: 170.2 cm (67\")     Body mass index is 22.55 kg/m².    PHYSICAL EXAM:    Physical Exam   Constitutional: She is " oriented to person, place, and time. She appears well-developed and well-nourished. No distress.   HENT:   Head: Normocephalic and atraumatic.   Eyes: Pupils are equal, round, and reactive to light.   Neck: No JVD present. No thyromegaly present.   Cardiovascular: Normal rate, regular rhythm, normal heart sounds and intact distal pulses.   No murmur heard.  Pulmonary/Chest: Effort normal and breath sounds normal. No respiratory distress.   Abdominal: Soft. Bowel sounds are normal. She exhibits no distension. There is no splenomegaly or hepatomegaly. There is no tenderness.   Musculoskeletal: Normal range of motion. She exhibits no edema.   Neurological: She is alert and oriented to person, place, and time.   Skin: Skin is warm and dry. She is not diaphoretic. No erythema.   Sternal incision and left lower extremity incision well-healed without erythema or edema   Psychiatric: She has a normal mood and affect. Her behavior is normal. Judgment normal.         ECG 12 Lead  Date/Time: 9/12/2019 1:12 PM  Performed by: Kamila Rai PA  Authorized by: Kamila Rai PA   Comparison: compared with previous ECG from 9/1/2019  Similar to previous ECG  Rhythm: sinus rhythm  BPM: 89  Conduction: 1st degree AV block  Q waves: V1 and V2      Clinical impression: abnormal EKG  Comments: Indication: Coronary disease.    Diffuse T wave abnormalities unchanged              Assessment:       Diagnosis Plan   1. Coronary artery disease involving native coronary artery of native heart with angina pectoris (CMS/[facility])  ECG 12 Lead     Orders Placed This Encounter   Procedures   • ECG 12 Lead     This order was created via procedure documentation          Plan:       Overall she is doing really well considering everything that she has been through.  She will remain on the Lasix and potassium for 1 month and then discontinue this.  Her weights have been stable.  I think she is ready for cardiac rehab and we will go ahead and get her  set up for that.  I am not going to make any changes to her medical regimen, and she will follow-up with Dr. Mccullough on 10/11/2019 or sooner if needed.    As always, it has been a pleasure to participate in your patient's care.      Sincerely,         Kamila Rai PA-C

## 2019-09-12 NOTE — PROGRESS NOTES
"[facility] [place] [facility]  FRANK LI M.D.    Reanna Higgins / 79 y.o. / female  09/12/2019      CC:   Coronary Artery Disease (s/p 6 vessel bypass 8/23/19, hosp f/u 8/22/19-9/4/19); Therapeutic drug monitoring (needs UDS; refill for gabapentin pending); and Peripheral Neuropathy      VITALS    Visit Vitals  /58   Pulse 86   Temp 97 °F (36.1 °C)   Ht 170.2 cm (67\")   Wt 65.8 kg (145 lb)   SpO2 98%   BMI 22.71 kg/m²       BP Readings from Last 3 Encounters:   09/12/19 110/58   09/04/19 119/60   08/08/19 152/80     Wt Readings from Last 3 Encounters:   09/12/19 65.8 kg (145 lb)   09/04/19 66.8 kg (147 lb 3.2 oz)   08/08/19 70.8 kg (156 lb)      Body mass index is 22.71 kg/m².    HPI:     Reviewed chief complaint and details of complaint as documented by staff.     Date of admission/discharge: As noted above in CC  Hospital: [facility]  Principle Dx: CAD s/p CABG  Secondary Dx: Hyperlipidemia and hypertension   History prior to hospitalization: Increased CHILDS   Evaluation/Treatment: noted to have multivessel disease. Underwent multivessel bypass surgery.   Course: denies chest pain or CHILDS.     Current outpatient and discharge medications have been reconciled for the patient.  Reviewed by: Ted Li MD       Patient Care Team:  Ted Li MD as PCP - General (Internal Medicine)  To Rivera MD as Consulting Physician (General Surgery)  Torsten Benson Jr., MD as Consulting Physician (Cardiology)  Arsenio Witt MD as Consulting Physician (Ophthalmology)  Brown Beckman MD as Consulting Physician (Orthopedic Surgery)  ____________________________________________________________________    ASSESSMENT & PLAN:    Problem List Items Addressed This Visit        High    Coronary artery disease involving native coronary artery of native heart with angina pectoris (CMS/HCC) - Primary (Chronic)    Overview     8/2019: s/p 6 vessel CABG         Current Assessment & Plan     Continue ASA " 81 mg, optimize cholesterol control.          Relevant Medications    metoprolol succinate XL (TOPROL-XL) 50 MG 24 hr tablet    aspirin 81 MG EC tablet    pravastatin (PRAVACHOL) 40 MG tablet    Other Relevant Orders    Lipid Panel With / Chol / HDL Ratio       Medium    Dyslipidemia (high LDL; low HDL) (Chronic)    Current Assessment & Plan     Increase pravastatin 40 mg to 6 nights weekly.   Check lipids today.          Relevant Orders    Lipid Panel With / Chol / HDL Ratio    Comprehensive Metabolic Panel       Low    Essential hypertension (Chronic)    Overview     Continue metoprolol XL 50 mg BID.          Relevant Medications    metoprolol succinate XL (TOPROL-XL) 50 MG 24 hr tablet    furosemide (LASIX) 40 MG tablet    Peripheral neuropathy (Chronic)    Overview     **Approved for electronic prescription for gabapentin on 1.9.19**          Current Assessment & Plan     Taking gabapentin 100 mg 5 caps daily. Continue.   Refill sent to pharmacy electronically.     Reviewed latest CURRY . Reviewed most recent UDS or ordered UDS. Patient is having medication refilled at expected intervals. Using medication appropriately without evidence of unusual increased use, abuse, or diverting.          Relevant Medications    gabapentin (NEURONTIN) 100 MG capsule    Mesenteric ischemia due to arterial insufficiency (CMS/HCC) (Chronic)    Current Assessment & Plan     Continue ASA and statin therapy. Monitor for worsening symptoms.          Relevant Medications    aspirin 81 MG EC tablet      Other Visit Diagnoses     Encounter for therapeutic drug monitoring        Relevant Orders    Compliance Drug Analysis, Ur - Urine, Clean Catch         Orders Placed This Encounter   Procedures   • Compliance Drug Analysis, Ur - Urine, Clean Catch   • Lipid Panel With / Chol / HDL Ratio   • Comprehensive Metabolic Panel       Summary/Discussion:      Return in about 3 months (around 12/12/2019) for Hyperlipidemia, Heart  disease.    ____________________________________________________________________    REVIEW OF SYSTEMS    Review of Systems  Weight loss  No chest pain or shortness of breath  Mild GI discomfort with eating, no blood    PHYSICAL EXAMINATION    Physical Exam  Alert  Heart sounds are normal  Lungs clear to auscultation     REVIEWED DATA:    Labs:   Admission on 08/22/2019, Discharged on 09/04/2019   No results displayed because visit has over 200 results.          Imaging:   Xr Abdomen Flat & Upright    Result Date: 8/28/2019  Narrative: FLAT AND UPRIGHT ABDOMEN  HISTORY: 79-year-old with ileus versus small bowel obstruction. Followup exam.  COMPARISON: KUB 08/28/2019, flat and upright abdomen 08/27/2019, CT angiogram abdomen and pelvis 07/22/2019.  FINDINGS: Nasogastric tube is present with the tip overlying the fundus. There are dilated air-filled small bowel loops throughout the abdomen similar to the previous exam. No dilated air-filled colonic loops are demonstrated though there is stool within the right colon and rectosigmoid colon.  Bilateral pleural effusions are present with bibasilar opacities. Cholecystectomy clips are noted.      Impression: 1. Long segment dilated air-filled small bowel loops that are dilated out of proportion to colonic loops raising the likelihood of a small bowel obstruction rather than an ileus. Nasogastric tube is present. 2. Bilateral pleural effusions with basilar opacity without change.  This report was finalized on 8/28/2019 12:08 PM by Dr. Eduar Mi M.D.      Xr Abdomen Flat & Upright    Result Date: 8/27/2019  Narrative: XR ABDOMEN FLAT AND UPRIGHT-  INDICATIONS: Postoperative ileus  TECHNIQUE: Supine and upright views of the abdomen  COMPARISON: 08/27/2019 at 1259 hours  FINDINGS:   NG tube extends to the mid to distal stomach. The bowel gas pattern is nonspecific, with gas seen in small bowel, large bowel, and rectum. Small bowel shows borderline prominent caliber.  Overall appearance is similar to prior exam, and suggests postoperative ileus, although early or partial obstruction are also possible, with continued follow-up suggested. No free air is seen under the diaphragm.  If there is further clinical concern, CT can be obtained for further examination. Minimal likely atelectasis at the lung bases.       Impression:  As described.  This report was finalized on 8/27/2019 2:56 PM by Dr. Torsten Ortega M.D.      Ct Abdomen Pelvis With Contrast    Result Date: 8/30/2019  Narrative: ABDOMEN AND PELVIS CT WITH CONTRAST  HISTORY: Small bowel obstruction versus ileus.  TECHNIQUE: Abdomen and pelvis CT was performed using oral and IV contrast and is correlated with multiple recent KUB exams and an abdomen and pelvis CT from 07/16/2019.  Radiation dose reduction techniques were utilized, including automated exposure control and exposure modulation based on body size.  FINDINGS: There are small bilateral pleural effusions, larger on the right than the left. There is cardiac pacing hardware and sternal wires and a nasogastric tube which has its tip coiled in the decompressed stomach. At the time of this scan, no enteric contrast remained in the stomach or the duodenum. There is enteric contrast within the small bowel which measures up to about 3.7 cm. There is gradual transition point decompressed bowel along the left lower quadrant with decompressed ileum in the pelvis. The terminal ileum is completely decompressed. The colon is decompressed. No obstructing lesion is identified along the gradual transition point. There is a small volume of ascites. There is no lymphadenopathy.  Parenchyma of the liver, spleen, pancreas, adrenals, and kidneys appears normal. There has been previous cholecystectomy.      Impression: Small bowel obstruction with gradual transition point in the left lower quadrant roughly in the region of the mid ileum. No focal obstructing lesion is identified.  This  report was finalized on 8/30/2019 7:48 AM by Dr. Arsenio Castro M.D.      Xr Chest 1 View    Result Date: 8/26/2019  Narrative: PORTABLE CHEST X-RAY  HISTORY: Chest tube removal.  Portable chest x-ray was acquired at around 12:15 PM and is correlated with chest x-ray from 9:00 AM this morning.  FINDINGS: Sternal wires and pacer device remain. Mediastinal and left chest drains have been removed. There is no pneumothorax or pneumomediastinum. Bilateral atelectasis is observed at the lung bases. The lungs are otherwise clear.      Impression: No pneumothorax is present following chest tube removal.  This report was finalized on 8/26/2019 1:00 PM by Dr. Arsenio Castro M.D.      Xr Chest 1 View    Result Date: 8/25/2019  Narrative: PORTABLE CHEST RADIOGRAPH  HISTORY: Postop open heart surgery  COMPARISON: 08/24/2019  FINDINGS: Hepler-Jordan catheter has been removed, although the introducer remains. Left-sided chest tube is again identified. No definite pneumothorax is seen. Cardiomegaly and vascular congestion are noted. There is increasing bibasilar atelectasis. There are small bilateral pleural effusions.      Impression: Increasing bibasilar atelectasis.  This report was finalized on 8/25/2019 5:51 AM by Dr. Umu Ziegler M.D.      Xr Chest 1 View    Result Date: 8/24/2019  Narrative: PORTABLE CHEST RADIOGRAPH  HISTORY: Postop open heart surgery  COMPARISON: 08/23/2019  FINDINGS: Endotracheal tube has been removed. Right internal jugular vein Hepler-Jordan catheter is positioned within the right pulmonary artery. Left-sided chest tube is present. Small left apical pneumothorax is noted. There is mild vascular congestion. There is increasing left basilar atelectasis. There are small bilateral pleural effusions.      Impression: Small left apical pneumothorax.  This report was finalized on 8/24/2019 5:15 AM by Dr. Umu Ziegler M.D.      Xr Chest 1 View    Result Date: 8/23/2019  Narrative: STAT PORTABLE VIEW OF THE  CHEST 08/23/2019  CLINICAL HISTORY: Postop heart surgery, check tubes and lines.  This is correlated to a prior chest x-ray 08/22/2019.  FINDINGS: There has been interval cardiac surgery with multiple sternal wires and mediastinal markers and clips in place.  Mediastinal drains project over the mediastinum.  Left-sided chest tube is in place.  No pneumothorax is seen. There is an endotracheal tube that has been placed with its tip about 3 cm above the ruth in good position.  A right subclavian pulmonary artery catheter is in place, its distal tip extends 2.5 cm down the descending right pulmonary artery. There is a left subclavian transvenous pacemaker in place, distal leads project over the right atrial and right ventricular apex.  The cardiomediastinal silhouette is upper-limits-of-normal.  There is mild vascular engorgement.  There is no alexandra pulmonary edema.  No alexandra airspace consolidation is seen.  The costophrenic angles are sharp.      Impression: Patient is status post cardiac surgery with multiple life support lines in place as described.  The right internal jugular pulmonary catheter extends 2.5 cm down the descending right pulmonary artery, otherwise life-support lines are in good position as described.. There are some linear areas of left perihilar atelectasis, otherwise no active disease is seen in the chest.  This report was finalized on 8/23/2019 4:45 PM by Dr. To Paris M.D.      Xr Abdomen Kub    Result Date: 8/30/2019  Narrative: CLINICAL HISTORY: 79-year-old female with small bowel obstruction versus ileus.  EXAM: Portable AP supine abdomen 2 views dated 08/30/2019 at 0537 hours and 0538 hours.  FINDINGS: When compared to  image AP projection and multiplanar sections of CT abdomen and pelvis study dated 08/29/2019 at 1628 hours, there is progression of some of the administered oral contrast for the CT scan with some of the contrast material now in proximal and distal colon and rectum.  Some of the excreted intravenous contrast is present in the urinary bladder. The colon is normal in caliber. There is diminished magnitude of dilatation of the mesenteric small bowel, suggesting resolving partial small bowel obstruction or adynamic ileus. Nasogastric tube is present with its tip and sidehole below the diaphragm at the distribution of the upper stomach. Cardiac pacing lead wire is noted. Surgical clips in the right upper abdomen are again demonstrated. No new abnormality of demonstrated bony framework is seen. Degenerative changes in the spine is demonstrated.  CONCLUSION: Findings compatible with resolving partial small bowel obstruction or adynamic ileus. The presence of normal caliber distal small bowel on the CT scan study 1 day ago suggests the former may be more likely.  This report was finalized on 8/30/2019 9:54 AM by Dr. Dylan Burrows M.D.      Xr Abdomen Kub    Result Date: 8/28/2019  Narrative: XR ABDOMEN KUB-  Clinical: Nasogastric tube placement  COMPARISON 8/27/2019  FINDINGS: Nasogastric tube tip superimposes the gastric fundus. This position is satisfactory. Similar to the previous examination there some prominence in the caliber of the gas-filled small bowel loops within the upper and mid abdomen. There is a scant amount of gas demonstrated within the colon. Monitoring leads superimpose the chest. The remainder is unremarkable.  This report was finalized on 8/28/2019 7:11 AM by Dr. Derrick Davidson M.D.      Xr Abdomen Kub    Result Date: 8/27/2019  Narrative: XR ABDOMEN KUB-  INDICATIONS: Postoperative nausea and vomiting  TECHNIQUE: Supine views of the abdomen  COMPARISON:  image from CT from 07/22/2019  FINDINGS:   The bowel gas pattern is nonspecific, with gas seen in small bowel, large bowel, and rectum. Small bowel show borderline prominent caliber. The appearance suggests postoperative ileus, although early or partial obstruction are also possible, with continued follow-up  suggested. No supine evidence for free intraperitoneal gas.  If there is further clinical concern, CT can be obtained for further examination.      Impression:  As described.  This report was finalized on 8/27/2019 1:31 PM by Dr. Torsten Ortega M.D.      Xr Chest Pa & Lateral    Result Date: 9/4/2019  Narrative: XR CHEST PA AND LATERAL-  Clinical: Shortness of breath  COMPARISON 08/26/2019  FINDINGS: There are median sternotomy wires and vascular markers in position. Transvenous pacemaker again noted in place. Small pleural effusions blunting the costophrenic angles, size greater than the right than the left. Heart size within normal limits. The degree of opacity at the left lung base has diminished within the interim. No acute airspace disease or pulmonary edema has developed. The remainder is unremarkable.  This report was finalized on 9/4/2019 12:38 PM by Dr. Derrick Davidson M.D.      Xr Chest Pa & Lateral    Result Date: 8/26/2019  Narrative: XR CHEST PA AND LATERAL-  HISTORY: Female who is 79 years-old, postoperative evaluation  TECHNIQUE: Frontal and lateral views of the chest  COMPARISON: 08/25/2019  FINDINGS: Stable appearing chest tubes. Right IJ sheath has been removed. Sternotomy wires, left-sided pacemaker and cardiac leads noted. Heart size is borderline. Aorta is calcified. Pulmonary vasculature is unremarkable. Small bilateral pleural effusions and basilar atelectasis. Minimal, 1 to 2 mm left apical pneumothorax is suggested. No right pneumothorax. No acute osseous process.      Impression: Minimal, 1 to 2 mm left apical pneumothorax, bilateral pleural effusions and basilar atelectasis. Continued follow-up suggested.  This report was finalized on 8/26/2019 12:09 PM by Dr. Torsten Ortega M.D.      Xr Chest Pa & Lateral    Result Date: 8/22/2019  Narrative: TWO-VIEW CHEST  HISTORY: Preop for cardiac surgery. Coronary artery disease. Hypertension.  FINDINGS:  The lungs are well-expanded and clear.  The heart size is normal with a pacemaker in place and there is no acute disease or change from 10/10/2016.  This report was finalized on 8/22/2019 4:10 PM by Dr. Mark Menendez M.D.         Medical Tests:        Summary of old records / correspondence / consultant report:   DC summary re: issues addressed on HPI    Request outside records:       ALLERGIES  Allergies   Allergen Reactions   • Ancef [Cefazolin] Other (See Comments)     Hypotension/bradycardia   • Codeine Hives   • Penicillins Other (See Comments)     Hypotension (Ancef allergy)    • Sulfa Antibiotics         MEDICATIONS   Current Outpatient Medications   Medication Sig Dispense Refill   • aspirin 81 MG EC tablet Take 1 tablet by mouth Daily.     • cholecalciferol (VITAMIN D3) 1000 UNITS tablet Take 1 tablet by mouth Daily.     • cycloSPORINE (RESTASIS) 0.05 % ophthalmic emulsion Administer 1 drop to both eyes 2 (Two) Times a Day.     • furosemide (LASIX) 40 MG tablet Take 1 tablet by mouth Daily for 29 days. 29 tablet 0   • gabapentin (NEURONTIN) 100 MG capsule Take 2 capsules by mouth 3 (Three) Times a Day. 180 capsule 0   • levothyroxine (SYNTHROID, LEVOTHROID) 25 MCG tablet TAKE 1 TABLET BY MOUTH EVERY DAY 90 tablet 1   • LORazepam (ATIVAN) 0.5 MG tablet One tablet 30 minutes before plane flight, may repeat times 1 in 4-6 hours. 8 tablet 0   • metoprolol succinate XL (TOPROL-XL) 50 MG 24 hr tablet Take 50 mg by mouth 2 (Two) Times a Day.     • Multiple Vitamins-Minerals (CENTRUM SILVER 50+WOMEN) tablet Take 1 tablet by mouth Daily.     • omeprazole (priLOSEC) 40 MG capsule Take 1 capsule by mouth Daily. 90 capsule 1   • potassium chloride (MICRO-K) 10 MEQ CR capsule Take 2 capsules by mouth 2 (Two) Times a Day With Meals for 30 days. 120 capsule 0   • pravastatin (PRAVACHOL) 40 MG tablet Take 1 tablet by mouth Every Night. 90 tablet 3   • vitamin B-12 (CYANOCOBALAMIN) 1000 MCG tablet Take 1,000 mcg by mouth Daily.       No current  facility-administered medications for this visit.        Current outpatient and discharge medications have been reconciled for the patient.  Reviewed by: Ted Li MD       [facility]:     The following portions of the patient's history were reviewed and updated as appropriate: Allergies / Current Medications / Past Medical History / Surgical History / Social History / Family History    PROBLEM LIST   Patient Active Problem List   Diagnosis   • Colon polyp   • Gastroesophageal reflux disease   • Essential hypertension   • Dyslipidemia (high LDL; low HDL)   • Mitral and aortic valve disease   • Heart valve disease   • Anxiety   • Peripheral neuropathy   • Malignant neoplasm of cheek (CMS/HCC)   • Non-rheumatic mitral regurgitation   • Osteopenia   • SVT (supraventricular tachycardia) (CMS/HCC)   • AV block, Mobitz 1   • Cardiac pacemaker in situ   • RLS (restless legs syndrome)   • Phobia, flying   • Elevated hemoglobin A1c   • Acute pancreatitis without infection or necrosis   • Mesenteric ischemia due to arterial insufficiency (CMS/HCC)   • Epigastric pain   • Chest pain   • Sick sinus syndrome (CMS/HCC)   • Abnormal dobutamine stress echocardiography   • Coronary artery disease involving native coronary artery of native heart with angina pectoris (CMS/HCC)       PAST MEDICAL HISTORY  Past Medical History:   Diagnosis Date   • Cancer (CMS/HCC)     skin   • Cardiac pacemaker in situ    • Carpal tunnel syndrome    • Cataract    • Cystic fibroadenosis of breast    • GERD (gastroesophageal reflux disease)    • Hypercholesterolemia    • Hypertension    • Osteoarthritis of both hands    • Pancreatitis    • Peripheral neuropathy    • Restless leg syndrome    • Second degree AV block, Mobitz type I    • SVT (supraventricular tachycardia) (CMS/HCC)        SURGICAL HISTORY  Past Surgical History:   Procedure Laterality Date   • APPENDECTOMY     • BILATERAL OOPHORECTOMY Bilateral 1988   • BREAST CYST EXCISION Bilateral     4  BREAST SURGERIES   • CARDIAC CATHETERIZATION N/A 8/22/2019    Procedure: Left Heart Cath;  Surgeon: Francis Mccullough MD;  Location: [facility] CATH INVASIVE LOCATION;  Service: Cardiology   • CARDIAC CATHETERIZATION N/A 8/22/2019    Procedure: Left ventriculography;  Surgeon: Francis Mccullough MD;  Location: [facility] CATH INVASIVE LOCATION;  Service: Cardiology   • CARDIAC CATHETERIZATION N/A 8/22/2019    Procedure: Coronary angiography;  Surgeon: Francis Mccullough MD;  Location: [facility] CATH INVASIVE LOCATION;  Service: Cardiology   • CARDIAC ELECTROPHYSIOLOGY PROCEDURE N/A 10/10/2016    Procedure: Pacemaker DC new  [place];  Surgeon: Wilfrido Hameed MD;  Location: [facility] CATH INVASIVE LOCATION;  Service:    • CARPAL TUNNEL RELEASE Right    • CATARACT EXTRACTION      NOVEMBER AND DECEMBER 2014   • CHOLECYSTECTOMY     • COLONOSCOPY  2015   • CORONARY ARTERY BYPASS GRAFT N/A 8/23/2019    Procedure: SUMMER STERNOTOMY CORONARY ARTERY BYPASS GRAFT TIMES 6 USING LEFT INTERNAL MAMMARY ARTERY AND LEFT GREATER SAPHENOUS VEIN GRAFT PER ENDOSCOPIC VEIN HARVESTING AND PRP;  Surgeon: Kem Hawk MD;  Location: [facility] MAIN OR;  Service: Cardiothoracic   • ENDOSCOPY N/A 7/18/2019    Procedure: ESOPHAGOGASTRODUODENOSCOPY with biopsy;  Surgeon: Ricky Chew MD;  Location: [facility] ENDOSCOPY;  Service: Gastroenterology   • HYSTERECTOMY     • KNEE ARTHROSCOPY Bilateral 04/2014    MENISCAL TEAR   • PACEMAKER IMPLANTATION  10/10/2016   • SHOULDER SURGERY Right     ROTATOR CUFF SURGERY JUNE 18, 2015   • TONSILLECTOMY     • TRIGGER FINGER RELEASE      both hands       SOCIAL HISTORY  Social History     Socioeconomic History   • Marital status:      Spouse name: Rishabh*   • Number of children: 2   • Years of education: Not on file   • Highest education level: Not on file   Occupational History   • 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)   Tobacco Use   • Smoking status: Former Smoker     Packs/day: 0.50     Years: 15.00      Pack years: 7.50     Last attempt to quit: 1970     Years since quittin.7   • Smokeless tobacco: Never Used   • Tobacco comment: quit age 30   Substance and Sexual Activity   • Alcohol use: Yes     Alcohol/week: 1.2 oz     Types: 1 Glasses of wine, 1 Cans of beer per week     Frequency: Monthly or less     Drinks per session: 1 or 2     Comment: rare glass of wine or beer rarely   • Drug use: No   • Sexual activity: No   Lifestyle   • Physical activity:     Days per week: 5 days     Minutes per session: 30 min   • Stress: Not at all       FAMILY HISTORY  Family History   Problem Relation Age of Onset   • Thyroid disease Daughter    • Lung cancer Brother    • Hypertension Mother    • Aortic stenosis Mother    • Coronary artery disease Mother          78 (smoker)   • Hypertension Father    • Depression Father    • Anxiety disorder Father    • Diabetes Father    • Heart failure Father    • Cirrhosis Father         alcohol   • Alcohol abuse Sister    • Lupus Sister    • Heart disease Sister    • Diabetes type II Sister    • Alcohol abuse Brother    • Drug abuse Brother    • Heart disease Brother    • Cancer Brother         bladder (smoker)   • Heart disease Brother    • Breast cancer Paternal Aunt    • Neuropathy Neg Hx    • Colon cancer Neg Hx    • Dementia Neg Hx          **Dragon Disclaimer:   Much of this encounter note is an electronic transcription/translation of spoken language to printed text. The electronic translation of spoken language may permit erroneous, or at times, nonsensical words or phrases to be inadvertently transcribed. Although I have reviewed the note for such errors, some may still exist.       Template created by Arabella Li MD

## 2019-09-12 NOTE — ASSESSMENT & PLAN NOTE
Taking gabapentin 100 mg 5 caps daily. Continue.   Refill sent to pharmacy electronically.     Reviewed latest CURRY . Reviewed most recent UDS or ordered UDS. Patient is having medication refilled at expected intervals. Using medication appropriately without evidence of unusual increased use, abuse, or diverting.

## 2019-09-13 ENCOUNTER — TELEPHONE (OUTPATIENT)
Dept: CARDIOLOGY | Facility: CLINIC | Age: 79
End: 2019-09-13

## 2019-09-13 PROBLEM — R73.03 PREDIABETES: Chronic | Status: ACTIVE | Noted: 2019-06-10

## 2019-09-13 PROBLEM — R73.03 PREDIABETES: Chronic | Status: ACTIVE | Noted: 2019-09-13

## 2019-09-13 PROBLEM — R73.09 ELEVATED HEMOGLOBIN A1C: Chronic | Status: ACTIVE | Noted: 2019-06-10

## 2019-09-13 NOTE — TELEPHONE ENCOUNTER
Pt called because she forgot to ask one thing yesterday. She is wondering when she can stop using her oxygen at night?.........Celeste

## 2019-09-16 DIAGNOSIS — I25.10 CORONARY ARTERY DISEASE INVOLVING NATIVE CORONARY ARTERY OF NATIVE HEART WITHOUT ANGINA PECTORIS: Primary | ICD-10-CM

## 2019-09-17 ENCOUNTER — READMISSION MANAGEMENT (OUTPATIENT)
Dept: CALL CENTER | Facility: HOSPITAL | Age: 79
End: 2019-09-17

## 2019-09-17 NOTE — OUTREACH NOTE
CT Surgery Week 2 Survey      Responses   Facility patient discharged from?  [place]   Does the patient have one of the following disease processes/diagnoses(primary or secondary)?  Cardiothoracic surgery   Week 2 attempt successful?  No   Unsuccessful attempts  Attempt 1          Leesa Simpson RN

## 2019-09-18 ENCOUNTER — READMISSION MANAGEMENT (OUTPATIENT)
Dept: CALL CENTER | Facility: HOSPITAL | Age: 79
End: 2019-09-18

## 2019-09-18 ENCOUNTER — CLINICAL SUPPORT (OUTPATIENT)
Dept: CARDIAC SURGERY | Facility: CLINIC | Age: 79
End: 2019-09-18

## 2019-09-18 DIAGNOSIS — I10 ESSENTIAL (PRIMARY) HYPERTENSION: ICD-10-CM

## 2019-09-18 DIAGNOSIS — I49.5 SICK SINUS SYNDROME (HCC): ICD-10-CM

## 2019-09-18 DIAGNOSIS — I25.10 ATHEROSCLEROSIS OF NATIVE CORONARY ARTERY WITHOUT ANGINA PECTORIS, UNSPECIFIED WHETHER NATIVE OR TRANSPLANTED HEART: ICD-10-CM

## 2019-09-18 DIAGNOSIS — Z48.812 ENCOUNTER FOR SURGICAL AFTERCARE FOLLOWING SURGERY OF CIRCULATORY SYSTEM: Primary | ICD-10-CM

## 2019-09-18 PROCEDURE — G0180 MD CERTIFICATION HHA PATIENT: HCPCS | Performed by: THORACIC SURGERY (CARDIOTHORACIC VASCULAR SURGERY)

## 2019-09-18 NOTE — OUTREACH NOTE
CT Surgery Week 2 Survey      Responses   Facility patient discharged from?  [place]   Does the patient have one of the following disease processes/diagnoses(primary or secondary)?  Cardiothoracic surgery   Week 2 attempt successful?  No   Unsuccessful attempts  Attempt 2          Justin Mccoy RN

## 2019-09-19 ENCOUNTER — OFFICE VISIT (OUTPATIENT)
Dept: CARDIAC REHAB | Facility: HOSPITAL | Age: 79
End: 2019-09-19

## 2019-09-19 VITALS
HEART RATE: 85 BPM | WEIGHT: 144.2 LBS | BODY MASS INDEX: 22.63 KG/M2 | HEIGHT: 67 IN | OXYGEN SATURATION: 97 % | DIASTOLIC BLOOD PRESSURE: 70 MMHG | SYSTOLIC BLOOD PRESSURE: 138 MMHG

## 2019-09-19 DIAGNOSIS — Z95.1 S/P CORONARY ARTERY BYPASS GRAFT X 6: Primary | ICD-10-CM

## 2019-09-19 PROCEDURE — 93797 PHYS/QHP OP CAR RHAB WO ECG: CPT

## 2019-09-19 NOTE — PROGRESS NOTES
Cardiac Rehab Initial Assessment      Name: Reanna Higgins  :1940 Allergies:Ancef [cefazolin]; Codeine; Penicillins; and Sulfa antibiotics   MRN: [id number] 79 y.o. Physician: Ted Li MD   Primary Diagnosis:    Diagnosis Plan   1. S/P coronary artery bypass graft x 6      Event Date: 2019 Specialist: Jamel   Secondary Diagnosis:  Risk Stratification:Moderate Risk Note Author: Edwige Ramirez RN     Cardiovascular History: Pacemaker placed 10/2016 for SSS and tachy-lyn with syncope     EXERCISE AT HOME  No, but has done some short walks in house and has been to grocery        EF: 60%      Source: stress echo 2019          Ambulatory Status:Independent  Ambulatory Fall Risk Assessed on Initial Visit: yes 6 Minute Walk Pre- Cardiac Rehab:  Distance:1386ft      RPE:3  Max. HR: 111       SPO2:97-98    MET: 3  MPH: 2.6             RPD: 0  Resting BP: 138/70 LA, 138/70 RA    Peak BP: 150/70  Recovery BP: 140/70  Comments: Walked full 6 minutes without stopping.  Denies any problems with walk.  Normal gait and balance.       NUTRITION  Lipids:yes If yes, labs as follows;  Total: No components found for: CHOLESTEROL  HDL:   HDL Cholesterol   Date Value Ref Range Status   2019 37 (L) 40 - 60 mg/dL Final   2019 39 (L) 40 - 60 mg/dL Final    Lipids continued:  LDL:  LDL Cholesterol    Date Value Ref Range Status   2019 81 0 - 100 mg/dL Final   2019 78 0 - 100 mg/dL Final     Triglyceride: No components found for: TRIGLYCERIDE   Weight Management:                 Weight: 144.2 lb  Height: 67.25 in                                   BMI: Body mass index is 22.42 kg/m².  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)   Tobacco Use   • Smoking status: Former Smoker     Packs/day: 0.50     Years: 15.00     Pack years: 7.50     Last attempt to quit: 1970     Years since quittin.7   • Smokeless tobacco: Never Used   • Tobacco comment: quit age 30   Substance and Sexual Activity   • Alcohol use: Yes     Alcohol/week: 1.2 oz     Types: 1 Glasses of wine, 1 Cans of beer per week     Frequency: Monthly or less     Drinks per session: 1 or 2     Comment: rare glass of wine or beer rarely/  No caffeine use   • Drug use: No   • Sexual activity: No   Lifestyle   • Physical activity:     Days per week: 5 days     Minutes per session: 30 min   • Stress: Not at all       Educational Level (choose one that applies) some college Learning Barriers:Ready to Learn    Family Support:yes    Living Arrangement: lives with their spouse, has grown son and daughter    Risk Factors: Stress  No, Clinical Depression  No, Heredity  Yes If Yes mother  in sleep from carotid blockage.  Her younger brothers have had heart issues, Hyperlipidemia  Yes, Exercise prior to event  Yes If Yes: Activity roller skating two times per week for 90 minutes and stationary bike for 27 minutes 5 days per week, Minutes per day 27-90, Days per week 7 and Obesity  No    Not diabetic Tobacco Adjunct: No  Quit at age 28      Comorbidities: Hypothyroidism, GERD.  Had a \"pancreatitis attack\" in July and doctors that she had some type of vascular blockage.  She was referred to Dr. Velazco, but he could not find anything wrong.  Bilateral neuropathy in both feet.      PSYCHOSOCIAL  Clinical Depression: no    Stress: no     Assess presence or absence of depression using a valid screening tool: yes      PHYSICAL ASSESSMENT  Influenza vaccine: yes  Pneumococcal vaccine: yes          Angina: no    Describe angina scale of 0 - 4: 0 = " "none    Today are you having incisional pain? No. If, Yes, Scale:     Just \"sore\" in chest area    Today are you having any other pain? No. If, Yes, Scale:      Diagnosed with Hypertension:yes    Heart Sounds: S1 S2 regular     Lung Sounds: normal air entry, lungs clear to auscultation         Assessment: Very pleasant lady.  Alert and appropriate to discussion.  Seems very energetic even though pt says she has been fatigued since surgery.  Orthopedic Problems: Bilateral repair of bilateral meniscus and repair of right shoulder rotator cuff    Are you being hurt, hit, or frightened by anyone at home or in your life? no    Are you being neglected by a caregiver? N/A Shoulder flexibility/Range of motion: Average     Recommended arm activity: None until surgical limitations are lifted    Chair sit and reach within: 4 inches   Leg flexibility: Average    Leg Strength/Balance/Five times sit to stand: 9 seconds.     Chose one: Average    Recommended stretching: Standing    Assessment: Mid sternal chest incision healed.  Small incisions under breasts are also healed.  Has a scabbed area on inner aspect of left knee.  Pt says if she touches her lower left leg it feels sore.      Family attends IA: no,  came but did not sit in on interview. Time of arrival: 0920  Time of departure: 1020     Patient Goals: MET 5-6  Return to roller skating for 90 minutes twice a week.  Return to using home stationary bike for 25-27 minutes (5 miles) 5 days per week.  Return to all yard work and house work.           9/19/2019  9:39 AM  Edwige Ramirez RN  "

## 2019-09-20 ENCOUNTER — READMISSION MANAGEMENT (OUTPATIENT)
Dept: CALL CENTER | Facility: HOSPITAL | Age: 79
End: 2019-09-20

## 2019-09-20 NOTE — OUTREACH NOTE
CT Surgery Week 3 Survey      Responses   Facility patient discharged from?  [place]   Does the patient have one of the following disease processes/diagnoses(primary or secondary)?  Cardiothoracic surgery   Week 3 attempt successful?  Yes   Call start time  1151   Call end time  1158   Discharge diagnosis  CAD, s/p CABG x 6 with LIMA and LGSVG per [facility] and [facility], chest pain, SSS, abnormal dobutamine stress echo, mesenteric ischemia d/t arterial insufficiency, essential HTN   Meds reviewed with patient/caregiver?  Yes   Is the patient taking all medications as directed (includes completed medication regime)?  Yes   Has the patient kept scheduled appointments due by today?  Yes   What is the Home health agency?   HH no longer coming.    DME comments  no longer wearing O2.    Psychosocial issues?  No   Comments  Incisions healed without issues per pt. Pt reports doing daily wts and had no gains. No longer wearing home O2, denies SOA. Denies pain. Appetite has returned.    What is the patient's perception of their health status since discharge?  Improving   Is the patient /caregiver able to teach back basic post-op care?  Continue use of incentive spirometry at least 1 week post discharge, Practice 'cough and deep breath', Keep incision areas clean, dry and protected   Is the patient/caregiver able to teach back signs and symptoms of incisional infection?  Pus or odor from incision, Incisional warmth, Increased drainage or bleeding   Is the patient/caregiver able to teach back steps to recovery at home?  Set small, achievable goals for return to baseline health, Weigh daily, Eat a well-balance diet   Is the patient /caregiver able to teach back the importance of cardiac rehab?  Yes   Is the patient/caregiver able to teach back the hierarchy of who to call/visit for symptoms/problems? PCP, Specialist, Home health nurse, Urgent Care, ED, 911  Yes   Week 3 call completed?  Yes   Revoked  No further  contact(revokes)-requires comment   Graduated/Revoked comments  Patient states that she is doing well.  All goals met.          Rocio Miguel RN

## 2019-09-23 ENCOUNTER — TELEPHONE (OUTPATIENT)
Dept: CARDIOLOGY | Facility: CLINIC | Age: 79
End: 2019-09-23

## 2019-09-23 ENCOUNTER — CLINICAL SUPPORT NO REQUIREMENTS (OUTPATIENT)
Dept: CARDIOLOGY | Facility: CLINIC | Age: 79
End: 2019-09-23

## 2019-09-23 DIAGNOSIS — I49.5 SICK SINUS SYNDROME (HCC): Primary | ICD-10-CM

## 2019-09-23 PROCEDURE — 93294 REM INTERROG EVL PM/LDLS PM: CPT | Performed by: INTERNAL MEDICINE

## 2019-09-23 PROCEDURE — 93296 REM INTERROG EVL PM/IDS: CPT | Performed by: INTERNAL MEDICINE

## 2019-09-23 NOTE — TELEPHONE ENCOUNTER
A remote tx. Was received on 9/23/19 and patient has had a fib episodes . The longest recorded was on 8/31/19 6 hr. Rhythm is  As/ vs today.

## 2019-09-25 ENCOUNTER — TREATMENT (OUTPATIENT)
Dept: CARDIAC REHAB | Facility: HOSPITAL | Age: 79
End: 2019-09-25

## 2019-09-25 DIAGNOSIS — Z95.1 S/P CORONARY ARTERY BYPASS GRAFT X 6: Primary | ICD-10-CM

## 2019-09-25 PROCEDURE — 93798 PHYS/QHP OP CAR RHAB W/ECG: CPT

## 2019-09-26 RX ORDER — FUROSEMIDE 40 MG/1
40 TABLET ORAL DAILY
Qty: 29 TABLET | Refills: 0 | OUTPATIENT
Start: 2019-09-26 | End: 2019-10-25

## 2019-09-27 ENCOUNTER — TREATMENT (OUTPATIENT)
Dept: CARDIAC REHAB | Facility: HOSPITAL | Age: 79
End: 2019-09-27

## 2019-09-27 DIAGNOSIS — Z95.1 S/P CORONARY ARTERY BYPASS GRAFT X 6: Primary | ICD-10-CM

## 2019-09-27 PROCEDURE — 93798 PHYS/QHP OP CAR RHAB W/ECG: CPT

## 2019-09-27 RX ORDER — POTASSIUM CHLORIDE 750 MG/1
20 CAPSULE, EXTENDED RELEASE ORAL 2 TIMES DAILY WITH MEALS
Qty: 120 CAPSULE | Refills: 0 | Status: SHIPPED | OUTPATIENT
Start: 2019-09-27 | End: 2019-10-27

## 2019-09-28 ENCOUNTER — HOSPITAL ENCOUNTER (EMERGENCY)
Facility: HOSPITAL | Age: 79
Discharge: HOME | End: 2019-09-28
Attending: EMERGENCY MEDICINE | Admitting: EMERGENCY MEDICINE

## 2019-09-28 ENCOUNTER — TELEPHONE (OUTPATIENT)
Dept: CARDIOLOGY | Facility: CLINIC | Age: 79
End: 2019-09-28

## 2019-09-28 ENCOUNTER — APPOINTMENT (OUTPATIENT)
Dept: GENERAL RADIOLOGY | Facility: HOSPITAL | Age: 79
End: 2019-09-28

## 2019-09-28 ENCOUNTER — APPOINTMENT (OUTPATIENT)
Dept: CT IMAGING | Facility: HOSPITAL | Age: 79
End: 2019-09-28

## 2019-09-28 VITALS
BODY MASS INDEX: 22.6 KG/M2 | HEIGHT: 67 IN | TEMPERATURE: 97.9 F | OXYGEN SATURATION: 97 % | HEART RATE: 70 BPM | WEIGHT: 144 LBS | SYSTOLIC BLOOD PRESSURE: 125 MMHG | RESPIRATION RATE: 16 BRPM | DIASTOLIC BLOOD PRESSURE: 63 MMHG

## 2019-09-28 DIAGNOSIS — K85.00 IDIOPATHIC ACUTE PANCREATITIS, UNSPECIFIED COMPLICATION STATUS: ICD-10-CM

## 2019-09-28 DIAGNOSIS — R10.13 EPIGASTRIC ABDOMINAL PAIN: Primary | ICD-10-CM

## 2019-09-28 LAB
ALBUMIN SERPL-MCNC: 4.3 G/DL (ref 3.5–5.2)
ALBUMIN/GLOB SERPL: 1.3 G/DL
ALP SERPL-CCNC: 89 U/L (ref 39–117)
ALT SERPL W P-5'-P-CCNC: 22 U/L (ref 1–33)
ANION GAP SERPL CALCULATED.3IONS-SCNC: 14.3 MMOL/L (ref 5–15)
AST SERPL-CCNC: 49 U/L (ref 1–32)
BASOPHILS # BLD AUTO: 0.03 10*3/MM3 (ref 0–0.2)
BASOPHILS NFR BLD AUTO: 0.3 % (ref 0–1.5)
BILIRUB SERPL-MCNC: 0.3 MG/DL (ref 0.2–1.2)
BUN BLD-MCNC: 16 MG/DL (ref 8–23)
BUN/CREAT SERPL: 20.8 (ref 7–25)
CALCIUM SPEC-SCNC: 9.4 MG/DL (ref 8.6–10.5)
CHLORIDE SERPL-SCNC: 100 MMOL/L (ref 98–107)
CO2 SERPL-SCNC: 28.7 MMOL/L (ref 22–29)
CREAT BLD-MCNC: 0.77 MG/DL (ref 0.57–1)
DEPRECATED RDW RBC AUTO: 41.2 FL (ref 37–54)
EOSINOPHIL # BLD AUTO: 0.17 10*3/MM3 (ref 0–0.4)
EOSINOPHIL NFR BLD AUTO: 1.9 % (ref 0.3–6.2)
ERYTHROCYTE [DISTWIDTH] IN BLOOD BY AUTOMATED COUNT: 12.5 % (ref 12.3–15.4)
GFR SERPL CREATININE-BSD FRML MDRD: 72 ML/MIN/1.73
GLOBULIN UR ELPH-MCNC: 3.2 GM/DL
GLUCOSE BLD-MCNC: 138 MG/DL (ref 65–99)
HCT VFR BLD AUTO: 38.7 % (ref 34–46.6)
HGB BLD-MCNC: 12.3 G/DL (ref 12–15.9)
IMM GRANULOCYTES # BLD AUTO: 0.02 10*3/MM3 (ref 0–0.05)
IMM GRANULOCYTES NFR BLD AUTO: 0.2 % (ref 0–0.5)
LIPASE SERPL-CCNC: 1367 U/L (ref 13–60)
LYMPHOCYTES # BLD AUTO: 1.45 10*3/MM3 (ref 0.7–3.1)
LYMPHOCYTES NFR BLD AUTO: 16.3 % (ref 19.6–45.3)
MCH RBC QN AUTO: 28.6 PG (ref 26.6–33)
MCHC RBC AUTO-ENTMCNC: 31.8 G/DL (ref 31.5–35.7)
MCV RBC AUTO: 90 FL (ref 79–97)
MONOCYTES # BLD AUTO: 0.43 10*3/MM3 (ref 0.1–0.9)
MONOCYTES NFR BLD AUTO: 4.8 % (ref 5–12)
NEUTROPHILS # BLD AUTO: 6.81 10*3/MM3 (ref 1.7–7)
NEUTROPHILS NFR BLD AUTO: 76.5 % (ref 42.7–76)
NRBC BLD AUTO-RTO: 0 /100 WBC (ref 0–0.2)
PLATELET # BLD AUTO: 201 10*3/MM3 (ref 140–450)
PMV BLD AUTO: 12.1 FL (ref 6–12)
POTASSIUM BLD-SCNC: 4 MMOL/L (ref 3.5–5.2)
PROT SERPL-MCNC: 7.5 G/DL (ref 6–8.5)
RBC # BLD AUTO: 4.3 10*6/MM3 (ref 3.77–5.28)
SODIUM BLD-SCNC: 143 MMOL/L (ref 136–145)
TROPONIN T SERPL-MCNC: <0.01 NG/ML (ref 0–0.03)
WBC NRBC COR # BLD: 8.91 10*3/MM3 (ref 3.4–10.8)

## 2019-09-28 PROCEDURE — 74177 CT ABD & PELVIS W/CONTRAST: CPT

## 2019-09-28 PROCEDURE — 96374 THER/PROPH/DIAG INJ IV PUSH: CPT

## 2019-09-28 PROCEDURE — 99283 EMERGENCY DEPT VISIT LOW MDM: CPT

## 2019-09-28 PROCEDURE — 93005 ELECTROCARDIOGRAM TRACING: CPT | Performed by: EMERGENCY MEDICINE

## 2019-09-28 PROCEDURE — 83690 ASSAY OF LIPASE: CPT | Performed by: EMERGENCY MEDICINE

## 2019-09-28 PROCEDURE — 84484 ASSAY OF TROPONIN QUANT: CPT | Performed by: EMERGENCY MEDICINE

## 2019-09-28 PROCEDURE — 93010 ELECTROCARDIOGRAM REPORT: CPT | Performed by: INTERNAL MEDICINE

## 2019-09-28 PROCEDURE — 99284 EMERGENCY DEPT VISIT MOD MDM: CPT

## 2019-09-28 PROCEDURE — 71046 X-RAY EXAM CHEST 2 VIEWS: CPT

## 2019-09-28 PROCEDURE — 85025 COMPLETE CBC W/AUTO DIFF WBC: CPT | Performed by: EMERGENCY MEDICINE

## 2019-09-28 PROCEDURE — 80053 COMPREHEN METABOLIC PANEL: CPT | Performed by: EMERGENCY MEDICINE

## 2019-09-28 PROCEDURE — 25010000002 IOPAMIDOL 61 % SOLUTION: Performed by: EMERGENCY MEDICINE

## 2019-09-28 RX ORDER — HYDROCODONE BITARTRATE AND ACETAMINOPHEN 5; 325 MG/1; MG/1
1 TABLET ORAL EVERY 6 HOURS PRN
Qty: 10 TABLET | Refills: 0 | Status: SHIPPED | OUTPATIENT
Start: 2019-09-28 | End: 2019-12-12

## 2019-09-28 RX ORDER — SODIUM CHLORIDE 0.9 % (FLUSH) 0.9 %
10 SYRINGE (ML) INJECTION AS NEEDED
Status: DISCONTINUED | OUTPATIENT
Start: 2019-09-28 | End: 2019-09-28 | Stop reason: HOSPADM

## 2019-09-28 RX ADMIN — IOPAMIDOL 85 ML: 612 INJECTION, SOLUTION INTRAVENOUS at 14:36

## 2019-09-28 NOTE — TELEPHONE ENCOUNTER
"Patient called reporting \"pancreatitis attack\" that felt like she was dying in pain and felt like the one she had in July that led to them finding multiple blockages in her heart that required CABG. She called EMS who came out and did an EKG, but she refused transport to the hospital for blood work. She called for advice on what to do next.   Informed her to come to the ER to rule out and cardiac issues. She said that she was on her way.   "

## 2019-09-30 ENCOUNTER — TELEPHONE (OUTPATIENT)
Dept: INTERNAL MEDICINE | Age: 79
End: 2019-09-30

## 2019-09-30 ENCOUNTER — TREATMENT (OUTPATIENT)
Dept: CARDIAC REHAB | Facility: HOSPITAL | Age: 79
End: 2019-09-30

## 2019-09-30 DIAGNOSIS — Z95.1 S/P CORONARY ARTERY BYPASS GRAFT X 6: Primary | ICD-10-CM

## 2019-09-30 PROCEDURE — 93798 PHYS/QHP OP CAR RHAB W/ECG: CPT

## 2019-09-30 NOTE — TELEPHONE ENCOUNTER
Regarding: Non-Urgent Medical Question  Contact: [phone]  ----- Message from [employer], Generic sent at 9/28/2019 11:18 PM EDT -----    Sat. Sept. 28th-----had another pancreatic attack, at home, called 911, they came did EKG, blood pressure, and advised me to go to ER------called Dr. Mccullough's office and they too said to go to ER. Arrived ER 12:00---4:00----after testing it was decided that it was a pancreatic attack, and that I should be in touch with Dr. Chew, who is already my doctor------I chose not to spend the night in the hospital. Will be in touch with Dr. Chew, on Monday.    Reanna Higgins 1940   [phone]

## 2019-10-01 ENCOUNTER — OFFICE VISIT (OUTPATIENT)
Dept: GASTROENTEROLOGY | Facility: CLINIC | Age: 79
End: 2019-10-01

## 2019-10-01 VITALS
SYSTOLIC BLOOD PRESSURE: 116 MMHG | HEIGHT: 67 IN | DIASTOLIC BLOOD PRESSURE: 63 MMHG | WEIGHT: 143 LBS | BODY MASS INDEX: 22.44 KG/M2 | HEART RATE: 84 BPM

## 2019-10-01 DIAGNOSIS — K85.00 IDIOPATHIC ACUTE PANCREATITIS WITHOUT INFECTION OR NECROSIS: Primary | ICD-10-CM

## 2019-10-01 PROCEDURE — 99213 OFFICE O/P EST LOW 20 MIN: CPT | Performed by: INTERNAL MEDICINE

## 2019-10-01 RX ORDER — POTASSIUM CHLORIDE 750 MG/1
20 CAPSULE, EXTENDED RELEASE ORAL 2 TIMES DAILY WITH MEALS
Qty: 120 CAPSULE | Refills: 0 | OUTPATIENT
Start: 2019-10-01 | End: 2019-10-31

## 2019-10-01 NOTE — PROGRESS NOTES
Subjective   Reanna Higgins is a 79 y.o.. female is here today for follow-up.    Chief Complaint   Patient presents with   • Pancreatitis     Hosp FU   • Heartburn     Indigestion   • Constipation     History of Present Illness  Patient was in the emergency room this weekend again with an attack of epigastric pain.  Happened out of the blue.  She reported to [facility], [place] and her evaluation included serum lipase which was 1700.  Liver chemistries were normal.  CT scan of the abdomen demonstrated pancreatic duct of 2.8 mm and no other lesion.  She is feeling better now but is very hesitant to eat anything that is not extremely bland.  By way of review she was hospitalized fairly recently with another such attack which was reviewed his idiopathic.  He has had CT scans x2 for evaluation.  She has a cardiac pacemaker so MRCP is contraindicated.  She also had a 5 vessel bypass 4 weeks ago.  She seems to have done well with that.    The following portions of the patient's history were reviewed and updated as appropriate: allergies, current medications, past family history, past medical history, past social history, past surgical history and problem list.      Current Outpatient Medications:   •  aspirin 81 MG EC tablet, Take 1 tablet by mouth Daily., Disp: , Rfl:   •  cholecalciferol (VITAMIN D3) 1000 UNITS tablet, Take 1 tablet by mouth Daily., Disp: , Rfl:   •  cycloSPORINE (RESTASIS) 0.05 % ophthalmic emulsion, Administer 1 drop to both eyes 2 (Two) Times a Day., Disp: , Rfl:   •  furosemide (LASIX) 40 MG tablet, Take 1 tablet by mouth Daily for 29 days., Disp: 29 tablet, Rfl: 0  •  gabapentin (NEURONTIN) 100 MG capsule, Take 2 capsules by mouth 3 (Three) Times a Day., Disp: 180 capsule, Rfl: 0  •  levothyroxine (SYNTHROID, LEVOTHROID) 25 MCG tablet, TAKE 1 TABLET BY MOUTH EVERY DAY, Disp: 90 tablet, Rfl: 1  •  LORazepam (ATIVAN) 0.5 MG tablet, One tablet 30 minutes before plane flight, may repeat times 1 in 4-6 hours.,  Disp: 8 tablet, Rfl: 0  •  metoprolol succinate XL (TOPROL-XL) 50 MG 24 hr tablet, Take 50 mg by mouth 2 (Two) Times a Day., Disp: , Rfl:   •  Multiple Vitamins-Minerals (CENTRUM SILVER 50+WOMEN) tablet, Take 1 tablet by mouth Daily., Disp: , Rfl:   •  omeprazole (priLOSEC) 40 MG capsule, Take 1 capsule by mouth Daily., Disp: 90 capsule, Rfl: 1  •  potassium chloride (MICRO-K) 10 MEQ CR capsule, TAKE 2 CAPSULES BY MOUTH 2 (TWO) TIMES A DAY WITH MEALS FOR 30 DAYS., Disp: 120 capsule, Rfl: 0  •  pravastatin (PRAVACHOL) 40 MG tablet, Take 1 tablet by mouth Every Night., Disp: 90 tablet, Rfl: 3  •  vitamin B-12 (CYANOCOBALAMIN) 1000 MCG tablet, Take 1,000 mcg by mouth Daily., Disp: , Rfl:   •  HYDROcodone-acetaminophen (NORCO) 5-325 MG per tablet, Take 1 tablet by mouth Every 6 (Six) Hours As Needed for Mild Pain ., Disp: 10 tablet, Rfl: 0    Family History   Problem Relation Age of Onset   • Thyroid disease Daughter    • Lung cancer Brother    • Hypertension Mother    • Aortic stenosis Mother    • Coronary artery disease Mother          78 (smoker)   • Hypertension Father    • Depression Father    • Anxiety disorder Father    • Diabetes Father    • Heart failure Father    • Cirrhosis Father         alcohol   • Alcohol abuse Sister    • Lupus Sister    • Heart disease Sister    • Diabetes type II Sister    • Alcohol abuse Brother    • Drug abuse Brother    • Heart disease Brother    • Cancer Brother         bladder (smoker)   • Heart disease Brother    • Breast cancer Paternal Aunt    • Neuropathy Neg Hx    • Colon cancer Neg Hx    • Dementia Neg Hx        Review of Systems   Respiratory: Negative for shortness of breath.    Cardiovascular: Negative for chest pain.   All other systems reviewed and are negative.      Objective   Physical Exam   Constitutional: She is oriented to person, place, and time. She appears well-developed and well-nourished.   HENT:   Head: Normocephalic and atraumatic.   Right Ear: External  ear normal.   Left Ear: External ear normal.   Eyes: Conjunctivae and EOM are normal. Pupils are equal, round, and reactive to light.   Pulmonary/Chest: Effort normal.   Abdominal: Soft. Bowel sounds are normal. She exhibits no distension.   Neurological: She is alert and oriented to person, place, and time.   Psychiatric: She has a normal mood and affect. Her behavior is normal. Judgment and thought content normal.   Nursing note and vitals reviewed.      Pertinent laboratory results were reviewed. , Pertinent old records were reviewed.  and Pertinent radiology results were reviewed.     Assessment/Plan   Problems Addressed this Visit        Digestive    Acute pancreatitis without infection or necrosis - Primary        He has had 2 attacks of severe pain which are associated with transient but very high elevations of the lipase.  Her chemistries have been normal.  Triglycerides are unremarkable.  CT scans have not demonstrated any traumatic pathology of the pancreas.  She is status post cholecystectomy.  Etiology of these attacks is unclear.  Because she is not a candidate for MRCP, I would like for her to undergo or at least be considered for endoscopic ultrasound and we will try to arrange this as quickly as possible.

## 2019-10-03 ENCOUNTER — TELEPHONE (OUTPATIENT)
Dept: INTERNAL MEDICINE | Age: 79
End: 2019-10-03

## 2019-10-03 NOTE — TELEPHONE ENCOUNTER
"Regarding: Non-Urgent Medical Question  Contact: [phone]  ----- Message from My[employer], Generic sent at 10/3/2019  1:59 PM EDT -----    I visited Dr. Ricky Chew, Tuesday, Oct. 1------and he is requesting  a visit to a Dr. Wm. Shields, who has the proper machine to test close up look at pancreas, it is Dr. Chew's feeling, that \"one case of a pancreatic attack, is one thing, but when you have the second one, something needs to be checked further into why it has occurred.  If you are okay with this, I really did not see a real reason for me making an appointment to see you, Dr. Li at this time, as the ER, stated in the papers they sent home with me, from Sept. 28th, 2019.    Sincerely, Reanna Higgins  1940  "

## 2019-10-04 ENCOUNTER — TREATMENT (OUTPATIENT)
Dept: CARDIAC REHAB | Facility: HOSPITAL | Age: 79
End: 2019-10-04

## 2019-10-04 DIAGNOSIS — Z95.1 S/P CORONARY ARTERY BYPASS GRAFT X 6: Primary | ICD-10-CM

## 2019-10-04 PROCEDURE — 93798 PHYS/QHP OP CAR RHAB W/ECG: CPT

## 2019-10-04 NOTE — TELEPHONE ENCOUNTER
Send as [employer] Message:    Reanna, I also think that is a good idea. Please let me know if you need something from us.   Otherwise, I will see you on your 12/12/19 appointment.     Dr. Li

## 2019-10-07 ENCOUNTER — TREATMENT (OUTPATIENT)
Dept: CARDIAC REHAB | Facility: HOSPITAL | Age: 79
End: 2019-10-07

## 2019-10-07 DIAGNOSIS — Z95.1 S/P CORONARY ARTERY BYPASS GRAFT X 6: Primary | ICD-10-CM

## 2019-10-07 PROCEDURE — 93798 PHYS/QHP OP CAR RHAB W/ECG: CPT

## 2019-10-10 ENCOUNTER — OFFICE VISIT (OUTPATIENT)
Dept: CARDIAC SURGERY | Facility: CLINIC | Age: 79
End: 2019-10-10

## 2019-10-10 VITALS
OXYGEN SATURATION: 99 % | HEIGHT: 67 IN | SYSTOLIC BLOOD PRESSURE: 135 MMHG | BODY MASS INDEX: 22.76 KG/M2 | HEART RATE: 67 BPM | WEIGHT: 145 LBS | DIASTOLIC BLOOD PRESSURE: 73 MMHG

## 2019-10-10 DIAGNOSIS — Z95.1 S/P CABG (CORONARY ARTERY BYPASS GRAFT): ICD-10-CM

## 2019-10-10 PROCEDURE — 99024 POSTOP FOLLOW-UP VISIT: CPT | Performed by: NURSE PRACTITIONER

## 2019-10-10 NOTE — PROGRESS NOTES
"CARDIOVASCULAR SURGERY FOLLOW-UP PROGRESS NOTE  Chief Complaint: post op        HPI:   Dear Ted Hayes MD and colleagues:    It was nice to see Reanna Higgins in follow up 5  after surgery.  As you know, she is a 79 y.o. female with CAD who underwent CABGx6. She did well postoperatively and continues to do well. She comes in today complaining of nothing.  Her activity level has been good.       Physical Exam:         /73   Pulse 67   Ht 170.2 cm (67\")   Wt 65.8 kg (145 lb)   SpO2 99%   BMI 22.71 kg/m²   Heart:  regular rate and rhythm  Lungs:  clear to auscultation bilaterally  Extremities:  no edema  Incision(s):  sternum stable    Assessment/Plan:     S/P CABG. Overall, she is doing well.    Post op ileus    OK to drive if not taking narcotic pain medicine  OK to begin cardiac rehab  Follow-up as scheduled with cardiology  Follow-up as scheduled with PCP      Thank you for allowing me to participate in the care of your   patient.  Regards,  LUIS A Malin      "

## 2019-10-11 ENCOUNTER — TELEPHONE (OUTPATIENT)
Dept: GASTROENTEROLOGY | Facility: CLINIC | Age: 79
End: 2019-10-11

## 2019-10-11 ENCOUNTER — OFFICE VISIT (OUTPATIENT)
Dept: CARDIOLOGY | Facility: CLINIC | Age: 79
End: 2019-10-11

## 2019-10-11 ENCOUNTER — TREATMENT (OUTPATIENT)
Dept: CARDIAC REHAB | Facility: HOSPITAL | Age: 79
End: 2019-10-11

## 2019-10-11 ENCOUNTER — CLINICAL SUPPORT NO REQUIREMENTS (OUTPATIENT)
Dept: CARDIOLOGY | Facility: CLINIC | Age: 79
End: 2019-10-11

## 2019-10-11 VITALS
DIASTOLIC BLOOD PRESSURE: 60 MMHG | HEIGHT: 67 IN | WEIGHT: 149 LBS | BODY MASS INDEX: 23.39 KG/M2 | HEART RATE: 66 BPM | SYSTOLIC BLOOD PRESSURE: 118 MMHG

## 2019-10-11 DIAGNOSIS — Z95.1 S/P CORONARY ARTERY BYPASS GRAFT X 6: Primary | ICD-10-CM

## 2019-10-11 DIAGNOSIS — I49.5 SICK SINUS SYNDROME (HCC): Primary | ICD-10-CM

## 2019-10-11 DIAGNOSIS — Z95.1 S/P CABG (CORONARY ARTERY BYPASS GRAFT): ICD-10-CM

## 2019-10-11 DIAGNOSIS — I25.119 CORONARY ARTERY DISEASE INVOLVING NATIVE CORONARY ARTERY OF NATIVE HEART WITH ANGINA PECTORIS (HCC): Primary | Chronic | ICD-10-CM

## 2019-10-11 PROCEDURE — 93280 PM DEVICE PROGR EVAL DUAL: CPT | Performed by: INTERNAL MEDICINE

## 2019-10-11 PROCEDURE — 99214 OFFICE O/P EST MOD 30 MIN: CPT | Performed by: INTERNAL MEDICINE

## 2019-10-11 PROCEDURE — 93000 ELECTROCARDIOGRAM COMPLETE: CPT | Performed by: INTERNAL MEDICINE

## 2019-10-11 PROCEDURE — 93798 PHYS/QHP OP CAR RHAB W/ECG: CPT

## 2019-10-11 RX ORDER — METOPROLOL SUCCINATE 50 MG/1
50 TABLET, EXTENDED RELEASE ORAL DAILY
Status: SHIPPED
Start: 2019-10-11 | End: 2019-12-12

## 2019-10-11 NOTE — PROGRESS NOTES
Date of Office Visit: 10/11/19  Encounter Provider: Francis Mccullough MD  Place of Service: [facility] CARDIOLOGY  Patient Name: Reanna Higgins  :[id number]  [id number]    Chief Complaint   Patient presents with   • Follow-up     from [facility]   • Coronary Artery Disease   :     HPI: Reanna Higgins is a 79 y.o. 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    Past Medical History:   Diagnosis Date   • Cancer (CMS/HCC)     skin   • Cardiac pacemaker in situ    • Carpal tunnel syndrome    • Cataract    • Cystic fibroadenosis of breast    • GERD (gastroesophageal reflux disease)    • Hypercholesterolemia    • Hypertension    • Osteoarthritis of both hands    • Pancreatitis    • Peripheral neuropathy    • Restless leg syndrome    • Second degree AV block, Mobitz type I    • SVT (supraventricular tachycardia) (CMS/HCC)        Past Surgical History:   Procedure Laterality Date   • APPENDECTOMY     • BILATERAL OOPHORECTOMY Bilateral    • BREAST CYST EXCISION Bilateral     4 BREAST SURGERIES   • CARDIAC CATHETERIZATION N/A 2019    Procedure: Left Heart Cath;  Surgeon: Francis Mccullough MD;  Location: [facility] CATH INVASIVE LOCATION;  Service: Cardiology   • CARDIAC CATHETERIZATION N/A 2019    Procedure: Left ventriculography;  Surgeon: Francis Mccullough MD;  Location: [facility] CATH INVASIVE LOCATION;  Service: Cardiology   •  CARDIAC CATHETERIZATION N/A 2019    Procedure: Coronary angiography;  Surgeon: Francis Mccullough MD;  Location: [facility] CATH INVASIVE LOCATION;  Service: Cardiology   • CARDIAC ELECTROPHYSIOLOGY PROCEDURE N/A 10/10/2016    Procedure: Pacemaker DC new  [place];  Surgeon: Wilfrido Hameed MD;  Location: [facility] CATH INVASIVE LOCATION;  Service:    • CARPAL TUNNEL RELEASE Right    • CATARACT EXTRACTION      NOVEMBER AND 2014   • CHOLECYSTECTOMY     • COLONOSCOPY     • CORONARY ARTERY BYPASS GRAFT N/A 2019    Procedure: SUMMER STERNOTOMY CORONARY ARTERY BYPASS GRAFT TIMES 6 USING LEFT INTERNAL MAMMARY ARTERY AND LEFT GREATER SAPHENOUS VEIN GRAFT PER ENDOSCOPIC VEIN HARVESTING AND PRP;  Surgeon: Kem Hawk MD;  Location: [facility] MAIN OR;  Service: Cardiothoracic   • ENDOSCOPY N/A 2019    Procedure: ESOPHAGOGASTRODUODENOSCOPY with biopsy;  Surgeon: Ricky Chew MD;  Location: [facility] ENDOSCOPY;  Service: Gastroenterology   • HYSTERECTOMY     • KNEE ARTHROSCOPY Bilateral 2014    MENISCAL TEAR   • PACEMAKER IMPLANTATION  10/10/2016   • SHOULDER SURGERY Right     ROTATOR CUFF SURGERY 2015   • TONSILLECTOMY     • TRIGGER FINGER RELEASE      both hands       Social History     Socioeconomic History   • Marital status:      Spouse name: Rishabh*   • Number of children: 2   • Years of education: 13   • Highest education level: Some college, no degree   Occupational History   • 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)   Tobacco Use   • Smoking status: Former Smoker     Packs/day: 0.50     Years: 14.00     Pack years: 7.00     Start date:      Last attempt to quit:      Years since quittin.8   • Smokeless tobacco: Never Used   • Tobacco comment: quit age 28   Substance and Sexual Activity   • Alcohol use: Yes     Alcohol/week: 1.2 oz     Types: 1 Glasses of wine, 1 Cans of beer per week     Frequency: Monthly or less     Drinks per session: 1 or 2     Comment:  rare glass of wine or beer rarely/  No caffeine use   • Drug use: No   • Sexual activity: No   Lifestyle   • Physical activity:     Days per week: 5 days     Minutes per session: 30 min   • Stress: Not at all       Family History   Problem Relation Age of Onset   • Thyroid disease Daughter    • Lung cancer Brother    • Hypertension Mother    • Aortic stenosis Mother    • Coronary artery disease Mother          78 (smoker)   • Hypertension Father    • Depression Father    • Anxiety disorder Father    • Diabetes Father    • Heart failure Father    • Cirrhosis Father         alcohol   • Alcohol abuse Sister    • Lupus Sister    • Heart disease Sister    • Diabetes type II Sister    • Alcohol abuse Brother    • Drug abuse Brother    • Heart disease Brother    • Cancer Brother         bladder (smoker)   • Heart disease Brother    • Breast cancer Paternal Aunt    • Neuropathy Neg Hx    • Colon cancer Neg Hx    • Dementia Neg Hx        Review of Systems   Constitution: Negative for decreased appetite, fever, malaise/fatigue and weight loss.   HENT: Negative for nosebleeds.    Eyes: Negative for double vision.   Cardiovascular: Negative for chest pain, claudication, cyanosis, dyspnea on exertion, irregular heartbeat, leg swelling, near-syncope, orthopnea, palpitations, paroxysmal nocturnal dyspnea and syncope.   Respiratory: Negative for cough, hemoptysis and shortness of breath.    Hematologic/Lymphatic: Negative for bleeding problem.   Skin: Negative for rash.   Musculoskeletal: Negative for falls and myalgias.   Gastrointestinal: Negative for hematochezia, jaundice, melena, nausea and vomiting.   Genitourinary: Negative for hematuria.   Neurological: Negative for dizziness and seizures.   Psychiatric/Behavioral: Negative for altered mental status and memory loss.       Allergies   Allergen Reactions   • Ancef [Cefazolin] Other (See Comments)     Hypotension/bradycardia   • Codeine Hives   • Penicillins Other (See  "Comments)     Hypotension (Ancef allergy)    • Sulfa Antibiotics Hives         Current Outpatient Medications:   •  aspirin 81 MG EC tablet, Take 1 tablet by mouth Daily., Disp: , Rfl:   •  cholecalciferol (VITAMIN D3) 1000 UNITS tablet, Take 1 tablet by mouth Daily., Disp: , Rfl:   •  cycloSPORINE (RESTASIS) 0.05 % ophthalmic emulsion, Administer 1 drop to both eyes 2 (Two) Times a Day., Disp: , Rfl:   •  gabapentin (NEURONTIN) 100 MG capsule, Take 2 capsules by mouth 3 (Three) Times a Day., Disp: 180 capsule, Rfl: 0  •  HYDROcodone-acetaminophen (NORCO) 5-325 MG per tablet, Take 1 tablet by mouth Every 6 (Six) Hours As Needed for Mild Pain ., Disp: 10 tablet, Rfl: 0  •  levothyroxine (SYNTHROID, LEVOTHROID) 25 MCG tablet, TAKE 1 TABLET BY MOUTH EVERY DAY, Disp: 90 tablet, Rfl: 1  •  LORazepam (ATIVAN) 0.5 MG tablet, One tablet 30 minutes before plane flight, may repeat times 1 in 4-6 hours., Disp: 8 tablet, Rfl: 0  •  metoprolol succinate XL (TOPROL-XL) 50 MG 24 hr tablet, Take 1 tablet by mouth Daily., Disp: , Rfl:   •  Multiple Vitamins-Minerals (CENTRUM SILVER 50+WOMEN) tablet, Take 1 tablet by mouth Daily., Disp: , Rfl:   •  omeprazole (priLOSEC) 40 MG capsule, Take 1 capsule by mouth Daily., Disp: 90 capsule, Rfl: 1  •  potassium chloride (MICRO-K) 10 MEQ CR capsule, TAKE 2 CAPSULES BY MOUTH 2 (TWO) TIMES A DAY WITH MEALS FOR 30 DAYS., Disp: 120 capsule, Rfl: 0  •  pravastatin (PRAVACHOL) 40 MG tablet, Take 1 tablet by mouth Every Night., Disp: 90 tablet, Rfl: 3  •  vitamin B-12 (CYANOCOBALAMIN) 1000 MCG tablet, Take 1,000 mcg by mouth Daily., Disp: , Rfl:       Objective:     Vitals:    10/11/19 1509   BP: 118/60   Pulse: 66   Weight: 67.6 kg (149 lb)   Height: 170.2 cm (67\")     Body mass index is 23.34 kg/m².    Physical Exam   Constitutional: She is oriented to person, place, and time. She appears well-developed and well-nourished.   HENT:   Head: Normocephalic.   Eyes: No scleral icterus.   Neck: No JVD " present. No thyromegaly present.   Cardiovascular: Normal rate, regular rhythm and normal heart sounds. Exam reveals no gallop and no friction rub.   No murmur heard.  Pulmonary/Chest: Effort normal and breath sounds normal. She has no wheezes. She has no rales.       Abdominal: Soft. There is no hepatosplenomegaly. There is no tenderness.   Musculoskeletal: Normal range of motion. She exhibits no edema.   Lymphadenopathy:     She has no cervical adenopathy.   Neurological: She is alert and oriented to person, place, and time.   Skin: Skin is warm and dry. No rash noted.   Psychiatric: She has a normal mood and affect.         ECG 12 Lead  Date/Time: 10/11/2019 3:51 PM  Performed by: Francis Mccullough MD  Authorized by: Francis Mccullough MD   Comparison: compared with previous ECG   Similar to previous ECG  Rhythm: sinus rhythm  Other findings: non-specific ST-T wave changes    Clinical impression: abnormal EKG             Assessment:       Diagnosis Plan   1. Coronary artery disease involving native coronary artery of native heart with angina pectoris (CMS/[facility])     2. S/P CABG (coronary artery bypass graft)            Plan:       She is doing very well she is an 79-year-old woman she had bypass she looks like she is 20 years younger than her stated age cardiac wise she is doing well and not can make any changes she is on good medical therapy we will have her come back and see me and 2 years she will see Lindsey next year and I think she is going to do very well in the long run    As always, it has been a pleasure to participate in your patient's care.      Sincerely,       Francis Mccullough MD

## 2019-10-11 NOTE — TELEPHONE ENCOUNTER
Patient was in first part of the ta. Dr Chew referred her to Dr. Enrike MAYBERRY. For EUS. She has an appointment with Dr. Wesley 11-6-19. Records have been faxed. Received fax confirmation. Patient is not sure this date will work for her. Gave her Dr. Wesley office number. She can call and try and change appointment date.

## 2019-10-14 ENCOUNTER — TELEPHONE (OUTPATIENT)
Dept: INTERNAL MEDICINE | Age: 79
End: 2019-10-14

## 2019-10-14 ENCOUNTER — TREATMENT (OUTPATIENT)
Dept: CARDIAC REHAB | Facility: HOSPITAL | Age: 79
End: 2019-10-14

## 2019-10-14 DIAGNOSIS — Z95.1 S/P CORONARY ARTERY BYPASS GRAFT X 6: Primary | ICD-10-CM

## 2019-10-14 PROCEDURE — 93798 PHYS/QHP OP CAR RHAB W/ECG: CPT

## 2019-10-14 NOTE — TELEPHONE ENCOUNTER
Regarding: Non-Urgent Medical Question  Contact: [phone]  ----- Message from [employer], Generic sent at 10/14/2019  7:24 AM EDT -----    Back in July 2019 while in hospital for pancreatic attack, someone took me off  taking Glucosamine Chondroitin-----I have taken that for years and felt it help my knees.  Is there any reason I cannot return taking this?   As of Friday, Oct. 11---at my visit with Dr. Wm. Mccullough, cardiologist, 7 weeks from my  by-pass heat surgery, he has released me-----I am to return to see his PA in a year, and back to see him in  2 years.  Thanks, Reanna Higgins 1940

## 2019-10-16 ENCOUNTER — TREATMENT (OUTPATIENT)
Dept: CARDIAC REHAB | Facility: HOSPITAL | Age: 79
End: 2019-10-16

## 2019-10-16 DIAGNOSIS — Z95.1 S/P CORONARY ARTERY BYPASS GRAFT X 6: Primary | ICD-10-CM

## 2019-10-16 PROCEDURE — 93798 PHYS/QHP OP CAR RHAB W/ECG: CPT

## 2019-10-18 ENCOUNTER — TREATMENT (OUTPATIENT)
Dept: CARDIAC REHAB | Facility: HOSPITAL | Age: 79
End: 2019-10-18

## 2019-10-18 DIAGNOSIS — Z95.1 S/P CORONARY ARTERY BYPASS GRAFT X 6: Primary | ICD-10-CM

## 2019-10-18 PROCEDURE — 93798 PHYS/QHP OP CAR RHAB W/ECG: CPT

## 2019-10-21 ENCOUNTER — TREATMENT (OUTPATIENT)
Dept: CARDIAC REHAB | Facility: HOSPITAL | Age: 79
End: 2019-10-21

## 2019-10-21 DIAGNOSIS — Z95.1 S/P CORONARY ARTERY BYPASS GRAFT X 6: Primary | ICD-10-CM

## 2019-10-21 PROCEDURE — 93798 PHYS/QHP OP CAR RHAB W/ECG: CPT

## 2019-10-23 ENCOUNTER — TREATMENT (OUTPATIENT)
Dept: CARDIAC REHAB | Facility: HOSPITAL | Age: 79
End: 2019-10-23

## 2019-10-23 DIAGNOSIS — Z95.1 S/P CORONARY ARTERY BYPASS GRAFT X 6: Primary | ICD-10-CM

## 2019-10-23 PROCEDURE — 93798 PHYS/QHP OP CAR RHAB W/ECG: CPT

## 2019-10-25 ENCOUNTER — TREATMENT (OUTPATIENT)
Dept: CARDIAC REHAB | Facility: HOSPITAL | Age: 79
End: 2019-10-25

## 2019-10-25 DIAGNOSIS — Z95.1 S/P CORONARY ARTERY BYPASS GRAFT X 6: Primary | ICD-10-CM

## 2019-10-25 PROCEDURE — 93798 PHYS/QHP OP CAR RHAB W/ECG: CPT

## 2019-10-25 NOTE — PROGRESS NOTES
07/26/17 1200   Quick Adds   Type of Visit Initial Occupational Therapy Evaluation   Living Environment   Lives With facility resident  (MCU)   Living Environment Comment No family present and PLOF filled out per chart review (PT discussed this with daughter yesterday)   Self-Care   Dominant Hand right   Functional Level Prior   Ambulation 1-->assistive equipment  (4WW)   Transferring 1-->assistive equipment   Toileting 1-->assistive equipment   Bathing 3-->assistive equipment and person   Dressing 0-->independent   Eating 0-->independent   Communication 0-->understands/communicates without difficulty   Swallowing 0-->swallows foods/liquids without difficulty   Cognition 1 - attention or memory deficits   Fall history within last six months yes   General Information   Onset of Illness/Injury or Date of Surgery - Date 07/24/17   Referring Physician Shaun Osman MD   Patient/Family Goals Statement None stated   Additional Occupational Profile Info/Pertinent History of Current Problem Deanne Zayas is a 82 year old female who presents after fall at Memory Care unit and was also noted to have left facial droop.   Precautions/Limitations fall precautions   Cognitive Status Examination   Orientation person;place   Level of Consciousness alert   Able to Follow Commands mild impairment   Cognitive Comment Pt appears to attempt to follow all commands during MMT. Answers 100% questions- not always accurate. Difficult to understand at times d/t slurred speech.    Pain Assessment   Patient Currently in Pain No   Strength   Strength Comments L UE strength: 4-/5.    MMT: Shoulder   Left Flexion (4-/5) good minus, left   Right Flexion (0/5) zero, right   MMT: Wrist   Right Flexion (3-/5) fair minus, right   Left Flexion (4/5) good, left   Hand Strength   Hand Strength Comments L  strength: WFL, R  strength: 2/5- would be unable to grasp objects.    Coordination   Coordination Comments Unable to test  R UE  See scanned session report.   "coordination d/t limited strength/ROM.    Transfer Skill: Bed to Chair/Chair to Bed   Level of Howe: Bed to Chair dependent (less than 25% patients effort)   Transfer Skill: Sit to Stand   Level of Howe: Sit/Stand dependent (less than 25% patients effort)   Transfer Skill: Toilet Transfer   Level of Howe: Toilet dependent (less than 25% patients effort)   Upper Body Dressing   Level of Howe: Dress Upper Body maximum assist (25% patients effort)   Lower Body Dressing   Level of Howe: Dress Lower Body maximum assist (25% patients effort)   Activities of Daily Living Analysis   Impairments Contributing to Impaired Activities of Daily Living ROM decreased;strength decreased   General Therapy Interventions   Planned Therapy Interventions ADL retraining;neuromuscular re-education;ROM;strengthening;progressive activity/exercise   Clinical Impression   Criteria for Skilled Therapeutic Interventions Met yes, treatment indicated   OT Diagnosis decreased independence with ADLs and functional mobility    Influenced by the following impairments significant R sided weakness   Assessment of Occupational Performance 3-5 Performance Deficits   Identified Performance Deficits dressing, toileting, showering and functional mobility/transfers.    Clinical Decision Making (Complexity) Low complexity   Therapy Frequency daily   Predicted Duration of Therapy Intervention (days/wks) 2-3 days   Anticipated Equipment Needs at Discharge (TBD at TCU)   Anticipated Discharge Disposition Transitional Care Facility   Risks and Benefits of Treatment have been explained. Yes   Patient, Family & other staff in agreement with plan of care Yes   [facility] AM-PAC  \"6 Clicks\" Daily Activity Inpatient Short Form   1. Putting on and taking off regular lower body clothing? 1 - Total   2. Bathing (including washing, rinsing, drying)? 2 - A Lot   3. Toileting, which includes using toilet, bedpan or urinal? 1 - " Total   4. Putting on and taking off regular upper body clothing? 2 - A Lot   5. Taking care of personal grooming such as brushing teeth? 2 - A Lot   6. Eating meals? 2 - A Lot   Daily Activity Raw Score (Score out of 24.Lower scores equate to lower levels of function) 10   Total Evaluation Time   Total Evaluation Time (Minutes) 5

## 2019-10-28 ENCOUNTER — TELEPHONE (OUTPATIENT)
Dept: CARDIAC REHAB | Facility: HOSPITAL | Age: 79
End: 2019-10-28

## 2019-10-28 ENCOUNTER — TREATMENT (OUTPATIENT)
Dept: CARDIAC REHAB | Facility: HOSPITAL | Age: 79
End: 2019-10-28

## 2019-10-28 DIAGNOSIS — Z95.1 S/P CORONARY ARTERY BYPASS GRAFT X 6: Primary | ICD-10-CM

## 2019-10-28 PROCEDURE — 93798 PHYS/QHP OP CAR RHAB W/ECG: CPT

## 2019-10-30 ENCOUNTER — TREATMENT (OUTPATIENT)
Dept: CARDIAC REHAB | Facility: HOSPITAL | Age: 79
End: 2019-10-30

## 2019-10-30 DIAGNOSIS — Z95.1 S/P CORONARY ARTERY BYPASS GRAFT X 6: Primary | ICD-10-CM

## 2019-10-30 PROCEDURE — 93798 PHYS/QHP OP CAR RHAB W/ECG: CPT

## 2019-11-01 ENCOUNTER — TREATMENT (OUTPATIENT)
Dept: CARDIAC REHAB | Facility: HOSPITAL | Age: 79
End: 2019-11-01

## 2019-11-01 DIAGNOSIS — Z95.1 S/P CORONARY ARTERY BYPASS GRAFT X 6: Primary | ICD-10-CM

## 2019-11-01 PROCEDURE — 93798 PHYS/QHP OP CAR RHAB W/ECG: CPT

## 2019-11-04 ENCOUNTER — TREATMENT (OUTPATIENT)
Dept: CARDIAC REHAB | Facility: HOSPITAL | Age: 79
End: 2019-11-04

## 2019-11-04 DIAGNOSIS — Z95.1 S/P CORONARY ARTERY BYPASS GRAFT X 6: Primary | ICD-10-CM

## 2019-11-04 PROCEDURE — 93798 PHYS/QHP OP CAR RHAB W/ECG: CPT

## 2019-11-06 ENCOUNTER — TELEPHONE (OUTPATIENT)
Dept: INTERNAL MEDICINE | Age: 79
End: 2019-11-06

## 2019-11-06 ENCOUNTER — TREATMENT (OUTPATIENT)
Dept: CARDIAC REHAB | Facility: HOSPITAL | Age: 79
End: 2019-11-06

## 2019-11-06 DIAGNOSIS — G62.9 PERIPHERAL POLYNEUROPATHY: Chronic | ICD-10-CM

## 2019-11-06 DIAGNOSIS — Z95.1 S/P CORONARY ARTERY BYPASS GRAFT X 6: Primary | ICD-10-CM

## 2019-11-06 PROCEDURE — 93798 PHYS/QHP OP CAR RHAB W/ECG: CPT

## 2019-11-08 ENCOUNTER — APPOINTMENT (OUTPATIENT)
Dept: CARDIAC REHAB | Facility: HOSPITAL | Age: 79
End: 2019-11-08

## 2019-11-11 ENCOUNTER — APPOINTMENT (OUTPATIENT)
Dept: CARDIAC REHAB | Facility: HOSPITAL | Age: 79
End: 2019-11-11

## 2019-11-11 RX ORDER — GABAPENTIN 100 MG/1
200 CAPSULE ORAL 3 TIMES DAILY
Qty: 180 CAPSULE | Refills: 0 | Status: SHIPPED | OUTPATIENT
Start: 2019-11-11 | End: 2020-01-02

## 2019-11-13 ENCOUNTER — OFFICE (OUTPATIENT)
Dept: URBAN - METROPOLITAN AREA CLINIC 75 | Facility: CLINIC | Age: 79
End: 2019-11-13
Payer: MEDICARE

## 2019-11-13 ENCOUNTER — APPOINTMENT (OUTPATIENT)
Dept: CARDIAC REHAB | Facility: HOSPITAL | Age: 79
End: 2019-11-13

## 2019-11-13 VITALS
SYSTOLIC BLOOD PRESSURE: 108 MMHG | HEIGHT: 67 IN | WEIGHT: 153 LBS | HEART RATE: 62 BPM | DIASTOLIC BLOOD PRESSURE: 58 MMHG

## 2019-11-13 DIAGNOSIS — Z86.010 PERSONAL HISTORY OF COLONIC POLYPS: ICD-10-CM

## 2019-11-13 DIAGNOSIS — I10 ESSENTIAL (PRIMARY) HYPERTENSION: ICD-10-CM

## 2019-11-13 DIAGNOSIS — Z90.49 ACQUIRED ABSENCE OF OTHER SPECIFIED PARTS OF DIGESTIVE TRACT: ICD-10-CM

## 2019-11-13 DIAGNOSIS — R10.13 EPIGASTRIC PAIN: ICD-10-CM

## 2019-11-13 DIAGNOSIS — Z98.84 BARIATRIC SURGERY STATUS: ICD-10-CM

## 2019-11-13 DIAGNOSIS — R85.0 ABNORMAL LEVEL OF ENZYMES IN SPECIMENS FROM DIGESTIVE ORGANS: ICD-10-CM

## 2019-11-13 PROCEDURE — 99202 OFFICE O/P NEW SF 15 MIN: CPT

## 2019-11-15 ENCOUNTER — APPOINTMENT (OUTPATIENT)
Dept: CARDIAC REHAB | Facility: HOSPITAL | Age: 79
End: 2019-11-15

## 2019-11-18 ENCOUNTER — APPOINTMENT (OUTPATIENT)
Dept: CARDIAC REHAB | Facility: HOSPITAL | Age: 79
End: 2019-11-18

## 2019-11-20 ENCOUNTER — APPOINTMENT (OUTPATIENT)
Dept: CARDIAC REHAB | Facility: HOSPITAL | Age: 79
End: 2019-11-20

## 2019-11-22 ENCOUNTER — APPOINTMENT (OUTPATIENT)
Dept: CARDIAC REHAB | Facility: HOSPITAL | Age: 79
End: 2019-11-22

## 2019-11-25 ENCOUNTER — APPOINTMENT (OUTPATIENT)
Dept: CARDIAC REHAB | Facility: HOSPITAL | Age: 79
End: 2019-11-25

## 2019-11-26 ENCOUNTER — ON CAMPUS - OUTPATIENT (OUTPATIENT)
Dept: URBAN - METROPOLITAN AREA HOSPITAL 108 | Facility: HOSPITAL | Age: 79
End: 2019-11-26
Payer: MEDICARE

## 2019-11-26 DIAGNOSIS — K85.90 ACUTE PANCREATITIS WITHOUT NECROSIS OR INFECTION, UNSPECIFIE: ICD-10-CM

## 2019-11-26 PROCEDURE — 43259 EGD US EXAM DUODENUM/JEJUNUM: CPT

## 2019-11-27 ENCOUNTER — APPOINTMENT (OUTPATIENT)
Dept: CARDIAC REHAB | Facility: HOSPITAL | Age: 79
End: 2019-11-27

## 2019-11-29 ENCOUNTER — APPOINTMENT (OUTPATIENT)
Dept: CARDIAC REHAB | Facility: HOSPITAL | Age: 79
End: 2019-11-29

## 2019-12-02 ENCOUNTER — APPOINTMENT (OUTPATIENT)
Dept: CARDIAC REHAB | Facility: HOSPITAL | Age: 79
End: 2019-12-02

## 2019-12-04 ENCOUNTER — APPOINTMENT (OUTPATIENT)
Dept: CARDIAC REHAB | Facility: HOSPITAL | Age: 79
End: 2019-12-04

## 2019-12-06 ENCOUNTER — APPOINTMENT (OUTPATIENT)
Dept: CARDIAC REHAB | Facility: HOSPITAL | Age: 79
End: 2019-12-06

## 2019-12-09 ENCOUNTER — APPOINTMENT (OUTPATIENT)
Dept: CARDIAC REHAB | Facility: HOSPITAL | Age: 79
End: 2019-12-09

## 2019-12-11 ENCOUNTER — APPOINTMENT (OUTPATIENT)
Dept: CARDIAC REHAB | Facility: HOSPITAL | Age: 79
End: 2019-12-11

## 2019-12-12 ENCOUNTER — OFFICE VISIT (OUTPATIENT)
Dept: INTERNAL MEDICINE | Age: 79
End: 2019-12-12

## 2019-12-12 VITALS
SYSTOLIC BLOOD PRESSURE: 140 MMHG | HEIGHT: 67 IN | WEIGHT: 150 LBS | TEMPERATURE: 96.9 F | DIASTOLIC BLOOD PRESSURE: 70 MMHG | BODY MASS INDEX: 23.54 KG/M2 | HEART RATE: 76 BPM | OXYGEN SATURATION: 99 %

## 2019-12-12 DIAGNOSIS — I10 ESSENTIAL HYPERTENSION: Chronic | ICD-10-CM

## 2019-12-12 DIAGNOSIS — G62.9 PERIPHERAL POLYNEUROPATHY: Chronic | ICD-10-CM

## 2019-12-12 DIAGNOSIS — I25.119 CORONARY ARTERY DISEASE INVOLVING NATIVE CORONARY ARTERY OF NATIVE HEART WITH ANGINA PECTORIS (HCC): Primary | Chronic | ICD-10-CM

## 2019-12-12 DIAGNOSIS — Z87.19 HISTORY OF ACUTE PANCREATITIS: ICD-10-CM

## 2019-12-12 DIAGNOSIS — R73.03 PREDIABETES: Chronic | ICD-10-CM

## 2019-12-12 DIAGNOSIS — E03.8 SUBCLINICAL HYPOTHYROIDISM: ICD-10-CM

## 2019-12-12 DIAGNOSIS — E78.5 DYSLIPIDEMIA (HIGH LDL; LOW HDL): Chronic | ICD-10-CM

## 2019-12-12 DIAGNOSIS — G25.81 RLS (RESTLESS LEGS SYNDROME): Chronic | ICD-10-CM

## 2019-12-12 LAB
HBA1C MFR BLD: 6.3 % (ref 4.8–5.6)
T4 FREE SERPL-MCNC: 1.13 NG/DL (ref 0.93–1.7)
TSH SERPL DL<=0.005 MIU/L-ACNC: 2.84 UIU/ML (ref 0.27–4.2)

## 2019-12-12 PROCEDURE — 99214 OFFICE O/P EST MOD 30 MIN: CPT | Performed by: INTERNAL MEDICINE

## 2019-12-12 RX ORDER — VITAMIN B COMPLEX
1 TABLET ORAL DAILY
COMMUNITY

## 2019-12-12 RX ORDER — METOPROLOL SUCCINATE 50 MG/1
50 TABLET, EXTENDED RELEASE ORAL 2 TIMES DAILY
COMMUNITY
Start: 2019-12-12 | End: 2019-12-23

## 2019-12-12 NOTE — PROGRESS NOTES
[facility] [place] INTERNAL MEDICINE  FRANK HEMPHILL M.D.      Reanna Higgins / 79 y.o. / female  12/12/2019      CHIEF COMPLAINT     Hyperlipidemia (3 month f/u); Hypertension; Coronary Artery Disease; and Pancreatitis      ASSESSMENT & PLAN     Problem List Items Addressed This Visit        High    Prediabetes (Chronic)    Current Assessment & Plan     Lab Results   Component Value Date    HGBA1C 6.20 (H) 06/10/2019    HGBA1C 5.90 (H) 09/28/2016      Previously A1c was higher. Check A1c.   Maintain a low sugar/starch/carbohydrate diet and exercise regularly.          Relevant Orders    Hemoglobin A1c    Coronary artery disease involving native coronary artery of native heart with angina pectoris (CMS/[facility]) - Primary (Chronic)    Overview     8/2019: s/p 6 vessel CABG    Continue ASA, statin, beta-blocker.          Relevant Medications    aspirin 81 MG EC tablet    pravastatin (PRAVACHOL) 40 MG tablet    metoprolol succinate XL (TOPROL-XL) 50 MG 24 hr tablet       Medium    Essential hypertension (Chronic)    Overview     Continue metoprolol XL 50 mg BID.          Relevant Medications    metoprolol succinate XL (TOPROL-XL) 50 MG 24 hr tablet    Dyslipidemia (high LDL; low HDL) (Chronic)       Low    Peripheral neuropathy (Chronic)    Overview     **Approved for electronic prescription for gabapentin on 1.9.19**          Relevant Medications    gabapentin (NEURONTIN) 100 MG capsule    RLS (restless legs syndrome) (Chronic)    Overview     **Approved for electronic prescription for gabapentin on 1.9.19**             Unprioritized    History of acute pancreatitis      Other Visit Diagnoses     Subclinical hypothyroidism        Relevant Medications    metoprolol succinate XL (TOPROL-XL) 50 MG 24 hr tablet    Other Relevant Orders    TSH+Free T4        Orders Placed This Encounter   Procedures   • Hemoglobin A1c   • TSH+Free T4     No orders of the defined types were placed in this encounter.      Summary/Discussion:  ·       Next  "Appointment with me: Visit date not found    Return in about 6 months (around 6/12/2020) for PRE-DIABETES, HYPERTENSION & HYPERLIPIDEMIA, Hypothyroidism.      MEDICATIONS     Current Outpatient Medications   Medication Sig Dispense Refill   • aspirin 81 MG EC tablet Take 1 tablet by mouth Daily.     • cholecalciferol (VITAMIN D3) 1000 UNITS tablet Take 1 tablet by mouth Daily.     • Coenzyme Q10 (COQ10) 100 MG capsule Take  by mouth Daily.     • cycloSPORINE (RESTASIS) 0.05 % ophthalmic emulsion Administer 1 drop to both eyes 2 (Two) Times a Day.     • gabapentin (NEURONTIN) 100 MG capsule Take 2 capsules by mouth 3 (Three) Times a Day. 180 capsule 0   • HYDROcodone-acetaminophen (NORCO) 5-325 MG per tablet Take 1 tablet by mouth Every 6 (Six) Hours As Needed for Mild Pain . 10 tablet 0   • hyoscyamine (LEVSIN) 0.125 MG SL tablet Prn     • levothyroxine (SYNTHROID, LEVOTHROID) 25 MCG tablet TAKE 1 TABLET BY MOUTH EVERY DAY 90 tablet 1   • LORazepam (ATIVAN) 0.5 MG tablet One tablet 30 minutes before plane flight, may repeat times 1 in 4-6 hours. 8 tablet 0   • metoprolol succinate XL (TOPROL-XL) 50 MG 24 hr tablet Take 1 tablet by mouth Daily. (Patient taking differently: Take 50 mg by mouth 2 (Two) Times a Day.)     • Multiple Vitamins-Minerals (CENTRUM SILVER 50+WOMEN) tablet Take 1 tablet by mouth Daily.     • omeprazole (priLOSEC) 40 MG capsule Take 1 capsule by mouth Daily. 90 capsule 1   • pravastatin (PRAVACHOL) 40 MG tablet Take 1 tablet by mouth Every Night. 90 tablet 3   • vitamin B-12 (CYANOCOBALAMIN) 1000 MCG tablet Take 1,000 mcg by mouth Daily.       No current facility-administered medications for this visit.           VITAL SIGNS     Visit Vitals  /70 (BP Location: Right arm)   Pulse 76   Temp 96.9 °F (36.1 °C)   Ht 170.2 cm (67\")   Wt 68 kg (150 lb)   SpO2 99%   BMI 23.49 kg/m²       BP Readings from Last 3 Encounters:   12/12/19 140/70   10/11/19 118/60   10/10/19 135/73     Wt Readings from " Last 3 Encounters:   12/12/19 68 kg (150 lb)   10/11/19 67.6 kg (149 lb)   10/10/19 65.8 kg (145 lb)      Body mass index is 23.49 kg/m².      HISTORY OF PRESENT ILLNESS     Underwent endoscopic US for evaluation of recurrent pancreatitis (normal).   Plan is for ercp if recurrent episode.   Complains of intermittent episodes of severe upper abdominal pains.   Has not tried Levsin prescribed by GI.   No melena or blood.     Prediabetes: A1c was higher at 6.2 previously. No medication.     Hypertension remains stable on metoprolol succinate 50 mg BID.     CAD s/p CABG earlier this year without angina or CHILDS. On ASA, statin and beta-blocker.     RLS and PN stable on gabapentin.       Patient Care Team:  Ted Li MD as PCP - General (Internal Medicine)  To Rivera MD as Consulting Physician (General Surgery)  Torsten Benson Jr., MD as Consulting Physician (Cardiology)  Arsenio Witt MD as Consulting Physician (Ophthalmology)  Brown Beckman MD as Consulting Physician (Orthopedic Surgery)      REVIEW OF SYSTEMS     Review of Systems  Constitutional neg  Resp neg  CV neg  GI intermittent epig pain  Neuro stable       PHYSICAL EXAMINATION     Physical Exam  Constitutional  No distress  Cardiovascular Rate  normal . Rhythm: regular . Heart sounds:  Normal  Pulmonary/Chest: Effort normal and breath sounds normal.    Abdomen: Soft and nontender, no palpable mass, no hepatomegaly.   Psychiatric  Alert. Judgment intact. Thought content normal. Mood normal      REVIEWED DATA     Labs:     Lab Results   Component Value Date     09/28/2019    K 4.0 09/28/2019    CALCIUM 9.4 09/28/2019    AST 49 (H) 09/28/2019    ALT 22 09/28/2019    BUN 16 09/28/2019    CREATININE 0.77 09/28/2019    CREATININE 0.79 09/12/2019    CREATININE 0.57 09/04/2019    EGFRIFNONA 72 09/28/2019    EGFRIFAFRI 85 09/12/2019       Lab Results   Component Value Date    HGBA1C 6.20 (H) 06/10/2019    HGBA1C 5.90 (H) 09/28/2016      (H) 09/12/2019     (H) 06/10/2019     (H) 01/09/2019       Lab Results   Component Value Date    LDL 81 09/12/2019    LDL 78 07/17/2019     (H) 06/10/2019    HDL 37 (L) 09/12/2019    HDL 39 (L) 07/17/2019    HDL 50 06/10/2019    TRIG 129 09/12/2019    TRIG 94 07/17/2019    TRIG 92 07/17/2019    CHOLHDLRATIO 3.89 09/12/2019    CHOLHDLRATIO 4.00 06/10/2019       Lab Results   Component Value Date    TSH 4.290 (H) 06/10/2019    FREET4 1.02 06/10/2019       Lab Results   Component Value Date    WBC 8.91 09/28/2019    HGB 12.3 09/28/2019    HGB 10.6 (L) 09/04/2019    HGB 11.1 (L) 08/28/2019     09/28/2019       Lab Results   Component Value Date    PROTEIN Negative 06/10/2019    GLUCOSEU Negative 07/16/2019    BLOODU Negative 07/16/2019    NITRITEU Negative 07/16/2019    LEUKOCYTESUR Moderate (2+) (A) 07/16/2019       Imaging:         Medical Tests:         Summary of old records / correspondence / consultant report:         Request outside records:         ALLERGIES  Allergies   Allergen Reactions   • Ancef [Cefazolin] Other (See Comments)     Hypotension/bradycardia   • Codeine Hives   • Penicillins Other (See Comments)     Hypotension (Ancef allergy)    • Sulfa Antibiotics Hives        PFSH:     The following portions of the patient's history were reviewed and updated as appropriate: Allergies / Current Medications / Past Medical History / Surgical History / Social History / Family History    PROBLEM LIST   Patient Active Problem List   Diagnosis   • Colon polyp   • Gastroesophageal reflux disease   • Essential hypertension   • Dyslipidemia (high LDL; low HDL)   • Mitral and aortic valve disease   • Heart valve disease   • Anxiety   • Peripheral neuropathy   • Malignant neoplasm of cheek (CMS/HCC)   • Non-rheumatic mitral regurgitation   • Osteopenia   • SVT (supraventricular tachycardia) (CMS/HCC)   • AV block, Mobitz 1   • Cardiac pacemaker in situ   • RLS (restless legs syndrome)    • Phobia, flying   • Prediabetes   • Idiopathic acute pancreatitis without infection or necrosis   • Mesenteric ischemia due to arterial insufficiency (CMS/HCC)   • Sick sinus syndrome (CMS/HCC)   • Coronary artery disease involving native coronary artery of native heart with angina pectoris (CMS/HCC)   • S/P CABG (coronary artery bypass graft)   • History of acute pancreatitis       PAST MEDICAL HISTORY  Past Medical History:   Diagnosis Date   • Cancer (CMS/HCC)     skin   • Cardiac pacemaker in situ    • Carpal tunnel syndrome    • Cataract    • Cystic fibroadenosis of breast    • GERD (gastroesophageal reflux disease)    • Hypercholesterolemia    • Hypertension    • Osteoarthritis of both hands    • Pancreatitis    • Peripheral neuropathy    • Restless leg syndrome    • Second degree AV block, Mobitz type I    • SVT (supraventricular tachycardia) (CMS/HCC)        SURGICAL HISTORY  Past Surgical History:   Procedure Laterality Date   • APPENDECTOMY     • BILATERAL OOPHORECTOMY Bilateral 1988   • BREAST CYST EXCISION Bilateral     4 BREAST SURGERIES   • CARDIAC CATHETERIZATION N/A 8/22/2019    Procedure: Left Heart Cath;  Surgeon: Francis Mccullough MD;  Location: [facility] CATH INVASIVE LOCATION;  Service: Cardiology   • CARDIAC CATHETERIZATION N/A 8/22/2019    Procedure: Left ventriculography;  Surgeon: Francis Mccullough MD;  Location:  [facility] CATH INVASIVE LOCATION;  Service: Cardiology   • CARDIAC CATHETERIZATION N/A 8/22/2019    Procedure: Coronary angiography;  Surgeon: Francis Mccullough MD;  Location:  [facility] CATH INVASIVE LOCATION;  Service: Cardiology   • CARDIAC ELECTROPHYSIOLOGY PROCEDURE N/A 10/10/2016    Procedure: Pacemaker DC new  [place];  Surgeon: Wilfrido Hameed MD;  Location:  [facility] CATH INVASIVE LOCATION;  Service:    • CARPAL TUNNEL RELEASE Right    • CATARACT EXTRACTION      NOVEMBER AND DECEMBER 2014   • CHOLECYSTECTOMY     • COLONOSCOPY  2015   • CORONARY ARTERY BYPASS GRAFT N/A 8/23/2019     Procedure: SUMMER STERNOTOMY CORONARY ARTERY BYPASS GRAFT TIMES 6 USING LEFT INTERNAL MAMMARY ARTERY AND LEFT GREATER SAPHENOUS VEIN GRAFT PER ENDOSCOPIC VEIN HARVESTING AND PRP;  Surgeon: Kem Hawk MD;  Location[facility] OR;  Service: Cardiothoracic   • ENDOSCOPY N/A 2019    Procedure: ESOPHAGOGASTRODUODENOSCOPY with biopsy;  Surgeon: Ricky Chew MD;  Location: [facility] ENDOSCOPY;  Service: Gastroenterology   • HYSTERECTOMY     • KNEE ARTHROSCOPY Bilateral 2014    MENISCAL TEAR   • PACEMAKER IMPLANTATION  10/10/2016   • SHOULDER SURGERY Right     ROTATOR CUFF SURGERY 2015   • TONSILLECTOMY     • TRIGGER FINGER RELEASE      both hands       SOCIAL HISTORY  Social History     Socioeconomic History   • Marital status:      Spouse name: Rishabh*   • Number of children: 2   • Years of education: 13   • Highest education level: Some college, no degree   Occupational History   • 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)   Tobacco Use   • Smoking status: Former Smoker     Packs/day: 0.50     Years: 14.00     Pack years: 7.00     Start date:      Last attempt to quit:      Years since quittin.9   • Smokeless tobacco: Never Used   • Tobacco comment: quit age 28   Substance and Sexual Activity   • Alcohol use: Yes     Alcohol/week: 2.0 standard drinks     Types: 1 Glasses of wine, 1 Cans of beer per week     Frequency: Monthly or less     Drinks per session: 1 or 2     Comment: rare glass of wine or beer rarely/  No caffeine use   • Drug use: No   • Sexual activity: Never   Lifestyle   • Physical activity:     Days per week: 5 days     Minutes per session: 30 min   • Stress: Not at all       FAMILY HISTORY  Family History   Problem Relation Age of Onset   • Thyroid disease Daughter    • Lung cancer Brother    • Hypertension Mother    • Aortic stenosis Mother    • Coronary artery disease Mother          78 (smoker)   • Hypertension Father    • Depression Father    •  Anxiety disorder Father    • Diabetes Father    • Heart failure Father    • Cirrhosis Father         alcohol   • Alcohol abuse Sister    • Lupus Sister    • Heart disease Sister    • Diabetes type II Sister    • Alcohol abuse Brother    • Drug abuse Brother    • Heart disease Brother    • Cancer Brother         bladder (smoker)   • Heart disease Brother    • Breast cancer Paternal Aunt    • Neuropathy Neg Hx    • Colon cancer Neg Hx    • Dementia Neg Hx          **Dragon Disclaimer:   Much of this encounter note is an electronic transcription/translation of spoken language to printed text. The electronic translation of spoken language may permit erroneous, or at times, nonsensical words or phrases to be inadvertently transcribed. Although I have reviewed the note for such errors, some may still exist.       Template created by Arabella Li MD

## 2019-12-12 NOTE — ASSESSMENT & PLAN NOTE
Lab Results   Component Value Date    HGBA1C 6.20 (H) 06/10/2019    HGBA1C 5.90 (H) 09/28/2016      Previously A1c was higher. Check A1c.   Maintain a low sugar/starch/carbohydrate diet and exercise regularly.

## 2019-12-13 ENCOUNTER — TELEPHONE (OUTPATIENT)
Dept: INTERNAL MEDICINE | Age: 79
End: 2019-12-13

## 2019-12-13 ENCOUNTER — APPOINTMENT (OUTPATIENT)
Dept: CARDIAC REHAB | Facility: HOSPITAL | Age: 79
End: 2019-12-13

## 2019-12-13 NOTE — TELEPHONE ENCOUNTER
----- Message from Ted Li MD sent at 12/13/2019  7:09 AM EST -----  A1c level for blood sugar average is higher and very close to diabetic range.   - Will consider medication on followup if not improving.  - Send info on diabetic diet.   - see me in no later than 3-4 months. - - Please notate that she will need a POCT A1c that day.

## 2019-12-13 NOTE — PROGRESS NOTES
A1c level for blood sugar average is higher and very close to diabetic range.   - Will consider medication on followup if not improving.  - Send info on diabetic diet.   - see me in no later than 3-4 months. - - Please notate that she will need a POCT A1c that day.

## 2019-12-16 ENCOUNTER — APPOINTMENT (OUTPATIENT)
Dept: CARDIAC REHAB | Facility: HOSPITAL | Age: 79
End: 2019-12-16

## 2019-12-18 ENCOUNTER — APPOINTMENT (OUTPATIENT)
Dept: CARDIAC REHAB | Facility: HOSPITAL | Age: 79
End: 2019-12-18

## 2019-12-20 ENCOUNTER — APPOINTMENT (OUTPATIENT)
Dept: CARDIAC REHAB | Facility: HOSPITAL | Age: 79
End: 2019-12-20

## 2019-12-23 RX ORDER — METOPROLOL SUCCINATE 50 MG/1
50 TABLET, EXTENDED RELEASE ORAL 2 TIMES DAILY
Qty: 180 TABLET | Refills: 3 | Status: SHIPPED | OUTPATIENT
Start: 2019-12-23 | End: 2020-11-02

## 2020-01-02 DIAGNOSIS — G62.9 PERIPHERAL POLYNEUROPATHY: Chronic | ICD-10-CM

## 2020-01-02 RX ORDER — GABAPENTIN 100 MG/1
200 CAPSULE ORAL 3 TIMES DAILY
Qty: 180 CAPSULE | Refills: 2 | Status: SHIPPED | OUTPATIENT
Start: 2020-01-02 | End: 2020-06-15

## 2020-01-10 ENCOUNTER — OFFICE VISIT (OUTPATIENT)
Dept: INTERNAL MEDICINE | Age: 80
End: 2020-01-10

## 2020-01-10 VITALS
OXYGEN SATURATION: 97 % | HEIGHT: 67 IN | BODY MASS INDEX: 24.01 KG/M2 | WEIGHT: 153 LBS | SYSTOLIC BLOOD PRESSURE: 120 MMHG | DIASTOLIC BLOOD PRESSURE: 62 MMHG | HEART RATE: 71 BPM | TEMPERATURE: 97.8 F

## 2020-01-10 DIAGNOSIS — J40 BRONCHITIS: Primary | ICD-10-CM

## 2020-01-10 PROCEDURE — 99213 OFFICE O/P EST LOW 20 MIN: CPT | Performed by: NURSE PRACTITIONER

## 2020-01-10 NOTE — PROGRESS NOTES
[facility] [place]  LUIS A Arango / 79 y.o. / female  01/10/2020      ASSESSMENT & PLAN:    Problem List Items Addressed This Visit     None      Visit Diagnoses     Bronchitis    -  Primary        No orders of the defined types were placed in this encounter.    No orders of the defined types were placed in this encounter.      Summary/Discussion:  1. Bronchitis  - Continue Mucinex DM BID  - Increase fluids, use cough drops with menthol, rest, wash hands  - monitor for new or worsening symptoms   - RTC as needed       Return if symptoms worsen or fail to improve, for Next scheduled follow up.  ____________________________________________________________________    MEDICATIONS  Current Outpatient Medications   Medication Sig Dispense Refill   • aspirin 81 MG EC tablet Take 1 tablet by mouth Daily.     • cholecalciferol (VITAMIN D3) 1000 UNITS tablet Take 1 tablet by mouth Daily.     • Coenzyme Q10 (COQ10) 100 MG capsule Take  by mouth Daily.     • cycloSPORINE (RESTASIS) 0.05 % ophthalmic emulsion Administer 1 drop to both eyes 2 (Two) Times a Day.     • gabapentin (NEURONTIN) 100 MG capsule TAKE 2 CAPSULES BY MOUTH 3 (THREE) TIMES A DAY. 180 capsule 2   • hyoscyamine (LEVSIN) 0.125 MG SL tablet Prn     • levothyroxine (SYNTHROID, LEVOTHROID) 25 MCG tablet TAKE 1 TABLET BY MOUTH EVERY DAY 90 tablet 1   • LORazepam (ATIVAN) 0.5 MG tablet One tablet 30 minutes before plane flight, may repeat times 1 in 4-6 hours. 8 tablet 0   • metoprolol succinate XL (TOPROL-XL) 50 MG 24 hr tablet TAKE 1 TABLET BY MOUTH 2 (TWO) TIMES A DAY. 180 tablet 3   • Multiple Vitamins-Minerals (CENTRUM SILVER 50+WOMEN) tablet Take 1 tablet by mouth Daily.     • omeprazole (priLOSEC) 40 MG capsule Take 1 capsule by mouth Daily. 90 capsule 1   • pravastatin (PRAVACHOL) 40 MG tablet Take 1 tablet by mouth Every Night. 90 tablet 3     No current facility-administered medications for this visit.        VITALS    Visit  "Vitals  /62   Pulse 71   Temp 97.8 °F (36.6 °C) (Temporal)   Ht 170.2 cm (67.01\")   Wt 69.4 kg (153 lb)   SpO2 97%   BMI 23.96 kg/m²       BP Readings from Last 3 Encounters:   01/10/20 120/62   12/12/19 140/70   10/11/19 118/60     Wt Readings from Last 3 Encounters:   01/10/20 69.4 kg (153 lb)   12/12/19 68 kg (150 lb)   10/11/19 67.6 kg (149 lb)      Body mass index is 23.96 kg/m².    CC:  Main reason(s) for today's visit: URI symptoms  HPI:     The patient is here for URI complaints x 8 days.  She is a new patient to me.    Complains of post nasal drip and cough (purulent sputum) for 8 days without improvement in sx's. Denies subjective fever, chills, headache, bodyache, nasal congestion, sinus pressure/pain, sore throat, wheezing, shortness of breath, swollen cervical lymph nodes, diarrhea, vomiting and generalized weakness.  Pt has been around sick contacts: unknown  OTC medications tried: Robitussin DM and cough drops      Patient Care Team:  Ted Li MD as PCP - General (Internal Medicine)  Miguel, To Elkins MD as Consulting Physician (General Surgery)  Torsten Benson Jr., MD as Consulting Physician (Cardiology)  Arsenio Witt MD as Consulting Physician (Ophthalmology)  Brown Beckman MD as Consulting Physician (Orthopedic Surgery)  ____________________________________________________________________      REVIEW OF SYSTEMS    Review of Systems  As noted per HPI  Eyes: Negative.    Cardiovascular: Negative.  Negative for chest pain, palpitations and leg swelling.   Musculoskeletal: Negative.    Neurological: Negative.      PHYSICAL EXAMINATION    Physical Exam   Constitutional: She is oriented to person, place, and time. She appears well-developed and well-nourished.   HENT:   Head: Normocephalic and atraumatic.   Right Ear: External ear normal.   Left Ear: External ear normal.   Nose: Nose normal. Right sinus exhibits no maxillary sinus tenderness and no frontal sinus tenderness. " Left sinus exhibits no maxillary sinus tenderness and no frontal sinus tenderness.   Mouth/Throat: Mucous membranes are normal. Posterior oropharyngeal erythema (mild) present. No oropharyngeal exudate, posterior oropharyngeal edema or tonsillar abscesses.   Eyes: Pupils are equal, round, and reactive to light. Conjunctivae and EOM are normal.   Cardiovascular: Normal rate, regular rhythm and normal heart sounds.   Pulmonary/Chest: Effort normal and breath sounds normal. No respiratory distress. She has no wheezes.   Lymphadenopathy:     She has no cervical adenopathy.   Neurological: She is alert and oriented to person, place, and time.   Skin: Skin is warm and dry.   Vitals reviewed.    REVIEWED DATA:    Labs:   Lab Results   Component Value Date     09/28/2019    K 4.0 09/28/2019    AST 49 (H) 09/28/2019    ALT 22 09/28/2019    BUN 16 09/28/2019    CREATININE 0.77 09/28/2019    CREATININE 0.79 09/12/2019    CREATININE 0.57 09/04/2019    EGFRIFNONA 72 09/28/2019    EGFRIFAFRI 85 09/12/2019       Lab Results   Component Value Date    HGBA1C 6.30 (H) 12/12/2019    HGBA1C 6.20 (H) 06/10/2019    HGBA1C 5.90 (H) 09/28/2016    GLUCOSE 138 (H) 09/28/2019    GLUCOSE 117 (H) 09/04/2019    GLUCOSE 107 (H) 09/03/2019       Lab Results   Component Value Date    LDL 81 09/12/2019    LDL 78 07/17/2019     (H) 06/10/2019    HDL 37 (L) 09/12/2019    HDL 39 (L) 07/17/2019    HDL 50 06/10/2019    TRIG 129 09/12/2019    TRIG 94 07/17/2019    TRIG 92 07/17/2019    CHOLHDLRATIO 3.89 09/12/2019    CHOLHDLRATIO 4.00 06/10/2019       Lab Results   Component Value Date    TSH 2.840 12/12/2019    FREET4 1.13 12/12/2019          Lab Results   Component Value Date    WBC 8.91 09/28/2019    HGB 12.3 09/28/2019    HGB 10.6 (L) 09/04/2019    HGB 11.1 (L) 08/28/2019     09/28/2019       Lab Results   Component Value Date    PROTEIN Negative 06/10/2019    GLUCOSEU Negative 07/16/2019    BLOODU Negative 07/16/2019    NITRITEU  Negative 07/16/2019    LEUKOCYTESUR Moderate (2+) (A) 07/16/2019       Imaging:        Medical Tests:        Summary of old records / correspondence / consultant report:        Request outside records:        ALLERGIES  Allergies   Allergen Reactions   • Ancef [Cefazolin] Other (See Comments)     Hypotension/bradycardia   • Codeine Hives   • Penicillins Other (See Comments)     Hypotension (Ancef allergy)    • Sulfa Antibiotics Hives        PFSH:     The following portions of the patient's history were reviewed and updated as appropriate: Allergies / Current Medications / Past Medical History / Surgical History / Social History / Family History    PROBLEM LIST   Patient Active Problem List   Diagnosis   • Colon polyp   • Gastroesophageal reflux disease   • Essential hypertension   • Dyslipidemia (high LDL; low HDL)   • Mitral and aortic valve disease   • Heart valve disease   • Anxiety   • Peripheral neuropathy   • Malignant neoplasm of cheek (CMS/HCC)   • Non-rheumatic mitral regurgitation   • Osteopenia   • SVT (supraventricular tachycardia) (CMS/[facility])   • AV block, Mobitz 1   • Cardiac pacemaker in situ   • RLS (restless legs syndrome)   • Phobia, flying   • Prediabetes   • Idiopathic acute pancreatitis without infection or necrosis   • Mesenteric ischemia due to arterial insufficiency (CMS/[facility])   • Sick sinus syndrome (CMS/HCC)   • Coronary artery disease involving native coronary artery of native heart with angina pectoris (CMS/[facility])   • S/P CABG (coronary artery bypass graft)   • History of acute pancreatitis       PAST MEDICAL HISTORY  Past Medical History:   Diagnosis Date   • Cancer (CMS/HCC)     skin   • Cardiac pacemaker in situ    • Carpal tunnel syndrome    • Cataract    • Cystic fibroadenosis of breast    • GERD (gastroesophageal reflux disease)    • Hypercholesterolemia    • Hypertension    • Osteoarthritis of both hands    • Pancreatitis    • Peripheral neuropathy    • Restless leg syndrome    • Second  degree AV block, Mobitz type I    • SVT (supraventricular tachycardia) (CMS/HCC)        SURGICAL HISTORY  Past Surgical History:   Procedure Laterality Date   • APPENDECTOMY     • BILATERAL OOPHORECTOMY Bilateral 1988   • BREAST CYST EXCISION Bilateral     4 BREAST SURGERIES   • CARDIAC CATHETERIZATION N/A 8/22/2019    Procedure: Left Heart Cath;  Surgeon: Francis Mccullough MD;  Location: [facility] CATH INVASIVE LOCATION;  Service: Cardiology   • CARDIAC CATHETERIZATION N/A 8/22/2019    Procedure: Left ventriculography;  Surgeon: Francis Mccullough MD;  Location: [facility] CATH INVASIVE LOCATION;  Service: Cardiology   • CARDIAC CATHETERIZATION N/A 8/22/2019    Procedure: Coronary angiography;  Surgeon: Francis Mccullough MD;  Location: [facility] CATH INVASIVE LOCATION;  Service: Cardiology   • CARDIAC ELECTROPHYSIOLOGY PROCEDURE N/A 10/10/2016    Procedure: Pacemaker DC new  [place];  Surgeon: Wilfrido Hameed MD;  Location: [facility] CATH INVASIVE LOCATION;  Service:    • CARPAL TUNNEL RELEASE Right    • CATARACT EXTRACTION      NOVEMBER AND DECEMBER 2014   • CHOLECYSTECTOMY     • COLONOSCOPY  2015   • CORONARY ARTERY BYPASS GRAFT N/A 8/23/2019    Procedure: SUMMER STERNOTOMY CORONARY ARTERY BYPASS GRAFT TIMES 6 USING LEFT INTERNAL MAMMARY ARTERY AND LEFT GREATER SAPHENOUS VEIN GRAFT PER ENDOSCOPIC VEIN HARVESTING AND PRP;  Surgeon: Kem Hawk MD;  Location: [facility] OR;  Service: Cardiothoracic   • ENDOSCOPY N/A 7/18/2019    Procedure: ESOPHAGOGASTRODUODENOSCOPY with biopsy;  Surgeon: Ricky Chew MD;  Location: [facility] ENDOSCOPY;  Service: Gastroenterology   • HYSTERECTOMY     • KNEE ARTHROSCOPY Bilateral 04/2014    MENISCAL TEAR   • PACEMAKER IMPLANTATION  10/10/2016   • SHOULDER SURGERY Right     ROTATOR CUFF SURGERY JUNE 18, 2015   • TONSILLECTOMY     • TRIGGER FINGER RELEASE      both hands       SOCIAL HISTORY  Social History     Socioeconomic History   • Marital status:      Spouse name: Rishabh*   •  Number of children: 2   • Years of education: 13   • Highest education level: Some college, no degree   Occupational History   • 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)   Tobacco Use   • Smoking status: Former Smoker     Packs/day: 0.50     Years: 14.00     Pack years: 7.00     Start date:      Last attempt to quit:      Years since quittin.0   • Smokeless tobacco: Never Used   • Tobacco comment: quit age 28   Substance and Sexual Activity   • Alcohol use: Yes     Alcohol/week: 2.0 standard drinks     Types: 1 Glasses of wine, 1 Cans of beer per week     Frequency: Monthly or less     Drinks per session: 1 or 2     Comment: rare glass of wine or beer rarely/  No caffeine use   • Drug use: No   • Sexual activity: Never   Lifestyle   • Physical activity:     Days per week: 5 days     Minutes per session: 30 min   • Stress: Not at all       FAMILY HISTORY  Family History   Problem Relation Age of Onset   • Thyroid disease Daughter    • Lung cancer Brother    • Hypertension Mother    • Aortic stenosis Mother    • Coronary artery disease Mother          78 (smoker)   • Hypertension Father    • Depression Father    • Anxiety disorder Father    • Diabetes Father    • Heart failure Father    • Cirrhosis Father         alcohol   • Alcohol abuse Sister    • Lupus Sister    • Heart disease Sister    • Diabetes type II Sister    • Alcohol abuse Brother    • Drug abuse Brother    • Heart disease Brother    • Cancer Brother         bladder (smoker)   • Heart disease Brother    • Breast cancer Paternal Aunt    • Neuropathy Neg Hx    • Colon cancer Neg Hx    • Dementia Neg Hx

## 2020-01-17 ENCOUNTER — CLINICAL SUPPORT NO REQUIREMENTS (OUTPATIENT)
Dept: CARDIOLOGY | Facility: CLINIC | Age: 80
End: 2020-01-17

## 2020-01-17 DIAGNOSIS — I49.5 SICK SINUS SYNDROME (HCC): Primary | ICD-10-CM

## 2020-01-17 PROCEDURE — 93294 REM INTERROG EVL PM/LDLS PM: CPT | Performed by: INTERNAL MEDICINE

## 2020-01-17 PROCEDURE — 93296 REM INTERROG EVL PM/IDS: CPT | Performed by: INTERNAL MEDICINE

## 2020-01-23 ENCOUNTER — OFFICE (OUTPATIENT)
Dept: URBAN - METROPOLITAN AREA CLINIC 75 | Facility: CLINIC | Age: 80
End: 2020-01-23

## 2020-01-23 VITALS
RESPIRATION RATE: 16 BRPM | HEIGHT: 67 IN | SYSTOLIC BLOOD PRESSURE: 118 MMHG | DIASTOLIC BLOOD PRESSURE: 60 MMHG | WEIGHT: 155 LBS

## 2020-01-23 DIAGNOSIS — R85.0 ABNORMAL LEVEL OF ENZYMES IN SPECIMENS FROM DIGESTIVE ORGANS: ICD-10-CM

## 2020-01-23 DIAGNOSIS — R10.13 EPIGASTRIC PAIN: ICD-10-CM

## 2020-01-23 PROCEDURE — 99213 OFFICE O/P EST LOW 20 MIN: CPT | Performed by: NURSE PRACTITIONER

## 2020-01-28 RX ORDER — OMEPRAZOLE 40 MG/1
CAPSULE, DELAYED RELEASE ORAL
Qty: 90 CAPSULE | Refills: 3 | Status: SHIPPED | OUTPATIENT
Start: 2020-01-28 | End: 2021-01-05

## 2020-01-30 DIAGNOSIS — F40.243 PHOBIA, FLYING: ICD-10-CM

## 2020-01-30 RX ORDER — LORAZEPAM 0.5 MG/1
TABLET ORAL
Qty: 8 TABLET | Refills: 0 | Status: SHIPPED | OUTPATIENT
Start: 2020-01-30 | End: 2021-03-01

## 2020-01-30 NOTE — TELEPHONE ENCOUNTER
lov 12/12/19   Nov 4/13/20  Last refill 1/9/19 ////8 tablet only  cristal 12/31/19  Contract 1/9/19  uds 3/4/19

## 2020-01-30 NOTE — TELEPHONE ENCOUNTER
----- Message from Reanna Higgins sent at 1/30/2020  2:09 PM EST -----  Regarding: Non-Urgent Medical Question  Contact: [phone]  Dr. Li, I will be traveling to [place] Feb. 22---returning March 1--2020----Will travel again March 3---and will fly to [place], and return March 31, 2020------would like to request Lorazepan-------I use [facility], pharmacy in [employer], at  [facility], [phone]    Thank you, Reanna Higgins    1940

## 2020-02-04 DIAGNOSIS — E03.8 SUBCLINICAL HYPOTHYROIDISM: ICD-10-CM

## 2020-02-04 RX ORDER — LEVOTHYROXINE SODIUM 0.03 MG/1
TABLET ORAL
Qty: 90 TABLET | Refills: 3 | Status: SHIPPED | OUTPATIENT
Start: 2020-02-04 | End: 2021-01-21

## 2020-04-21 ENCOUNTER — CLINICAL SUPPORT NO REQUIREMENTS (OUTPATIENT)
Dept: CARDIOLOGY | Facility: CLINIC | Age: 80
End: 2020-04-21

## 2020-04-21 ENCOUNTER — TELEPHONE (OUTPATIENT)
Dept: CARDIOLOGY | Facility: CLINIC | Age: 80
End: 2020-04-21

## 2020-04-21 DIAGNOSIS — I49.5 SICK SINUS SYNDROME (HCC): Primary | ICD-10-CM

## 2020-04-21 PROCEDURE — 93296 REM INTERROG EVL PM/IDS: CPT | Performed by: INTERNAL MEDICINE

## 2020-04-21 PROCEDURE — 93294 REM INTERROG EVL PM/LDLS PM: CPT | Performed by: INTERNAL MEDICINE

## 2020-04-21 NOTE — TELEPHONE ENCOUNTER
Remote dual chamber pacemaker report received and reviewed. Patient had an NSVT episode on 2/14/20, 18 beats. Patient does not recall any symptoms. Strip below.

## 2020-04-22 ENCOUNTER — TELEPHONE (OUTPATIENT)
Dept: CARDIOLOGY | Facility: CLINIC | Age: 80
End: 2020-04-22

## 2020-04-22 RX ORDER — LISINOPRIL 10 MG/1
10 TABLET ORAL DAILY
Qty: 30 TABLET | Refills: 11 | Status: SHIPPED | OUTPATIENT
Start: 2020-04-22 | End: 2020-05-27

## 2020-04-22 NOTE — TELEPHONE ENCOUNTER
Pt called stating after she took her metoprolol, her bp went down to 154/74. Pt states she does feel better but her bp is still elevated. Please advise.    Lisha HOLLIS, CMA

## 2020-04-22 NOTE — TELEPHONE ENCOUNTER
Spoke with pt, advised her she was being prescribed lisinopril 10mg. Pt has been set up for a phone visit Monday 4-27, at 10:30am. Pt verbalized understanding.     Lisha HOLLIS CMA

## 2020-04-22 NOTE — TELEPHONE ENCOUNTER
Ok lets call her in lisinopril 10 mg daily #30 with 4 refills. Then she needs an apt with me in a week

## 2020-04-27 ENCOUNTER — OFFICE VISIT (OUTPATIENT)
Dept: CARDIOLOGY | Facility: CLINIC | Age: 80
End: 2020-04-27

## 2020-04-27 VITALS
BODY MASS INDEX: 23.96 KG/M2 | SYSTOLIC BLOOD PRESSURE: 148 MMHG | WEIGHT: 153 LBS | HEART RATE: 62 BPM | DIASTOLIC BLOOD PRESSURE: 79 MMHG

## 2020-04-27 DIAGNOSIS — I25.119 CORONARY ARTERY DISEASE INVOLVING NATIVE CORONARY ARTERY OF NATIVE HEART WITH ANGINA PECTORIS (HCC): Chronic | ICD-10-CM

## 2020-04-27 DIAGNOSIS — E78.5 DYSLIPIDEMIA (HIGH LDL; LOW HDL): Chronic | ICD-10-CM

## 2020-04-27 DIAGNOSIS — I10 ESSENTIAL HYPERTENSION: Chronic | ICD-10-CM

## 2020-04-27 DIAGNOSIS — Z95.0 CARDIAC PACEMAKER IN SITU: ICD-10-CM

## 2020-04-27 DIAGNOSIS — I34.0 NON-RHEUMATIC MITRAL REGURGITATION: ICD-10-CM

## 2020-04-27 DIAGNOSIS — Z95.1 S/P CABG (CORONARY ARTERY BYPASS GRAFT): Primary | ICD-10-CM

## 2020-04-27 DIAGNOSIS — Z87.19 HISTORY OF ACUTE PANCREATITIS: ICD-10-CM

## 2020-04-27 PROCEDURE — 99443 PR PHYS/QHP TELEPHONE EVALUATION 21-30 MIN: CPT | Performed by: NURSE PRACTITIONER

## 2020-04-27 NOTE — PROGRESS NOTES
Date of Office Visit: 2020  Encounter Provider: LUIS A Hahn  Place of Service: [facility] CARDIOLOGY  Patient Name: Reanna Higgins  :1940    Chief Complaint   Patient presents with   • Coronary Artery Disease   :     HPI:Reanna Higgins is a 80 year old female who is a patient of Dr Mccullough and is new to me today.She has a history of coronary disease. In August of last year she came in with acute onset of angina and underwent cardiac cath showing severe multivessel disease. It was not amendable to PCI and she underwent a 6 vessel CABG with LIMA to the LAD, SVG to RCA, OM, OM3 and sequential graft to the ramus and the D1. She developed a small bowel obstruction and required NGT which did prolong her stay. Her echo at that time showed an EF of 60%, MIld MR and TR. She had a sigmoid shaped septum consistent with hypertrophy.     She was last in the office in October to see Dr. Mccullough and cardiac wise had been doing well. She had a few boughts of pancreatitis. She also has a PPM secondary to SSS. She had elevated blood pressure one night last week that woke her up.  She called our answering service and I was on call.  She took an extra metoprolol and was able to fall asleep.  The next day her blood pressure was still little elevated and I added some lisinopril to her medical regimen.  She states she is still getting some high blood pressures.  She denies any chest discomfort but has been having headaches.  She also started taking an herbal supplement for nerve pain.  It appears to be combination of vitamin B12 and alpha lipoic acid.  She is very active she rides a bike 5 to 8 miles a day and roller skates regularly.      Past Medical History:   Diagnosis Date   • Cancer (CMS/HCC)     skin   • Cardiac pacemaker in situ    • Carpal tunnel syndrome    • Cataract    • Cystic fibroadenosis of breast    • GERD (gastroesophageal reflux disease)    • Hypercholesterolemia    •  Hypertension    • Osteoarthritis of both hands    • Pancreatitis    • Peripheral neuropathy    • Restless leg syndrome    • Second degree AV block, Mobitz type I    • SVT (supraventricular tachycardia) (CMS/HCC)        Past Surgical History:   Procedure Laterality Date   • APPENDECTOMY     • BILATERAL OOPHORECTOMY Bilateral 1988   • BREAST CYST EXCISION Bilateral     4 BREAST SURGERIES   • CARDIAC CATHETERIZATION N/A 8/22/2019    Procedure: Left Heart Cath;  Surgeon: Francis Mccullough MD;  Location: [facility] CATH INVASIVE LOCATION;  Service: Cardiology   • CARDIAC CATHETERIZATION N/A 8/22/2019    Procedure: Left ventriculography;  Surgeon: Francis Mccullough MD;  Location: [facility] CATH INVASIVE LOCATION;  Service: Cardiology   • CARDIAC CATHETERIZATION N/A 8/22/2019    Procedure: Coronary angiography;  Surgeon: Francis Mccullough MD;  Location: [facility] CATH INVASIVE LOCATION;  Service: Cardiology   • CARDIAC ELECTROPHYSIOLOGY PROCEDURE N/A 10/10/2016    Procedure: Pacemaker DC new  [place];  Surgeon: Wilfrido Hameed MD;  Location: [facility] CATH INVASIVE LOCATION;  Service:    • CARPAL TUNNEL RELEASE Right    • CATARACT EXTRACTION      NOVEMBER AND DECEMBER 2014   • CHOLECYSTECTOMY     • COLONOSCOPY  2015   • CORONARY ARTERY BYPASS GRAFT N/A 8/23/2019    Procedure: SUMMER STERNOTOMY CORONARY ARTERY BYPASS GRAFT TIMES 6 USING LEFT INTERNAL MAMMARY ARTERY AND LEFT GREATER SAPHENOUS VEIN GRAFT PER ENDOSCOPIC VEIN HARVESTING AND PRP;  Surgeon: Kem Hawk MD;  Location: [facility] OR;  Service: Cardiothoracic   • ENDOSCOPY N/A 7/18/2019    Procedure: ESOPHAGOGASTRODUODENOSCOPY with biopsy;  Surgeon: Ricky Chew MD;  Location: [facility] ENDOSCOPY;  Service: Gastroenterology   • HYSTERECTOMY     • KNEE ARTHROSCOPY Bilateral 04/2014    MENISCAL TEAR   • PACEMAKER IMPLANTATION  10/10/2016   • SHOULDER SURGERY Right     ROTATOR CUFF SURGERY JUNE 18, 2015   • TONSILLECTOMY     • TRIGGER FINGER RELEASE      both hands        Social History     Socioeconomic History   • Marital status:      Spouse name: Rishabh*   • Number of children: 2   • Years of education: 13   • Highest education level: Some college, no degree   Occupational History   • 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)   Tobacco Use   • Smoking status: Former Smoker     Packs/day: 0.50     Years: 14.00     Pack years: 7.00     Start date:      Last attempt to quit:      Years since quittin.3   • Smokeless tobacco: Never Used   • Tobacco comment: quit age 28   Substance and Sexual Activity   • Alcohol use: Yes     Alcohol/week: 2.0 standard drinks     Types: 1 Glasses of wine, 1 Cans of beer per week     Frequency: Monthly or less     Drinks per session: 1 or 2     Comment: rare glass of wine or beer rarely/  No caffeine use   • Drug use: No   • Sexual activity: Never   Lifestyle   • Physical activity:     Days per week: 5 days     Minutes per session: 30 min   • Stress: Not at all       Family History   Problem Relation Age of Onset   • Thyroid disease Daughter    • Lung cancer Brother    • Hypertension Mother    • Aortic stenosis Mother    • Coronary artery disease Mother          78 (smoker)   • Hypertension Father    • Depression Father    • Anxiety disorder Father    • Diabetes Father    • Heart failure Father    • Cirrhosis Father         alcohol   • Alcohol abuse Sister    • Lupus Sister    • Heart disease Sister    • Diabetes type II Sister    • Alcohol abuse Brother    • Drug abuse Brother    • Heart disease Brother    • Cancer Brother         bladder (smoker)   • Heart disease Brother    • Breast cancer Paternal Aunt    • Neuropathy Neg Hx    • Colon cancer Neg Hx    • Dementia Neg Hx        ROS    Allergies   Allergen Reactions   • Ancef [Cefazolin] Other (See Comments)     Hypotension/bradycardia   • Codeine Hives   • Penicillins Other (See Comments)     Hypotension (Ancef allergy)    • Sulfa Antibiotics Hives         Current  Outpatient Medications:   •  aspirin 81 MG EC tablet, Take 1 tablet by mouth Daily., Disp: , Rfl:   •  cholecalciferol (VITAMIN D3) 1000 UNITS tablet, Take 1 tablet by mouth Daily., Disp: , Rfl:   •  Coenzyme Q10 (COQ10) 100 MG capsule, Take  by mouth Daily., Disp: , Rfl:   •  cycloSPORINE (RESTASIS) 0.05 % ophthalmic emulsion, Administer 1 drop to both eyes 2 (Two) Times a Day., Disp: , Rfl:   •  gabapentin (NEURONTIN) 100 MG capsule, TAKE 2 CAPSULES BY MOUTH 3 (THREE) TIMES A DAY., Disp: 180 capsule, Rfl: 2  •  hyoscyamine (LEVSIN) 0.125 MG SL tablet, Prn, Disp: , Rfl:   •  levothyroxine (SYNTHROID, LEVOTHROID) 25 MCG tablet, TAKE 1 TABLET BY MOUTH EVERY DAY, Disp: 90 tablet, Rfl: 3  •  lisinopril (PRINIVIL,ZESTRIL) 10 MG tablet, Take 1 tablet by mouth Daily., Disp: 30 tablet, Rfl: 11  •  metoprolol succinate XL (TOPROL-XL) 50 MG 24 hr tablet, TAKE 1 TABLET BY MOUTH 2 (TWO) TIMES A DAY., Disp: 180 tablet, Rfl: 3  •  Multiple Vitamins-Minerals (CENTRUM SILVER 50+WOMEN) tablet, Take 1 tablet by mouth Daily., Disp: , Rfl:   •  omeprazole (priLOSEC) 40 MG capsule, TAKE 1 CAPSULE BY MOUTH EVERY DAY, Disp: 90 capsule, Rfl: 3  •  pravastatin (PRAVACHOL) 40 MG tablet, Take 1 tablet by mouth Every Night., Disp: 90 tablet, Rfl: 3  •  LORazepam (ATIVAN) 0.5 MG tablet, One tablet 30 minutes before plane flight, may repeat times 1 in 4-6 hours., Disp: 8 tablet, Rfl: 0      Objective:     Vitals:    04/27/20 1032   BP: 148/79   Pulse: 62   Weight: 69.4 kg (153 lb)     Body mass index is 23.96 kg/m².    PHYSICAL EXAM:    Physical Exam    Procedures      Assessment:       Diagnosis Plan   1. S/P CABG (coronary artery bypass graft)     2. Dyslipidemia (high LDL; low HDL)     3. History of acute pancreatitis     4. Non-rheumatic mitral regurgitation     5. Coronary artery disease involving native coronary artery of native heart with angina pectoris (CMS/HCC)     6. Essential hypertension     7. Cardiac pacemaker in situ       No  orders of the defined types were placed in this encounter.         Plan:        I am getting have Reanna increase her metoprolol to 1-1/2 tablets twice a day and see if this helps bring her blood pressure down a little more.  She feels good on the beta-blocker and does not have any beta-blocker fatigue.  I would really like to get some blood work on her but due to coronavirus we will hold off another month.  I have encouraged her to watch her sodium intake and she has been very strict with her diet because her hemoglobin A1c was a little elevated in December.  I am going to follow back up with her in a month.    This patient has consented to a telehealth visit via telephone. The visit was scheduled as a telephone visit to comply with patient safety concerns in accordance with CDC recommendations.  All vitals recorded within this visit are reported by the patient.  I spent 25 minutes in total including but not limited to the 21 minutes spent in direct conversation with this patient.            Your medication list           Accurate as of April 27, 2020 10:55 AM. If you have any questions, ask your nurse or doctor.               CONTINUE taking these medications      Instructions Last Dose Given Next Dose Due   aspirin 81 MG EC tablet      Take 1 tablet by mouth Daily.       Centrum Silver 50+Women tablet      Take 1 tablet by mouth Daily.       cholecalciferol 25 MCG (1000 UT) tablet  Commonly known as:  VITAMIN D3      Take 1 tablet by mouth Daily.       CoQ10 100 MG capsule      Take  by mouth Daily.       cycloSPORINE 0.05 % ophthalmic emulsion  Commonly known as:  RESTASIS      Administer 1 drop to both eyes 2 (Two) Times a Day.       gabapentin 100 MG capsule  Commonly known as:  NEURONTIN      TAKE 2 CAPSULES BY MOUTH 3 (THREE) TIMES A DAY.       hyoscyamine 0.125 MG SL tablet  Commonly known as:  LEVSIN      Prn       levothyroxine 25 MCG tablet  Commonly known as:  SYNTHROID, LEVOTHROID      TAKE 1 TABLET BY  MOUTH EVERY DAY       lisinopril 10 MG tablet  Commonly known as:  PRINIVIL,ZESTRIL      Take 1 tablet by mouth Daily.       LORazepam 0.5 MG tablet  Commonly known as:  Ativan      One tablet 30 minutes before plane flight, may repeat times 1 in 4-6 hours.       metoprolol succinate XL 50 MG 24 hr tablet  Commonly known as:  TOPROL-XL      TAKE 1 TABLET BY MOUTH 2 (TWO) TIMES A DAY.       omeprazole 40 MG capsule  Commonly known as:  priLOSEC      TAKE 1 CAPSULE BY MOUTH EVERY DAY       pravastatin 40 MG tablet  Commonly known as:  PRAVACHOL      Take 1 tablet by mouth Every Night.                As always, it has been a pleasure to participate in your patient's care.      Sincerely,     Mary GUNN

## 2020-05-27 ENCOUNTER — TELEPHONE (OUTPATIENT)
Dept: CARDIOLOGY | Facility: CLINIC | Age: 80
End: 2020-05-27

## 2020-05-27 ENCOUNTER — OFFICE VISIT (OUTPATIENT)
Dept: CARDIOLOGY | Facility: CLINIC | Age: 80
End: 2020-05-27

## 2020-05-27 VITALS
SYSTOLIC BLOOD PRESSURE: 129 MMHG | HEIGHT: 67 IN | DIASTOLIC BLOOD PRESSURE: 55 MMHG | WEIGHT: 153 LBS | BODY MASS INDEX: 24.01 KG/M2 | HEART RATE: 60 BPM

## 2020-05-27 DIAGNOSIS — I34.0 NON-RHEUMATIC MITRAL REGURGITATION: ICD-10-CM

## 2020-05-27 DIAGNOSIS — Z95.1 S/P CABG (CORONARY ARTERY BYPASS GRAFT): ICD-10-CM

## 2020-05-27 DIAGNOSIS — I25.119 CORONARY ARTERY DISEASE INVOLVING NATIVE CORONARY ARTERY OF NATIVE HEART WITH ANGINA PECTORIS (HCC): Chronic | ICD-10-CM

## 2020-05-27 DIAGNOSIS — I10 ESSENTIAL HYPERTENSION: Primary | Chronic | ICD-10-CM

## 2020-05-27 DIAGNOSIS — Z95.0 CARDIAC PACEMAKER IN SITU: ICD-10-CM

## 2020-05-27 DIAGNOSIS — I49.5 SICK SINUS SYNDROME (HCC): ICD-10-CM

## 2020-05-27 DIAGNOSIS — I47.1 SVT (SUPRAVENTRICULAR TACHYCARDIA) (HCC): ICD-10-CM

## 2020-05-27 PROCEDURE — 99442 PR PHYS/QHP TELEPHONE EVALUATION 11-20 MIN: CPT | Performed by: NURSE PRACTITIONER

## 2020-05-27 RX ORDER — LISINOPRIL 20 MG/1
20 TABLET ORAL DAILY
Qty: 30 TABLET | Refills: 11 | Status: SHIPPED | OUTPATIENT
Start: 2020-05-27 | End: 2020-07-10

## 2020-05-27 NOTE — TELEPHONE ENCOUNTER
"Pt last pacemaker check was by remote on 4/21. She is not due another remote check until 7/21. Her next clinic is not due until Oct. Her April remote check was normal. The \"glitch\" she is referring to is a recorded episode of Nonsust VT from 2/14/20 lasting 18 beats. When Sunshine spoke with her in April she did not recall any symptoms. While not freq NST is not new for her. No other events recorded. I will speak to her.  NS  "

## 2020-05-27 NOTE — TELEPHONE ENCOUNTER
"----- Message from Reanna Higgins sent at 5/24/2020  9:58 AM EDT -----  Regarding: Non-Urgent Medical Question  Contact: [phone]  Ac Hernandez---I have a follow-up  visit with you this coming week, May 27------what we have not talked about is, my pacemaker has only been checked \"by phone\" April 20th--------and there had been a \"glitch on Feb. 14th I was told when it was reported to me on April 21.  It has been a long time since your office has seen me for this---what are your thoughts on this?  Would it be considered for this procedure when I come in on Wed. May 27th?    Thanks for your consideration.    Reanna Higgins  "

## 2020-05-27 NOTE — PROGRESS NOTES
Date of Office Visit: 2020  Encounter Provider: LUIS A Hahn  Place of Service: [facility] CARDIOLOG[facility]  Patient Name: Reanna Higgins  :1940    Chief Complaint   Patient presents with   • Coronary Artery Disease     s/p CABG   • non rheumatic mitral regurgitation   • Hypertension   • dyslipidemia   :     HPI:Reanna Higgins is a 80 year old female who is a patient of Dr Mccullough and is known to me from previous. She has a history of coronary disease. In August of last year she came in with acute onset of angina and underwent cardiac cath showing severe multivessel disease. It was not amendable to PCI and she underwent a 6 vessel CABG with LIMA to the LAD, SVG to RCA, OM, OM3 and sequential graft to the ramus and the D1. She developed a small bowel obstruction and required NGT which did prolong her stay. Her echo at that time showed an EF of 60%, MIld MR and TR. She had a sigmoid shaped septum consistent with hypertrophy.      She was in the office in October to see Dr. Mccullough and cardiac wise had been doing well. She had a few boughts of pancreatitis. She also has a PPM secondary to SSS. She had elevated blood pressure one night last week that woke her up.  She called our answering service and I was on call.  She took an extra metoprolol and was able to fall asleep.  The next day her blood pressure was still little elevated and I added some lisinopril to her medical regimen.  She states she is still getting some high blood pressures.  She denies any chest discomfort but has been having headaches.  She also started taking an herbal supplement for nerve pain.  It appears to be combination of vitamin B12 and alpha lipoic acid.  She is very active she rides a bike 5 to 8 miles a day and roller skates regularly.  I had a telehealth appointment with her on the end of April, her blood pressure was still little high and I increased her metoprolol.  She is seeing me today from   month follow-up.    Since going up on the metoprolol she is felt blocked in tired.  She seems to be having some beta-blocker fatigue.  However the blood pressure is better.  She denies any shortness of breath, palpitations or chest discomfort.    Past Medical History:   Diagnosis Date   • Cancer (CMS/HCC)     skin   • Cardiac pacemaker in situ    • Carpal tunnel syndrome    • Cataract    • Cystic fibroadenosis of breast    • GERD (gastroesophageal reflux disease)    • Hypercholesterolemia    • Hypertension    • Osteoarthritis of both hands    • Pancreatitis    • Peripheral neuropathy    • Restless leg syndrome    • Second degree AV block, Mobitz type I    • SVT (supraventricular tachycardia) (CMS/HCC)        Past Surgical History:   Procedure Laterality Date   • APPENDECTOMY     • BILATERAL OOPHORECTOMY Bilateral 1988   • BREAST CYST EXCISION Bilateral     4 BREAST SURGERIES   • CARDIAC CATHETERIZATION N/A 8/22/2019    Procedure: Left Heart Cath;  Surgeon: Francis Mccullough MD;  Location: [facility] CATH INVASIVE LOCATION;  Service: Cardiology   • CARDIAC CATHETERIZATION N/A 8/22/2019    Procedure: Left ventriculography;  Surgeon: Francis Mccullough MD;  [facility];  Service: Cardiology   • CARDIAC CATHETERIZATION N/A 8/22/2019    Procedure: Coronary angiography;  Surgeon: Francis Mccullough MD;  Location: [facility] CATH INVASIVE LOCATION;  Service: Cardiology   • CARDIAC ELECTROPHYSIOLOGY PROCEDURE N/A 10/10/2016    Procedure: Pacemaker DC new  [place];  Surgeon: Wilfrido Hameed MD;  Location: [facility] CATH INVASIVE LOCATION;  Service:    • CARPAL TUNNEL RELEASE Right    • CATARACT EXTRACTION      NOVEMBER AND DECEMBER 2014   • CHOLECYSTECTOMY     • COLONOSCOPY  2015   • CORONARY ARTERY BYPASS GRAFT N/A 8/23/2019    Procedure: SUMMER STERNOTOMY CORONARY ARTERY BYPASS GRAFT TIMES 6 USING LEFT INTERNAL MAMMARY ARTERY AND LEFT GREATER SAPHENOUS VEIN GRAFT PER ENDOSCOPIC VEIN HARVESTING AND PRP;   Surgeon: Kem Hawk MD;  Location: [facility] MAIN OR;  Service: Cardiothoracic   • ENDOSCOPY N/A 2019    Procedure: ESOPHAGOGASTRODUODENOSCOPY with biopsy;  Surgeon: Ricky Chew MD;  Location: [facility] ENDOSCOPY;  Service: Gastroenterology   • HYSTERECTOMY     • KNEE ARTHROSCOPY Bilateral 2014    MENISCAL TEAR   • PACEMAKER IMPLANTATION  10/10/2016   • SHOULDER SURGERY Right     ROTATOR CUFF SURGERY 2015   • TONSILLECTOMY     • TRIGGER FINGER RELEASE      both hands       Social History     Socioeconomic History   • Marital status:      Spouse name: Rishabh*   • Number of children: 2   • Years of education: 13   • Highest education level: Some college, no degree   Occupational History   • 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)   Tobacco Use   • Smoking status: Former Smoker     Packs/day: 0.50     Years: 14.00     Pack years: 7.00     Start date:      Last attempt to quit:      Years since quittin.4   • Smokeless tobacco: Never Used   • Tobacco comment: quit age 28   Substance and Sexual Activity   • Alcohol use: Yes     Alcohol/week: 2.0 standard drinks     Types: 1 Glasses of wine, 1 Cans of beer per week     Frequency: Monthly or less     Drinks per session: 1 or 2     Comment: rare glass of wine or beer rarely/  No caffeine use   • Drug use: No   • Sexual activity: Never   Lifestyle   • Physical activity:     Days per week: 5 days     Minutes per session: 30 min   • Stress: Not at all       Family History   Problem Relation Age of Onset   • Thyroid disease Daughter    • Lung cancer Brother    • Hypertension Mother    • Aortic stenosis Mother    • Coronary artery disease Mother          78 (smoker)   • Hypertension Father    • Depression Father    • Anxiety disorder Father    • Diabetes Father    • Heart failure Father    • Cirrhosis Father         alcohol   • Alcohol abuse Sister    • Lupus Sister    • Heart disease Sister    • Diabetes type II Sister    •  Alcohol abuse Brother    • Drug abuse Brother    • Heart disease Brother    • Cancer Brother         bladder (smoker)   • Heart disease Brother    • Breast cancer Paternal Aunt    • Neuropathy Neg Hx    • Colon cancer Neg Hx    • Dementia Neg Hx        Review of Systems   Constitution: Positive for malaise/fatigue. Negative for diaphoresis.   Cardiovascular: Negative for chest pain, claudication, dyspnea on exertion, irregular heartbeat, leg swelling, near-syncope, orthopnea, palpitations, paroxysmal nocturnal dyspnea and syncope.   Respiratory: Negative for cough, shortness of breath and sleep disturbances due to breathing.    Musculoskeletal: Negative for falls.   Neurological: Negative for dizziness and weakness.   Psychiatric/Behavioral: Negative for altered mental status and substance abuse.       Allergies   Allergen Reactions   • Ancef [Cefazolin] Other (See Comments)     Hypotension/bradycardia   • Codeine Hives   • Penicillins Other (See Comments)     Hypotension (Ancef allergy)    • Sulfa Antibiotics Hives         Current Outpatient Medications:   •  aspirin 81 MG EC tablet, Take 1 tablet by mouth Daily., Disp: , Rfl:   •  cholecalciferol (VITAMIN D3) 1000 UNITS tablet, Take 1 tablet by mouth Daily., Disp: , Rfl:   •  Coenzyme Q10 (COQ10) 100 MG capsule, Take  by mouth Daily., Disp: , Rfl:   •  cycloSPORINE (RESTASIS) 0.05 % ophthalmic emulsion, Administer 1 drop to both eyes 2 (Two) Times a Day., Disp: , Rfl:   •  gabapentin (NEURONTIN) 100 MG capsule, TAKE 2 CAPSULES BY MOUTH 3 (THREE) TIMES A DAY., Disp: 180 capsule, Rfl: 2  •  hyoscyamine (LEVSIN) 0.125 MG SL tablet, Prn, Disp: , Rfl:   •  levothyroxine (SYNTHROID, LEVOTHROID) 25 MCG tablet, TAKE 1 TABLET BY MOUTH EVERY DAY, Disp: 90 tablet, Rfl: 3  •  LORazepam (ATIVAN) 0.5 MG tablet, One tablet 30 minutes before plane flight, may repeat times 1 in 4-6 hours., Disp: 8 tablet, Rfl: 0  •  metoprolol succinate XL (TOPROL-XL) 50 MG 24 hr tablet, TAKE 1  "TABLET BY MOUTH 2 (TWO) TIMES A DAY. (Patient taking differently: Take 50 mg by mouth 2 (Two) Times a Day. TAKES 1 TAB IN AM & 1.5 TABS IN PM), Disp: 180 tablet, Rfl: 3  •  Multiple Vitamins-Minerals (CENTRUM SILVER 50+WOMEN) tablet, Take 1 tablet by mouth Daily., Disp: , Rfl:   •  omeprazole (priLOSEC) 40 MG capsule, TAKE 1 CAPSULE BY MOUTH EVERY DAY, Disp: 90 capsule, Rfl: 3  •  pravastatin (PRAVACHOL) 40 MG tablet, Take 1 tablet by mouth Every Night., Disp: 90 tablet, Rfl: 3  •  lisinopril (PRINIVIL,ZESTRIL) 20 MG tablet, Take 1 tablet by mouth Daily., Disp: 30 tablet, Rfl: 11      Objective:     Vitals:    05/27/20 1413   BP: 129/55   Pulse: 60   Weight: 69.4 kg (153 lb)   Height: 170.2 cm (67.01\")     Body mass index is 23.96 kg/m².    PHYSICAL EXAM:    Physical Exam    Procedures      Assessment:       Diagnosis Plan   1. Essential hypertension     2. Coronary artery disease involving native coronary artery of native heart with angina pectoris (CMS/HCC)     3. Non-rheumatic mitral regurgitation     4. SVT (supraventricular tachycardia) (CMS/[facility])     5. Cardiac pacemaker in situ     6. Sick sinus syndrome (CMS/[facility])     7. S/P CABG (coronary artery bypass graft)       No orders of the defined types were placed in this encounter.         Plan:       I am going to have her go back on the metoprolol succinate 1 tablet twice a day and instead we will go up on her lisinopril to 20 mg a day.  She is going to keep an eye on her blood pressure and she has a follow-up with me in a month.    This patient has consented to a telehealth visit via telephone. The visit was scheduled as a telephone visit to comply with patient safety concerns in accordance with CDC recommendations.  All vitals recorded within this visit are reported by the patient.  I spent 25 minutes in total including but not limited to the 20 minutes spent in direct conversation with this patient.          Your medication list           Accurate as of May " 27, 2020  2:51 PM. If you have any questions, ask your nurse or doctor.               CHANGE how you take these medications      Instructions Last Dose Given Next Dose Due   lisinopril 20 MG tablet  Commonly known as:  MARIALUISA MCKEONSTRIL  What changed:    · medication strength  · how much to take  Changed by:  LUIS A Hahn      Take 1 tablet by mouth Daily.       metoprolol succinate XL 50 MG 24 hr tablet  Commonly known as:  TOPROL-XL  What changed:  additional instructions      TAKE 1 TABLET BY MOUTH 2 (TWO) TIMES A DAY.          CONTINUE taking these medications      Instructions Last Dose Given Next Dose Due   aspirin 81 MG EC tablet      Take 1 tablet by mouth Daily.       Centrum Silver 50+Women tablet      Take 1 tablet by mouth Daily.       cholecalciferol 25 MCG (1000 UT) tablet  Commonly known as:  VITAMIN D3      Take 1 tablet by mouth Daily.       CoQ10 100 MG capsule      Take  by mouth Daily.       cycloSPORINE 0.05 % ophthalmic emulsion  Commonly known as:  RESTASIS      Administer 1 drop to both eyes 2 (Two) Times a Day.       gabapentin 100 MG capsule  Commonly known as:  NEURONTIN      TAKE 2 CAPSULES BY MOUTH 3 (THREE) TIMES A DAY.       hyoscyamine 0.125 MG SL tablet  Commonly known as:  LEVSIN      Prn       levothyroxine 25 MCG tablet  Commonly known as:  SYNTHROID, LEVOTHROID      TAKE 1 TABLET BY MOUTH EVERY DAY       LORazepam 0.5 MG tablet  Commonly known as:  Ativan      One tablet 30 minutes before plane flight, may repeat times 1 in 4-6 hours.       omeprazole 40 MG capsule  Commonly known as:  priLOSEC      TAKE 1 CAPSULE BY MOUTH EVERY DAY       pravastatin 40 MG tablet  Commonly known as:  PRAVACHOL      Take 1 tablet by mouth Every Night.             Where to Get Your Medications      These medications were sent to [place] [address] [place] [facility] [place], [place] - [address] [place] [address] - [phone] [facility] [phone] [place] [address]    Phone:  [phone]    · lisinopril 20 MG tablet           As always, it has been a pleasure to participate in your patient's care.      Sincerely,     Mary GUNN

## 2020-06-02 ENCOUNTER — TELEPHONE (OUTPATIENT)
Dept: GASTROENTEROLOGY | Facility: CLINIC | Age: 80
End: 2020-06-02

## 2020-06-02 NOTE — TELEPHONE ENCOUNTER
"Pt called and states she is having pain again that she thinks is pancreatitis. Saw Dr Chew last year and was found to have suspected pancreatitis so he sent her to Dr Shields downw. He did a scope on her and told her that he does not suspect pancreatitis but she is having stomach spasms that is causing her pain. She is having symptoms again and she has tried calling Dr Shields office again, but no one is answering and she cannot get a call back. She is now reaching out to Dr Chew. Advised that he is out of the office until next week, but can send a message to one of the MDs at the GI office next door. Pt verb understanding. She states she had two attacks last week. Both times the pain lasted 15 minutes, then resolved. While the pain was present, she broke out in a cold sweat, felt nauseated and almost passed out. The next day, she is very weak. She is not nauseated before or after the attacks, denies any vomiting and she has been able to eat and drink normally, but states she does \"feel sick to her stomach\". She is also sore just under her breast bone. Advised will send the update and call back with any recs. Pt verb understanding.    "

## 2020-06-04 NOTE — TELEPHONE ENCOUNTER
This is not pancreatitis as it would not resolve in 15 minutes.  Would recommend following up with primary physician.

## 2020-06-11 ENCOUNTER — TRANSCRIBE ORDERS (OUTPATIENT)
Dept: ADMINISTRATIVE | Facility: HOSPITAL | Age: 80
End: 2020-06-11

## 2020-06-11 ENCOUNTER — OFFICE VISIT (OUTPATIENT)
Dept: GASTROENTEROLOGY | Facility: CLINIC | Age: 80
End: 2020-06-11

## 2020-06-11 ENCOUNTER — LAB (OUTPATIENT)
Dept: LAB | Facility: HOSPITAL | Age: 80
End: 2020-06-11

## 2020-06-11 VITALS
WEIGHT: 148 LBS | DIASTOLIC BLOOD PRESSURE: 56 MMHG | BODY MASS INDEX: 23.23 KG/M2 | SYSTOLIC BLOOD PRESSURE: 124 MMHG | HEIGHT: 67 IN

## 2020-06-11 DIAGNOSIS — K85.00 IDIOPATHIC ACUTE PANCREATITIS WITHOUT INFECTION OR NECROSIS: Primary | ICD-10-CM

## 2020-06-11 DIAGNOSIS — K85.00 IDIOPATHIC ACUTE PANCREATITIS, UNSPECIFIED COMPLICATION STATUS: ICD-10-CM

## 2020-06-11 DIAGNOSIS — K85.00 IDIOPATHIC ACUTE PANCREATITIS, UNSPECIFIED COMPLICATION STATUS: Primary | ICD-10-CM

## 2020-06-11 LAB
ALBUMIN SERPL-MCNC: 4.3 G/DL (ref 3.5–5.2)
ALBUMIN/GLOB SERPL: 1.7 G/DL
ALP SERPL-CCNC: 64 U/L (ref 39–117)
ALT SERPL W P-5'-P-CCNC: 18 U/L (ref 1–33)
ANION GAP SERPL CALCULATED.3IONS-SCNC: 11.7 MMOL/L (ref 5–15)
AST SERPL-CCNC: 20 U/L (ref 1–32)
BILIRUB SERPL-MCNC: 0.3 MG/DL (ref 0.2–1.2)
BUN BLD-MCNC: 24 MG/DL (ref 8–23)
BUN/CREAT SERPL: 24.2 (ref 7–25)
CALCIUM SPEC-SCNC: 9.3 MG/DL (ref 8.6–10.5)
CHLORIDE SERPL-SCNC: 103 MMOL/L (ref 98–107)
CO2 SERPL-SCNC: 24.3 MMOL/L (ref 22–29)
CREAT BLD-MCNC: 0.99 MG/DL (ref 0.57–1)
GFR SERPL CREATININE-BSD FRML MDRD: 54 ML/MIN/1.73
GLOBULIN UR ELPH-MCNC: 2.6 GM/DL
GLUCOSE BLD-MCNC: 122 MG/DL (ref 65–99)
LIPASE SERPL-CCNC: 23 U/L (ref 13–60)
POTASSIUM BLD-SCNC: 4.3 MMOL/L (ref 3.5–5.2)
PROT SERPL-MCNC: 6.9 G/DL (ref 6–8.5)
SODIUM BLD-SCNC: 139 MMOL/L (ref 136–145)

## 2020-06-11 PROCEDURE — 36415 COLL VENOUS BLD VENIPUNCTURE: CPT | Performed by: INTERNAL MEDICINE

## 2020-06-11 PROCEDURE — 83690 ASSAY OF LIPASE: CPT | Performed by: INTERNAL MEDICINE

## 2020-06-11 PROCEDURE — 80053 COMPREHEN METABOLIC PANEL: CPT | Performed by: INTERNAL MEDICINE

## 2020-06-11 PROCEDURE — 99214 OFFICE O/P EST MOD 30 MIN: CPT | Performed by: INTERNAL MEDICINE

## 2020-06-11 NOTE — PROGRESS NOTES
Subjective   Reanna Higgins is a 80 y.o.. female is here today for follow-up.    Chief Complaint   Patient presents with   • Abdominal Pain     History of Present Illness  Interesting patient.  She was hospitalized last year with clinical pancreatitis and a major increase in her lipase.  Liver chemistries were minimally elevated at that time.  However, patient's CT scan did not confirm pancreatitis.  She rapidly improved, and had a few additional minor attacks, and was referred to Dr. Shields.  Endoscopic ultrasound was normal.  Pancreatic anatomy was normal.  Bile duct anatomy was normal.  Patient has had several good months but over the last couple of months is once again experiencing abrupt onset of diffuse upper abdominal pain.  Nothing particular triggers it.  It may last minutes to hours and is severe but not bad enough to get her into the hospital.    The following portions of the patient's history were reviewed and updated as appropriate: allergies, current medications, past family history, past medical history, past social history, past surgical history and problem list.      Current Outpatient Medications:   •  aspirin 81 MG EC tablet, Take 1 tablet by mouth Daily., Disp: , Rfl:   •  cholecalciferol (VITAMIN D3) 1000 UNITS tablet, Take 1 tablet by mouth Daily., Disp: , Rfl:   •  Coenzyme Q10 (COQ10) 100 MG capsule, Take  by mouth Daily., Disp: , Rfl:   •  cycloSPORINE (RESTASIS) 0.05 % ophthalmic emulsion, Administer 1 drop to both eyes 2 (Two) Times a Day., Disp: , Rfl:   •  gabapentin (NEURONTIN) 100 MG capsule, TAKE 2 CAPSULES BY MOUTH 3 (THREE) TIMES A DAY., Disp: 180 capsule, Rfl: 2  •  hyoscyamine (LEVSIN) 0.125 MG SL tablet, Prn, Disp: , Rfl:   •  levothyroxine (SYNTHROID, LEVOTHROID) 25 MCG tablet, TAKE 1 TABLET BY MOUTH EVERY DAY, Disp: 90 tablet, Rfl: 3  •  lisinopril (PRINIVIL,ZESTRIL) 20 MG tablet, Take 1 tablet by mouth Daily., Disp: 30 tablet, Rfl: 11  •  LORazepam (ATIVAN) 0.5 MG tablet, One  tablet 30 minutes before plane flight, may repeat times 1 in 4-6 hours., Disp: 8 tablet, Rfl: 0  •  metoprolol succinate XL (TOPROL-XL) 50 MG 24 hr tablet, TAKE 1 TABLET BY MOUTH 2 (TWO) TIMES A DAY. (Patient taking differently: Take 50 mg by mouth 2 (Two) Times a Day. TAKES 1 TAB IN AM & 1.5 TABS IN PM), Disp: 180 tablet, Rfl: 3  •  Multiple Vitamins-Minerals (CENTRUM SILVER 50+WOMEN) tablet, Take 1 tablet by mouth Daily., Disp: , Rfl:   •  omeprazole (priLOSEC) 40 MG capsule, TAKE 1 CAPSULE BY MOUTH EVERY DAY, Disp: 90 capsule, Rfl: 3  •  pravastatin (PRAVACHOL) 40 MG tablet, Take 1 tablet by mouth Every Night., Disp: 90 tablet, Rfl: 3    Family History   Problem Relation Age of Onset   • Thyroid disease Daughter    • Lung cancer Brother    • Hypertension Mother    • Aortic stenosis Mother    • Coronary artery disease Mother          78 (smoker)   • Hypertension Father    • Depression Father    • Anxiety disorder Father    • Diabetes Father    • Heart failure Father    • Cirrhosis Father         alcohol   • Alcohol abuse Sister    • Lupus Sister    • Heart disease Sister    • Diabetes type II Sister    • Alcohol abuse Brother    • Drug abuse Brother    • Heart disease Brother    • Cancer Brother         bladder (smoker)   • Heart disease Brother    • Breast cancer Paternal Aunt    • Neuropathy Neg Hx    • Colon cancer Neg Hx    • Dementia Neg Hx        Review of Systems   Respiratory: Negative for shortness of breath.    Cardiovascular: Negative for chest pain.   All other systems reviewed and are negative.      Objective   Physical Exam   Constitutional: She is oriented to person, place, and time. She appears well-developed and well-nourished.   HENT:   Head: Normocephalic and atraumatic.   Right Ear: External ear normal.   Left Ear: External ear normal.   Eyes: Pupils are equal, round, and reactive to light. Conjunctivae and EOM are normal.   Pulmonary/Chest: Effort normal.   Abdominal: Soft.   Neurological:  She is alert and oriented to person, place, and time.   Psychiatric: She has a normal mood and affect. Her behavior is normal. Judgment and thought content normal.   Nursing note and vitals reviewed.      Pertinent laboratory results were reviewed. , Pertinent old records were reviewed.  and Pertinent radiology results were reviewed.     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  I will go ahead and check liver chemistries and a lipase now.  If they are normal, she has been instructed to contact us should she have another attack and then we would like to repeat those studies within 48 hours.  The next step would be ERCP and sphincterotomy.  However we discussed that the risk of pancreatitis can be as high as 20% so it is not a decision that we would take likely by any means.

## 2020-06-15 ENCOUNTER — OFFICE VISIT (OUTPATIENT)
Dept: INTERNAL MEDICINE | Age: 80
End: 2020-06-15

## 2020-06-15 VITALS
BODY MASS INDEX: 23.39 KG/M2 | HEIGHT: 67 IN | TEMPERATURE: 95.8 F | DIASTOLIC BLOOD PRESSURE: 60 MMHG | HEART RATE: 65 BPM | WEIGHT: 149 LBS | OXYGEN SATURATION: 99 % | SYSTOLIC BLOOD PRESSURE: 130 MMHG

## 2020-06-15 DIAGNOSIS — E78.5 DYSLIPIDEMIA (HIGH LDL; LOW HDL): Chronic | ICD-10-CM

## 2020-06-15 DIAGNOSIS — Z00.00 MEDICARE ANNUAL WELLNESS VISIT, SUBSEQUENT: Primary | ICD-10-CM

## 2020-06-15 DIAGNOSIS — R73.03 PREDIABETES: Chronic | ICD-10-CM

## 2020-06-15 DIAGNOSIS — G62.9 PERIPHERAL POLYNEUROPATHY: Chronic | ICD-10-CM

## 2020-06-15 LAB
CHOLEST SERPL-MCNC: 172 MG/DL (ref 0–200)
CHOLEST/HDLC SERPL: 3.51 {RATIO}
HBA1C MFR BLD: 6.2 % (ref 4.8–5.6)
HDLC SERPL-MCNC: 49 MG/DL (ref 40–60)
LDLC SERPL CALC-MCNC: 101 MG/DL (ref 0–100)
TRIGL SERPL-MCNC: 109 MG/DL (ref 0–150)
VLDLC SERPL CALC-MCNC: 21.8 MG/DL

## 2020-06-15 PROCEDURE — 99213 OFFICE O/P EST LOW 20 MIN: CPT | Performed by: INTERNAL MEDICINE

## 2020-06-15 PROCEDURE — 96160 PT-FOCUSED HLTH RISK ASSMT: CPT | Performed by: INTERNAL MEDICINE

## 2020-06-15 PROCEDURE — G0439 PPPS, SUBSEQ VISIT: HCPCS | Performed by: INTERNAL MEDICINE

## 2020-06-15 RX ORDER — VITAMIN B COMPLEX
CAPSULE ORAL DAILY
COMMUNITY
End: 2020-07-10

## 2020-06-15 RX ORDER — GABAPENTIN 100 MG/1
100 CAPSULE ORAL 3 TIMES DAILY
Qty: 270 CAPSULE | Refills: 0 | Status: SHIPPED | OUTPATIENT
Start: 2020-06-15 | End: 2020-09-21

## 2020-06-15 NOTE — PROGRESS NOTES
"06/15/2020      MEDICARE ANNUAL WELLNESS VISIT     Reanna Higgins is a 80 y.o. female who presents for Medicare Wellness-subsequent      Patient's general assessment of her health since a year ago:     - Compared to one year ago, she feels her physical health is about the same without significant change.    - Compared to one year ago, she feels her mental health is about the same without significant change.      HPI for other active medical problems:     Take gabapentin for peripheral neuropathy, trying to decrease/taper off, but feels it helps her and denies significant side effects.     Prediabetes with A1c of 6.3 previously.   Lab Results   Component Value Date     (H) 09/12/2019     (H) 06/10/2019     (H) 01/09/2019     Lab Results   Component Value Date    HGBA1C 6.30 (H) 12/12/2019    HGBA1C 6.20 (H) 06/10/2019    HGBA1C 5.90 (H) 09/28/2016      CAD : stable without angina. On pravastatin 40 mg for hyperlipidemia without problems.       * The required components of Health Risk Assessment (HRA) that were completed by the patient and/or my staff are contained within this note and in the scanned documents titled \"Health Risk Assessment\" within the media section of the patient's chart in [facility].       HISTORY    Recent Hospitalizations:    Recent hospitalization?:     If YES, location, date, and diagnoses:     · Location:   · Date:   · Principle Discharge Dx:   · Secondary Dx:       Patient Care Team:    Patient Care Team:  Ted Li MD as PCP - General (Internal Medicine)  To Rivera MD as Consulting Physician (General Surgery)  Torsten Benson Jr., MD as Consulting Physician (Cardiology)  Arsenio Witt MD as Consulting Physician (Ophthalmology)  Brown Beckman MD as Consulting Physician (Orthopedic Surgery)      Allergies:  Ancef [cefazolin]; Codeine; Penicillins; and Sulfa antibiotics    Medications:  Current Outpatient Medications   Medication Sig Dispense Refill "   • aspirin 81 MG EC tablet Take 1 tablet by mouth Daily.     • B Complex Vitamins (VITAMIN B COMPLEX) capsule capsule Take  by mouth Daily.     • Calcium Carbonate-Vitamin D (CALCIUM-D) 600-400 MG-UNIT tablet Take  by mouth.     • cholecalciferol (VITAMIN D3) 1000 UNITS tablet Take 1 tablet by mouth Daily.     • Coenzyme Q10 (COQ10) 100 MG capsule Take  by mouth Daily.     • cycloSPORINE (RESTASIS) 0.05 % ophthalmic emulsion Administer 1 drop to both eyes 2 (Two) Times a Day.     • gabapentin (NEURONTIN) 100 MG capsule Take 1 capsule by mouth 3 (Three) Times a Day. 270 capsule 0   • hyoscyamine (LEVSIN) 0.125 MG SL tablet Prn     • levothyroxine (SYNTHROID, LEVOTHROID) 25 MCG tablet TAKE 1 TABLET BY MOUTH EVERY DAY 90 tablet 3   • lisinopril (PRINIVIL,ZESTRIL) 20 MG tablet Take 1 tablet by mouth Daily. (Patient taking differently: Take 10 mg by mouth Daily.) 30 tablet 11   • LORazepam (ATIVAN) 0.5 MG tablet One tablet 30 minutes before plane flight, may repeat times 1 in 4-6 hours. 8 tablet 0   • metoprolol succinate XL (TOPROL-XL) 50 MG 24 hr tablet TAKE 1 TABLET BY MOUTH 2 (TWO) TIMES A DAY. 180 tablet 3   • Multiple Vitamins-Minerals (CENTRUM SILVER 50+WOMEN) tablet Take 1 tablet by mouth Daily.     • omeprazole (priLOSEC) 40 MG capsule TAKE 1 CAPSULE BY MOUTH EVERY DAY 90 capsule 3   • pravastatin (PRAVACHOL) 40 MG tablet Take 1 tablet by mouth Every Night. 90 tablet 3     No current facility-administered medications for this visit.         PFSH:     The following portions of the patient's history were reviewed and updated as appropriate: Allergies / Current Medications / Past Medical History / Surgical History / Social History / Family History    Problem List:  Patient Active Problem List   Diagnosis   • Colon polyp   • Gastroesophageal reflux disease   • Essential hypertension   • Dyslipidemia (high LDL; low HDL)   • Mitral and aortic valve disease   • Heart valve disease   • Anxiety   • Peripheral neuropathy    • Malignant neoplasm of cheek (CMS/HCC)   • Non-rheumatic mitral regurgitation   • Osteopenia   • SVT (supraventricular tachycardia) (CMS/HCC)   • AV block, Mobitz 1   • Cardiac pacemaker in situ   • RLS (restless legs syndrome)   • Phobia, flying   • Prediabetes   • Idiopathic acute pancreatitis without infection or necrosis   • Mesenteric ischemia due to arterial insufficiency (CMS/HCC)   • Sick sinus syndrome (CMS/HCC)   • Coronary artery disease involving native coronary artery of native heart with angina pectoris (CMS/HCC)   • S/P CABG (coronary artery bypass graft)   • History of acute pancreatitis       Past Medical History:  Past Medical History:   Diagnosis Date   • Cancer (CMS/HCC)     skin   • Cardiac pacemaker in situ    • Carpal tunnel syndrome    • Cataract    • Cystic fibroadenosis of breast    • GERD (gastroesophageal reflux disease)    • Hypercholesterolemia    • Hypertension    • Osteoarthritis of both hands    • Pancreatitis    • Peripheral neuropathy    • Restless leg syndrome    • Second degree AV block, Mobitz type I    • SVT (supraventricular tachycardia) (CMS/HCC)        Past Surgical History:  Past Surgical History:   Procedure Laterality Date   • APPENDECTOMY     • BILATERAL OOPHORECTOMY Bilateral 1988   • BREAST CYST EXCISION Bilateral     4 BREAST SURGERIES   • CARDIAC CATHETERIZATION N/A 8/22/2019    Procedure: Left Heart Cath;  Surgeon: Francis Mccullough MD;  Location: [facility] CATH INVASIVE LOCATION;  Service: Cardiology   • CARDIAC CATHETERIZATION N/A 8/22/2019    Procedure: Left ventriculography;  Surgeon: Francis Mccullough MD;  [facility];  Service: Cardiology   • CARDIAC CATHETERIZATION N/A 8/22/2019    Procedure: Coronary angiography;  Surgeon: Francis Mccullough MD;  Location: [facility] CATH INVASIVE LOCATION;  Service: Cardiology   • CARDIAC ELECTROPHYSIOLOGY PROCEDURE N/A 10/10/2016    Procedure: Pacemaker DC new  [place];  Surgeon: Wilfrido Hameed  MD;  Location: [facility] CATH INVASIVE LOCATION;  Service:    • CARPAL TUNNEL RELEASE Right    • CATARACT EXTRACTION      NOVEMBER AND 2014   • CHOLECYSTECTOMY     • COLONOSCOPY     • CORONARY ARTERY BYPASS GRAFT N/A 2019    Procedure: SUMMER STERNOTOMY CORONARY ARTERY BYPASS GRAFT TIMES 6 USING LEFT INTERNAL MAMMARY ARTERY AND LEFT GREATER SAPHENOUS VEIN GRAFT PER ENDOSCOPIC VEIN HARVESTING AND PRP;  Surgeon: Kem Hawk MD;  Location: [facility] MAIN OR;  Service: Cardiothoracic   • ENDOSCOPY N/A 2019    Procedure: ESOPHAGOGASTRODUODENOSCOPY with biopsy;  Surgeon: Ricky Chew MD;  Location: [facility] ENDOSCOPY;  Service: Gastroenterology   • HYSTERECTOMY     • KNEE ARTHROSCOPY Bilateral 2014    MENISCAL TEAR   • PACEMAKER IMPLANTATION  10/10/2016   • SHOULDER SURGERY Right     ROTATOR CUFF SURGERY 2015   • TONSILLECTOMY     • TRIGGER FINGER RELEASE      both hands       Social History:  Social History     Socioeconomic History   • Marital status:      Spouse name: Rishabh*   • Number of children: 2   • Years of education: 13   • Highest education level: Some college, no degree   Occupational History   • 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)   Tobacco Use   • Smoking status: Former Smoker     Packs/day: 0.50     Years: 14.00     Pack years: 7.00     Start date:      Last attempt to quit: 1968     Years since quittin.4   • Smokeless tobacco: Never Used   • Tobacco comment: quit age 28   Substance and Sexual Activity   • Alcohol use: Yes     Alcohol/week: 2.0 standard drinks     Types: 1 Glasses of wine, 1 Cans of beer per week     Frequency: Monthly or less     Drinks per session: 1 or 2     Comment: rare glass of wine or beer rarely/  No caffeine use   • Drug use: No   • Sexual activity: Never   Lifestyle   • Physical activity:     Days per week: 5 days     Minutes per session: 30 min   • Stress: Not at all       Family History:  Family History   Problem  "Relation Age of Onset   • Thyroid disease Daughter    • Lung cancer Brother    • Hypertension Mother    • Aortic stenosis Mother    • Coronary artery disease Mother          78 (smoker)   • Hypertension Father    • Depression Father    • Anxiety disorder Father    • Diabetes Father    • Heart failure Father    • Cirrhosis Father         alcohol   • Alcohol abuse Sister    • Lupus Sister    • Heart disease Sister    • Diabetes type II Sister    • Alcohol abuse Brother    • Drug abuse Brother    • Heart disease Brother    • Cancer Brother         bladder (smoker)   • Heart disease Brother    • Breast cancer Paternal Aunt    • Neuropathy Neg Hx    • Colon cancer Neg Hx    • Dementia Neg Hx          PATIENT ASSESSMENT    Vitals:  /60   Pulse 65   Temp 95.8 °F (35.4 °C)   Ht 170.2 cm (67\")   Wt 67.6 kg (149 lb)   SpO2 99%   BMI 23.34 kg/m²   BP Readings from Last 3 Encounters:   06/15/20 130/60   20 124/56   20 129/55     Wt Readings from Last 3 Encounters:   06/15/20 67.6 kg (149 lb)   20 67.1 kg (148 lb)   20 69.4 kg (153 lb)      Body mass index is 23.34 kg/m².    Pain Score    06/15/20 0851   PainSc: 0-No pain         Review of Systems:    Review of Systems   Constitutional neg  Resp neg  CV neg   Neuro peripheral neuropathy  Psych neg     Physical Exam:    Physical Exam  Constitutional  No distress  Cardiovascular Rate  normal . Rhythm: regular . Heart sounds:  normal  Pulmonary/Chest  Effort normal. Breath sounds:  normal  Psychiatric  Alert. Judgment and thought content normal. Mood normal     Reviewed Data:    Labs:   Lab Results   Component Value Date     2020    K 4.3 2020    CALCIUM 9.3 2020    AST 20 2020    ALT 18 2020    BUN 24 (H) 2020    CREATININE 0.99 2020    CREATININE 0.77 2019    CREATININE 0.79 2019    EGFRIFNONA 54 (L) 2020    EGFRIFAFRI 85 2019       Lab Results   Component Value Date    "  (H) 09/12/2019     (H) 06/10/2019     (H) 01/09/2019    HGBA1C 6.30 (H) 12/12/2019    HGBA1C 6.20 (H) 06/10/2019    HGBA1C 5.90 (H) 09/28/2016       Lab Results   Component Value Date    LDL 81 09/12/2019    LDL 78 07/17/2019     (H) 06/10/2019    HDL 37 (L) 09/12/2019    TRIG 129 09/12/2019    CHOLHDLRATIO 3.89 09/12/2019       Lab Results   Component Value Date    TSH 2.840 12/12/2019    FREET4 1.13 12/12/2019          Lab Results   Component Value Date    WBC 8.91 09/28/2019    HGB 12.3 09/28/2019    HGB 10.6 (L) 09/04/2019    HGB 11.1 (L) 08/28/2019     09/28/2019                 No results found for: PSA    Imaging:          Medical Tests:          Screening for Glaucoma:  Previous screening for glaucoma?: Yes      Hearing Loss Screen:  Finger Rub Hearing Test (right ear): passed  Finger Rub Hearing Test (left ear): passed      Urinary Incontinence Screen:  Episodes of urinary incontinence? : No      Depression Screen:  PHQ-2/PHQ-9 Depression Screening 6/15/2020   Little interest or pleasure in doing things 0   Feeling down, depressed, or hopeless 0   Total Score 0        PHQ-2: 0 (Not depressed)    PHQ-9:        FUNCTIONAL, FALL RISK, & COGNITIVE SCREENING (Components below):    DATA:    Functional & Cognitive Status 6/15/2020   Do you have difficulty preparing food and eating? No   Do you have difficulty bathing yourself, getting dressed or grooming yourself? No   Do you have difficulty using the toilet? No   Do you have difficulty moving around from place to place? No   Do you have trouble with steps or getting out of a bed or a chair? No   Current Diet Limited Junk Food   Dental Exam Up to date   Eye Exam Up to date   Exercise (times per week) 7 times per week   Current Exercise Activities Include Cardiovasular Workout on Exercise Equipment   Do you need help using the phone?  No   Are you deaf or do you have serious difficulty hearing?  No   Do you need help with  transportation? No   Do you need help shopping? No   Do you need help preparing meals?  No   Do you need help with housework?  No   Do you need help with laundry? No   Do you need help taking your medications? No   Do you need help managing money? No   Do you ever drive or ride in a car without wearing a seat belt? No   Have you felt unusual stress, anger or loneliness in the last month? -   Do you have difficulty concentrating, remembering or making decisions? No         A) Assessment of Functional Ability:  (Assessment of ability to perform ADL's (showering/bathing, using toilet, dressing, feeding self, moving self around) and IADL's (use telephone, shop, prepare food, housekeep, do laundry, transport independently, take medications independently, and handle finances)    Degree of functional impairment: NONE (based on assessment noted above)      B) Assessment of Fall Risk:  Fall Risk Assessment was completed, and patient is at low risk for falls.       Need for further evaluation of gait, strength, and balance? : No    Timed Up and Go (TUG):   (>= 12 seconds indicates high risk for falling)    Observable abnormalities included: Normal gait pattern       C. Assessment of Cognitive Function:    Mini-Cog Test:     1) Registration (3 objects): Yes   2) Number of objects recalled: 2   3) Clock Draw: Passed? : N/A       Further evaluation required? : No      COUNSELING    A. Identification of Health Risk Factors:    Risk factors include: cardiovascular risk factors, polypharmacy and chronic pain      B. Age-Appropriate Screening Schedule:  (Refer to the list below for future screening recommendations based on patient's age, sex and/or medical conditions. Orders for these recommended tests are listed in the plan section. The patient has been provided with a written plan)    Health Maintenance Topics  Health Maintenance   Topic Date Due   • INFLUENZA VACCINE  08/01/2020   • LIPID PANEL  09/12/2020   • COLONOSCOPY   11/14/2020   • MAMMOGRAM  05/12/2021   • DXA SCAN  06/20/2021   • TDAP/TD VACCINES (3 - Td) 09/15/2022   • ZOSTER VACCINE  Completed       Health Maintenance Topics Due or Over-Due  There are no preventive care reminders to display for this patient.      C. Advanced Care Planning:    Advance Care Planning   ACP discussion was held with the patient during this visit. Patient has an advance directive in EMR which is still valid.        D. Patient Self-Management and Personalized Health Advice:    She has been provided with personalized counseling/information (including brochures/handouts) about:     -- reducing risk for cardiac/vascular events with pre-existing disease, polypharmacy concerns, side effects of medications and judicious use of supplements      She has been recommended for the following preventative services which has been performed today, will be ordered today or ordered/performed on upcoming follow-up visit:     -- counseling for cardiovascular disease risk reduction, fall risk assessment / plan of care completed, urinary incontinence assessment done, screening for diabetes performed (current/reviewed labs/lab ordered)      E. Miscellaneous Items:    -Aspirin use counseling: Taking ASA appropriately as indicated    -Discussed BMI with her. The BMI is in the acceptable range    -Reviewed use of high risk medication in the elderly: YES    -Reviewed for potential of harmful drug interactions in the elderly: YES      IV. WRAP-UP    Assessment & Plan:    1) MEDICARE ANNUAL WELLNESS VISIT    2) OTHER MEDICAL CONDITIONS ADDRESSED TODAY:            Problem List Items Addressed This Visit        High    Prediabetes (Chronic)    Relevant Orders    Hemoglobin A1c       Medium    Dyslipidemia (high LDL; low HDL) (Chronic)    Relevant Orders    Lipid Panel With / Chol / HDL Ratio       Low    Peripheral neuropathy (Chronic)    Overview     **Approved for electronic prescription for gabapentin on 1.9.19**           Relevant Medications    gabapentin (NEURONTIN) 100 MG capsule      Other Visit Diagnoses     Medicare annual wellness visit, subsequent    -  Primary                    Orders Placed This Encounter   Procedures   • Hemoglobin A1c   • Lipid Panel With / Chol / HDL Ratio       Discussion / Summary:        Medications as of TODAY:              Current Outpatient Medications   Medication Sig Dispense Refill   • aspirin 81 MG EC tablet Take 1 tablet by mouth Daily.     • B Complex Vitamins (VITAMIN B COMPLEX) capsule capsule Take  by mouth Daily.     • Calcium Carbonate-Vitamin D (CALCIUM-D) 600-400 MG-UNIT tablet Take  by mouth.     • cholecalciferol (VITAMIN D3) 1000 UNITS tablet Take 1 tablet by mouth Daily.     • Coenzyme Q10 (COQ10) 100 MG capsule Take  by mouth Daily.     • cycloSPORINE (RESTASIS) 0.05 % ophthalmic emulsion Administer 1 drop to both eyes 2 (Two) Times a Day.     • gabapentin (NEURONTIN) 100 MG capsule Take 1 capsule by mouth 3 (Three) Times a Day. 270 capsule 0   • hyoscyamine (LEVSIN) 0.125 MG SL tablet Prn     • levothyroxine (SYNTHROID, LEVOTHROID) 25 MCG tablet TAKE 1 TABLET BY MOUTH EVERY DAY 90 tablet 3   • lisinopril (PRINIVIL,ZESTRIL) 20 MG tablet Take 1 tablet by mouth Daily. (Patient taking differently: Take 10 mg by mouth Daily.) 30 tablet 11   • LORazepam (ATIVAN) 0.5 MG tablet One tablet 30 minutes before plane flight, may repeat times 1 in 4-6 hours. 8 tablet 0   • metoprolol succinate XL (TOPROL-XL) 50 MG 24 hr tablet TAKE 1 TABLET BY MOUTH 2 (TWO) TIMES A DAY. 180 tablet 3   • Multiple Vitamins-Minerals (CENTRUM SILVER 50+WOMEN) tablet Take 1 tablet by mouth Daily.     • omeprazole (priLOSEC) 40 MG capsule TAKE 1 CAPSULE BY MOUTH EVERY DAY 90 capsule 3   • pravastatin (PRAVACHOL) 40 MG tablet Take 1 tablet by mouth Every Night. 90 tablet 3     No current facility-administered medications for this visit.          FOLLOW-UP:            Return in about 6 months (around 12/1/2020) for  Prediabetes, Hypertension, Hyperlipidemia, Heart disease.              Future Appointments   Date Time Provider Department Center   6/24/2020  9:00 AM [facility] None   6/24/2020  9:30 AM Mary Silva APRN [facility] None   8/6/2020  8:00 AM Shivam Vallejo MD [facility]   12/14/2020 10:15 AM Ted Li MD [facility] None         ______________________________________________________________________      A printed After Visit Summary (AVS) including the Personalized Prevention  Plan Services (PPPS) was given to the patient at check-out today.     Educational Handouts    Strong & Stable / Balance / Physical Activity / Pain / Depression /  Forgetfulness / Healthy Eating / Alcohol / Smoking /  Medicine / Sexuality / Scams     **Dragon Disclaimer:   Much of this encounter note is an electronic transcription/translation of spoken language to printed text. The electronic translation of spoken language may permit erroneous, or at times, nonsensical words or phrases to be inadvertently transcribed. Although I have reviewed the note for such errors, some may still exist.     This template was created by Arabella Li MD.

## 2020-06-15 NOTE — PROGRESS NOTES
Andria:    Reanna, here are the result(s) of your test(s):     A1c for average glucose level is slightly lower at 6.2 (from 6.3). Continue dietary modifications.   LDL cholesterol is higher but HDL is improved.     Please do not hesitate to contact me if you have questions.

## 2020-06-18 ENCOUNTER — TELEPHONE (OUTPATIENT)
Dept: GASTROENTEROLOGY | Facility: CLINIC | Age: 80
End: 2020-06-18

## 2020-06-18 NOTE — TELEPHONE ENCOUNTER
----- Message from iRcky Chew MD sent at 6/11/2020  4:03 PM EDT -----  Please call the patient regarding her abnormal result. Mild elevation of the glucose. Liver tests look fine. Lipase is normal.

## 2020-06-18 NOTE — TELEPHONE ENCOUNTER
Called pt... No answer after multiple rings; left a message that per Dr Chew: her recent labwork showed a mild elevation of the glucose, bu her liver tests look fine and th lipase (pancres level) was normal. Pt to call back with nay questions.

## 2020-07-07 NOTE — PROGRESS NOTES
[address] [facility] [address] ? [place], [address] ? Phone [phone] ? Fax [phone]           Return to Work Status    Patient: Francheska Staley  YOB: 1984   Date: 07/07/2020        To Whom It Concerns:    [facility] has adopted CDCs time-based return to work strategy for persons with confirmed or suspected COVID19. [facility] does not recommend using a test-based strategy for returning to work after COVID-19 infection. [facility] has reported prolonged positive test results without evidence of infectiousness. At this time, positive specimens capable of producing disease have not been isolated more than 9 days after onset of illness.   Symptomatic persons with confirmed COVID-19 or suspected COVID-19 can return to work after:    At least 3 days (72 hours) have passed since recovery defined as resolution of fever without the use of fever-reducing medications AND improvement in respiratory symptoms (e.g., cough, shortness of breath)    AND, at least 10 days have passed since symptoms first appeared  Asymptomatic persons with confirmed COVID-19 can return to work after:   At least 10 days have passed since the positive laboratory test and the person remains asymptomatic.  More information about the science behind the symptom-based return to work can be found at: https://www.cdc.gov/coronavirus/2019-ncov/community/edpbaruy-eclzvjuvook-ibnabhnhh.html    Sincerely,    Shweta Trevino NP         Willian Forte, here are the result(s) of your test(s):     1. LDL cholesterol goal is < 70. Yours is 81 currently. Increase cholesterol medication to 6 days a week for 4 weeks. If you do not have any issues, try taking it nightly. Come fasting for labs next time for repeat labs. If you're doing taking CoQ10 currently, start CoQ10 100 mg daily as a supplement. May help you tolerate the statin therapy better.     2. Random glucose is elevated and previous A1c for average glucose level was 6.2 consistent with prediabetes. Maintain a low sugar/starch/carbohydrate diet. We will follow this closely.     Please do not hesitate to contact me if you have questions.

## 2020-07-10 ENCOUNTER — OFFICE VISIT (OUTPATIENT)
Dept: CARDIOLOGY | Facility: CLINIC | Age: 80
End: 2020-07-10

## 2020-07-10 ENCOUNTER — CLINICAL SUPPORT NO REQUIREMENTS (OUTPATIENT)
Dept: CARDIOLOGY | Facility: CLINIC | Age: 80
End: 2020-07-10

## 2020-07-10 VITALS
WEIGHT: 151.2 LBS | OXYGEN SATURATION: 99 % | BODY MASS INDEX: 22.91 KG/M2 | SYSTOLIC BLOOD PRESSURE: 152 MMHG | DIASTOLIC BLOOD PRESSURE: 62 MMHG | HEIGHT: 68 IN | HEART RATE: 72 BPM

## 2020-07-10 DIAGNOSIS — Z95.1 S/P CABG (CORONARY ARTERY BYPASS GRAFT): ICD-10-CM

## 2020-07-10 DIAGNOSIS — I08.0 MITRAL AND AORTIC VALVE DISEASE: ICD-10-CM

## 2020-07-10 DIAGNOSIS — I49.5 SICK SINUS SYNDROME (HCC): Primary | ICD-10-CM

## 2020-07-10 DIAGNOSIS — I49.5 SICK SINUS SYNDROME (HCC): ICD-10-CM

## 2020-07-10 DIAGNOSIS — I10 ESSENTIAL HYPERTENSION: Primary | Chronic | ICD-10-CM

## 2020-07-10 DIAGNOSIS — I25.119 CORONARY ARTERY DISEASE INVOLVING NATIVE CORONARY ARTERY OF NATIVE HEART WITH ANGINA PECTORIS (HCC): Chronic | ICD-10-CM

## 2020-07-10 DIAGNOSIS — Z95.0 CARDIAC PACEMAKER IN SITU: ICD-10-CM

## 2020-07-10 PROCEDURE — 99213 OFFICE O/P EST LOW 20 MIN: CPT | Performed by: NURSE PRACTITIONER

## 2020-07-10 PROCEDURE — 93280 PM DEVICE PROGR EVAL DUAL: CPT | Performed by: INTERNAL MEDICINE

## 2020-07-10 PROCEDURE — 93000 ELECTROCARDIOGRAM COMPLETE: CPT | Performed by: NURSE PRACTITIONER

## 2020-07-10 NOTE — PROGRESS NOTES
Date of Office Visit: 07/10/2020  Encounter Provider: LUIS A Hahn  Place of Service: [facility] CARDIOLOGY  Patient Name: Reanna Higgins  :1940    Chief Complaint   Patient presents with   • Hypertension   :     HPI:Reanna Higgins is an 80-year-old female who is a patient of Dr. Mccullough and is known to me from previous.  In 2019 she had an acute onset of chest pain and underwent a cardiac cath showing multivessel coronary disease.  She ended up having a 6 vessel coronary artery bypass graft surgery with a LIMA to the LAD, saphenous vein graft to the right coronary OM and third OM and a sequential vein graft to the ramus and first diagonal.  She did develop a small bowel obstruction which prolonged her hospital stay and required an NG tube.  Her LV at that time was normal function with an EF of 60% she had mild valvular disease.    She also has a permanent pacemaker due to sick sinus syndrome.  She has been coming once a year for follow-up appointments.  She is a very active lady she does [employer] regularly and rides a bike 5 to 8 miles a day.  In April of this year when night when I was on call she called the office stating that her blood pressure was in the 190s.  She had walked up the stairs to get ready for bed.  I had her take some extra metoprolol which improved and she came to the office for follow-up.  Her blood pressure had still been a little high and I started her on lisinopril.  She had some fatigue with the increase of the metoprolol she was taking and we had to reduce it back to once a day.    She comes in today for follow-up she said she had not feeling herself.  She ended up stopping the metoprolol and staying on the original dose of metoprolol.  She is not having any shortness of breath or chest discomfort.  She brings in a blood pressure log which shows a systolic in the low 100s to 130s.  She has had a periodic 150 but it is happened twice  in the last 2 months.  Her blood pressure is a little high today 152/62 I rechecked it and it was 132/72.      Past Medical History:   Diagnosis Date   • Cancer (CMS/HCC)     skin   • Cardiac pacemaker in situ    • Carpal tunnel syndrome    • Cataract    • Cystic fibroadenosis of breast    • GERD (gastroesophageal reflux disease)    • Hypercholesterolemia    • Hypertension    • Osteoarthritis of both hands    • Pancreatitis    • Peripheral neuropathy    • Restless leg syndrome    • Second degree AV block, Mobitz type I    • SVT (supraventricular tachycardia) (CMS/HCC)        Past Surgical History:   Procedure Laterality Date   • APPENDECTOMY     • BILATERAL OOPHORECTOMY Bilateral 1988   • BREAST CYST EXCISION Bilateral     4 BREAST SURGERIES   • CARDIAC CATHETERIZATION N/A 8/22/2019    Procedure: Left Heart Cath;  Surgeon: Francis Mccullough MD;  Location: [facility] CATH INVASIVE LOCATION;  Service: Cardiology   • CARDIAC CATHETERIZATION N/A 8/22/2019    Procedure: Left ventriculography;  Surgeon: Francis Mccullough MD;  Location: [facility] CATH INVASIVE LOCATION;  Service: Cardiology   • CARDIAC CATHETERIZATION N/A 8/22/2019    Procedure: Coronary angiography;  Surgeon: Francis Mccullough MD;  Location: [facility] CATH INVASIVE LOCATION;  Service: Cardiology   • CARDIAC ELECTROPHYSIOLOGY PROCEDURE N/A 10/10/2016    Procedure: Pacemaker DC new  [place];  Surgeon: Wilfrido Hameed MD;  Location: [facility] CATH INVASIVE LOCATION;  Service:    • CARPAL TUNNEL RELEASE Right    • CATARACT EXTRACTION      NOVEMBER AND DECEMBER 2014   • CHOLECYSTECTOMY     • COLONOSCOPY  2015   • CORONARY ARTERY BYPASS GRAFT N/A 8/23/2019    Procedure: SUMMER STERNOTOMY CORONARY ARTERY BYPASS GRAFT TIMES 6 USING LEFT INTERNAL MAMMARY ARTERY AND LEFT GREATER SAPHENOUS VEIN GRAFT PER ENDOSCOPIC VEIN HARVESTING AND PRP;  Surgeon: Kem Hawk MD;  Location: [facility] OR;  Service: Cardiothoracic   • ENDOSCOPY N/A 7/18/2019    Procedure:  ESOPHAGOGASTRODUODENOSCOPY with biopsy;  Surgeon: Ricky Chew MD;  Location: [facility] ENDOSCOPY;  Service: Gastroenterology   • HYSTERECTOMY     • KNEE ARTHROSCOPY Bilateral 2014    MENISCAL TEAR   • PACEMAKER IMPLANTATION  10/10/2016   • SHOULDER SURGERY Right     ROTATOR CUFF SURGERY 2015   • TONSILLECTOMY     • TRIGGER FINGER RELEASE      both hands       Social History     Socioeconomic History   • Marital status:      Spouse name: Rishabh*   • Number of children: 2   • Years of education: 13   • Highest education level: Some college, no degree   Occupational History   • 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)   Tobacco Use   • Smoking status: Former Smoker     Packs/day: 0.50     Years: 14.00     Pack years: 7.00     Start date:      Last attempt to quit:      Years since quittin.5   • Smokeless tobacco: Never Used   • Tobacco comment: quit age 28   Substance and Sexual Activity   • Alcohol use: Yes     Alcohol/week: 2.0 standard drinks     Types: 1 Glasses of wine, 1 Cans of beer per week     Frequency: Monthly or less     Drinks per session: 1 or 2     Comment: rare glass of wine or beer rarely/  No caffeine use   • Drug use: No   • Sexual activity: Never   Lifestyle   • Physical activity:     Days per week: 5 days     Minutes per session: 30 min   • Stress: Not at all       Family History   Problem Relation Age of Onset   • Thyroid disease Daughter    • Lung cancer Brother    • Hypertension Mother    • Aortic stenosis Mother    • Coronary artery disease Mother          78 (smoker)   • Hypertension Father    • Depression Father    • Anxiety disorder Father    • Diabetes Father    • Heart failure Father    • Cirrhosis Father         alcohol   • Alcohol abuse Sister    • Lupus Sister    • Heart disease Sister    • Diabetes type II Sister    • Alcohol abuse Brother    • Drug abuse Brother    • Heart disease Brother    • Cancer Brother         bladder (smoker)   • Heart  disease Brother    • Breast cancer Paternal Aunt    • Neuropathy Neg Hx    • Colon cancer Neg Hx    • Dementia Neg Hx        Review of Systems   Constitution: Negative for diaphoresis and malaise/fatigue.   Cardiovascular: Negative for chest pain, claudication, dyspnea on exertion, irregular heartbeat, leg swelling, near-syncope, orthopnea, palpitations, paroxysmal nocturnal dyspnea and syncope.   Respiratory: Negative for cough, shortness of breath and sleep disturbances due to breathing.    Musculoskeletal: Negative for falls.   Neurological: Negative for dizziness and weakness.   Psychiatric/Behavioral: Negative for altered mental status and substance abuse.       Allergies   Allergen Reactions   • Ancef [Cefazolin] Other (See Comments)     Hypotension/bradycardia   • Codeine Hives   • Penicillins Other (See Comments)     Hypotension (Ancef allergy)    • Sulfa Antibiotics Hives         Current Outpatient Medications:   •  aspirin 81 MG EC tablet, Take 1 tablet by mouth Daily., Disp: , Rfl:   •  Calcium Carbonate-Vitamin D (CALCIUM-D) 600-400 MG-UNIT tablet, Take  by mouth., Disp: , Rfl:   •  cholecalciferol (VITAMIN D3) 1000 UNITS tablet, Take 1 tablet by mouth Daily., Disp: , Rfl:   •  Coenzyme Q10 (COQ10) 100 MG capsule, Take  by mouth Daily., Disp: , Rfl:   •  cycloSPORINE (RESTASIS) 0.05 % ophthalmic emulsion, Administer 1 drop to both eyes 2 (Two) Times a Day., Disp: , Rfl:   •  gabapentin (NEURONTIN) 100 MG capsule, Take 1 capsule by mouth 3 (Three) Times a Day., Disp: 270 capsule, Rfl: 0  •  hyoscyamine (LEVSIN) 0.125 MG SL tablet, Prn, Disp: , Rfl:   •  levothyroxine (SYNTHROID, LEVOTHROID) 25 MCG tablet, TAKE 1 TABLET BY MOUTH EVERY DAY, Disp: 90 tablet, Rfl: 3  •  LORazepam (ATIVAN) 0.5 MG tablet, One tablet 30 minutes before plane flight, may repeat times 1 in 4-6 hours., Disp: 8 tablet, Rfl: 0  •  metoprolol succinate XL (TOPROL-XL) 50 MG 24 hr tablet, TAKE 1 TABLET BY MOUTH 2 (TWO) TIMES A DAY.,  "Disp: 180 tablet, Rfl: 3  •  Multiple Vitamins-Minerals (CENTRUM SILVER 50+WOMEN) tablet, Take 1 tablet by mouth Daily., Disp: , Rfl:   •  omeprazole (priLOSEC) 40 MG capsule, TAKE 1 CAPSULE BY MOUTH EVERY DAY, Disp: 90 capsule, Rfl: 3  •  pravastatin (PRAVACHOL) 40 MG tablet, Take 1 tablet by mouth Every Night., Disp: 90 tablet, Rfl: 3      Objective:     Vitals:    07/10/20 1353   BP: 152/62   BP Location: Right arm   Patient Position: Sitting   Cuff Size: Adult   Pulse: 72   SpO2: 99%   Weight: 68.6 kg (151 lb 3.2 oz)   Height: 171.5 cm (67.5\")     Body mass index is 23.33 kg/m².    PHYSICAL EXAM:    Physical Exam   Constitutional: She is oriented to person, place, and time. She appears well-developed and well-nourished. No distress.   HENT:   Head: Normocephalic.   Eyes: Pupils are equal, round, and reactive to light. EOM are normal.   Neck: Normal range of motion. Neck supple. No JVD present. Carotid bruit is not present. No edema present.   Cardiovascular: Normal rate, regular rhythm, S1 normal, S2 normal, normal heart sounds and intact distal pulses. Exam reveals no gallop.   No murmur heard.  Pulses:       Radial pulses are 2+ on the right side, and 2+ on the left side.        Dorsalis pedis pulses are 2+ on the right side, and 2+ on the left side.   Pulmonary/Chest: Effort normal and breath sounds normal. No tachypnea. She has no wheezes. She has no rales.       Abdominal: Soft. Normal appearance and bowel sounds are normal. She exhibits no ascites. There is no tenderness.   Musculoskeletal: Normal range of motion. She exhibits no edema.   Neurological: She is alert and oriented to person, place, and time.   Skin: Skin is warm and dry.   Psychiatric: She has a normal mood and affect.         ECG 12 Lead  Date/Time: 7/10/2020 2:32 PM  Performed by: Mary Silva APRN  Authorized by: Mary Silva APRN   Comparison: compared with previous ECG from 10/11/2019  Rhythm: sinus rhythm  Rate: normal  Q " waves: V1 and V2    ST Flattening: II, III and aVF  T inversion: V4, V5 and V6                Assessment:       Diagnosis Plan   1. Essential hypertension     2. Coronary artery disease involving native coronary artery of native heart with angina pectoris (CMS/HCC)     3. Mitral and aortic valve disease     4. Cardiac pacemaker in situ     5. Sick sinus syndrome (CMS/HCC)     6. S/P CABG (coronary artery bypass graft)       No orders of the defined types were placed in this encounter.         Plan:       I think she looks great.  She is back to exercising.  She had the spike in blood pressure during the start of the pandemic.  And I think maybe she was just having some anxiety.  She seems to be doing much better she is not having any chest pain her blood pressures at home have been well controlled.  I am getting have her come back and see Dr. Mccullough in 6 months.       Your medication list           Accurate as of July 10, 2020  2:18 PM. If you have any questions, ask your nurse or doctor.               CONTINUE taking these medications      Instructions Last Dose Given Next Dose Due   aspirin 81 MG EC tablet      Take 1 tablet by mouth Daily.       Calcium-D 600-400 MG-UNIT tablet      Take  by mouth.       Centrum Silver 50+Women tablet      Take 1 tablet by mouth Daily.       cholecalciferol 25 MCG (1000 UT) tablet  Commonly known as:  VITAMIN D3      Take 1 tablet by mouth Daily.       CoQ10 100 MG capsule      Take  by mouth Daily.       cycloSPORINE 0.05 % ophthalmic emulsion  Commonly known as:  RESTASIS      Administer 1 drop to both eyes 2 (Two) Times a Day.       gabapentin 100 MG capsule  Commonly known as:  NEURONTIN      Take 1 capsule by mouth 3 (Three) Times a Day.       hyoscyamine 0.125 MG SL tablet  Commonly known as:  LEVSIN      Prn       levothyroxine 25 MCG tablet  Commonly known as:  SYNTHROID, LEVOTHROID      TAKE 1 TABLET BY MOUTH EVERY DAY       LORazepam 0.5 MG tablet  Commonly known as:   Ativan      One tablet 30 minutes before plane flight, may repeat times 1 in 4-6 hours.       metoprolol succinate XL 50 MG 24 hr tablet  Commonly known as:  TOPROL-XL      TAKE 1 TABLET BY MOUTH 2 (TWO) TIMES A DAY.       omeprazole 40 MG capsule  Commonly known as:  priLOSEC      TAKE 1 CAPSULE BY MOUTH EVERY DAY       pravastatin 40 MG tablet  Commonly known as:  PRAVACHOL      Take 1 tablet by mouth Every Night.          STOP taking these medications    lisinopril 20 MG tablet  Commonly known as:  PRINIVIL,ZESTRIL  Stopped by:  LUIS A Hahn        vitamin b complex capsule capsule  Stopped by:  LUIS A Hahn                 As always, it has been a pleasure to participate in your patient's care.      Sincerely,     Mary GUNN

## 2020-08-04 ENCOUNTER — OFFICE VISIT (OUTPATIENT)
Dept: GASTROENTEROLOGY | Facility: CLINIC | Age: 80
End: 2020-08-04

## 2020-08-04 VITALS — BODY MASS INDEX: 22.94 KG/M2 | WEIGHT: 151.4 LBS | TEMPERATURE: 98.3 F | HEIGHT: 68 IN

## 2020-08-04 DIAGNOSIS — R10.13 EPIGASTRIC PAIN: Primary | ICD-10-CM

## 2020-08-04 PROBLEM — K85.00 IDIOPATHIC ACUTE PANCREATITIS WITHOUT INFECTION OR NECROSIS: Status: RESOLVED | Noted: 2019-07-16 | Resolved: 2020-08-04

## 2020-08-04 PROCEDURE — 99214 OFFICE O/P EST MOD 30 MIN: CPT | Performed by: INTERNAL MEDICINE

## 2020-08-04 NOTE — PROGRESS NOTES
Chief Complaint   Patient presents with   • Establish Care       Reanna Higgins is a  80 y.o. female here for a follow up visit for recurrent abdominal pain.    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  Patient reports 5 episodes have occurred lasting typically 15 minutes or less but severe.  Patient reports pain associated with nausea but once the pain resolves she is left weekend but no further discomfort.  Endoscopic ultrasound revealed no abnormalities of the biliary tree.  Patient now here for further recommendations.    Past Medical History:   Diagnosis Date   • Cancer (CMS/HCC)     skin   • Cardiac pacemaker in situ    • Carpal tunnel syndrome    • Cataract    • Cystic fibroadenosis of breast    • GERD (gastroesophageal reflux disease)    • Hypercholesterolemia    • Hypertension    • Osteoarthritis of both hands    • Pancreatitis    • Peripheral neuropathy    • Restless leg syndrome    • Second degree AV block, Mobitz type I    • SVT (supraventricular tachycardia) (CMS/HCC)          Current Outpatient Medications:   •  aspirin 81 MG EC tablet, Take 1 tablet by mouth Daily., Disp: , Rfl:   •  Calcium Carbonate-Vitamin D (CALCIUM-D) 600-400 MG-UNIT tablet, Take  by mouth., Disp: , Rfl:   •  cholecalciferol (VITAMIN D3) 1000 UNITS tablet, Take 1 tablet by mouth Daily., Disp: , Rfl:   •  Coenzyme Q10 (COQ10) 100 MG capsule, Take  by mouth Daily., Disp: , Rfl:   •  cycloSPORINE (RESTASIS) 0.05 % ophthalmic emulsion, Administer 1 drop to both eyes 2 (Two) Times a Day., Disp: , Rfl:   •  gabapentin (NEURONTIN) 100 MG capsule, Take 1 capsule by mouth 3 (Three) Times a Day., Disp: 270 capsule, Rfl: 0  •  levothyroxine (SYNTHROID, LEVOTHROID) 25 MCG tablet, TAKE 1 TABLET BY MOUTH EVERY DAY, Disp: 90 tablet, Rfl: 3  •  LORazepam (ATIVAN) 0.5 MG  tablet, One tablet 30 minutes before plane flight, may repeat times 1 in 4-6 hours., Disp: 8 tablet, Rfl: 0  •  metoprolol succinate XL (TOPROL-XL) 50 MG 24 hr tablet, TAKE 1 TABLET BY MOUTH 2 (TWO) TIMES A DAY., Disp: 180 tablet, Rfl: 3  •  Multiple Vitamins-Minerals (CENTRUM SILVER 50+WOMEN) tablet, Take 1 tablet by mouth Daily., Disp: , Rfl:   •  omeprazole (priLOSEC) 40 MG capsule, TAKE 1 CAPSULE BY MOUTH EVERY DAY, Disp: 90 capsule, Rfl: 3  •  pravastatin (PRAVACHOL) 40 MG tablet, Take 1 tablet by mouth Every Night., Disp: 90 tablet, Rfl: 3  •  hyoscyamine (LEVSIN) 0.125 MG SL tablet, Prn, Disp: , Rfl:     Allergies   Allergen Reactions   • Ancef [Cefazolin] Other (See Comments)     Hypotension/bradycardia   • Codeine Hives   • Penicillins Other (See Comments)     Hypotension (Ancef allergy)    • Sulfa Antibiotics Hives       Social History     Socioeconomic History   • Marital status:      Spouse name: Rishabh*   • Number of children: 2   • Years of education: 13   • Highest education level: Some college, no degree   Occupational History   • 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)   Tobacco Use   • Smoking status: Former Smoker     Packs/day: 0.50     Years: 14.00     Pack years: 7.00     Start date:      Last attempt to quit:      Years since quittin.6   • Smokeless tobacco: Never Used   • Tobacco comment: quit age 28   Substance and Sexual Activity   • Alcohol use: Yes     Alcohol/week: 2.0 standard drinks     Types: 1 Glasses of wine, 1 Cans of beer per week     Frequency: Monthly or less     Drinks per session: 1 or 2     Comment: rare glass of wine or beer rarely/  No caffeine use   • Drug use: No   • Sexual activity: Never   Lifestyle   • Physical activity:     Days per week: 5 days     Minutes per session: 30 min   • Stress: Not at all       Family History   Problem Relation Age of Onset   • Thyroid disease Daughter    • Lung cancer Brother    • Hypertension Mother    • Aortic  stenosis Mother    • Coronary artery disease Mother          78 (smoker)   • Hypertension Father    • Depression Father    • Anxiety disorder Father    • Diabetes Father    • Heart failure Father    • Cirrhosis Father         alcohol   • Alcohol abuse Sister    • Lupus Sister    • Heart disease Sister    • Diabetes type II Sister    • Alcohol abuse Brother    • Drug abuse Brother    • Heart disease Brother    • Cancer Brother         bladder (smoker)   • Heart disease Brother    • Breast cancer Paternal Aunt    • Neuropathy Neg Hx    • Colon cancer Neg Hx    • Dementia Neg Hx        Review of Systems   Constitutional: Negative.    Respiratory: Negative.    Cardiovascular: Negative.    Gastrointestinal: Negative.    Musculoskeletal: Negative.    Skin: Negative.    Hematological: Negative.        Vitals:    20 0900   Temp: 98.3 °F (36.8 °C)       Physical Exam   Constitutional: She is oriented to person, place, and time. She appears well-developed and well-nourished.   HENT:   Head: Normocephalic and atraumatic.   Eyes: Pupils are equal, round, and reactive to light. No scleral icterus.   Neck: Normal range of motion.   Cardiovascular: Normal rate, regular rhythm and normal heart sounds. Exam reveals no gallop and no friction rub.   No murmur heard.  Pulmonary/Chest: Effort normal and breath sounds normal. She has no wheezes. She has no rales.   Abdominal: Soft. Bowel sounds are normal. She exhibits no shifting dullness, no distension, no pulsatile liver, no fluid wave, no abdominal bruit, no ascites, no pulsatile midline mass and no mass. There is no hepatosplenomegaly. There is no tenderness. There is no rigidity and no guarding. No hernia.   Musculoskeletal: Normal range of motion. She exhibits no edema.   Lymphadenopathy:     She has no cervical adenopathy.   Neurological: She is alert and oriented to person, place, and time. No cranial nerve deficit.   Skin: Skin is warm and dry. No rash noted.    Psychiatric: She has a normal mood and affect. Her behavior is normal. Thought content normal.   Nursing note and vitals reviewed.      Office Visit on 06/15/2020   Component Date Value Ref Range Status   • Hemoglobin A1C 06/15/2020 6.20* 4.80 - 5.60 % Final   • Total Cholesterol 06/15/2020 172  0 - 200 mg/dL Final   • Triglycerides 06/15/2020 109  0 - 150 mg/dL Final   • HDL Cholesterol 06/15/2020 49  40 - 60 mg/dL Final   • VLDL Cholesterol 06/15/2020 21.8  mg/dL Final   • LDL Cholesterol  06/15/2020 101* 0 - 100 mg/dL Final   • Chol/HDL Ratio 06/15/2020 3.51   Final   Office Visit on 06/11/2020   Component Date Value Ref Range Status   • Glucose 06/11/2020 122* 65 - 99 mg/dL Final   • BUN 06/11/2020 24* 8 - 23 mg/dL Final   • Creatinine 06/11/2020 0.99  0.57 - 1.00 mg/dL Final   • Sodium 06/11/2020 139  136 - 145 mmol/L Final   • Potassium 06/11/2020 4.3  3.5 - 5.2 mmol/L Final   • Chloride 06/11/2020 103  98 - 107 mmol/L Final   • CO2 06/11/2020 24.3  22.0 - 29.0 mmol/L Final   • Calcium 06/11/2020 9.3  8.6 - 10.5 mg/dL Final   • Total Protein 06/11/2020 6.9  6.0 - 8.5 g/dL Final   • Albumin 06/11/2020 4.30  3.50 - 5.20 g/dL Final   • ALT (SGPT) 06/11/2020 18  1 - 33 U/L Final   • AST (SGOT) 06/11/2020 20  1 - 32 U/L Final   • Alkaline Phosphatase 06/11/2020 64  39 - 117 U/L Final   • Total Bilirubin 06/11/2020 0.3  0.2 - 1.2 mg/dL Final   • eGFR Non African Amer 06/11/2020 54* >60 mL/min/1.73 Final   • Globulin 06/11/2020 2.6  gm/dL Final   • A/G Ratio 06/11/2020 1.7  g/dL Final   • BUN/Creatinine Ratio 06/11/2020 24.2  7.0 - 25.0 Final   • Anion Gap 06/11/2020 11.7  5.0 - 15.0 mmol/L Final   • Lipase 06/11/2020 23  13 - 60 U/L Final       Reanna was seen today for establish care.    Diagnoses and all orders for this visit:    Epigastric pain      Patient 80-year-old female with history of recurrent abdominal pain here to establish care.  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  Patient pain lasts about 15 minutes but is severe inconsistent with biliary disease.  For right now we will maintain current therapy patient to call immediately if symptoms arise.  Will discuss again with vascular concerning this high-grade stenosis whether this could be intermittently causing symptoms.  We will follow-up clinically based on clinical course.

## 2020-08-06 ENCOUNTER — OFFICE VISIT (OUTPATIENT)
Dept: NEUROLOGY | Facility: CLINIC | Age: 80
End: 2020-08-06

## 2020-08-06 ENCOUNTER — LAB (OUTPATIENT)
Dept: LAB | Facility: HOSPITAL | Age: 80
End: 2020-08-06

## 2020-08-06 VITALS — DIASTOLIC BLOOD PRESSURE: 64 MMHG | SYSTOLIC BLOOD PRESSURE: 160 MMHG

## 2020-08-06 DIAGNOSIS — R73.03 PREDIABETES: ICD-10-CM

## 2020-08-06 DIAGNOSIS — G63 NEUROPATHY DUE TO MEDICAL CONDITION (HCC): Primary | ICD-10-CM

## 2020-08-06 DIAGNOSIS — E11.42 DIABETIC PERIPHERAL NEUROPATHY (HCC): ICD-10-CM

## 2020-08-06 LAB
ERYTHROCYTE [SEDIMENTATION RATE] IN BLOOD: 5 MM/HR (ref 0–30)
FOLATE SERPL-MCNC: >20 NG/ML (ref 4.78–24.2)
VIT B12 BLD-MCNC: >2000 PG/ML (ref 211–946)

## 2020-08-06 PROCEDURE — 82784 ASSAY IGA/IGD/IGG/IGM EACH: CPT

## 2020-08-06 PROCEDURE — 99204 OFFICE O/P NEW MOD 45 MIN: CPT | Performed by: PSYCHIATRY & NEUROLOGY

## 2020-08-06 PROCEDURE — 82607 VITAMIN B-12: CPT | Performed by: PSYCHIATRY & NEUROLOGY

## 2020-08-06 PROCEDURE — 83825 ASSAY OF MERCURY: CPT | Performed by: PSYCHIATRY & NEUROLOGY

## 2020-08-06 PROCEDURE — 86334 IMMUNOFIX E-PHORESIS SERUM: CPT

## 2020-08-06 PROCEDURE — 85652 RBC SED RATE AUTOMATED: CPT | Performed by: PSYCHIATRY & NEUROLOGY

## 2020-08-06 PROCEDURE — 82525 ASSAY OF COPPER: CPT

## 2020-08-06 PROCEDURE — 86592 SYPHILIS TEST NON-TREP QUAL: CPT | Performed by: PSYCHIATRY & NEUROLOGY

## 2020-08-06 PROCEDURE — 84165 PROTEIN E-PHORESIS SERUM: CPT | Performed by: PSYCHIATRY & NEUROLOGY

## 2020-08-06 PROCEDURE — 36415 COLL VENOUS BLD VENIPUNCTURE: CPT | Performed by: PSYCHIATRY & NEUROLOGY

## 2020-08-06 PROCEDURE — 83655 ASSAY OF LEAD: CPT | Performed by: PSYCHIATRY & NEUROLOGY

## 2020-08-06 PROCEDURE — 82175 ASSAY OF ARSENIC: CPT | Performed by: PSYCHIATRY & NEUROLOGY

## 2020-08-06 PROCEDURE — 82595 ASSAY OF CRYOGLOBULIN: CPT

## 2020-08-06 PROCEDURE — 84155 ASSAY OF PROTEIN SERUM: CPT | Performed by: PSYCHIATRY & NEUROLOGY

## 2020-08-06 PROCEDURE — 82746 ASSAY OF FOLIC ACID SERUM: CPT | Performed by: PSYCHIATRY & NEUROLOGY

## 2020-08-06 NOTE — PROGRESS NOTES
"CC: Peripheral neuropathy    HPI:  Reanna Higgins is a  80 y.o.  right-handed white female who was sent for neurological consultation by Dr. Li regarding peripheral neuropathy.  The patient has history of right carpal tunnel syndrome and had an EMG by Dr. Silvestre probably a year prior to her surgery 7/2019\" and [place].  This operation worked very well to alleviate her hand symptoms.  She did not and does not really have substantial troubles with the left hand.  She denies any significant numbness tingling or burning in the hands.  She did however at that time describe some onset of numbness tingling and some burning in the toes probably 5 years ago and she saw Dr. Gannon who eventually was replaced by Dr. Li on Dr. Gannon left [facility].  She had increasing burning in the toes and feet and gabapentin was tried at small doses which seems to help.  She is currently on 100 mg 3 times daily.  She takes a morning dose and then a suppertime and nighttime dose.  She states that she is not particularly affected at night with painful symptoms.  The patient had bypass surgery last year and had vein stripping in the left leg for this purpose.  It was done internally.  She had additional numbness in the left leg but not just focally over the saphenous nerve/vein but over time it has improved a bit and she really has primarily just numbness tingling and burning in the feet with some of the symptoms creeping up to about the mid shin level on both sides about equally.  She denies any substantial neck or back pain but has occasional back pain if she is lifting things that are heavy.  There is no history of back surgery.  What bothers her the most is at times when she is primarily at rest it feels as though there is a blowtorch on her feet.  She also has some pain on the sole of the right foot somewhat laterally as if she had stepped on a rock or stone.  She states that an EMG was also done later by Dr. Silvestre probably in 2017 or 18 which " confirmed electrographically that she has peripheral neuropathy in her legs.    She denies history of significant head or spine trauma, meningitis, seizure, stroke or syncope.  She has no family history of neuropathy and no family history of stroke, brain tumor, brain hemorrhage or aneurysm of a brain artery.    The patient indicates she is very active.  She rolled her skates regularly with her .  She indicates that she drinks about 1 glass of wine per month and was never a heavy drinker.  She also indicates that her diet is really not terrible particularly after she had her open heart surgery she has been more cautious.        Past Medical History:   Diagnosis Date   • Cancer (CMS/HCC)     skin   • Cardiac pacemaker in situ    • Carpal tunnel syndrome    • Cataract    • Cystic fibroadenosis of breast    • GERD (gastroesophageal reflux disease)    • Hypercholesterolemia    • Hypertension    • Osteoarthritis of both hands    • Pancreatitis    • Peripheral neuropathy    • Restless leg syndrome    • Second degree AV block, Mobitz type I    • SVT (supraventricular tachycardia) (CMS/HCC)          Past Surgical History:   Procedure Laterality Date   • APPENDECTOMY     • BILATERAL OOPHORECTOMY Bilateral 1988   • BREAST CYST EXCISION Bilateral     4 BREAST SURGERIES   • CARDIAC CATHETERIZATION N/A 8/22/2019    Procedure: Left Heart Cath;  Surgeon: Francis Mccullough MD;  Location: [facility] CATH INVASIVE LOCATION;  Service: Cardiology   • CARDIAC CATHETERIZATION N/A 8/22/2019    Procedure: Left ventriculography;  Surgeon: Francis Mccullough MD;  Location: [facility] CATH INVASIVE LOCATION;  Service: Cardiology   • CARDIAC CATHETERIZATION N/A 8/22/2019    Procedure: Coronary angiography;  Surgeon: Francis Mccullough MD;  Location: [facility] CATH INVASIVE LOCATION;  Service: Cardiology   • CARDIAC ELECTROPHYSIOLOGY PROCEDURE N/A 10/10/2016    Procedure: Pacemaker DC new  [place];  Surgeon: Wilfrido Hameed MD;  Location: [facility]  CATH INVASIVE LOCATION;  Service:    • CARPAL TUNNEL RELEASE Right    • CATARACT EXTRACTION      NOVEMBER AND DECEMBER 2014   • CHOLECYSTECTOMY     • COLONOSCOPY  2015   • CORONARY ARTERY BYPASS GRAFT N/A 8/23/2019    Procedure: SUMMER STERNOTOMY CORONARY ARTERY BYPASS GRAFT TIMES 6 USING LEFT INTERNAL MAMMARY ARTERY AND LEFT GREATER SAPHENOUS VEIN GRAFT PER ENDOSCOPIC VEIN HARVESTING AND PRP;  Surgeon: Kem Hawk MD;  Location: [facility] MAIN OR;  Service: Cardiothoracic   • ENDOSCOPY N/A 7/18/2019    Procedure: ESOPHAGOGASTRODUODENOSCOPY with biopsy;  Surgeon: Ricky Chew MD;  Location: [facility] ENDOSCOPY;  Service: Gastroenterology   • HYSTERECTOMY     • KNEE ARTHROSCOPY Bilateral 04/2014    MENISCAL TEAR   • PACEMAKER IMPLANTATION  10/10/2016   • SHOULDER SURGERY Right     ROTATOR CUFF SURGERY JUNE 18, 2015   • TONSILLECTOMY     • TRIGGER FINGER RELEASE      both hands           Current Outpatient Medications:   •  aspirin 81 MG EC tablet, Take 1 tablet by mouth Daily., Disp: , Rfl:   •  Calcium Carbonate-Vitamin D (CALCIUM-D) 600-400 MG-UNIT tablet, Take  by mouth., Disp: , Rfl:   •  Coenzyme Q10 (COQ10) 100 MG capsule, Take  by mouth Daily., Disp: , Rfl:   •  cycloSPORINE (RESTASIS) 0.05 % ophthalmic emulsion, Administer 1 drop to both eyes 2 (Two) Times a Day., Disp: , Rfl:   •  gabapentin (NEURONTIN) 100 MG capsule, Take 1 capsule by mouth 3 (Three) Times a Day., Disp: 270 capsule, Rfl: 0  •  levothyroxine (SYNTHROID, LEVOTHROID) 25 MCG tablet, TAKE 1 TABLET BY MOUTH EVERY DAY, Disp: 90 tablet, Rfl: 3  •  LORazepam (ATIVAN) 0.5 MG tablet, One tablet 30 minutes before plane flight, may repeat times 1 in 4-6 hours., Disp: 8 tablet, Rfl: 0  •  metoprolol succinate XL (TOPROL-XL) 50 MG 24 hr tablet, TAKE 1 TABLET BY MOUTH 2 (TWO) TIMES A DAY., Disp: 180 tablet, Rfl: 3  •  Multiple Vitamins-Minerals (CENTRUM SILVER 50+WOMEN) tablet, Take 1 tablet by mouth Daily., Disp: , Rfl:   •  omeprazole (priLOSEC) 40  MG capsule, TAKE 1 CAPSULE BY MOUTH EVERY DAY, Disp: 90 capsule, Rfl: 3  •  pravastatin (PRAVACHOL) 40 MG tablet, Take 1 tablet by mouth Every Night., Disp: 90 tablet, Rfl: 3  •  cholecalciferol (VITAMIN D3) 1000 UNITS tablet, Take 1 tablet by mouth Daily., Disp: , Rfl:   •  hyoscyamine (LEVSIN) 0.125 MG SL tablet, Prn, Disp: , Rfl:       Family History   Problem Relation Age of Onset   • Thyroid disease Daughter    • Lung cancer Brother    • Hypertension Mother    • Aortic stenosis Mother    • Coronary artery disease Mother          78 (smoker)   • Hypertension Father    • Depression Father    • Anxiety disorder Father    • Diabetes Father    • Heart failure Father    • Cirrhosis Father         alcohol   • Alcohol abuse Sister    • Lupus Sister    • Heart disease Sister    • Diabetes type II Sister    • Alcohol abuse Brother    • Drug abuse Brother    • Heart disease Brother    • Cancer Brother         bladder (smoker)   • Heart disease Brother    • Breast cancer Paternal Aunt    • Neuropathy Neg Hx    • Colon cancer Neg Hx    • Dementia Neg Hx          Social History     Socioeconomic History   • Marital status:      Spouse name: Rishabh*   • Number of children: 2   • Years of education: 13   • Highest education level: Some college, no degree   Occupational History   • 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)   Tobacco Use   • Smoking status: Former Smoker     Packs/day: 0.50     Years: 14.00     Pack years: 7.00     Start date:      Last attempt to quit:      Years since quittin.6   • Smokeless tobacco: Never Used   • Tobacco comment: quit age 28   Substance and Sexual Activity   • Alcohol use: Yes     Alcohol/week: 2.0 standard drinks     Types: 1 Glasses of wine, 1 Cans of beer per week     Frequency: Monthly or less     Drinks per session: 1 or 2     Comment: rare glass of wine or beer rarely/  No caffeine use   • Drug use: No   • Sexual activity: Never   Lifestyle   • Physical  activity:     Days per week: 5 days     Minutes per session: 30 min   • Stress: Not at all         Allergies   Allergen Reactions   • Ancef [Cefazolin] Other (See Comments)     Hypotension/bradycardia   • Codeine Hives   • Penicillins Other (See Comments)     Hypotension (Ancef allergy)    • Sulfa Antibiotics Hives         Pain Scale: 0/10 currently but it may escalate to a 7/10 when untreated        ROS:  Review of Systems   Constitutional: Negative for activity change, appetite change and fatigue.   HENT: Negative for ear pain, facial swelling and hearing loss.    Eyes: Negative for pain, redness and itching.   Respiratory: Negative for cough, choking and shortness of breath.    Cardiovascular: Negative for chest pain and leg swelling.   Gastrointestinal: Negative for abdominal pain and nausea.   Endocrine: Negative for cold intolerance and heat intolerance.   Musculoskeletal: Negative for arthralgias, back pain and joint swelling.   Skin: Negative for color change, pallor and rash.   Allergic/Immunologic: Negative for environmental allergies and food allergies.   Neurological: Positive for numbness. Negative for dizziness, tremors, seizures, syncope, facial asymmetry, speech difficulty, weakness, light-headedness and headaches.   Psychiatric/Behavioral: Negative for agitation, behavioral problems, confusion, decreased concentration, dysphoric mood, hallucinations, self-injury, sleep disturbance and suicidal ideas. The patient is not nervous/anxious and is not hyperactive.          I have reviewed and agree with the above ROS completed by the medical assistant.      Physical Exam:  Vitals:    08/06/20 0814   BP: 160/64     Orthostatic BP:    There is no height or weight on file to calculate BMI.  Most recent measurements were 8/4/2020 with height 171.5 cm and weight 68.7 kg for a BMI of 23.3    Physical Exam  General: Well-developed fit appearing white female who appears younger than stated age  HEENT:  Normocephalic no evidence of trauma.  Discs flat.  No AV nicking.  Throat negative.  Neck: Supple.  No thyromegaly.  No cervical bruits.  Radial pulses were strong and simultaneous.  Heart: Regular rate and rhythm without murmurs  Extremities: No pedal edema      Neurological Exam:   Mental Status: Awake, alert, oriented to person, place and time.  Conversant without evidence of an affective disorder, thought disorder, delusions or hallucinations.  Attention span and concentration are normal.  HCF: No aphasia, apraxia or dysarthria.  Recent and remote memory intact.  Knowledge  of recent events intact.  CN: I:   II: Visual fields full without left inattention   III, IV, VI: Eye movements intact without nystagmus or ptosis.  Pupils equal  round and reactive to light.   V,VII: Light touch and pinprick intact all 3 divisions of V.  Facial muscles symmetrical.   VIII: Hearing intact to finger rub   IX,X: Soft palate elevates symmetrically   XI: Sternomastoid and trapezius are strong.   XII: Tongue midline without atrophy or fasciculations  Motor: Normal tone and bulk in the upper and lower extremities   Power testing: Minor weakness of both abductor pollicis brevis muscles but otherwise normal power in all muscles tested in the arms and legs.  Reflexes: Upper extremities: +1 diffusely        Lower extremities: +1 diffusely        Toe signs: Downgoing bilaterally  Sensory: Light touch: Diffusely intact upper extremities.  Reduced in the feet and in the lower half of each lower leg.        Pinprick: Normal in the upper extremities.  Reduced in the feet        Vibration: Reduced at the ankles        Position: Normal at the great toes    Cerebellar: Finger-to-nose: Normal           Rapid movement: Normal           Heel-to-shin: Normal  Gait and Station: Normal casual toe heel and tandem walk.  No Romberg no drift    Results:      Lab Results   Component Value Date    GLUCOSE 122 (H) 06/11/2020    BUN 24 (H) 06/11/2020     CREATININE 0.99 06/11/2020    EGFRIFNONA 54 (L) 06/11/2020    EGFRIFAFRI 85 09/12/2019    BCR 24.2 06/11/2020    CO2 24.3 06/11/2020    CALCIUM 9.3 06/11/2020    PROTENTOTREF 7.2 09/12/2019    ALBUMIN 4.30 06/11/2020    LABIL2 1.7 09/12/2019    AST 20 06/11/2020    ALT 18 06/11/2020       Lab Results   Component Value Date    WBC 8.91 09/28/2019    HGB 12.3 09/28/2019    HCT 38.7 09/28/2019    MCV 90.0 09/28/2019     09/28/2019         .No results found for: RPR      Lab Results   Component Value Date    TSH 2.840 12/12/2019         Lab Results   Component Value Date    AHWNXSZT12 715 09/28/2016         No results found for: FOLATE      Lab Results   Component Value Date    HGBA1C 6.20 (H) 06/15/2020         Lab Results   Component Value Date    GLUCOSE 122 (H) 06/11/2020    BUN 24 (H) 06/11/2020    CREATININE 0.99 06/11/2020    EGFRIFNONA 54 (L) 06/11/2020    EGFRIFAFRI 85 09/12/2019    BCR 24.2 06/11/2020    K 4.3 06/11/2020    CO2 24.3 06/11/2020    CALCIUM 9.3 06/11/2020    PROTENTOTREF 7.2 09/12/2019    ALBUMIN 4.30 06/11/2020    LABIL2 1.7 09/12/2019    AST 20 06/11/2020    ALT 18 06/11/2020         Lab Results   Component Value Date    WBC 8.91 09/28/2019    HGB 12.3 09/28/2019    HCT 38.7 09/28/2019    MCV 90.0 09/28/2019     09/28/2019       Review of most recent CT scan of the abdomen and pelvis performed for acute abdominal pain demonstrates degenerative disc disease predominantly at L4/5 and L5/S1 with some disc bulging and angulation of the space into a triangular space.  The degree of spinal stenosis appears moderate at these levels.      Assessment:   1.  Peripheral neuropathy most likely due to prediabetes and hyperlipidemia  2.  Degenerative disc disease lower lumbar spine based on a review of CT scan of the abdomen and pelvis.  No appreciable back pain and no findings to suggest radiculopathies.  She does not have any symptoms that suggest neurogenic claudication.          Plan:  1.   Obtain prior EMGs done by Dr. Silvestre as far back as 2017 or so.  2.  Complete laboratory evaluation for treatable causes of neuropathy by obtaining a sed rate, RPR, B12 and folate, SPE/IEP, cryoglobulins, heavy metal screen and copper level  3.  Follow-up with 1 of our nurse practitioners regarding these labs.  4.  I reviewed with her common dosages of gabapentin which are used as symptomatic treatment for neuropathic pain.  She simply indicated she tries to take as little medication as possible which is quite reasonable.  She denies any side effects currently to gabapentin.  I told her dosage could be escalated if needed up to 900 or even 1800 mg/day if no side effects.  She is only on 300 mg daily currently.  This can be done through her primary care physician if needed.  5.  We reviewed briefly dietary concerns regarding prediabetes.  She is already aware of these things because she already knew of the diagnosis.  She was encouraged to limit carbohydrate consumption as best as possible and supplement with more protein.  Target value for her hemoglobin A1c obviously is as low as possible but also with her LDL the target is less than 70.  Currently she is not experiencing any appreciable side effects to pravastatin it appears and I will defer to her primary physician any other alternatives or adjustments for consideration.            >50% of this 45-minute consult was spent counseling the patient on treatment options and evaluations for peripheral neuropathy and target for control of her vascular risk factors.    8/14/2020  Addendum  Reviewed 2 EMGs by Dr. Silvestre the first was on 4/4/2016 involving the right upper extremity showing a severe right median neuropathy at the wrist with distal latency 6.2 ms and amplitude of 3.84 mV from wrist stimulation.  The right median orthodromic palmar sensory latency was prolonged at 3.2 ms with a normal ulnar orthodromic palmar sensory study.  The right radial sensory study to the  thumb was also normal.  Needle exam of the abductor pollicis brevis showed some large motor units with reduced recruitment.    The second study was done 9/21/2016 involving predominantly the right leg with some comparison on the left.  The right peroneal and tibial motor studies were normal and the left peroneal study was also normal (peroneal studies were from the fibular head to the ankle).  No response from the right medial orthodromic plantar study with normal-appearing waveform on the left.  The sural sensory studies were reported to show reduced amplitudes but still on the right was 6.3 µV and on the left 8.9 µV.  Needle examination showed some large motor units with reduced recruitment in the ADB muscles with normal-appearing left gastrocnemius and right tibialis anterior.    GNS        Dictated utilizing Dragon dictation.

## 2020-08-07 LAB
ALBUMIN SERPL-MCNC: 3.9 G/DL (ref 2.9–4.4)
ALBUMIN/GLOB SERPL: 1.3 {RATIO} (ref 0.7–1.7)
ALPHA1 GLOB FLD ELPH-MCNC: 0.2 G/DL (ref 0–0.4)
ALPHA2 GLOB SERPL ELPH-MCNC: 0.7 G/DL (ref 0.4–1)
B-GLOBULIN SERPL ELPH-MCNC: 1 G/DL (ref 0.7–1.3)
GAMMA GLOB SERPL ELPH-MCNC: 0.9 G/DL (ref 0.4–1.8)
GLOBULIN SER CALC-MCNC: 2.9 G/DL (ref 2.2–3.9)
IGA SERPL-MCNC: 287 MG/DL (ref 64–422)
IGG SERPL-MCNC: 944 MG/DL (ref 586–1602)
IGM SERPL-MCNC: 85 MG/DL (ref 26–217)
Lab: NORMAL
M-SPIKE: NORMAL G/DL
PROT PATTERN SERPL ELPH-IMP: NORMAL
PROT PATTERN SERPL IFE-IMP: NORMAL
PROT SERPL-MCNC: 6.8 G/DL (ref 6–8.5)
RPR SER QL: NORMAL

## 2020-08-08 LAB — COPPER SERPL-MCNC: 122 UG/DL (ref 72–166)

## 2020-08-10 DIAGNOSIS — G60.9 IDIOPATHIC PERIPHERAL NEUROPATHY: Primary | ICD-10-CM

## 2020-08-10 LAB
ARSENIC BLD-MCNC: 6 UG/L (ref 2–23)
CRYOGLOB SER QL 1D COLD INC: NORMAL
LEAD BLD-MCNC: 1 UG/DL (ref 0–4)
MERCURY BLD-MCNC: 1.5 UG/L (ref 0–14.9)

## 2020-08-12 ENCOUNTER — LAB (OUTPATIENT)
Dept: LAB | Facility: HOSPITAL | Age: 80
End: 2020-08-12

## 2020-08-12 DIAGNOSIS — G60.9 IDIOPATHIC PERIPHERAL NEUROPATHY: ICD-10-CM

## 2020-08-12 PROCEDURE — 84207 ASSAY OF VITAMIN B-6: CPT

## 2020-08-12 PROCEDURE — 36415 COLL VENOUS BLD VENIPUNCTURE: CPT

## 2020-08-15 LAB — VIT B6 SERPL-MCNC: 101.2 UG/L (ref 2–32.8)

## 2020-09-21 DIAGNOSIS — I25.119 CORONARY ARTERY DISEASE INVOLVING NATIVE CORONARY ARTERY OF NATIVE HEART WITH ANGINA PECTORIS (HCC): ICD-10-CM

## 2020-09-21 DIAGNOSIS — G62.9 PERIPHERAL POLYNEUROPATHY: Chronic | ICD-10-CM

## 2020-09-21 RX ORDER — PRAVASTATIN SODIUM 40 MG
TABLET ORAL
Qty: 90 TABLET | Refills: 3 | Status: SHIPPED | OUTPATIENT
Start: 2020-09-21 | End: 2021-08-16

## 2020-09-21 RX ORDER — GABAPENTIN 100 MG/1
CAPSULE ORAL
Qty: 270 CAPSULE | Refills: 0 | Status: SHIPPED | OUTPATIENT
Start: 2020-09-21 | End: 2020-12-17

## 2020-10-29 ENCOUNTER — OFFICE VISIT (OUTPATIENT)
Dept: NEUROLOGY | Facility: CLINIC | Age: 80
End: 2020-10-29

## 2020-10-29 VITALS
DIASTOLIC BLOOD PRESSURE: 68 MMHG | SYSTOLIC BLOOD PRESSURE: 128 MMHG | WEIGHT: 151 LBS | HEART RATE: 63 BPM | OXYGEN SATURATION: 99 % | BODY MASS INDEX: 22.88 KG/M2 | HEIGHT: 68 IN

## 2020-10-29 DIAGNOSIS — G63 NEUROPATHY DUE TO MEDICAL CONDITION (HCC): Primary | ICD-10-CM

## 2020-10-29 DIAGNOSIS — R73.03 PREDIABETES: ICD-10-CM

## 2020-10-29 PROBLEM — E11.42 DIABETIC PERIPHERAL NEUROPATHY: Status: ACTIVE | Noted: 2020-10-29

## 2020-10-29 PROCEDURE — 99213 OFFICE O/P EST LOW 20 MIN: CPT | Performed by: NURSE PRACTITIONER

## 2020-10-29 RX ORDER — LISINOPRIL 20 MG/1
10 TABLET ORAL
COMMUNITY
Start: 2020-10-24 | End: 2021-01-21

## 2020-10-29 RX ORDER — CLOBETASOL PROPIONATE 0.5 MG/G
OINTMENT TOPICAL
COMMUNITY
Start: 2020-10-02 | End: 2021-03-01

## 2020-10-29 NOTE — PROGRESS NOTES
Subjective:     Patient ID: Reanna Higgins is a 80 y.o. female presenting for follow up for peripheral neuropathy. She was seen by Dr. Vallejo on 8/6/20.     She has a history of prediabetes. She has had neuropathy symptoms to her bilateral lower extremities about to about the mid shin. She says several years ago she noticed the symptoms in 3 toes of her left foot and over time her symptoms have gradually worsened. She says her most bothersome symptom occurs occasionally while she is at rest and it feels like a blow torch on her feet.     She remains very active. She roller blades regularly with her . She swims with a friend and tries to keep busy.     Her most recent hemoglobin A1c was 6.2. She says she works very hard to control her blood sugar with diet and exercise.     She had an EMG in 2017 with Dr. Silvestre that confirmed peripheral neuropathy in her legs.    Dr. Vallejo ordered lab work on 8/6/20, including sed rate, RPR, B12 and folate, SPE/IEP, cryoglobulins, heavy metal screen and copper level. All labs returned normal except vitamin B12 >2,000. She does take a B Complex vitamin so vitamin B6 was then checked and was 101.2, over 3 times the normal range. She was advised by Dr. Vallejo to discontinue the vitamin B supplement but she says she has not done so because she feels it has improved her symptoms.     History of Present Illness  The following portions of the patient's history were reviewed and updated as appropriate: allergies, current medications, past family history, past medical history, past social history, past surgical history and problem list.    Review of Systems   Constitutional: Negative for chills, fatigue and fever.   HENT: Negative for hearing loss, tinnitus and trouble swallowing.    Eyes: Negative for pain, redness and itching.   Respiratory: Negative for cough, shortness of breath and wheezing.    Cardiovascular: Negative for chest pain, palpitations and leg swelling.    Gastrointestinal: Negative for diarrhea, nausea and vomiting.   Endocrine: Negative for cold intolerance, heat intolerance and polydipsia.   Genitourinary: Negative for decreased urine volume, difficulty urinating and urgency.   Musculoskeletal: Negative for back pain, neck pain and neck stiffness.   Skin: Negative for color change, rash and wound.   Allergic/Immunologic: Negative for environmental allergies, food allergies and immunocompromised state.   Neurological: Positive for numbness (burning in both feet up legs). Negative for dizziness, tremors, seizures, syncope, facial asymmetry, speech difficulty, weakness, light-headedness and headaches.   Hematological: Negative for adenopathy. Does not bruise/bleed easily.   Psychiatric/Behavioral: Negative for confusion and sleep disturbance. The patient is not nervous/anxious.         Objective:    Neurologic Exam  Mental status: Awake, alert, oriented to person place and time. No evidence of an affective disorder, thought disorder, delusions or hallucinations.   HCF: No aphasia, apraxia, or dysarthria. Recent and remote memory intact. Knowledge of recent events intact.  Cranial nerves 2-12: Visual fields full without left inattention. Eye movements intact without nystagmus or ptosis. Pupils equal, round, and reactive to light. Light touch and pinprick intact over all 3 divisions of cranial nerve 5. Facial strength symmetrical. Hearing intact to finger rub. Soft palate elevates symmetrically. Sternomastoid and trapezius are strong. Tongue protrudes midline.  Motor: Normal tone and bulk in the upper and lower extremities. Strength 5/5 in upper and lower extremities.   Sensory: Light touch and pinprick intact in the upper extremities. Light touch and pinprick reduced in the feet. Vibration reduced at the ankles. Position sense intact at the great toes.   Cerebellar: Finger-to-nose normal as well as heel-to-shin.  Gait and station: Normal. Romberg  negative.    Physical Exam  General: Well-developed 80-year-old female in no acute distress.  HEENT: Normocephalic, no evidence of trauma.  Neck: Supple, no thyromegaly.   Heart: Regular rate and rhythm without murmurs.  Extremities: No pedal edema.    Assessment/Plan:     Diagnoses and all orders for this visit:    1. Diabetic peripheral neuropathy (CMS/HCC) (Primary)        Lab results reviewed with the patient. Her vitamin B6 level was over 3 times the normal range. This can lead to worsening neuropathy symptoms and I advised her to stop the B Complex vitamin. She states that she feels it has actually improved her symptoms and does not wish to stop it. Ultimately it is her decision but I did let her know that it could likely be worsening her symptoms along with GI symptoms, ataxia, and photosensitivity. She voiced understanding of this. Her most recent A1c was 6.2. She is working on diet and has remained physically active to help control her blood sugar. She will continue gabapentin 100 mg twice daily. She does know this can be increased if needed but states she does not wish to take any more medicine at this time.     She will f/u as needed.

## 2020-11-02 RX ORDER — METOPROLOL SUCCINATE 50 MG/1
TABLET, EXTENDED RELEASE ORAL
Qty: 180 TABLET | Refills: 3 | Status: SHIPPED | OUTPATIENT
Start: 2020-11-02 | End: 2021-10-29

## 2020-12-14 ENCOUNTER — OFFICE VISIT (OUTPATIENT)
Dept: INTERNAL MEDICINE | Age: 80
End: 2020-12-14

## 2020-12-14 VITALS
DIASTOLIC BLOOD PRESSURE: 60 MMHG | HEIGHT: 68 IN | RESPIRATION RATE: 15 BRPM | WEIGHT: 153 LBS | BODY MASS INDEX: 23.19 KG/M2 | OXYGEN SATURATION: 99 % | TEMPERATURE: 96.9 F | HEART RATE: 62 BPM | SYSTOLIC BLOOD PRESSURE: 112 MMHG

## 2020-12-14 DIAGNOSIS — R05.8 COUGH DUE TO ACE INHIBITOR: ICD-10-CM

## 2020-12-14 DIAGNOSIS — T46.4X5A COUGH DUE TO ACE INHIBITOR: ICD-10-CM

## 2020-12-14 DIAGNOSIS — G25.81 RLS (RESTLESS LEGS SYNDROME): Chronic | ICD-10-CM

## 2020-12-14 DIAGNOSIS — G62.9 PERIPHERAL POLYNEUROPATHY: Chronic | ICD-10-CM

## 2020-12-14 DIAGNOSIS — I25.119 CORONARY ARTERY DISEASE INVOLVING NATIVE CORONARY ARTERY OF NATIVE HEART WITH ANGINA PECTORIS (HCC): Chronic | ICD-10-CM

## 2020-12-14 DIAGNOSIS — I10 ESSENTIAL HYPERTENSION: Chronic | ICD-10-CM

## 2020-12-14 DIAGNOSIS — E78.5 DYSLIPIDEMIA (HIGH LDL; LOW HDL): Chronic | ICD-10-CM

## 2020-12-14 DIAGNOSIS — R73.03 PREDIABETES: Primary | Chronic | ICD-10-CM

## 2020-12-14 LAB — HBA1C MFR BLD: 6.07 % (ref 4.8–5.6)

## 2020-12-14 PROCEDURE — 99214 OFFICE O/P EST MOD 30 MIN: CPT | Performed by: INTERNAL MEDICINE

## 2020-12-14 NOTE — PROGRESS NOTES
[facility] [place] INTERNAL MEDICINE  FRANK HEMPHILL M.D.      Reanna Higgins / 80 y.o. / female  12/14/2020    CHIEF COMPLAINT     Prediabetes, Hypertension, Hyperlipidemia, Coronary Artery Disease, and Cough      ASSESSMENT & PLAN     Problem List Items Addressed This Visit        High    Prediabetes - Primary (Chronic)    Current Assessment & Plan     Check A1c. Consider metformin.          Relevant Orders    Hemoglobin A1c       Medium    Essential hypertension (Chronic)    Overview     Continue metoprolol XL 50 mg BID.          Current Assessment & Plan     Has developed a persistent cough likely due to ACEI.   She was advised to discuss with cardiologist.         Relevant Medications    lisinopril (PRINIVIL,ZESTRIL) 20 MG tablet    metoprolol succinate XL (TOPROL-XL) 50 MG 24 hr tablet    Dyslipidemia (high LDL; low HDL) (Chronic)    Current Assessment & Plan     Continue pravastatin 40 mg qHS.             Low    Peripheral neuropathy (Chronic)    Current Assessment & Plan     Continue gabapentin 100 mg TID.          Relevant Medications    gabapentin (NEURONTIN) 100 MG capsule    RLS (restless legs syndrome) (Chronic)    Current Assessment & Plan     Continue gabapentin.          Coronary artery disease involving native coronary artery of native heart with angina pectoris (CMS/HCC) (Chronic)    Overview     8/2019: s/p 6 vessel CABG    Continue ASA, statin, beta-blocker.          Relevant Medications    aspirin 81 MG EC tablet    pravastatin (PRAVACHOL) 40 MG tablet    metoprolol succinate XL (TOPROL-XL) 50 MG 24 hr tablet      Other Visit Diagnoses     Cough due to ACE inhibitor            Orders Placed This Encounter   Procedures   • Hemoglobin A1c     No orders of the defined types were placed in this encounter.      Summary/Discussion:  •     Next Appointment with me: Visit date not found    Return in about 6 months (around 6/14/2021) for Next scheduled follow  "up.    _____________________________________________________________________________________    VITAL SIGNS     Visit Vitals  /60 (BP Location: Left arm, Patient Position: Sitting, Cuff Size: Adult)   Pulse 62   Temp 96.9 °F (36.1 °C) (Temporal)   Resp 15   Ht 171.5 cm (67.52\")   Wt 69.4 kg (153 lb)   SpO2 99%   BMI 23.60 kg/m²       BP Readings from Last 3 Encounters:   12/14/20 112/60   10/29/20 128/68   08/06/20 160/64     Wt Readings from Last 3 Encounters:   12/14/20 69.4 kg (153 lb)   10/29/20 68.5 kg (151 lb)   08/04/20 68.7 kg (151 lb 6.4 oz)     Body mass index is 23.6 kg/m².      MEDICATIONS     Current Outpatient Medications   Medication Sig Dispense Refill   • aspirin 81 MG EC tablet Take 1 tablet by mouth Daily.     • Calcium Carbonate-Vitamin D (CALCIUM-D) 600-400 MG-UNIT tablet Take  by mouth.     • cholecalciferol (VITAMIN D3) 1000 UNITS tablet Take 1 tablet by mouth Daily.     • Coenzyme Q10 (COQ10) 100 MG capsule Take  by mouth Daily.     • cycloSPORINE (RESTASIS) 0.05 % ophthalmic emulsion Administer 1 drop to both eyes 2 (Two) Times a Day.     • gabapentin (NEURONTIN) 100 MG capsule TAKE 1 CAPSULE BY MOUTH THREE TIMES A  capsule 0   • levothyroxine (SYNTHROID, LEVOTHROID) 25 MCG tablet TAKE 1 TABLET BY MOUTH EVERY DAY 90 tablet 3   • lisinopril (PRINIVIL,ZESTRIL) 20 MG tablet 10 mg.     • LORazepam (ATIVAN) 0.5 MG tablet One tablet 30 minutes before plane flight, may repeat times 1 in 4-6 hours. 8 tablet 0   • metoprolol succinate XL (TOPROL-XL) 50 MG 24 hr tablet TAKE 1 TABLET BY MOUTH TWICE A  tablet 3   • Multiple Vitamins-Minerals (CENTRUM SILVER 50+WOMEN) tablet Take 1 tablet by mouth Daily.     • omeprazole (priLOSEC) 40 MG capsule TAKE 1 CAPSULE BY MOUTH EVERY DAY 90 capsule 3   • pravastatin (PRAVACHOL) 40 MG tablet TAKE 1 TABLET BY MOUTH EVERY DAY AT NIGHT 90 tablet 3   • Unable to find 1 each 1 (One) Time. Med Name: Nerve Renew     • clobetasol (TEMOVATE) 0.05 % " ointment      • hyoscyamine (LEVSIN) 0.125 MG SL tablet Prn       No current facility-administered medications for this visit.        _____________________________________________________________________________________    HISTORY OF PRESENT ILLNESS     Persistent hacking cough and drainage, on lisinopril since earlier this year (cardiologist).   Hypertension remains stable on medications.   Hyperlipidemia on atorvastatin without problems.   CAD stable without angina or CHILDS.   Restless leg syndrome and peripheral neuropathy overall stable on gabapentin.       Patient Care Team:  Ted Li MD as PCP - General (Internal Medicine)  To Rivera MD as Consulting Physician (General Surgery)  Torsten Benson Jr., MD as Consulting Physician (Cardiology)  Arsenio Witt MD as Consulting Physician (Ophthalmology)  Brown Beckman MD as Consulting Physician (Orthopedic Surgery)      REVIEW OF SYSTEMS     Review of Systems  Constitutional neg except per HPI   Resp neg  CV neg   Neuro neg for change      PHYSICAL EXAMINATION     Physical Exam  Cardiovascular Rate: normal. Rhythm: regular. Heart sounds: normal   Pulm/Chest: Effort normal, breath sounds normal.   Psych: Normal mood and affect. Alert and intact judgment.     REVIEWED DATA     Labs:     Lab Results   Component Value Date     06/11/2020    K 4.3 06/11/2020    CALCIUM 9.3 06/11/2020    AST 20 06/11/2020    ALT 18 06/11/2020    BUN 24 (H) 06/11/2020    CREATININE 0.99 06/11/2020    CREATININE 0.77 09/28/2019    CREATININE 0.79 09/12/2019    EGFRIFNONA 54 (L) 06/11/2020    EGFRIFAFRI 85 09/12/2019       Lab Results   Component Value Date    HGBA1C 6.20 (H) 06/15/2020    HGBA1C 6.30 (H) 12/12/2019    HGBA1C 6.20 (H) 06/10/2019     (H) 09/12/2019     (H) 06/10/2019     (H) 01/09/2019       Lab Results   Component Value Date     (H) 06/15/2020    LDL 81 09/12/2019    LDL 78 07/17/2019    HDL 49 06/15/2020    HDL 37  (L) 09/12/2019    HDL 39 (L) 07/17/2019    TRIG 109 06/15/2020    TRIG 129 09/12/2019    TRIG 94 07/17/2019    TRIG 92 07/17/2019    CHOLHDLRATIO 3.51 06/15/2020    CHOLHDLRATIO 3.89 09/12/2019    CHOLHDLRATIO 4.00 06/10/2019       Lab Results   Component Value Date    TSH 2.840 12/12/2019    FREET4 1.13 12/12/2019       Lab Results   Component Value Date    WBC 8.91 09/28/2019    HGB 12.3 09/28/2019    HGB 10.6 (L) 09/04/2019    HGB 11.1 (L) 08/28/2019     09/28/2019       Lab Results   Component Value Date    PROTEIN Negative 06/10/2019    GLUCOSEU Negative 07/16/2019    BLOODU Negative 07/16/2019    NITRITEU Negative 07/16/2019    LEUKOCYTESUR Moderate (2+) (A) 07/16/2019       Imaging:           Medical Tests:           Summary of old records / correspondence / consultant report:           Request outside records:           ALLERGIES  Allergies   Allergen Reactions   • Ancef [Cefazolin] Other (See Comments)     Hypotension/bradycardia   • Codeine Hives   • Penicillins Other (See Comments)     Hypotension (Ancef allergy)    • Sulfa Antibiotics Hives        PFSH:     The following portions of the patient's history were reviewed and updated as appropriate: Allergies / Current Medications / Past Medical History / Surgical History / Social History / Family History    PROBLEM LIST   Patient Active Problem List   Diagnosis   • Colon polyp   • Gastroesophageal reflux disease   • Essential hypertension   • Dyslipidemia (high LDL; low HDL)   • Mitral and aortic valve disease   • Heart valve disease   • Anxiety   • Peripheral neuropathy   • Malignant neoplasm of cheek (CMS/HCC)   • Non-rheumatic mitral regurgitation   • Osteopenia   • SVT (supraventricular tachycardia) (CMS/HCC)   • AV block, Mobitz 1   • Cardiac pacemaker in situ   • RLS (restless legs syndrome)   • Phobia, flying   • Prediabetes   • Mesenteric ischemia due to arterial insufficiency (CMS/HCC)   • Sick sinus syndrome (CMS/HCC)   • Coronary artery  disease involving native coronary artery of native heart with angina pectoris (CMS/HCC)   • S/P CABG (coronary artery bypass graft)   • History of acute pancreatitis   • Diabetic peripheral neuropathy (CMS/HCC)       PAST MEDICAL HISTORY  Past Medical History:   Diagnosis Date   • Cancer (CMS/HCC)     skin   • Cardiac pacemaker in situ    • Carpal tunnel syndrome    • Cataract    • Cystic fibroadenosis of breast    • GERD (gastroesophageal reflux disease)    • Hypercholesterolemia    • Hypertension    • Osteoarthritis of both hands    • Pancreatitis    • Peripheral neuropathy    • Restless leg syndrome    • Second degree AV block, Mobitz type I    • SVT (supraventricular tachycardia) (CMS/[facility])        SURGICAL HISTORY  Past Surgical History:   Procedure Laterality Date   • APPENDECTOMY     • BILATERAL OOPHORECTOMY Bilateral 1988   • BREAST CYST EXCISION Bilateral     4 BREAST SURGERIES   • CARDIAC CATHETERIZATION N/A 8/22/2019    Procedure: Left Heart Cath;  Surgeon: Francis Mccullough MD;  Location: [facility] CATH INVASIVE LOCATION;  Service: Cardiology   • CARDIAC CATHETERIZATION N/A 8/22/2019    Procedure: Left ventriculography;  Surgeon: Francis Mccullough MD;  Location: [facility] CATH INVASIVE LOC[facility];  Service: Cardiology   • CARDIAC CATHETERIZATION N/A 8/22/2019    Procedure: Coronary angiography;  Surgeon: Francis Mccullough MD;  Location: [facility] CATH INVASIVE LOCATION;  Service: Cardiology   • CARDIAC ELECTROPHYSIOLOGY PROCEDURE N/A 10/10/2016    Procedure: Pacemaker DC new  [place];  Surgeon: Wilfrido Hameed MD;  Location: [facility] CATH [facility]VASIVE LOC[facility];  Service:    • CARPAL TUNNEL RELEASE Right    • CATARACT EXTRACTION      NOVEMBER AND DECEMBER 2014   • CHOLECYSTECTOMY     • COLONOSCOPY  2015   • CORONARY ARTERY BYPASS GRAFT N/A 8/23/2019    Procedure: SUMMER STERNOTOMY CORONARY ARTERY BYPASS GRAFT TIMES 6 USING LEFT INTERNAL MAMMARY ARTERY AND LEFT GREATER SAPHENOUS VEIN GRAFT PER ENDOSCOPIC VEIN HARVESTING AND  PRP;  Surgeon: Kem Hawk MD;  Location: [facility] MAIN OR;  Service: Cardiothoracic   • ENDOSCOPY N/A 2019    Procedure: ESOPHAGOGASTRODUODENOSCOPY with biopsy;  Surgeon: Ricky Chew MD;  Location: [facility] ENDOSCOPY;  Service: Gastroenterology   • HYSTERECTOMY     • KNEE ARTHROSCOPY Bilateral 2014    MENISCAL TEAR   • PACEMAKER IMPLANTATION  10/10/2016   • SHOULDER SURGERY Right     ROTATOR CUFF SURGERY 2015   • TONSILLECTOMY     • TRIGGER FINGER RELEASE      both hands       SOCIAL HISTORY  Social History     Socioeconomic History   • Marital status:      Spouse name: Rishabh*   • Number of children: 2   • Years of education: 13   • Highest education level: Some college, no degree   Occupational History   • 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)   Tobacco Use   • Smoking status: Former Smoker     Packs/day: 0.50     Years: 14.00     Pack years: 7.00     Start date:      Quit date:      Years since quittin.9   • Smokeless tobacco: Never Used   • Tobacco comment: quit age 28   Substance and Sexual Activity   • Alcohol use: Yes     Alcohol/week: 2.0 standard drinks     Types: 1 Glasses of wine, 1 Cans of beer per week     Frequency: Monthly or less     Drinks per session: 1 or 2     Comment: rare glass of wine or beer rarely/  No caffeine use   • Drug use: No   • Sexual activity: Never   Lifestyle   • Physical activity     Days per week: 5 days     Minutes per session: 30 min   • Stress: Not at all       FAMILY HISTORY  Family History   Problem Relation Age of Onset   • Thyroid disease Daughter    • Lung cancer Brother    • Hypertension Mother    • Aortic stenosis Mother    • Coronary artery disease Mother          78 (smoker)   • Hypertension Father    • Depression Father    • Anxiety disorder Father    • Diabetes Father    • Heart failure Father    • Cirrhosis Father         alcohol   • Alcohol abuse Sister    • Lupus Sister    • Heart disease Sister    •  Diabetes type II Sister    • Alcohol abuse Brother    • Drug abuse Brother    • Heart disease Brother    • Cancer Brother         bladder (smoker)   • Heart disease Brother    • Breast cancer Paternal Aunt    • Neuropathy Neg Hx    • Colon cancer Neg Hx    • Dementia Neg Hx          Examiner was wearing KN95 mask, face shield and exam gloves during the entire duration of the visit. Patient was masked the entire time.   Minimum social distance of 6 ft maintained entire visit except if physical contact was necessary as documented.     **Dragon Disclaimer:   Much of this encounter note is an electronic transcription/translation of spoken language to printed text. The electronic translation of spoken language may permit erroneous, or at times, nonsensical words or phrases to be inadvertently transcribed. Although I have reviewed the note for such errors, some may still exist.     Template created by Arabella Li MD

## 2020-12-14 NOTE — ASSESSMENT & PLAN NOTE
Has developed a persistent cough likely due to ACEI.   She was advised to discuss with cardiologist.

## 2020-12-17 DIAGNOSIS — G62.9 PERIPHERAL POLYNEUROPATHY: Chronic | ICD-10-CM

## 2020-12-17 RX ORDER — GABAPENTIN 300 MG/1
300 CAPSULE ORAL 2 TIMES DAILY
Qty: 60 CAPSULE | Refills: 1 | Status: SHIPPED | OUTPATIENT
Start: 2020-12-17 | End: 2021-01-18 | Stop reason: SDUPTHER

## 2021-01-05 RX ORDER — OMEPRAZOLE 40 MG/1
CAPSULE, DELAYED RELEASE ORAL
Qty: 90 CAPSULE | Refills: 3 | Status: SHIPPED | OUTPATIENT
Start: 2021-01-05 | End: 2022-01-20

## 2021-01-13 ENCOUNTER — OFFICE VISIT (OUTPATIENT)
Dept: CARDIOLOGY | Facility: CLINIC | Age: 81
End: 2021-01-13

## 2021-01-13 VITALS
DIASTOLIC BLOOD PRESSURE: 60 MMHG | SYSTOLIC BLOOD PRESSURE: 142 MMHG | BODY MASS INDEX: 23.89 KG/M2 | HEIGHT: 67 IN | WEIGHT: 152.2 LBS | HEART RATE: 60 BPM

## 2021-01-13 DIAGNOSIS — Z95.1 S/P CABG (CORONARY ARTERY BYPASS GRAFT): ICD-10-CM

## 2021-01-13 DIAGNOSIS — R00.2 PALPITATIONS: ICD-10-CM

## 2021-01-13 DIAGNOSIS — Z95.0 CARDIAC PACEMAKER IN SITU: Primary | ICD-10-CM

## 2021-01-13 PROCEDURE — 93000 ELECTROCARDIOGRAM COMPLETE: CPT | Performed by: INTERNAL MEDICINE

## 2021-01-13 PROCEDURE — 99214 OFFICE O/P EST MOD 30 MIN: CPT | Performed by: INTERNAL MEDICINE

## 2021-01-13 RX ORDER — LISINOPRIL 10 MG/1
10 TABLET ORAL DAILY
COMMUNITY
End: 2021-01-21

## 2021-01-13 NOTE — PROGRESS NOTES
Date of Office Visit: 21  Encounter Provider: Francis Mccullough MD  Place of Service: [facility] CARDIOLOGY  Patient Name: Reanna Higgins  :1940  [id number]    Chief Complaint   Patient presents with   • Coronary Artery Disease   :     HPI: Reanna Higgins is a 80 y.o. female she is a lady who came in 2019 with unstable angina underwent cath and she had three-vessel disease so that really was not amenable to PCI she underwent a bypass x6 with Alvin Hawk.  This was complicated by a small bowel obstruction that resolved with conservative therapy. she had normal LV function     She is here for follow-up today.  She has not had any chest discomfort or shortness of breath.  She does complain of palpitations that are intermittent tacky in nature she notices them more when she is sitting around lying down.  She has not had any dizziness or syncope.  They happen almost every day and sometimes they last a long time.  She also complains of a little bit of cough and congestion and is wondering if it is from lisinopril.  By nurse practitioner put her on a higher dose of lisinopril and she felt poorly with it so she is back down to 10 mg a day    Past Medical History:   Diagnosis Date   • Cancer (CMS/HCC)     skin   • Cardiac pacemaker in situ    • Carpal tunnel syndrome    • Cataract    • Cystic fibroadenosis of breast    • GERD (gastroesophageal reflux disease)    • Hypercholesterolemia    • Hypertension    • Osteoarthritis of both hands    • Pancreatitis    • Peripheral neuropathy    • Restless leg syndrome    • Second degree AV block, Mobitz type I    • SVT (supraventricular tachycardia) (CMS/HCC)        Past Surgical History:   Procedure Laterality Date   • APPENDECTOMY     • BILATERAL OOPHORECTOMY Bilateral    • BREAST CYST EXCISION Bilateral     4 BREAST SURGERIES   • CARDIAC CATHETERIZATION N/A 2019    Procedure: Left Heart Cath;  Surgeon: Francis Mccullough MD;   Location: [facility] CATH INVASIVE LOCATION;  Service: Cardiology   • CARDIAC CATHETERIZATION N/A 2019    Procedure: Left ventriculography;  Surgeon: Francis Mccullough MD;  Location: [facility] CATH INVASIVE LOCATION;  Service: Cardiology   • CARDIAC CATHETERIZATION N/A 2019    Procedure: Coronary angiography;  Surgeon: Francis Mccullough MD;  Location: [facility] CATH INVASIVE LOCATION;  Service: Cardiology   • CARDIAC ELECTROPHYSIOLOGY PROCEDURE N/A 10/10/2016    Procedure: Pacemaker DC new  [place];  Surgeon: Wilfrido Hameed MD;  Location: [facility] CATH INVASIVE LOCATION;  Service:    • CARPAL TUNNEL RELEASE Right    • CATARACT EXTRACTION      NOVEMBER AND 2014   • CHOLECYSTECTOMY     • COLONOSCOPY     • CORONARY ARTERY BYPASS GRAFT N/A 2019    Procedure: SUMMER STERNOTOMY CORONARY ARTERY BYPASS GRAFT TIMES 6 USING LEFT INTERNAL MAMMARY ARTERY AND LEFT GREATER SAPHENOUS VEIN GRAFT PER ENDOSCOPIC VEIN HARVESTING AND PRP;  Surgeon: Kem Hawk MD;  Location: [facility] OR;  Service: Cardiothoracic   • ENDOSCOPY N/A 2019    Procedure: ESOPHAGOGASTRODUODENOSCOPY with biopsy;  Surgeon: Ricky Chew MD;  Location: [facility] ENDOSCOPY;  Service: Gastroenterology   • HYSTERECTOMY     • KNEE ARTHROSCOPY Bilateral 2014    MENISCAL TEAR   • PACEMAKER IMPLANTATION  10/10/2016   • SHOULDER SURGERY Right     ROTATOR CUFF SURGERY 2015   • TONSILLECTOMY     • TRIGGER FINGER RELEASE      both hands       Social History     Socioeconomic History   • Marital status:      Spouse name: Rishabh*   • Number of children: 2   • Years of education: 13   • Highest education level: Some college, no degree   Occupational History   • 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)   Tobacco Use   • Smoking status: Former Smoker     Packs/day: 0.50     Years: 14.00     Pack years: 7.00     Start date:      Quit date:      Years since quittin.0   • Smokeless tobacco: Never Used   • Tobacco  comment: quit age 28   Substance and Sexual Activity   • Alcohol use: Yes     Alcohol/week: 2.0 standard drinks     Types: 1 Glasses of wine, 1 Cans of beer per week     Frequency: Monthly or less     Drinks per session: 1 or 2     Comment: rare glass of wine or beer rarely/  No caffeine use   • Drug use: No   • Sexual activity: Never   Lifestyle   • Physical activity     Days per week: 5 days     Minutes per session: 30 min   • Stress: Not at all       Family History   Problem Relation Age of Onset   • Thyroid disease Daughter    • Lung cancer Brother    • Hypertension Mother    • Aortic stenosis Mother    • Coronary artery disease Mother          78 (smoker)   • Hypertension Father    • Depression Father    • Anxiety disorder Father    • Diabetes Father    • Heart failure Father    • Cirrhosis Father         alcohol   • Alcohol abuse Sister    • Lupus Sister    • Heart disease Sister    • Diabetes type II Sister    • Alcohol abuse Brother    • Drug abuse Brother    • Heart disease Brother    • Cancer Brother         bladder (smoker)   • Heart disease Brother    • Breast cancer Paternal Aunt    • Neuropathy Neg Hx    • Colon cancer Neg Hx    • Dementia Neg Hx        Review of Systems   Constitution: Negative for decreased appetite, fever, malaise/fatigue and weight loss.   HENT: Negative for nosebleeds.    Eyes: Negative for double vision.   Cardiovascular: Negative for chest pain, claudication, cyanosis, dyspnea on exertion, irregular heartbeat, leg swelling, near-syncope, orthopnea, palpitations, paroxysmal nocturnal dyspnea and syncope.   Respiratory: Negative for cough, hemoptysis and shortness of breath.    Hematologic/Lymphatic: Negative for bleeding problem.   Skin: Negative for rash.   Musculoskeletal: Negative for falls and myalgias.   Gastrointestinal: Negative for hematochezia, jaundice, melena, nausea and vomiting.   Genitourinary: Negative for hematuria.   Neurological: Negative for dizziness and  seizures.   Psychiatric/Behavioral: Negative for altered mental status and memory loss.       Allergies   Allergen Reactions   • Ancef [Cefazolin] Other (See Comments)     Hypotension/bradycardia   • Codeine Hives   • Penicillins Other (See Comments)     Hypotension (Ancef allergy)    • Sulfa Antibiotics Hives         Current Outpatient Medications:   •  aspirin 81 MG EC tablet, Take 1 tablet by mouth Daily., Disp: , Rfl:   •  Calcium Carbonate-Vitamin D (CALCIUM-D) 600-400 MG-UNIT tablet, Take  by mouth., Disp: , Rfl:   •  cholecalciferol (VITAMIN D3) 1000 UNITS tablet, Take 1 tablet by mouth Daily., Disp: , Rfl:   •  Coenzyme Q10 (COQ10) 100 MG capsule, Take  by mouth Daily., Disp: , Rfl:   •  cycloSPORINE (RESTASIS) 0.05 % ophthalmic emulsion, Administer 1 drop to both eyes 2 (Two) Times a Day., Disp: , Rfl:   •  gabapentin (NEURONTIN) 300 MG capsule, Take 1 capsule by mouth 2 (two) times a day. (Patient taking differently: Take 300 mg by mouth 3 (Three) Times a Day.), Disp: 60 capsule, Rfl: 1  •  levothyroxine (SYNTHROID, LEVOTHROID) 25 MCG tablet, TAKE 1 TABLET BY MOUTH EVERY DAY, Disp: 90 tablet, Rfl: 3  •  lisinopril (PRINIVIL,ZESTRIL) 10 MG tablet, Take 10 mg by mouth Daily., Disp: , Rfl:   •  LORazepam (ATIVAN) 0.5 MG tablet, One tablet 30 minutes before plane flight, may repeat times 1 in 4-6 hours., Disp: 8 tablet, Rfl: 0  •  metoprolol succinate XL (TOPROL-XL) 50 MG 24 hr tablet, TAKE 1 TABLET BY MOUTH TWICE A DAY, Disp: 180 tablet, Rfl: 3  •  Multiple Vitamins-Minerals (CENTRUM SILVER 50+WOMEN) tablet, Take 1 tablet by mouth Daily., Disp: , Rfl:   •  omeprazole (priLOSEC) 40 MG capsule, TAKE 1 CAPSULE BY MOUTH EVERY DAY, Disp: 90 capsule, Rfl: 3  •  pravastatin (PRAVACHOL) 40 MG tablet, TAKE 1 TABLET BY MOUTH EVERY DAY AT NIGHT, Disp: 90 tablet, Rfl: 3  •  Unable to find, 1 each 1 (One) Time. Med Name: Nerve Renew, Disp: , Rfl:   •  clobetasol (TEMOVATE) 0.05 % ointment, , Disp: , Rfl:   •  hyoscyamine  "(LEVSIN) 0.125 MG SL tablet, Prn, Disp: , Rfl:   •  lisinopril (PRINIVIL,ZESTRIL) 20 MG tablet, 10 mg., Disp: , Rfl:       Objective:     Vitals:    01/13/21 0755   BP: 142/60   Pulse: 60   Weight: 69 kg (152 lb 3.2 oz)   Height: 170.2 cm (67\")     Body mass index is 23.84 kg/m².    Physical Exam   Constitutional: She is oriented to person, place, and time. She appears well-developed and well-nourished.   HENT:   Head: Normocephalic.   Eyes: No scleral icterus.   Neck: No JVD present. No thyromegaly present.   Cardiovascular: Normal rate, regular rhythm and normal heart sounds. Exam reveals no gallop and no friction rub.   No murmur heard.  Pulmonary/Chest: Effort normal and breath sounds normal. She has no wheezes. She has no rales.       Abdominal: Soft. There is no hepatosplenomegaly. There is no abdominal tenderness.   Musculoskeletal: Normal range of motion.         General: No edema.   Lymphadenopathy:     She has no cervical adenopathy.   Neurological: She is alert and oriented to person, place, and time.   Skin: Skin is warm and dry. No rash noted.   Psychiatric: She has a normal mood and affect.         ECG 12 Lead    Date/Time: 1/13/2021 8:31 AM  Performed by: Francis Mccullough MD  Authorized by: Francis Mccullough MD   Comparison: compared with previous ECG   Similar to previous ECG  Rhythm: paced  Other findings: left ventricular hypertrophy with strain    Clinical impression: abnormal EKG             Assessment:       Diagnosis Plan   1. Cardiac pacemaker in situ  Holter Monitor - 48 Hour   2. S/P CABG (coronary artery bypass graft)  Holter Monitor - 48 Hour   3. Palpitations  Holter Monitor - 48 Hour          Plan:       In general I am pleased with how she is doing however would like to investigate this palpitations a little bit more we have not really seen anything that sustained on her pacemaker checks.  I am going to have her wear a 48-hour Holter and see what they are I suspect her PVCs depending on " how frequent they are I would probably start with a low magnesium and increasing her metoprolol.  I think we will have to stop her lisinopril and switch her to low-dose losartan we may have to be very low if we increase the metoprolol to let us see what culture shows and then we will make decisions based on the have her come back and see me in a year and she will see Mary or Lindsey at that time and then she will see me in 2 years we have her on pravastatin because she is over age 73    As always, it has been a pleasure to participate in your patient's care.      Sincerely,       Francis Mccullough MD

## 2021-01-18 DIAGNOSIS — G62.9 PERIPHERAL POLYNEUROPATHY: Chronic | ICD-10-CM

## 2021-01-19 RX ORDER — GABAPENTIN 300 MG/1
300 CAPSULE ORAL 2 TIMES DAILY
Qty: 180 CAPSULE | Refills: 0 | Status: SHIPPED | OUTPATIENT
Start: 2021-01-19 | End: 2021-04-06

## 2021-01-21 DIAGNOSIS — E03.8 SUBCLINICAL HYPOTHYROIDISM: ICD-10-CM

## 2021-01-21 RX ORDER — LOSARTAN POTASSIUM 25 MG/1
12.5 TABLET ORAL DAILY
Qty: 45 TABLET | Refills: 3 | Status: SHIPPED | OUTPATIENT
Start: 2021-01-21 | End: 2021-02-15

## 2021-01-21 RX ORDER — LEVOTHYROXINE SODIUM 0.03 MG/1
TABLET ORAL
Qty: 90 TABLET | Refills: 3 | Status: SHIPPED | OUTPATIENT
Start: 2021-01-21 | End: 2021-06-17

## 2021-01-31 ENCOUNTER — IMMUNIZATION (OUTPATIENT)
Dept: VACCINE CLINIC | Facility: HOSPITAL | Age: 81
End: 2021-01-31

## 2021-01-31 PROCEDURE — 0001A: CPT | Performed by: INTERNAL MEDICINE

## 2021-01-31 PROCEDURE — 91300 HC SARSCOV02 VAC 30MCG/0.3ML IM: CPT | Performed by: INTERNAL MEDICINE

## 2021-02-15 RX ORDER — LOSARTAN POTASSIUM 25 MG/1
25 TABLET ORAL DAILY
Qty: 90 TABLET | Refills: 2 | Status: SHIPPED | OUTPATIENT
Start: 2021-02-15 | End: 2021-02-17 | Stop reason: SDUPTHER

## 2021-02-17 RX ORDER — LOSARTAN POTASSIUM 25 MG/1
25 TABLET ORAL DAILY
Qty: 90 TABLET | Refills: 2 | Status: SHIPPED | OUTPATIENT
Start: 2021-02-17 | End: 2021-03-23 | Stop reason: SDUPTHER

## 2021-02-21 ENCOUNTER — IMMUNIZATION (OUTPATIENT)
Dept: VACCINE CLINIC | Facility: HOSPITAL | Age: 81
End: 2021-02-21

## 2021-02-21 PROCEDURE — 0002A: CPT | Performed by: INTERNAL MEDICINE

## 2021-02-21 PROCEDURE — 91300 HC SARSCOV02 VAC 30MCG/0.3ML IM: CPT | Performed by: INTERNAL MEDICINE

## 2021-03-01 ENCOUNTER — HOSPITAL ENCOUNTER (EMERGENCY)
Facility: HOSPITAL | Age: 81
Discharge: HOME | End: 2021-03-01
Attending: EMERGENCY MEDICINE | Admitting: EMERGENCY MEDICINE

## 2021-03-01 ENCOUNTER — OFFICE VISIT (OUTPATIENT)
Dept: INTERNAL MEDICINE | Age: 81
End: 2021-03-01

## 2021-03-01 VITALS
HEIGHT: 67 IN | OXYGEN SATURATION: 99 % | HEART RATE: 65 BPM | SYSTOLIC BLOOD PRESSURE: 136 MMHG | DIASTOLIC BLOOD PRESSURE: 54 MMHG | WEIGHT: 153 LBS | TEMPERATURE: 96.9 F | BODY MASS INDEX: 24.01 KG/M2

## 2021-03-01 VITALS
RESPIRATION RATE: 16 BRPM | BODY MASS INDEX: 24.01 KG/M2 | HEART RATE: 60 BPM | HEIGHT: 67 IN | OXYGEN SATURATION: 99 % | DIASTOLIC BLOOD PRESSURE: 71 MMHG | SYSTOLIC BLOOD PRESSURE: 159 MMHG | TEMPERATURE: 97.5 F | WEIGHT: 153 LBS

## 2021-03-01 DIAGNOSIS — E03.8 SUBCLINICAL HYPOTHYROIDISM: ICD-10-CM

## 2021-03-01 DIAGNOSIS — F41.1 GENERALIZED ANXIETY DISORDER: Primary | ICD-10-CM

## 2021-03-01 DIAGNOSIS — K85.90 ACUTE PANCREATITIS, UNSPECIFIED COMPLICATION STATUS, UNSPECIFIED PANCREATITIS TYPE: Primary | ICD-10-CM

## 2021-03-01 DIAGNOSIS — G62.9 PERIPHERAL POLYNEUROPATHY: Chronic | ICD-10-CM

## 2021-03-01 DIAGNOSIS — G25.81 RLS (RESTLESS LEGS SYNDROME): Chronic | ICD-10-CM

## 2021-03-01 PROBLEM — F40.243 PHOBIA, FLYING: Status: RESOLVED | Noted: 2019-01-09 | Resolved: 2021-03-01

## 2021-03-01 LAB
ALBUMIN SERPL-MCNC: 3.8 G/DL (ref 3.5–5.2)
ALBUMIN/GLOB SERPL: 1.6 G/DL
ALP SERPL-CCNC: 68 U/L (ref 39–117)
ALT SERPL W P-5'-P-CCNC: 48 U/L (ref 1–33)
ANION GAP SERPL CALCULATED.3IONS-SCNC: 8.4 MMOL/L (ref 5–15)
AST SERPL-CCNC: 67 U/L (ref 1–32)
BASOPHILS # BLD AUTO: 0.03 10*3/MM3 (ref 0–0.2)
BASOPHILS NFR BLD AUTO: 0.6 % (ref 0–1.5)
BILIRUB SERPL-MCNC: 0.2 MG/DL (ref 0–1.2)
BUN SERPL-MCNC: 22 MG/DL (ref 8–23)
BUN/CREAT SERPL: 22.2 (ref 7–25)
CALCIUM SPEC-SCNC: 9 MG/DL (ref 8.6–10.5)
CHLORIDE SERPL-SCNC: 104 MMOL/L (ref 98–107)
CO2 SERPL-SCNC: 27.6 MMOL/L (ref 22–29)
CREAT SERPL-MCNC: 0.99 MG/DL (ref 0.57–1)
DEPRECATED RDW RBC AUTO: 38.3 FL (ref 37–54)
EOSINOPHIL # BLD AUTO: 0.08 10*3/MM3 (ref 0–0.4)
EOSINOPHIL NFR BLD AUTO: 1.5 % (ref 0.3–6.2)
ERYTHROCYTE [DISTWIDTH] IN BLOOD BY AUTOMATED COUNT: 12 % (ref 12.3–15.4)
GFR SERPL CREATININE-BSD FRML MDRD: 54 ML/MIN/1.73
GLOBULIN UR ELPH-MCNC: 2.4 GM/DL
GLUCOSE SERPL-MCNC: 117 MG/DL (ref 65–99)
HCT VFR BLD AUTO: 38.4 % (ref 34–46.6)
HGB BLD-MCNC: 13.2 G/DL (ref 12–15.9)
IMM GRANULOCYTES # BLD AUTO: 0.01 10*3/MM3 (ref 0–0.05)
IMM GRANULOCYTES NFR BLD AUTO: 0.2 % (ref 0–0.5)
LIPASE SERPL-CCNC: 623 U/L (ref 13–60)
LYMPHOCYTES # BLD AUTO: 1.57 10*3/MM3 (ref 0.7–3.1)
LYMPHOCYTES NFR BLD AUTO: 29.6 % (ref 19.6–45.3)
MCH RBC QN AUTO: 30.8 PG (ref 26.6–33)
MCHC RBC AUTO-ENTMCNC: 34.4 G/DL (ref 31.5–35.7)
MCV RBC AUTO: 89.5 FL (ref 79–97)
MONOCYTES # BLD AUTO: 0.35 10*3/MM3 (ref 0.1–0.9)
MONOCYTES NFR BLD AUTO: 6.6 % (ref 5–12)
NEUTROPHILS NFR BLD AUTO: 3.27 10*3/MM3 (ref 1.7–7)
NEUTROPHILS NFR BLD AUTO: 61.5 % (ref 42.7–76)
NRBC BLD AUTO-RTO: 0 /100 WBC (ref 0–0.2)
PLATELET # BLD AUTO: 192 10*3/MM3 (ref 140–450)
PMV BLD AUTO: 11.4 FL (ref 6–12)
POTASSIUM SERPL-SCNC: 4.3 MMOL/L (ref 3.5–5.2)
PROT SERPL-MCNC: 6.2 G/DL (ref 6–8.5)
RBC # BLD AUTO: 4.29 10*6/MM3 (ref 3.77–5.28)
SODIUM SERPL-SCNC: 140 MMOL/L (ref 136–145)
T4 FREE SERPL-MCNC: 1.09 NG/DL (ref 0.93–1.7)
TSH SERPL DL<=0.005 MIU/L-ACNC: 3.14 UIU/ML (ref 0.27–4.2)
WBC # BLD AUTO: 5.31 10*3/MM3 (ref 3.4–10.8)

## 2021-03-01 PROCEDURE — 99214 OFFICE O/P EST MOD 30 MIN: CPT | Performed by: INTERNAL MEDICINE

## 2021-03-01 PROCEDURE — 83690 ASSAY OF LIPASE: CPT | Performed by: EMERGENCY MEDICINE

## 2021-03-01 PROCEDURE — 85025 COMPLETE CBC W/AUTO DIFF WBC: CPT | Performed by: EMERGENCY MEDICINE

## 2021-03-01 PROCEDURE — 99284 EMERGENCY DEPT VISIT MOD MDM: CPT

## 2021-03-01 PROCEDURE — 80053 COMPREHEN METABOLIC PANEL: CPT | Performed by: EMERGENCY MEDICINE

## 2021-03-01 RX ORDER — SODIUM CHLORIDE 0.9 % (FLUSH) 0.9 %
10 SYRINGE (ML) INJECTION AS NEEDED
Status: DISCONTINUED | OUTPATIENT
Start: 2021-03-01 | End: 2021-03-02 | Stop reason: HOSPADM

## 2021-03-01 NOTE — PROGRESS NOTES
"    I N T E R N A L  M E D I C I N E  J U N O H  K I M,  M D      ENCOUNTER DATE:  03/01/2021    Reanna Higgins / 81 y.o. / female      CHIEF COMPLAINT / REASON FOR OFFICE VISIT     Peripheral Neuropathy, Restless Legs Syndrome, and Anxiety      ASSESSMENT & PLAN     Problem List Items Addressed This Visit     REKHA (generalized anxiety disorder) - Primary (Chronic)    Current Assessment & Plan     · Start sertraline 50 mg qAM.   Follow-up 6 weeks.     Discussed diagnosis of depression, antidepressant medications including information on side effects, adverse effects and discussed need to monitor for worsening depression, anxiety/irritability, suicidal thoughts/ideations, or significant mood swings. She expressed understanding and agreed to follow above instructions.           Relevant Medications    sertraline (Zoloft) 50 MG tablet    Peripheral neuropathy (Chronic)    Current Assessment & Plan     Increase gabapentin 300 mg up to 4 tablets daily. May take 1 in the evening and an additional dose at bedtime.          Relevant Medications    gabapentin (NEURONTIN) 300 MG capsule    RLS (restless legs syndrome) (Chronic)    Current Assessment & Plan     Same as for peripheral neuropathy.          Subclinical hypothyroidism    Relevant Medications    metoprolol succinate XL (TOPROL-XL) 50 MG 24 hr tablet    levothyroxine (SYNTHROID, LEVOTHROID) 25 MCG tablet    Other Relevant Orders    TSH+Free T4        Orders Placed This Encounter   Procedures   • TSH+Free T4     New Medications Ordered This Visit   Medications   • sertraline (Zoloft) 50 MG tablet     Sig: Take 1 tablet by mouth Daily.     Dispense:  30 tablet     Refill:  2       SUMMARY/DISCUSSION  •       Next Appointment with me: 6/17/2021    Return in about 6 weeks (around 4/12/2021) for Reassess today's problem(s).      VITAL SIGNS     Visit Vitals  /54   Pulse 65   Temp 96.9 °F (36.1 °C)   Ht 170.2 cm (67\")   Wt 69.4 kg (153 lb)   SpO2 99%   BMI 23.96 " kg/m²       BP Readings from Last 3 Encounters:   03/01/21 136/54   01/13/21 142/60   12/14/20 112/60     Wt Readings from Last 3 Encounters:   03/01/21 69.4 kg (153 lb)   01/13/21 69 kg (152 lb 3.2 oz)   12/14/20 69.4 kg (153 lb)     Body mass index is 23.96 kg/m².      MEDICATIONS AT THE TIME OF OFFICE VISIT     Current Outpatient Medications on File Prior to Visit   Medication Sig   • aspirin 81 MG EC tablet Take 1 tablet by mouth Daily.   • Calcium Carbonate-Vitamin D (CALCIUM-D) 600-400 MG-UNIT tablet Take  by mouth.   • cholecalciferol (VITAMIN D3) 1000 UNITS tablet Take 1 tablet by mouth Daily.   • Coenzyme Q10 (COQ10) 100 MG capsule Take  by mouth Daily.   • cycloSPORINE (RESTASIS) 0.05 % ophthalmic emulsion Administer 1 drop to both eyes 2 (Two) Times a Day.   • gabapentin (NEURONTIN) 300 MG capsule Take 1 capsule by mouth 2 (two) times a day.   • losartan (COZAAR) 25 MG tablet Take 1 tablet by mouth Daily.   • metoprolol succinate XL (TOPROL-XL) 50 MG 24 hr tablet TAKE 1 TABLET BY MOUTH TWICE A DAY   • Multiple Vitamins-Minerals (CENTRUM SILVER 50+WOMEN) tablet Take 1 tablet by mouth Daily.   • omeprazole (priLOSEC) 40 MG capsule TAKE 1 CAPSULE BY MOUTH EVERY DAY   • pravastatin (PRAVACHOL) 40 MG tablet TAKE 1 TABLET BY MOUTH EVERY DAY AT NIGHT         HISTORY OF PRESENT ILLNESS     Increased anxiety problem. Peripheral neuropathy and RLS symptoms are ongoing. Has seen neuro for this. Take gabapentin 300 mg BID.         REVIEW OF SYSTEMS     Palpitations intermittently (sees cardiologist)  Denies angina or CHILDS   Increased anxiety; not depressed       PHYSICAL EXAMINATION     Physical Exam  Anxious appearing  Cardiovascular: Normal rate, regular rhythm.       REVIEWED DATA     Labs:     Lab Results   Component Value Date     06/11/2020    K 4.3 06/11/2020    AST 20 06/11/2020    ALT 18 06/11/2020    BUN 24 (H) 06/11/2020    CREATININE 0.99 06/11/2020    CREATININE 0.77 09/28/2019    CREATININE 0.79  09/12/2019    EGFRIFNONA 54 (L) 06/11/2020    EGFRIFAFRI 85 09/12/2019       Lab Results   Component Value Date    HGBA1C 6.07 (H) 12/14/2020    HGBA1C 6.20 (H) 06/15/2020    HGBA1C 6.30 (H) 12/12/2019       Lab Results   Component Value Date     (H) 06/15/2020    LDL 81 09/12/2019    HDL 49 06/15/2020    TRIG 109 06/15/2020       Lab Results   Component Value Date    TSH 2.840 12/12/2019    FREET4 1.13 12/12/2019       Lab Results   Component Value Date    WBC 8.91 09/28/2019    HGB 12.3 09/28/2019     09/28/2019         Imaging:           Medical Tests:     Echocardiogram 8/8/19:  · Left ventricular wall thickness is consistent with hypertrophy. Sigmoid-shaped ventricular septum is present.  · Left ventricular systolic function is normal.  · There is calcification of the aortic valve.  · Mild tricuspid valve regurgitation is present.  · Mild mitral valve regurgitation is present  · Left ventricular diastolic dysfunction (grade I) consistent with impaired relaxation.  · Rest EF= 60%  · Normal stress echo with no significant echocardiographic evidence for myocardial ischemia.  · Post EF=69%.    Recent Holter was uneventful    Summary of old records / correspondence / consultant report:     Neuro note re: peripheral neuropathy       Request outside records:             *Examiner was wearing KN95 mask, face shield and exam gloves during the entire duration of the visit. Patient was masked the entire time.   Minimum social distance of 6 ft maintained entire visit except if physical contact was necessary as documented.     **Dragon Disclaimer:   Much of this encounter note is an electronic transcription/translation of spoken language to printed text. The electronic translation of spoken language may permit erroneous, or at times, nonsensical words or phrases to be inadvertently transcribed. Although I have reviewed the note for such errors, some may still exist.     Template created by Arabella Li MD

## 2021-03-01 NOTE — ASSESSMENT & PLAN NOTE
· Start sertraline 50 mg qAM.   Follow-up 6 weeks.     Discussed diagnosis of depression, antidepressant medications including information on side effects, adverse effects and discussed need to monitor for worsening depression, anxiety/irritability, suicidal thoughts/ideations, or significant mood swings. She expressed understanding and agreed to follow above instructions.

## 2021-03-01 NOTE — ASSESSMENT & PLAN NOTE
Increase gabapentin 300 mg up to 4 tablets daily. May take 1 in the evening and an additional dose at bedtime.

## 2021-03-02 ENCOUNTER — TELEPHONE (OUTPATIENT)
Dept: GASTROENTEROLOGY | Facility: CLINIC | Age: 81
End: 2021-03-02

## 2021-03-02 DIAGNOSIS — K86.1 CHRONIC PANCREATITIS, UNSPECIFIED PANCREATITIS TYPE (HCC): Primary | ICD-10-CM

## 2021-03-02 NOTE — ED NOTES
This RN in appropriate ppe while in pt room. Pt wearing mask.        Bonnie Bolanos, RN  03/01/21 4373

## 2021-03-02 NOTE — ED PROVIDER NOTES
EMERGENCY DEPARTMENT ENCOUNTER    Room Number:  33/33  Date seen:  3/1/2021  PCP: Ted Li MD  Historian: Patient      HPI:  Chief Complaint: Abdominal pain  A complete HPI/ROS/PMH/PSH/SH/FH are unobtainable due to: Nothing  Context: Reanna Higgins is a 81 y.o. female who presents to the ED c/o sharp upper abdominal pain onset about an hour ago.  She reports the pain lasted only about 10 minutes and then spontaneously resolved.  She had some associated nausea and dry heaving.  Patient reports that this is the sixth episode of this pain that she has had in the last year and a half, but she has not had an episode in the last 9 months.  She has been seen by GI including Dr. Redman and Dr. Shields.  She has had an EUS and there differential diagnosis includes intestinal angina related to celiac plexus stenosis or pancreatitis.  She was advised to come to the ER if the pain recurs to get lab work.  Currently the pain has resolved.  She denies history of alcohol use.  She denies fever, chills, diarrhea.            PAST MEDICAL HISTORY  Active Ambulatory Problems     Diagnosis Date Noted   • Colon polyp 03/18/2016   • Gastroesophageal reflux disease 03/18/2016   • Essential hypertension 03/18/2016   • Dyslipidemia (high LDL; low HDL) 03/18/2016   • Mitral and aortic valve disease 03/18/2016   • Heart valve disease 03/18/2016   • REKHA (generalized anxiety disorder) 07/07/2016   • Peripheral neuropathy    • Malignant neoplasm of cheek (CMS/[facility]) 05/31/2017   • Non-rheumatic mitral regurgitation 05/31/2017   • Osteopenia 06/17/2017   • SVT (supraventricular tachycardia) (CMS/[facility]) 12/08/2017   • AV block, Mobitz 1 12/08/2017   • Cardiac pacemaker in situ 12/08/2017   • RLS (restless legs syndrome) 01/09/2019   • Prediabetes 09/13/2019   • Mesenteric ischemia due to arterial insufficiency (CMS/[facility]) 07/19/2019   • Sick sinus syndrome (CMS/[facility]) 08/14/2019   • Coronary artery disease involving native coronary artery of native  heart with angina pectoris (CMS/[facility]) 08/14/2019   • S/P CABG (coronary artery bypass graft) 10/11/2019   • History of acute pancreatitis 12/12/2019   • Diabetic peripheral neuropathy (CMS/[facility]) 10/29/2020   • Subclinical hypothyroidism 03/01/2021     Resolved Ambulatory Problems     Diagnosis Date Noted   • Abdominal bruit 03/18/2016   • Gastric polyp 03/18/2016   • Ventricular tachycardia (CMS/[facility]) 10/31/2017   • Phobia, flying 01/09/2019   • Idiopathic acute pancreatitis without infection or necrosis 07/16/2019     Past Medical History:   Diagnosis Date   • Cancer (CMS/[facility])    • Carpal tunnel syndrome    • Cataract    • Cystic fibroadenosis of breast    • GERD (gastroesophageal reflux disease)    • Hypercholesterolemia    • Hypertension    • Osteoarthritis of both hands    • Pancreatitis    • Restless leg syndrome    • Second degree AV block, Mobitz type I          PAST SURGICAL HISTORY  Past Surgical History:   Procedure Laterality Date   • APPENDECTOMY     • BILATERAL OOPHORECTOMY Bilateral 1988   • BREAST CYST EXCISION Bilateral     4 BREAST SURGERIES   • CARDIAC CATHETERIZATION N/A 8/22/2019    Procedure: Left Heart Cath;  Surgeon: Francis Mccullough MD;  Location: [facility] CATH INVASIVE LOCATION;  Service: Cardiology   • CARDIAC CATHETERIZATION N/A 8/22/2019    Procedure: Left ventriculography;  Surgeon: Francis Mccullough MD;  [facility] C[facility]VAS[facility] LOC[facility];  Service: Cardiology   • CARDIAC CATHETERIZATION N/A 8/22/2019    Procedure: Coronary angiography;  Surgeon: Francis Mccullough MD;  [facility] C[facility]AS[facility];  Service: Cardiology   • CARDIAC ELECTROPHYSIOLOGY PROCEDURE N/A 10/10/2016    Procedure: Pacemaker DC new  [place];  Surgeon: Wilfrido Hameed MD;  Location: [facility] CATH INVASIVE LOCATION;  Service:    • CARPAL TUNNEL RELEASE Right    • CATARACT EXTRACTION      NOVEMBER AND DECEMBER 2014   • CHOLECYSTECTOMY     • COLONOSCOPY  2015   • CORONARY ARTERY BYPASS GRAFT N/A 8/23/2019     Procedure: SUMMER STERNOTOMY CORONARY ARTERY BYPASS GRAFT TIMES 6 USING LEFT INTERNAL MAMMARY ARTERY AND LEFT GREATER SAPHENOUS VEIN GRAFT PER ENDOSCOPIC VEIN HARVESTING AND PRP;  Surgeon: Kem Hawk MD;  Location: [facility] OR;  Service: Cardiothoracic   • ENDOSCOPY N/A 2019    Procedure: ESOPHAGOGASTRODUODENOSCOPY with biopsy;  Surgeon: Ricky Chew MD;  Location[facility] ENDOSCOPY;  Service: Gastroenterology   • HYSTERECTOMY     • KNEE ARTHROSCOPY Bilateral 2014    MENISCAL TEAR   • PACEMAKER IMPLANTATION  10/10/2016   • SHOULDER SURGERY Right     ROTATOR CUFF SURGERY 2015   • TONSILLECTOMY     • TRIGGER FINGER RELEASE      both hands         FAMILY HISTORY  Family History   Problem Relation Age of Onset   • Thyroid disease Daughter    • Lung cancer Brother    • Hypertension Mother    • Aortic stenosis Mother    • Coronary artery disease Mother          78 (smoker)   • Hypertension Father    • Depression Father    • Anxiety disorder Father    • Diabetes Father    • Heart failure Father    • Cirrhosis Father         alcohol   • Alcohol abuse Sister    • Lupus Sister    • Heart disease Sister    • Diabetes type II Sister    • Alcohol abuse Brother    • Drug abuse Brother    • Heart disease Brother    • Cancer Brother         bladder (smoker)   • Heart disease Brother    • Breast cancer Paternal Aunt    • Neuropathy Neg Hx    • Colon cancer Neg Hx    • Dementia Neg Hx          SOCIAL HISTORY  Social History     Socioeconomic History   • Marital status:      Spouse name: Rishabh*   • Number of children: 2   • Years of education: 13   • Highest education level: Some college, no degree   Occupational History   • 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)   Tobacco Use   • Smoking status: Former Smoker     Packs/day: 0.50     Years: 14.00     Pack years: 7.00     Start date:      Quit date:      Years since quittin.2   • Smokeless tobacco: Never Used   • Tobacco  comment: quit age 28   Substance and Sexual Activity   • Alcohol use: Yes     Alcohol/week: 2.0 standard drinks     Types: 1 Glasses of wine, 1 Cans of beer per week     Frequency: Monthly or less     Drinks per session: 1 or 2     Comment: rare glass of wine or beer rarely/  No caffeine use   • Drug use: No   • Sexual activity: Never   Lifestyle   • Physical activity     Days per week: 5 days     Minutes per session: 30 min   • Stress: Not at all         ALLERGIES  Ancef [cefazolin], Codeine, Penicillins, and Sulfa antibiotics        REVIEW OF SYSTEMS  Review of Systems   Review of all 14 systems is negative other than stated in the HPI above.      PHYSICAL EXAM  ED Triage Vitals   Temp Heart Rate Resp BP SpO2   03/01/21 2014 03/01/21 2014 03/01/21 2014 03/01/21 2103 03/01/21 2014   97.5 °F (36.4 °C) 66 16 133/50 98 %      Temp src Heart Rate Source Patient Position BP Location FiO2 (%)   03/01/21 2014 -- -- -- --   Tympanic             GENERAL: Awake and alert, no acute distress  HENT: nares patent  EYES: no scleral icterus  CV: regular rhythm, normal rate  RESPIRATORY: normal effort  ABDOMEN: soft, minimal epigastric tenderness without rebound or guarding.  MUSCULOSKELETAL: no deformity  NEURO: alert, moves all extremities, follows commands  PSYCH:  calm, cooperative  SKIN: warm, dry    Vital signs and nursing notes reviewed.          LAB RESULTS  Recent Results (from the past 24 hour(s))   Lipase    Collection Time: 03/01/21  9:08 PM    Specimen: Blood   Result Value Ref Range    Lipase 623 (H) 13 - 60 U/L   CBC Auto Differential    Collection Time: 03/01/21  9:08 PM    Specimen: Blood   Result Value Ref Range    WBC 5.31 3.40 - 10.80 10*3/mm3    RBC 4.29 3.77 - 5.28 10*6/mm3    Hemoglobin 13.2 12.0 - 15.9 g/dL    Hematocrit 38.4 34.0 - 46.6 %    MCV 89.5 79.0 - 97.0 fL    MCH 30.8 26.6 - 33.0 pg    MCHC 34.4 31.5 - 35.7 g/dL    RDW 12.0 (L) 12.3 - 15.4 %    RDW-SD 38.3 37.0 - 54.0 fl    MPV 11.4 6.0 - 12.0 fL     Platelets 192 140 - 450 10*3/mm3    Neutrophil % 61.5 42.7 - 76.0 %    Lymphocyte % 29.6 19.6 - 45.3 %    Monocyte % 6.6 5.0 - 12.0 %    Eosinophil % 1.5 0.3 - 6.2 %    Basophil % 0.6 0.0 - 1.5 %    Immature Grans % 0.2 0.0 - 0.5 %    Neutrophils, Absolute 3.27 1.70 - 7.00 10*3/mm3    Lymphocytes, Absolute 1.57 0.70 - 3.10 10*3/mm3    Monocytes, Absolute 0.35 0.10 - 0.90 10*3/mm3    Eosinophils, Absolute 0.08 0.00 - 0.40 10*3/mm3    Basophils, Absolute 0.03 0.00 - 0.20 10*3/mm3    Immature Grans, Absolute 0.01 0.00 - 0.05 10*3/mm3    nRBC 0.0 0.0 - 0.2 /100 WBC   Comprehensive Metabolic Panel    Collection Time: 03/01/21  9:51 PM    Specimen: Blood   Result Value Ref Range    Glucose 117 (H) 65 - 99 mg/dL    BUN 22 8 - 23 mg/dL    Creatinine 0.99 0.57 - 1.00 mg/dL    Sodium 140 136 - 145 mmol/L    Potassium 4.3 3.5 - 5.2 mmol/L    Chloride 104 98 - 107 mmol/L    CO2 27.6 22.0 - 29.0 mmol/L    Calcium 9.0 8.6 - 10.5 mg/dL    Total Protein 6.2 6.0 - 8.5 g/dL    Albumin 3.80 3.50 - 5.20 g/dL    ALT (SGPT) 48 (H) 1 - 33 U/L    AST (SGOT) 67 (H) 1 - 32 U/L    Alkaline Phosphatase 68 39 - 117 U/L    Total Bilirubin 0.2 0.0 - 1.2 mg/dL    eGFR Non African Amer 54 (L) >60 mL/min/1.73    Globulin 2.4 gm/dL    A/G Ratio 1.6 g/dL    BUN/Creatinine Ratio 22.2 7.0 - 25.0    Anion Gap 8.4 5.0 - 15.0 mmol/L       Ordered the above labs and reviewed the results.            PROCEDURES  Procedures            MEDICATIONS GIVEN IN ER  Medications   sodium chloride 0.9 % flush 10 mL (has no administration in time range)                   MEDICAL DECISION MAKING, PROGRESS, and CONSULTS    All labs have been independently reviewed by me.  All radiology studies have been reviewed by me and discussed with radiologist dictating the report.   EKG's independently viewed and interpreted by me.  Discussion below represents my analysis of pertinent findings related to patient's condition, differential diagnosis, treatment plan and final  disposition.    ED Course as of Mar 01 2255   Mon Mar 01, 2021   2230 ALT (SGPT)(!): 48 [JR]   2230 AST (SGOT)(!): 67 [JR]   2230 Alkaline Phosphatase: 68 [JR]   2230 Lipase(!): 623 [JR]   2230 WBC: 5.31 [JR]   2246 Discussed with Dr. Auguste, GI, who felt it would be reasonable to allow patient to go home given that her symptoms have resolved as long as she is tolerating p.o.  He has sent communication to Dr. Redman to help facilitate close follow-up with him.    [JR]   2247 Patient reassessed and provided Mirela mist for p.o. challenge.    [JR]   2255 Patient tolerated the Mirela mist and denies nausea or pain at this time.  She is appropriate for discharge home with clear liquid diet for the next 24 hours and GI follow-up, return precautions.    [JR]      ED Course User Index  [JR] Ken Koo MD              I wore a mask, face shield, and gloves during this patient encounter.  Patient also wearing a surgical mask.  Hand hygeine performed before and after seeing the patient.    DIAGNOSIS  Final diagnoses:   Acute pancreatitis, unspecified complication status, unspecified pancreatitis type         DISPOSITION  DISCHARGE    Patient discharged in stable condition.    Reviewed implications of results, diagnosis, meds, responsibility to follow up, warning signs and symptoms of possible worsening, potential complications and reasons to return to ER.    Patient/Family voiced understanding of above instructions.    Discussed plan for discharge, as there is no emergent indication for admission. Patient referred to primary care provider for BP management due to today's BP. Pt/family is agreeable and understands need for follow up and repeat testing.  Pt is aware that discharge does not mean that nothing is wrong but it indicates no emergency is present that requires admission and they must continue care with follow-up as given below or physician of their choice.     FOLLOW-UP  Giovany Redman MD  [address]  [address] [address]  [phone]    Schedule an appointment as soon as possible for a visit            Medication List      No changes were made to your prescriptions during this visit.                   Latest Documented Vital Signs:  As of 22:55 EST  BP- 143/63 HR- 66 Temp- 97.5 °F (36.4 °C) (Tympanic) O2 sat- 99%        --    Please note that portions of this were completed with a voice recognition program.          Ken Koo MD  03/01/21 4253

## 2021-03-02 NOTE — TELEPHONE ENCOUNTER
Returned patient's phone call. She states she was seen in the ER last night for pancreatitis. States its her 6th attack in a year. She questions what should she do next? Advised an update will be sent to Dr. Redman.   Patient also advised to stay on a clear liquid diet today, then follow a low fat diet.   Update to Dr. Redman.

## 2021-03-02 NOTE — ED TRIAGE NOTES
Pt to the ED from home with c/o abdominal pain earlier today that lasted for about 10 minutes. Pt denies any pain at this time. Pt states she was told by PCP to have blood work here to check for pancreatitis.     Pt placed in mask during triage. This RN wearing proper PPE, mask and glasses, during pt encounter. Hand hygiene performed before and after pt encounter.

## 2021-03-02 NOTE — TELEPHONE ENCOUNTER
----- Message from Jodi Naqvi sent at 3/2/2021  8:46 AM EST -----  Regarding: Call back  Contact: [phone]  PT was just released from the hospital and was advised to speak with  regarding a liquid diet she is supposed to start today, please advise

## 2021-03-02 NOTE — ED NOTES
This RN able to obtain blood work but unsuccessful placing IV x 2. MD aware, IV not required at this time. This RN in appropriate ppe while in pt room. Pt wearing mask.        Bonnie Bolanos, RN  03/01/21 2120

## 2021-03-03 ENCOUNTER — TELEPHONE (OUTPATIENT)
Dept: NEUROLOGY | Facility: CLINIC | Age: 81
End: 2021-03-03

## 2021-03-03 NOTE — TELEPHONE ENCOUNTER
Patient called, advised as per Dr. Redman's note. Patient verb understanding and is in agreement with the plan.

## 2021-03-03 NOTE — TELEPHONE ENCOUNTER
KURT VERGARA  [phone]    THE PT CALLED IN TODAY BECAUSE SHE VISITED THE OFFICE 08/07 AND 10/29 AND ON 3/1 SHE WAS AT DR HEMPHILL'S OFFICE HER PCP. HE WAS CHECKING HER MED RECORDS AND NOTICED THAT DR WEINSTEIN SAID THAT SHE HAD DIABETES AND NEUROPATHY. SHE DOES NOT HAVE DIABETES AND NEUROPATHY PER HER PCP. SHE DOES HAVE NEUROPATHY BUT NOT THE TYPE OF NEUROPATHY THAT DR WEINSTEIN WAS SUGGESTING IN HER MED RECORDS. AND ALL OF THIS IS ACCORDING TO THE PT'S PCP. THE PT WOULD LIKE TO HAVE HER MED RECORDS CORRECTED. SHE WOULD ALSO LIKE NOTIFICATION THAT HER MED RECORDS HAVE BEEN CORRECTED. PLEASE GIVE HER A CALL TO DISCUSS THIS.

## 2021-03-03 NOTE — TELEPHONE ENCOUNTER
No indication for ERCP, should arrange CT with pancreatic protocol to reimage her pancreas not done in some time.  Patient should maintain on the low-fat diet and call for recurrence.

## 2021-03-03 NOTE — TELEPHONE ENCOUNTER
Called patient to get more clarification. Her  stated she was not home and would return around 3 pm and to call her back around that time.

## 2021-03-18 PROBLEM — E11.42 DIABETIC PERIPHERAL NEUROPATHY: Status: RESOLVED | Noted: 2020-10-29 | Resolved: 2021-03-18

## 2021-03-19 ENCOUNTER — HOSPITAL ENCOUNTER (OUTPATIENT)
Dept: CT IMAGING | Facility: HOSPITAL | Age: 81
Discharge: HOME | End: 2021-03-19
Admitting: INTERNAL MEDICINE

## 2021-03-19 DIAGNOSIS — K86.1 CHRONIC PANCREATITIS, UNSPECIFIED PANCREATITIS TYPE (HCC): ICD-10-CM

## 2021-03-19 LAB — CREAT BLDA-MCNC: 0.9 MG/DL (ref 0.6–1.3)

## 2021-03-19 PROCEDURE — 74160 CT ABDOMEN W/CONTRAST: CPT

## 2021-03-19 PROCEDURE — 0 DIATRIZOATE MEGLUMINE & SODIUM PER 1 ML: Performed by: INTERNAL MEDICINE

## 2021-03-19 PROCEDURE — 82565 ASSAY OF CREATININE: CPT

## 2021-03-19 PROCEDURE — 25010000002 IOPAMIDOL 61 % SOLUTION: Performed by: INTERNAL MEDICINE

## 2021-03-19 RX ADMIN — DIATRIZOATE MEGLUMINE AND DIATRIZOATE SODIUM 30 ML: 600; 100 SOLUTION ORAL; RECTAL at 07:00

## 2021-03-19 RX ADMIN — IOPAMIDOL 85 ML: 612 INJECTION, SOLUTION INTRAVENOUS at 07:30

## 2021-03-23 ENCOUNTER — TELEPHONE (OUTPATIENT)
Dept: CARDIOLOGY | Facility: CLINIC | Age: 81
End: 2021-03-23

## 2021-03-23 RX ORDER — LOSARTAN POTASSIUM 25 MG/1
50 TABLET ORAL DAILY
Qty: 90 TABLET | Refills: 3 | Status: SHIPPED | OUTPATIENT
Start: 2021-03-23 | End: 2021-10-07

## 2021-03-23 NOTE — TELEPHONE ENCOUNTER
Pt called. She said that this morning at 5 am she was woken up with a headache and her BP was 162/75. You had increased her Losartan to 25mg qd from 12.5 mg qd on 2/15/21.   She said that since then she is still having palpitations and her BP is running 130-150/60-70 HR 60s.  She would like to know if she needs to increase her Losartan further.   Please advise.    Thanks,  Esther

## 2021-03-24 DIAGNOSIS — F41.1 GENERALIZED ANXIETY DISORDER: ICD-10-CM

## 2021-03-24 NOTE — TELEPHONE ENCOUNTER
Called patient and she didn't take her BP  Per Mary she can take 25mg 1 po bid  Told her if she feels weak to take her BP the next time

## 2021-03-24 NOTE — TELEPHONE ENCOUNTER
Patient calling back wanting to know if she can take the losartan 50mg  1/2 bid . States she took a 50mg tablet and she feels weak.

## 2021-03-30 ENCOUNTER — TELEPHONE (OUTPATIENT)
Dept: INTERNAL MEDICINE | Age: 81
End: 2021-03-30

## 2021-03-30 NOTE — TELEPHONE ENCOUNTER
DELETE AFTER REVIEWING: Telephone encounter to be sent to the clinical pool     Caller: Reanna Higgins    Relationship to patient: Self    Best call back number:     Date of exposure:     Date of positive COVID19 test:     Date if possible COVID19 exposure:     COVID19 symptoms:     Date of initial quarantine:     Additional information or concerns: PT IS CALLING IN TO INFORM DR HEMPHILL THAT SHE HAD THE PFIZER  COVID VACCINE SHOTS ON 01/31/21 AND THE SECOND ONE ON 02/21/21 AND WANTS TO KNOW IF THIS CAN BE ADDED TO HER CHART.      What is the patients preferred pharmacy:

## 2021-04-05 DIAGNOSIS — G62.9 PERIPHERAL POLYNEUROPATHY: Chronic | ICD-10-CM

## 2021-04-06 DIAGNOSIS — Z51.81 THERAPEUTIC DRUG MONITORING: Primary | ICD-10-CM

## 2021-04-06 RX ORDER — GABAPENTIN 300 MG/1
CAPSULE ORAL
Qty: 180 CAPSULE | Refills: 0 | Status: SHIPPED | OUTPATIENT
Start: 2021-04-06 | End: 2021-04-08 | Stop reason: SDUPTHER

## 2021-04-08 ENCOUNTER — OFFICE VISIT (OUTPATIENT)
Dept: GASTROENTEROLOGY | Facility: CLINIC | Age: 81
End: 2021-04-08

## 2021-04-08 ENCOUNTER — TELEPHONE (OUTPATIENT)
Dept: INTERNAL MEDICINE | Age: 81
End: 2021-04-08

## 2021-04-08 VITALS — TEMPERATURE: 97.6 F | BODY MASS INDEX: 36.52 KG/M2 | WEIGHT: 157.8 LBS | HEIGHT: 55 IN

## 2021-04-08 DIAGNOSIS — G62.9 PERIPHERAL POLYNEUROPATHY: Chronic | ICD-10-CM

## 2021-04-08 DIAGNOSIS — K55.1 MESENTERIC ISCHEMIA DUE TO ARTERIAL INSUFFICIENCY (HCC): Primary | Chronic | ICD-10-CM

## 2021-04-08 PROCEDURE — 99213 OFFICE O/P EST LOW 20 MIN: CPT | Performed by: INTERNAL MEDICINE

## 2021-04-08 RX ORDER — GABAPENTIN 300 MG/1
300 CAPSULE ORAL 3 TIMES DAILY
Qty: 270 CAPSULE | Refills: 0 | Status: SHIPPED | OUTPATIENT
Start: 2021-04-08 | End: 2021-10-29

## 2021-04-08 NOTE — PROGRESS NOTES
Chief Complaint   Patient presents with   • Follow-up       Reanna Higgins is a  81 y.o. female here for a follow up visit for recurrent abdominal pain.    HPI    Patient 81-year-old female with history of hypertension and hyperlipidemia complaining of recurrent epigastric pain.  Patient reports pain severe can last up to 15 to 20 minutes before it suddenly resolves.  Patient denies any specific activity or eating when the attacks begin.  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  Patient here for further recommendations.  Patient status post CT of the pancreas which was unremarkable.    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   • Restless leg syndrome    • Second degree AV block, Mobitz type I    • SVT (supraventricular tachycardia) (CMS/HCC)          Current Outpatient Medications:   •  aspirin 81 MG EC tablet, Take 1 tablet by mouth Daily., Disp: , Rfl:   •  Calcium Carbonate-Vitamin D (CALCIUM-D) 600-400 MG-UNIT tablet, Take  by mouth., Disp: , Rfl:   •  cholecalciferol (VITAMIN D3) 1000 UNITS tablet, Take 1 tablet by mouth Daily., Disp: , Rfl:   •  Coenzyme Q10 (COQ10) 100 MG capsule, Take  by mouth Daily., Disp: , Rfl:   •  cycloSPORINE (RESTASIS) 0.05 % ophthalmic emulsion, Administer 1 drop to both eyes 2 (Two) Times a Day., Disp: , Rfl:   •  gabapentin (NEURONTIN) 300 MG capsule, TAKE 1 CAPSULE BY MOUTH TWICE A DAY, Disp: 180 capsule, Rfl: 0  •  losartan (COZAAR) 25 MG tablet, Take 2 tablets by mouth Daily., Disp: 90 tablet, Rfl: 3  •  metoprolol succinate XL (TOPROL-XL) 50 MG 24 hr tablet, TAKE 1  TABLET BY MOUTH TWICE A DAY, Disp: 180 tablet, Rfl: 3  •  Multiple Vitamins-Minerals (CENTRUM SILVER 50+WOMEN) tablet, Take 1 tablet by mouth Daily., Disp: , Rfl:   •  omeprazole (priLOSEC) 40 MG capsule, TAKE 1 CAPSULE BY MOUTH EVERY DAY, Disp: 90 capsule, Rfl: 3  •  pravastatin (PRAVACHOL) 40 MG tablet, TAKE 1 TABLET BY MOUTH EVERY DAY AT NIGHT, Disp: 90 tablet, Rfl: 3  •  sertraline (Zoloft) 50 MG tablet, Take 1 tablet by mouth Daily., Disp: 90 tablet, Rfl: 1  •  levothyroxine (SYNTHROID, LEVOTHROID) 25 MCG tablet, TAKE 1 TABLET BY MOUTH EVERY DAY, Disp: 90 tablet, Rfl: 3    Allergies   Allergen Reactions   • Ancef [Cefazolin] Other (See Comments)     Hypotension/bradycardia   • Codeine Hives   • Penicillins Other (See Comments)     Hypotension (Ancef allergy)    • Sulfa Antibiotics Hives       Social History     Socioeconomic History   • Marital status:      Spouse name: Rishabh*   • Number of children: 2   • Years of education: 13   • Highest education level: Some college, no degree   Tobacco Use   • Smoking status: Former Smoker     Packs/day: 0.50     Years: 14.00     Pack years: 7.00     Start date:      Quit date:      Years since quittin.3   • Smokeless tobacco: Never Used   • Tobacco comment: quit age 28   Substance and Sexual Activity   • Alcohol use: Yes     Alcohol/week: 2.0 standard drinks     Types: 1 Glasses of wine, 1 Cans of beer per week     Comment: rare glass of wine or beer rarely/  No caffeine use   • Drug use: No   • Sexual activity: Never       Family History   Problem Relation Age of Onset   • Thyroid disease Daughter    • Lung cancer Brother    • Hypertension Mother    • Aortic stenosis Mother    • Coronary artery disease Mother          78 (smoker)   • Hypertension Father    • Depression Father    • Anxiety disorder Father    • Diabetes Father    • Heart failure Father    • Cirrhosis Father         alcohol   • Alcohol abuse Sister    • Lupus Sister    • Heart  disease Sister    • Diabetes type II Sister    • Alcohol abuse Brother    • Drug abuse Brother    • Heart disease Brother    • Cancer Brother         bladder (smoker)   • Heart disease Brother    • Breast cancer Paternal Aunt    • Neuropathy Neg Hx    • Colon cancer Neg Hx    • Dementia Neg Hx        Review of Systems   Constitutional: Negative.    Respiratory: Negative.    Cardiovascular: Negative.    Gastrointestinal: Negative.    Musculoskeletal: Negative.    Skin: Negative.    Hematological: Negative.        Vitals:    04/08/21 1432   Temp: 97.6 °F (36.4 °C)       Physical Exam  Vitals reviewed.   Constitutional:       Appearance: Normal appearance. She is well-developed and normal weight.   HENT:      Head: Normocephalic and atraumatic.   Eyes:      General: No scleral icterus.     Pupils: Pupils are equal, round, and reactive to light.   Cardiovascular:      Rate and Rhythm: Normal rate and regular rhythm.      Heart sounds: Normal heart sounds.   Pulmonary:      Effort: Pulmonary effort is normal.      Breath sounds: Normal breath sounds. No wheezing or rales.   Abdominal:      General: Bowel sounds are normal. There is no distension.      Palpations: Abdomen is soft. There is no mass.      Tenderness: There is no abdominal tenderness.      Hernia: No hernia is present.   Skin:     General: Skin is warm and dry.      Coloration: Skin is not jaundiced.      Findings: No rash.   Neurological:      General: No focal deficit present.      Mental Status: She is alert and oriented to person, place, and time.      Cranial Nerves: No cranial nerve deficit.   Psychiatric:         Behavior: Behavior normal.         Thought Content: Thought content normal.         Hospital Outpatient Visit on 03/19/2021   Component Date Value Ref Range Status   • Creatinine 03/19/2021 0.90  0.60 - 1.30 mg/dL Final   Admission on 03/01/2021, Discharged on 03/01/2021   Component Date Value Ref Range Status   • Glucose 03/01/2021 117* 65 -  99 mg/dL Final   • BUN 03/01/2021 22  8 - 23 mg/dL Final   • Creatinine 03/01/2021 0.99  0.57 - 1.00 mg/dL Final   • Sodium 03/01/2021 140  136 - 145 mmol/L Final   • Potassium 03/01/2021 4.3  3.5 - 5.2 mmol/L Final   • Chloride 03/01/2021 104  98 - 107 mmol/L Final   • CO2 03/01/2021 27.6  22.0 - 29.0 mmol/L Final   • Calcium 03/01/2021 9.0  8.6 - 10.5 mg/dL Final   • Total Protein 03/01/2021 6.2  6.0 - 8.5 g/dL Final   • Albumin 03/01/2021 3.80  3.50 - 5.20 g/dL Final   • ALT (SGPT) 03/01/2021 48* 1 - 33 U/L Final   • AST (SGOT) 03/01/2021 67* 1 - 32 U/L Final   • Alkaline Phosphatase 03/01/2021 68  39 - 117 U/L Final   • Total Bilirubin 03/01/2021 0.2  0.0 - 1.2 mg/dL Final   • eGFR Non African Amer 03/01/2021 54* >60 mL/min/1.73 Final   • Globulin 03/01/2021 2.4  gm/dL Final   • A/G Ratio 03/01/2021 1.6  g/dL Final   • BUN/Creatinine Ratio 03/01/2021 22.2  7.0 - 25.0 Final   • Anion Gap 03/01/2021 8.4  5.0 - 15.0 mmol/L Final   • Lipase 03/01/2021 623* 13 - 60 U/L Final   • WBC 03/01/2021 5.31  3.40 - 10.80 10*3/mm3 Final   • RBC 03/01/2021 4.29  3.77 - 5.28 10*6/mm3 Final   • Hemoglobin 03/01/2021 13.2  12.0 - 15.9 g/dL Final   • Hematocrit 03/01/2021 38.4  34.0 - 46.6 % Final   • MCV 03/01/2021 89.5  79.0 - 97.0 fL Final   • MCH 03/01/2021 30.8  26.6 - 33.0 pg Final   • MCHC 03/01/2021 34.4  31.5 - 35.7 g/dL Final   • RDW 03/01/2021 12.0* 12.3 - 15.4 % Final   • RDW-SD 03/01/2021 38.3  37.0 - 54.0 fl Final   • MPV 03/01/2021 11.4  6.0 - 12.0 fL Final   • Platelets 03/01/2021 192  140 - 450 10*3/mm3 Final   • Neutrophil % 03/01/2021 61.5  42.7 - 76.0 % Final   • Lymphocyte % 03/01/2021 29.6  19.6 - 45.3 % Final   • Monocyte % 03/01/2021 6.6  5.0 - 12.0 % Final   • Eosinophil % 03/01/2021 1.5  0.3 - 6.2 % Final   • Basophil % 03/01/2021 0.6  0.0 - 1.5 % Final   • Immature Grans % 03/01/2021 0.2  0.0 - 0.5 % Final   • Neutrophils, Absolute 03/01/2021 3.27  1.70 - 7.00 10*3/mm3 Final   • Lymphocytes, Absolute  03/01/2021 1.57  0.70 - 3.10 10*3/mm3 Final   • Monocytes, Absolute 03/01/2021 0.35  0.10 - 0.90 10*3/mm3 Final   • Eosinophils, Absolute 03/01/2021 0.08  0.00 - 0.40 10*3/mm3 Final   • Basophils, Absolute 03/01/2021 0.03  0.00 - 0.20 10*3/mm3 Final   • Immature Grans, Absolute 03/01/2021 0.01  0.00 - 0.05 10*3/mm3 Final   • nRBC 03/01/2021 0.0  0.0 - 0.2 /100 WBC Final   Office Visit on 03/01/2021   Component Date Value Ref Range Status   • TSH 03/01/2021 3.140  0.270 - 4.200 uIU/mL Final   • Free T4 03/01/2021 1.09  0.93 - 1.70 ng/dL Final       Diagnoses and all orders for this visit:    1. Mesenteric ischemia due to arterial insufficiency (CMS/HCC) (Primary)  -     Ambulatory Referral to Vascular Surgery      Patient 81-year-old female with history of recurrent abdominal pain x6 since 2019.  Repeated work-ups have been negative except for some significant calcification by the celiac axis.  Patient's pain is severe lasting 15 to 20 minutes and then resolves.  Repeat CT of the pancreas was otherwise normal with no evident etiology there.  Recommend follow-up with Dr. Velazco and vascular for possible celiac axis stenting will monitor clinically for further episodes.

## 2021-04-08 NOTE — TELEPHONE ENCOUNTER
gabapentin (NEURONTIN) 300 MG capsule   0 ordered         Summary: TAKE 1 CAPSULE BY MOUTH TWICE A DAY        [facility] [address] [place] [facility] - [address] - [phone] [facility] [phone]   [phone]      PHARMACY CALLED STATING THAT THE PATIENT WAS TO BE INCREASED TO 3 DAILY - BUT THE RX CALLED IN SAYS 2 DAILY.     PLEASE CONFIRM DOSE WITH PHARMACY AND PATIENT.

## 2021-04-09 LAB — DRUGS UR: NORMAL

## 2021-04-12 ENCOUNTER — TELEPHONE (OUTPATIENT)
Dept: GASTROENTEROLOGY | Facility: CLINIC | Age: 81
End: 2021-04-12

## 2021-04-12 NOTE — TELEPHONE ENCOUNTER
----- Message from Jodi Skelton sent at 4/12/2021  4:16 PM EDT -----  Regarding: referral  Contact: [phone]  Pt was ref. To a vascular MD by Юлия, the doctor he gave is no longer at that practice, she would like to know what she should do now

## 2021-04-14 ENCOUNTER — TELEPHONE (OUTPATIENT)
Dept: GASTROENTEROLOGY | Facility: CLINIC | Age: 81
End: 2021-04-14

## 2021-04-14 NOTE — TELEPHONE ENCOUNTER
Called pt and advised that the referral to vascular has been placed. .  Pt verbalized understanding.

## 2021-04-14 NOTE — TELEPHONE ENCOUNTER
----- Message from Jodi Hurst sent at 4/14/2021  3:05 PM EDT -----  Regarding: vascular referral  Pt is checking status of vascular referral. Pt states that we need to call vascular office [phone].  Please also advise pt [phone]. Thank you

## 2021-04-15 ENCOUNTER — TELEPHONE (OUTPATIENT)
Dept: GASTROENTEROLOGY | Facility: CLINIC | Age: 81
End: 2021-04-15

## 2021-04-15 NOTE — TELEPHONE ENCOUNTER
----- Message from Giovany Redman MD sent at 3/26/2021 11:17 AM EDT -----  CT with normal pancreas, no evidence of chronic pancreatitis at this point the patient has noted to have some moderate stool retention.  Patient follow a high-fiber diet increase water intake consider adding MiraLAX and follow for any change in symptoms.

## 2021-04-27 ENCOUNTER — TRANSCRIBE ORDERS (OUTPATIENT)
Dept: ADMINISTRATIVE | Facility: HOSPITAL | Age: 81
End: 2021-04-27

## 2021-04-27 DIAGNOSIS — K90.0 CELIAC DISEASE: Primary | ICD-10-CM

## 2021-04-27 DIAGNOSIS — I63.81: ICD-10-CM

## 2021-05-19 ENCOUNTER — HOSPITAL ENCOUNTER (OUTPATIENT)
Dept: CT IMAGING | Facility: HOSPITAL | Age: 81
Discharge: HOME | End: 2021-05-19
Admitting: SURGERY

## 2021-05-19 DIAGNOSIS — I63.81: ICD-10-CM

## 2021-05-19 DIAGNOSIS — K90.0 CELIAC DISEASE: ICD-10-CM

## 2021-05-19 PROCEDURE — 74174 CTA ABD&PLVS W/CONTRAST: CPT

## 2021-05-19 PROCEDURE — 82565 ASSAY OF CREATININE: CPT

## 2021-05-19 PROCEDURE — 25010000002 IOPAMIDOL 61 % SOLUTION: Performed by: SURGERY

## 2021-05-19 RX ADMIN — IOPAMIDOL 95 ML: 612 INJECTION, SOLUTION INTRAVENOUS at 16:45

## 2021-05-21 LAB — CREAT BLDA-MCNC: 0.8 MG/DL (ref 0.6–1.3)

## 2021-05-27 ENCOUNTER — TELEPHONE (OUTPATIENT)
Dept: INTERNAL MEDICINE | Age: 81
End: 2021-05-27

## 2021-05-27 DIAGNOSIS — Z78.0 POSTMENOPAUSAL: Primary | ICD-10-CM

## 2021-05-27 DIAGNOSIS — M85.80 OSTEOPENIA, UNSPECIFIED LOCATION: ICD-10-CM

## 2021-05-28 ENCOUNTER — TELEPHONE (OUTPATIENT)
Dept: GASTROENTEROLOGY | Facility: CLINIC | Age: 81
End: 2021-05-28

## 2021-05-28 NOTE — TELEPHONE ENCOUNTER
----- Message from Jodi Skelton Rep sent at 5/25/2021  3:45 PM EDT -----  Regarding: questions  Contact: [phone]  Pt saw  MD Deejay Nguyen per Юлия, states he doesn't agree that her issues is vascular. Would like to speak to someone.

## 2021-06-03 ENCOUNTER — TELEPHONE (OUTPATIENT)
Dept: GASTROENTEROLOGY | Facility: CLINIC | Age: 81
End: 2021-06-03

## 2021-06-14 LAB
MAXIMAL PREDICTED HEART RATE: 138 BPM
STRESS TARGET HR: 117 BPM

## 2021-06-17 ENCOUNTER — OFFICE VISIT (OUTPATIENT)
Dept: INTERNAL MEDICINE | Age: 81
End: 2021-06-17

## 2021-06-17 VITALS
HEART RATE: 61 BPM | HEIGHT: 68 IN | TEMPERATURE: 96.8 F | DIASTOLIC BLOOD PRESSURE: 60 MMHG | BODY MASS INDEX: 23.19 KG/M2 | SYSTOLIC BLOOD PRESSURE: 130 MMHG | OXYGEN SATURATION: 99 % | WEIGHT: 153 LBS

## 2021-06-17 DIAGNOSIS — R73.03 PREDIABETES: ICD-10-CM

## 2021-06-17 DIAGNOSIS — Z00.00 MEDICARE ANNUAL WELLNESS VISIT, SUBSEQUENT: Primary | ICD-10-CM

## 2021-06-17 DIAGNOSIS — Z87.19 HISTORY OF ACUTE PANCREATITIS: ICD-10-CM

## 2021-06-17 DIAGNOSIS — G25.81 RLS (RESTLESS LEGS SYNDROME): Chronic | ICD-10-CM

## 2021-06-17 DIAGNOSIS — E78.5 DYSLIPIDEMIA (HIGH LDL; LOW HDL): ICD-10-CM

## 2021-06-17 DIAGNOSIS — F41.1 GAD (GENERALIZED ANXIETY DISORDER): Chronic | ICD-10-CM

## 2021-06-17 DIAGNOSIS — I77.1 CELIAC ARTERY STENOSIS (HCC): ICD-10-CM

## 2021-06-17 DIAGNOSIS — I25.119 CORONARY ARTERY DISEASE INVOLVING NATIVE CORONARY ARTERY OF NATIVE HEART WITH ANGINA PECTORIS (HCC): Chronic | ICD-10-CM

## 2021-06-17 DIAGNOSIS — G63 NEUROPATHY DUE TO MEDICAL CONDITION (HCC): ICD-10-CM

## 2021-06-17 PROBLEM — I77.4 CELIAC ARTERY STENOSIS: Status: ACTIVE | Noted: 2021-06-17

## 2021-06-17 PROBLEM — K86.1 CHRONIC RECURRENT PANCREATITIS (HCC): Status: ACTIVE | Noted: 2021-06-17

## 2021-06-17 PROCEDURE — 99214 OFFICE O/P EST MOD 30 MIN: CPT | Performed by: INTERNAL MEDICINE

## 2021-06-17 PROCEDURE — 96160 PT-FOCUSED HLTH RISK ASSMT: CPT | Performed by: INTERNAL MEDICINE

## 2021-06-17 PROCEDURE — 1170F FXNL STATUS ASSESSED: CPT | Performed by: INTERNAL MEDICINE

## 2021-06-17 PROCEDURE — 1159F MED LIST DOCD IN RCRD: CPT | Performed by: INTERNAL MEDICINE

## 2021-06-17 PROCEDURE — G0439 PPPS, SUBSEQ VISIT: HCPCS | Performed by: INTERNAL MEDICINE

## 2021-06-17 NOTE — PROGRESS NOTES
"    I N T E R N A L  M E D I C I N E  J U N O H  K I M,  M D      ENCOUNTER DATE:  06/17/2021    Reanna Higgins / 81 y.o. / female        MEDICARE ANNUAL WELLNESS VISIT       Chief Complaint: Medicare Wellness-subsequent (6/15/20)       Patient's general assessment of her health since a year ago:     - Compared to one year ago, she feels her physical health is moderately worse.    - Compared to one year ago, she feels her mental health is about the same without significant change.      HPI for other active medical problems:     Recurrent bouts of severe abdominal pain, history of recurrent pancreatitis, followed by GI and seen by vascular. Question of celiac artery stenosis. Patient is quite concerned and apprehensive about these recurrent attacks. No significant wt loss. No pain with eating or apprehension about eating. No blood in stool. On sertraline for REKHA.   On gabapentin for peripheral neuropathy and RLS. Taking 1 twice daily but has ongoing symptoms at night.         * The required components of Health Risk Assessment (HRA) that were completed by the patient and/or my staff are contained within this note and in the scanned documents titled \"Health Risk Assessment\" within the media section of the patient's chart in [employer].         HISTORY       Recent Hospitalizations:    Recent hospitalization?:     If YES, location, date, and diagnoses:     · Location:   · Date:   · Principle Discharge Dx:   · Secondary Dx:       Patient Care Team:    Patient Care Team:  Ted Li MD as PCP - General (Internal Medicine)  To Rivera MD as Consulting Physician (General Surgery)  Arsenio Witt MD as Consulting Physician (Ophthalmology)  Brown Beckman MD as Consulting Physician (Orthopedic Surgery)  Shivam Vallejo MD as Consulting Physician (Neurology)  Francis Mccullough MD as Consulting Physician (Cardiology)      Allergies:  Ancef [cefazolin], Codeine, Penicillins, and Sulfa " antibiotics    Medications:  Current Outpatient Medications on File Prior to Visit   Medication Sig Dispense Refill   • aspirin 81 MG EC tablet Take 1 tablet by mouth Daily.     • Calcium Carbonate-Vitamin D (CALCIUM-D) 600-400 MG-UNIT tablet Take  by mouth.     • cholecalciferol (VITAMIN D3) 1000 UNITS tablet Take 1 tablet by mouth Daily.     • Coenzyme Q10 (COQ10) 100 MG capsule Take  by mouth Daily.     • cycloSPORINE (RESTASIS) 0.05 % ophthalmic emulsion Administer 1 drop to both eyes 2 (Two) Times a Day.     • gabapentin (NEURONTIN) 300 MG capsule Take 1 capsule by mouth 3 (Three) Times a Day. (Patient taking differently: Take 300 mg by mouth 3 (Three) Times a Day. Taking 1 tablet bid) 270 capsule 0   • losartan (COZAAR) 25 MG tablet Take 2 tablets by mouth Daily. 90 tablet 3   • metoprolol succinate XL (TOPROL-XL) 50 MG 24 hr tablet TAKE 1 TABLET BY MOUTH TWICE A  tablet 3   • Multiple Vitamins-Minerals (CENTRUM SILVER 50+WOMEN) tablet Take 1 tablet by mouth Daily.     • omeprazole (priLOSEC) 40 MG capsule TAKE 1 CAPSULE BY MOUTH EVERY DAY 90 capsule 3   • pravastatin (PRAVACHOL) 40 MG tablet TAKE 1 TABLET BY MOUTH EVERY DAY AT NIGHT 90 tablet 3   • sertraline (Zoloft) 50 MG tablet Take 1 tablet by mouth Daily. 90 tablet 1       PFSH:     The following portions of the patient's history were reviewed and updated as appropriate: Allergies / Current Medications / Past Medical History / Surgical History / Social History / Family History    Problem List:  Patient Active Problem List   Diagnosis   • Colon polyp   • Gastroesophageal reflux disease   • Essential hypertension   • Dyslipidemia (high LDL; low HDL)   • Mitral and aortic valve disease   • Heart valve disease   • REKHA (generalized anxiety disorder)   • Neuropathy due to medical condition (CMS/HCC)   • Malignant neoplasm of cheek (CMS/HCC)   • Non-rheumatic mitral regurgitation   • Osteopenia   • SVT (supraventricular tachycardia) (CMS/HCC)   • AV  block, Mobitz 1   • Cardiac pacemaker in situ   • RLS (restless legs syndrome)   • Prediabetes   • Mesenteric ischemia due to arterial insufficiency (CMS/HCC)   • Sick sinus syndrome (CMS/HCC)   • Coronary artery disease involving native coronary artery of native heart with angina pectoris (CMS/HCC)   • S/P CABG (coronary artery bypass graft)   • Subclinical hypothyroidism   • Celiac artery stenosis (CMS/HCC)   • History of acute pancreatitis       Past Medical History:  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   • Restless leg syndrome    • Second degree AV block, Mobitz type I    • SVT (supraventricular tachycardia) (CMS/HCC)        Past Surgical History:  Past Surgical History:   Procedure Laterality Date   • APPENDECTOMY     • BILATERAL OOPHORECTOMY Bilateral 1988   • BREAST CYST EXCISION Bilateral     4 BREAST SURGERIES   • CARDIAC CATHETERIZATION N/A 8/22/2019    Procedure: Left Heart Cath;  Surgeon: Francis Mccullough MD;  Location: [facility] CATH INVASIVE LOCATION;  Service: Cardiology   • CARDIAC CATHETERIZATION N/A 8/22/2019    Procedure: Left ventriculography;  Surgeon: Francis Mccullough MD;  Location: [facility] CATH INVASIVE LOCATION;  Service: Cardiology   • CARDIAC CATHETERIZATION N/A 8/22/2019    Procedure: Coronary angiography;  Surgeon: Francis Mccullough MD;  Location: [facility] CATH INVASIVE LOCATION;  Service: Cardiology   • CARDIAC ELECTROPHYSIOLOGY PROCEDURE N/A 10/10/2016    Procedure: Pacemaker DC new  [place];  Surgeon: Wilfrido Hameed MD;  Location: [facility] CATH INVASIVE LOCATION;  Service:    • CARPAL TUNNEL RELEASE Right    • CATARACT EXTRACTION      NOVEMBER AND DECEMBER     • CHOLECYSTECTOMY     • COLONOSCOPY     • CORONARY ARTERY BYPASS GRAFT N/A 2019    Procedure: SUMMER STERNOTOMY CORONARY ARTERY BYPASS GRAFT TIMES 6 USING LEFT INTERNAL MAMMARY ARTERY AND LEFT GREATER SAPHENOUS VEIN GRAFT PER ENDOSCOPIC VEIN HARVESTING AND PRP;  Surgeon: Kem Hawk MD;  Location: [facility] OR;  Service: Cardiothoracic   • ENDOSCOPY N/A 2019    Procedure: ESOPHAGOGASTRODUODENOSCOPY with biopsy;  Surgeon: Ricky Chew MD;  Location: [facility] ENDOSCOPY;  Service: Gastroenterology   • HYSTERECTOMY     • KNEE ARTHROSCOPY Bilateral 2014    MENISCAL TEAR   • PACEMAKER IMPLANTATION  10/10/2016   • SHOULDER SURGERY Right     ROTATOR CUFF SURGERY 2015   • TONSILLECTOMY     • TRIGGER FINGER RELEASE      both hands       Social History:  Social History     Socioeconomic History   • Marital status:      Spouse name: Rishabh*   • Number of children: 2   • Years of education: 13   • Highest education level: Some college, no degree   Tobacco Use   • Smoking status: Former Smoker     Packs/day: 0.50     Years: 14.00     Pack years: 7.00     Start date:      Quit date:      Years since quittin.4   • Smokeless tobacco: Never Used   • Tobacco comment: quit age 28   Substance and Sexual Activity   • Alcohol use: Yes     Comment: rare glass of wine or beer rarely/  No caffeine use   • Drug use: No   • Sexual activity: Not Currently       Family History:  Family History   Problem Relation Age of Onset   • Thyroid disease Daughter    • Lung cancer Brother    • Hypertension Mother    • Aortic stenosis Mother    • Coronary artery disease Mother          78 (smoker)   • Hypertension Father    • Depression Father    • Anxiety disorder Father    • Diabetes Father    • Heart failure Father    • Cirrhosis Father         alcohol   • Alcohol abuse Sister    • Lupus Sister    • Heart disease Sister    • No Known Problems Sister    • Alcohol abuse Brother    • Drug  "abuse Brother    • Heart disease Brother    • Cancer Brother         bladder (smoker)   • Heart disease Brother    • Breast cancer Paternal Aunt    • Neuropathy Neg Hx    • Colon cancer Neg Hx    • Dementia Neg Hx          PATIENT ASSESSMENT     Vitals:  /60 (BP Location: Left arm)   Pulse 61   Temp 96.8 °F (36 °C)   Ht 171.5 cm (67.5\")   Wt 69.4 kg (153 lb)   SpO2 99%   BMI 23.61 kg/m²   BP Readings from Last 3 Encounters:   06/17/21 130/60   03/01/21 159/71   03/01/21 136/54     Wt Readings from Last 3 Encounters:   06/17/21 69.4 kg (153 lb)   04/08/21 71.6 kg (157 lb 12.8 oz)   03/01/21 69.4 kg (153 lb)      Body mass index is 23.61 kg/m².    There were no vitals filed for this visit.      Review of Systems:    Review of Systems  Constitutional neg except per HPI   Resp neg  CV neg   GI as per HPI   neg   Neuro stable neuropathy, restless leg syndrome   Psych anxiety     Physical Exam:    Physical Exam  General: Alert with intact judgment   Abdomen: Soft and nontender. No palpable mass.     Reviewed Data:    Labs:   Lab Results   Component Value Date     03/01/2021    K 4.3 03/01/2021    CALCIUM 9.0 03/01/2021    AST 67 (H) 03/01/2021    ALT 48 (H) 03/01/2021    BUN 22 03/01/2021    CREATININE 0.80 05/19/2021    CREATININE 0.90 03/19/2021    CREATININE 0.99 03/01/2021    EGFRIFNONA 54 (L) 03/01/2021    EGFRIFAFRI 85 09/12/2019       Lab Results   Component Value Date     (H) 09/12/2019     (H) 06/10/2019     (H) 01/09/2019    HGBA1C 6.07 (H) 12/14/2020    HGBA1C 6.20 (H) 06/15/2020    HGBA1C 6.30 (H) 12/12/2019       Lab Results   Component Value Date     (H) 06/15/2020    LDL 81 09/12/2019    LDL 78 07/17/2019    HDL 49 06/15/2020    TRIG 109 06/15/2020    CHOLHDLRATIO 3.51 06/15/2020       Lab Results   Component Value Date    TSH 3.140 03/01/2021    FREET4 1.09 03/01/2021          Lab Results   Component Value Date    WBC 5.31 03/01/2021    HGB 13.2 03/01/2021    " HGB 12.3 09/28/2019    HGB 10.6 (L) 09/04/2019     03/01/2021                 No results found for: PSA    Imaging:     CTA of abdomen  CT abdomen    Moderate to severe stenosis of celiac artery  Pancreas grossly looked fine in 3/2021      Medical Tests:          Screening for Glaucoma:  Previous screening for glaucoma?: Yes      Hearing Loss Screen:  Finger Rub Hearing Test (right ear): passed  Finger Rub Hearing Test (left ear): passed      Urinary Incontinence Screen:  Episodes of urinary incontinence? : No      Depression Screen:  PHQ-2/PHQ-9 Depression Screening 6/17/2021   Little interest or pleasure in doing things 0   Feeling down, depressed, or hopeless 0   Total Score 0        PHQ-2: 0 (Not depressed)    PHQ-9: 0 (Negative screening for depression)       FUNCTIONAL, FALL RISK, & COGNITIVE SCREENING (Components below):    DATA:    Functional & Cognitive Status 6/17/2021   Do you have difficulty preparing food and eating? No   Do you have difficulty bathing yourself, getting dressed or grooming yourself? No   Do you have difficulty using the toilet? No   Do you have difficulty moving around from place to place? No   Do you have trouble with steps or getting out of a bed or a chair? No   Current Diet Well Balanced Diet   Dental Exam Up to date   Eye Exam Up to date   Exercise (times per week) 5 times per week   Current Exercises Include Cardiovascular Workout;Yard Work;Dancing;Home Exercise Program (TV, Computer, Etc.)   Current Exercise Activities Include -   Do you need help using the phone?  No   Are you deaf or do you have serious difficulty hearing?  No   Do you need help with transportation? No   Do you need help shopping? No   Do you need help preparing meals?  No   Do you need help with housework?  No   Do you need help with laundry? No   Do you need help taking your medications? No   Do you need help managing money? No   Do you ever drive or ride in a car without wearing a seat belt? No   Have  you felt unusual stress, anger or loneliness in the last month? -   Do you have difficulty concentrating, remembering or making decisions? No         A) Assessment of Functional Ability:  (Assessment of ability to perform ADL's (showering/bathing, using toilet, dressing, feeding self, moving self around) and IADL's (use telephone, shop, prepare food, housekeep, do laundry, transport independently, take medications independently, and handle finances)    Degree of functional impairment: MILD (based on assessment noted above)      B) Assessment of Fall Risk:  Fall Risk Assessment was completed, and patient is at low risk for falls.       Need for further evaluation of gait, strength, and balance? : No    Timed Up and Go (TUG):   (>= 12 seconds indicates high risk for falling)    Observable abnormalities included: Normal gait pattern       C. Assessment of Cognitive Function:    Mini-Cog Test:     1) Registration (5 objects): Yes   2) Number of objects recalled: 4   3) Clock Draw: Passed? : N/A       Further evaluation required? : No        COUNSELING       A. Identification of Health Risk Factors:    Risk factors include: cardiovascular risk factors, depression / other psychiatric problems and history of tobacco use      B. Age-Appropriate Screening Schedule:  (Refer to the list below for future screening recommendations based on patient's age, sex and/or medical conditions. Orders for these recommended tests are listed in the plan section. The patient has been provided with a written plan)    Health Maintenance Topics  Health Maintenance   Topic Date Due   • LIPID PANEL  06/15/2021   • DXA SCAN  06/20/2021   • INFLUENZA VACCINE  08/01/2021   • DIABETIC EYE EXAM  10/18/2021   • MAMMOGRAM  05/20/2022   • TDAP/TD VACCINES (3 - Td or Tdap) 09/15/2022   • ZOSTER VACCINE  Completed   • HEMOGLOBIN A1C  Discontinued   • URINE MICROALBUMIN  Discontinued       Health Maintenance Topics Due or Over-Due  Health Maintenance Due    Topic Date Due   • COLORECTAL CANCER SCREENING  11/14/2020   • ANNUAL WELLNESS VISIT  06/15/2021   • LIPID PANEL  06/15/2021         C. Advanced Care Planning:    Advance Care Planning   ACP discussion was held with the patient during this visit. Patient has an advance directive in EMR which is still valid.        D. Patient Self-Management and Personalized Health Advice:    She has been provided with personalized counseling/information (including brochures/handouts) about:     -- reducing risk for cardiac/vascular events with pre-existing disease, fall prevention, mental health concerns (depression/anxiety) and managing depression/anxiety      She has been recommended for the following preventative services which has been performed today, will be ordered today or ordered/performed on upcoming follow-up visit:     -- nutrition counseling provided, exercise counseling provided, counseling for cardiovascular disease risk reduction, fall risk assessment / plan of care completed, urinary incontinence assessment done, screening for diabetes performed (current/reviewed labs/lab ordered), **screening for colorectal cancer (colonoscopy discussed or ordered)      E. Miscellaneous Items:    -Aspirin use counseling: Taking ASA appropriately as indicated    -Discussed BMI with her. The BMI is in the acceptable range    -Reviewed use of high risk medication in the elderly: YES    -Reviewed for potential of harmful drug interactions in the elderly: YES        WRAP UP       Assessment & Plan:    1) MEDICARE ANNUAL WELLNESS VISIT    2) OTHER MEDICAL CONDITIONS ADDRESSED TODAY:            Problem List Items Addressed This Visit        High    Celiac artery stenosis (CMS/HCC)       Medium    Dyslipidemia (high LDL; low HDL) (Chronic)    Relevant Orders    Lipid Panel With / Chol / HDL Ratio    REKHA (generalized anxiety disorder) (Chronic)    Relevant Medications    sertraline (Zoloft) 50 MG tablet    RLS (restless legs syndrome)  (Chronic)    Prediabetes (Chronic)    Relevant Orders    Hemoglobin A1c    Urinalysis With Microscopic If Indicated (No Culture) - Urine, Clean Catch    Neuropathy due to medical condition (CMS/[facility])    Relevant Medications    gabapentin (NEURONTIN) 300 MG capsule    History of acute pancreatitis       Low    Coronary artery disease involving native coronary artery of native heart with angina pectoris (CMS/[facility]) (Chronic)    Overview     8/2019: s/p 6 vessel CABG    Continue ASA, statin, beta-blocker.          Relevant Medications    aspirin 81 MG EC tablet    pravastatin (PRAVACHOL) 40 MG tablet    metoprolol succinate XL (TOPROL-XL) 50 MG 24 hr tablet      Other Visit Diagnoses     Medicare annual wellness visit, subsequent    -  Primary                    Orders Placed This Encounter   Procedures   • Hemoglobin A1c   • Lipid Panel With / Chol / HDL Ratio   • Urinalysis With Microscopic If Indicated (No Culture) - Urine, Clean Catch       Discussion / Summary:    She has moderate to severe stenosis of celiac artery. She has had recurrent bouts of pancreatitis. Question of whether celiac stenosis could be leading to recurrent bouts of acute pancreatitis leading to acute abdominal pain. She will have follow-up with Dr. Redman in August. Instructed to go to ED for any future recurrent attacks of pain. She expressed understanding and agreed to follow above instructions.       Patient has been erroneously marked as diabetic. Based on the available clinical information, she does not have diabetes and should therefore be excluded from diabetic health maintenance and quality measures for the remainder of the reporting period.     Medications as of TODAY:              Current Outpatient Medications   Medication Sig Dispense Refill   • aspirin 81 MG EC tablet Take 1 tablet by mouth Daily.     • Calcium Carbonate-Vitamin D (CALCIUM-D) 600-400 MG-UNIT tablet Take  by mouth.     • cholecalciferol (VITAMIN D3) 1000 UNITS tablet  Take 1 tablet by mouth Daily.     • Coenzyme Q10 (COQ10) 100 MG capsule Take  by mouth Daily.     • cycloSPORINE (RESTASIS) 0.05 % ophthalmic emulsion Administer 1 drop to both eyes 2 (Two) Times a Day.     • gabapentin (NEURONTIN) 300 MG capsule Take 1 capsule by mouth 3 (Three) Times a Day. (Patient taking differently: Take 300 mg by mouth 3 (Three) Times a Day. Taking 1 tablet bid) 270 capsule 0   • losartan (COZAAR) 25 MG tablet Take 2 tablets by mouth Daily. 90 tablet 3   • metoprolol succinate XL (TOPROL-XL) 50 MG 24 hr tablet TAKE 1 TABLET BY MOUTH TWICE A  tablet 3   • Multiple Vitamins-Minerals (CENTRUM SILVER 50+WOMEN) tablet Take 1 tablet by mouth Daily.     • omeprazole (priLOSEC) 40 MG capsule TAKE 1 CAPSULE BY MOUTH EVERY DAY 90 capsule 3   • pravastatin (PRAVACHOL) 40 MG tablet TAKE 1 TABLET BY MOUTH EVERY DAY AT NIGHT 90 tablet 3   • sertraline (Zoloft) 50 MG tablet Take 1 tablet by mouth Daily. 90 tablet 1     No current facility-administered medications for this visit.         FOLLOW-UP:            Return in about 3 months (around 9/17/2021) for Reassess chronic medical problems.                 Future Appointments   Date Time Provider Department Center   8/12/2021  2:00 PM Giovany Redman MD [facility]   10/18/2021  9:30 AM [facility] 1 [facility]   1/17/2022  9:30 AM Lindsey Farrell APRN [facility]           After Visit Summary (AVS) including the Personalized Prevention  Plan Services (PPPS) was either printed and given to the patient at check-out today and/or sent to [facility] for review.       *Examiner was wearing KN95 mask during the entire duration of the visit. Patient was masked the entire time.   Minimum social distance of 6 ft maintained entire visit except if physical contact was necessary as documented.      **Dragon Disclaimer:   Much of this encounter note is an electronic transcription/translation of spoken language to printed text. The electronic  translation of spoken language may permit erroneous, or at times, nonsensical words or phrases to be inadvertently transcribed. Although I have reviewed the note for such errors, some may still exist.     This template was created by Arabella Li MD.

## 2021-06-18 LAB
APPEARANCE UR: CLEAR
BACTERIA #/AREA URNS HPF: NORMAL /HPF
BILIRUB UR QL STRIP: NEGATIVE
CASTS URNS MICRO: NORMAL
CHOLEST SERPL-MCNC: 150 MG/DL (ref 0–200)
CHOLEST/HDLC SERPL: 3 {RATIO}
COLOR UR: YELLOW
EPI CELLS #/AREA URNS HPF: NORMAL /HPF
GLUCOSE UR QL: NEGATIVE
HBA1C MFR BLD: 6.2 % (ref 4.8–5.6)
HDLC SERPL-MCNC: 50 MG/DL (ref 40–60)
HGB UR QL STRIP: NEGATIVE
KETONES UR QL STRIP: NEGATIVE
LDLC SERPL CALC-MCNC: 84 MG/DL (ref 0–100)
LEUKOCYTE ESTERASE UR QL STRIP: ABNORMAL
NITRITE UR QL STRIP: NEGATIVE
PH UR STRIP: 6.5 [PH] (ref 5–8)
PROT UR QL STRIP: NEGATIVE
RBC #/AREA URNS HPF: NORMAL /HPF
SP GR UR: 1.02 (ref 1–1.03)
TRIGL SERPL-MCNC: 83 MG/DL (ref 0–150)
UROBILINOGEN UR STRIP-MCNC: ABNORMAL MG/DL
VLDLC SERPL CALC-MCNC: 16 MG/DL (ref 5–40)
WBC #/AREA URNS HPF: NORMAL /HPF

## 2021-07-01 PROCEDURE — 93296 REM INTERROG EVL PM/IDS: CPT | Performed by: INTERNAL MEDICINE

## 2021-07-01 PROCEDURE — 93294 REM INTERROG EVL PM/LDLS PM: CPT | Performed by: INTERNAL MEDICINE

## 2021-07-21 ENCOUNTER — TELEPHONE (OUTPATIENT)
Dept: INTERNAL MEDICINE | Age: 81
End: 2021-07-21

## 2021-07-21 ENCOUNTER — CLINICAL SUPPORT NO REQUIREMENTS (OUTPATIENT)
Dept: CARDIOLOGY | Facility: CLINIC | Age: 81
End: 2021-07-21

## 2021-07-21 DIAGNOSIS — I49.5 SICK SINUS SYNDROME (HCC): Primary | ICD-10-CM

## 2021-07-21 PROCEDURE — 93280 PM DEVICE PROGR EVAL DUAL: CPT | Performed by: INTERNAL MEDICINE

## 2021-07-22 ENCOUNTER — OFFICE VISIT (OUTPATIENT)
Dept: INTERNAL MEDICINE | Age: 81
End: 2021-07-22

## 2021-07-22 VITALS
DIASTOLIC BLOOD PRESSURE: 60 MMHG | HEART RATE: 60 BPM | BODY MASS INDEX: 23.41 KG/M2 | HEIGHT: 68 IN | SYSTOLIC BLOOD PRESSURE: 144 MMHG | WEIGHT: 154.45 LBS | TEMPERATURE: 97.5 F | OXYGEN SATURATION: 100 %

## 2021-07-22 DIAGNOSIS — S39.012A BACK STRAIN, INITIAL ENCOUNTER: Primary | ICD-10-CM

## 2021-07-22 PROCEDURE — 99213 OFFICE O/P EST LOW 20 MIN: CPT | Performed by: NURSE PRACTITIONER

## 2021-07-22 RX ORDER — CYCLOBENZAPRINE HCL 5 MG
5 TABLET ORAL 3 TIMES DAILY PRN
Qty: 21 TABLET | Refills: 0 | Status: SHIPPED | OUTPATIENT
Start: 2021-07-22 | End: 2022-01-20

## 2021-07-22 NOTE — PROGRESS NOTES
"    I N T E R N A L  M E D I C I N E  MARIELLA NEVES, APRN      ENCOUNTER DATE:  07/22/2021    Reanna Higgins / 81 y.o. / female      CHIEF COMPLAINT / REASON FOR OFFICE VISIT     Back Pain (x1 wk)      ASSESSMENT & PLAN     1. Back strain, initial encounter  - Rest  -Tylenol as needed for additional pain relief  -Flexeril 5 mg 3 times daily as needed    No orders of the defined types were placed in this encounter.    New Medications Ordered This Visit   Medications   • cyclobenzaprine (FLEXERIL) 5 MG tablet     Sig: Take 1 tablet by mouth 3 (Three) Times a Day As Needed for Muscle Spasms.     Dispense:  21 tablet     Refill:  0       SUMMARY/DISCUSSION  • Follow-up if symptoms do not improve or worsen      Next Appointment with me: Visit date not found    No follow-ups on file.      VITAL SIGNS     Visit Vitals  /60   Pulse 60   Temp 97.5 °F (36.4 °C) (Temporal)   Ht 171.5 cm (67.5\")   Wt 70.1 kg (154 lb 7.2 oz)   SpO2 100%   BMI 23.83 kg/m²     Wt Readings from Last 3 Encounters:   07/22/21 70.1 kg (154 lb 7.2 oz)   06/17/21 69.4 kg (153 lb)   04/08/21 71.6 kg (157 lb 12.8 oz)     Body mass index is 23.83 kg/m².      MEDICATIONS AT THE TIME OF OFFICE VISIT     Current Outpatient Medications on File Prior to Visit   Medication Sig   • aspirin 81 MG EC tablet Take 1 tablet by mouth Daily.   • Calcium Carbonate-Vitamin D (CALCIUM-D) 600-400 MG-UNIT tablet Take  by mouth.   • cholecalciferol (VITAMIN D3) 1000 UNITS tablet Take 1 tablet by mouth Daily.   • Coenzyme Q10 (COQ10) 100 MG capsule Take  by mouth Daily.   • cycloSPORINE (RESTASIS) 0.05 % ophthalmic emulsion Administer 1 drop to both eyes 2 (Two) Times a Day.   • gabapentin (NEURONTIN) 300 MG capsule Take 1 capsule by mouth 3 (Three) Times a Day. (Patient taking differently: Take 300 mg by mouth 3 (Three) Times a Day. Taking 1 tablet bid)   • losartan (COZAAR) 25 MG tablet Take 2 tablets by mouth Daily.   • metoprolol succinate XL (TOPROL-XL) 50 MG 24 hr " tablet TAKE 1 TABLET BY MOUTH TWICE A DAY   • Multiple Vitamins-Minerals (CENTRUM SILVER 50+WOMEN) tablet Take 1 tablet by mouth Daily.   • omeprazole (priLOSEC) 40 MG capsule TAKE 1 CAPSULE BY MOUTH EVERY DAY   • pravastatin (PRAVACHOL) 40 MG tablet TAKE 1 TABLET BY MOUTH EVERY DAY AT NIGHT   • sertraline (Zoloft) 50 MG tablet Take 1 tablet by mouth Daily.     No current facility-administered medications on file prior to visit.         HISTORY OF PRESENT ILLNESS     Patient presents for symptoms of bilateral thoracic tightness and pain occurring over the last week.  Pain does seem to improve over the last week with rest from the rowmyEDmatch exercise machine.  Denies any radiculopathy or numbness and tingling.  No previous imaging on her back, which she declines today.     REVIEW OF SYSTEMS     Constitutional neg except per HPI   Musc bilateral thoracic pain/tightness     PHYSICAL EXAMINATION     Physical Exam  Constitutional  No distress  Musc pinpointed tenderness with palpation thoracic     REVIEWED DATA     Labs:   Lab Results   Component Value Date    GLUCOSE 117 (H) 03/01/2021    BUN 22 03/01/2021    CREATININE 0.80 05/19/2021    EGFRIFNONA 54 (L) 03/01/2021    EGFRIFAFRI 85 09/12/2019    BCR 22.2 03/01/2021    K 4.3 03/01/2021    CO2 27.6 03/01/2021    CALCIUM 9.0 03/01/2021    PROTENTOTREF 6.8 08/06/2020    ALBUMIN 3.80 03/01/2021    LABIL2 1.3 08/06/2020    AST 67 (H) 03/01/2021    ALT 48 (H) 03/01/2021         Imaging:           Medical Tests:             Summary of old records / correspondence / consultant report:           Request outside records:           *Examiner was wearing medical surgical mask, face shield and exam gloves during the entire duration of the visit. Patient was masked the entire time.   Minimum social distance of 6 ft maintained entire visit except if physical contact was necessary as documented.     **Dragon Disclaimer:   Much of this encounter note is an electronic  transcription/translation of spoken language to printed text. The electronic translation of spoken language may permit erroneous, or at times, nonsensical words or phrases to be inadvertently transcribed. Although I have reviewed the note for such errors, some may still exist.

## 2021-08-12 ENCOUNTER — TELEPHONE (OUTPATIENT)
Dept: GASTROENTEROLOGY | Facility: CLINIC | Age: 81
End: 2021-08-12

## 2021-08-12 ENCOUNTER — OFFICE VISIT (OUTPATIENT)
Dept: GASTROENTEROLOGY | Facility: CLINIC | Age: 81
End: 2021-08-12

## 2021-08-12 VITALS — BODY MASS INDEX: 23.52 KG/M2 | HEIGHT: 68 IN | WEIGHT: 155.2 LBS | TEMPERATURE: 96.2 F

## 2021-08-12 DIAGNOSIS — K86.1 CHRONIC PANCREATITIS, UNSPECIFIED PANCREATITIS TYPE (HCC): Primary | ICD-10-CM

## 2021-08-12 PROCEDURE — 99214 OFFICE O/P EST MOD 30 MIN: CPT | Performed by: INTERNAL MEDICINE

## 2021-08-12 NOTE — PROGRESS NOTES
Chief Complaint   Patient presents with   • Follow-up   • Pancreatitis       Reanna Higgins is a  81 y.o. female here for a follow up visit for epigastric pain.    HPI     Patient 81-year-old female with history of hypertension, hyperlipidemia and GERD noted with vascular stenosis of the mesentery felt not to be clinically significant presenting with recurrent episodes of acute abdominal pain lasting 10 to 15 to 20 minutes and then spontaneously resolving without residual.  Patient CT of been normal though episode related to elevated lipase also occurred though pain still relieved within 20 minutes and scans have always been negative.  Patient here for follow-up.  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   • Restless leg syndrome    • Second degree AV block, Mobitz type I    • SVT (supraventricular tachycardia) (CMS/HCC)          Current Outpatient Medications:   •  aspirin 81 MG EC tablet, Take 1 tablet by mouth Daily., Disp: , Rfl:   •  Calcium Carbonate-Vitamin D (CALCIUM-D) 600-400 MG-UNIT tablet, Take  by mouth., Disp: , Rfl:   •  cholecalciferol (VITAMIN D3) 1000 UNITS tablet, Take 1 tablet by mouth Daily., Disp: , Rfl:   •  Coenzyme Q10 (COQ10) 100 MG capsule, Take  by mouth Daily., Disp: , Rfl:   •  cyclobenzaprine (FLEXERIL) 5 MG tablet, Take 1 tablet by mouth 3 (Three) Times a Day As Needed for Muscle Spasms., Disp: 21 tablet, Rfl: 0  •  cycloSPORINE (RESTASIS) 0.05 % ophthalmic emulsion, Administer 1 drop to both eyes 2 (Two) Times a Day., Disp: , Rfl:   •  gabapentin (NEURONTIN) 300 MG capsule, Take 1 capsule by mouth 3 (Three) Times a Day. (Patient  taking differently: Take 300 mg by mouth 3 (Three) Times a Day. Taking 1 tablet bid), Disp: 270 capsule, Rfl: 0  •  losartan (COZAAR) 25 MG tablet, Take 2 tablets by mouth Daily., Disp: 90 tablet, Rfl: 3  •  metoprolol succinate XL (TOPROL-XL) 50 MG 24 hr tablet, TAKE 1 TABLET BY MOUTH TWICE A DAY, Disp: 180 tablet, Rfl: 3  •  Multiple Vitamins-Minerals (CENTRUM SILVER 50+WOMEN) tablet, Take 1 tablet by mouth Daily., Disp: , Rfl:   •  omeprazole (priLOSEC) 40 MG capsule, TAKE 1 CAPSULE BY MOUTH EVERY DAY, Disp: 90 capsule, Rfl: 3  •  pravastatin (PRAVACHOL) 40 MG tablet, TAKE 1 TABLET BY MOUTH EVERY DAY AT NIGHT, Disp: 90 tablet, Rfl: 3  •  sertraline (Zoloft) 50 MG tablet, Take 1 tablet by mouth Daily., Disp: 90 tablet, Rfl: 1  •  Unable to find, 1 each Daily. Med Name: NeuroFlo dietary formula, Disp: , Rfl:   •  Unable to find, 1 each Daily. Med Name: Supreme Keto dietary supplement, Disp: , Rfl:     Allergies   Allergen Reactions   • Ancef [Cefazolin] Other (See Comments)     Hypotension/bradycardia   • Codeine Hives   • Penicillins Other (See Comments)     Hypotension (Ancef allergy)    • Sulfa Antibiotics Hives       Social History     Socioeconomic History   • Marital status:      Spouse name: Newton   • Number of children: 2   • Years of education: 13   • Highest education level: Some college, no degree   Tobacco Use   • Smoking status: Former Smoker     Packs/day: 0.50     Years: 14.00     Pack years: 7.00     Start date:      Quit date:      Years since quittin.6   • Smokeless tobacco: Never Used   • Tobacco comment: quit age 28   Vaping Use   • Vaping Use: Never used   Substance and Sexual Activity   • Alcohol use: Yes     Comment: rare glass of wine or beer rarely/  No caffeine use   • Drug use: No   • Sexual activity: Not Currently       Family History   Problem Relation Age of Onset   • Thyroid disease Daughter    • Lung cancer Brother    • Hypertension Mother    • Aortic stenosis  Mother    • Coronary artery disease Mother          78 (smoker)   • Hypertension Father    • Depression Father    • Anxiety disorder Father    • Diabetes Father    • Heart failure Father    • Cirrhosis Father         alcohol   • Alcohol abuse Sister    • Lupus Sister    • Heart disease Sister    • No Known Problems Sister    • Alcohol abuse Brother    • Drug abuse Brother    • Heart disease Brother    • Cancer Brother         bladder (smoker)   • Heart disease Brother    • Breast cancer Paternal Aunt    • Neuropathy Neg Hx    • Colon cancer Neg Hx    • Dementia Neg Hx        Review of Systems   Constitutional: Negative.    HENT: Negative.    Respiratory: Negative.    Cardiovascular: Negative.    Gastrointestinal: Negative.  Negative for abdominal distention and abdominal pain.   Musculoskeletal: Negative.    Skin: Negative.    Hematological: Negative.        Vitals:    21 1348   Temp: 96.2 °F (35.7 °C)       Physical Exam  Vitals and nursing note reviewed.   Constitutional:       Appearance: She is well-developed.   HENT:      Head: Normocephalic and atraumatic.   Eyes:      General: No scleral icterus.     Pupils: Pupils are equal, round, and reactive to light.   Cardiovascular:      Rate and Rhythm: Normal rate and regular rhythm.      Heart sounds: Normal heart sounds.   Pulmonary:      Effort: Pulmonary effort is normal.      Breath sounds: Normal breath sounds.   Abdominal:      General: Bowel sounds are normal. There is no distension or abdominal bruit.      Palpations: Abdomen is soft. Abdomen is not rigid. There is no shifting dullness, fluid wave, mass or pulsatile mass.      Tenderness: There is no abdominal tenderness. There is no guarding.      Hernia: No hernia is present.   Musculoskeletal:         General: Normal range of motion.   Skin:     General: Skin is warm and dry.   Neurological:      Mental Status: She is alert and oriented to person, place, and time.   Psychiatric:          Behavior: Behavior normal.         Thought Content: Thought content normal.         Office Visit on 06/17/2021   Component Date Value Ref Range Status   • Hemoglobin A1C 06/17/2021 6.20* 4.80 - 5.60 % Corrected   • Total Cholesterol 06/17/2021 150  0 - 200 mg/dL Corrected   • Triglycerides 06/17/2021 83  0 - 150 mg/dL Final   • HDL Cholesterol 06/17/2021 50  40 - 60 mg/dL Final   • VLDL Cholesterol [place] 06/17/2021 16  5 - 40 mg/dL Final   • LDL Chol Calc ([facility]) 06/17/2021 84  0 - 100 mg/dL Final   • Chol/HDL Ratio 06/17/2021 3.00   Final   • Specific Gravity, UA 06/17/2021 1.018  1.005 - 1.030 Final   • pH, UA 06/17/2021 6.5  5.0 - 8.0 Final   • Color, UA 06/17/2021 Yellow   Final   • Appearance, UA 06/17/2021 Clear  Clear Final   • Leukocytes, UA 06/17/2021 See below:* Negative Final   • Protein 06/17/2021 Negative  Negative Final   • Glucose, UA 06/17/2021 Negative  Negative Final   • Ketones 06/17/2021 Negative  Negative Final   • Blood, UA 06/17/2021 Negative  Negative Final   • Bilirubin, UA 06/17/2021 Negative  Negative Final   • Urobilinogen, UA 06/17/2021 Comment   Final   • Nitrite, UA 06/17/2021 Negative  Negative Final   • WBC, UA 06/17/2021 0-2  /HPF Final   • RBC, UA 06/17/2021 0-2  /HPF Final   • Epithelial Cells (non renal) 06/17/2021 0-2  /HPF Final   • Cast Type 06/17/2021 Comment   Final   • Bacteria, UA 06/17/2021 Comment  None Seen /HPF Final       Diagnoses and all orders for this visit:    1. Chronic pancreatitis, unspecified pancreatitis type (CMS/HCC) (Primary)  -     Case Request; Standing  -     Implement Anesthesia orders day of procedure.; Standing  -     Obtain informed consent; Standing  -     Case Request      Patient with recurrent attacks of pain associated with transient rise in lipase.  Patient with hypertension and hypercholesterolemia with acute attacks in the epigastric region last 10 to 20 minutes and then resolved completely.  Patient with no residual pain and the time she is  presented to the ER negative abnormalities on CT.  Patient sent for EUS again showed normal pancreatic parenchyma normal ducts normal biliary tree with no evidence of pancreas is an issue.  However EUS specialist suggested the possibility of sphincter of Oddi dysfunction and the need for ERCP if pancreatitis were to occur again.  None of the multiple CTs she has had the last 2 years have shown any inflammation of the pancreas.  That being said patient reports she is at her wits end with recurrent pain and despite the risk wants to proceed with ERCP for possibility of preventing these episodes in the future.  Discussed the high risk of pancreatitis from the procedure itself though if this is sphincter of Oddi dysfunction the sphincterotomy would prevent this from happening again.  Patient wishes to proceed despite the high risk.  Will order the ERCP and follow-up clinically.

## 2021-08-12 NOTE — TELEPHONE ENCOUNTER
Spoke with pt scheduled at [facility] on sept 29 arrive at 0700 am edgar kelley (offered pt earlier dates but she wanted an early am appt)

## 2021-08-14 DIAGNOSIS — G62.9 PERIPHERAL POLYNEUROPATHY: Chronic | ICD-10-CM

## 2021-08-14 DIAGNOSIS — I25.119 CORONARY ARTERY DISEASE INVOLVING NATIVE CORONARY ARTERY OF NATIVE HEART WITH ANGINA PECTORIS (HCC): ICD-10-CM

## 2021-08-16 RX ORDER — PRAVASTATIN SODIUM 40 MG
TABLET ORAL
Qty: 90 TABLET | Refills: 3 | Status: SHIPPED | OUTPATIENT
Start: 2021-08-16 | End: 2022-07-19

## 2021-08-16 RX ORDER — GABAPENTIN 300 MG/1
CAPSULE ORAL
Qty: 270 CAPSULE | Refills: 0 | OUTPATIENT
Start: 2021-08-16

## 2021-08-16 NOTE — TELEPHONE ENCOUNTER
Spoke with pt . Said she will not come in she was there . Usually she come every 6 month .       Pt said in September will have procedure ERCP with dr horne .       
lov 6/17/21  Nov none  Contract 1/19/21  uds 4/6/21  
normal for race

## 2021-08-30 ENCOUNTER — TELEPHONE (OUTPATIENT)
Dept: INTERNAL MEDICINE | Age: 81
End: 2021-08-30

## 2021-08-30 NOTE — TELEPHONE ENCOUNTER
PATIENT WAS TOLD TO COME IN IN DEC. I CAN NOT FIND ANY APPOINTMENTS IN DEC FOR DR HEMPHILL. 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      PLEASE ADVISE  [phone]

## 2021-09-22 ENCOUNTER — TRANSCRIBE ORDERS (OUTPATIENT)
Dept: ADMINISTRATIVE | Facility: HOSPITAL | Age: 81
End: 2021-09-22

## 2021-09-22 DIAGNOSIS — Z01.818 OTHER SPECIFIED PRE-OPERATIVE EXAMINATION: Primary | ICD-10-CM

## 2021-09-28 ENCOUNTER — LAB (OUTPATIENT)
Dept: LAB | Facility: HOSPITAL | Age: 81
End: 2021-09-28

## 2021-09-28 DIAGNOSIS — Z01.818 OTHER SPECIFIED PRE-OPERATIVE EXAMINATION: ICD-10-CM

## 2021-09-28 LAB — SARS-COV-2 ORF1AB RESP QL NAA+PROBE: NOT DETECTED

## 2021-09-28 PROCEDURE — C9803 HOPD COVID-19 SPEC COLLECT: HCPCS

## 2021-09-28 PROCEDURE — U0004 COV-19 TEST NON-CDC HGH THRU: HCPCS

## 2021-09-29 ENCOUNTER — HOSPITAL ENCOUNTER (OUTPATIENT)
Facility: HOSPITAL | Age: 81
Setting detail: HOSPITAL OUTPATIENT SURGERY
Discharge: HOME | End: 2021-09-29
Attending: INTERNAL MEDICINE | Admitting: INTERNAL MEDICINE

## 2021-09-29 ENCOUNTER — APPOINTMENT (OUTPATIENT)
Dept: GENERAL RADIOLOGY | Facility: HOSPITAL | Age: 81
End: 2021-09-29

## 2021-09-29 ENCOUNTER — ANESTHESIA EVENT (OUTPATIENT)
Dept: GASTROENTEROLOGY | Facility: HOSPITAL | Age: 81
End: 2021-09-29

## 2021-09-29 ENCOUNTER — ANESTHESIA (OUTPATIENT)
Dept: GASTROENTEROLOGY | Facility: HOSPITAL | Age: 81
End: 2021-09-29

## 2021-09-29 VITALS
OXYGEN SATURATION: 94 % | DIASTOLIC BLOOD PRESSURE: 86 MMHG | RESPIRATION RATE: 16 BRPM | BODY MASS INDEX: 24.31 KG/M2 | WEIGHT: 154.9 LBS | TEMPERATURE: 98.1 F | SYSTOLIC BLOOD PRESSURE: 112 MMHG | HEART RATE: 62 BPM | HEIGHT: 67 IN

## 2021-09-29 DIAGNOSIS — K86.1 CHRONIC PANCREATITIS, UNSPECIFIED PANCREATITIS TYPE (HCC): ICD-10-CM

## 2021-09-29 PROCEDURE — C1769 GUIDE WIRE: HCPCS | Performed by: INTERNAL MEDICINE

## 2021-09-29 PROCEDURE — S0260 H&P FOR SURGERY: HCPCS | Performed by: INTERNAL MEDICINE

## 2021-09-29 PROCEDURE — 25010000002 PROPOFOL 10 MG/ML EMULSION: Performed by: ANESTHESIOLOGY

## 2021-09-29 PROCEDURE — 25010000002 ONDANSETRON PER 1 MG: Performed by: ANESTHESIOLOGY

## 2021-09-29 PROCEDURE — 43262 ENDO CHOLANGIOPANCREATOGRAPH: CPT | Performed by: INTERNAL MEDICINE

## 2021-09-29 PROCEDURE — 74328 X-RAY BILE DUCT ENDOSCOPY: CPT

## 2021-09-29 PROCEDURE — 25010000002 IOPAMIDOL 61 % SOLUTION: Performed by: INTERNAL MEDICINE

## 2021-09-29 RX ORDER — ONDANSETRON 2 MG/ML
4 INJECTION INTRAMUSCULAR; INTRAVENOUS ONCE
Status: COMPLETED | OUTPATIENT
Start: 2021-09-29 | End: 2021-09-29

## 2021-09-29 RX ORDER — PROPOFOL 10 MG/ML
VIAL (ML) INTRAVENOUS AS NEEDED
Status: DISCONTINUED | OUTPATIENT
Start: 2021-09-29 | End: 2021-09-29 | Stop reason: SURG

## 2021-09-29 RX ORDER — SODIUM CHLORIDE 0.9 % (FLUSH) 0.9 %
10 SYRINGE (ML) INJECTION AS NEEDED
Status: DISCONTINUED | OUTPATIENT
Start: 2021-09-29 | End: 2021-09-29 | Stop reason: HOSPADM

## 2021-09-29 RX ORDER — SODIUM CHLORIDE 0.9 % (FLUSH) 0.9 %
3 SYRINGE (ML) INJECTION EVERY 12 HOURS SCHEDULED
Status: DISCONTINUED | OUTPATIENT
Start: 2021-09-29 | End: 2021-09-29 | Stop reason: HOSPADM

## 2021-09-29 RX ORDER — SODIUM CHLORIDE, SODIUM LACTATE, POTASSIUM CHLORIDE, CALCIUM CHLORIDE 600; 310; 30; 20 MG/100ML; MG/100ML; MG/100ML; MG/100ML
30 INJECTION, SOLUTION INTRAVENOUS CONTINUOUS PRN
Status: DISCONTINUED | OUTPATIENT
Start: 2021-09-29 | End: 2021-09-29 | Stop reason: HOSPADM

## 2021-09-29 RX ADMIN — ONDANSETRON 4 MG: 2 INJECTION INTRAMUSCULAR; INTRAVENOUS at 09:15

## 2021-09-29 RX ADMIN — SODIUM CHLORIDE, POTASSIUM CHLORIDE, SODIUM LACTATE AND CALCIUM CHLORIDE 30 ML/HR: 600; 310; 30; 20 INJECTION, SOLUTION INTRAVENOUS at 07:39

## 2021-09-29 RX ADMIN — PROPOFOL 50 MG: 10 INJECTION, EMULSION INTRAVENOUS at 08:09

## 2021-09-29 RX ADMIN — PROPOFOL 140 MCG/KG/MIN: 10 INJECTION, EMULSION INTRAVENOUS at 08:10

## 2021-09-29 NOTE — ANESTHESIA PREPROCEDURE EVALUATION
Anesthesia Evaluation     Patient summary reviewed and Nursing notes reviewed   NPO Solid Status: > 8 hours  NPO Liquid Status: > 2 hours           Airway   Mallampati: II  TM distance: >3 FB  Neck ROM: full  Dental - normal exam     Pulmonary - negative pulmonary ROS and normal exam   Cardiovascular - normal exam    (+) pacemaker pacemaker, hypertension 2 medications or greater, valvular problems/murmurs MR, CAD, CABG, dysrhythmias, angina, hyperlipidemia,       Neuro/Psych- negative ROS  GI/Hepatic/Renal/Endo    (+)  GERD,  thyroid problem hypothyroidism    ROS Comment: Chronic pancreatitis (HCC)    Musculoskeletal     Abdominal    Substance History - negative use     OB/GYN negative ob/gyn ROS         Other   arthritis,    history of cancer                    Anesthesia Plan    ASA 4     MAC       Anesthetic plan, all risks, benefits, and alternatives have been provided, discussed and informed consent has been obtained with: patient.

## 2021-09-29 NOTE — ANESTHESIA POSTPROCEDURE EVALUATION
"Patient: Reanna Higgins    Procedure Summary     Date: 09/29/21 Room / Location:  [facility]    Anesthesia Start: 0808 Anesthesia Stop: 0857    Procedure: ENDOSCOPIC RETROGRADE CHOLANGIOPANCREATOGRAPHY WITH SPHINCTEROTOMY AND BALLOON SWEEP (N/A ) Diagnosis:       Chronic pancreatitis, unspecified pancreatitis type (HCC)      (Chronic pancreatitis, unspecified pancreatitis type (CMS/HCC) [K86.1])    Surgeons: Giovany Redman MD Provider: Yves Jc MD    Anesthesia Type: MAC ASA Status: 4          Anesthesia Type: MAC    Vitals  No vitals data found for the desired time range.          Post Anesthesia Care and Evaluation    Patient location during evaluation: bedside  Patient participation: complete - patient participated  Level of consciousness: awake  Pain score: 2  Pain management: adequate  Airway patency: patent  Anesthetic complications: No anesthetic complications  PONV Status: none  Cardiovascular status: acceptable  Respiratory status: acceptable  Hydration status: acceptable    Comments: /58 (BP Location: Left arm, Patient Position: Lying)   Pulse 65   Temp 36.7 °C (98 °F) (Oral)   Resp 18   Ht 170.8 cm (67.25\")   Wt 70.3 kg (154 lb 14.4 oz)   SpO2 99%   BMI 24.08 kg/m²         "

## 2021-10-07 RX ORDER — LOSARTAN POTASSIUM 25 MG/1
TABLET ORAL
Qty: 180 TABLET | Refills: 2 | Status: SHIPPED | OUTPATIENT
Start: 2021-10-07 | End: 2022-06-01

## 2021-10-08 ENCOUNTER — TELEPHONE (OUTPATIENT)
Dept: GASTROENTEROLOGY | Facility: CLINIC | Age: 81
End: 2021-10-08

## 2021-10-08 ENCOUNTER — OFFICE VISIT (OUTPATIENT)
Dept: INTERNAL MEDICINE | Age: 81
End: 2021-10-08

## 2021-10-08 VITALS
SYSTOLIC BLOOD PRESSURE: 152 MMHG | OXYGEN SATURATION: 96 % | HEART RATE: 76 BPM | TEMPERATURE: 97.3 F | WEIGHT: 156 LBS | BODY MASS INDEX: 23.64 KG/M2 | HEIGHT: 68 IN | DIASTOLIC BLOOD PRESSURE: 52 MMHG

## 2021-10-08 DIAGNOSIS — K86.1 CHRONIC RECURRENT PANCREATITIS (HCC): Primary | Chronic | ICD-10-CM

## 2021-10-08 PROBLEM — K85.90 RECURRENT PANCREATITIS: Status: ACTIVE | Noted: 2021-10-08

## 2021-10-08 PROBLEM — IMO0002 RECURRENT PANCREATITIS: Status: ACTIVE | Noted: 2021-10-08

## 2021-10-08 PROCEDURE — 99213 OFFICE O/P EST LOW 20 MIN: CPT | Performed by: INTERNAL MEDICINE

## 2021-10-08 NOTE — PROGRESS NOTES
"    I N T E R N A L  M E D I C I N E  J U N O H  K I M,  M D      ENCOUNTER DATE:  10/08/2021    Reanna Higgins / 81 y.o. / female      CHIEF COMPLAINT / REASON FOR OFFICE VISIT     hospital f/u-Chronic pancreatitis (9/29/21)      ASSESSMENT & PLAN     1. Chronic recurrent pancreatitis (HCC)      No orders of the defined types were placed in this encounter.    No orders of the defined types were placed in this encounter.      SUMMARY/DISCUSSION  • Stable clinically. Follow-up with GI as instructed. Watch for any recurrent abdominal pain symptoms.       Next Appointment with me: 1/10/2022    No follow-ups on file.        VITAL SIGNS     Visit Vitals  /52 (BP Location: Left arm)   Pulse 76   Temp 97.3 °F (36.3 °C)   Ht 171.5 cm (67.5\")   Wt 70.8 kg (156 lb)   SpO2 96%   BMI 24.07 kg/m²       BP Readings from Last 3 Encounters:   10/08/21 152/52   09/29/21 112/86   07/22/21 144/60     Wt Readings from Last 3 Encounters:   10/08/21 70.8 kg (156 lb)   09/29/21 70.3 kg (154 lb 14.4 oz)   08/12/21 70.4 kg (155 lb 3.2 oz)     Body mass index is 24.07 kg/m².      MEDICATIONS AT THE TIME OF OFFICE VISIT     Current Outpatient Medications on File Prior to Visit   Medication Sig   • aspirin 81 MG EC tablet Take 1 tablet by mouth Daily.   • Calcium Carbonate-Vitamin D (CALCIUM-D) 600-400 MG-UNIT tablet Take  by mouth.   • cholecalciferol (VITAMIN D3) 1000 UNITS tablet Take 1 tablet by mouth Daily.   • Coenzyme Q10 (COQ10) 100 MG capsule Take  by mouth Daily.   • cycloSPORINE (RESTASIS) 0.05 % ophthalmic emulsion Administer 1 drop to both eyes 2 (Two) Times a Day.   • gabapentin (NEURONTIN) 300 MG capsule Take 1 capsule by mouth 3 (Three) Times a Day. (Patient taking differently: Take 300 mg by mouth 3 (Three) Times a Day. Taking 1 tablet bid)   • losartan (COZAAR) 25 MG tablet TAKE 2 TABLETS BY MOUTH EVERY DAY   • metoprolol succinate XL (TOPROL-XL) 50 MG 24 hr tablet TAKE 1 TABLET BY MOUTH TWICE A DAY   • Multiple " Vitamins-Minerals (CENTRUM SILVER 50+WOMEN) tablet Take 1 tablet by mouth Daily.   • omeprazole (priLOSEC) 40 MG capsule TAKE 1 CAPSULE BY MOUTH EVERY DAY   • pravastatin (PRAVACHOL) 40 MG tablet TAKE 1 TABLET BY MOUTH EVERY DAY AT NIGHT   • cyclobenzaprine (FLEXERIL) 5 MG tablet Take 1 tablet by mouth 3 (Three) Times a Day As Needed for Muscle Spasms.   • sertraline (Zoloft) 50 MG tablet Take 1 tablet by mouth Daily.   • Unable to find 1 each Daily. Med Name: NeuroFlo dietary formula   • Unable to find 1 each Daily. Med Name: Supreme Keto dietary supplement     No current facility-administered medications on file prior to visit.          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 Denies worsening abdominal pain, melena or blood. No fever, chills, jaundice.         REVIEW OF SYSTEMS             PHYSICAL EXAMINATION     Physical Exam  No acute distress  No icterus, jaundice  Abdomen is soft, mild upper abdominal tenderness without guarding or rebound       REVIEWED DATA     Labs:     Lab Results   Component Value Date     03/01/2021    K 4.3 03/01/2021    CALCIUM 9.0 03/01/2021    AST 67 (H) 03/01/2021    ALT 48 (H) 03/01/2021    BUN 22 03/01/2021    CREATININE 0.80 05/19/2021    CREATININE 0.90 03/19/2021    CREATININE 0.99 03/01/2021    EGFRIFNONA 54 (L) 03/01/2021    EGFRIFAFRI 85 09/12/2019       Lab Results   Component Value Date    HGBA1C 6.20 (H) 06/17/2021    HGBA1C 6.07 (H) 12/14/2020    HGBA1C 6.20 (H) 06/15/2020       Lab Results   Component Value Date    LDL 84 06/17/2021     (H) 06/15/2020    HDL 50 06/17/2021    TRIG 83 06/17/2021       Lab Results   Component Value Date    TSH 3.140 03/01/2021    TSH 2.840 12/12/2019    TSH 4.290 (H) 06/10/2019    FREET4 1.09 03/01/2021    FREET4 1.13 12/12/2019    FREET4 1.02 06/10/2019       Lab Results   Component Value Date    WBC 5.31 03/01/2021     HGB 13.2 03/01/2021     03/01/2021       No results found for: MICROALBUR        Imaging:           Medical Tests:           Summary of old records / correspondence / consultant report:           Request outside records:             *Examiner was wearing KN95 mask and eye protection during the entire duration of the visit. Patient was masked the entire time. Minimum social distance of 6 ft maintained entire visit except if physical contact was necessary as documented.     **Dragon Disclaimer: Much of this encounter note is an electronic transcription/translation of spoken language to printed text. The electronic translation of spoken language may permit erroneous, or at times, nonsensical words or phrases to be inadvertently transcribed. Although I have reviewed the note for such errors, some may still exist.       Template created by Arabella Li MD

## 2021-10-08 NOTE — TELEPHONE ENCOUNTER
Returned patient's phone call. Multiple questions answered ERCP. Patient states she is overdue a colonoscopy and would like to set up an appointment. Advised will send an update to our open access person for her to send the paperwork to her home.

## 2021-10-08 NOTE — TELEPHONE ENCOUNTER
----- Message from Jodi Shi sent at 10/8/2021  3:36 PM EDT -----  Regarding: labs  Contact: [phone]  Pt is wanting to see if she needs follow up labs.

## 2021-10-18 ENCOUNTER — HOSPITAL ENCOUNTER (OUTPATIENT)
Dept: BONE DENSITY | Facility: HOSPITAL | Age: 81
Discharge: HOME | End: 2021-10-18
Admitting: NURSE PRACTITIONER

## 2021-10-18 PROCEDURE — 77080 DXA BONE DENSITY AXIAL: CPT

## 2021-10-29 DIAGNOSIS — G62.9 PERIPHERAL POLYNEUROPATHY: Chronic | ICD-10-CM

## 2021-10-29 RX ORDER — METOPROLOL SUCCINATE 50 MG/1
TABLET, EXTENDED RELEASE ORAL
Qty: 180 TABLET | Refills: 3 | Status: SHIPPED | OUTPATIENT
Start: 2021-10-29 | End: 2022-09-07

## 2021-10-29 RX ORDER — GABAPENTIN 300 MG/1
CAPSULE ORAL
Qty: 180 CAPSULE | Refills: 0 | Status: SHIPPED | OUTPATIENT
Start: 2021-10-29 | End: 2022-01-06

## 2021-11-24 ENCOUNTER — TELEPHONE (OUTPATIENT)
Dept: GASTROENTEROLOGY | Facility: CLINIC | Age: 81
End: 2021-11-24

## 2021-11-24 NOTE — TELEPHONE ENCOUNTER
"----- Message from Corine Cordero RN sent at 11/24/2021  9:56 AM EST -----  Regarding: FW: need to get papers for colonostomy    ----- Message -----  From: Reanna Higgins  Sent: 11/24/2021   9:23 AM EST  To: [facility]  Subject: need to get papers for colonostomy               Have spoken to, left message with, and sent message---regarding getting paper work to schedule a colonostomy-----as I am well over a year \"overdue\" for this procedure--I am aware that Dr. Redman is out until December.  Once I was promised they would send forms to be completed, that was in early Oct., so far, no papers. I will be out of town Feb. and March, 2022. Reanna Higgins---[phone]    "

## 2021-12-07 ENCOUNTER — TELEPHONE (OUTPATIENT)
Dept: GASTROENTEROLOGY | Facility: CLINIC | Age: 81
End: 2021-12-07

## 2021-12-07 ENCOUNTER — PRE-PROCEDURE SCREENING (OUTPATIENT)
Dept: GASTROENTEROLOGY | Facility: CLINIC | Age: 81
End: 2021-12-07

## 2021-12-07 NOTE — TELEPHONE ENCOUNTER
Pt called today  Wanting to know, when she can schedule her procedure ?   She talked to Jovita in October.  She tried to schedule on my chart, I told her she could not do that, you had to do that for her.    [phone]

## 2021-12-10 ENCOUNTER — PRE-PROCEDURE SCREENING (OUTPATIENT)
Dept: GASTROENTEROLOGY | Facility: CLINIC | Age: 81
End: 2021-12-10

## 2021-12-10 NOTE — TELEPHONE ENCOUNTER
Last scope 11/14/15 in epic-- Personal of history of polyps-- No family history of polyps or colon cancer--Aspirin--Medications:        aspirin 81 MG EC tablet  Calcium Carbonate-Vitamin D (CALCIUM-D) 600-400 MG-UNIT tablet  cholecalciferol (VITAMIN D3) 1000 UNITS tablet  Coenzyme Q10 (COQ10) 100 MG capsule  cyclobenzaprine (FLEXERIL) 5 MG tablet  cycloSPORINE (RESTASIS) 0.05 % ophthalmic emulsion  gabapentin (NEURONTIN) 300 MG capsule  losartan (COZAAR) 25 MG tablet  metoprolol succinate XL (TOPROL-XL) 50 MG 24 hr tablet  Multiple Vitamins-Minerals (CENTRUM SILVER 50+WOMEN) tablet  omeprazole (priLOSEC) 40 MG capsule  pravastatin (PRAVACHOL) 40 MG tablet  sertraline (Zoloft) 50 MG tablet      Pt verbalized and understood that it would be few weeks before been schedule

## 2021-12-22 ENCOUNTER — TELEPHONE (OUTPATIENT)
Dept: GASTROENTEROLOGY | Facility: CLINIC | Age: 81
End: 2021-12-22

## 2021-12-22 ENCOUNTER — PREP FOR SURGERY (OUTPATIENT)
Dept: OTHER | Facility: HOSPITAL | Age: 81
End: 2021-12-22

## 2021-12-22 DIAGNOSIS — Z86.010 HISTORY OF ADENOMATOUS POLYP OF COLON: Primary | ICD-10-CM

## 2021-12-22 PROBLEM — Z86.0101 HISTORY OF ADENOMATOUS POLYP OF COLON: Status: ACTIVE | Noted: 2021-12-22

## 2021-12-22 NOTE — TELEPHONE ENCOUNTER
edgar Faria for 03/10 arrive at 1200-cs/patient procedure packet was given to pt in office on 11/08 pt was advised time of arrival is subject to change, [facility] will call for w/finale time

## 2021-12-28 ENCOUNTER — TELEPHONE (OUTPATIENT)
Dept: INTERNAL MEDICINE | Age: 81
End: 2021-12-28

## 2021-12-30 PROCEDURE — 93294 REM INTERROG EVL PM/LDLS PM: CPT | Performed by: INTERNAL MEDICINE

## 2021-12-30 PROCEDURE — 93296 REM INTERROG EVL PM/IDS: CPT | Performed by: INTERNAL MEDICINE

## 2022-01-06 DIAGNOSIS — G62.9 PERIPHERAL POLYNEUROPATHY: Chronic | ICD-10-CM

## 2022-01-06 RX ORDER — GABAPENTIN 300 MG/1
CAPSULE ORAL
Qty: 180 CAPSULE | Refills: 0 | Status: SHIPPED | OUTPATIENT
Start: 2022-01-06 | End: 2022-04-25

## 2022-01-10 ENCOUNTER — OFFICE VISIT (OUTPATIENT)
Dept: INTERNAL MEDICINE | Age: 82
End: 2022-01-10

## 2022-01-10 VITALS
WEIGHT: 156 LBS | HEIGHT: 68 IN | TEMPERATURE: 96.9 F | SYSTOLIC BLOOD PRESSURE: 140 MMHG | OXYGEN SATURATION: 99 % | BODY MASS INDEX: 23.64 KG/M2 | DIASTOLIC BLOOD PRESSURE: 52 MMHG | HEART RATE: 63 BPM

## 2022-01-10 DIAGNOSIS — G63 NEUROPATHY DUE TO MEDICAL CONDITION: ICD-10-CM

## 2022-01-10 DIAGNOSIS — F41.1 GAD (GENERALIZED ANXIETY DISORDER): Chronic | ICD-10-CM

## 2022-01-10 DIAGNOSIS — E78.5 DYSLIPIDEMIA (HIGH LDL; LOW HDL): Chronic | ICD-10-CM

## 2022-01-10 DIAGNOSIS — Z51.81 THERAPEUTIC DRUG MONITORING: Primary | ICD-10-CM

## 2022-01-10 DIAGNOSIS — R73.03 PREDIABETES: ICD-10-CM

## 2022-01-10 DIAGNOSIS — I10 ESSENTIAL HYPERTENSION: Chronic | ICD-10-CM

## 2022-01-10 PROCEDURE — 99214 OFFICE O/P EST MOD 30 MIN: CPT | Performed by: INTERNAL MEDICINE

## 2022-01-10 NOTE — ASSESSMENT & PLAN NOTE
Currently taking losartan 25 mg 2 tablets daily and metoprolol XL 50 mg twice daily.  This is managed by her cardiologist.

## 2022-01-10 NOTE — ASSESSMENT & PLAN NOTE
Check A1c level.  Consider metformin if A1c is high..    Lab Results   Component Value Date    HGBA1C 6.20 (H) 06/17/2021    HGBA1C 6.07 (H) 12/14/2020    HGBA1C 6.20 (H) 06/15/2020

## 2022-01-10 NOTE — ASSESSMENT & PLAN NOTE
She stopped taking sertraline on her own after 3 months.  She has ongoing anxiety symptoms but defers medication at this time.  Follow-up.

## 2022-01-10 NOTE — PROGRESS NOTES
I N T E R N A L  M E D I C I N E  J U N O H  K I M,  M D      ENCOUNTER DATE:  01/10/2022    Reanna Higgins / 81 y.o. / female      CHIEF COMPLAINT / REASON FOR OFFICE VISIT     Prediabetes,  Dyslipidemia, REKHA (generalized anxiety disorder, and Peripheral Neuropathy      ASSESSMENT & PLAN     Problem List Items Addressed This Visit        High    Prediabetes (Chronic)    Current Assessment & Plan     Check A1c level.  Consider metformin if A1c is high..    Lab Results   Component Value Date    HGBA1C 6.20 (H) 06/17/2021    HGBA1C 6.07 (H) 12/14/2020    HGBA1C 6.20 (H) 06/15/2020             Relevant Orders    Hemoglobin A1c       Medium    Essential hypertension (Chronic)    Current Assessment & Plan     Currently taking losartan 25 mg 2 tablets daily and metoprolol XL 50 mg twice daily.  This is managed by her cardiologist.         Relevant Medications    losartan (COZAAR) 25 MG tablet    metoprolol succinate XL (TOPROL-XL) 50 MG 24 hr tablet    Other Relevant Orders    Comprehensive Metabolic Panel    Dyslipidemia (high LDL; low HDL) (Chronic)    Overview     Continue pravastatin 40 mg daily         Relevant Orders    Comprehensive Metabolic Panel    REKHA (generalized anxiety disorder) (Chronic)    Current Assessment & Plan     She stopped taking sertraline on her own after 3 months.  She has ongoing anxiety symptoms but defers medication at this time.  Follow-up.         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         Relevant Medications    gabapentin (NEURONTIN) 300 MG capsule    Other Relevant Orders    Compliance Drug Analysis, Ur - Urine, Clean Catch      Other Visit Diagnoses     Therapeutic drug monitoring    -  Primary    Relevant Orders    Compliance Drug Analysis, Ur - Urine, Clean Catch        Orders Placed This Encounter   Procedures   • Compliance Drug Analysis, Ur -  "Urine, Clean Catch   • Hemoglobin A1c   • Comprehensive Metabolic Panel     No orders of the defined types were placed in this encounter.      SUMMARY/DISCUSSION  Patient has been erroneously marked as diabetic. Based on the available clinical information, she does not have diabetes and should therefore be excluded from diabetic health maintenance and quality measures for the remainder of the reporting period.       Next Appointment with me: Visit date not found    Return in about 6 months (around 7/10/2022) for COMBINED AWV AND MEDICAL F/U (30 MIN).      VITAL SIGNS     Visit Vitals  /52 (BP Location: Left arm)   Pulse 63   Temp 96.9 °F (36.1 °C)   Ht 171.5 cm (67.5\")   Wt 70.8 kg (156 lb)   SpO2 99%   BMI 24.07 kg/m²       BP Readings from Last 3 Encounters:   01/10/22 140/52   10/08/21 152/52   09/29/21 112/86     Wt Readings from Last 3 Encounters:   01/10/22 70.8 kg (156 lb)   10/08/21 70.8 kg (156 lb)   09/29/21 70.3 kg (154 lb 14.4 oz)     Body mass index is 24.07 kg/m².      MEDICATIONS AT THE TIME OF OFFICE VISIT     Current Outpatient Medications on File Prior to Visit   Medication Sig   • aspirin 81 MG EC tablet Take 1 tablet by mouth Daily.   • Calcium Carbonate-Vitamin D (CALCIUM-D) 600-400 MG-UNIT tablet Take  by mouth.   • cholecalciferol (VITAMIN D3) 1000 UNITS tablet Take 1 tablet by mouth Daily.   • Coenzyme Q10 (COQ10) 100 MG capsule Take  by mouth Daily.   • cyclobenzaprine (FLEXERIL) 5 MG tablet Take 1 tablet by mouth 3 (Three) Times a Day As Needed for Muscle Spasms.   • cycloSPORINE (RESTASIS) 0.05 % ophthalmic emulsion Administer 1 drop to both eyes 2 (Two) Times a Day.   • gabapentin (NEURONTIN) 300 MG capsule TAKE 1 CAPSULE BY MOUTH TWICE A DAY   • losartan (COZAAR) 25 MG tablet TAKE 2 TABLETS BY MOUTH EVERY DAY   • metoprolol succinate XL (TOPROL-XL) 50 MG 24 hr tablet TAKE 1 TABLET BY MOUTH TWICE A DAY   • omeprazole (priLOSEC) 40 MG capsule TAKE 1 CAPSULE BY MOUTH EVERY DAY   • " pravastatin (PRAVACHOL) 40 MG tablet TAKE 1 TABLET BY MOUTH EVERY DAY AT NIGHT   • sertraline (Zoloft) 50 MG tablet Take 1 tablet by mouth Daily.   • Unable to find 1 each Daily. Med Name: NeuroFlo dietary formula   • Unable to find 1 each Daily. Med Name: Supreme Keto dietary supplement   • [DISCONTINUED] Multiple Vitamins-Minerals (CENTRUM SILVER 50+WOMEN) tablet Take 1 tablet by mouth Daily.     No current facility-administered medications on file prior to visit.          HISTORY OF PRESENT ILLNESS     Planned colonoscopy.  She stopped taking sertraline on her own after 3 months of taking the medication.  She has ongoing anxiety symptoms but defers medication.  Hypertension is managed by her cardiologist started and metoprolol.  On pravastatin for hyperlipidemia without significant problems.  Takes gabapentin 300 mg twice daily for peripheral neuropathy and restless leg syndrome without significant problems.  Medication continues to benefit her.  She has history of prediabetes most recent A1c was 6.2, currently no medication.      Patient Care Team:  Ted Li MD as PCP - General (Internal Medicine)  To Rivera MD as Consulting Physician (General Surgery)  Arsenio Witt MD as Consulting Physician (Ophthalmology)  Brown Beckman MD as Consulting Physician (Orthopedic Surgery)  Shivam Vallejo MD as Consulting Physician (Neurology)  Francis Mccullough MD as Consulting Physician (Cardiology)    REVIEW OF SYSTEMS     Review of Systems       PHYSICAL EXAMINATION     Physical Exam  General: No acute distress  Psych: Normal thought and judgment ; anxiety  Cardiovascular Rate: normal. Rhythm: regular. Heart sounds: normal.   Pulm/Chest: Effort normal, breath sounds normal.       REVIEWED DATA     Labs:     Lab Results   Component Value Date     03/01/2021    K 4.3 03/01/2021    CALCIUM 9.0 03/01/2021    AST 67 (H) 03/01/2021    ALT 48 (H) 03/01/2021    BUN 22 03/01/2021    CREATININE 0.80  05/19/2021    CREATININE 0.90 03/19/2021    CREATININE 0.99 03/01/2021    EGFRIFNONA 54 (L) 03/01/2021    EGFRIFAFRI 85 09/12/2019       Lab Results   Component Value Date    HGBA1C 6.20 (H) 06/17/2021    HGBA1C 6.07 (H) 12/14/2020    HGBA1C 6.20 (H) 06/15/2020       Lab Results   Component Value Date    LDL 84 06/17/2021     (H) 06/15/2020    HDL 50 06/17/2021    TRIG 83 06/17/2021       Lab Results   Component Value Date    TSH 3.140 03/01/2021    TSH 2.840 12/12/2019    TSH 4.290 (H) 06/10/2019    FREET4 1.09 03/01/2021    FREET4 1.13 12/12/2019    FREET4 1.02 06/10/2019       Lab Results   Component Value Date    WBC 5.31 03/01/2021    HGB 13.2 03/01/2021     03/01/2021       No results found for: MICROALBUR        Imaging:           Medical Tests:           Summary of old records / correspondence / consultant report:           Request outside records:             *Examiner was wearing KN95 mask and eye protection during the entire duration of the visit. Patient was masked the entire time. Minimum social distance of 6 ft maintained entire visit except if physical contact was necessary as documented.     **Dragon Disclaimer: Much of this encounter note is an electronic transcription/translation of spoken language to printed text. The electronic translation of spoken language may permit erroneous, or at times, nonsensical words or phrases to be inadvertently transcribed. Although I have reviewed the note for such errors, some may still exist.       Template created by Arabella Li MD

## 2022-01-14 ENCOUNTER — TELEPHONE (OUTPATIENT)
Dept: INTERNAL MEDICINE | Age: 82
End: 2022-01-14

## 2022-01-14 ENCOUNTER — TELEPHONE (OUTPATIENT)
Dept: GASTROENTEROLOGY | Facility: CLINIC | Age: 82
End: 2022-01-14

## 2022-01-14 LAB
ALBUMIN SERPL-MCNC: 4.3 G/DL (ref 3.6–4.6)
ALBUMIN/GLOB SERPL: 1.7 {RATIO} (ref 1.2–2.2)
ALP SERPL-CCNC: 82 IU/L (ref 44–121)
ALT SERPL-CCNC: 16 IU/L (ref 0–32)
AST SERPL-CCNC: 16 IU/L (ref 0–40)
BILIRUB SERPL-MCNC: 0.7 MG/DL (ref 0–1.2)
BUN SERPL-MCNC: 18 MG/DL (ref 8–27)
BUN/CREAT SERPL: 20 (ref 12–28)
CALCIUM SERPL-MCNC: 9.3 MG/DL (ref 8.7–10.3)
CHLORIDE SERPL-SCNC: 102 MMOL/L (ref 96–106)
CO2 SERPL-SCNC: 25 MMOL/L (ref 20–29)
CREAT SERPL-MCNC: 0.89 MG/DL (ref 0.57–1)
DRUGS UR: NORMAL
GLOBULIN SER CALC-MCNC: 2.6 G/DL (ref 1.5–4.5)
GLUCOSE SERPL-MCNC: 114 MG/DL (ref 65–99)
HBA1C MFR BLD: 6.4 % (ref 4.8–5.6)
POTASSIUM SERPL-SCNC: 4.4 MMOL/L (ref 3.5–5.2)
PROT SERPL-MCNC: 6.9 G/DL (ref 6–8.5)
SODIUM SERPL-SCNC: 139 MMOL/L (ref 134–144)

## 2022-01-14 NOTE — TELEPHONE ENCOUNTER
edgar Paredes for r/s from 01/21 to 1/17 arrive at 130-cs/patient procedure packet was given to pt in office on 11/08 pt was advised time of arrival is subject to change, [facility] will call for w/finale time

## 2022-01-14 NOTE — PROGRESS NOTES
MyChart:    Here are the result(s) of your test(s):     A1c for average glucose level is higher and now very close to diabetes.   Maintain a low sugar/starch/carbohydrate diet and exercise regularly.   Consider medication if any worse. You may wish to see me sooner in 3 months.     Please do not hesitate to contact me if you have questions.

## 2022-01-14 NOTE — TELEPHONE ENCOUNTER
Caller: Ronaldo Reanna J    Relationship: Self    Best call back number: [phone]    What test was performed: LAB WORK    When was the test performed: 01/10/2022    Additional notes: PATIENT STATED THAT SHE CHECKED MY CHART TO SEE THE RESULTS OF HER LAB WORK. PATIENT STATED SHE CAN USUALLY SEE THIS THE SAME DAY SHE GETS IT DONE. PATIENT STILL DOES NOT SEE THE REPORT IN HER MY CHART. PLEASE ADVISE IF IT WILL BE PUT IN HER MY CHART OR IF SHE NEEDS A CALL TO GO OVER HER RESULTS.

## 2022-01-14 NOTE — TELEPHONE ENCOUNTER
Pt is calling stating that she had just got off the phone with you and is thinking that she could keep he apt time and date for this upcoming Monday. Please give pt a call as soon as possible .

## 2022-01-14 NOTE — TELEPHONE ENCOUNTER
Spoke with patient and informed her of her A1C results which she was initially inquiring about. She verbalized an understanding.

## 2022-01-17 NOTE — TELEPHONE ENCOUNTER
SPOKE WITH PT AMIRAH BACK TO JAN 21 ARRIVE AT 0930 AM AMIRAH--KAI---PT TO CHANGE DATE AND TIME ON HER EXISTING PAPERWORK --AUTHORIZATION CAME THRU GOOD 1- THRU FEB

## 2022-01-20 DIAGNOSIS — K21.9 GASTROESOPHAGEAL REFLUX DISEASE, UNSPECIFIED WHETHER ESOPHAGITIS PRESENT: Primary | ICD-10-CM

## 2022-01-20 RX ORDER — OMEPRAZOLE 40 MG/1
CAPSULE, DELAYED RELEASE ORAL
Qty: 90 CAPSULE | Refills: 0 | Status: SHIPPED | OUTPATIENT
Start: 2022-01-20 | End: 2022-05-31 | Stop reason: SDUPTHER

## 2022-01-21 ENCOUNTER — HOSPITAL ENCOUNTER (OUTPATIENT)
Facility: HOSPITAL | Age: 82
Setting detail: HOSPITAL OUTPATIENT SURGERY
Discharge: HOME | End: 2022-01-21
Attending: INTERNAL MEDICINE | Admitting: INTERNAL MEDICINE

## 2022-01-21 ENCOUNTER — ANESTHESIA EVENT (OUTPATIENT)
Dept: GASTROENTEROLOGY | Facility: HOSPITAL | Age: 82
End: 2022-01-21

## 2022-01-21 ENCOUNTER — ANESTHESIA (OUTPATIENT)
Dept: GASTROENTEROLOGY | Facility: HOSPITAL | Age: 82
End: 2022-01-21

## 2022-01-21 VITALS
HEART RATE: 65 BPM | DIASTOLIC BLOOD PRESSURE: 72 MMHG | OXYGEN SATURATION: 98 % | WEIGHT: 159 LBS | HEIGHT: 68 IN | TEMPERATURE: 98 F | RESPIRATION RATE: 16 BRPM | BODY MASS INDEX: 24.1 KG/M2 | SYSTOLIC BLOOD PRESSURE: 145 MMHG

## 2022-01-21 DIAGNOSIS — Z86.010 HISTORY OF ADENOMATOUS POLYP OF COLON: ICD-10-CM

## 2022-01-21 PROCEDURE — 25010000002 PROPOFOL 10 MG/ML EMULSION: Performed by: ANESTHESIOLOGY

## 2022-01-21 PROCEDURE — 88305 TISSUE EXAM BY PATHOLOGIST: CPT | Performed by: INTERNAL MEDICINE

## 2022-01-21 PROCEDURE — 45385 COLONOSCOPY W/LESION REMOVAL: CPT | Performed by: INTERNAL MEDICINE

## 2022-01-21 PROCEDURE — S0260 H&P FOR SURGERY: HCPCS | Performed by: INTERNAL MEDICINE

## 2022-01-21 PROCEDURE — 25010000002 MIDAZOLAM PER 1 MG: Performed by: ANESTHESIOLOGY

## 2022-01-21 RX ORDER — MIDAZOLAM HYDROCHLORIDE 1 MG/ML
0.5 INJECTION INTRAMUSCULAR; INTRAVENOUS
Status: CANCELLED | OUTPATIENT
Start: 2022-01-21

## 2022-01-21 RX ORDER — MIDAZOLAM HYDROCHLORIDE 1 MG/ML
INJECTION INTRAMUSCULAR; INTRAVENOUS AS NEEDED
Status: DISCONTINUED | OUTPATIENT
Start: 2022-01-21 | End: 2022-01-21 | Stop reason: SURG

## 2022-01-21 RX ORDER — PROPOFOL 10 MG/ML
VIAL (ML) INTRAVENOUS AS NEEDED
Status: DISCONTINUED | OUTPATIENT
Start: 2022-01-21 | End: 2022-01-21 | Stop reason: SURG

## 2022-01-21 RX ORDER — LIDOCAINE HYDROCHLORIDE 20 MG/ML
INJECTION, SOLUTION INFILTRATION; PERINEURAL AS NEEDED
Status: DISCONTINUED | OUTPATIENT
Start: 2022-01-21 | End: 2022-01-21 | Stop reason: SURG

## 2022-01-21 RX ORDER — SODIUM CHLORIDE 0.9 % (FLUSH) 0.9 %
10 SYRINGE (ML) INJECTION AS NEEDED
Status: DISCONTINUED | OUTPATIENT
Start: 2022-01-21 | End: 2022-01-21 | Stop reason: HOSPADM

## 2022-01-21 RX ORDER — SODIUM CHLORIDE 0.9 % (FLUSH) 0.9 %
10 SYRINGE (ML) INJECTION EVERY 12 HOURS SCHEDULED
Status: CANCELLED | OUTPATIENT
Start: 2022-01-21

## 2022-01-21 RX ORDER — SODIUM CHLORIDE 0.9 % (FLUSH) 0.9 %
10 SYRINGE (ML) INJECTION AS NEEDED
Status: CANCELLED | OUTPATIENT
Start: 2022-01-21

## 2022-01-21 RX ORDER — SODIUM CHLORIDE, SODIUM LACTATE, POTASSIUM CHLORIDE, CALCIUM CHLORIDE 600; 310; 30; 20 MG/100ML; MG/100ML; MG/100ML; MG/100ML
30 INJECTION, SOLUTION INTRAVENOUS CONTINUOUS PRN
Status: DISCONTINUED | OUTPATIENT
Start: 2022-01-21 | End: 2022-01-21 | Stop reason: HOSPADM

## 2022-01-21 RX ORDER — PROPOFOL 10 MG/ML
VIAL (ML) INTRAVENOUS CONTINUOUS PRN
Status: DISCONTINUED | OUTPATIENT
Start: 2022-01-21 | End: 2022-01-21 | Stop reason: SURG

## 2022-01-21 RX ORDER — SODIUM CHLORIDE, SODIUM LACTATE, POTASSIUM CHLORIDE, CALCIUM CHLORIDE 600; 310; 30; 20 MG/100ML; MG/100ML; MG/100ML; MG/100ML
9 INJECTION, SOLUTION INTRAVENOUS CONTINUOUS
Status: CANCELLED | OUTPATIENT
Start: 2022-01-21

## 2022-01-21 RX ORDER — FAMOTIDINE 10 MG/ML
20 INJECTION, SOLUTION INTRAVENOUS ONCE
Status: CANCELLED | OUTPATIENT
Start: 2022-01-21 | End: 2022-01-21

## 2022-01-21 RX ORDER — SODIUM CHLORIDE 0.9 % (FLUSH) 0.9 %
3 SYRINGE (ML) INJECTION EVERY 12 HOURS SCHEDULED
Status: DISCONTINUED | OUTPATIENT
Start: 2022-01-21 | End: 2022-01-21 | Stop reason: HOSPADM

## 2022-01-21 RX ORDER — FENTANYL CITRATE 50 UG/ML
50 INJECTION, SOLUTION INTRAMUSCULAR; INTRAVENOUS
Status: CANCELLED | OUTPATIENT
Start: 2022-01-21

## 2022-01-21 RX ADMIN — MIDAZOLAM 1 MG: 1 INJECTION INTRAMUSCULAR; INTRAVENOUS at 10:29

## 2022-01-21 RX ADMIN — SODIUM CHLORIDE, POTASSIUM CHLORIDE, SODIUM LACTATE AND CALCIUM CHLORIDE 30 ML/HR: 600; 310; 30; 20 INJECTION, SOLUTION INTRAVENOUS at 10:07

## 2022-01-21 RX ADMIN — Medication 70 MG: at 10:27

## 2022-01-21 RX ADMIN — PROPOFOL 180 MCG/KG/MIN: 10 INJECTION, EMULSION INTRAVENOUS at 10:26

## 2022-01-21 RX ADMIN — MIDAZOLAM 1 MG: 1 INJECTION INTRAMUSCULAR; INTRAVENOUS at 10:37

## 2022-01-21 RX ADMIN — LIDOCAINE HYDROCHLORIDE 60 MG: 20 INJECTION, SOLUTION INFILTRATION; PERINEURAL at 10:26

## 2022-01-21 NOTE — ANESTHESIA PREPROCEDURE EVALUATION
Anesthesia Evaluation     Patient summary reviewed and Nursing notes reviewed   no history of anesthetic complications:  NPO Solid Status: > 6 hours  NPO Liquid Status: > 6 hours           Airway   Mallampati: II  TM distance: >3 FB  Neck ROM: full  no difficulty expected and No difficulty expected  Dental - normal exam     Pulmonary - negative pulmonary ROS and normal exam    breath sounds clear to auscultation  (-) rhonchi, decreased breath sounds, wheezes, rales, stridor  Cardiovascular - normal exam    NYHA Classification: I  Rhythm: regular  Rate: normal    (+) pacemaker, hypertension, valvular problems/murmurs MR and AI, CAD, CABG, dysrhythmias Tachycardia, hyperlipidemia,   (-) murmur, weak pulses, friction rub, systolic click, carotid bruits, JVD, peripheral edema      Neuro/Psych  (+) numbness, psychiatric history Anxiety,     GI/Hepatic/Renal/Endo - negative ROS     Musculoskeletal (-) negative ROS    Abdominal  - normal exam    Abdomen: soft.   Substance History - negative use     OB/GYN negative ob/gyn ROS         Other      history of cancer                    Anesthesia Plan    ASA 3     MAC     intravenous induction     Anesthetic plan, all risks, benefits, and alternatives have been provided, discussed and informed consent has been obtained with: patient.        CODE STATUS:

## 2022-01-21 NOTE — ANESTHESIA POSTPROCEDURE EVALUATION
"Patient: Reanna Higgins    Procedure Summary     Date: 01/21/22 Room / Location: [facility] ENDOSCOPY 5 / [facility] ENDOSCOPY    Anesthesia Start: 1020 Anesthesia Stop: 1105    Procedure: COLONOSCOPY into cecum and terminal ileum with hot snare polypectomy x2 (N/A ) Diagnosis:       History of adenomatous polyp of colon      Colon polyps      Diverticulosis      Internal hemorrhoids      (History of adenomatous polyp of colon [Z86.010])    Surgeons: Giovany Redman MD Provider: Colin Abraham MD    Anesthesia Type: MAC ASA Status: 3          Anesthesia Type: MAC    Vitals  Vitals Value Taken Time   BP 92/46 01/21/22 1106   Temp     Pulse 78 01/21/22 1106   Resp 16 01/21/22 1106   SpO2 99 % 01/21/22 1106           Post Anesthesia Care and Evaluation    Patient location during evaluation: bedside  Patient participation: complete - patient participated  Level of consciousness: awake and alert  Pain management: adequate  Airway patency: patent  Anesthetic complications: No anesthetic complications    Cardiovascular status: acceptable  Respiratory status: acceptable  Hydration status: acceptable    Comments: BP 92/46 (BP Location: Left leg, Patient Position: Lying)   Pulse 78   Temp 36.7 °C (98 °F) (Oral)   Resp 16   Ht 171.5 cm (67.5\")   Wt 72.1 kg (159 lb)   SpO2 99%   BMI 24.54 kg/m²           "

## 2022-01-21 NOTE — BRIEF OP NOTE
COLONOSCOPY  Progress Note    Reanna Higgins  1/21/2022    Pre-op Diagnosis:   History of adenomatous polyp of colon [Z86.010]       Post-Op Diagnosis Codes:     * History of adenomatous polyp of colon [Z86.010]     * Colon polyps [K63.5]     * Diverticulosis [K57.90]     * Internal hemorrhoids [K64.8]    Procedure/CPT® Codes:        Procedure(s):  COLONOSCOPY into cecum and terminal ileum with hot snare polypectomy x2    Surgeon(s):  Giovany Redman MD    Anesthesia: Monitored Anesthesia Care    Staff:   Endo Technician: Yessi Zepeda  Endo Nurse: Yasmin Francisco RN         Estimated Blood Loss: none    Urine Voided: * No values recorded between 1/21/2022 10:18 AM and 1/21/2022 10:58 AM *    Specimens:                Specimens     ID Source Type Tests Collected By Collected At Frozen?    A Large Intestine, Transverse Colon Polyp · TISSUE PATHOLOGY EXAM   Giovany Redman MD 1/21/22 1050 No    Description: Transverse Colon Polyp    This specimen was not marked as sent.                Drains: * No LDAs found *    Findings: Colonoscopy the terminal ileum with normal ileum mucosa.  Colon polyps x2 in the transverse colon removed hot snare.  Sigmoid diverticulosis and internal hemorrhoids were noted.    Complications: None          Giovany Redman MD     Date: 1/21/2022  Time: 10:59 EST

## 2022-01-21 NOTE — H&P
[facility]  Pre Procedure History & Physical    Chief Complaint:   History: Polyps    Subjective     HPI:   Patient 81-year-old female with history of hypertension, hyperlipidemia and GERD presenting for surveillance.  Patient with history of colon polyps here for colonoscopy.    Past Medical History:   Past Medical History:   Diagnosis Date   • Anesthesia complication     SEVERE SHAKING OF ENTIRE BODY AFTER EGD/ERCP. 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 CATH INVASIVE LOCATION;  Service: Cardiology   • CARDIAC CATHETERIZATION N/A 8/22/2019    Procedure: Left ventriculography;  Surgeon: Francis Mccullough MD;  Location: [facility] CATH INVASIVE LOCATION;  Service: Cardiology   • CARDIAC CATHETERIZATION N/A 8/22/2019    Procedure: Coronary angiography;  Surgeon: Francis Mccullough MD;  [facility];  Service: Cardiology   • CARDIAC ELECTROPHYSIOLOGY PROCEDURE N/A 10/10/2016    Procedure: Pacemaker DC new  [place];  Surgeon: Wilfrido Hameed MD;  Location: [facility] CATH INVASIVE LOCATION;  Service:    • CARPAL TUNNEL RELEASE Right    • CATARACT EXTRACTION      NOVEMBER AND DECEMBER 2014   •  CHOLECYSTECTOMY     • COLONOSCOPY     • CORONARY ARTERY BYPASS GRAFT N/A 2019    Procedure: SUMMER STERNOTOMY CORONARY ARTERY BYPASS GRAFT TIMES 6 USING LEFT INTERNAL MAMMARY ARTERY AND LEFT GREATER SAPHENOUS VEIN GRAFT PER ENDOSCOPIC VEIN HARVESTING AND PRP;  Surgeon: Kem Hawk MD;  Location: [facility] OR;  Service: Cardiothoracic   • ENDOSCOPY N/A 2019    Procedure: ESOPHAGOGASTRODUODENOSCOPY with biopsy;  Surgeon: Ricky Chew MD;  Location: [facility] ENDOSCOPY;  Service: Gastroenterology   • ERCP N/A 2021    Procedure: ENDOSCOPIC RETROGRADE CHOLANGIOPANCREATOGRAPHY WITH SPHINCTEROTOMY AND BALLOON SWEEP;  Surgeon: Giovany Redman MD;  Location: [facility] ENDOSCOPY;  Service: Gastroenterology;  Laterality: N/A;  PRE- RECURRENT PANCREATITIS  POST- SAME   • EYE SURGERY     • HYSTERECTOMY     • KNEE ARTHROSCOPY Bilateral 2014    MENISCAL TEAR   • PACEMAKER IMPLANTATION  10/10/2016   • SHOULDER SURGERY Right     ROTATOR CUFF SURGERY 2015   • TONSILLECTOMY     • TRIGGER FINGER RELEASE      both hands       Family History:  Family History   Problem Relation Age of Onset   • Thyroid disease Daughter    • Lung cancer Brother    • Hypertension Mother    • Aortic stenosis Mother    • Coronary artery disease Mother          78 (smoker)   • Hypertension Father    • Depression Father    • Anxiety disorder Father    • Diabetes Father    • Heart failure Father    • Cirrhosis Father         alcohol   • Alcohol abuse Sister    • Lupus Sister    • Heart disease Sister    • No Known Problems Sister    • Alcohol abuse Brother    • Drug abuse Brother    • Heart disease Brother    • Cancer Brother         bladder (smoker)   • Heart disease Brother    • Breast cancer Paternal Aunt    • Neuropathy Neg Hx    • Colon cancer Neg Hx    • Dementia Neg Hx    • Malig Hyperthermia Neg Hx        Social History:   reports that she quit smoking about 54 years ago. She started smoking about 68 years  "ago. She has a 7.00 pack-year smoking history. She has never used smokeless tobacco. She reports current alcohol use. She reports that she does not use drugs.    Medications:   Medications Prior to Admission   Medication Sig Dispense Refill Last Dose   • aspirin 81 MG EC tablet Take 1 tablet by mouth Daily.   1/19/2022   • Calcium Carbonate-Vitamin D (CALCIUM-D) 600-400 MG-UNIT tablet Take 1 tablet by mouth Daily.   Past Week at Unknown time   • cholecalciferol (VITAMIN D3) 1000 UNITS tablet Take 1 tablet by mouth Daily.   Past Week at Unknown time   • Coenzyme Q10 (COQ10) 100 MG capsule Take 1 capsule by mouth Daily.   Past Week at Unknown time   • cycloSPORINE (RESTASIS) 0.05 % ophthalmic emulsion Administer 1 drop to both eyes 2 (Two) Times a Day.   1/21/2022 at Unknown time   • gabapentin (NEURONTIN) 300 MG capsule TAKE 1 CAPSULE BY MOUTH TWICE A  capsule 0 1/20/2022 at Unknown time   • losartan (COZAAR) 25 MG tablet TAKE 2 TABLETS BY MOUTH EVERY DAY (Patient taking differently: Take 25 mg by mouth 2 (Two) Times a Day.) 180 tablet 2 1/20/2022 at Unknown time   • metoprolol succinate XL (TOPROL-XL) 50 MG 24 hr tablet TAKE 1 TABLET BY MOUTH TWICE A  tablet 3 1/21/2022 at Unknown time   • omeprazole (priLOSEC) 40 MG capsule TAKE 1 CAPSULE BY MOUTH EVERY DAY 90 capsule 0 1/20/2022 at Unknown time   • pravastatin (PRAVACHOL) 40 MG tablet TAKE 1 TABLET BY MOUTH EVERY DAY AT NIGHT 90 tablet 3 1/20/2022 at Unknown time       Allergies:  Ancef [cefazolin], Codeine, Penicillins, and Sulfa antibiotics    ROS:    Pertinent items are noted in HPI     Objective     Blood pressure 139/81, pulse 69, temperature 98 °F (36.7 °C), temperature source Oral, resp. rate 16, height 171.5 cm (67.5\"), weight 72.1 kg (159 lb), SpO2 96 %.    Physical Exam   Constitutional: Pt is oriented to person, place, and time and well-developed, well-nourished, and in no distress.   Mouth/Throat: Oropharynx is clear and moist.   Neck: " Normal range of motion.   Cardiovascular: Normal rate, regular rhythm and normal heart sounds.    Pulmonary/Chest: Effort normal and breath sounds normal.   Abdominal: Soft. Nontender  Skin: Skin is warm and dry.   Psychiatric: Mood, memory, affect and judgment normal.     Assessment/Plan     Diagnosis:  History of colon polyps    Anticipated Surgical Procedure:  Colonoscopy    The risks, benefits, and alternatives of this procedure have been discussed with the patient or the responsible party- the patient understands and agrees to proceed.

## 2022-01-21 NOTE — DISCHARGE INSTRUCTIONS
For the next 24 hours patient needs to be with a responsible adult.    For 24 hours DO NOT drive, operate machinery, appliances, drink alcohol, make important decisions or sign legal documents.    Start with a light or bland diet if you are feeling sick to your stomach otherwise advance to regular diet as tolerated.    Follow recommendations on procedure report if provided by your doctor.    Call Dr. Redman for problems [phone]    Problems may include but not limited to: large amounts of bleeding, trouble breathing, repeated vomiting, severe unrelieved pain, fever or chills.

## 2022-01-24 LAB
LAB AP CASE REPORT: NORMAL
PATH REPORT.FINAL DX SPEC: NORMAL
PATH REPORT.GROSS SPEC: NORMAL

## 2022-01-25 ENCOUNTER — OFFICE VISIT (OUTPATIENT)
Dept: CARDIOLOGY | Facility: CLINIC | Age: 82
End: 2022-01-25

## 2022-01-25 VITALS
BODY MASS INDEX: 24.74 KG/M2 | HEIGHT: 67 IN | HEART RATE: 62 BPM | WEIGHT: 157.6 LBS | SYSTOLIC BLOOD PRESSURE: 148 MMHG | DIASTOLIC BLOOD PRESSURE: 68 MMHG

## 2022-01-25 DIAGNOSIS — I25.119 CORONARY ARTERY DISEASE INVOLVING NATIVE CORONARY ARTERY OF NATIVE HEART WITH ANGINA PECTORIS: Primary | Chronic | ICD-10-CM

## 2022-01-25 DIAGNOSIS — I10 ESSENTIAL HYPERTENSION: ICD-10-CM

## 2022-01-25 PROCEDURE — 93000 ELECTROCARDIOGRAM COMPLETE: CPT | Performed by: INTERNAL MEDICINE

## 2022-01-25 PROCEDURE — 99213 OFFICE O/P EST LOW 20 MIN: CPT | Performed by: INTERNAL MEDICINE

## 2022-01-25 NOTE — PROGRESS NOTES
Date of Office Visit: 22  Encounter Provider: Francis Mccullough MD  Place of Service: [facility] CARDIOLOGY  Patient Name: Reanna Higgins  :[id number]  [id number]    Chief Complaint   Patient presents with   • Coronary Artery Disease   :     HPI: Reanna Higgins is a 81 y.o. female she is a lady who came in 2019 with unstable angina underwent cath and she had three-vessel disease so that really was not amenable to PCI she underwent a bypass x 6 with Alvin Hawk.  This was complicated by a small bowel obstruction that resolved with conservative therapy. she had normal LV function     She is been doing well no chest pain shortness of breath PND orthopnea edema she just finished rollerskating earlier today and had a colonoscopy earlier in the week that was okay.  She gets a little lightheaded when she bends over to pick something up she is not had any syncope    Past Medical History:   Diagnosis Date   • Anesthesia complication     SEVERE SHAKING OF ENTIRE BODY AFTER EGD/ERCP. STATES THAT ANESTHESIA DID NOT SEE PREOP   • Cancer (HCC)     skin   • Cardiac pacemaker in situ    • Carpal tunnel syndrome    • Cataract    • Cystic fibroadenosis of breast    • GERD (gastroesophageal reflux disease)    • Hypercholesterolemia    • Hypertension    • Osteoarthritis of both hands    • Pacemaker    • Pancreatitis    • Peripheral neuropathy    • Phobia, flying 2019   • Restless leg syndrome    • Second degree AV block, Mobitz type I    • SVT (supraventricular tachycardia) (HCC)        Past Surgical History:   Procedure Laterality Date   • APPENDECTOMY     • BILATERAL OOPHORECTOMY Bilateral    • BREAST CYST EXCISION Bilateral     4 BREAST SURGERIES   • CARDIAC CATHETERIZATION N/A 2019    Procedure: Left Heart Cath;  Surgeon: Francis Mccullough MD;  Location: [facility] CATH INVASIVE LOCATION;  Service: Cardiology   • CARDIAC CATHETERIZATION N/A 2019    Procedure: Left  ventriculography;  Surgeon: Francis Mccullough MD;  Location: [facility] CATH INVASIVE LOCATION;  Service: Cardiology   • CARDIAC CATHETERIZATION N/A 8/22/2019    Procedure: Coronary angiography;  Surgeon: Francis Mccullough MD;  Location: [facility] CATH INVASIVE LOCATION;  Service: Cardiology   • CARDIAC ELECTROPHYSIOLOGY PROCEDURE N/A 10/10/2016    Procedure: Pacemaker DC new  [place];  Surgeon: Wilfrido Hameed MD;  Location: [facility] CATH INVASIVE LOCATION;  Service:    • CARPAL TUNNEL RELEASE Right    • CATARACT EXTRACTION      NOVEMBER AND DECEMBER 2014   • CHOLECYSTECTOMY     • COLONOSCOPY  2015   • COLONOSCOPY N/A 1/21/2022    Procedure: COLONOSCOPY into cecum and terminal ileum with hot snare polypectomy x2;  Surgeon: Giovany Redman MD;  Location: [facility] ENDOSCOPY;  Service: Gastroenterology;  Laterality: N/A;  Pre op: History of polyps  Post op: Polyps, Diverticulosis and Hemorrhoids   • CORONARY ARTERY BYPASS GRAFT N/A 8/23/2019    Procedure: SUMMER STERNOTOMY CORONARY ARTERY BYPASS GRAFT TIMES 6 USING LEFT INTERNAL MAMMARY ARTERY AND LEFT GREATER SAPHENOUS VEIN GRAFT PER ENDOSCOPIC VEIN HARVESTING AND PRP;  Surgeon: Kem Hawk MD;  Location: [facility] MAIN OR;  Service: Cardiothoracic   • ENDOSCOPY N/A 7/18/2019    Procedure: ESOPHAGOGASTRODUODENOSCOPY with biopsy;  Surgeon: Ricky Chew MD;  Location: [facility] ENDOSCOPY;  Service: Gastroenterology   • ERCP N/A 9/29/2021    Procedure: ENDOSCOPIC RETROGRADE CHOLANGIOPANCREATOGRAPHY WITH SPHINCTEROTOMY AND BALLOON SWEEP;  Surgeon: Giovany Redman MD;  Location: [facility] ENDOSCOPY;  Service: Gastroenterology;  Laterality: N/A;  PRE- RECURRENT PANCREATITIS  POST- SAME   • EYE SURGERY     • HYSTERECTOMY     • KNEE ARTHROSCOPY Bilateral 04/2014    MENISCAL TEAR   • PACEMAKER IMPLANTATION  10/10/2016   • SHOULDER SURGERY Right     ROTATOR CUFF SURGERY JUNE 18, 2015   • TONSILLECTOMY     • TRIGGER FINGER RELEASE      both hands       Social History      Socioeconomic History   • Marital status:      Spouse name: Rishabh*   • Number of children: 2   • Years of education: 13   • Highest education level: Some college, no degree   Tobacco Use   • Smoking status: Former Smoker     Packs/day: 0.50     Years: 14.00     Pack years: 7.00     Start date:      Quit date:      Years since quittin.1   • Smokeless tobacco: Never Used   • Tobacco comment: quit age 28   Vaping Use   • Vaping Use: Never used   Substance and Sexual Activity   • Alcohol use: Yes     Comment: rare glass of wine or beer rarely/  No caffeine use   • Drug use: No   • Sexual activity: Not Currently       Family History   Problem Relation Age of Onset   • Thyroid disease Daughter    • Lung cancer Brother    • Hypertension Mother    • Aortic stenosis Mother    • Coronary artery disease Mother          78 (smoker)   • Hypertension Father    • Depression Father    • Anxiety disorder Father    • Diabetes Father    • Heart failure Father    • Cirrhosis Father         alcohol   • Alcohol abuse Sister    • Lupus Sister    • Heart disease Sister    • No Known Problems Sister    • Alcohol abuse Brother    • Drug abuse Brother    • Heart disease Brother    • Cancer Brother         bladder (smoker)   • Heart disease Brother    • Breast cancer Paternal Aunt    • Neuropathy Neg Hx    • Colon cancer Neg Hx    • Dementia Neg Hx    • Malig Hyperthermia Neg Hx        Review of Systems   Constitutional: Negative for decreased appetite, fever, malaise/fatigue and weight loss.   HENT: Negative for nosebleeds.    Eyes: Negative for double vision.   Cardiovascular: Negative for chest pain, claudication, cyanosis, dyspnea on exertion, irregular heartbeat, leg swelling, near-syncope, orthopnea, palpitations, paroxysmal nocturnal dyspnea and syncope.   Respiratory: Negative for cough, hemoptysis and shortness of breath.    Hematologic/Lymphatic: Negative for bleeding problem.   Skin: Negative for rash.  "  Musculoskeletal: Negative for falls and myalgias.   Gastrointestinal: Negative for hematochezia, jaundice, melena, nausea and vomiting.   Genitourinary: Negative for hematuria.   Neurological: Negative for dizziness and seizures.   Psychiatric/Behavioral: Negative for altered mental status and memory loss.       Allergies   Allergen Reactions   • Ancef [Cefazolin] Other (See Comments)     Hypotension/bradycardia   • Codeine Hives   • Penicillins Other (See Comments)     Hypotension (Ancef allergy)    • Sulfa Antibiotics Hives         Current Outpatient Medications:   •  aspirin 81 MG EC tablet, Take 1 tablet by mouth Daily., Disp: , Rfl:   •  Calcium Carbonate-Vitamin D (CALCIUM-D) 600-400 MG-UNIT tablet, Take 1 tablet by mouth Daily., Disp: , Rfl:   •  cholecalciferol (VITAMIN D3) 1000 UNITS tablet, Take 1 tablet by mouth Daily., Disp: , Rfl:   •  Coenzyme Q10 (COQ10) 100 MG capsule, Take 1 capsule by mouth Daily., Disp: , Rfl:   •  cycloSPORINE (RESTASIS) 0.05 % ophthalmic emulsion, Administer 1 drop to both eyes 2 (Two) Times a Day., Disp: , Rfl:   •  gabapentin (NEURONTIN) 300 MG capsule, TAKE 1 CAPSULE BY MOUTH TWICE A DAY, Disp: 180 capsule, Rfl: 0  •  losartan (COZAAR) 25 MG tablet, TAKE 2 TABLETS BY MOUTH EVERY DAY (Patient taking differently: Take 25 mg by mouth 2 (Two) Times a Day.), Disp: 180 tablet, Rfl: 2  •  metoprolol succinate XL (TOPROL-XL) 50 MG 24 hr tablet, TAKE 1 TABLET BY MOUTH TWICE A DAY, Disp: 180 tablet, Rfl: 3  •  omeprazole (priLOSEC) 40 MG capsule, TAKE 1 CAPSULE BY MOUTH EVERY DAY, Disp: 90 capsule, Rfl: 0  •  pravastatin (PRAVACHOL) 40 MG tablet, TAKE 1 TABLET BY MOUTH EVERY DAY AT NIGHT, Disp: 90 tablet, Rfl: 3      Objective:     Vitals:    01/25/22 1254   BP: 148/68   Pulse: 62   Weight: 71.5 kg (157 lb 9.6 oz)   Height: 170.2 cm (67\")     Body mass index is 24.68 kg/m².    Physical Exam  Constitutional:       Appearance: She is well-developed.   HENT:      Head: Normocephalic. "   Eyes:      General: No scleral icterus.  Neck:      Thyroid: No thyromegaly.      Vascular: No JVD.   Cardiovascular:      Rate and Rhythm: Normal rate and regular rhythm.      Heart sounds: Normal heart sounds. No murmur heard.  No friction rub. No gallop.    Pulmonary:      Effort: Pulmonary effort is normal.      Breath sounds: Normal breath sounds. No wheezing or rales.   Chest:       Abdominal:      Palpations: Abdomen is soft.      Tenderness: There is no abdominal tenderness.   Musculoskeletal:         General: Normal range of motion.   Lymphadenopathy:      Cervical: No cervical adenopathy.   Skin:     General: Skin is warm and dry.      Findings: No rash.   Neurological:      Mental Status: She is alert and oriented to person, place, and time.           ECG 12 Lead    Date/Time: 1/25/2022 1:26 PM  Performed by: Francis Mccullough MD  Authorized by: Francis Mccullough MD   Comparison: compared with previous ECG   Similar to previous ECG  Rhythm: sinus rhythm  Other findings: non-specific ST-T wave changes    Clinical impression: abnormal EKG             Assessment:       Diagnosis Plan   1. Coronary artery disease involving native coronary artery of native heart with angina pectoris (HCC)     2. Essential hypertension            Plan:       Think she is doing well she has not had any new anginal symptoms no heart failure symptoms and in general is getting along quite well specially for her age she lives looks about 10 years younger than her age echo to make any changes and when to have her come back and see Mary or Lindsey in a year and then see me in 2    As always, it has been a pleasure to participate in your patient's care.      Sincerely,       Francis Mccullough MD

## 2022-01-31 ENCOUNTER — TELEPHONE (OUTPATIENT)
Dept: GASTROENTEROLOGY | Facility: CLINIC | Age: 82
End: 2022-01-31

## 2022-04-23 DIAGNOSIS — G62.9 PERIPHERAL POLYNEUROPATHY: Chronic | ICD-10-CM

## 2022-04-23 DIAGNOSIS — F41.1 GENERALIZED ANXIETY DISORDER: ICD-10-CM

## 2022-04-25 RX ORDER — GABAPENTIN 300 MG/1
CAPSULE ORAL
Qty: 180 CAPSULE | Refills: 0 | Status: SHIPPED | OUTPATIENT
Start: 2022-04-25 | End: 2022-08-08

## 2022-04-25 NOTE — TELEPHONE ENCOUNTER
lov 1/10/22  Nov 7/18/22  Contract 1/10/22  uds 1/10/22  Per your notes :   REKHA (generalized anxiety disorder) (Chronic)     Current Assessment & Plan       She stopped taking sertraline on her own after 3 months.  She has ongoing anxiety symptoms but defers medication at this time.  Follow-up.

## 2022-05-31 DIAGNOSIS — K21.9 GASTROESOPHAGEAL REFLUX DISEASE, UNSPECIFIED WHETHER ESOPHAGITIS PRESENT: ICD-10-CM

## 2022-05-31 RX ORDER — OMEPRAZOLE 40 MG/1
40 CAPSULE, DELAYED RELEASE ORAL DAILY
Qty: 90 CAPSULE | Refills: 3 | Status: SHIPPED | OUTPATIENT
Start: 2022-05-31

## 2022-06-01 RX ORDER — LOSARTAN POTASSIUM 25 MG/1
25 TABLET ORAL 2 TIMES DAILY
Qty: 180 TABLET | Refills: 2 | Status: SHIPPED | OUTPATIENT
Start: 2022-06-01 | End: 2023-02-15

## 2022-07-14 NOTE — TELEPHONE ENCOUNTER
This is not our patient. Please forward to the correct  pool. Thanks    Medical Necessity Statement: Based on my medical judgement, Mohs surgery is the most appropriate treatment for this cancer compared to other treatments. none

## 2022-07-18 ENCOUNTER — OFFICE VISIT (OUTPATIENT)
Dept: INTERNAL MEDICINE | Age: 82
End: 2022-07-18

## 2022-07-18 VITALS
DIASTOLIC BLOOD PRESSURE: 80 MMHG | HEIGHT: 67 IN | BODY MASS INDEX: 23.86 KG/M2 | WEIGHT: 152 LBS | SYSTOLIC BLOOD PRESSURE: 156 MMHG | TEMPERATURE: 97.1 F | HEART RATE: 64 BPM | OXYGEN SATURATION: 97 %

## 2022-07-18 DIAGNOSIS — I10 ESSENTIAL HYPERTENSION: ICD-10-CM

## 2022-07-18 DIAGNOSIS — Z00.00 MEDICARE ANNUAL WELLNESS VISIT, SUBSEQUENT: Primary | ICD-10-CM

## 2022-07-18 DIAGNOSIS — G63 NEUROPATHY DUE TO MEDICAL CONDITION: Chronic | ICD-10-CM

## 2022-07-18 DIAGNOSIS — E78.5 DYSLIPIDEMIA (HIGH LDL; LOW HDL): ICD-10-CM

## 2022-07-18 DIAGNOSIS — Z51.81 THERAPEUTIC DRUG MONITORING: ICD-10-CM

## 2022-07-18 DIAGNOSIS — R73.03 PREDIABETES: ICD-10-CM

## 2022-07-18 DIAGNOSIS — G25.81 RLS (RESTLESS LEGS SYNDROME): Chronic | ICD-10-CM

## 2022-07-18 DIAGNOSIS — I25.119 CORONARY ARTERY DISEASE INVOLVING NATIVE CORONARY ARTERY OF NATIVE HEART WITH ANGINA PECTORIS: Chronic | ICD-10-CM

## 2022-07-18 PROCEDURE — 96160 PT-FOCUSED HLTH RISK ASSMT: CPT | Performed by: INTERNAL MEDICINE

## 2022-07-18 PROCEDURE — 99214 OFFICE O/P EST MOD 30 MIN: CPT | Performed by: INTERNAL MEDICINE

## 2022-07-18 PROCEDURE — 1170F FXNL STATUS ASSESSED: CPT | Performed by: INTERNAL MEDICINE

## 2022-07-18 PROCEDURE — 1159F MED LIST DOCD IN RCRD: CPT | Performed by: INTERNAL MEDICINE

## 2022-07-18 PROCEDURE — G0439 PPPS, SUBSEQ VISIT: HCPCS | Performed by: INTERNAL MEDICINE

## 2022-07-18 RX ORDER — MULTIPLE VITAMINS W/ MINERALS TAB 9MG-400MCG
1 TAB ORAL DAILY
COMMUNITY

## 2022-07-18 NOTE — ASSESSMENT & PLAN NOTE
Prior A1c level was very close to diabetic range at 6.4.  Check A1c level.  Anything higher would be consistent with type 2 diabetes.  I advised her to maintain a low carbohydrate diabetic diet.  Positive findings as she has lost a little bit of weight since last visit.  And she has been monitoring her diet better.  Consider metformin if A1c is close to 7.    Lab Results   Component Value Date    HGBA1C 6.4 (H) 01/10/2022    HGBA1C 6.20 (H) 06/17/2021    HGBA1C 6.07 (H) 12/14/2020

## 2022-07-18 NOTE — PATIENT INSTRUCTIONS
** IMPORTANT MESSAGE FROM DR. HEMPHILL **    In our office, your satisfaction is VERY important to us.     You may receive a survey from [facility] by mail or E-mail for you to provide feedback about your visit. This information is invaluable for me to know what we can do to improve our services.     I ask that you please take a few minutes to complete the survey and let us know how we are doing in serving your needs. (You may receive the survey more than once for multiple visits)    Thank You !    Dr. Hemphill    _________________________________________________________________________________________________________________________      ** ADDITIONAL INSTRUCTION / REMINDERS FROM DR. HEMPHILL **    Tetanus booster at pharmacy  Check on PCV13 (if needed get the PCV20)

## 2022-07-18 NOTE — ASSESSMENT & PLAN NOTE
Blood pressure is elevated today.  Continue losartan and metoprolol as per cardiology.    BP Readings from Last 3 Encounters:   07/18/22 156/80   01/25/22 148/68   01/21/22 145/72

## 2022-07-18 NOTE — PROGRESS NOTES
I N T E R N A L  M E D I C I N E  J U N O H  K I M,  M D      ENCOUNTER DATE:  07/18/2022    Reanna Higgins / 82 y.o. / female        MEDICARE ANNUAL WELLNESS VISIT       Chief Complaint: Medicare Wellness-subsequent (6/17/21)       Patient's general assessment of her health since a year ago:     - Compared to one year ago, she feels her physical health is about the same without significant change.    - Compared to one year ago, she feels her mental health is about the same without significant change.      HPI for other active medical problems:     Previous A1c level was higher at 6.4 which was very close to diabetic range.  She has lost a little bit of weight with improved diet.  She does not take any medication for blood sugar at this time.  She has coronary artery disease with prior history of CABG but she had stopped taking aspirin due to concerns of bruising.  She is taking gabapentin 300 mg twice daily for peripheral neuropathy and restless leg syndrome without significant problems.  She is due for urine drug screen today.  Hypertension is being managed by her cardiologist and blood pressure remains elevated.  She is on pravastatin for hyperlipidemia without significant problems and LDL remains at goal.        HISTORY       Recent Hospitalizations:    Recent hospitalization?:     If YES, location, date, and diagnoses:     · Location:   · Date:   · Principle Discharge Dx:   · Secondary Dx:       Patient Care Team:    Patient Care Team:  Ted Li MD as PCP - General (Internal Medicine)  To Rivera MD as Consulting Physician (General Surgery)  Arsenio Witt MD as Consulting Physician (Ophthalmology)  Brown Beckman MD as Consulting Physician (Orthopedic Surgery)  Shivam Vallejo MD as Consulting Physician (Neurology)  Francis Mccullough MD as Consulting Physician (Cardiology)      Allergies:  Ancef [cefazolin], Codeine, Penicillins, and Sulfa antibiotics    Medications:  Current  Outpatient Medications on File Prior to Visit   Medication Sig Dispense Refill   • Calcium Carbonate-Vitamin D (CALCIUM-D) 600-400 MG-UNIT tablet Take 1 tablet by mouth Daily.     • cholecalciferol (VITAMIN D3) 1000 UNITS tablet Take 1 tablet by mouth Daily.     • CINNAMON PO Take  by mouth.     • Coenzyme Q10 (COQ10) 100 MG capsule Take 1 capsule by mouth Daily.     • cycloSPORINE (RESTASIS) 0.05 % ophthalmic emulsion Administer 1 drop to both eyes 2 (Two) Times a Day.     • gabapentin (NEURONTIN) 300 MG capsule TAKE 1 CAPSULE BY MOUTH TWICE A  capsule 0   • hydrocortisone 2.5 % cream      • losartan (COZAAR) 25 MG tablet Take 1 tablet by mouth 2 (Two) Times a Day. 180 tablet 2   • metoprolol succinate XL (TOPROL-XL) 50 MG 24 hr tablet TAKE 1 TABLET BY MOUTH TWICE A  tablet 3   • multivitamin with minerals (MULTIVITAMIN ADULT PO) Take 1 tablet by mouth Daily.     • omeprazole (priLOSEC) 40 MG capsule Take 1 capsule by mouth Daily. 90 capsule 3   • pravastatin (PRAVACHOL) 40 MG tablet TAKE 1 TABLET BY MOUTH EVERY DAY AT NIGHT 90 tablet 3   • aspirin 81 MG EC tablet Take 1 tablet by mouth Daily.       No current facility-administered medications on file prior to visit.        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[facility])   • Non-rheumatic mitral regurgitation   • Osteopenia   • SVT (supraventricular tachycardia) ([facility])   • AV block, Mobitz 1   • Cardiac pacemaker in situ   • RLS (restless legs  syndrome)   • Prediabetes   • Mesenteric ischemia due to arterial insufficiency (HCC)   • Sick sinus syndrome (HCC)   • Coronary artery disease involving native coronary artery of native heart with angina pectoris (HCC)   • S/P CABG (coronary artery bypass graft)   • Subclinical hypothyroidism   • Celiac artery stenosis (HCC)   • History of acute pancreatitis   • Chronic pancreatitis (HCC)   • Chronic recurrent pancreatitis (HCC)   • History of adenomatous polyp of colon       Past Medical History:  Past Medical History:   Diagnosis Date   • Anesthesia complication     SEVERE SHAKING OF ENTIRE BODY AFTER EGD/ERCP. 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   • Restless leg syndrome    • Second degree AV block, Mobitz type I    • SVT (supraventricular tachycardia) (HCC)        Past Surgical History:  Past Surgical History:   Procedure Laterality Date   • APPENDECTOMY     • BILATERAL OOPHORECTOMY Bilateral 1988   • BREAST CYST EXCISION Bilateral     4 BREAST SURGERIES   • CARDIAC CATHETERIZATION N/A 8/22/2019    Procedure: Left Heart Cath;  Surgeon: Francis Mccullough MD;  Location: [facility] CATH INVASIVE LOCATION;  Service: Cardiology   • CARDIAC CATHETERIZATION N/A 8/22/2019    Procedure: Left ventriculography;  Surgeon: Francis Mccullough MD;  Location:  [facility] CATH INVASIVE LOCATION;  Service: Cardiology   • CARDIAC CATHETERIZATION N/A 8/22/2019    Procedure: Coronary angiography;  Surgeon: Francis Mccullough MD;  [facility]AS[facility];  Service: Cardiology   • CARDIAC ELECTROPHYSIOLOGY PROCEDURE N/A 10/10/2016    Procedure: Pacemaker DC new  [place];  Surgeon: Wilfrido  MD Zari;  Location: [facility] CATH INVASIVE LOCATION;  Service:    • CARPAL TUNNEL RELEASE Right    • CATARACT EXTRACTION      NOVEMBER AND 2014   • CHOLECYSTECTOMY     • COLONOSCOPY     • COLONOSCOPY N/A 2022    Procedure: COLONOSCOPY into cecum and terminal ileum with hot snare polypectomy x2;  Surgeon: Giovany Redman MD;  Location: [facility] ENDOSCOPY;  Service: Gastroenterology;  Laterality: N/A;  Pre op: History of polyps  Post op: Polyps, Diverticulosis and Hemorrhoids   • CORONARY ARTERY BYPASS GRAFT N/A 2019    Procedure: SUMMER STERNOTOMY CORONARY ARTERY BYPASS GRAFT TIMES 6 USING LEFT INTERNAL MAMMARY ARTERY AND LEFT GREATER SAPHENOUS VEIN GRAFT PER ENDOSCOPIC VEIN HARVESTING AND PRP;  Surgeon: Kem Hawk MD;  Location: [facility] MAIN OR;  Service: Cardiothoracic   • ENDOSCOPY N/A 2019    Procedure: ESOPHAGOGASTRODUODENOSCOPY with biopsy;  Surgeon: Ricky Chew MD;  Location: [facility] ENDOSCOPY;  Service: Gastroenterology   • ERCP N/A 2021    Procedure: ENDOSCOPIC RETROGRADE CHOLANGIOPANCREATOGRAPHY WITH SPHINCTEROTOMY AND BALLOON SWEEP;  Surgeon: Giovany Redman MD;  Location: [facility] ENDOSCOPY;  Service: Gastroenterology;  Laterality: N/A;  PRE- RECURRENT PANCREATITIS  POST- SAME   • EYE SURGERY     • HYSTERECTOMY     • KNEE ARTHROSCOPY Bilateral 2014    MENISCAL TEAR   • PACEMAKER IMPLANTATION  10/10/2016   • SHOULDER SURGERY Right     ROTATOR CUFF SURGERY 2015   • TONSILLECTOMY     • TRIGGER FINGER RELEASE      both hands       Social History:  Social History     Socioeconomic History   • Marital status:      Spouse name: Rishabh*   • Number of children: 2   • Years of education: 13   • Highest education level: Some college, no degree   Tobacco Use   • Smoking status: Former Smoker     Packs/day: 0.50     Years: 14.00     Pack years: 7.00     Start date:      Quit date:      Years since quittin.5   • Smokeless tobacco:  "Never Used   • Tobacco comment: quit age 28   Vaping Use   • Vaping Use: Never used   Substance and Sexual Activity   • Alcohol use: Yes     Comment: rare glass of wine or beer rarely/  No caffeine use   • Drug use: No   • Sexual activity: Not Currently       Family History:  Family History   Problem Relation Age of Onset   • Thyroid disease Daughter    • Lung cancer Brother    • Hypertension Mother    • Aortic stenosis Mother    • Coronary artery disease Mother          78 (smoker)   • Hypertension Father    • Depression Father    • Anxiety disorder Father    • Diabetes Father    • Heart failure Father    • Cirrhosis Father         alcohol   • Alcohol abuse Sister    • Lupus Sister    • Heart disease Sister    • No Known Problems Sister    • Alcohol abuse Brother    • Drug abuse Brother    • Heart disease Brother    • Cancer Brother         bladder (smoker)   • Heart disease Brother    • Breast cancer Paternal Aunt    • Neuropathy Neg Hx    • Colon cancer Neg Hx    • Dementia Neg Hx    • Malig Hyperthermia Neg Hx          PATIENT ASSESSMENT     Vitals:  Vitals:    22 0914   BP: 156/80   BP Location: Left arm   Pulse: 64   Temp: 97.1 °F (36.2 °C)   SpO2: 97%   Weight: 68.9 kg (152 lb)   Height: 170.2 cm (67\")       BP Readings from Last 3 Encounters:   22 156/80   22 148/68   22 145/72     Wt Readings from Last 3 Encounters:   22 68.9 kg (152 lb)   22 71.5 kg (157 lb 9.6 oz)   22 72.1 kg (159 lb)      Body mass index is 23.81 kg/m².    Blood pressure readings recorded on patient flowsheet:  No flowsheet data found.         Review of Systems:    Review of Systems  Constitutional neg except per HPI   Resp neg  CV neg   GI negative  Neuro negative   Psych negative for change   negative     Physical Exam:    Physical Exam  General: No acute distress  Psych: Normal thought and judgment   Cardiovascular Rate: normal. Rhythm: regular. Heart sounds: normal.   Pulm/Chest: " Effort normal, breath sounds normal.     Reviewed Data:    Labs:   Lab Results   Component Value Date     01/10/2022    K 4.4 01/10/2022    CALCIUM 9.3 01/10/2022    AST 16 01/10/2022    ALT 16 01/10/2022    BUN 18 01/10/2022    CREATININE 0.89 01/10/2022    CREATININE 0.80 05/19/2021    CREATININE 0.90 03/19/2021    EGFRIFNONA 60 01/10/2022    EGFRIFAFRI 69 01/10/2022       Lab Results   Component Value Date    HGBA1C 6.4 (H) 01/10/2022    HGBA1C 6.20 (H) 06/17/2021    HGBA1C 6.07 (H) 12/14/2020       Lab Results   Component Value Date    LDL 84 06/17/2021     (H) 06/15/2020    LDL 81 09/12/2019    HDL 50 06/17/2021    TRIG 83 06/17/2021    CHOLHDLRATIO 3.00 06/17/2021       Lab Results   Component Value Date    TSH 3.140 03/01/2021    FREET4 1.09 03/01/2021     Lab Results   Component Value Date    MWKO35VF 68.4 06/10/2019       Lab Results   Component Value Date    WBC 5.31 03/01/2021    HGB 13.2 03/01/2021    HGB 12.3 09/28/2019    HGB 10.6 (L) 09/04/2019     03/01/2021                 No results found for: PSA    Imaging:          Medical Tests:          Screening for Glaucoma:  Previous screening for glaucoma?: Yes      Hearing Loss Screen:  Finger Rub Hearing Test (right ear): passed  Finger Rub Hearing Test (left ear): passed      Urinary Incontinence Screen:  Episodes of urinary incontinence? : No      Depression Screen:  PHQ-2/PHQ-9 Depression Screening 7/18/2022   Retired PHQ-9 Total Score -   Retired Total Score -   Little Interest or Pleasure in Doing Things 0-->not at all   Feeling Down, Depressed or Hopeless 0-->not at all   PHQ-9: Brief Depression Severity Measure Score 0        PHQ-2: 0 (Not depressed)    PHQ-9: 0 (Negative screening for depression)       FUNCTIONAL, FALL RISK, & COGNITIVE SCREENING (Components below):    DATA:    Functional & Cognitive Status 7/18/2022   Do you have difficulty preparing food and eating? No   Do you have difficulty bathing yourself, getting  dressed or grooming yourself? No   Do you have difficulty using the toilet? No   Do you have difficulty moving around from place to place? No   Do you have trouble with steps or getting out of a bed or a chair? No   Current Diet Well Balanced Diet   Dental Exam Up to date   Eye Exam Up to date   Exercise (times per week) 7 times per week   Current Exercises Include Stationary Bicycling/Spin Class;Team Sports;Bicycling Outdoors        Exercise Comment chair vollyball   Current Exercise Activities Include -   Do you need help using the phone?  No   Are you deaf or do you have serious difficulty hearing?  No   Do you need help with transportation? No   Do you need help shopping? No   Do you need help preparing meals?  No   Do you need help with housework?  No   Do you need help with laundry? No   Do you need help taking your medications? No   Do you need help managing money? No   Do you ever drive or ride in a car without wearing a seat belt? No   Have you felt unusual stress, anger or loneliness in the last month? -   Do you have difficulty concentrating, remembering or making decisions? No         A) Assessment of Functional Ability:  (Assessment of ability to perform ADL's (showering/bathing, using toilet, dressing, feeding self, moving self around) and IADL's (use telephone, shop, prepare food, housekeep, do laundry, transport independently, take medications independently, and handle finances)    Degree of functional impairment: NONE (based on assessment noted above)      B) Assessment of Fall Risk:  Fall Risk Assessment was completed, and patient is at low risk for falls.       Need for further evaluation of gait, strength, and balance? : No    Timed Up and Go (TUG):   (>= 12 seconds indicates high risk for falling)    Observable abnormalities included: Normal gait pattern       C. Assessment of Cognitive Function:    Mini-Cog Test:     1) Registration (3 objects): Yes   2) Number of objects recalled: 3   3) Clock  Draw: Passed? : N/A       Further evaluation required? : No        COUNSELING       A. Identification of Health Risk Factors:    Risk factors include: cardiovascular risk factors, polypharmacy and chronic pain      B. Age-Appropriate Screening Schedule:  (Refer to the list below for future screening recommendations based on patient's age, sex and/or medical conditions. Orders for these recommended tests are listed in the plan section. The patient has been provided with a written plan)    Health Maintenance Topics  Health Maintenance   Topic Date Due   • LIPID PANEL  06/17/2022   • TDAP/TD VACCINES (3 - Td or Tdap) 09/15/2022   • INFLUENZA VACCINE  10/01/2022   • MAMMOGRAM  05/23/2023   • DXA SCAN  10/18/2023   • ZOSTER VACCINE  Completed       Health Maintenance Topics Due or Over-Due  Health Maintenance Due   Topic Date Due   • Pneumococcal Vaccine 65+ (2 - PCV) 06/05/2019   • LIPID PANEL  06/17/2022         C. Advanced Care Planning:    Advance Care Planning   ACP discussion was held with the patient during this visit. Patient has an advance directive in EMR which is still valid.        D. Patient Self-Management and Personalized Health Advice:    She has been provided with personalized counseling/information (including brochures/handouts) about:     -- optimizing diet/nutrition plans, improving exercise / conditioning, weight management, reducing risk for cardiac/vascular events with pre-existing disease and polypharmacy concerns, side effects of medications      She has been recommended for the following preventative services which has been performed today, will be ordered today or ordered/performed on upcoming follow-up visit:     -- NUTRITION counseling provided, EXERCISE counseling provided, CARDIOVASCULAR disease risk reduction counseling performed, FALL RISK assessment / plan of care completed, URINARY incontinence assessment done, OSTEOPOROSIS screening DISCUSSED, BREAST CANCER screening DISCUSSED,  **COLORECTAL cancer screening (colonoscopy/Cologuard discussed or ordered), vaccination for PNEUMOVAX/PCV administered (or recommended)      E. Miscellaneous Items:    -Aspirin use counseling: Advised to resume enteric-coated aspirin 81 mg once daily or every other day    -Discussed BMI with her. The BMI is in the acceptable range    -Reviewed use of high risk medication in the elderly: YES    -Reviewed for potential of harmful drug interactions in the elderly: YES        WRAP UP       Assessment & Plan:    1) MEDICARE ANNUAL WELLNESS VISIT    2) OTHER MEDICAL CONDITIONS ADDRESSED TODAY:            Problem List Items Addressed This Visit        High    Prediabetes (Chronic)    Current Assessment & Plan     Prior A1c level was very close to diabetic range at 6.4.  Check A1c level.  Anything higher would be consistent with type 2 diabetes.  I advised her to maintain a low carbohydrate diabetic diet.  Positive findings as she has lost a little bit of weight since last visit.  And she has been monitoring her diet better.  Consider metformin if A1c is close to 7.    Lab Results   Component Value Date    HGBA1C 6.4 (H) 01/10/2022    HGBA1C 6.20 (H) 06/17/2021    HGBA1C 6.07 (H) 12/14/2020               Relevant Orders    Hemoglobin A1c       Medium    Essential hypertension (Chronic)    Current Assessment & Plan     Blood pressure is elevated today.  Continue losartan and metoprolol as per cardiology.    BP Readings from Last 3 Encounters:   07/18/22 156/80   01/25/22 148/68   01/21/22 145/72               Relevant Medications    metoprolol succinate XL (TOPROL-XL) 50 MG 24 hr tablet    losartan (COZAAR) 25 MG tablet    Other Relevant Orders    Comprehensive Metabolic Panel    CBC & Differential    Dyslipidemia (high LDL; low HDL) (Chronic)    Overview     Continue pravastatin 40 mg daily           Relevant Orders    Comprehensive Metabolic Panel    Lipid Panel With / Chol / HDL Ratio    Neuropathy due to medical  condition (HCC) (Chronic)    Overview     Continue gabapentin 300 mg twice daily.           Relevant Medications    gabapentin (NEURONTIN) 300 MG capsule    RLS (restless legs syndrome) (Chronic)    Overview     Continue gabapentin              Low    Coronary artery disease involving native coronary artery of native heart with angina pectoris (HCC) (Chronic)    Overview     8/2019: s/p 6 vessel CABG    Continue ASA, statin, beta-blocker.            Current Assessment & Plan     I advised her to resume aspirin 81 mg once daily or every other day.           Relevant Medications    aspirin 81 MG EC tablet    pravastatin (PRAVACHOL) 40 MG tablet    metoprolol succinate XL (TOPROL-XL) 50 MG 24 hr tablet      Other Visit Diagnoses     Medicare annual wellness visit, subsequent    -  Primary    Therapeutic drug monitoring        Relevant Orders    Compliance Drug Analysis, Ur - Urine, Clean Catch                    Orders Placed This Encounter   Procedures   • Comprehensive Metabolic Panel   • Hemoglobin A1c   • Lipid Panel With / Chol / HDL Ratio   • Compliance Drug Analysis, Ur - Urine, Clean Catch   • CBC & Differential       Discussion / Summary:        Medications as of TODAY:              Current Outpatient Medications   Medication Sig Dispense Refill   • Calcium Carbonate-Vitamin D (CALCIUM-D) 600-400 MG-UNIT tablet Take 1 tablet by mouth Daily.     • cholecalciferol (VITAMIN D3) 1000 UNITS tablet Take 1 tablet by mouth Daily.     • CINNAMON PO Take  by mouth.     • Coenzyme Q10 (COQ10) 100 MG capsule Take 1 capsule by mouth Daily.     • cycloSPORINE (RESTASIS) 0.05 % ophthalmic emulsion Administer 1 drop to both eyes 2 (Two) Times a Day.     • gabapentin (NEURONTIN) 300 MG capsule TAKE 1 CAPSULE BY MOUTH TWICE A  capsule 0   • hydrocortisone 2.5 % cream      • losartan (COZAAR) 25 MG tablet Take 1 tablet by mouth 2 (Two) Times a Day. 180 tablet 2   • metoprolol succinate XL (TOPROL-XL) 50 MG 24 hr tablet  TAKE 1 TABLET BY MOUTH TWICE A  tablet 3   • multivitamin with minerals (MULTIVITAMIN ADULT PO) Take 1 tablet by mouth Daily.     • omeprazole (priLOSEC) 40 MG capsule Take 1 capsule by mouth Daily. 90 capsule 3   • pravastatin (PRAVACHOL) 40 MG tablet TAKE 1 TABLET BY MOUTH EVERY DAY AT NIGHT 90 tablet 3   • aspirin 81 MG EC tablet Take 1 tablet by mouth Daily.       No current facility-administered medications for this visit.         FOLLOW-UP:            Return in about 6 months (around 1/18/2023) for Reassess chronic medical problems, Schedule AWV with DR. LI for next year.                 Future Appointments   Date Time Provider Department Center   7/25/2022  9:00 AM [facility] IN [facility]ICE[facility]   1/23/2023  7:45 AM Ted Li MD [facility]   1/25/2023  1:30 PM Mary Silva APRN [facility]   7/24/2023  8:00 AM Ted Li MD [facility]           After Visit Summary (AVS) including the Personalized Prevention  Plan Services (PPPS) was either printed and given to the patient at check-out today and/or sent to [facility] for review.         *Examiner was wearing KN95 mask and eye protection during the entire duration of the visit. Patient was masked the entire time. Minimum social distance of 6 ft maintained entire visit except if physical contact was necessary as documented.       Template created by Arabella Li MD

## 2022-07-19 ENCOUNTER — DOCUMENTATION (OUTPATIENT)
Dept: INTERNAL MEDICINE | Age: 82
End: 2022-07-19

## 2022-07-19 DIAGNOSIS — E11.65 TYPE 2 DIABETES MELLITUS WITH HYPERGLYCEMIA, WITHOUT LONG-TERM CURRENT USE OF INSULIN: Primary | ICD-10-CM

## 2022-07-19 DIAGNOSIS — E78.5 DYSLIPIDEMIA (HIGH LDL; LOW HDL): Primary | ICD-10-CM

## 2022-07-19 DIAGNOSIS — E78.5 DYSLIPIDEMIA (HIGH LDL; LOW HDL): ICD-10-CM

## 2022-07-19 LAB
ALBUMIN SERPL-MCNC: 4.3 G/DL (ref 3.6–4.6)
ALBUMIN/GLOB SERPL: 1.8 {RATIO} (ref 1.2–2.2)
ALP SERPL-CCNC: 80 IU/L (ref 44–121)
ALT SERPL-CCNC: 16 IU/L (ref 0–32)
AST SERPL-CCNC: 17 IU/L (ref 0–40)
BASOPHILS # BLD AUTO: 0 X10E3/UL (ref 0–0.2)
BASOPHILS NFR BLD AUTO: 0 %
BILIRUB SERPL-MCNC: 0.4 MG/DL (ref 0–1.2)
BUN SERPL-MCNC: 18 MG/DL (ref 8–27)
BUN/CREAT SERPL: 20 (ref 12–28)
CALCIUM SERPL-MCNC: 9.7 MG/DL (ref 8.7–10.3)
CHLORIDE SERPL-SCNC: 103 MMOL/L (ref 96–106)
CHOLEST SERPL-MCNC: 182 MG/DL (ref 100–199)
CHOLEST/HDLC SERPL: 3.6 RATIO (ref 0–4.4)
CO2 SERPL-SCNC: 26 MMOL/L (ref 20–29)
CREAT SERPL-MCNC: 0.91 MG/DL (ref 0.57–1)
EGFRCR SERPLBLD CKD-EPI 2021: 62 ML/MIN/1.73
EOSINOPHIL # BLD AUTO: 0.1 X10E3/UL (ref 0–0.4)
EOSINOPHIL NFR BLD AUTO: 1 %
ERYTHROCYTE [DISTWIDTH] IN BLOOD BY AUTOMATED COUNT: 12.6 % (ref 11.7–15.4)
GLOBULIN SER CALC-MCNC: 2.4 G/DL (ref 1.5–4.5)
GLUCOSE SERPL-MCNC: 115 MG/DL (ref 65–99)
HBA1C MFR BLD: 6.6 % (ref 4.8–5.6)
HCT VFR BLD AUTO: 42 % (ref 34–46.6)
HDLC SERPL-MCNC: 50 MG/DL
HGB BLD-MCNC: 14.2 G/DL (ref 11.1–15.9)
IMM GRANULOCYTES # BLD AUTO: 0 X10E3/UL (ref 0–0.1)
IMM GRANULOCYTES NFR BLD AUTO: 0 %
LDLC SERPL CALC-MCNC: 113 MG/DL (ref 0–99)
LYMPHOCYTES # BLD AUTO: 1.3 X10E3/UL (ref 0.7–3.1)
LYMPHOCYTES NFR BLD AUTO: 18 %
MCH RBC QN AUTO: 29.5 PG (ref 26.6–33)
MCHC RBC AUTO-ENTMCNC: 33.8 G/DL (ref 31.5–35.7)
MCV RBC AUTO: 87 FL (ref 79–97)
MONOCYTES # BLD AUTO: 0.5 X10E3/UL (ref 0.1–0.9)
MONOCYTES NFR BLD AUTO: 7 %
NEUTROPHILS # BLD AUTO: 5.6 X10E3/UL (ref 1.4–7)
NEUTROPHILS NFR BLD AUTO: 74 %
PLATELET # BLD AUTO: 225 X10E3/UL (ref 150–450)
POTASSIUM SERPL-SCNC: 4.6 MMOL/L (ref 3.5–5.2)
PROT SERPL-MCNC: 6.7 G/DL (ref 6–8.5)
RBC # BLD AUTO: 4.81 X10E6/UL (ref 3.77–5.28)
SODIUM SERPL-SCNC: 142 MMOL/L (ref 134–144)
TRIGL SERPL-MCNC: 105 MG/DL (ref 0–149)
VLDLC SERPL CALC-MCNC: 19 MG/DL (ref 5–40)
WBC # BLD AUTO: 7.6 X10E3/UL (ref 3.4–10.8)

## 2022-07-19 RX ORDER — ATORVASTATIN CALCIUM 20 MG/1
20 TABLET, FILM COATED ORAL NIGHTLY
Qty: 30 TABLET | Refills: 3 | Status: SHIPPED | OUTPATIENT
Start: 2022-07-19 | End: 2022-09-29

## 2022-07-19 NOTE — ASSESSMENT & PLAN NOTE
She now has type 2 diabetes.  Start monitoring glucose readings.  Maintain diabetic diet.  See nurse practitioner for diabetic education.  Consider medication if A1c worsens.    Lab Results   Component Value Date    HGBA1C 6.6 (H) 07/18/2022    HGBA1C 6.4 (H) 01/10/2022    HGBA1C 6.20 (H) 06/17/2021

## 2022-07-19 NOTE — ASSESSMENT & PLAN NOTE
She now has type 2 diabetes and LDL is not at goal.  Discontinue pravastatin 40 mg and start atorvastatin 20 mg daily.  Recheck labs in 3 months.    Lab Results   Component Value Date     (H) 07/18/2022    LDL 84 06/17/2021     (H) 06/15/2020     Lab Results   Component Value Date    HDL 50 07/18/2022    HDL 50 06/17/2021    HDL 49 06/15/2020     Lab Results   Component Value Date    TRIG 105 07/18/2022    TRIG 83 06/17/2021    TRIG 109 06/15/2020     Lab Results   Component Value Date    CHOLHDLRATIO 3.6 07/18/2022    CHOLHDLRATIO 3.00 06/17/2021    CHOLHDLRATIO 3.51 06/15/2020

## 2022-07-19 NOTE — PROGRESS NOTES
CALL PATIENT with results:    1. A1c level for blood sugar average is now higher at 6.6 consistent with new onset type 2 diabetes.   - have her see Sunshine within 1 month for diabetic education   - send order for glucometer, monitor FBS.   - will consider medication if A1c is not improving     2. LDL (bad cholesterol) is higher.   - I would recommend changing pravastatin to atorvastatin 20 mg daily.      **Schedule her to see me in 4 months for “new onset type 2 diabetes”. -    **Schedule fasting labs for lipids, LFT and A1c 1 week prior to followup with me in 3 months

## 2022-07-21 ENCOUNTER — TELEPHONE (OUTPATIENT)
Dept: GASTROENTEROLOGY | Facility: CLINIC | Age: 82
End: 2022-07-21

## 2022-07-21 DIAGNOSIS — K85.90 ACUTE PANCREATITIS, UNSPECIFIED COMPLICATION STATUS, UNSPECIFIED PANCREATITIS TYPE: Primary | ICD-10-CM

## 2022-07-21 NOTE — TELEPHONE ENCOUNTER
Returned patient's phone.   Patient reports this morning around 0730 she had an attack similar to the one she had back in Sept 2021 which resulted her having an ERCP.   Pain lasted about 15 minutes and the pain traveled up to esophagus, she had difficult swallowing, the pain was so intense the patient almost passed out. Pain stopped after 15 minutes and now she feels weak.   Denied any nausea or fever during this episode.   Advised patient to continue to rest and will send an update to LUIS A Waddell. She verb understanding.

## 2022-07-21 NOTE — TELEPHONE ENCOUNTER
Caller: Reanna Higgins    Relationship: Self    Best call back number: [phone]    What is the best time to reach you: ANYTIME    Who are you requesting to speak with (clinical staff, provider,  specific staff member):CLINICAL STAFF/DR PALACIOS    What was the call regarding: PT ADVISED SHE HAD AN ERCP 9/29/21. STATED SHE HAD AN ATTACK THIS A.M. SIMILAR TO WHAT SHE USED TO HAVE BEFORE THE PROCEDURE LAST YEAR. STABBING PAIN LASTED 15 MINS ALMOST PASSED OUT. WOULD LIKE TO KNOW WHAT DR PALACIOS THINKS/SHOULD SHE COME IN FOR AN APPT?     Do you require a callback: YES

## 2022-07-22 ENCOUNTER — LAB (OUTPATIENT)
Dept: LAB | Facility: HOSPITAL | Age: 82
End: 2022-07-22

## 2022-07-22 DIAGNOSIS — K85.90 ACUTE PANCREATITIS, UNSPECIFIED COMPLICATION STATUS, UNSPECIFIED PANCREATITIS TYPE: ICD-10-CM

## 2022-07-22 LAB
ALBUMIN SERPL-MCNC: 4.1 G/DL (ref 3.5–5.2)
ALBUMIN/GLOB SERPL: 1.4 G/DL
ALP SERPL-CCNC: 72 U/L (ref 39–117)
ALT SERPL W P-5'-P-CCNC: 20 U/L (ref 1–33)
AMYLASE SERPL-CCNC: 40 U/L (ref 28–100)
ANION GAP SERPL CALCULATED.3IONS-SCNC: 11.8 MMOL/L (ref 5–15)
AST SERPL-CCNC: 20 U/L (ref 1–32)
BILIRUB SERPL-MCNC: 0.4 MG/DL (ref 0–1.2)
BUN SERPL-MCNC: 22 MG/DL (ref 8–23)
BUN/CREAT SERPL: 19.6 (ref 7–25)
CALCIUM SPEC-SCNC: 9.1 MG/DL (ref 8.6–10.5)
CHLORIDE SERPL-SCNC: 103 MMOL/L (ref 98–107)
CO2 SERPL-SCNC: 24.2 MMOL/L (ref 22–29)
CREAT SERPL-MCNC: 1.12 MG/DL (ref 0.57–1)
EGFRCR SERPLBLD CKD-EPI 2021: 48.6 ML/MIN/1.73
GLOBULIN UR ELPH-MCNC: 2.9 GM/DL
GLUCOSE SERPL-MCNC: 170 MG/DL (ref 65–99)
LIPASE SERPL-CCNC: 17 U/L (ref 13–60)
POTASSIUM SERPL-SCNC: 4.2 MMOL/L (ref 3.5–5.2)
PROT SERPL-MCNC: 7 G/DL (ref 6–8.5)
SODIUM SERPL-SCNC: 139 MMOL/L (ref 136–145)

## 2022-07-22 PROCEDURE — 36415 COLL VENOUS BLD VENIPUNCTURE: CPT

## 2022-07-22 PROCEDURE — 82150 ASSAY OF AMYLASE: CPT

## 2022-07-22 PROCEDURE — 80053 COMPREHEN METABOLIC PANEL: CPT

## 2022-07-22 PROCEDURE — 83690 ASSAY OF LIPASE: CPT

## 2022-07-22 NOTE — TELEPHONE ENCOUNTER
Patient called. Advised as per Dr. Redman's note. She states she will go over to [facility] out patient lab to have her blood work drawn.   States she will go today.

## 2022-07-23 LAB — DRUGS UR: NORMAL

## 2022-07-25 ENCOUNTER — CLINICAL SUPPORT NO REQUIREMENTS (OUTPATIENT)
Dept: CARDIOLOGY | Facility: CLINIC | Age: 82
End: 2022-07-25

## 2022-07-25 DIAGNOSIS — I49.5 SICK SINUS SYNDROME: Primary | ICD-10-CM

## 2022-07-25 PROCEDURE — 93280 PM DEVICE PROGR EVAL DUAL: CPT | Performed by: INTERNAL MEDICINE

## 2022-08-01 ENCOUNTER — OFFICE VISIT (OUTPATIENT)
Dept: INTERNAL MEDICINE | Age: 82
End: 2022-08-01

## 2022-08-01 VITALS
BODY MASS INDEX: 23.39 KG/M2 | TEMPERATURE: 96.8 F | SYSTOLIC BLOOD PRESSURE: 140 MMHG | HEIGHT: 67 IN | DIASTOLIC BLOOD PRESSURE: 66 MMHG | OXYGEN SATURATION: 98 % | WEIGHT: 149 LBS | HEART RATE: 80 BPM

## 2022-08-01 DIAGNOSIS — I10 ESSENTIAL HYPERTENSION: Chronic | ICD-10-CM

## 2022-08-01 DIAGNOSIS — E78.5 DYSLIPIDEMIA (HIGH LDL; LOW HDL): Chronic | ICD-10-CM

## 2022-08-01 DIAGNOSIS — E11.65 TYPE 2 DIABETES MELLITUS WITH HYPERGLYCEMIA, WITHOUT LONG-TERM CURRENT USE OF INSULIN: Primary | Chronic | ICD-10-CM

## 2022-08-01 PROCEDURE — 99214 OFFICE O/P EST MOD 30 MIN: CPT

## 2022-08-01 RX ORDER — METFORMIN HYDROCHLORIDE 500 MG/1
500 TABLET, EXTENDED RELEASE ORAL
Qty: 30 TABLET | Refills: 0 | Status: SHIPPED | OUTPATIENT
Start: 2022-08-01 | End: 2022-08-02

## 2022-08-01 NOTE — PROGRESS NOTES
"    I N T E R N A L  M E D I C I N E  Sunshine Escalona, APRN    ENCOUNTER DATE:  08/01/2022    Reanna Higgins / 82 y.o. / female      CHIEF COMPLAINT / REASON FOR OFFICE VISIT     Diabetes ED      ASSESSMENT & PLAN     Diagnoses and all orders for this visit:    1. Type 2 diabetes mellitus with hyperglycemia, without long-term current use of insulin (HCC) (Primary)  -     Discontinue: metFORMIN ER (GLUCOPHAGE-XR) 500 MG 24 hr tablet; Take 1 tablet by mouth Daily With Dinner for 30 days.  Dispense: 30 tablet; Refill: 0    2. Essential hypertension  Overview:  Continue losartan 25 mg BID, metoprolol 50 mg BID.      3. Dyslipidemia (high LDL; low HDL)  Overview:  Continue atorvastatin 20 mg nightly.         SUMMARY/DISCUSSION  • She was educated on importance of checking fasting blood sugars to assist with titration of diabetes medications; however, she declines to use glucometer at this time.  • She was educated on reducing intake of simple carbohydrates, including bread, rice, pasta and sweets.  She is encouraged to continue regular physical activity throughout the week.  • Pt aware of importance of scheduling yearly vision exam.  • Discussed initiating oral medication with patient, including metformin as compared to SGLT2.  Shared decision made to not start medication at this time.  Pt will focus on diet improvements and agreeable to recheck labs in 3 months.  • She was educated on signs and symptoms of hypoglycemia.  She is aware that if symptoms continue after she ingests dietary sugar, she will need to seek treatment at ED.        Next Appointment with me: Visit date not found    Return for Next scheduled follow up with Dr. Li for diabetes.      VITAL SIGNS     Visit Vitals  /66   Pulse 80   Temp 96.8 °F (36 °C) (Temporal)   Ht 170.2 cm (67.01\")   Wt 67.6 kg (149 lb)   SpO2 98%   BMI 23.33 kg/m²             Wt Readings from Last 3 Encounters:   08/01/22 67.6 kg (149 lb)   07/18/22 68.9 kg (152 lb)   01/25/22 " 71.5 kg (157 lb 9.6 oz)     Body mass index is 23.33 kg/m².        MEDICATIONS AT THE TIME OF OFFICE VISIT     Current Outpatient Medications on File Prior to Visit   Medication Sig Dispense Refill   • aspirin 81 MG EC tablet Take 1 tablet by mouth Daily.     • atorvastatin (LIPITOR) 20 MG tablet Take 1 tablet by mouth Every Night. 30 tablet 3   • Calcium Carbonate-Vitamin D (CALCIUM-D) 600-400 MG-UNIT tablet Take 1 tablet by mouth Daily.     • cholecalciferol (VITAMIN D3) 1000 UNITS tablet Take 1 tablet by mouth Daily.     • CINNAMON PO Take  by mouth.     • Coenzyme Q10 (COQ10) 100 MG capsule Take 1 capsule by mouth Daily.     • cycloSPORINE (RESTASIS) 0.05 % ophthalmic emulsion Administer 1 drop to both eyes 2 (Two) Times a Day.     • hydrocortisone 2.5 % cream      • losartan (COZAAR) 25 MG tablet Take 1 tablet by mouth 2 (Two) Times a Day. 180 tablet 2   • metoprolol succinate XL (TOPROL-XL) 50 MG 24 hr tablet TAKE 1 TABLET BY MOUTH TWICE A  tablet 3   • multivitamin with minerals tablet tablet Take 1 tablet by mouth Daily.     • omeprazole (priLOSEC) 40 MG capsule Take 1 capsule by mouth Daily. 90 capsule 3     No current facility-administered medications on file prior to visit.        HISTORY OF PRESENT ILLNESS     Recent type 2 diabetes diagnosis with A1C 6.6.  She is strongly opposed to starting fasting blood sugar checks with glucometer.  She is focusing on improved diet, and limiting intake of fruits with high sugar.  She is very physically active throughout the week - rides bicycle and plays volleyball.      HLD: Remains on Atorvastatin 20 mg nightly.    HTN: Remains on losartan 25 mg BID, metoprolol 50 mg BID.  BP elevated today, but she has not had any medicine today.      Patient Care Team:  Ted Li MD as PCP - General (Internal Medicine)  To Rivera MD as Consulting Physician (General Surgery)  Arsenio Witt MD as Consulting Physician (Ophthalmology)  Brown Beckman  MD as Consulting Physician (Orthopedic Surgery)  Shivam Vallejo MD as Consulting Physician (Neurology)  Francis Mccullough MD as Consulting Physician (Cardiology)    REVIEW OF SYSTEMS     Review of Systems   Constitutional: Negative for chills, fever and unexpected weight change.   Respiratory: Negative for cough, chest tightness and shortness of breath.    Cardiovascular: Negative for chest pain, palpitations and leg swelling.   Neurological: Negative for dizziness, weakness, light-headedness and headaches.   Psychiatric/Behavioral: The patient is not nervous/anxious.           PHYSICAL EXAMINATION     Physical Exam  Vitals reviewed.   Constitutional:       General: She is not in acute distress.     Appearance: Normal appearance. She is not ill-appearing, toxic-appearing or diaphoretic.   HENT:      Head: Normocephalic and atraumatic.   Cardiovascular:      Rate and Rhythm: Normal rate and regular rhythm.      Heart sounds: Normal heart sounds.   Pulmonary:      Effort: Pulmonary effort is normal.      Breath sounds: Normal breath sounds.   Musculoskeletal:      Right lower leg: No edema.      Left lower leg: No edema.   Neurological:      Mental Status: She is alert and oriented to person, place, and time. Mental status is at baseline.   Psychiatric:         Mood and Affect: Mood normal.         Behavior: Behavior normal.         Thought Content: Thought content normal.         Judgment: Judgment normal.           REVIEWED DATA     Labs:     Lab Results   Component Value Date    HGBA1C 6.6 (H) 07/18/2022           Imaging:            Medical Tests:           Summary of old records / correspondence / consultant report:           Request outside records:

## 2022-08-06 DIAGNOSIS — G62.9 PERIPHERAL POLYNEUROPATHY: Chronic | ICD-10-CM

## 2022-08-08 RX ORDER — GABAPENTIN 300 MG/1
CAPSULE ORAL
Qty: 180 CAPSULE | Refills: 0 | Status: SHIPPED | OUTPATIENT
Start: 2022-08-08 | End: 2022-11-08 | Stop reason: SDUPTHER

## 2022-08-17 ENCOUNTER — TELEPHONE (OUTPATIENT)
Dept: INTERNAL MEDICINE | Age: 82
End: 2022-08-17

## 2022-08-17 NOTE — TELEPHONE ENCOUNTER
Patient wants to know if she is just supposed to set up a lab only appt since Sunshine and Dr Redman had discussed something in regards to her kidney function and then told her to do some labs in a month and then follow up as regular with Dr Li. Please clarify how to schedule this.

## 2022-08-17 NOTE — TELEPHONE ENCOUNTER
Lvm to inform pt   Seeing josie for recheck     Having labs in 3 month and then will see dr gamboa for f/u

## 2022-09-07 RX ORDER — METOPROLOL SUCCINATE 50 MG/1
TABLET, EXTENDED RELEASE ORAL
Qty: 180 TABLET | Refills: 3 | Status: SHIPPED | OUTPATIENT
Start: 2022-09-07

## 2022-09-28 ENCOUNTER — OFFICE VISIT (OUTPATIENT)
Dept: INTERNAL MEDICINE | Age: 82
End: 2022-09-28

## 2022-09-28 VITALS
HEIGHT: 67 IN | OXYGEN SATURATION: 98 % | WEIGHT: 153 LBS | DIASTOLIC BLOOD PRESSURE: 62 MMHG | SYSTOLIC BLOOD PRESSURE: 110 MMHG | BODY MASS INDEX: 24.01 KG/M2 | HEART RATE: 60 BPM | TEMPERATURE: 96.5 F

## 2022-09-28 DIAGNOSIS — J30.2 SEASONAL ALLERGIC RHINITIS, UNSPECIFIED TRIGGER: Primary | ICD-10-CM

## 2022-09-28 PROCEDURE — 99213 OFFICE O/P EST LOW 20 MIN: CPT

## 2022-09-28 RX ORDER — AZELASTINE 1 MG/ML
2 SPRAY, METERED NASAL 2 TIMES DAILY
Qty: 30 ML | Refills: 1 | Status: SHIPPED | OUTPATIENT
Start: 2022-09-28 | End: 2022-10-24

## 2022-09-28 NOTE — PROGRESS NOTES
"    I N T E R N A L  M E D I C I N E  LUIS A Ennis    ENCOUNTER DATE:  09/28/2022    Reanna Higgins / 82 y.o. / female      CHIEF COMPLAINT / REASON FOR OFFICE VISIT     Earache (Left )      ASSESSMENT & PLAN     Diagnoses and all orders for this visit:    1. Seasonal allergic rhinitis, unspecified trigger (Primary)  -     azelastine (ASTELIN) 0.1 % nasal spray; 2 sprays into the nostril(s) as directed by provider 2 (Two) Times a Day. Use in each nostril as directed  Dispense: 30 mL; Refill: 1         SUMMARY/DISCUSSION  • Pt agreeable to start taking Zyrtec daily.  Will add azelastine nasal spray.  She will return for any new symptoms, including any ear pain.  She will consider referral to allergist at later time.        Next Appointment with me: Visit date not found    Return for Next scheduled follow up.      VITAL SIGNS     Visit Vitals  /62   Pulse 60   Temp 96.5 °F (35.8 °C)   Ht 170.2 cm (67.01\")   Wt 69.4 kg (153 lb)   SpO2 98%   BMI 23.96 kg/m²             Wt Readings from Last 3 Encounters:   09/28/22 69.4 kg (153 lb)   08/01/22 67.6 kg (149 lb)   07/18/22 68.9 kg (152 lb)     Body mass index is 23.96 kg/m².        MEDICATIONS AT THE TIME OF OFFICE VISIT     Current Outpatient Medications on File Prior to Visit   Medication Sig Dispense Refill   • aspirin 81 MG EC tablet Take 1 tablet by mouth Daily.     • atorvastatin (LIPITOR) 20 MG tablet Take 1 tablet by mouth Every Night. 30 tablet 3   • Calcium Carbonate-Vitamin D (CALCIUM-D) 600-400 MG-UNIT tablet Take 1 tablet by mouth Daily.     • cholecalciferol (VITAMIN D3) 1000 UNITS tablet Take 1 tablet by mouth Daily.     • CINNAMON PO Take  by mouth.     • Coenzyme Q10 (COQ10) 100 MG capsule Take 1 capsule by mouth Daily.     • cycloSPORINE (RESTASIS) 0.05 % ophthalmic emulsion Administer 1 drop to both eyes 2 (Two) Times a Day.     • gabapentin (NEURONTIN) 300 MG capsule TAKE 1 CAPSULE BY MOUTH TWICE A  capsule 0   • hydrocortisone 2.5 % " "cream      • losartan (COZAAR) 25 MG tablet Take 1 tablet by mouth 2 (Two) Times a Day. 180 tablet 2   • metoprolol succinate XL (TOPROL-XL) 50 MG 24 hr tablet TAKE 1 TABLET BY MOUTH TWICE A  tablet 3   • multivitamin with minerals tablet tablet Take 1 tablet by mouth Daily.     • omeprazole (priLOSEC) 40 MG capsule Take 1 capsule by mouth Daily. 90 capsule 3     No current facility-administered medications on file prior to visit.        HISTORY OF PRESENT ILLNESS     She describes a sensation of L ear \"squishing\" and muffled hearing earlier today.  She went home and put alcohol in ear and symptoms have since improved.  She denies any ear pain, fever, chills.  Does have personal history of seasonal allergies, and reports continuous clear nasal discharge occurring 365 days a year.  Currently, she takes Zyrtec PRN.  She tried Flonase in the past which caused nose bleeds.        Patient Care Team:  Ted Li MD as PCP - General (Internal Medicine)  To Rivera MD as Consulting Physician (General Surgery)  Arsenio Witt MD as Consulting Physician (Ophthalmology)  Brown Beckman MD as Consulting Physician (Orthopedic Surgery)  Shivam Vallejo MD as Consulting Physician (Neurology)  Francis Mccullough MD as Consulting Physician (Cardiology)    REVIEW OF SYSTEMS     Review of Systems   Constitutional: Negative for chills, fever and unexpected weight change.   HENT: Positive for rhinorrhea. Negative for ear discharge, ear pain, hearing loss, sinus pressure, sinus pain and sore throat.    Respiratory: Negative for cough, chest tightness and shortness of breath.    Cardiovascular: Negative for chest pain, palpitations and leg swelling.   Neurological: Negative for dizziness, weakness, light-headedness and headaches.   Psychiatric/Behavioral: The patient is not nervous/anxious.           PHYSICAL EXAMINATION     Physical Exam  Vitals reviewed.   Constitutional:       General: She is not in acute " distress.     Appearance: Normal appearance. She is not ill-appearing, toxic-appearing or diaphoretic.   HENT:      Head: Normocephalic and atraumatic.      Right Ear: Tympanic membrane, ear canal and external ear normal. There is no impacted cerumen.      Left Ear: Tympanic membrane, ear canal and external ear normal. There is no impacted cerumen.      Nose: Rhinorrhea present. No congestion.      Mouth/Throat:      Mouth: Mucous membranes are moist.      Pharynx: Oropharynx is clear. No oropharyngeal exudate or posterior oropharyngeal erythema.   Cardiovascular:      Rate and Rhythm: Normal rate and regular rhythm.      Heart sounds: Normal heart sounds.   Pulmonary:      Effort: Pulmonary effort is normal.      Breath sounds: Normal breath sounds.   Neurological:      Mental Status: She is alert and oriented to person, place, and time. Mental status is at baseline.   Psychiatric:         Mood and Affect: Mood normal.         Behavior: Behavior normal.         Thought Content: Thought content normal.         Judgment: Judgment normal.           REVIEWED DATA     Labs:           Imaging:            Medical Tests:           Summary of old records / correspondence / consultant report:           Request outside records:

## 2022-09-29 DIAGNOSIS — E78.5 DYSLIPIDEMIA (HIGH LDL; LOW HDL): ICD-10-CM

## 2022-09-29 DIAGNOSIS — I25.119 CORONARY ARTERY DISEASE INVOLVING NATIVE CORONARY ARTERY OF NATIVE HEART WITH ANGINA PECTORIS: ICD-10-CM

## 2022-09-29 RX ORDER — PRAVASTATIN SODIUM 40 MG
TABLET ORAL
Qty: 90 TABLET | Refills: 3 | OUTPATIENT
Start: 2022-09-29

## 2022-09-29 RX ORDER — ATORVASTATIN CALCIUM 20 MG/1
TABLET, FILM COATED ORAL
Qty: 90 TABLET | Refills: 0 | Status: SHIPPED | OUTPATIENT
Start: 2022-09-29 | End: 2022-11-08

## 2022-10-21 DIAGNOSIS — J30.2 SEASONAL ALLERGIC RHINITIS, UNSPECIFIED TRIGGER: ICD-10-CM

## 2022-10-24 RX ORDER — AZELASTINE HYDROCHLORIDE 137 UG/1
SPRAY, METERED NASAL
Qty: 30 ML | Refills: 11 | Status: SHIPPED | OUTPATIENT
Start: 2022-10-24 | End: 2022-11-08 | Stop reason: ALTCHOICE

## 2022-11-08 ENCOUNTER — OFFICE VISIT (OUTPATIENT)
Dept: INTERNAL MEDICINE | Age: 82
End: 2022-11-08

## 2022-11-08 VITALS
SYSTOLIC BLOOD PRESSURE: 144 MMHG | TEMPERATURE: 97.3 F | WEIGHT: 156 LBS | HEIGHT: 67 IN | OXYGEN SATURATION: 61 % | DIASTOLIC BLOOD PRESSURE: 66 MMHG | HEART RATE: 95 BPM | BODY MASS INDEX: 24.48 KG/M2

## 2022-11-08 DIAGNOSIS — E11.65 TYPE 2 DIABETES MELLITUS WITH HYPERGLYCEMIA, WITHOUT LONG-TERM CURRENT USE OF INSULIN: Primary | Chronic | ICD-10-CM

## 2022-11-08 DIAGNOSIS — E78.5 DYSLIPIDEMIA (HIGH LDL; LOW HDL): ICD-10-CM

## 2022-11-08 DIAGNOSIS — G62.9 PERIPHERAL POLYNEUROPATHY: Chronic | ICD-10-CM

## 2022-11-08 DIAGNOSIS — G25.81 RLS (RESTLESS LEGS SYNDROME): Chronic | ICD-10-CM

## 2022-11-08 DIAGNOSIS — I10 ESSENTIAL HYPERTENSION: Chronic | ICD-10-CM

## 2022-11-08 DIAGNOSIS — E78.5 DYSLIPIDEMIA (HIGH LDL; LOW HDL): Chronic | ICD-10-CM

## 2022-11-08 PROCEDURE — 99214 OFFICE O/P EST MOD 30 MIN: CPT | Performed by: INTERNAL MEDICINE

## 2022-11-08 RX ORDER — ATORVASTATIN CALCIUM 20 MG/1
TABLET, FILM COATED ORAL
Qty: 90 TABLET | Refills: 3 | Status: SHIPPED | OUTPATIENT
Start: 2022-11-08 | End: 2023-03-13

## 2022-11-08 RX ORDER — IPRATROPIUM BROMIDE 42 UG/1
SPRAY, METERED NASAL
COMMUNITY
Start: 2022-11-04

## 2022-11-08 RX ORDER — GABAPENTIN 300 MG/1
300 CAPSULE ORAL 3 TIMES DAILY
Qty: 270 CAPSULE | Refills: 0 | Status: SHIPPED | OUTPATIENT
Start: 2022-11-08 | End: 2023-02-14

## 2022-11-08 NOTE — ASSESSMENT & PLAN NOTE
Blood pressure is elevated today but reportedly better at home.  Continue current meds but send blood pressure readings from home in 2 to 3 weeks.    BP Readings from Last 3 Encounters:   11/08/22 144/66   09/28/22 110/62   08/01/22 140/66

## 2022-11-08 NOTE — PROGRESS NOTES
I N T E R N A L  M E D I C I N E    J U N O H  K I M,  M D      ENCOUNTER DATE:  11/08/2022    Reanna Higgins / 82 y.o. / female    CHIEF COMPLAINT / REASON FOR OFFICE VISIT     Diabetes, Hyperlipidemia, and Hypertension      ASSESSMENT & PLAN     Problem List Items Addressed This Visit        High    Type 2 diabetes mellitus with hyperglycemia, without long-term current use of insulin (HCC) - Primary (Chronic)    Current Assessment & Plan     A1c has improved to 6.2 with dietary modifications.  Follow-up in 4 months with POC A1c.            Medium    Essential hypertension (Chronic)    Overview     Continue losartan 25 mg BID, metoprolol 50 mg BID.         Current Assessment & Plan     Blood pressure is elevated today but reportedly better at home.  Continue current meds but send blood pressure readings from home in 2 to 3 weeks.    BP Readings from Last 3 Encounters:   11/08/22 144/66   09/28/22 110/62   08/01/22 140/66             Relevant Medications    losartan (COZAAR) 25 MG tablet    metoprolol succinate XL (TOPROL-XL) 50 MG 24 hr tablet    Dyslipidemia (high LDL; low HDL) (Chronic)    Overview     Continue atorvastatin 20 mg nightly.         Current Assessment & Plan     Previously switched to atorvastatin 20 mg from pravastatin 40 and LDL cholesterol is significantly better.     Lab Results   Component Value Date    LDL 90 10/31/2022     (H) 07/18/2022    LDL 84 06/17/2021     Lab Results   Component Value Date    HDL 47 10/31/2022    HDL 50 07/18/2022    HDL 50 06/17/2021     Lab Results   Component Value Date    TRIG 88 10/31/2022    TRIG 105 07/18/2022    TRIG 83 06/17/2021     Lab Results   Component Value Date    CHOLHDLRATIO 3.28 10/31/2022    CHOLHDLRATIO 3.6 07/18/2022    CHOLHDLRATIO 3.00 06/17/2021             Relevant Medications    atorvastatin (LIPITOR) 20 MG tablet    RLS (restless legs syndrome) (Chronic)    Overview     Continue gabapentin         Relevant Medications     "gabapentin (NEURONTIN) 300 MG capsule   Other Visit Diagnoses     Peripheral polyneuropathy  (Chronic)       Relevant Medications    gabapentin (NEURONTIN) 300 MG capsule        No orders of the defined types were placed in this encounter.    New Medications Ordered This Visit   Medications   • gabapentin (NEURONTIN) 300 MG capsule     Sig: Take 1 capsule by mouth 3 (Three) Times a Day.     Dispense:  270 capsule     Refill:  0     Not to exceed 5 additional fills before 10/22/2022       SUMMARY/DISCUSSION  •       Next Appointment with me: 1/23/2023    Return in about 4 months (around 3/8/2023) for Reassess chronic medical problems, POCT A1C.      VITAL SIGNS     Vitals:    11/08/22 1333 11/08/22 1356   BP: 152/60 144/66   Pulse: 95    Temp: 97.3 °F (36.3 °C)    SpO2: (!) 61%    Weight: 70.8 kg (156 lb)    Height: 170.2 cm (67.01\")        BP Readings from Last 3 Encounters:   11/08/22 144/66   09/28/22 110/62   08/01/22 140/66     Wt Readings from Last 3 Encounters:   11/08/22 70.8 kg (156 lb)   09/28/22 69.4 kg (153 lb)   08/01/22 67.6 kg (149 lb)     Body mass index is 24.43 kg/m².    Blood pressure readings recorded on patient flowsheet:  No flowsheet data found.       MEDICATIONS AT THE TIME OF OFFICE VISIT     Current Outpatient Medications on File Prior to Visit   Medication Sig   • aspirin 81 MG EC tablet Take 1 tablet by mouth Daily.   • Calcium Carbonate-Vitamin D (CALCIUM-D) 600-400 MG-UNIT tablet Take 1 tablet by mouth Daily.   • cholecalciferol (VITAMIN D3) 1000 UNITS tablet Take 1 tablet by mouth Daily.   • Coenzyme Q10 (COQ10) 100 MG capsule Take 1 capsule by mouth Daily.   • cycloSPORINE (RESTASIS) 0.05 % ophthalmic emulsion Administer 1 drop to both eyes 2 (Two) Times a Day.   • ipratropium (ATROVENT) 0.06 % nasal spray 2 SPRAYS EACH NOSTRIL EVERY 6 HOURS AS NEEDED.   • losartan (COZAAR) 25 MG tablet Take 1 tablet by mouth 2 (Two) Times a Day.   • metoprolol succinate XL (TOPROL-XL) 50 MG 24 hr " tablet TAKE 1 TABLET BY MOUTH TWICE A DAY   • multivitamin with minerals tablet tablet Take 1 tablet by mouth Daily.   • omeprazole (priLOSEC) 40 MG capsule Take 1 capsule by mouth Daily.   • [DISCONTINUED] CINNAMON PO Take  by mouth.   • [DISCONTINUED] gabapentin (NEURONTIN) 300 MG capsule TAKE 1 CAPSULE BY MOUTH TWICE A DAY   • hydrocortisone 2.5 % cream    • [DISCONTINUED] atorvastatin (LIPITOR) 20 MG tablet TAKE 1 TABLET BY MOUTH EVERY DAY AT NIGHT   • [DISCONTINUED] Azelastine HCl 137 MCG/SPRAY solution 2 SPRAYS INTO THE NOSTRIL(S) AS DIRECTED BY PROVIDER 2 (TWO) TIMES A DAY. USE IN EACH NOSTRIL AS DIRECTED     No current facility-administered medications on file prior to visit.          HISTORY OF PRESENT ILLNESS     Previously diagnosed with new onset type 2 diabetes with A1c of 6.6.  Recent A1c is down to 6.2 with dietary modifications.  APRN previously changed pravastatin 40 mg to atorvastatin 20 mg with significant improvement in LDL.  Denies any significant side effects.  She has restless leg syndrome and peripheral neuropathy and has more bothersome symptoms at night.  She would like to consider increasing gabapentin to 2 capsules at nighttime.  Denies any significant side effects from the medication.      Patient Care Team:  Ted Li MD as PCP - General (Internal Medicine)  To Rivera MD as Consulting Physician (General Surgery)  Arsenio Witt MD as Consulting Physician (Ophthalmology)  Brown Beckman MD as Consulting Physician (Orthopedic Surgery)  Shivam Vallejo MD as Consulting Physician (Neurology)  Francis Mccullough MD as Consulting Physician (Cardiology)    REVIEW OF SYSTEMS     Review of Systems       PHYSICAL EXAMINATION     Physical Exam  Alert with normal thought and judgment.   Cardiovascular: Normal rate, regular rhythm.  No lower extremity edema       REVIEWED DATA     Labs:     Lab Results   Component Value Date     07/22/2022    K 4.2 07/22/2022    CALCIUM  9.1 07/22/2022    AST 18 10/31/2022    ALT 14 10/31/2022    BUN 22 07/22/2022    CREATININE 1.12 (H) 07/22/2022    CREATININE 0.91 07/18/2022    CREATININE 0.89 01/10/2022    EGFRIFNONA 60 01/10/2022    EGFRIFAFRI 69 01/10/2022       Lab Results   Component Value Date    HGBA1C 6.20 (H) 10/31/2022    HGBA1C 6.6 (H) 07/18/2022    HGBA1C 6.4 (H) 01/10/2022       Lab Results   Component Value Date    LDL 90 10/31/2022     (H) 07/18/2022    LDL 84 06/17/2021    HDL 47 10/31/2022    HDL 50 07/18/2022    TRIG 88 10/31/2022    TRIG 105 07/18/2022       Lab Results   Component Value Date    TSH 3.140 03/01/2021    TSH 2.840 12/12/2019    TSH 4.290 (H) 06/10/2019    FREET4 1.09 03/01/2021    FREET4 1.13 12/12/2019    FREET4 1.02 06/10/2019       Lab Results   Component Value Date    WBC 7.6 07/18/2022    HGB 14.2 07/18/2022     07/18/2022       No results found for: MALBCRERATIO       Imaging:           Medical Tests:           Summary of old records / correspondence / consultant report:           Request outside records:             *Examiner was wearing [id number] mask and eye protection during the entire duration of the visit. Patient was masked the entire time. Minimum social distance of 6 ft maintained entire visit except if physical contact was necessary as documented.       Template created by Arabella Li MD

## 2022-11-08 NOTE — ASSESSMENT & PLAN NOTE
Increase gabapentin to 300 mg 1 in AM and 2 at night.  New prescription sent to pharmacy for quantity 270 with no refill.

## 2022-11-08 NOTE — ASSESSMENT & PLAN NOTE
Previously switched to atorvastatin 20 mg from pravastatin 40 and LDL cholesterol is significantly better.     Lab Results   Component Value Date    LDL 90 10/31/2022     (H) 07/18/2022    LDL 84 06/17/2021     Lab Results   Component Value Date    HDL 47 10/31/2022    HDL 50 07/18/2022    HDL 50 06/17/2021     Lab Results   Component Value Date    TRIG 88 10/31/2022    TRIG 105 07/18/2022    TRIG 83 06/17/2021     Lab Results   Component Value Date    CHOLHDLRATIO 3.28 10/31/2022    CHOLHDLRATIO 3.6 07/18/2022    CHOLHDLRATIO 3.00 06/17/2021

## 2022-11-09 ENCOUNTER — TELEPHONE (OUTPATIENT)
Dept: INTERNAL MEDICINE | Age: 82
End: 2022-11-09

## 2022-11-09 ENCOUNTER — OFFICE VISIT (OUTPATIENT)
Dept: INTERNAL MEDICINE | Age: 82
End: 2022-11-09

## 2022-11-09 ENCOUNTER — HOSPITAL ENCOUNTER (OUTPATIENT)
Dept: CT IMAGING | Facility: HOSPITAL | Age: 82
Discharge: HOME | End: 2022-11-09

## 2022-11-09 VITALS
BODY MASS INDEX: 24.48 KG/M2 | HEIGHT: 67 IN | HEART RATE: 67 BPM | DIASTOLIC BLOOD PRESSURE: 66 MMHG | SYSTOLIC BLOOD PRESSURE: 140 MMHG | WEIGHT: 156 LBS | TEMPERATURE: 97.6 F | OXYGEN SATURATION: 97 %

## 2022-11-09 DIAGNOSIS — R51.9 ACUTE NONINTRACTABLE HEADACHE, UNSPECIFIED HEADACHE TYPE: ICD-10-CM

## 2022-11-09 DIAGNOSIS — R51.9 ACUTE NONINTRACTABLE HEADACHE, UNSPECIFIED HEADACHE TYPE: Primary | ICD-10-CM

## 2022-11-09 DIAGNOSIS — I10 ESSENTIAL HYPERTENSION: Chronic | ICD-10-CM

## 2022-11-09 LAB — CREAT BLDA-MCNC: 0.8 MG/DL (ref 0.6–1.3)

## 2022-11-09 PROCEDURE — 0 IOPAMIDOL PER 1 ML

## 2022-11-09 PROCEDURE — 70496 CT ANGIOGRAPHY HEAD: CPT

## 2022-11-09 PROCEDURE — 82565 ASSAY OF CREATININE: CPT

## 2022-11-09 PROCEDURE — 70498 CT ANGIOGRAPHY NECK: CPT

## 2022-11-09 PROCEDURE — 70450 CT HEAD/BRAIN W/O DYE: CPT

## 2022-11-09 PROCEDURE — 99214 OFFICE O/P EST MOD 30 MIN: CPT

## 2022-11-09 RX ORDER — AMLODIPINE BESYLATE 5 MG/1
5 TABLET ORAL DAILY PRN
Qty: 30 TABLET | Refills: 0 | Status: SHIPPED | OUTPATIENT
Start: 2022-11-09 | End: 2022-12-01

## 2022-11-09 RX ADMIN — IOPAMIDOL 100 ML: 755 INJECTION, SOLUTION INTRAVENOUS at 13:45

## 2022-11-09 NOTE — NURSING NOTE
Spoke with Adela and Sonal in MRI regarding pacemaker. Patient stated she has always been told that she can't have an MRI with it.  Sonal's records in MRI show that it could be conditional. Patient would need to see Cardiologist to determine for sure if her pacemaker is compatible with MRI, if an MRI is needed in future.

## 2022-11-09 NOTE — NURSING NOTE
Pt waited in Radiology Triage after CT Angiogram Head and Neck for stae hold and Call.    1424 Sunshinekesha Escalona called to speak with patient. She may go home. I removed her IV and directed her to Main entrance to exit independently.       Protective goggles and mask in place with all patient interactions today

## 2022-11-09 NOTE — PROGRESS NOTES
"    I N T E R N A L  M E D I C I N E  Sunshine Escalona, APRN    ENCOUNTER DATE:  11/09/2022    Reanna Higgins / 82 y.o. / female      CHIEF COMPLAINT / REASON FOR OFFICE VISIT     Hypertension      ASSESSMENT & PLAN     Diagnoses and all orders for this visit:    1. Acute nonintractable headache, unspecified headache type (Primary)  -     CT Head Without Contrast  -     CT angiogram head    2. Essential hypertension  Overview:  Continue losartan 25 mg BID, metoprolol 50 mg BID.    Orders:  -     amLODIPine (NORVASC) 5 MG tablet; Take 1 tablet by mouth Daily As Needed (For use for blood pressure >150/90).  Dispense: 30 tablet; Refill: 0       SUMMARY/DISCUSSION  • Will obtain STAT Head CT to rule out subarachnoid hemorrhage given this is new, severe headache.    • She was educated on importance of visiting the ER for any worsening headache, vision changes, numbness, tingling, weakness.    • Will prescribe amlodipine 5 mg PRN to use if her BP >150/90.  She is instructed to closely monitor BP at home and will provide update via [facility] if consistently elevated >130/80.  • Update: Head CT showed \"Moderate to severe vascular calcification involving the carotid siphons bilaterally and there is severe vascular calcification involving the right M1 segment proximally. There is no evidence of acute infarction or of intracranial hemorrhage. Mild small vessel ischemic disease noted.\"  Discussed with radiologist, and pt is unable to undergo MRI/ MRA due to presence of pacemaker.  Radiologist recommended follow up with CTA Head/ Neck.      Next Appointment with me: Visit date not found    Return for Next scheduled follow up.      VITAL SIGNS     Visit Vitals  /66   Pulse 67   Temp 97.6 °F (36.4 °C)   Ht 170.2 cm (67.01\")   Wt 70.8 kg (156 lb)   SpO2 97%   BMI 24.43 kg/m²             Wt Readings from Last 3 Encounters:   11/09/22 70.8 kg (156 lb)   11/08/22 70.8 kg (156 lb)   09/28/22 69.4 kg (153 lb)     Body mass index is " 24.43 kg/m².        MEDICATIONS AT THE TIME OF OFFICE VISIT     Current Outpatient Medications on File Prior to Visit   Medication Sig Dispense Refill   • aspirin 81 MG EC tablet Take 1 tablet by mouth Daily.     • atorvastatin (LIPITOR) 20 MG tablet TAKE 1 TABLET BY MOUTH EVERY DAY AT NIGHT 90 tablet 3   • Calcium Carbonate-Vitamin D (CALCIUM-D) 600-400 MG-UNIT tablet Take 1 tablet by mouth Daily.     • cholecalciferol (VITAMIN D3) 1000 UNITS tablet Take 1 tablet by mouth Daily.     • Coenzyme Q10 (COQ10) 100 MG capsule Take 1 capsule by mouth Daily.     • cycloSPORINE (RESTASIS) 0.05 % ophthalmic emulsion Administer 1 drop to both eyes 2 (Two) Times a Day.     • gabapentin (NEURONTIN) 300 MG capsule Take 1 capsule by mouth 3 (Three) Times a Day. 270 capsule 0   • hydrocortisone 2.5 % cream      • ipratropium (ATROVENT) 0.06 % nasal spray 2 SPRAYS EACH NOSTRIL EVERY 6 HOURS AS NEEDED.     • losartan (COZAAR) 25 MG tablet Take 1 tablet by mouth 2 (Two) Times a Day. 180 tablet 2   • metoprolol succinate XL (TOPROL-XL) 50 MG 24 hr tablet TAKE 1 TABLET BY MOUTH TWICE A  tablet 3   • multivitamin with minerals tablet tablet Take 1 tablet by mouth Daily.     • omeprazole (priLOSEC) 40 MG capsule Take 1 capsule by mouth Daily. 90 capsule 3     No current facility-administered medications on file prior to visit.        HISTORY OF PRESENT ILLNESS     HTN: Remains on losartan 25 mg BID, and metoprolol succinate XL 50 mg BID.  BPs at home have been averaging 110s-120s/50s-60s until this morning at 8 AM, when it was reportedly 166/85, followed by 182/82 at 9 AM.  Today in the office, 140/66.    Yesterday evening she developed a new headache that was worse when she was recumbent in bed.  Pain was felt throughout her entire head and was accompanied by bilateral eye pressure.  She reports pain was so significant that she found it difficult to sleep.  She denies this being the worse headache of her life, but she states she  has never had a headache like this before.  Headache last night was 10/10.  Rates current pain as 8-9/10.  Denies vision changes, nausea, vomiting, numbness, tingling, weakness.    She has been followed by allergist the last couple months for nasal congestion, allergic rhinitis.      She is on baby aspirin 81 mg.  Known left internal carotid mild stenosis, per ultrasound from August 2019.      Patient Care Team:  Ted Li MD as PCP - General (Internal Medicine)  To Rivera MD as Consulting Physician (General Surgery)  Arsenio Witt MD as Consulting Physician (Ophthalmology)  Brown Beckman MD as Consulting Physician (Orthopedic Surgery)  Shivam Vallejo MD as Consulting Physician (Neurology)  Francis Mccullough MD as Consulting Physician (Cardiology)    REVIEW OF SYSTEMS     Review of Systems   Constitutional: Negative for chills, fever and unexpected weight change.   Respiratory: Negative for cough, chest tightness and shortness of breath.    Cardiovascular: Negative for chest pain, palpitations and leg swelling.   Musculoskeletal: Negative for gait problem.   Neurological: Positive for headaches. Negative for dizziness, speech difficulty, weakness, light-headedness and numbness.   Psychiatric/Behavioral: The patient is not nervous/anxious.           PHYSICAL EXAMINATION     Physical Exam  Vitals reviewed.   Constitutional:       General: She is not in acute distress.     Appearance: Normal appearance. She is not ill-appearing, toxic-appearing or diaphoretic.   HENT:      Head: Normocephalic and atraumatic.   Cardiovascular:      Rate and Rhythm: Normal rate and regular rhythm.      Heart sounds: Normal heart sounds.   Pulmonary:      Effort: Pulmonary effort is normal.      Breath sounds: Normal breath sounds.   Neurological:      General: No focal deficit present.      Mental Status: She is alert and oriented to person, place, and time. Mental status is at baseline.      Cranial Nerves: No  cranial nerve deficit.      Sensory: No sensory deficit.      Motor: No weakness.      Gait: Gait normal.   Psychiatric:         Mood and Affect: Mood normal.         Behavior: Behavior normal.         Thought Content: Thought content normal.         Judgment: Judgment normal.           REVIEWED DATA     Labs:           Imaging:            Medical Tests:           Summary of old records / correspondence / consultant report:           Request outside records:

## 2022-11-09 NOTE — TELEPHONE ENCOUNTER
Pt was seen yesterday   She has bad dull headache . BP last night 166/75 60 HR  Asked pt to take BP now 182/82 hr 61    Dr gamboa informed verbally .   Said ask pt to come and be seen today for reevaluation. Pt informed . scheduled

## 2022-11-09 NOTE — NURSING NOTE
1210 Pt arrived in radiology triage for stat hold and call.    1240 Sunshine Escalona called to speak with patient . Dr Ryan suggested MRI. Pt informed Pola that she has pacer that is incompatible with MRI. Pola spoke with Shelby again and Pola ordered CT Angiogram Head.

## 2022-12-01 DIAGNOSIS — I10 ESSENTIAL HYPERTENSION: Chronic | ICD-10-CM

## 2022-12-01 RX ORDER — AMLODIPINE BESYLATE 5 MG/1
5 TABLET ORAL DAILY PRN
Qty: 90 TABLET | Refills: 3 | Status: SHIPPED | OUTPATIENT
Start: 2022-12-01

## 2022-12-09 ENCOUNTER — TELEPHONE (OUTPATIENT)
Dept: CARDIOLOGY | Facility: CLINIC | Age: 82
End: 2022-12-09

## 2022-12-09 NOTE — TELEPHONE ENCOUNTER
Pt left a VM stating she needed cardiac clearance for a knee surgery 1/23/23 being performed by Dr. Martinez at [facility].    Please advise

## 2022-12-29 PROCEDURE — 93294 REM INTERROG EVL PM/LDLS PM: CPT | Performed by: INTERNAL MEDICINE

## 2022-12-29 PROCEDURE — 93296 REM INTERROG EVL PM/IDS: CPT | Performed by: INTERNAL MEDICINE

## 2023-01-12 ENCOUNTER — HOSPITAL ENCOUNTER (OUTPATIENT)
Dept: GENERAL RADIOLOGY | Facility: HOSPITAL | Age: 83
Discharge: HOME | End: 2023-01-12
Payer: MEDICARE

## 2023-01-12 ENCOUNTER — PRE-ADMISSION TESTING (OUTPATIENT)
Dept: PREADMISSION TESTING | Facility: HOSPITAL | Age: 83
End: 2023-01-12
Payer: MEDICARE

## 2023-01-12 ENCOUNTER — LAB (OUTPATIENT)
Dept: LAB | Facility: HOSPITAL | Age: 83
End: 2023-01-12
Payer: MEDICARE

## 2023-01-12 ENCOUNTER — TRANSCRIBE ORDERS (OUTPATIENT)
Dept: ADMINISTRATIVE | Facility: HOSPITAL | Age: 83
End: 2023-01-12
Payer: MEDICARE

## 2023-01-12 VITALS
OXYGEN SATURATION: 98 % | HEIGHT: 67 IN | BODY MASS INDEX: 24.83 KG/M2 | RESPIRATION RATE: 18 BRPM | SYSTOLIC BLOOD PRESSURE: 153 MMHG | WEIGHT: 158.2 LBS | HEART RATE: 70 BPM | DIASTOLIC BLOOD PRESSURE: 73 MMHG | TEMPERATURE: 97.8 F

## 2023-01-12 DIAGNOSIS — R73.09 OTHER ABNORMAL GLUCOSE: ICD-10-CM

## 2023-01-12 DIAGNOSIS — M17.11 PRIMARY LOCALIZED OSTEOARTHRITIS OF RIGHT KNEE: Primary | ICD-10-CM

## 2023-01-12 DIAGNOSIS — M17.11 PRIMARY LOCALIZED OSTEOARTHRITIS OF RIGHT KNEE: ICD-10-CM

## 2023-01-12 DIAGNOSIS — R79.1 ABNORMAL COAGULATION PROFILE: ICD-10-CM

## 2023-01-12 LAB
ALBUMIN SERPL-MCNC: 4.2 G/DL (ref 3.5–5.2)
ALBUMIN/GLOB SERPL: 1.8 G/DL
ALP SERPL-CCNC: 81 U/L (ref 39–117)
ALT SERPL W P-5'-P-CCNC: 18 U/L (ref 1–33)
ANION GAP SERPL CALCULATED.3IONS-SCNC: 10 MMOL/L (ref 5–15)
AST SERPL-CCNC: 17 U/L (ref 1–32)
BILIRUB SERPL-MCNC: 0.5 MG/DL (ref 0–1.2)
BUN SERPL-MCNC: 21 MG/DL (ref 8–23)
BUN/CREAT SERPL: 23.3 (ref 7–25)
CALCIUM SPEC-SCNC: 9.3 MG/DL (ref 8.6–10.5)
CHLORIDE SERPL-SCNC: 104 MMOL/L (ref 98–107)
CO2 SERPL-SCNC: 27 MMOL/L (ref 22–29)
CREAT SERPL-MCNC: 0.9 MG/DL (ref 0.57–1)
DEPRECATED RDW RBC AUTO: 39.9 FL (ref 37–54)
EGFRCR SERPLBLD CKD-EPI 2021: 63.2 ML/MIN/1.73
ERYTHROCYTE [DISTWIDTH] IN BLOOD BY AUTOMATED COUNT: 12.2 % (ref 12.3–15.4)
GLOBULIN UR ELPH-MCNC: 2.4 GM/DL
GLUCOSE SERPL-MCNC: 146 MG/DL (ref 65–99)
HBA1C MFR BLD: 6.4 % (ref 4.8–5.6)
HCT VFR BLD AUTO: 39.8 % (ref 34–46.6)
HGB BLD-MCNC: 13.1 G/DL (ref 12–15.9)
INR PPP: 1.06 (ref 0.9–1.1)
MCH RBC QN AUTO: 29.2 PG (ref 26.6–33)
MCHC RBC AUTO-ENTMCNC: 32.9 G/DL (ref 31.5–35.7)
MCV RBC AUTO: 88.8 FL (ref 79–97)
PLATELET # BLD AUTO: 207 10*3/MM3 (ref 140–450)
PMV BLD AUTO: 11.4 FL (ref 6–12)
POTASSIUM SERPL-SCNC: 4.4 MMOL/L (ref 3.5–5.2)
PROT SERPL-MCNC: 6.6 G/DL (ref 6–8.5)
PROTHROMBIN TIME: 13.9 SECONDS (ref 11.7–14.2)
QT INTERVAL: 411 MS
RBC # BLD AUTO: 4.48 10*6/MM3 (ref 3.77–5.28)
SODIUM SERPL-SCNC: 141 MMOL/L (ref 136–145)
WBC NRBC COR # BLD: 6.07 10*3/MM3 (ref 3.4–10.8)

## 2023-01-12 PROCEDURE — 83036 HEMOGLOBIN GLYCOSYLATED A1C: CPT

## 2023-01-12 PROCEDURE — 93010 ELECTROCARDIOGRAM REPORT: CPT | Performed by: INTERNAL MEDICINE

## 2023-01-12 PROCEDURE — 73560 X-RAY EXAM OF KNEE 1 OR 2: CPT

## 2023-01-12 PROCEDURE — 80053 COMPREHEN METABOLIC PANEL: CPT

## 2023-01-12 PROCEDURE — 36415 COLL VENOUS BLD VENIPUNCTURE: CPT

## 2023-01-12 PROCEDURE — 93005 ELECTROCARDIOGRAM TRACING: CPT

## 2023-01-12 PROCEDURE — 85027 COMPLETE CBC AUTOMATED: CPT

## 2023-01-12 PROCEDURE — 85610 PROTHROMBIN TIME: CPT

## 2023-01-12 RX ORDER — CHLORHEXIDINE GLUCONATE 500 MG/1
CLOTH TOPICAL
COMMUNITY
End: 2023-01-17 | Stop reason: HOSPADM

## 2023-01-12 NOTE — DISCHARGE INSTRUCTIONS
ARRIVE DAY OF SURGERY AS NOTIFIED THE DAY BEFORE        Take the following medications the morning of surgery:  AMLODIPINE, GABAPENTIN, METOPROLOL      If you are on prescription narcotic pain medication to control your pain you may also take that medication the morning of surgery.    General Instructions:  Do not eat solid food after midnight the night before surgery.  You may drink clear liquids day of surgery but must stop at least one hour before your hospital arrival time.  It is beneficial for you to have a clear drink that contains carbohydrates the day of surgery.  We suggest a 12 to 20 ounce bottle of Gatorade or Powerade for non-diabetic patients or a 12 to 20 ounce bottle of G2 or Powerade Zero for diabetic patients. (Pediatric patients, are not advised to drink a 12 to 20 ounce carbohydrate drink)    Clear liquids are liquids you can see through.  Nothing red in color.     Plain water                               Sports drinks  Sodas                                   Gelatin (Jell-O)  Fruit juices without pulp such as white grape juice and apple juice  Popsicles that contain no fruit or yogurt  Tea or coffee (no cream or milk added)  Gatorade / Powerade  G2 / Powerade Zero    Infants may have breast milk up to four hours before surgery.  Infants drinking formula may drink formula up to six hours before surgery.   Patients who avoid smoking, chewing tobacco and alcohol for 4 weeks prior to surgery have a reduced risk of post-operative complications.  Quit smoking as many days before surgery as you can.  Do not smoke, use chewing tobacco or drink alcohol the day of surgery.   If applicable bring your C-PAP/ BI-PAP machine.  Bring any papers given to you in the doctor’s office.  Wear clean comfortable clothes.  Do not wear contact lenses, false eyelashes or make-up.  Bring a case for your glasses.   Bring crutches or walker if applicable.  Remove all piercings.  Leave jewelry and any other valuables at  home.  Hair extensions with metal clips must be removed prior to surgery.  The Pre-Admission Testing nurse will instruct you to bring medications if unable to obtain an accurate list in Pre-Admission Testing.            Preventing a Surgical Site Infection:  For 2 to 3 days before surgery, avoid shaving with a razor because the razor can irritate skin and make it easier to develop an infection.    Any areas of open skin can increase the risk of a post-operative wound infection by allowing bacteria to enter and travel throughout the body.  Notify your surgeon if you have any skin wounds / rashes even if it is not near the expected surgical site.  The area will need assessed to determine if surgery should be delayed until it is healed.  The night prior to surgery shower using a fresh bar of anti-bacterial soap (such as Dial) and clean washcloth.  Sleep in a clean bed with clean clothing.  Do not allow pets to sleep with you.  Shower on the morning of surgery using a fresh bar of anti-bacterial soap (such as Dial) and clean washcloth.  Dry with a clean towel and dress in clean clothing.  Ask your surgeon if you will be receiving antibiotics prior to surgery.  Make sure you, your family, and all healthcare providers clean their hands with soap and water or an alcohol based hand  before caring for you or your wound.    Day of surgery:  Your arrival time is approximately two hours before your scheduled surgery time.  Upon arrival, a Pre-op nurse and Anesthesiologist will review your health history, obtain vital signs, and answer questions you may have.  The only belongings needed at this time will be a list of your home medications and if applicable your C-PAP/BI-PAP machine.  A Pre-op nurse will start an IV and you may receive medication in preparation for surgery, including something to help you relax.     Please be aware that surgery does come with discomfort.  We want to make every effort to control your  discomfort so please discuss any uncontrolled symptoms with your nurse.   Your doctor will most likely have prescribed pain medications.      If you are going home after surgery you will receive individualized written care instructions before being discharged.  A responsible adult must drive you to and from the hospital on the day of your surgery and stay with you for 24 hours.  Discharge prescriptions can be filled by the hospital pharmacy during regular pharmacy hours.  If you are having surgery late in the day/evening your prescription may be e-prescribed to your pharmacy.  Please verify your pharmacy hours or chose a 24 hour pharmacy to avoid not having access to your prescription because your pharmacy has closed for the day.    If you are staying overnight following surgery, you will be transported to your hospital room following the recovery period.  [facility] has all private rooms.    If you have any questions please call Pre-Admission Testing at [phone].  Deductibles and co-payments are collected on the day of service. Please be prepared to pay the required co-pay, deductible or deposit on the day of service as defined by your plan.    Call your surgeon immediately if you experience any of the following symptoms:  Sore Throat  Shortness of Breath or difficulty breathing  Cough  Chills  Body soreness or muscle pain  Headache  Fever  New loss of taste or smell  Do not arrive for your surgery ill.  Your procedure will need to be rescheduled to another time.  You will need to call your physician before the day of surgery to avoid any unnecessary exposure to hospital staff as well as other patients.         CHLORHEXIDINE CLOTH INSTRUCTIONS  The morning of surgery follow these instructions using the Chlorhexidine cloths you've been given.  These steps reduce bacteria on the body.  Do not use the cloths near your eyes, ears mouth, genitalia or on open wounds.  Throw the cloths away after use  but do not try to flush them down a toilet.      Open and remove one cloth at a time from the package.    Leave the cloth unfolded and begin the bathing.  Massage the skin with the cloths using gentle pressure to remove bacteria.  Do not scrub harshly.   Follow the steps below with one 2% CHG cloth per area (6 total cloths).  One cloth for neck, shoulders and chest.  One cloth for both arms, hands, fingers and underarms (do underarms last).  One cloth for the abdomen followed by groin.  One cloth for right leg and foot including between the toes.  One cloth for left leg and foot including between the toes.  The last cloth is to be used for the back of the neck, back and buttocks.    Allow the CHG to air dry 3 minutes on the skin which will give it time to work and decrease the chance of irritation.  The skin may feel sticky until it is dry.  Do not rinse with water or any other liquid or you will lose the beneficial effects of the CHG.  If mild skin irritation occurs, do rinse the skin to remove the CHG.  Report this to the nurse at time of admission.  Do not apply lotions, creams, ointments, deodorants or perfumes after using the clothes. Dress in clean clothes before coming to the hospital.

## 2023-01-16 ENCOUNTER — ANESTHESIA EVENT (OUTPATIENT)
Dept: PERIOP | Facility: HOSPITAL | Age: 83
End: 2023-01-16
Payer: MEDICARE

## 2023-01-16 RX ORDER — CELECOXIB 200 MG/1
200 CAPSULE ORAL ONCE
Status: CANCELLED | OUTPATIENT
Start: 2023-01-17

## 2023-01-17 ENCOUNTER — ANESTHESIA (OUTPATIENT)
Dept: PERIOP | Facility: HOSPITAL | Age: 83
End: 2023-01-17
Payer: MEDICARE

## 2023-01-17 ENCOUNTER — HOSPITAL ENCOUNTER (OUTPATIENT)
Facility: HOSPITAL | Age: 83
Discharge: HOME | End: 2023-01-18
Attending: ORTHOPAEDIC SURGERY | Admitting: ORTHOPAEDIC SURGERY
Payer: MEDICARE

## 2023-01-17 ENCOUNTER — APPOINTMENT (OUTPATIENT)
Dept: GENERAL RADIOLOGY | Facility: HOSPITAL | Age: 83
End: 2023-01-17
Payer: MEDICARE

## 2023-01-17 DIAGNOSIS — M17.11 PRIMARY OSTEOARTHRITIS OF RIGHT KNEE: Primary | ICD-10-CM

## 2023-01-17 LAB — GLUCOSE BLDC GLUCOMTR-MCNC: 121 MG/DL (ref 70–130)

## 2023-01-17 PROCEDURE — 97110 THERAPEUTIC EXERCISES: CPT

## 2023-01-17 PROCEDURE — 73560 X-RAY EXAM OF KNEE 1 OR 2: CPT

## 2023-01-17 PROCEDURE — C1776 JOINT DEVICE (IMPLANTABLE): HCPCS | Performed by: ORTHOPAEDIC SURGERY

## 2023-01-17 PROCEDURE — A9270 NON-COVERED ITEM OR SERVICE: HCPCS | Performed by: ORTHOPAEDIC SURGERY

## 2023-01-17 PROCEDURE — 25010000002 ONDANSETRON PER 1 MG: Performed by: NURSE ANESTHETIST, CERTIFIED REGISTERED

## 2023-01-17 PROCEDURE — 25010000002 KETOROLAC TROMETHAMINE PER 15 MG: Performed by: ORTHOPAEDIC SURGERY

## 2023-01-17 PROCEDURE — 25010000002 ROPIVACAINE PER 1 MG: Performed by: ANESTHESIOLOGY

## 2023-01-17 PROCEDURE — 25010000002 MIDAZOLAM PER 1 MG: Performed by: ANESTHESIOLOGY

## 2023-01-17 PROCEDURE — 25010000002 MORPHINE PER 10 MG: Performed by: ORTHOPAEDIC SURGERY

## 2023-01-17 PROCEDURE — 25010000002 FENTANYL CITRATE (PF) 100 MCG/2ML SOLUTION: Performed by: NURSE ANESTHETIST, CERTIFIED REGISTERED

## 2023-01-17 PROCEDURE — G0378 HOSPITAL OBSERVATION PER HR: HCPCS

## 2023-01-17 PROCEDURE — 25010000002 DEXAMETHASONE PER 1 MG: Performed by: ANESTHESIOLOGY

## 2023-01-17 PROCEDURE — 25010000002 HYDROMORPHONE 1 MG/ML SOLUTION: Performed by: NURSE ANESTHETIST, CERTIFIED REGISTERED

## 2023-01-17 PROCEDURE — 25010000002 DEXAMETHASONE SODIUM PHOSPHATE 20 MG/5ML SOLUTION: Performed by: NURSE ANESTHETIST, CERTIFIED REGISTERED

## 2023-01-17 PROCEDURE — 97161 PT EVAL LOW COMPLEX 20 MIN: CPT

## 2023-01-17 PROCEDURE — 82962 GLUCOSE BLOOD TEST: CPT

## 2023-01-17 PROCEDURE — 63710000001 HYDROCODONE-ACETAMINOPHEN 7.5-325 MG TABLET: Performed by: NURSE ANESTHETIST, CERTIFIED REGISTERED

## 2023-01-17 PROCEDURE — 63710000001 OXYCODONE-ACETAMINOPHEN 5-325 MG TABLET: Performed by: ORTHOPAEDIC SURGERY

## 2023-01-17 PROCEDURE — 25010000002 NEOSTIGMINE 5 MG/10ML SOLUTION: Performed by: NURSE ANESTHETIST, CERTIFIED REGISTERED

## 2023-01-17 PROCEDURE — 63710000001 ATORVASTATIN 20 MG TABLET: Performed by: ORTHOPAEDIC SURGERY

## 2023-01-17 PROCEDURE — 63710000001 DIPHENHYDRAMINE PER 50 MG: Performed by: ORTHOPAEDIC SURGERY

## 2023-01-17 PROCEDURE — 25010000002 FENTANYL CITRATE (PF) 50 MCG/ML SOLUTION: Performed by: NURSE ANESTHETIST, CERTIFIED REGISTERED

## 2023-01-17 PROCEDURE — 63710000001 PANTOPRAZOLE 40 MG TABLET DELAYED-RELEASE: Performed by: ORTHOPAEDIC SURGERY

## 2023-01-17 PROCEDURE — 63710000001 GABAPENTIN 300 MG CAPSULE: Performed by: ORTHOPAEDIC SURGERY

## 2023-01-17 PROCEDURE — 63710000001 LOSARTAN 25 MG TABLET: Performed by: ORTHOPAEDIC SURGERY

## 2023-01-17 PROCEDURE — 63710000001 CYCLOSPORINE 0.05 % EMULSION: Performed by: ORTHOPAEDIC SURGERY

## 2023-01-17 PROCEDURE — 25010000002 ROPIVACAINE PER 1 MG: Performed by: ORTHOPAEDIC SURGERY

## 2023-01-17 PROCEDURE — 25010000002 PROPOFOL 10 MG/ML EMULSION: Performed by: NURSE ANESTHETIST, CERTIFIED REGISTERED

## 2023-01-17 PROCEDURE — 63710000001 METOPROLOL SUCCINATE XL 50 MG TABLET SUSTAINED-RELEASE 24 HOUR: Performed by: ORTHOPAEDIC SURGERY

## 2023-01-17 PROCEDURE — A9270 NON-COVERED ITEM OR SERVICE: HCPCS | Performed by: NURSE ANESTHETIST, CERTIFIED REGISTERED

## 2023-01-17 PROCEDURE — C1713 ANCHOR/SCREW BN/BN,TIS/BN: HCPCS | Performed by: ORTHOPAEDIC SURGERY

## 2023-01-17 PROCEDURE — 63710000001 FERROUS SULFATE 325 (65 FE) MG TABLET: Performed by: ORTHOPAEDIC SURGERY

## 2023-01-17 PROCEDURE — 63710000001 MUPIROCIN 2 % OINTMENT: Performed by: ORTHOPAEDIC SURGERY

## 2023-01-17 PROCEDURE — 25010000002 EPINEPHRINE 1 MG/ML SOLUTION 30 ML VIAL: Performed by: ORTHOPAEDIC SURGERY

## 2023-01-17 DEVICE — IMPLANTABLE DEVICE
Type: IMPLANTABLE DEVICE | Site: KNEE | Status: FUNCTIONAL
Brand: BIOMET KNEE SYSTEM

## 2023-01-17 DEVICE — IMPLANTABLE DEVICE
Type: IMPLANTABLE DEVICE | Site: KNEE | Status: FUNCTIONAL
Brand: VANGUARD® KNEE SYSTEM

## 2023-01-17 DEVICE — CAP TOTL KN CMT PRIMARY: Type: IMPLANTABLE DEVICE | Site: KNEE | Status: FUNCTIONAL

## 2023-01-17 DEVICE — CMT BONE R 1X40: Type: IMPLANTABLE DEVICE | Site: KNEE | Status: FUNCTIONAL

## 2023-01-17 DEVICE — IMPLANTABLE DEVICE
Type: IMPLANTABLE DEVICE | Site: KNEE | Status: FUNCTIONAL
Brand: BIOMET® KNEE SYSTEM

## 2023-01-17 RX ORDER — HYDROMORPHONE HYDROCHLORIDE 1 MG/ML
0.25 INJECTION, SOLUTION INTRAMUSCULAR; INTRAVENOUS; SUBCUTANEOUS
Status: DISCONTINUED | OUTPATIENT
Start: 2023-01-17 | End: 2023-01-17 | Stop reason: HOSPADM

## 2023-01-17 RX ORDER — FERROUS SULFATE 325(65) MG
325 TABLET ORAL
Status: DISCONTINUED | OUTPATIENT
Start: 2023-01-17 | End: 2023-01-18 | Stop reason: HOSPADM

## 2023-01-17 RX ORDER — PHENYLEPHRINE HCL IN 0.9% NACL 1 MG/10 ML
SYRINGE (ML) INTRAVENOUS AS NEEDED
Status: DISCONTINUED | OUTPATIENT
Start: 2023-01-17 | End: 2023-01-17 | Stop reason: SURG

## 2023-01-17 RX ORDER — PROMETHAZINE HYDROCHLORIDE 25 MG/1
25 SUPPOSITORY RECTAL ONCE AS NEEDED
Status: DISCONTINUED | OUTPATIENT
Start: 2023-01-17 | End: 2023-01-17 | Stop reason: HOSPADM

## 2023-01-17 RX ORDER — OXYCODONE HYDROCHLORIDE AND ACETAMINOPHEN 5; 325 MG/1; MG/1
1-2 TABLET ORAL EVERY 4 HOURS PRN
Qty: 42 TABLET | Refills: 0 | Status: SHIPPED | OUTPATIENT
Start: 2023-01-17 | End: 2023-03-13

## 2023-01-17 RX ORDER — TRANEXAMIC ACID 100 MG/ML
INJECTION, SOLUTION INTRAVENOUS AS NEEDED
Status: DISCONTINUED | OUTPATIENT
Start: 2023-01-17 | End: 2023-01-17 | Stop reason: SURG

## 2023-01-17 RX ORDER — ONDANSETRON 2 MG/ML
4 INJECTION INTRAMUSCULAR; INTRAVENOUS ONCE AS NEEDED
Status: DISCONTINUED | OUTPATIENT
Start: 2023-01-17 | End: 2023-01-17 | Stop reason: HOSPADM

## 2023-01-17 RX ORDER — PROMETHAZINE HYDROCHLORIDE 25 MG/1
25 TABLET ORAL ONCE AS NEEDED
Status: DISCONTINUED | OUTPATIENT
Start: 2023-01-17 | End: 2023-01-17 | Stop reason: HOSPADM

## 2023-01-17 RX ORDER — CHOLECALCIFEROL (VITAMIN D3) 125 MCG
5 CAPSULE ORAL NIGHTLY PRN
Status: DISCONTINUED | OUTPATIENT
Start: 2023-01-17 | End: 2023-01-18 | Stop reason: HOSPADM

## 2023-01-17 RX ORDER — MORPHINE SULFATE 2 MG/ML
4 INJECTION, SOLUTION INTRAMUSCULAR; INTRAVENOUS
Status: DISCONTINUED | OUTPATIENT
Start: 2023-01-17 | End: 2023-01-18 | Stop reason: HOSPADM

## 2023-01-17 RX ORDER — AMLODIPINE BESYLATE 5 MG/1
5 TABLET ORAL DAILY PRN
Status: DISCONTINUED | OUTPATIENT
Start: 2023-01-17 | End: 2023-01-18 | Stop reason: HOSPADM

## 2023-01-17 RX ORDER — CYCLOSPORINE 0.5 MG/ML
1 EMULSION OPHTHALMIC 2 TIMES DAILY
Status: DISCONTINUED | OUTPATIENT
Start: 2023-01-17 | End: 2023-01-18 | Stop reason: HOSPADM

## 2023-01-17 RX ORDER — FENTANYL CITRATE 50 UG/ML
INJECTION, SOLUTION INTRAMUSCULAR; INTRAVENOUS AS NEEDED
Status: DISCONTINUED | OUTPATIENT
Start: 2023-01-17 | End: 2023-01-17 | Stop reason: SURG

## 2023-01-17 RX ORDER — CLINDAMYCIN PHOSPHATE 900 MG/50ML
900 INJECTION INTRAVENOUS EVERY 8 HOURS
Status: COMPLETED | OUTPATIENT
Start: 2023-01-17 | End: 2023-01-17

## 2023-01-17 RX ORDER — HYDROCODONE BITARTRATE AND ACETAMINOPHEN 7.5; 325 MG/1; MG/1
1 TABLET ORAL ONCE AS NEEDED
Status: COMPLETED | OUTPATIENT
Start: 2023-01-17 | End: 2023-01-17

## 2023-01-17 RX ORDER — DIPHENHYDRAMINE HYDROCHLORIDE 50 MG/ML
12.5 INJECTION INTRAMUSCULAR; INTRAVENOUS
Status: DISCONTINUED | OUTPATIENT
Start: 2023-01-17 | End: 2023-01-17 | Stop reason: HOSPADM

## 2023-01-17 RX ORDER — FLUMAZENIL 0.1 MG/ML
0.2 INJECTION INTRAVENOUS AS NEEDED
Status: DISCONTINUED | OUTPATIENT
Start: 2023-01-17 | End: 2023-01-17 | Stop reason: HOSPADM

## 2023-01-17 RX ORDER — ROPIVACAINE HYDROCHLORIDE 5 MG/ML
INJECTION, SOLUTION EPIDURAL; INFILTRATION; PERINEURAL
Status: COMPLETED | OUTPATIENT
Start: 2023-01-17 | End: 2023-01-17

## 2023-01-17 RX ORDER — SODIUM CHLORIDE 450 MG/100ML
100 INJECTION, SOLUTION INTRAVENOUS CONTINUOUS
Status: DISCONTINUED | OUTPATIENT
Start: 2023-01-17 | End: 2023-01-18 | Stop reason: HOSPADM

## 2023-01-17 RX ORDER — DEXAMETHASONE SODIUM PHOSPHATE 4 MG/ML
INJECTION, SOLUTION INTRA-ARTICULAR; INTRALESIONAL; INTRAMUSCULAR; INTRAVENOUS; SOFT TISSUE AS NEEDED
Status: DISCONTINUED | OUTPATIENT
Start: 2023-01-17 | End: 2023-01-17 | Stop reason: SURG

## 2023-01-17 RX ORDER — ASPIRIN 325 MG
325 TABLET, DELAYED RELEASE (ENTERIC COATED) ORAL DAILY
Qty: 45 TABLET | Refills: 0 | Status: SHIPPED | OUTPATIENT
Start: 2023-01-18

## 2023-01-17 RX ORDER — OXYCODONE HYDROCHLORIDE AND ACETAMINOPHEN 5; 325 MG/1; MG/1
2 TABLET ORAL EVERY 4 HOURS PRN
Status: DISCONTINUED | OUTPATIENT
Start: 2023-01-17 | End: 2023-01-18 | Stop reason: HOSPADM

## 2023-01-17 RX ORDER — OXYCODONE AND ACETAMINOPHEN 7.5; 325 MG/1; MG/1
1 TABLET ORAL EVERY 4 HOURS PRN
Status: DISCONTINUED | OUTPATIENT
Start: 2023-01-17 | End: 2023-01-17 | Stop reason: HOSPADM

## 2023-01-17 RX ORDER — ACETAMINOPHEN 500 MG
1000 TABLET ORAL ONCE
Status: COMPLETED | OUTPATIENT
Start: 2023-01-17 | End: 2023-01-17

## 2023-01-17 RX ORDER — GABAPENTIN 300 MG/1
300 CAPSULE ORAL 3 TIMES DAILY
Status: DISCONTINUED | OUTPATIENT
Start: 2023-01-17 | End: 2023-01-18 | Stop reason: HOSPADM

## 2023-01-17 RX ORDER — CLINDAMYCIN PHOSPHATE 900 MG/50ML
INJECTION INTRAVENOUS AS NEEDED
Status: DISCONTINUED | OUTPATIENT
Start: 2023-01-17 | End: 2023-01-17 | Stop reason: SURG

## 2023-01-17 RX ORDER — HYDRALAZINE HYDROCHLORIDE 20 MG/ML
5 INJECTION INTRAMUSCULAR; INTRAVENOUS
Status: DISCONTINUED | OUTPATIENT
Start: 2023-01-17 | End: 2023-01-17 | Stop reason: HOSPADM

## 2023-01-17 RX ORDER — ATORVASTATIN CALCIUM 20 MG/1
20 TABLET, FILM COATED ORAL DAILY
Status: DISCONTINUED | OUTPATIENT
Start: 2023-01-17 | End: 2023-01-18 | Stop reason: HOSPADM

## 2023-01-17 RX ORDER — OXYCODONE HYDROCHLORIDE AND ACETAMINOPHEN 5; 325 MG/1; MG/1
1 TABLET ORAL EVERY 4 HOURS PRN
Status: DISCONTINUED | OUTPATIENT
Start: 2023-01-17 | End: 2023-01-18 | Stop reason: HOSPADM

## 2023-01-17 RX ORDER — METOPROLOL SUCCINATE 50 MG/1
50 TABLET, EXTENDED RELEASE ORAL 2 TIMES DAILY
Status: DISCONTINUED | OUTPATIENT
Start: 2023-01-17 | End: 2023-01-18 | Stop reason: HOSPADM

## 2023-01-17 RX ORDER — LIDOCAINE HYDROCHLORIDE 20 MG/ML
INJECTION, SOLUTION INFILTRATION; PERINEURAL AS NEEDED
Status: DISCONTINUED | OUTPATIENT
Start: 2023-01-17 | End: 2023-01-17 | Stop reason: SURG

## 2023-01-17 RX ORDER — EPHEDRINE SULFATE 50 MG/ML
5 INJECTION, SOLUTION INTRAVENOUS ONCE AS NEEDED
Status: DISCONTINUED | OUTPATIENT
Start: 2023-01-17 | End: 2023-01-17 | Stop reason: HOSPADM

## 2023-01-17 RX ORDER — MAGNESIUM HYDROXIDE 1200 MG/15ML
LIQUID ORAL AS NEEDED
Status: DISCONTINUED | OUTPATIENT
Start: 2023-01-17 | End: 2023-01-17 | Stop reason: HOSPADM

## 2023-01-17 RX ORDER — ASPIRIN 325 MG
325 TABLET, DELAYED RELEASE (ENTERIC COATED) ORAL DAILY
Status: DISCONTINUED | OUTPATIENT
Start: 2023-01-18 | End: 2023-01-18 | Stop reason: HOSPADM

## 2023-01-17 RX ORDER — ACETAMINOPHEN 325 MG/1
325 TABLET ORAL EVERY 4 HOURS PRN
Status: DISCONTINUED | OUTPATIENT
Start: 2023-01-17 | End: 2023-01-18 | Stop reason: HOSPADM

## 2023-01-17 RX ORDER — GLYCOPYRROLATE 0.2 MG/ML
INJECTION INTRAMUSCULAR; INTRAVENOUS AS NEEDED
Status: DISCONTINUED | OUTPATIENT
Start: 2023-01-17 | End: 2023-01-17 | Stop reason: SURG

## 2023-01-17 RX ORDER — SODIUM CHLORIDE 0.9 % (FLUSH) 0.9 %
1-10 SYRINGE (ML) INJECTION AS NEEDED
Status: DISCONTINUED | OUTPATIENT
Start: 2023-01-17 | End: 2023-01-18 | Stop reason: HOSPADM

## 2023-01-17 RX ORDER — ROCURONIUM BROMIDE 10 MG/ML
INJECTION, SOLUTION INTRAVENOUS AS NEEDED
Status: DISCONTINUED | OUTPATIENT
Start: 2023-01-17 | End: 2023-01-17 | Stop reason: SURG

## 2023-01-17 RX ORDER — PROPOFOL 10 MG/ML
VIAL (ML) INTRAVENOUS AS NEEDED
Status: DISCONTINUED | OUTPATIENT
Start: 2023-01-17 | End: 2023-01-17 | Stop reason: SURG

## 2023-01-17 RX ORDER — ESMOLOL HYDROCHLORIDE 10 MG/ML
INJECTION INTRAVENOUS AS NEEDED
Status: DISCONTINUED | OUTPATIENT
Start: 2023-01-17 | End: 2023-01-17 | Stop reason: SURG

## 2023-01-17 RX ORDER — FENTANYL CITRATE 50 UG/ML
25 INJECTION, SOLUTION INTRAMUSCULAR; INTRAVENOUS
Status: DISCONTINUED | OUTPATIENT
Start: 2023-01-17 | End: 2023-01-17 | Stop reason: HOSPADM

## 2023-01-17 RX ORDER — ONDANSETRON 2 MG/ML
4 INJECTION INTRAMUSCULAR; INTRAVENOUS EVERY 6 HOURS PRN
Status: DISCONTINUED | OUTPATIENT
Start: 2023-01-17 | End: 2023-01-18 | Stop reason: HOSPADM

## 2023-01-17 RX ORDER — LABETALOL HYDROCHLORIDE 5 MG/ML
5 INJECTION, SOLUTION INTRAVENOUS
Status: DISCONTINUED | OUTPATIENT
Start: 2023-01-17 | End: 2023-01-17 | Stop reason: HOSPADM

## 2023-01-17 RX ORDER — DOCUSATE SODIUM 250 MG
250 CAPSULE ORAL 2 TIMES DAILY PRN
Qty: 30 CAPSULE | Refills: 1 | Status: SHIPPED | OUTPATIENT
Start: 2023-01-17

## 2023-01-17 RX ORDER — DOCUSATE SODIUM 100 MG/1
100 CAPSULE, LIQUID FILLED ORAL 2 TIMES DAILY PRN
Status: DISCONTINUED | OUTPATIENT
Start: 2023-01-17 | End: 2023-01-18 | Stop reason: HOSPADM

## 2023-01-17 RX ORDER — IPRATROPIUM BROMIDE 42 UG/1
2 SPRAY, METERED NASAL 4 TIMES DAILY PRN
Status: DISCONTINUED | OUTPATIENT
Start: 2023-01-17 | End: 2023-01-18 | Stop reason: HOSPADM

## 2023-01-17 RX ORDER — SODIUM CHLORIDE 9 MG/ML
40 INJECTION, SOLUTION INTRAVENOUS AS NEEDED
Status: DISCONTINUED | OUTPATIENT
Start: 2023-01-17 | End: 2023-01-18 | Stop reason: HOSPADM

## 2023-01-17 RX ORDER — DIPHENHYDRAMINE HCL 25 MG
25 CAPSULE ORAL EVERY 6 HOURS PRN
Status: DISCONTINUED | OUTPATIENT
Start: 2023-01-17 | End: 2023-01-18 | Stop reason: HOSPADM

## 2023-01-17 RX ORDER — BUPIVACAINE HYDROCHLORIDE AND EPINEPHRINE 2.5; 5 MG/ML; UG/ML
INJECTION, SOLUTION EPIDURAL; INFILTRATION; INTRACAUDAL; PERINEURAL
Status: COMPLETED | OUTPATIENT
Start: 2023-01-17 | End: 2023-01-17

## 2023-01-17 RX ORDER — PANTOPRAZOLE SODIUM 40 MG/1
40 TABLET, DELAYED RELEASE ORAL
Status: DISCONTINUED | OUTPATIENT
Start: 2023-01-17 | End: 2023-01-18 | Stop reason: HOSPADM

## 2023-01-17 RX ORDER — SODIUM CHLORIDE, SODIUM LACTATE, POTASSIUM CHLORIDE, CALCIUM CHLORIDE 600; 310; 30; 20 MG/100ML; MG/100ML; MG/100ML; MG/100ML
9 INJECTION, SOLUTION INTRAVENOUS CONTINUOUS
Status: DISCONTINUED | OUTPATIENT
Start: 2023-01-17 | End: 2023-01-18 | Stop reason: HOSPADM

## 2023-01-17 RX ORDER — DIAZEPAM 5 MG/1
5 TABLET ORAL EVERY 6 HOURS PRN
Status: DISCONTINUED | OUTPATIENT
Start: 2023-01-17 | End: 2023-01-18 | Stop reason: HOSPADM

## 2023-01-17 RX ORDER — FAMOTIDINE 10 MG/ML
20 INJECTION, SOLUTION INTRAVENOUS ONCE
Status: COMPLETED | OUTPATIENT
Start: 2023-01-17 | End: 2023-01-17

## 2023-01-17 RX ORDER — ONDANSETRON 4 MG/1
4 TABLET, FILM COATED ORAL EVERY 6 HOURS PRN
Status: DISCONTINUED | OUTPATIENT
Start: 2023-01-17 | End: 2023-01-18 | Stop reason: HOSPADM

## 2023-01-17 RX ORDER — KETOROLAC TROMETHAMINE 15 MG/ML
15 INJECTION, SOLUTION INTRAMUSCULAR; INTRAVENOUS EVERY 6 HOURS PRN
Status: DISCONTINUED | OUTPATIENT
Start: 2023-01-17 | End: 2023-01-18 | Stop reason: HOSPADM

## 2023-01-17 RX ORDER — NALOXONE HCL 0.4 MG/ML
0.4 VIAL (ML) INJECTION
Status: DISCONTINUED | OUTPATIENT
Start: 2023-01-17 | End: 2023-01-18 | Stop reason: HOSPADM

## 2023-01-17 RX ORDER — LOSARTAN POTASSIUM 25 MG/1
25 TABLET ORAL 2 TIMES DAILY
Status: DISCONTINUED | OUTPATIENT
Start: 2023-01-17 | End: 2023-01-18 | Stop reason: HOSPADM

## 2023-01-17 RX ORDER — MIDAZOLAM HYDROCHLORIDE 1 MG/ML
0.5 INJECTION INTRAMUSCULAR; INTRAVENOUS
Status: DISCONTINUED | OUTPATIENT
Start: 2023-01-17 | End: 2023-01-17 | Stop reason: HOSPADM

## 2023-01-17 RX ORDER — SODIUM CHLORIDE 0.9 % (FLUSH) 0.9 %
3-10 SYRINGE (ML) INJECTION AS NEEDED
Status: DISCONTINUED | OUTPATIENT
Start: 2023-01-17 | End: 2023-01-17 | Stop reason: HOSPADM

## 2023-01-17 RX ORDER — CLINDAMYCIN PHOSPHATE 900 MG/50ML
900 INJECTION INTRAVENOUS ONCE
Status: COMPLETED | OUTPATIENT
Start: 2023-01-17 | End: 2023-01-17

## 2023-01-17 RX ORDER — SODIUM CHLORIDE 0.9 % (FLUSH) 0.9 %
3 SYRINGE (ML) INJECTION EVERY 12 HOURS SCHEDULED
Status: DISCONTINUED | OUTPATIENT
Start: 2023-01-17 | End: 2023-01-17 | Stop reason: HOSPADM

## 2023-01-17 RX ORDER — LIDOCAINE HYDROCHLORIDE 10 MG/ML
0.5 INJECTION, SOLUTION EPIDURAL; INFILTRATION; INTRACAUDAL; PERINEURAL ONCE AS NEEDED
Status: DISCONTINUED | OUTPATIENT
Start: 2023-01-17 | End: 2023-01-17 | Stop reason: HOSPADM

## 2023-01-17 RX ORDER — NALOXONE HCL 0.4 MG/ML
0.2 VIAL (ML) INJECTION AS NEEDED
Status: DISCONTINUED | OUTPATIENT
Start: 2023-01-17 | End: 2023-01-17 | Stop reason: HOSPADM

## 2023-01-17 RX ORDER — NEOSTIGMINE METHYLSULFATE 0.5 MG/ML
INJECTION, SOLUTION INTRAVENOUS AS NEEDED
Status: DISCONTINUED | OUTPATIENT
Start: 2023-01-17 | End: 2023-01-17 | Stop reason: SURG

## 2023-01-17 RX ORDER — ONDANSETRON 2 MG/ML
INJECTION INTRAMUSCULAR; INTRAVENOUS AS NEEDED
Status: DISCONTINUED | OUTPATIENT
Start: 2023-01-17 | End: 2023-01-17 | Stop reason: SURG

## 2023-01-17 RX ORDER — DEXAMETHASONE SODIUM PHOSPHATE 4 MG/ML
INJECTION, SOLUTION INTRA-ARTICULAR; INTRALESIONAL; INTRAMUSCULAR; INTRAVENOUS; SOFT TISSUE
Status: COMPLETED | OUTPATIENT
Start: 2023-01-17 | End: 2023-01-17

## 2023-01-17 RX ORDER — DIPHENHYDRAMINE HCL 25 MG
25 CAPSULE ORAL
Status: DISCONTINUED | OUTPATIENT
Start: 2023-01-17 | End: 2023-01-17 | Stop reason: HOSPADM

## 2023-01-17 RX ORDER — SODIUM CHLORIDE 0.9 % (FLUSH) 0.9 %
10 SYRINGE (ML) INJECTION EVERY 12 HOURS SCHEDULED
Status: DISCONTINUED | OUTPATIENT
Start: 2023-01-17 | End: 2023-01-18 | Stop reason: HOSPADM

## 2023-01-17 RX ORDER — FENTANYL CITRATE 50 UG/ML
50 INJECTION, SOLUTION INTRAMUSCULAR; INTRAVENOUS
Status: DISCONTINUED | OUTPATIENT
Start: 2023-01-17 | End: 2023-01-17 | Stop reason: HOSPADM

## 2023-01-17 RX ORDER — PROMETHAZINE HYDROCHLORIDE 12.5 MG/1
12.5 TABLET ORAL EVERY 6 HOURS PRN
Status: DISCONTINUED | OUTPATIENT
Start: 2023-01-17 | End: 2023-01-18 | Stop reason: HOSPADM

## 2023-01-17 RX ORDER — DIPHENHYDRAMINE HYDROCHLORIDE 50 MG/ML
12.5 INJECTION INTRAMUSCULAR; INTRAVENOUS EVERY 6 HOURS PRN
Status: DISCONTINUED | OUTPATIENT
Start: 2023-01-17 | End: 2023-01-18 | Stop reason: HOSPADM

## 2023-01-17 RX ADMIN — GABAPENTIN 300 MG: 300 CAPSULE ORAL at 16:38

## 2023-01-17 RX ADMIN — Medication 100 MCG: at 08:25

## 2023-01-17 RX ADMIN — DEXAMETHASONE SODIUM PHOSPHATE 8 MG: 4 INJECTION, SOLUTION INTRAMUSCULAR; INTRAVENOUS at 07:25

## 2023-01-17 RX ADMIN — FAMOTIDINE 20 MG: 10 INJECTION INTRAVENOUS at 06:32

## 2023-01-17 RX ADMIN — Medication 10 ML: at 12:32

## 2023-01-17 RX ADMIN — HYDROMORPHONE HYDROCHLORIDE 1 MG: 1 INJECTION, SOLUTION INTRAMUSCULAR; INTRAVENOUS; SUBCUTANEOUS at 08:02

## 2023-01-17 RX ADMIN — OXYCODONE AND ACETAMINOPHEN 1 TABLET: 5; 325 TABLET ORAL at 22:13

## 2023-01-17 RX ADMIN — PANTOPRAZOLE SODIUM 40 MG: 40 TABLET, DELAYED RELEASE ORAL at 12:27

## 2023-01-17 RX ADMIN — ONDANSETRON 4 MG: 2 INJECTION INTRAMUSCULAR; INTRAVENOUS at 08:39

## 2023-01-17 RX ADMIN — FENTANYL CITRATE 25 MCG: 50 INJECTION, SOLUTION INTRAMUSCULAR; INTRAVENOUS at 07:36

## 2023-01-17 RX ADMIN — FENTANYL CITRATE 25 MCG: 50 INJECTION, SOLUTION INTRAMUSCULAR; INTRAVENOUS at 09:10

## 2023-01-17 RX ADMIN — HYDROCODONE BITARTRATE AND ACETAMINOPHEN 1 TABLET: 7.5; 325 TABLET ORAL at 09:10

## 2023-01-17 RX ADMIN — LOSARTAN POTASSIUM 25 MG: 25 TABLET, FILM COATED ORAL at 21:13

## 2023-01-17 RX ADMIN — Medication 100 MCG: at 08:45

## 2023-01-17 RX ADMIN — BUPIVACAINE HYDROCHLORIDE AND EPINEPHRINE BITARTRATE 15 ML: 2.5; .005 INJECTION, SOLUTION EPIDURAL; INFILTRATION; INTRACAUDAL; PERINEURAL at 06:53

## 2023-01-17 RX ADMIN — GABAPENTIN 300 MG: 300 CAPSULE ORAL at 12:28

## 2023-01-17 RX ADMIN — SODIUM CHLORIDE, POTASSIUM CHLORIDE, SODIUM LACTATE AND CALCIUM CHLORIDE 9 ML/HR: 600; 310; 30; 20 INJECTION, SOLUTION INTRAVENOUS at 06:24

## 2023-01-17 RX ADMIN — LIDOCAINE HYDROCHLORIDE 80 MG: 20 INJECTION, SOLUTION INFILTRATION; PERINEURAL at 07:17

## 2023-01-17 RX ADMIN — MUPIROCIN 1 APPLICATION: 20 OINTMENT TOPICAL at 12:29

## 2023-01-17 RX ADMIN — ROCURONIUM BROMIDE 35 MG: 10 INJECTION, SOLUTION INTRAVENOUS at 07:17

## 2023-01-17 RX ADMIN — LOSARTAN POTASSIUM 25 MG: 25 TABLET, FILM COATED ORAL at 12:28

## 2023-01-17 RX ADMIN — CLINDAMYCIN PHOSPHATE 900 MG: 900 INJECTION, SOLUTION INTRAVENOUS at 22:13

## 2023-01-17 RX ADMIN — GABAPENTIN 300 MG: 300 CAPSULE ORAL at 21:13

## 2023-01-17 RX ADMIN — METOPROLOL SUCCINATE 50 MG: 50 TABLET, FILM COATED, EXTENDED RELEASE ORAL at 21:13

## 2023-01-17 RX ADMIN — CLINDAMYCIN IN 5 PERCENT DEXTROSE 900 MG: 18 INJECTION, SOLUTION INTRAVENOUS at 08:35

## 2023-01-17 RX ADMIN — DEXAMETHASONE SODIUM PHOSPHATE 4 MG: 4 INJECTION, SOLUTION INTRAMUSCULAR; INTRAVENOUS at 06:54

## 2023-01-17 RX ADMIN — FENTANYL CITRATE 50 MCG: 50 INJECTION, SOLUTION INTRAMUSCULAR; INTRAVENOUS at 07:47

## 2023-01-17 RX ADMIN — NEOSTIGMINE METHYLSULFATE 2 MG: 0.5 INJECTION INTRAVENOUS at 08:39

## 2023-01-17 RX ADMIN — ATORVASTATIN CALCIUM 20 MG: 20 TABLET, FILM COATED ORAL at 14:09

## 2023-01-17 RX ADMIN — GLYCOPYRROLATE 0.3 MG: 1 INJECTION INTRAMUSCULAR; INTRAVENOUS at 08:39

## 2023-01-17 RX ADMIN — FERROUS SULFATE TAB 325 MG (65 MG ELEMENTAL FE) 325 MG: 325 (65 FE) TAB at 12:27

## 2023-01-17 RX ADMIN — SODIUM CHLORIDE 100 ML/HR: 4.5 INJECTION, SOLUTION INTRAVENOUS at 11:24

## 2023-01-17 RX ADMIN — ROPIVACAINE HYDROCHLORIDE 15 ML: 5 INJECTION, SOLUTION EPIDURAL; INFILTRATION; PERINEURAL at 06:53

## 2023-01-17 RX ADMIN — FENTANYL CITRATE 25 MCG: 50 INJECTION, SOLUTION INTRAMUSCULAR; INTRAVENOUS at 09:22

## 2023-01-17 RX ADMIN — ESMOLOL HYDROCHLORIDE 30 MG: 100 INJECTION, SOLUTION INTRAVENOUS at 07:17

## 2023-01-17 RX ADMIN — ACETAMINOPHEN 1000 MG: 500 TABLET, FILM COATED ORAL at 06:31

## 2023-01-17 RX ADMIN — Medication 1 DROP: at 12:31

## 2023-01-17 RX ADMIN — MIDAZOLAM 0.5 MG: 1 INJECTION INTRAMUSCULAR; INTRAVENOUS at 06:47

## 2023-01-17 RX ADMIN — PROPOFOL 110 MG: 10 INJECTION, EMULSION INTRAVENOUS at 07:17

## 2023-01-17 RX ADMIN — MUPIROCIN 1 APPLICATION: 20 OINTMENT TOPICAL at 21:12

## 2023-01-17 RX ADMIN — FENTANYL CITRATE 25 MCG: 50 INJECTION, SOLUTION INTRAMUSCULAR; INTRAVENOUS at 07:35

## 2023-01-17 RX ADMIN — TRANEXAMIC ACID 1000 MG: 100 INJECTION, SOLUTION INTRAVENOUS at 07:30

## 2023-01-17 RX ADMIN — DIPHENHYDRAMINE HYDROCHLORIDE 25 MG: 25 CAPSULE ORAL at 14:09

## 2023-01-17 RX ADMIN — Medication 1 DROP: at 22:13

## 2023-01-17 RX ADMIN — CLINDAMYCIN PHOSPHATE 900 MG: 900 INJECTION, SOLUTION INTRAVENOUS at 07:03

## 2023-01-17 RX ADMIN — CLINDAMYCIN PHOSPHATE 900 MG: 900 INJECTION, SOLUTION INTRAVENOUS at 14:09

## 2023-01-17 NOTE — PLAN OF CARE
Goal Outcome Evaluation:  Plan of Care Reviewed With: patient           Outcome Evaluation: Pt arrived to unit Post op R TKA, AOx4, VSS, pt ambulating with x1 assist and RW, voiding intact, no pain reported, NS at 100/hr, no c/o n/v

## 2023-01-17 NOTE — OP NOTE
Orthopaedic Surgery Operative Note    Patient Name:  Reanna Higgins  YOB: 1940  Age: 82 y.o.  Medical Records Number:  [id number]    Date of Procedure:  1/17/2023    Pre-operative Diagnosis:  Primary Osteoarthritis Right Knee    Post-operative Diagnosis:  Primary Osteoarthritis Right Knee    Procedure Performed:  Right Total Knee Arthroplasty    Implants:  Biomet Vanguard TKA, 67.5 Femoral Component, 75 Tibial Tray with a 40 mm Modular Stem, 34 3-Peg Patella, 12 Posterior Lipped Polyethylene Insert    Surgeon:  To Sands M.D.    Assistant: Lina Pleitez (who was present during the critical portions of the case, thereby decreasing operative time and patient morbidity)    Anesthetic Type:  General    Estimated Blood Loss:  250cc's    Specimens: * No orders in the log *    No Complications      Indications for Procedure:  Reanna Higigns is a 82 y.o. female suffering from end stage degenerative changes in the right knee.  The patients pain is becoming disabling, despite extensive conservative care, including NSAIDS, therapy and injections.  The risks, benefits and alternatives were discussed and the patient wishes to proceed with right total knee arthroplasty.      Procedure Performed:    After informed consent was obtained, the correct patient identified, the correct operative side marked by the operative surgeon and pre-operative IV Clindamycin given (prior to tourniquet inflation), the patient was taken to the operating room and placed supine on the operating table.  After general anesthesia was induced, a surgical time out was performed and 1 gm of Tranxemic Acid given, a well padded tourniquet was placed and the patient's right lower extremity was prepped with ChloraPrep and draped in a sterile fashion.    A midline incision was made overlying the right knee and we sharply dissected down to expose the parapatellar retinaculum.  A muscle sparing, mid-vastus parapatellar arthrotomy was  performed and we elevated the soft tissue both medially and laterally.  The menisci and ACL were excised.  We everted the patella and measured this to be 23 mm and we removed 8 mm of bone down to 15 mm.  We measured the patella to be 34 and drilled our three drill holes.  We protected the patella with the patella protector and turned our attention to the femur and the tibia.    We drilled drill holes into the femoral and the tibial intramedullary canals and proceeded to irrigate the canals to remove the fatty marrow.  We used the intramedullary theresa and the 5 degree valgus cut block to make our distal femoral cut.  Once we had a smooth surface, we measured the femur to be 67.5.  We assured we had rotational alignment using the epicondylar axis, then we used the four-in-one cut block to make our anterior, posterior, anterior and posterior chamfer cuts.  We placed our femoral trial, had excellent fit, and extended the knee and marked [place]'s line on the tibia.      We then turned our attention to the tibia, where we irrigated the canal again.  We used the intramedullary theresa and the 3 degree posterior sloped cut block to remove 2 mm of bone from the effected side of the tibia.  Prior to making our cut, we assured we had rotational alignment using the external guide and [place]'s line.  Once we had a smooth surface, we measured the tibia to be 75.  We then removed soft tissue and bony debris from the posterior aspect of the knee.    We placed our trial implants and had excellent fit, range of motion, stability and ligament balance, throughout a complete arc of motion with a 12 lipped polyethylene liner.  We removed our trial implants, punched the tibial keel, copiously irrigated the knee, then cemented our implants in place using Biomet cement.  Once the cement cured, we trialed again with the 10, 12 and 14 AS,standard and posterior lipped polyethylene liners.  The 12 lipped gave us the best range of motion,  "stability and ligament balance, throughout a complete arc of motion.  We removed the trials, copiously irrigated the knee with dilute betadine solution, gave IV antibiotics and local anesthetic, then placed our permanent polyethylene liner.  We placed a 1/8\" hemovac drain, then closed the arthrotomy with #1 Vicryl pop-off sutures.  The subcutaneous tissue was closed with 2-0 vicryl pop-off sutures.  The skin was closed with staples.  We placed a sterile dressing of Xeroform, 4x4's, abdominal pads, cast padding and an Ace Wrap.  All sponge and needle counts were correct.  The patient was then awakened from general anesthesia and taken to the recovery room in stable condition.    The patient will be started on Aspirin 325 mg twice daily for DVT prophylaxis.  IV antibiotics will be discontinued within 24 hours of surgery.  Immediately prior to surgery, there were no acute Thromboembolic nor Cardiovascular risk factors.  An updated Medical Reconciliation form is on the chart.    To Robles PA-C  [facility]  [address] [place] [address]  [phone]    1/17/2023  "

## 2023-01-17 NOTE — NURSING NOTE
Notified Dr. Sands that patient has a small circular scabbed area approximately 4 inches below right operative knee. Per Dr. Sands, he will observe it when he arrives to the hospital in about 5 minutes.

## 2023-01-17 NOTE — NURSING NOTE
Dr. Sands present at patient's bedside. Dr. Sands observed the small mostly scabbed area approximately 4 inches below the operative knee. Pt states that she made the scratch herself. No new orders, ok to proceed with surgery.

## 2023-01-17 NOTE — THERAPY EVALUATION
Patient Name: Reanna Higgins  : 1940    MRN: [id number]                              Today's Date: 2023       Admit Date: 2023    Visit Dx:     ICD-10-CM ICD-9-CM   1. Primary osteoarthritis of right knee  M17.11 715.16     Patient Active Problem List   Diagnosis   • Colon polyp   • Gastroesophageal reflux disease   • Essential hypertension   • Dyslipidemia (high LDL; low HDL)   • Mitral and aortic valve disease   • Heart valve disease   • REKHA (generalized anxiety disorder)   • Neuropathy due to medical condition ([facility])   • Malignant neoplasm of cheek ([facility])   • Non-rheumatic mitral regurgitation   • Osteopenia   • SVT (supraventricular tachycardia) ([facility])   • AV block, Mobitz 1   • Cardiac pacemaker in situ   • RLS (restless legs syndrome)   • Type 2 diabetes mellitus with hyperglycemia, without long-term current use of insulin ([facility])   • Mesenteric ischemia due to arterial insufficiency ([facility])   • Sick sinus syndrome ([facility])   • Coronary artery disease involving native coronary artery of native heart with angina pectoris ([facility])   • S/P CABG (coronary artery bypass graft)   • Subclinical hypothyroidism   • Celiac artery stenosis ([facility])   • History of acute pancreatitis   • Chronic pancreatitis ([facility])   • Chronic recurrent pancreatitis ([facility])   • History of adenomatous polyp of colon   • Primary osteoarthritis of right knee     Past Medical History:   Diagnosis Date   • Anesthesia complication     SEVERE SHAKING OF ENTIRE BODY AFTER EGD/ERCP. STATES THAT ANESTHESIA DID NOT SEE PREOP   • Cardiac pacemaker in situ    • Carpal tunnel syndrome    • Cystic fibroadenosis of breast    • Diabetes mellitus (HCC)    • GERD (gastroesophageal reflux disease)    • History of ileus 2019   • History of skin cancer    • Hypercholesterolemia    • Hypertension    • Osteoarthritis of both hands    • Pacemaker    • Pancreatitis    • Peripheral neuropathy    • Phobia, flying 2019   • Restless leg syndrome    • Second  degree AV block, Mobitz type I    • SVT (supraventricular tachycardia) (HCC)      Past Surgical History:   Procedure Laterality Date   • APPENDECTOMY     • BILATERAL OOPHORECTOMY Bilateral 1988   • BREAST CYST EXCISION Bilateral     4 BREAST SURGERIES   • CARDIAC CATHETERIZATION N/A 8/22/2019    Procedure: Left Heart Cath;  Surgeon: Francis Mccullough MD;  Location: [facility] CATH INVASIVE LOCATION;  Service: Cardiology   • CARDIAC CATHETERIZATION N/A 8/22/2019    Procedure: Left ventriculography;  Surgeon: Francis Mccullough MD;  Location: [facility] CATH INVASIVE LOCATION;  Service: Cardiology   • CARDIAC CATHETERIZATION N/A 8/22/2019    Procedure: Coronary angiography;  Surgeon: Francis Mccullough MD;  Location: [facility] CATH INVASIVE LOCATION;  Service: Cardiology   • CARDIAC ELECTROPHYSIOLOGY PROCEDURE N/A 10/10/2016    Procedure: Pacemaker DC new  [place];  Surgeon: Wilfrido Hameed MD;  Location: [facility] CATH INVASIVE LOCATION;  Service:    • CARPAL TUNNEL RELEASE Right    • CATARACT EXTRACTION      NOVEMBER AND DECEMBER 2014   • CHOLECYSTECTOMY     • COLONOSCOPY  2015   • COLONOSCOPY N/A 1/21/2022    Procedure: COLONOSCOPY into cecum and terminal ileum with hot snare polypectomy x2;  Surgeon: Giovany Redman MD;  Location: [facility] ENDOSCOPY;  Service: Gastroenterology;  Laterality: N/A;  Pre op: History of polyps  Post op: Polyps, Diverticulosis and Hemorrhoids   • CORONARY ARTERY BYPASS GRAFT N/A 8/23/2019    Procedure: SUMMER STERNOTOMY CORONARY ARTERY BYPASS GRAFT TIMES 6 USING LEFT INTERNAL MAMMARY ARTERY AND LEFT GREATER SAPHENOUS VEIN GRAFT PER ENDOSCOPIC VEIN HARVESTING AND PRP;  Surgeon: Kem aHwk MD;  Location: [facility] MAIN OR;  Service: Cardiothoracic   • ENDOSCOPY N/A 7/18/2019    Procedure: ESOPHAGOGASTRODUODENOSCOPY with biopsy;  Surgeon: Ricky Chew MD;  Location: [facility] ENDOSCOPY;  Service: Gastroenterology   • ERCP N/A 9/29/2021    Procedure: ENDOSCOPIC RETROGRADE CHOLANGIOPANCREATOGRAPHY  WITH SPHINCTEROTOMY AND BALLOON SWEEP;  Surgeon: Giovany Redman MD;  Location: [facility] ENDOSCOPY;  Service: Gastroenterology;  Laterality: N/A;  PRE- RECURRENT PANCREATITIS  POST- SAME   • EYE SURGERY     • HYSTERECTOMY     • KNEE ARTHROSCOPY Bilateral 04/2014    MENISCAL TEAR   • PACEMAKER IMPLANTATION  10/10/2016   • SHOULDER SURGERY Right     ROTATOR CUFF SURGERY JUNE 18, 2015   • TONSILLECTOMY     • TRIGGER FINGER RELEASE      both hands      General Information     Row Name 01/17/23 1418          Physical Therapy Time and Intention    Document Type evaluation  -AR     Mode of Treatment physical therapy  -AR     Row Name 01/17/23 1418          General Information    Patient Profile Reviewed yes  -AR     Prior Level of Function independent:  -AR     Existing Precautions/Restrictions fall  -AR     Barriers to Rehab none identified  -AR     Row Name 01/17/23 1418          Living Environment    People in Home spouse  -AR     Row Name 01/17/23 1418          Home Main Entrance    Number of Stairs, Main Entrance three  -AR     Stair Railings, Main Entrance railings safe and in good condition  -AR     Row Name 01/17/23 1418          Stairs Within Home, Primary    Stairs, Within Home, Primary tri level home   -AR     Row Name 01/17/23 1418          Cognition    Orientation Status (Cognition) oriented x 3  -AR     Row Name 01/17/23 1418          Safety Issues, Functional Mobility    Impairments Affecting Function (Mobility) strength;range of motion (ROM);endurance/activity tolerance;balance  -AR           User Key  (r) = Recorded By, (t) = Taken By, (c) = Cosigned By    Initials Name Provider Type    AR Magaly Friedman PT Physical Therapist               Mobility     Row Name 01/17/23 1418          Bed Mobility    Bed Mobility supine-sit  -AR     Supine-Sit [place] (Bed Mobility) modified independence  -AR     Assistive Device (Bed Mobility) head of bed elevated  -AR     Row Name 01/17/23 1418           Sit-Stand Transfer    Sit-Stand [place] (Transfers) contact guard  -AR     Assistive Device (Sit-Stand Transfers) walker, front-wheeled  -AR     Row Name 01/17/23 1418          Gait/Stairs (Locomotion)    [place] Level (Gait) contact guard;minimum assist (75% patient effort)  -AR     Assistive Device (Gait) walker, front-wheeled  -AR     Distance in Feet (Gait) 30' limited by R knee buckling, minimally impulsive w/ few cues to keep UE on walker  -AR     Deviations/Abnormal Patterns (Gait) gait speed decreased  -AR     Right Sided Gait Deviations weight shift ability decreased;knee buckling, right side  -AR           User Key  (r) = Recorded By, (t) = Taken By, (c) = Cosigned By    Initials Name Provider Type    AR Magaly Friedman, PT Physical Therapist               Obj/Interventions     Row Name 01/17/23 1419          Range of Motion Comprehensive    Comment, General Range of Motion WFL except R knee  -AR     Row Name 01/17/23 1419          Strength Comprehensive (MMT)    Comment, General Manual Muscle Testing (MMT) Assessment WFL except R LE, able to perform SLR and LAQ and tolerate weight bearing but R knee did buckle once  -AR     Row Name 01/17/23 1419          Motor Skills    Therapeutic Exercise --  R TKA 10x w/ frequent cues  -AR     Row Name 01/17/23 1419          Balance    Balance Assessment standing dynamic balance  -AR     Dynamic Standing Balance contact guard  -AR     Position/Device Used, Standing Balance walker, rolling  -AR           User Key  (r) = Recorded By, (t) = Taken By, (c) = Cosigned By    Initials Name Provider Type    AR Magaly Friedman, PT Physical Therapist               Goals/Plan     Row Name 01/17/23 1421          Gait Training Goal 1 (PT)    Activity/Assistive Device (Gait Training Goal 1, PT) gait (walking locomotion);walker, rolling  -AR     [place] Level (Gait Training Goal 1, PT) standby assist  -AR     Distance (Gait Training Goal 1, PT) 100  -AR     Time  Frame (Gait Training Goal 1, PT) 1 week  -AR     Row Name 01/17/23 1421          Stairs Goal 1 (PT)    Activity/Assistive Device (Stairs Goal 1, PT) stairs, all skills  -AR     [place] Level/Cues Needed (Stairs Goal 1, PT) contact guard required  -AR     Number of Stairs (Stairs Goal 1, PT) 4  -AR     Time Frame (Stairs Goal 1, PT) 1 week  -AR     Row Name 01/17/23 1421          Therapy Assessment/Plan (PT)    Planned Therapy Interventions (PT) balance training;bed mobility training;gait training;home exercise program;patient/family education;transfer training;ROM (range of motion);stair training;strengthening  -AR           User Key  (r) = Recorded By, (t) = Taken By, (c) = Cosigned By    Initials Name Provider Type    AR Magaly Friedman, PT Physical Therapist               Clinical Impression     Row Name 01/17/23 1420          Pain    Pretreatment Pain Rating 0/10 - no pain  -AR     Posttreatment Pain Rating 0/10 - no pain  -AR     Pain Intervention(s) Repositioned;Medication (See MAR);Cold applied  -AR     Row Name 01/17/23 1420          Plan of Care Review    Plan of Care Reviewed With patient;spouse;family  -AR     Outcome Evaluation POD 0 R TKA.  Pt reports normally independent, no use of AD.  Today pt demonstrates impaired R knee ROM, stregnth and gait pattern but able to ambulate 30' w/ contact assist to min A using RW, 1 R knee buckling episode.  Pt has RW and lives in tri-level home.  Anticipate DC to home w/ assist and home PT.  -AR     Row Name 01/17/23 1420          Therapy Assessment/Plan (PT)    Rehab Potential (PT) good, to achieve stated therapy goals  -AR     Criteria for Skilled Interventions Met (PT) yes  -AR     Therapy Frequency (PT) 6 times/wk  -AR     Row Name 01/17/23 1420          Vital Signs    O2 Delivery Pre Treatment room air  -AR     Row Name 01/17/23 1420          Positioning and Restraints    Pre-Treatment Position in bed  -AR     Post Treatment Position chair  -AR     In  Chair notified nsg;reclined;sitting;call light within reach;encouraged to call for assist;exit alarm on;with family/caregiver  -AR           User Key  (r) = Recorded By, (t) = Taken By, (c) = Cosigned By    Initials Name Provider Type    AR Magaly Friedman, PT Physical Therapist               Outcome Measures     Row Name 01/17/23 1422 01/17/23 1015       How much help from another person do you currently need...    Turning from your back to your side while in flat bed without using bedrails? 4  -AR 3  -BB    Moving from lying on back to sitting on the side of a flat bed without bedrails? 3  -AR 3  -BB    Moving to and from a bed to a chair (including a wheelchair)? 3  -AR 3  -BB    Standing up from a chair using your arms (e.g., wheelchair, bedside chair)? 3  -AR 3  -BB    Climbing 3-5 steps with a railing? 2  -AR 2  -BB    To walk in hospital room? 3  -AR 3  -BB    AM-PAC 6 Clicks Score (PT) 18  -AR 17  -BB    Highest level of mobility 6 --> Walked 10 steps or more  -AR 5 --> Static standing  -BB    Row Name 01/17/23 1422          Functional Assessment    Outcome Measure Options AM-PAC 6 Clicks Basic Mobility (PT)  -AR           User Key  (r) = Recorded By, (t) = Taken By, (c) = Cosigned By    Initials Name Provider Type    Magaly Quijano, PT Physical Therapist    Katie Meek RN Registered Nurse                             Physical Therapy Education     Title: PT OT SLP Therapies (In Progress)     Topic: Physical Therapy (In Progress)     Point: Mobility training (In Progress)     Learning Progress Summary           Patient Acceptance, E, NR by AR at 1/17/2023 1422   Family Acceptance, E, NR by AR at 1/17/2023 1422                   Point: Home exercise program (In Progress)     Learning Progress Summary           Patient Acceptance, E, NR by AR at 1/17/2023 1422   Family Acceptance, E, NR by AR at 1/17/2023 1422                   Point: Body mechanics (In Progress)     Learning Progress Summary            Patient Acceptance, E, NR by AR at 1/17/2023 1422   Family Acceptance, E, NR by AR at 1/17/2023 1422                   Point: Precautions (In Progress)     Learning Progress Summary           Patient Acceptance, E, NR by AR at 1/17/2023 1422   Family Acceptance, E, NR by AR at 1/17/2023 1422                               User Key     Initials Effective Dates Name Provider Type Discipline    AR 06/16/21 -  Magaly Friedman PT Physical Therapist PT              PT Recommendation and Plan  Planned Therapy Interventions (PT): balance training, bed mobility training, gait training, home exercise program, patient/family education, transfer training, ROM (range of motion), stair training, strengthening  Plan of Care Reviewed With: patient, spouse, family  Outcome Evaluation: POD 0 R TKA.  Pt reports normally independent, no use of AD.  Today pt demonstrates impaired R knee ROM, stregnth and gait pattern but able to ambulate 30' w/ contact assist to min A using RW, 1 R knee buckling episode.  Pt has RW and lives in tri-level home.  Anticipate DC to home w/ assist and home PT.     Time Calculation:    PT Charges     Row Name 01/17/23 1417             Time Calculation    Start Time 1317  -AR      Stop Time 1352  -AR      Time Calculation (min) 35 min  -AR      PT Received On 01/17/23  -AR      PT - Next Appointment 01/18/23  -AR      PT Goal Re-Cert Due Date 01/24/23  -AR            User Key  (r) = Recorded By, (t) = Taken By, (c) = Cosigned By    Initials Name Provider Type    AR Magaly Friedman PT Physical Therapist              Therapy Charges for Today     Code Description Service Date Service Provider Modifiers Qty    86874281007 HC PT EVAL LOW COMPLEXITY 4 1/17/2023 Magaly Friedman, PT GP 1    07306276158 HC PT THER PROC EA 15 MIN 1/17/2023 Magaly Friedman, PT GP 1          PT G-Codes  Outcome Measure Options: AM-PAC 6 Clicks Basic Mobility (PT)  AM-PAC 6 Clicks Score (PT): 18  PT Discharge  Summary  Anticipated Discharge Disposition (PT): home with assist, home with home health    Magaly Friedman, PT  1/17/2023

## 2023-01-17 NOTE — DISCHARGE PLACEMENT REQUEST
"Kurt Higgins (82 y.o. Female)     Date of Birth   1940    Social Security Number       Address   [address] [facility] [address]    Home Phone   [phone]    MRN   [id number]       [employer]   [employer]ian    Marital Status                               Admission Date   1/17/23    Admission Type   Elective    Admitting Provider   To Sands MD    Attending Provider   To Sands MD    Department, Room/Bed   [address], [address]/1       Discharge Date       Discharge Disposition       Discharge Destination                               Attending Provider: To Sands MD    Allergies: Ancef [Cefazolin], Codeine, Penicillins, Sulfa Antibiotics    Isolation: None   Infection: None   Code Status: CPR    Ht: 170.2 cm (67\")   Wt: 71.8 kg (158 lb 3.2 oz)    Admission Cmt: None   Principal Problem: Primary osteoarthritis of right knee [M17.11]                 Active Insurance as of 1/17/2023     Primary Coverage     Payor Plan Insurance Group Employer/Plan Group    HU[employer] MEDICARE REPLACEMENT HU[employer] MEDICARE REPLACEMENT [id number]     Payor Plan Address Payor Plan Phone Number Payor Plan Fax Number Effective Dates    [address] [address] [phone]  1/1/2018 - None Entered    [facility] [id number]       Subscriber Name Subscriber Birth Date Member ID       KURT HIGGINS 1940 [id number]                 Emergency Contacts      (Rel.) Home Phone Work Phone Mobile Phone    Rishabh Higgins (Spouse) [phone] -- --    Antonio Higgins (Son) [phone] -- --    Amaury Higgins (Daughter) [phone]              "

## 2023-01-17 NOTE — H&P
Orthopaedic Surgery History and Physical    Patient Name:  Reanna Higgins  YOB: 1940  Age: 82 y.o.  Medical Records Number:  [id number]    Date of Admission:  1/17/2023  5:31 AM    Chief Complaint:  No admission diagnoses are documented for this encounter.    Reanna Higgins is a 82 y.o. female who presents c/o severe right knee pain.  The pain has been on and off for many years, worsening to the point where the pain is becoming disabling.  The pain is a constant dull ache with occasional sharp, stabbing pain.  The patient has failed conservative treatment and would like to proceed with right total knee arthroplasty.    /67 (BP Location: Right arm, Patient Position: Sitting)   Pulse 66   Temp 97.8 °F (36.6 °C) (Oral)   Resp 18   SpO2 100%     Past Medical History:    Past Medical History:   Diagnosis Date   • Anesthesia complication     SEVERE SHAKING OF ENTIRE BODY AFTER EGD/ERCP. 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   • Restless leg syndrome    • Second degree AV block, Mobitz type I    • SVT (supraventricular tachycardia) (HCC)        Past Surgical History:   Past Surgical History:   Procedure Laterality Date   • APPENDECTOMY     • BILATERAL OOPHORECTOMY Bilateral 1988   • BREAST CYST EXCISION Bilateral     4 BREAST SURGERIES   • CARDIAC CATHETERIZATION N/A 8/22/2019    Procedure: Left Heart Cath;  Surgeon: Francis Mccullough MD;  Location: [facility] CATH INVASIVE LOCATION;  Service: Cardiology   • CARDIAC CATHETERIZATION N/A 8/22/2019    Procedure: Left ventriculography;  Surgeon: Francis Mccullough MD;   Location: [facility] CATH INVASIVE LOCATION;  Service: Cardiology   • CARDIAC CATHETERIZATION N/A 8/22/2019    Procedure: Coronary angiography;  Surgeon: Francis Mccullough MD;  Location: [facility] CATH INVASIVE LOCATION;  Service: Cardiology   • CARDIAC ELECTROPHYSIOLOGY PROCEDURE N/A 10/10/2016    Procedure: Pacemaker DC new  [place];  Surgeon: Wilfrido Hameed MD;  Location: [facility] CATH INVASIVE LOCATION;  Service:    • CARPAL TUNNEL RELEASE Right    • CATARACT EXTRACTION      NOVEMBER AND DECEMBER 2014   • CHOLECYSTECTOMY     • COLONOSCOPY  2015   • COLONOSCOPY N/A 1/21/2022    Procedure: COLONOSCOPY into cecum and terminal ileum with hot snare polypectomy x2;  Surgeon: Giovany Redman MD;  Location: [facility] ENDOSCOPY;  Service: Gastroenterology;  Laterality: N/A;  Pre op: History of polyps  Post op: Polyps, Diverticulosis and Hemorrhoids   • CORONARY ARTERY BYPASS GRAFT N/A 8/23/2019    Procedure: SUMMER STERNOTOMY CORONARY ARTERY BYPASS GRAFT TIMES 6 USING LEFT INTERNAL MAMMARY ARTERY AND LEFT GREATER SAPHENOUS VEIN GRAFT PER ENDOSCOPIC VEIN HARVESTING AND PRP;  Surgeon: Kem Hawk MD;  Location: [facility] MAIN OR;  Service: Cardiothoracic   • ENDOSCOPY N/A 7/18/2019    Procedure: ESOPHAGOGASTRODUODENOSCOPY with biopsy;  Surgeon: Ricky Chew MD;  Location: [facility] ENDOSCOPY;  Service: Gastroenterology   • ERCP N/A 9/29/2021    Procedure: ENDOSCOPIC RETROGRADE CHOLANGIOPANCREATOGRAPHY WITH SPHINCTEROTOMY AND BALLOON SWEEP;  Surgeon: Giovany Redman MD;  Location: [facility] ENDOSCOPY;  Service: Gastroenterology;  Laterality: N/A;  PRE- RECURRENT PANCREATITIS  POST- SAME   • EYE SURGERY     • HYSTERECTOMY     • KNEE ARTHROSCOPY Bilateral 04/2014    MENISCAL TEAR   • PACEMAKER IMPLANTATION  10/10/2016   • SHOULDER SURGERY Right     ROTATOR CUFF SURGERY JUNE 18, 2015   • TONSILLECTOMY     • TRIGGER FINGER RELEASE      both hands       Social History:    Social History     Socioeconomic History   • Marital  status:      Spouse name: Newton   • Number of children: 2   • Years of education: 13   • Highest education level: Some college, no degree   Tobacco Use   • Smoking status: Former     Packs/day: 0.50     Years: 14.00     Pack years: 7.00     Types: Cigarettes     Start date:      Quit date:      Years since quittin.0   • Smokeless tobacco: Never   • Tobacco comments:     quit age 28   Vaping Use   • Vaping Use: Never used   Substance and Sexual Activity   • Alcohol use: Yes     Comment: rare glass of wine or beer rarely/  No caffeine use   • Drug use: No   • Sexual activity: Not Currently       Family History:    Family History   Problem Relation Age of Onset   • Thyroid disease Daughter    • Lung cancer Brother    • Hypertension Mother    • Aortic stenosis Mother    • Coronary artery disease Mother          78 (smoker)   • Hypertension Father    • Depression Father    • Anxiety disorder Father    • Diabetes Father    • Heart failure Father    • Cirrhosis Father         alcohol   • Alcohol abuse Sister    • Lupus Sister    • Heart disease Sister    • No Known Problems Sister    • Alcohol abuse Brother    • Drug abuse Brother    • Heart disease Brother    • Cancer Brother         bladder (smoker)   • Heart disease Brother    • Breast cancer Paternal Aunt    • Neuropathy Neg Hx    • Colon cancer Neg Hx    • Dementia Neg Hx    • Malig Hyperthermia Neg Hx        Current Medications:  Scheduled Meds:clindamycin, 900 mg, Intravenous, Once  Orthopedic surgery custom cocktail, , Injection, Once  sodium chloride, 3 mL, Intravenous, Q12H      Continuous Infusions:lactated ringers, 9 mL/hr, Last Rate: 9 mL/hr (23 0624)      PRN Meds:.•  fentanyl  •  lidocaine PF 1%  •  midazolam  •  sodium chloride    Current Facility-Administered Medications:   •  clindamycin (CLEOCIN) 900 mg in dextrose 5% 50 mL IVPB (premix), 900 mg, Intravenous, Once, To Sands MD  •  fentaNYL citrate (PF) (SUBLIMAZE)  injection 50 mcg, 50 mcg, Intravenous, Q10 Min PRN, Finesse Bennett DO  •  lactated ringers infusion, 9 mL/hr, Intravenous, Continuous, Finesse Bennett DO, Last Rate: 9 mL/hr at 01/17/23 0624, 9 mL/hr at 01/17/23 0624  •  lidocaine PF 1% (XYLOCAINE) injection 0.5 mL, 0.5 mL, Injection, Once PRN, Finesse Bennett DO  •  midazolam (VERSED) injection 0.5 mg, 0.5 mg, Intravenous, Q10 Min PRN, Finesse Bennett DO  •  ropivacaine 0.5 % 50 mL, Morphine 5 mg, ketorolac 30 mg, EPINEPHrine 1 mg/mL 0.3 mg in sodium chloride 0.9 % 50 mL solution, , Injection, Once, To Sands MD  •  sodium chloride 0.9 % flush 3 mL, 3 mL, Intravenous, Q12H, Finesse Bennett DO  •  sodium chloride 0.9 % flush 3-10 mL, 3-10 mL, Intravenous, PRN, Finesse Bennett DO    Allergies:    Allergies   Allergen Reactions   • Ancef [Cefazolin] Other (See Comments)     Hypotension/bradycardia   • Codeine Hives   • Penicillins Other (See Comments)     Hypotension (Ancef allergy)    • Sulfa Antibiotics Hives       Review of Systems:   HEENT: Patient denies any headaches, vision changes, change in hearing, or tinnitus, Patient denies any rhinorrhea,epistaxis, sinus pain, mouth or dental problems, sore throat or hoarseness, or dysphagia  Pulmonary: Patient denies any cough, congestion, SOA, or wheezing  Cardiovascular: Patient denies any chest pain, dyspnea, palpitations, weakness, intolerance of exercise, varicosities, swelling of extremities, known murmur  Gastrointestinal:  Patient denies nausea, vomiting, diarrhea, constipation, loss  of appetite, change in appetite, dysphagia, gas, heartburn, melena, change in bowel habits, use of laxatives or other drugs to alter the function of the gastrointestinal tract.  Genital/Urinary: Patient denies dysuria, change in color of urine, change in frequency of urination, pain with urgency, incontinence, retention, or nocturia.  Musculoskeletal: Patient denies  increased warmth; redness; or swelling of joints; limitation of function; deformity; crepitation: pain in a joint or an extremity, the neck, or the back, especially with movement.  Neurological: Patient denies dizziness, tremor, ataxia, difficulty in speaking, change in speech, paresthesia, loss of sensation, seizures, syncope, changes in memory.  Endocrine system: Patient denies tremors, palpitations, intolerance of heat or cold, polyuria, polydipsia, polyphagia, diaphoresis, exophthalmos, or goiter.  Psychological: Patient denies thoughts/plans or harming self or other; depression,  insomnia, night terrors, william, memory loss, disorientation.  Skin: Patient denies any bruising, rashes, discoloration, pruritus, wounds, ulcers, decubiti, changes in the hair or nails  Hematopoietic: Patient denies history of spontaneous or excessive bleeding, epistaxis, hematuria, melena, fatigue, enlarged or tender lymph nodes, pallor, history of anemia.      Physical Exam:   Awake, A&O x3, affect normal, no acute distress  Ambulating with a limp due to knee pain  Knee ROM is limited due to pain (5-115)  Strength is 4/5 in the quad, hamstring and calf  Cap refill is normal, Sensation intact    Card:  RR, HD Stable  Pulm:  Regular breathing, no S.O.A  Abd:  Soft, NT, ND    Lab Results (last 24 hours)     Procedure Component Value Units Date/Time    POC Glucose Once [[id number]]  (Normal) Collected: 01/17/23 0640    Specimen: Blood Updated: 01/17/23 0641     Glucose 121 mg/dL      Comment: Meter: [id number] : [id number] Darcy De Souza RN             XR Knee 1 or 2 View Right    Result Date: 1/12/2023  Narrative: XR KNEE 1 OR 2 VW RIGHT-  INDICATIONS: Preoperative evaluation  TECHNIQUE: Frontal and lateral views of the right knee  COMPARISON: None available  FINDINGS:  Minimal knee effusion is apparent. Moderate to prominent medial tibiofemoral joint space narrowing is seen. Mild to moderate degenerative spurring is noted. No acute  fracture, erosion, or dislocation is identified. Arterial calcifications are seen.      Impression:  Degenerative changes of the knee.  This report was finalized on 1/12/2023 12:34 PM by Dr. Torsten Ortega M.D.        Assessment:  End-stage Primary Right Knee Osteoarthritis    Plan:  Patient's pain is becoming disabling, despite extensive conservative treatment.  Radiographs reveal end-stage degenerative changes.  The risks of surgery, including, but not limited to, heart attack, stroke, dying, DVT, nerve injury, vascular injury, arthrofibrosis and infection were discussed.  The alternatives and benefits were also discussed.  All questions answered and the patient wishes to proceed with right total knee arthroplasty.    To Robles PA-C  [facility]  [address] [place] [address]  [phone]    1/17/2023    CC: Ted Li MD, To Sands MD

## 2023-01-17 NOTE — DISCHARGE SUMMARY
Orthopaedic Surgery Discharge Summary    Patient Name:  Reanna Higgins  YOB: 1940  Age: 82 y.o.  Medical Records Number:  [id number]    Date of Admission:  1/17/2023  Date of Discharge:  1/18/2023    Primary Discharge Diagnosis:  Primary osteoarthritis of right knee [M17.11]    Secondary Discharge Diagnosis:    Problems Addressed this Visit        Musculoskeletal and Injuries    * (Principal) Primary osteoarthritis of right knee - Primary    Relevant Medications    oxyCODONE-acetaminophen (PERCOCET) 5-325 MG per tablet   Diagnoses       Codes Comments    Primary osteoarthritis of right knee    -  Primary ICD-10-CM: M17.11  ICD-9-CM: 715.16           Procedures Performed:  Right Total Knee Arthroplasty      Hospital Course:    Reanna Higgins is a 82 y.o.  female who underwent successful right tka on 1/17/2023.  Reanna Higgins was started on Aspirin 325 mg po once daily post-operatively for DVT prophylaxis.  On post-op day 1 the patients dressing was changed and their incision was clean, with no signs of infection and their calf was soft, with no signs of DVT.  The patient progressed well with physical therapy and the patients hemoglobin remained stable. On post-operative day 1 the patient was felt ready for discharge.     Vitals:  Vitals:    01/17/23 0945 01/17/23 1000 01/17/23 1015 01/17/23 1100   BP: 145/61 135/63 132/70 125/61   BP Location:   Right arm Right arm   Patient Position:   Sitting Lying   Pulse: 78 71 69 69   Resp: 20 16 16 17   Temp:   97.7 °F (36.5 °C)    TempSrc:   Oral    SpO2: 99% 100% 98% 98%       Discharge Medications:      Discharge Medications      New Medications      Instructions Start Date   docusate sodium 250 MG capsule  Commonly known as: COLACE   250 mg, Oral, 2 Times Daily PRN      oxyCODONE-acetaminophen 5-325 MG per tablet  Commonly known as: PERCOCET   1-2 tablets, Oral, Every 4 Hours PRN         Changes to Medications      Instructions Start Date   aspirin   MG tablet  What changed:   · medication strength  · how much to take   325 mg, Oral, Daily   Start Date: January 18, 2023        Continue These Medications      Instructions Start Date   amLODIPine 5 MG tablet  Commonly known as: NORVASC   5 mg, Oral, Daily PRN      atorvastatin 20 MG tablet  Commonly known as: LIPITOR   TAKE 1 TABLET BY MOUTH EVERY DAY AT NIGHT      Calcium-D 600-400 MG-UNIT tablet   1 tablet, Oral, Daily      cholecalciferol 25 MCG (1000 UT) tablet  Commonly known as: VITAMIN D3   1,000 Units, Oral, Daily      CoQ10 100 MG capsule   1 capsule, Oral, Daily      cycloSPORINE 0.05 % ophthalmic emulsion  Commonly known as: RESTASIS   1 drop, Both Eyes, 2 Times Daily      gabapentin 300 MG capsule  Commonly known as: NEURONTIN   300 mg, Oral, 3 Times Daily      hydrocortisone 2.5 % cream   No dose, route, or frequency recorded.      ipratropium 0.06 % nasal spray  Commonly known as: ATROVENT   2 SPRAYS EACH NOSTRIL EVERY 6 HOURS AS NEEDED.      losartan 25 MG tablet  Commonly known as: COZAAR   25 mg, Oral, 2 Times Daily      metoprolol succinate XL 50 MG 24 hr tablet  Commonly known as: TOPROL-XL   TAKE 1 TABLET BY MOUTH TWICE A DAY      multivitamin with minerals tablet tablet   1 tablet, Oral, Daily      omeprazole 40 MG capsule  Commonly known as: priLOSEC   40 mg, Oral, Daily         Stop These Medications    Chlorhexidine Gluconate Cloth 2 % pads            Pain Medications:  Percocet 5/325 mg 1-2 po q 4-6 hours prn pain    DVT Prophylaxis:  Enteric Coated Aspirin 325 mg po once daily for 6 weeks      Total Knee Joint Replacement Discharge Instructions:    I. ACTIVITIES:  1. Exercises:  ? Complete exercise program as taught by the hospital physical therapist 2 times per day  ? Exercise program will be advanced by the physical therapist  ? During the day be up ambulating every 2 hours (while awake) for short distances  ? Complete the ankle pump exercises at least 10 times per hour (while  awake)  ? Elevate legs most of the day the first week post operatively and thereafter elevate legs when in bed and for at least 30 minutes during the day. Caution must be taken to avoid pillow placement under the bend of the knee as this can led to flexion contractures of the knee.  ? Use cold packs 20-30 minutes approximately 5 times per day. This should be done before and after completing your exercises and at any time you are experiencing pain/ stiffness in your operative extremity.      2. Activities of Daily Living:  ? No tub baths, hot tubs, or swimming pools for 4 weeks  ? May shower and let water run over the incision on post-operative day #5 if no drainage. Do not scrub or rub the incision. Simply let the water run over the incision and pat dry.    II. Restrictions  ? Do not cross legs or kneel  ? Your surgeon will discuss with you when you will be able to drive again.  ? Weight bearing is as tolerated  ? First week stay inside on even terrain. May go up and down stairs one stair at a time utilizing the hand rail  ? After one week, you may venture outside.    III. Precautions:  ? Everyone that comes near you should wash their hands  ? No elective dental, genital-urinary, or colon procedures or surgical procedures for 12 weeks after surgery unless absolutely necessary.  ?  If dental work or surgical procedure is deemed absolutely necessary, you will need to contact your surgeon as you will need to take antibiotics 1 hour prior to any dental work (including teeth cleanings).  ? Please discuss with your surgeon prophylactic antibiotics as the length of time this intervention will be necessary for you varies with each patient’s health history and situation.  ? Avoid sick people. If you must be around someone who is ill, they should wear a mask.  ? Avoid visits to the Emergency Room or Urgent Care unless you are having a life threatening event.   ? If ordered stockings are to be placed on in the morning and  removed at night. Monitor the stockings to ensure that any swelling is not causing the stockings to become too tight. In this case, remove stockings immediately.    IV. INCISION CARE:  ? Wash your hands prior to dressing changes  ? Change the dressing as needed to keep incision clean and dry. Utilize dry gauze and paper tape. Avoid touching the side of the gauze that goes against the incision with your hands.  ? No creams or ointments to the incision  ? May remove dressing once the incision is free of drainage  ? Do not touch or pick at the incision  ? Check incision every day and notify surgeon immediately if any of the following signs or symptoms are noted:  o Increase in redness  o Increase in swelling around the incision and of the entire extremity  o Increase in pain  o Drainage oozing from the incision  o Pulling apart of the edges of the incision  o Increase in overall body temperature (greater than 100.5 degrees)  ? Your surgeon will instruct you regarding suture or staple removal    V. Medications:   1. Anticoagulants: You will be discharged on an anticoagulant. This is a prophylactic medication that helps prevent blood clots during your post-operative period. The type and length of dosage varies based on your individual needs, procedure performed, and surgeon’s preference.  ? While taking the anticoagulant, you should avoid taking any additional aspirin, ibuprofen (Advil or Motrin), Aleve (Naprosyn) or other non-steroidal anti-inflammatory medications.   ? Notify surgeon immediately if any alexandra bleeding is noted in the urine, stool, emesis, or from the nose or the incision. Blood in the stool will often appear as black rather than red. Blood in urine may appear as pink. Blood in emesis may appear as brown/black like coffee grounds.  ? You will need to apply pressure for longer periods of time to any cuts or abrasions to stop bleeding  ? Avoid alcohol while taking anticoagulants    2. Stool Softeners: You  will be at greater risk of constipation after surgery due to being less mobile and the pain medications.   ? Take stool softeners as instructed by your surgeon while on pain medications. Over the counter Colace 100 mg 1-2 capsules twice daily.   ? If stools become too loose or too frequent, please decreases the dosage or stop the stool softener.  ? If constipation occurs despite use of stool softeners, you are to continue the stool softeners and add a laxative (Milk of Magnesia 1 ounce daily as needed)  ? Drink plenty of fluids, and eat fruits and vegetables during your recovery time    3. Pain Medications utilized after surgery are narcotics and the law requires that the following information be given to all patients that are prescribed narcotics:  ? CLASSIFICATION: Pain medications are called Opioids and are narcotics  ? LEGALITIES: It is illegal to share narcotics with others and to drive within 24 hours of taking narcotics  ? POTENTIAL SIDE EFFECTS: Potential side effects of opioids include: nausea, vomiting, itching, dizziness, drowsiness, dry mouth, constipation, and difficulty urinating.  ? POTENTIAL ADVERSE EFFECTS:   o Opioid tolerance can develop with use of pain medications and this simply means that it requires more and more of the medication to control pain; however, this is seen more in patients that use opioids for longer periods of time.  o Opioid dependence can develop with use of Opioids and this simply means that to stop the medication can cause withdrawal symptoms; however, this is seen with patients that use Opioids for longer periods of time.  o Opioid addiction can develop with use of Opioids and the incidence of this is very unlikely in patients who take the medications as ordered and stop the medications as instructed.  o Opioid overdose can be dangerous, but is unlikely when the medication is taken as ordered and stopped when ordered. It is important not to mix opioids with alcohol or with  and type of sedative such as Benadryl as this can lead to over sedation and respiratory difficulty.  ? DOSAGE:   o Pain medications will need to be taken consistently for the first week to decrease pain and promote adequate pain relief and participation in physical therapy.  o After the initial surgical pain begins to resolve, you may begin to decrease the pain medication. By the end of 6-8 weeks, you should be off of pain medications.  o Refills will not be given by the office during evening hours, on weekends, or after 6-8 weeks post-op.  o To seek refills on pain medications during the initial 6 week post-operative period, you must call the office 48 hours in advance to request the refill. The office will then notify you when to  the prescription. DO NOT wait until you are out of the medication to request a refill.    V. FOLLOW-UP VISITS:  ? You will need to follow up in the office with your surgeon in 3 weeks. Please call this number [phone] to schedule this appointment.  If you have any concerns or suspected complications prior to your follow up visit, please call your surgeons office. Do not wait until your appointment time if you suspect complications. These will need to be addressed in the office promptly.    To Robles PA-C  [facility]  [address] [place] [address]  [phone]    1/17/2023    CC:Ted Li MD:To Sands MD

## 2023-01-17 NOTE — PLAN OF CARE
Goal Outcome Evaluation:  Plan of Care Reviewed With: patient, spouse, family           Outcome Evaluation: POD 0 R TKA.  Pt reports normally independent, no use of AD.  Today pt demonstrates impaired R knee ROM, stregnth and gait pattern but able to ambulate 30' w/ contact assist to min A using RW, 1 R knee buckling episode.  Pt has RW and lives in tri-level home.  Anticipate DC to home w/ assist and home PT.

## 2023-01-17 NOTE — ANESTHESIA POSTPROCEDURE EVALUATION
Patient: Reanna Higgins    Procedure Summary     Date: 01/17/23 Room / Location: [facility] OR [address] [place] [address] [facility]    Anesthesia Start: 0710 Anesthesia Stop: 0900    Procedure: RIGHT TOTAL KNEE ARTHROPLASTY (Right: Knee) Diagnosis:     Surgeons: To Sands MD Provider: Finesse Bennett DO    Anesthesia Type: general ASA Status: 3          Anesthesia Type: general    Vitals  Vitals Value Taken Time   /63 01/17/23 1000   Temp 36.6 °C (97.9 °F) 01/17/23 0900   Pulse 71 01/17/23 1002   Resp 16 01/17/23 1000   SpO2 99 % 01/17/23 1002   Vitals shown include unvalidated device data.        Post Anesthesia Care and Evaluation    Patient location during evaluation: bedside  Patient participation: complete - patient participated  Level of consciousness: awake and alert  Pain score: 0  Pain management: adequate    Airway patency: patent  Anesthetic complications: No anesthetic complications  PONV Status: controlled  Cardiovascular status: acceptable and hemodynamically stable  Respiratory status: acceptable  Hydration status: acceptable    Comments: /63   Pulse 71   Temp 36.6 °C (97.9 °F) (Oral)   Resp 16   SpO2 100%     POPC supplied by [facility] with continued effect in expected distribution

## 2023-01-17 NOTE — DISCHARGE SUMMARY
Orthopaedic Surgery Discharge Summary    Patient Name:  Reanna Higgins  YOB: 1940  Age: 82 y.o.  Medical Records Number:  [id number]    Date of Admission:  1/17/2023  Date of Discharge:  1/18/2023    Primary Discharge Diagnosis:  Primary osteoarthritis of right knee [M17.11]    Secondary Discharge Diagnosis:    Problems Addressed this Visit        Musculoskeletal and Injuries    * (Principal) Primary osteoarthritis of right knee - Primary    Relevant Medications    oxyCODONE-acetaminophen (PERCOCET) 5-325 MG per tablet   Diagnoses       Codes Comments    Primary osteoarthritis of right knee    -  Primary ICD-10-CM: M17.11  ICD-9-CM: 715.16           Procedures Performed:  Right Total Knee Arthroplasty      Hospital Course:    Reanna Higgins is a 82 y.o.  female who underwent successful right tka on 1/17/2023.  Reanna Higgins was started on Aspirin 325 mg po once daily post-operatively for DVT prophylaxis.  On post-op day 1 the patients dressing was changed and their incision was clean, with no signs of infection and their calf was soft, with no signs of DVT.  The patient progressed well with physical therapy and the patients hemoglobin remained stable. On post-operative day 1 the patient was felt ready for discharge.     Vitals:  Vitals:    01/17/23 0945 01/17/23 1000 01/17/23 1015 01/17/23 1100   BP: 145/61 135/63 132/70 125/61   BP Location:   Right arm Right arm   Patient Position:   Sitting Lying   Pulse: 78 71 69 69   Resp: 20 16 16 17   Temp:   97.7 °F (36.5 °C)    TempSrc:   Oral    SpO2: 99% 100% 98% 98%       Discharge Medications:      Discharge Medications      New Medications      Instructions Start Date   docusate sodium 250 MG capsule  Commonly known as: COLACE   250 mg, Oral, 2 Times Daily PRN      oxyCODONE-acetaminophen 5-325 MG per tablet  Commonly known as: PERCOCET   1-2 tablets, Oral, Every 4 Hours PRN         Changes to Medications      Instructions Start Date   aspirin   MG tablet  What changed:   · medication strength  · how much to take   325 mg, Oral, Daily   Start Date: January 18, 2023        Continue These Medications      Instructions Start Date   amLODIPine 5 MG tablet  Commonly known as: NORVASC   5 mg, Oral, Daily PRN      atorvastatin 20 MG tablet  Commonly known as: LIPITOR   TAKE 1 TABLET BY MOUTH EVERY DAY AT NIGHT      Calcium-D 600-400 MG-UNIT tablet   1 tablet, Oral, Daily      Chlorhexidine Gluconate Cloth 2 % pads   Apply externally, AS DIRECTED PREOP      cholecalciferol 25 MCG (1000 UT) tablet  Commonly known as: VITAMIN D3   1,000 Units, Oral, Daily      CoQ10 100 MG capsule   1 capsule, Oral, Daily      cycloSPORINE 0.05 % ophthalmic emulsion  Commonly known as: RESTASIS   1 drop, Both Eyes, 2 Times Daily      gabapentin 300 MG capsule  Commonly known as: NEURONTIN   300 mg, Oral, 3 Times Daily      hydrocortisone 2.5 % cream   No dose, route, or frequency recorded.      ipratropium 0.06 % nasal spray  Commonly known as: ATROVENT   2 SPRAYS EACH NOSTRIL EVERY 6 HOURS AS NEEDED.      losartan 25 MG tablet  Commonly known as: COZAAR   25 mg, Oral, 2 Times Daily      metoprolol succinate XL 50 MG 24 hr tablet  Commonly known as: TOPROL-XL   TAKE 1 TABLET BY MOUTH TWICE A DAY      multivitamin with minerals tablet tablet   1 tablet, Oral, Daily      omeprazole 40 MG capsule  Commonly known as: priLOSEC   40 mg, Oral, Daily             Pain Medications:  Percocet 5/325 mg 1-2 po q 4-6 hours prn pain    DVT Prophylaxis:  Enteric Coated Aspirin 325 mg po once daily for 6 weeks      Total Knee Joint Replacement Discharge Instructions:    I. ACTIVITIES:  1. Exercises:  ? Complete exercise program as taught by the hospital physical therapist 2 times per day  ? Exercise program will be advanced by the physical therapist  ? During the day be up ambulating every 2 hours (while awake) for short distances  ? Complete the ankle pump exercises at least 10 times per hour (while  awake)  ? Elevate legs most of the day the first week post operatively and thereafter elevate legs when in bed and for at least 30 minutes during the day. Caution must be taken to avoid pillow placement under the bend of the knee as this can led to flexion contractures of the knee.  ? Use cold packs 20-30 minutes approximately 5 times per day. This should be done before and after completing your exercises and at any time you are experiencing pain/ stiffness in your operative extremity.      2. Activities of Daily Living:  ? No tub baths, hot tubs, or swimming pools for 4 weeks  ? May shower and let water run over the incision on post-operative day #5 if no drainage. Do not scrub or rub the incision. Simply let the water run over the incision and pat dry.    II. Restrictions  ? Do not cross legs or kneel  ? Your surgeon will discuss with you when you will be able to drive again.  ? Weight bearing is as tolerated  ? First week stay inside on even terrain. May go up and down stairs one stair at a time utilizing the hand rail  ? After one week, you may venture outside.    III. Precautions:  ? Everyone that comes near you should wash their hands  ? No elective dental, genital-urinary, or colon procedures or surgical procedures for 12 weeks after surgery unless absolutely necessary.  ?  If dental work or surgical procedure is deemed absolutely necessary, you will need to contact your surgeon as you will need to take antibiotics 1 hour prior to any dental work (including teeth cleanings).  ? Please discuss with your surgeon prophylactic antibiotics as the length of time this intervention will be necessary for you varies with each patient’s health history and situation.  ? Avoid sick people. If you must be around someone who is ill, they should wear a mask.  ? Avoid visits to the Emergency Room or Urgent Care unless you are having a life threatening event.   ? If ordered stockings are to be placed on in the morning and  removed at night. Monitor the stockings to ensure that any swelling is not causing the stockings to become too tight. In this case, remove stockings immediately.    IV. INCISION CARE:  ? Wash your hands prior to dressing changes  ? Change the dressing as needed to keep incision clean and dry. Utilize dry gauze and paper tape. Avoid touching the side of the gauze that goes against the incision with your hands.  ? No creams or ointments to the incision  ? May remove dressing once the incision is free of drainage  ? Do not touch or pick at the incision  ? Check incision every day and notify surgeon immediately if any of the following signs or symptoms are noted:  o Increase in redness  o Increase in swelling around the incision and of the entire extremity  o Increase in pain  o Drainage oozing from the incision  o Pulling apart of the edges of the incision  o Increase in overall body temperature (greater than 100.5 degrees)  ? Your surgeon will instruct you regarding suture or staple removal    V. Medications:   1. Anticoagulants: You will be discharged on an anticoagulant. This is a prophylactic medication that helps prevent blood clots during your post-operative period. The type and length of dosage varies based on your individual needs, procedure performed, and surgeon’s preference.  ? While taking the anticoagulant, you should avoid taking any additional aspirin, ibuprofen (Advil or Motrin), Aleve (Naprosyn) or other non-steroidal anti-inflammatory medications.   ? Notify surgeon immediately if any alexandra bleeding is noted in the urine, stool, emesis, or from the nose or the incision. Blood in the stool will often appear as black rather than red. Blood in urine may appear as pink. Blood in emesis may appear as brown/black like coffee grounds.  ? You will need to apply pressure for longer periods of time to any cuts or abrasions to stop bleeding  ? Avoid alcohol while taking anticoagulants    2. Stool Softeners: You  will be at greater risk of constipation after surgery due to being less mobile and the pain medications.   ? Take stool softeners as instructed by your surgeon while on pain medications. Over the counter Colace 100 mg 1-2 capsules twice daily.   ? If stools become too loose or too frequent, please decreases the dosage or stop the stool softener.  ? If constipation occurs despite use of stool softeners, you are to continue the stool softeners and add a laxative (Milk of Magnesia 1 ounce daily as needed)  ? Drink plenty of fluids, and eat fruits and vegetables during your recovery time    3. Pain Medications utilized after surgery are narcotics and the law requires that the following information be given to all patients that are prescribed narcotics:  ? CLASSIFICATION: Pain medications are called Opioids and are narcotics  ? LEGALITIES: It is illegal to share narcotics with others and to drive within 24 hours of taking narcotics  ? POTENTIAL SIDE EFFECTS: Potential side effects of opioids include: nausea, vomiting, itching, dizziness, drowsiness, dry mouth, constipation, and difficulty urinating.  ? POTENTIAL ADVERSE EFFECTS:   o Opioid tolerance can develop with use of pain medications and this simply means that it requires more and more of the medication to control pain; however, this is seen more in patients that use opioids for longer periods of time.  o Opioid dependence can develop with use of Opioids and this simply means that to stop the medication can cause withdrawal symptoms; however, this is seen with patients that use Opioids for longer periods of time.  o Opioid addiction can develop with use of Opioids and the incidence of this is very unlikely in patients who take the medications as ordered and stop the medications as instructed.  o Opioid overdose can be dangerous, but is unlikely when the medication is taken as ordered and stopped when ordered. It is important not to mix opioids with alcohol or with  and type of sedative such as Benadryl as this can lead to over sedation and respiratory difficulty.  ? DOSAGE:   o Pain medications will need to be taken consistently for the first week to decrease pain and promote adequate pain relief and participation in physical therapy.  o After the initial surgical pain begins to resolve, you may begin to decrease the pain medication. By the end of 6-8 weeks, you should be off of pain medications.  o Refills will not be given by the office during evening hours, on weekends, or after 6-8 weeks post-op.  o To seek refills on pain medications during the initial 6 week post-operative period, you must call the office 48 hours in advance to request the refill. The office will then notify you when to  the prescription. DO NOT wait until you are out of the medication to request a refill.    V. FOLLOW-UP VISITS:  ? You will need to follow up in the office with your surgeon in 3 weeks. Please call this number [phone] to schedule this appointment.  If you have any concerns or suspected complications prior to your follow up visit, please call your surgeons office. Do not wait until your appointment time if you suspect complications. These will need to be addressed in the office promptly.    To Robles PA-C  [facility]  [address] [place] [address]  [phone]    1/17/2023    CC:Ted Li MD:To Sands MD

## 2023-01-17 NOTE — ANESTHESIA PROCEDURE NOTES
Airway  Urgency: elective    Date/Time: 1/17/2023 7:21 AM  Airway not difficult    General Information and Staff    Patient location during procedure: OR  Anesthesiologist: Finesse Bennett DO  CRNA/CAA: Magaly Day CRNA    Indications and Patient Condition  Indications for airway management: airway protection    Preoxygenated: yes  Mask difficulty assessment: 1 - vent by mask    Final Airway Details  Final airway type: endotracheal airway      Successful airway: ETT  Cuffed: yes   Successful intubation technique: direct laryngoscopy  Facilitating devices/methods: Bougie  Endotracheal tube insertion site: oral  Blade: Keenan  Blade size: 2  ETT size (mm): 7.0  Cormack-Lehane Classification: grade IIa - partial view of glottis  Placement verified by: capnometry   Measured from: lips  ETT/EBT  to lips (cm): 22  Number of attempts at approach: 1  Assessment: lips, teeth, and gum same as pre-op and atraumatic intubation

## 2023-01-17 NOTE — ANESTHESIA PROCEDURE NOTES
Peripheral Block    Pre-sedation assessment completed: 1/17/2023 6:48 AM    Patient reassessed immediately prior to procedure    Patient location during procedure: holding area  Start time: 1/17/2023 6:50 AM  Stop time: 1/17/2023 6:54 AM  Reason for block: at surgeon's request and post-op pain management  Performed by  Anesthesiologist: Finesse Bennett DO  Preanesthetic Checklist  Completed: patient identified, IV checked, site marked, risks and benefits discussed, surgical consent, monitors and equipment checked, pre-op evaluation and timeout performed  Prep:  Pt Position: supine  Sterile barriers:gloves, mask, cap and washed/disinfected hands  Prep: ChloraPrep  Patient monitoring: blood pressure monitoring, continuous pulse oximetry and EKG  Procedure    Sedation: yes  Performed under: local infiltration  Guidance:ultrasound guided    ULTRASOUND INTERPRETATION.  Using ultrasound guidance a gauge needle was placed in close proximity to the femoral nerve, at which point, under ultrasound guidance anesthetic was injected in the area of the nerve and spread of the anesthesia was seen on ultrasound in close proximity thereto.  There were no abnormalities seen on ultrasound; a digital image was taken; and the patient tolerated the procedure with no complications. Images:still images obtained, printed/placed on chart    Laterality:right  Block Type:adductor canal block  Injection Technique:single-shot  Needle Type:echogenic  Needle Gauge:22 G  Resistance on Injection: none    Medications Used: dexamethasone (DECADRON) injection - Injection   4 mg - 1/17/2023 6:54:00 AM  bupivacaine-EPINEPHrine PF (MARCAINE w/EPI) 0.25% -[id number] injection - Peripheral Nerve   15 mL - 1/17/2023 6:53:00 AM  ropivacaine (NAROPIN) 0.5 % injection - Peripheral Nerve   15 mL - 1/17/2023 6:53:00 AM      Medications  Comment:Ultrasound Interpretation:  Using ultrasound guidance the needle was placed in close proximity to the adductor  canal and anesthetic was injected in the area of the nerve and spread of the anesthetic was seen on ultrasound in close proximity thereto.  There were no abnormalities seen on ultrasound; a digital image was taken; and the patient tolerated the procedure with no complications.         Post Assessment  Injection Assessment: negative aspiration for heme, no paresthesia on injection and incremental injection  Patient Tolerance:comfortable throughout block  Complications:no  Additional Notes  Ultrasound guidance was used to both view and verify local anesthetic placement and disbursement. In addition, it was used to evaluate the respective anatomy to determine needle approach and placement.

## 2023-01-18 ENCOUNTER — HOME HEALTH ADMISSION (OUTPATIENT)
Dept: HOME HEALTH SERVICES | Facility: HOME HEALTHCARE | Age: 83
End: 2023-01-18
Payer: MEDICARE

## 2023-01-18 ENCOUNTER — TRANSCRIBE ORDERS (OUTPATIENT)
Dept: HOME HEALTH SERVICES | Facility: HOME HEALTHCARE | Age: 83
End: 2023-01-18
Payer: MEDICARE

## 2023-01-18 ENCOUNTER — READMISSION MANAGEMENT (OUTPATIENT)
Dept: CALL CENTER | Facility: HOSPITAL | Age: 83
End: 2023-01-18
Payer: MEDICARE

## 2023-01-18 VITALS
WEIGHT: 158 LBS | OXYGEN SATURATION: 93 % | BODY MASS INDEX: 24.8 KG/M2 | DIASTOLIC BLOOD PRESSURE: 52 MMHG | RESPIRATION RATE: 16 BRPM | HEIGHT: 67 IN | HEART RATE: 72 BPM | SYSTOLIC BLOOD PRESSURE: 104 MMHG | TEMPERATURE: 98.1 F

## 2023-01-18 DIAGNOSIS — Z96.651 STATUS POST RIGHT KNEE REPLACEMENT: Primary | ICD-10-CM

## 2023-01-18 LAB
ANION GAP SERPL CALCULATED.3IONS-SCNC: 7 MMOL/L (ref 5–15)
BUN SERPL-MCNC: 28 MG/DL (ref 8–23)
BUN/CREAT SERPL: 29.8 (ref 7–25)
CALCIUM SPEC-SCNC: 8.6 MG/DL (ref 8.6–10.5)
CHLORIDE SERPL-SCNC: 99 MMOL/L (ref 98–107)
CO2 SERPL-SCNC: 25 MMOL/L (ref 22–29)
CREAT SERPL-MCNC: 0.94 MG/DL (ref 0.57–1)
DEPRECATED RDW RBC AUTO: 38.4 FL (ref 37–54)
EGFRCR SERPLBLD CKD-EPI 2021: 60 ML/MIN/1.73
ERYTHROCYTE [DISTWIDTH] IN BLOOD BY AUTOMATED COUNT: 12.3 % (ref 12.3–15.4)
GLUCOSE SERPL-MCNC: 122 MG/DL (ref 65–99)
HCT VFR BLD AUTO: 32.3 % (ref 34–46.6)
HGB BLD-MCNC: 11.1 G/DL (ref 12–15.9)
MCH RBC QN AUTO: 29.8 PG (ref 26.6–33)
MCHC RBC AUTO-ENTMCNC: 34.4 G/DL (ref 31.5–35.7)
MCV RBC AUTO: 86.8 FL (ref 79–97)
PLATELET # BLD AUTO: 186 10*3/MM3 (ref 140–450)
PMV BLD AUTO: 11.3 FL (ref 6–12)
POTASSIUM SERPL-SCNC: 4.7 MMOL/L (ref 3.5–5.2)
RBC # BLD AUTO: 3.72 10*6/MM3 (ref 3.77–5.28)
SODIUM SERPL-SCNC: 131 MMOL/L (ref 136–145)
WBC NRBC COR # BLD: 13.66 10*3/MM3 (ref 3.4–10.8)

## 2023-01-18 PROCEDURE — 80048 BASIC METABOLIC PNL TOTAL CA: CPT | Performed by: ORTHOPAEDIC SURGERY

## 2023-01-18 PROCEDURE — A9270 NON-COVERED ITEM OR SERVICE: HCPCS | Performed by: ORTHOPAEDIC SURGERY

## 2023-01-18 PROCEDURE — 97110 THERAPEUTIC EXERCISES: CPT

## 2023-01-18 PROCEDURE — 63710000001 CYCLOSPORINE 0.05 % EMULSION: Performed by: ORTHOPAEDIC SURGERY

## 2023-01-18 PROCEDURE — 63710000001 GABAPENTIN 300 MG CAPSULE: Performed by: ORTHOPAEDIC SURGERY

## 2023-01-18 PROCEDURE — 63710000001 ASPIRIN EC 325 MG TABLET DELAYED-RELEASE: Performed by: ORTHOPAEDIC SURGERY

## 2023-01-18 PROCEDURE — G0378 HOSPITAL OBSERVATION PER HR: HCPCS

## 2023-01-18 PROCEDURE — 63710000001 OXYCODONE-ACETAMINOPHEN 5-325 MG TABLET: Performed by: ORTHOPAEDIC SURGERY

## 2023-01-18 PROCEDURE — 63710000001 PANTOPRAZOLE 40 MG TABLET DELAYED-RELEASE: Performed by: ORTHOPAEDIC SURGERY

## 2023-01-18 PROCEDURE — 63710000001 METOPROLOL SUCCINATE XL 50 MG TABLET SUSTAINED-RELEASE 24 HOUR: Performed by: ORTHOPAEDIC SURGERY

## 2023-01-18 PROCEDURE — 85027 COMPLETE CBC AUTOMATED: CPT | Performed by: ORTHOPAEDIC SURGERY

## 2023-01-18 PROCEDURE — 63710000001 FERROUS SULFATE 325 (65 FE) MG TABLET: Performed by: ORTHOPAEDIC SURGERY

## 2023-01-18 PROCEDURE — 63710000001 MUPIROCIN 2 % OINTMENT: Performed by: ORTHOPAEDIC SURGERY

## 2023-01-18 RX ADMIN — Medication 1 DROP: at 08:17

## 2023-01-18 RX ADMIN — Medication 10 ML: at 08:18

## 2023-01-18 RX ADMIN — MUPIROCIN 1 APPLICATION: 20 OINTMENT TOPICAL at 08:18

## 2023-01-18 RX ADMIN — PANTOPRAZOLE SODIUM 40 MG: 40 TABLET, DELAYED RELEASE ORAL at 05:35

## 2023-01-18 RX ADMIN — OXYCODONE AND ACETAMINOPHEN 1 TABLET: 5; 325 TABLET ORAL at 08:17

## 2023-01-18 RX ADMIN — ASPIRIN 325 MG: 325 TABLET, COATED ORAL at 08:16

## 2023-01-18 RX ADMIN — FERROUS SULFATE TAB 325 MG (65 MG ELEMENTAL FE) 325 MG: 325 (65 FE) TAB at 08:17

## 2023-01-18 RX ADMIN — METOPROLOL SUCCINATE 50 MG: 50 TABLET, FILM COATED, EXTENDED RELEASE ORAL at 08:16

## 2023-01-18 RX ADMIN — GABAPENTIN 300 MG: 300 CAPSULE ORAL at 08:17

## 2023-01-18 NOTE — OUTREACH NOTE
Prep Survey    Flowsheet Row Responses   [facility] [place] patient discharged from? [place]   Is LACE score < 7 ? Yes   Eligibility [facility]   Date of Admission 01/17/23   Date of Discharge 01/18/23   Discharge Disposition Home or Self Care   Discharge diagnosis Right Total Knee Arthroplasty   Does the patient have one of the following disease processes/diagnoses(primary or secondary)? Total Joint Replacement   Does the patient have Home health ordered? No   Is there a DME ordered? No   Prep survey completed? Yes          MAU MIRELES - Registered Nurse

## 2023-01-18 NOTE — PROGRESS NOTES
[facility] following for home care needs.  [facility]  Verified all info with patient in room, states no recent home health, states come around to back door ground level where living room is to enter.  D/Cing today.

## 2023-01-18 NOTE — THERAPY TREATMENT NOTE
Patient Name: Reanna Higgins  : 1940    MRN: [id number]                              Today's Date: 2023       Admit Date: 2023    Visit Dx:     ICD-10-CM ICD-9-CM   1. Primary osteoarthritis of right knee  M17.11 715.16     Patient Active Problem List   Diagnosis   • Colon polyp   • Gastroesophageal reflux disease   • Essential hypertension   • Dyslipidemia (high LDL; low HDL)   • Mitral and aortic valve disease   • Heart valve disease   • REKHA (generalized anxiety disorder)   • Neuropathy due to medical condition ([facility])   • Malignant neoplasm of cheek ([facility]ly [facility])   • Non-rheumatic mitral regurgitation   • Osteopenia   • SVT (supraventricular tachycardia) ([facility]ly [facility])   • AV block, Mobitz 1   • Cardiac pacemaker in situ   • RLS (restless legs syndrome)   • Type 2 diabetes mellitus with hyperglycemia, without long-term current use of insulin ([facility])   • Mesenteric ischemia due to arterial insufficiency ([facility])   • Sick sinus syndrome ([facility])   • Coronary artery disease involving native coronary artery of native heart with angina pectoris ([facility])   • S/P CABG (coronary artery bypass graft)   • Subclinical hypothyroidism   • Celiac artery stenosis ([facility])   • History of acute pancreatitis   • Chronic pancreatitis ([facility])   • Chronic recurrent pancreatitis ([facility])   • History of adenomatous polyp of colon   • Primary osteoarthritis of right knee     Past Medical History:   Diagnosis Date   • Anesthesia complication     SEVERE SHAKING OF ENTIRE BODY AFTER EGD/ERCP. STATES THAT ANESTHESIA DID NOT SEE PREOP   • Cardiac pacemaker in situ    • Carpal tunnel syndrome    • Cystic fibroadenosis of breast    • Diabetes mellitus (HCC)    • GERD (gastroesophageal reflux disease)    • History of ileus 2019   • History of skin cancer    • Hypercholesterolemia    • Hypertension    • Osteoarthritis of both hands    • Pacemaker    • Pancreatitis    • Peripheral neuropathy    • Phobia, flying 2019   • Restless leg syndrome    • Second  degree AV block, Mobitz type I    • SVT (supraventricular tachycardia) (HCC)      Past Surgical History:   Procedure Laterality Date   • APPENDECTOMY     • BILATERAL OOPHORECTOMY Bilateral 1988   • BREAST CYST EXCISION Bilateral     4 BREAST SURGERIES   • CARDIAC CATHETERIZATION N/A 8/22/2019    Procedure: Left Heart Cath;  Surgeon: Francis Mccullough MD;  Location: [facility] CATH INVASIVE LOCATION;  Service: Cardiology   • CARDIAC CATHETERIZATION N/A 8/22/2019    Procedure: Left ventriculography;  Surgeon: Francis Mccullough MD;  Location: [facility] CATH INVASIVE LOCATION;  Service: Cardiology   • CARDIAC CATHETERIZATION N/A 8/22/2019    Procedure: Coronary angiography;  Surgeon: Francis Mccullough MD;  Location: [facility] CATH INVASIVE LOCATION;  Service: Cardiology   • CARDIAC ELECTROPHYSIOLOGY PROCEDURE N/A 10/10/2016    Procedure: Pacemaker DC new  [place];  Surgeon: Wilfrido Hameed MD;  Location: [facility] CATH INVASIVE LOCATION;  Service:    • CARPAL TUNNEL RELEASE Right    • CATARACT EXTRACTION      NOVEMBER AND DECEMBER 2014   • CHOLECYSTECTOMY     • COLONOSCOPY  2015   • COLONOSCOPY N/A 1/21/2022    Procedure: COLONOSCOPY into cecum and terminal ileum with hot snare polypectomy x2;  Surgeon: Giovany Redman MD;  Location: [facility] ENDOSCOPY;  Service: Gastroenterology;  Laterality: N/A;  Pre op: History of polyps  Post op: Polyps, Diverticulosis and Hemorrhoids   • CORONARY ARTERY BYPASS GRAFT N/A 8/23/2019    Procedure: SUMMER STERNOTOMY CORONARY ARTERY BYPASS GRAFT TIMES 6 USING LEFT INTERNAL MAMMARY ARTERY AND LEFT GREATER SAPHENOUS VEIN GRAFT PER ENDOSCOPIC VEIN HARVESTING AND PRP;  Surgeon: Kem Hawk MD;  Location: [facility] MAIN OR;  Service: Cardiothoracic   • ENDOSCOPY N/A 7/18/2019    Procedure: ESOPHAGOGASTRODUODENOSCOPY with biopsy;  Surgeon: Ricky Chew MD;  Location: [facility] ENDOSCOPY;  Service: Gastroenterology   • ERCP N/A 9/29/2021    Procedure: ENDOSCOPIC RETROGRADE CHOLANGIOPANCREATOGRAPHY  WITH SPHINCTEROTOMY AND BALLOON SWEEP;  Surgeon: Giovany Redman MD;  Location: [facility] ENDOSCOPY;  Service: Gastroenterology;  Laterality: N/A;  PRE- RECURRENT PANCREATITIS  POST- SAME   • EYE SURGERY     • HYSTERECTOMY     • KNEE ARTHROSCOPY Bilateral 04/2014    MENISCAL TEAR   • PACEMAKER IMPLANTATION  10/10/2016   • SHOULDER SURGERY Right     ROTATOR CUFF SURGERY JUNE 18, 2015   • TONSILLECTOMY     • TOTAL KNEE ARTHROPLASTY Right 1/17/2023    Procedure: RIGHT TOTAL KNEE ARTHROPLASTY;  Surgeon: To Sands MD;  Location: [facility] OR [facility];  Service: Orthopedics;  Laterality: Right;   • TRIGGER FINGER RELEASE      both hands      General Information     Row Name 01/18/23 1325          Physical Therapy Time and Intention    Document Type therapy note (daily note)  -AR     Mode of Treatment physical therapy  -AR     Row Name 01/18/23 1325          General Information    Patient Profile Reviewed yes  -AR     Existing Precautions/Restrictions fall  -AR     Row Name 01/18/23 1325          Cognition    Orientation Status (Cognition) oriented x 4  -AR           User Key  (r) = Recorded By, (t) = Taken By, (c) = Cosigned By    Initials Name Provider Type    AR Magaly Friedman PT Physical Therapist               Mobility     Row Name 01/18/23 1325          Bed Mobility    Bed Mobility supine-sit  -AR     Supine-Sit [place] (Bed Mobility) modified independence  -AR     Assistive Device (Bed Mobility) head of bed elevated  -AR     Row Name 01/18/23 1325          Sit-Stand Transfer    Sit-Stand [place] (Transfers) standby assist  -AR     Assistive Device (Sit-Stand Transfers) walker, front-wheeled  -AR     Row Name 01/18/23 1325          Gait/Stairs (Locomotion)    [place] Level (Gait) standby assist  -AR     Assistive Device (Gait) walker, front-wheeled  -AR     Distance in Feet (Gait) 150  -AR     Right Sided Gait Deviations weight shift ability decreased;heel strike decreased  -AR     [place]  Level (Stairs) contact guard  -AR     Handrail Location (Stairs) right side (ascending)  -AR     Number of Steps (Stairs) 8  -AR     Ascending Technique (Stairs) step-to-step  -AR     Descending Technique (Stairs) step-to-step  -AR           User Key  (r) = Recorded By, (t) = Taken By, (c) = Cosigned By    Initials Name Provider Type    AR Magaly Friedman, PT Physical Therapist               Obj/Interventions     Row Name 01/18/23 1326          Motor Skills    Therapeutic Exercise --  R TKA 10x w/ ed for HEP  -AR           User Key  (r) = Recorded By, (t) = Taken By, (c) = Cosigned By    Initials Name Provider Type    AR Magaly Friedman, PT Physical Therapist               Goals/Plan    No documentation.                Clinical Impression     Row Name 01/18/23 1326          Pain    Pretreatment Pain Rating 1/10  -AR     Posttreatment Pain Rating 2/10  -AR     Pain Location - Side/Orientation Right  -AR     Pain Location - knee  -AR     Pain Intervention(s) Medication (See MAR);Repositioned;Cold applied  -AR     Row Name 01/18/23 1326          Plan of Care Review    Outcome Evaluation Improved independence w/ gait and stairs during PT today.  Recommend hoem w/ home PT, pt owns walker.  diccussed with pt, spouse and RN.  -AR     Row Name 01/18/23 1326          Vital Signs    O2 Delivery Pre Treatment room air  -AR     Row Name 01/18/23 1326          Positioning and Restraints    Pre-Treatment Position in bed  -AR     In Chair notified nsg;reclined;sitting;call light within reach;encouraged to call for assist;exit alarm on;with family/caregiver  -AR           User Key  (r) = Recorded By, (t) = Taken By, (c) = Cosigned By    Initials Name Provider Type    AR Magaly Friedman, PT Physical Therapist               Outcome Measures     Row Name 01/18/23 1327 01/18/23 0816       How much help from another person do you currently need...    Turning from your back to your side while in flat bed without using bedrails? 4  -AR  4  -MN    Moving from lying on back to sitting on the side of a flat bed without bedrails? 4  -AR 3  -MN    Moving to and from a bed to a chair (including a wheelchair)? 4  -AR 3  -MN    Standing up from a chair using your arms (e.g., wheelchair, bedside chair)? 4  -AR 3  -MN    Climbing 3-5 steps with a railing? 3  -AR 2  -MN    To walk in hospital room? 3  -AR 3  -MN    AM-PAC 6 Clicks Score (PT) 22  -AR 18  -MN    Highest level of mobility 7 --> Walked 25 feet or more  -AR 6 --> Walked 10 steps or more  -MN    Row Name 01/18/23 1327          Functional Assessment    Outcome Measure Options AM-PAC 6 Clicks Basic Mobility (PT)  -AR           User Key  (r) = Recorded By, (t) = Taken By, (c) = Cosigned By    Initials Name Provider Type    Malathi Santiago RN Registered Nurse    Magaly Quijano, PT Physical Therapist                             Physical Therapy Education     Title: PT OT SLP Therapies (Done)     Topic: Physical Therapy (Done)     Point: Mobility training (Done)     Learning Progress Summary           Patient Acceptance, E, VU,DU by AR at 1/18/2023 1328    Acceptance, E, NR by AR at 1/17/2023 1422   Family Acceptance, E, VU,DU by AR at 1/18/2023 1328    Acceptance, E, NR by AR at 1/17/2023 1422                   Point: Home exercise program (Done)     Learning Progress Summary           Patient Acceptance, E, VU,DU by AR at 1/18/2023 1328    Acceptance, E, NR by AR at 1/17/2023 1422   Family Acceptance, E, VU,DU by AR at 1/18/2023 1328    Acceptance, E, NR by AR at 1/17/2023 1422                   Point: Body mechanics (Done)     Learning Progress Summary           Patient Acceptance, E, VU,DU by AR at 1/18/2023 1328    Acceptance, E, NR by AR at 1/17/2023 1422   Family Acceptance, E, VU,DU by AR at 1/18/2023 1328    Acceptance, E, NR by AR at 1/17/2023 1422                   Point: Precautions (Done)     Learning Progress Summary           Patient Acceptance, E, VU,DU by AR at 1/18/2023 2081     Acceptance, E, NR by AR at 1/17/2023 1422   Family Acceptance, E, VU,DU by AR at 1/18/2023 1328    Acceptance, E, NR by AR at 1/17/2023 1422                               User Key     Initials Effective Dates Name Provider Type Discipline    AR 06/16/21 -  Magaly Friedman PT Physical Therapist PT              PT Recommendation and Plan  Planned Therapy Interventions (PT): balance training, bed mobility training, gait training, home exercise program, patient/family education, transfer training, ROM (range of motion), stair training, strengthening  Plan of Care Reviewed With: patient, spouse, family  Outcome Evaluation: Improved independence w/ gait and stairs during PT today.  Recommend hoem w/ home PT, pt owns walker.  diccussed with pt, spouse and RN.     Time Calculation:    PT Charges     Row Name 01/18/23 1325             Time Calculation    Start Time 0959  -AR      Stop Time 1017  -AR      Time Calculation (min) 18 min  -AR      PT Received On 01/18/23  -AR            User Key  (r) = Recorded By, (t) = Taken By, (c) = Cosigned By    Initials Name Provider Type    AR Magaly Friedman PT Physical Therapist              Therapy Charges for Today     Code Description Service Date Service Provider Modifiers Qty    93615522181 HC PT EVAL LOW COMPLEXITY 4 1/17/2023 Magaly Friedman, PT GP 1    50515988502 HC PT THER PROC EA 15 MIN 1/17/2023 Magaly Friedman PT GP 1    97877460194 HC PT THER PROC EA 15 MIN 1/18/2023 Magaly Friedman, PT GP 1          PT G-Codes  Outcome Measure Options: AM-PAC 6 Clicks Basic Mobility (PT)  AM-PAC 6 Clicks Score (PT): 22  PT Discharge Summary  Anticipated Discharge Disposition (PT): home with assist, home with home health    Magaly Friedman PT  1/18/2023

## 2023-01-18 NOTE — PROGRESS NOTES
Orthopaedic Surgery  Progress Note  1/18/2023    Patients Name:  Reanna Higgins  YOB: 1940  Age: 82 y.o.  Medical Records Number:  [id number]  Date of Admission: 1/17/2023    No complaints except appropriate pain and stiffness in the right knee    Vitals:  Vitals:    01/17/23 1700 01/17/23 2107 01/18/23 0241 01/18/23 0534   BP: 125/58 105/51 125/51 104/52   BP Location: Right arm Right arm Right arm Right arm   Patient Position: Lying Sitting Lying Lying   Pulse: 77 84 77 72   Resp: 17 16 16 16   Temp: 97.4 °F (36.3 °C) 98.2 °F (36.8 °C) 98 °F (36.7 °C) 98.1 °F (36.7 °C)   TempSrc: Oral Oral Oral Oral   SpO2: 97% 96% 95% 93%   Weight:       Height:           RLE:  NVI, calf nontender, sensation intact  No signs of DVT    Incision: clean, no signs of infection    Lab Results (last 24 hours)     Procedure Component Value Units Date/Time    Basic Metabolic Panel [[id number]]  (Abnormal) Collected: 01/18/23 0538    Specimen: Blood Updated: 01/18/23 0633     Glucose 122 mg/dL      BUN 28 mg/dL      Creatinine 0.94 mg/dL      Sodium 131 mmol/L      Potassium 4.7 mmol/L      Chloride 99 mmol/L      CO2 25.0 mmol/L      Calcium 8.6 mg/dL      BUN/Creatinine Ratio 29.8     Anion Gap 7.0 mmol/L      eGFR 60.0 mL/min/1.73     Narrative:      GFR Normal >60  Chronic Kidney Disease <60  Kidney Failure <15    The GFR formula is only valid for adults with stable renal function between ages 18 and 70.    CBC (No Diff) [[id number]]  (Abnormal) Collected: 01/18/23 0538    Specimen: Blood Updated: 01/18/23 0610     WBC 13.66 10*3/mm3      RBC 3.72 10*6/mm3      Hemoglobin 11.1 g/dL      Hematocrit 32.3 %      MCV 86.8 fL      MCH 29.8 pg      MCHC 34.4 g/dL      RDW 12.3 %      RDW-SD 38.4 fl      MPV 11.3 fL      Platelets 186 10*3/mm3           XR Knee 1 or 2 View Right    Result Date: 1/17/2023  Narrative: TWO-VIEW PORTABLE RIGHT KNEE  HISTORY: Knee replacement for osteoarthritis  FINDINGS: The patient has had recent  total knee replacement and the alignment appears satisfactory.  This report was finalized on 1/17/2023 9:30 AM by Dr. Mark Menendez M.D.      XR Knee 1 or 2 View Right    Result Date: 1/12/2023  Narrative: XR KNEE 1 OR 2 VW RIGHT-  INDICATIONS: Preoperative evaluation  TECHNIQUE: Frontal and lateral views of the right knee  COMPARISON: None available  FINDINGS:  Minimal knee effusion is apparent. Moderate to prominent medial tibiofemoral joint space narrowing is seen. Mild to moderate degenerative spurring is noted. No acute fracture, erosion, or dislocation is identified. Arterial calcifications are seen.      Impression:  Degenerative changes of the knee.  This report was finalized on 1/12/2023 12:34 PM by Dr. Torsten Ortega M.D.      Peripheral Block    Result Date: 1/17/2023  Narrative: Finesse Bennett DO     1/17/2023  7:02 AM Peripheral Block Pre-sedation assessment completed: 1/17/2023 6:48 AM Patient reassessed immediately prior to procedure Patient location during procedure: holding area Start time: 1/17/2023 6:50 AM Stop time: 1/17/2023 6:54 AM Reason for block: at surgeon's request and post-op pain management Performed by Anesthesiologist: Finesse Bennett DO Preanesthetic Checklist Completed: patient identified, IV checked, site marked, risks and benefits discussed, surgical consent, monitors and equipment checked, pre-op evaluation and timeout performed Prep: Pt Position: supine Sterile barriers:gloves, mask, cap and washed/disinfected hands Prep: ChloraPrep Patient monitoring: blood pressure monitoring, continuous pulse oximetry and EKG Procedure Sedation: yes Performed under: local infiltration Guidance:ultrasound guided ULTRASOUND INTERPRETATION.  Using ultrasound guidance a gauge needle was placed in close proximity to the femoral nerve, at which point, under ultrasound guidance anesthetic was injected in the area of the nerve and spread of the anesthesia was seen on ultrasound  in close proximity thereto.  There were no abnormalities seen on ultrasound; a digital image was taken; and the patient tolerated the procedure with no complications. Images:still images obtained, printed/placed on chart Laterality:right Block Type:adductor canal block Injection Technique:single-shot Needle Type:echogenic Needle Gauge:22 G Resistance on Injection: none Medications Used: dexamethasone (DECADRON) injection - Injection  4 mg - 1/17/2023 6:54:00 AM bupivacaine-EPINEPHrine PF (MARCAINE w/EPI) 0.25% -1:[id number] injection - Peripheral Nerve  15 mL - 1/17/2023 6:53:00 AM ropivacaine (NAROPIN) 0.5 % injection - Peripheral Nerve  15 mL - 1/17/2023 6:53:00 AM Medications Comment:Ultrasound Interpretation:  Using ultrasound guidance the needle was placed in close proximity to the adductor canal and anesthetic was injected in the area of the nerve and spread of the anesthetic was seen on ultrasound in close proximity thereto.  There were no abnormalities seen on ultrasound; a digital image was taken; and the patient tolerated the procedure with no complications.   Post Assessment Injection Assessment: negative aspiration for heme, no paresthesia on injection and incremental injection Patient Tolerance:comfortable throughout block Complications:no Additional Notes Ultrasound guidance was used to both view and verify local anesthetic placement and disbursement. In addition, it was used to evaluate the respective anatomy to determine needle approach and placement.       Assesment/Plan:    Procedures:  Right TKA  Postoperative Day: 1  Weightbearing Status:  WBAT with walker  DVT Prophylaxis:  ASA for DVT prophylaxis    Disposition:  Home with home health after PT today, if comfortable and mobilizing safely    To Robles  [facility]  [address] [place] [address] [phone]    1/18/2023

## 2023-01-18 NOTE — PLAN OF CARE
Goal Outcome Evaluation:  Plan of Care Reviewed With: patient, spouse, family           Outcome Evaluation: Improved independence w/ gait and stairs during PT today.  Recommend hoem w/ home PT, pt owns walker.  diccussed with pt, spouse and RN.

## 2023-01-19 ENCOUNTER — HOME CARE VISIT (OUTPATIENT)
Dept: HOME HEALTH SERVICES | Facility: HOME HEALTHCARE | Age: 83
End: 2023-01-19
Payer: MEDICARE

## 2023-01-19 ENCOUNTER — TRANSITIONAL CARE MANAGEMENT TELEPHONE ENCOUNTER (OUTPATIENT)
Dept: CALL CENTER | Facility: HOSPITAL | Age: 83
End: 2023-01-19
Payer: MEDICARE

## 2023-01-19 PROCEDURE — G0151 HHCP-SERV OF PT,EA 15 MIN: HCPCS

## 2023-01-19 NOTE — OUTREACH NOTE
Call Center TCM Note    Flowsheet Row Responses   [facility] patient discharged from? [facility]   Does the patient have one of the following disease processes/diagnoses(primary or secondary)? Total Joint Replacement   Joint surgery performed? Knee   TCM attempt successful? Yes  [No verbal release on file]   Call start time 1413   Call end time 1416   Has the patient been back in either the hospital or Emergency Department since discharge? No   Discharge diagnosis Right Total Knee Arthroplasty   Does the patient have all medications related to this admission filled (includes all antibiotics, pain medications, etc.) Yes   Is the patient taking all medications as directed (includes completed medication regime)? Yes   Is the patient able to teach back alternate methods of pain control? Ice, Short, frequent activity, Reposition   Does the patient have an appointment with their PCP within 7 days of discharge? No   Nursing Interventions Routed TCM call to PCP office, Patient declined scheduling/rescheduling appointment at this time   Has home health visited the patient within 72 hours of discharge? Yes   Psychosocial issues? No   Has the patient began therapy sessions (either in the home or as an out patient)? Yes   If the patient has started attending therapy, what post op day did they begin to attend (either in home or as an out patient)?   PO day 2   Does the patient have a wound vac in place? No   Has the patient fallen since discharge? No   Did the patient receive a copy of their discharge instructions? Yes   Nursing interventions Reviewed instructions with patient   What is the patient's perception of their functional status since discharge? Improving   Is the patient able to teach back signs and symptoms of infection? Temp >100.4 for 24h or longer, Blisters around incision, Incisional drainage, Increased swelling or redness around incision (not associated with surgical edema), Severe discomfort or pain,  Changes in mobility, Shortness of breath or chest pain   Is the patient able to teach back how to prevent infection? Check incision daily, Keep incision covered if pets in house, No lotion or creams, Wash hands before and after touching incision, Keep incision covered if drainage   Is the patient able to teach back signs and symptoms of DVT? Redness in calf, Area hot to touch, Shortness of breath or chest pain, Swelling in calf, Severe pain in calf   Is the patient able to teach back home safety measures? Modifications to reach items, Accessibility to necessary areas in home   Did the patient implement home safety suggestions from pre-surgery classes if attended? N/A   If the patient is a current smoker, are they able to teach back resources for cessation? Not a smoker   Is the patient/caregiver able to teach back the hierarchy of who to call/visit for symptoms/problems? PCP, Specialist, Home health nurse, Urgent Care, ED, 911 Yes   TCM call completed? Yes   Call end time 1416   Would this patient benefit from a Referral to [facility] Social Work? No   Is the patient interested in additional calls from an ambulatory ?  NOTE:  applies to high risk patients requiring additional follow-up. No          Raisa Nuñez RN    1/19/2023, 14:17 EST

## 2023-01-20 VITALS
TEMPERATURE: 98.9 F | OXYGEN SATURATION: 96 % | RESPIRATION RATE: 18 BRPM | HEART RATE: 70 BPM | SYSTOLIC BLOOD PRESSURE: 130 MMHG | DIASTOLIC BLOOD PRESSURE: 56 MMHG

## 2023-01-20 NOTE — HOME HEALTH
Patient had right TKR on 1/17/23 per Dr. Sands. Her outpt it set for 2/2/23.  Staples are intact and are to be removed post-op 10 to 14 days.  Bandage changed and incision appears closed with no s/s of infection. She has a cardiac history of CABG x 6 and a pacemaker.  She states she is a borderline diabetic, not taking any meds and A1c of 6.4.  She was very active for her age prior to this surgery, rollerskating still.  She lives is a well-kempt tri-level with her  who is 90 years old, but is in good physical shape and is an able caregiver.      Assessment:  she was in 10/10 pain today because she as tried to go to full weight bearing and no AD and back to full function too quickly.  She is very motivated to get back to prior activities and she should do very well post-op.  Skilled PT needed post right TKR to guide her HEP to acheive pain/edema control and to meet her mobility goals.     Plan for next visit  Review and amend HEP  Pain/edema teaching as needed.

## 2023-01-21 ENCOUNTER — NURSE TRIAGE (OUTPATIENT)
Dept: CALL CENTER | Facility: HOSPITAL | Age: 83
End: 2023-01-21
Payer: MEDICARE

## 2023-01-21 NOTE — TELEPHONE ENCOUNTER
Mary Ellener states she had right knee Total replacement on Tuesday and feels the knee is pink today. Reviewed guideline with caller advises home care. Advised she routinely check her temp every 4 hours. Advised to monitor this pink area on the side of her leg and if it begins to feel warm to the touch or she begins to run a fever to go to ED to be evaluated. Caller agrees to follow care advice.     Reason for Disposition  • Other post-op symptom or question    Additional Information  • Negative: Sounds like a life-threatening emergency to the triager  • Negative: Chest pain  • Negative: Difficulty breathing  • Negative: Acting confused (e.g., disoriented, slurred speech) or excessively sleepy  • Negative: Surgical incision symptoms and questions  • Negative: [1] Discomfort (pain, burning or stinging) when passing urine AND [2] male  • Negative: [1] Discomfort (pain, burning or stinging) when passing urine AND [2] female  • Negative: Constipation  • Negative: New or worsening leg (calf, thigh) pain  • Negative: New or worsening leg swelling  • Negative: Dizziness is severe, or persists > 24 hours after surgery  • Negative: Pain, redness, swelling, or pus at IV Site  • Negative: Symptoms arising from use of a urinary catheter (Quiñonez or Coude)  • Negative: Cast problems or questions  • Negative: Medication question  • Negative: [1] Widespread rash AND [2] bright red, sunburn-like  • Negative: [1] SEVERE headache AND [2] after spinal (epidural) anesthesia  • Negative: [1] Vomiting AND [2] persists > 4 hours  • Negative: [1] Vomiting AND [2] abdomen looks much more swollen than usual  • Negative: [1] Drinking very little AND [2] dehydration suspected (e.g., no urine > 12 hours, very dry mouth, very lightheaded)  • Negative: Patient sounds very sick or weak to the triager  • Negative: Sounds like a serious complication to the triager  • Negative: Fever > 100.4 F (38.0 C)  • Negative: [1] SEVERE post-op pain (e.g.,  "excruciating, pain scale 8-10) AND [2] not controlled with pain medications  • Negative: [1] Caller has URGENT question AND [2] triager unable to answer question  • Negative: [1] Headache AND [2] after spinal (epidural) anesthesia AND [3] not severe  • Negative: Fever present > 3 days (72 hours)  • Negative: [1] MILD-MODERATE post-op pain (e.g., pain scale 1-7) AND [2] not controlled with pain medications  • Negative: [1] Caller has NON-URGENT question AND [2] triager unable to answer question  • Negative: General activity, questions about  • Negative: Resuming driving, questions about  • Negative: Resuming sexual relations, questions about  • Negative: Getting the incision wet, questions about  • Negative: Throat pain after surgery, questions about  • Negative: [1] Vomiting AND [2] present < 4 hours    Answer Assessment - Initial Assessment Questions  1. SYMPTOM: \"What's the main symptom you're concerned about?\" (e.g., pain, fever, vomiting)      Pinkness on inside of the leg   2. ONSET: \"When did pinkness  start?\"      Just noticed it today   3. SURGERY: \"What surgery was performed?\"      Total knee replacement  4. DATE of SURGERY: \"When was surgery performed?\"       Tuesday  5. ANESTHESIA: \" What type of anesthesia did you have?\" (e.g., general, spinal, epidural, local)      general  6. PAIN: \"Is there any pain?\" If Yes, ask: \"How bad is it?\"  (Scale 1-10; or mild, moderate, severe)      Post op pain, has been using ice   7. FEVER: \"Do you have a fever?\" If Yes, ask: \"What is your temperature, how was it measured, and when did it start?\"      No   8. VOMITING: \"Is there any vomiting?\" If yes, ask: \"How many times?\"      no  9. BLEEDING: \"Is there any bleeding?\" If Yes, ask: \"How much?\" and \"Where?\"      no  10. OTHER SYMPTOMS: \"Do you have any other symptoms?\" (e.g., drainage from wound, painful urination, constipation)        no    Protocols used: POST-OP SYMPTOMS AND QUESTIONS-ADULT-      "

## 2023-01-23 ENCOUNTER — HOME CARE VISIT (OUTPATIENT)
Dept: HOME HEALTH SERVICES | Facility: HOME HEALTHCARE | Age: 83
End: 2023-01-23
Payer: MEDICARE

## 2023-01-23 PROCEDURE — G0151 HHCP-SERV OF PT,EA 15 MIN: HCPCS

## 2023-01-24 ENCOUNTER — TELEPHONE (OUTPATIENT)
Dept: ORTHOPEDIC SURGERY | Facility: HOSPITAL | Age: 83
End: 2023-01-24
Payer: MEDICARE

## 2023-01-24 VITALS
RESPIRATION RATE: 17 BRPM | HEART RATE: 68 BPM | SYSTOLIC BLOOD PRESSURE: 128 MMHG | DIASTOLIC BLOOD PRESSURE: 70 MMHG | OXYGEN SATURATION: 96 % | TEMPERATURE: 98 F

## 2023-01-24 NOTE — TELEPHONE ENCOUNTER
Called and spoke with Ms. Higgins to see how she is doing as she is 1 week SP RTK. She said she is doing pretty well. She is working with PT and doing her exercises. She continues to walk and ice her leg. Her pain is controlled. She takes the medication as needed. Taking maybe 1-2 a day. She said it hurts the worst at night when she is trying to rest. She does have some bruising and swelling, but the therapist explained what was going on and that it was normal. The dressing remains in place, CDI. BM's are getting better. Ms. Higgins doesn't have any other questions/concerns for me at this time. She was given my contact information should she need anything. She voiced understanding and appreciated the call.

## 2023-01-24 NOTE — TELEPHONE ENCOUNTER
Caller: Reanna Higgins    Relationship: Self    Best call back number:[phone]     What specialty or service is being requested: DEXA    What is the provider, practice or medical service name: DR. HEMPHILL    What is the office location: [facility]    Any additional details: PATIENT IS WANTING TO GET A DEXA DONE.  SHE WILL NEED AN ORDER FAXED TO [facility].    PLEASE ADVISE.    
Order attached   
no

## 2023-01-25 ENCOUNTER — HOME CARE VISIT (OUTPATIENT)
Dept: HOME HEALTH SERVICES | Facility: HOME HEALTHCARE | Age: 83
End: 2023-01-25
Payer: MEDICARE

## 2023-01-25 PROCEDURE — G0151 HHCP-SERV OF PT,EA 15 MIN: HCPCS

## 2023-01-26 VITALS
OXYGEN SATURATION: 97 % | HEART RATE: 70 BPM | TEMPERATURE: 97.8 F | SYSTOLIC BLOOD PRESSURE: 124 MMHG | DIASTOLIC BLOOD PRESSURE: 76 MMHG | RESPIRATION RATE: 17 BRPM

## 2023-01-27 ENCOUNTER — HOME CARE VISIT (OUTPATIENT)
Dept: HOME HEALTH SERVICES | Facility: HOME HEALTHCARE | Age: 83
End: 2023-01-27
Payer: MEDICARE

## 2023-01-27 PROCEDURE — G0151 HHCP-SERV OF PT,EA 15 MIN: HCPCS

## 2023-01-29 VITALS
HEART RATE: 66 BPM | TEMPERATURE: 97.4 F | RESPIRATION RATE: 18 BRPM | OXYGEN SATURATION: 97 % | DIASTOLIC BLOOD PRESSURE: 76 MMHG | SYSTOLIC BLOOD PRESSURE: 132 MMHG

## 2023-01-31 ENCOUNTER — HOME CARE VISIT (OUTPATIENT)
Dept: HOME HEALTH SERVICES | Facility: HOME HEALTHCARE | Age: 83
End: 2023-01-31
Payer: MEDICARE

## 2023-01-31 PROCEDURE — G0151 HHCP-SERV OF PT,EA 15 MIN: HCPCS

## 2023-02-01 VITALS
SYSTOLIC BLOOD PRESSURE: 120 MMHG | TEMPERATURE: 98.6 F | HEART RATE: 66 BPM | OXYGEN SATURATION: 96 % | DIASTOLIC BLOOD PRESSURE: 64 MMHG

## 2023-02-01 NOTE — HOME HEALTH
Patient up and moving around easily without AD today.  She is still icing her knee and taking only 2 to 3 pain pills a day.  Staple removal today:  32 staples counted before and after removal.  She toleated removal very well and steri-strips applied after removal. She is ready for outpt PT to start 2/2 with all goals met.

## 2023-02-08 ENCOUNTER — OFFICE VISIT (OUTPATIENT)
Dept: CARDIOLOGY | Facility: CLINIC | Age: 83
End: 2023-02-08
Payer: MEDICARE

## 2023-02-08 VITALS
SYSTOLIC BLOOD PRESSURE: 160 MMHG | HEART RATE: 67 BPM | OXYGEN SATURATION: 97 % | WEIGHT: 156 LBS | DIASTOLIC BLOOD PRESSURE: 73 MMHG | BODY MASS INDEX: 24.48 KG/M2 | HEIGHT: 67 IN

## 2023-02-08 DIAGNOSIS — I10 ESSENTIAL HYPERTENSION: Chronic | ICD-10-CM

## 2023-02-08 DIAGNOSIS — Z95.1 S/P CABG (CORONARY ARTERY BYPASS GRAFT): ICD-10-CM

## 2023-02-08 DIAGNOSIS — I47.1 SVT (SUPRAVENTRICULAR TACHYCARDIA): ICD-10-CM

## 2023-02-08 DIAGNOSIS — I34.0 NON-RHEUMATIC MITRAL REGURGITATION: ICD-10-CM

## 2023-02-08 DIAGNOSIS — Z95.0 CARDIAC PACEMAKER IN SITU: ICD-10-CM

## 2023-02-08 DIAGNOSIS — I49.5 SICK SINUS SYNDROME: ICD-10-CM

## 2023-02-08 DIAGNOSIS — E11.65 TYPE 2 DIABETES MELLITUS WITH HYPERGLYCEMIA, WITHOUT LONG-TERM CURRENT USE OF INSULIN: Chronic | ICD-10-CM

## 2023-02-08 DIAGNOSIS — I08.0 MITRAL AND AORTIC VALVE DISEASE: ICD-10-CM

## 2023-02-08 DIAGNOSIS — I44.1 AV BLOCK, MOBITZ 1: Primary | ICD-10-CM

## 2023-02-08 DIAGNOSIS — I25.119 CORONARY ARTERY DISEASE INVOLVING NATIVE CORONARY ARTERY OF NATIVE HEART WITH ANGINA PECTORIS: Chronic | ICD-10-CM

## 2023-02-08 DIAGNOSIS — E78.5 DYSLIPIDEMIA (HIGH LDL; LOW HDL): Chronic | ICD-10-CM

## 2023-02-08 PROCEDURE — 93000 ELECTROCARDIOGRAM COMPLETE: CPT | Performed by: NURSE PRACTITIONER

## 2023-02-08 PROCEDURE — 99214 OFFICE O/P EST MOD 30 MIN: CPT | Performed by: NURSE PRACTITIONER

## 2023-02-08 RX ORDER — OXYCODONE HYDROCHLORIDE AND ACETAMINOPHEN 5; 325 MG/1; MG/1
TABLET ORAL
COMMUNITY
Start: 2023-01-18 | End: 2023-03-13

## 2023-02-08 NOTE — PROGRESS NOTES
Date of Office Visit: 2023  Encounter Provider: LUIS A Hahn  Place of Service: [facility] CARDIOLOGY  Patient Name: Reanna Higgins  :1940    Chief Complaint   Patient presents with   • Follow-up   • Coronary Artery Disease   :     HPI: Reanna Higgins is a 83 y.o. female who is a patient of Dr. Mccullough and is known to me from previous.  She has a history of unstable angina and has three-vessel disease in the past she was not amendable to PCI and underwent a 6 vessel bypass.  This was complicated by a small bowel obstruction that resolved with conservative therapy.  Her EF was normal at the time.  She was last in the office a year ago 2022 she was doing well she just had a colonoscopy.  Sometimes she gets lightheaded if she bends over too quickly but for the most part is stable.    She had a right total knee replacement around 3 weeks ago and is doing remarkable.  She is walking with a cane but usually just around her house.  Overall she is feeling good from a cardiac standpoint.  No chest pain, palpitations or shortness of breath.  She is very active.  She had a birthday yesterday and turned 83.  She does physical therapy for her knee twice a week.  She has had some swelling in her right lower extremity from her surgery.  Previous testing and notes have been reviewed by me.   Past Medical History:   Diagnosis Date   • Anesthesia complication     SEVERE SHAKING OF ENTIRE BODY AFTER EGD/ERCP. STATES THAT ANESTHESIA DID NOT SEE PREOP   • Cardiac pacemaker in situ    • Carpal tunnel syndrome    • Cystic fibroadenosis of breast    • Diabetes mellitus (HCC)    • GERD (gastroesophageal reflux disease)    • History of ileus 2019   • History of skin cancer    • Hypercholesterolemia    • Hypertension    • Osteoarthritis of both hands    • Pacemaker    • Pancreatitis    • Peripheral neuropathy    • Phobia, flying 2019   • Restless leg syndrome    • Second  degree AV block, Mobitz type I    • SVT (supraventricular tachycardia) (HCC)        Past Surgical History:   Procedure Laterality Date   • APPENDECTOMY     • BILATERAL OOPHORECTOMY Bilateral 1988   • BREAST CYST EXCISION Bilateral     4 BREAST SURGERIES   • CARDIAC CATHETERIZATION N/A 8/22/2019    Procedure: Left Heart Cath;  Surgeon: Francis Mccullough MD;  [place] [facility] [facility];  Service: Cardiology   • CARDIAC CATHETERIZATION N/A 8/22/2019    Procedure: Left ventriculography;  Surgeon: Francis Mccullough MD;  Location: [facility] INVASIVE LOCATION;  Service: Cardiology   • CARDIAC CATHETERIZATION N/A 8/22/2019    Procedure: Coronary angiography;  Surgeon: Francis Mccullough MD;  Location: [facility] INVASIVE LOCATION;  Service: Cardiology   • CARDIAC ELECTROPHYSIOLOGY PROCEDURE N/A 10/10/2016    Procedure: Pacemaker DC new  [place];  Surgeon: Wilfrido Hameed MD;  Location: [facility] INVASIVE LOCATION;  Service:    • CARPAL TUNNEL RELEASE Right    • CATARACT EXTRACTION      NOVEMBER AND DECEMBER 2014   • CHOLECYSTECTOMY     • COLONOSCOPY  2015   • COLONOSCOPY N/A 1/21/2022    Procedure: COLONOSCOPY into cecum and terminal ileum with hot snare polypectomy x2;  Surgeon: Giovany Redman MD;  Location: [facility]OSCOPY;  Service: Gastroenterology;  Laterality: N/A;  Pre op: History of polyps  Post op: Polyps, Diverticulosis and Hemorrhoids   • CORONARY ARTERY BYPASS GRAFT N/A 8/23/2019    Procedure: SUMMER STERNOTOMY CORONARY ARTERY BYPASS GRAFT TIMES 6 USING LEFT INTERNAL MAMMARY ARTERY AND LEFT GREATER SAPHENOUS VEIN GRAFT PER ENDOSCOPIC VEIN HARVESTING AND PRP;  Surgeon: Kem Hawk MD;  Location: [facility];  Service: Cardiothoracic   • ENDOSCOPY N/A 7/18/2019    Procedure: ESOPHAGOGASTRODUODENOSCOPY with biopsy;  Surgeon: Ricky Chew MD;  Location: [facility] ENDOSCOPY;  Service: Gastroenterology   • ERCP N/A 9/29/2021    Procedure: ENDOSCOPIC RETROGRADE  CHOLANGIOPANCREATOGRAPHY WITH SPHINCTEROTOMY AND BALLOON SWEEP;  Surgeon: Giovany Redman MD;  Location: [facility] ENDOSCOPY;  Service: Gastroenterology;  Laterality: N/A;  PRE- RECURRENT PANCREATITIS  POST- SAME   • EYE SURGERY     • HYSTERECTOMY     • KNEE ARTHROSCOPY Bilateral 2014    MENISCAL TEAR   • PACEMAKER IMPLANTATION  10/10/2016   • SHOULDER SURGERY Right     ROTATOR CUFF SURGERY 2015   • TONSILLECTOMY     • TOTAL KNEE ARTHROPLASTY Right 2023    Procedure: RIGHT TOTAL KNEE ARTHROPLASTY;  Surgeon: To Sands MD;  Location: [facility] OR [facility];  Service: Orthopedics;  Laterality: Right;   • TRIGGER FINGER RELEASE      both hands       Social History     Socioeconomic History   • Marital status:      Spouse name: Rishabh*   • Number of children: 2   • Years of education: 13   • Highest education level: Some college, no degree   Tobacco Use   • Smoking status: Former     Packs/day: 0.50     Years: 14.00     Pack years: 7.00     Types: Cigarettes     Start date:      Quit date:      Years since quittin.1   • Smokeless tobacco: Never   • Tobacco comments:     quit age 28   Vaping Use   • Vaping Use: Never used   Substance and Sexual Activity   • Alcohol use: Yes     Comment: rare glass of wine or beer rarely/  No caffeine use   • Drug use: Never   • Sexual activity: Not Currently       Family History   Problem Relation Age of Onset   • Thyroid disease Daughter    • Lung cancer Brother    • Hypertension Mother    • Aortic stenosis Mother    • Coronary artery disease Mother          78 (smoker)   • Hypertension Father    • Depression Father    • Anxiety disorder Father    • Diabetes Father    • Heart failure Father    • Cirrhosis Father         alcohol   • Alcohol abuse Sister    • Lupus Sister    • Heart disease Sister    • No Known Problems Sister    • Alcohol abuse Brother    • Drug abuse Brother    • Heart disease Brother    • Cancer Brother         bladder (smoker)    • Heart disease Brother    • Breast cancer Paternal Aunt    • Neuropathy Neg Hx    • Colon cancer Neg Hx    • Dementia Neg Hx    • Malig Hyperthermia Neg Hx        Review of Systems   Constitutional: Negative for diaphoresis and malaise/fatigue.   Cardiovascular: Positive for leg swelling. Negative for chest pain, claudication, dyspnea on exertion, irregular heartbeat, near-syncope, orthopnea, palpitations, paroxysmal nocturnal dyspnea and syncope.   Respiratory: Negative for cough, shortness of breath and sleep disturbances due to breathing.    Musculoskeletal: Negative for falls.   Neurological: Negative for dizziness and weakness.   Psychiatric/Behavioral: Negative for altered mental status and substance abuse.       Allergies   Allergen Reactions   • Ancef [Cefazolin] Other (See Comments)     Hypotension/bradycardia   • Codeine Hives   • Penicillins Other (See Comments)     Hypotension (Ancef allergy)    • Sulfa Antibiotics Hives         Current Outpatient Medications:   •  amLODIPine (NORVASC) 5 MG tablet, TAKE 1 TABLET BY MOUTH DAILY AS NEEDED (FOR USE FOR BLOOD PRESSURE >150/90)., Disp: 90 tablet, Rfl: 3  •  aspirin  MG tablet, Take 1 tablet by mouth Daily., Disp: 45 tablet, Rfl: 0  •  atorvastatin (LIPITOR) 20 MG tablet, TAKE 1 TABLET BY MOUTH EVERY DAY AT NIGHT, Disp: 90 tablet, Rfl: 3  •  Calcium Carbonate-Vitamin D (CALCIUM-D) 600-400 MG-UNIT tablet, Take 1 tablet by mouth Daily., Disp: , Rfl:   •  cholecalciferol (VITAMIN D3) 1000 UNITS tablet, Take 1 tablet by mouth Daily., Disp: , Rfl:   •  Coenzyme Q10 (COQ10) 100 MG capsule, Take 1 capsule by mouth Daily., Disp: , Rfl:   •  cycloSPORINE (RESTASIS) 0.05 % ophthalmic emulsion, Administer 1 drop to both eyes 2 (Two) Times a Day., Disp: , Rfl:   •  docusate sodium (COLACE) 250 MG capsule, Take 1 capsule by mouth 2 (Two) Times a Day As Needed for Constipation., Disp: 30 capsule, Rfl: 1  •  gabapentin (NEURONTIN) 300 MG capsule, Take 1 capsule by  "mouth 3 (Three) Times a Day., Disp: 270 capsule, Rfl: 0  •  hydrocortisone 2.5 % cream, , Disp: , Rfl:   •  hydrocortisone 2.5 % cream, , Disp: , Rfl:   •  ipratropium (ATROVENT) 0.06 % nasal spray, 2 SPRAYS EACH NOSTRIL EVERY 6 HOURS AS NEEDED., Disp: , Rfl:   •  losartan (COZAAR) 25 MG tablet, Take 1 tablet by mouth 2 (Two) Times a Day., Disp: 180 tablet, Rfl: 2  •  metoprolol succinate XL (TOPROL-XL) 50 MG 24 hr tablet, TAKE 1 TABLET BY MOUTH TWICE A DAY, Disp: 180 tablet, Rfl: 3  •  multivitamin with minerals tablet tablet, Take 1 tablet by mouth Daily., Disp: , Rfl:   •  omeprazole (priLOSEC) 40 MG capsule, Take 1 capsule by mouth Daily., Disp: 90 capsule, Rfl: 3  •  oxyCODONE-acetaminophen (PERCOCET) 5-325 MG per tablet, Take 1-2 tablets by mouth Every 4 (Four) Hours As Needed (Pain)., Disp: 42 tablet, Rfl: 0  •  oxyCODONE-acetaminophen (PERCOCET) 5-325 MG per tablet, , Disp: , Rfl:       Objective:     Vitals:    02/08/23 1509   BP: 160/73   Pulse: 67   SpO2: 97%   Weight: 70.8 kg (156 lb)   Height: 170.2 cm (67\")     Body mass index is 24.43 kg/m².    PHYSICAL EXAM:    Constitutional:       General: Not in acute distress.     Appearance: Normal appearance. Well-developed.   Eyes:      Pupils: Pupils are equal, round, and reactive to light.   HENT:      Head: Normocephalic.   Neck:      Vascular: No carotid bruit or JVD.   Pulmonary:      Effort: Pulmonary effort is normal. No tachypnea.      Breath sounds: Normal breath sounds. No wheezing. No rales.   Cardiovascular:      Normal rate. Regular rhythm.      No gallop.   Pulses:     Intact distal pulses.   Edema:     Peripheral edema absent.   Abdominal:      General: Bowel sounds are normal.      Palpations: Abdomen is soft.      Tenderness: There is no abdominal tenderness.   Musculoskeletal: Normal range of motion.      Cervical back: Normal range of motion and neck supple. No edema. Skin:     General: Skin is warm and dry.   Neurological:      Mental " Status: Alert and oriented to person, place, and time.           ECG 12 Lead    Date/Time: 2/8/2023 3:24 PM  Performed by: Mary Silva APRN  Authorized by: Mary Silva APRN   Comparison: compared with previous ECG from 1/12/2022  Similar to previous ECG  Rhythm: sinus rhythm  Rate: normal  Conduction: 1st degree AV block  T inversion: V6, V5 and V4  T flattening: V3  QRS axis: normal    Clinical impression: non-specific ECG              Assessment:       Diagnosis Plan   1. AV block, Mobitz 1        2. Cardiac pacemaker in situ        3. Coronary artery disease involving native coronary artery of native heart with angina pectoris ([facility])        4. Dyslipidemia (high LDL; low HDL)        5. Essential hypertension        6. Mitral and aortic valve disease        7. Non-rheumatic mitral regurgitation        8. S/P CABG (coronary artery bypass graft)        9. Sick sinus syndrome (HCC)        10. SVT (supraventricular tachycardia) ([facility])        11. Type 2 diabetes mellitus with hyperglycemia, without long-term current use of insulin ([facility])          No orders of the defined types were placed in this encounter.         Plan:       Her blood pressure is a little high today she is rosario check it over the next week and send a message to me in my chart with her blood pressure readings.  If it still elevated we can go up on the losartan to 50 mg twice a day.  She also has as needed hydralazine that she uses.  Her cholesterol is overall about the same.  Her LDL ranges anywhere from 80s to low 100s.  Recently her pravastatin was changed to Lipitor.  She has an appointment in March with her primary who manages her lipids.  She can follow-up with us in 1 year         Your medication list          Accurate as of February 8, 2023  3:23 PM. If you have any questions, ask your nurse or doctor.            CONTINUE taking these medications      Instructions Last Dose Given Next Dose Due   amLODIPine 5 MG tablet  Commonly known  as: NORVASC      TAKE 1 TABLET BY MOUTH DAILY AS NEEDED (FOR USE FOR BLOOD PRESSURE >150/90).       aspirin  MG tablet      Take 1 tablet by mouth Daily.       atorvastatin 20 MG tablet  Commonly known as: LIPITOR      TAKE 1 TABLET BY MOUTH EVERY DAY AT NIGHT       Calcium-D 600-400 MG-UNIT tablet      Take 1 tablet by mouth Daily.       cholecalciferol 25 MCG (1000 UT) tablet  Commonly known as: VITAMIN D3      Take 1 tablet by mouth Daily.       CoQ10 100 MG capsule      Take 1 capsule by mouth Daily.       cycloSPORINE 0.05 % ophthalmic emulsion  Commonly known as: RESTASIS      Administer 1 drop to both eyes 2 (Two) Times a Day.       docusate sodium 250 MG capsule  Commonly known as: COLACE      Take 1 capsule by mouth 2 (Two) Times a Day As Needed for Constipation.       gabapentin 300 MG capsule  Commonly known as: NEURONTIN      Take 1 capsule by mouth 3 (Three) Times a Day.       hydrocortisone 2.5 % cream           hydrocortisone 2.5 % cream           ipratropium 0.06 % nasal spray  Commonly known as: ATROVENT      2 SPRAYS EACH NOSTRIL EVERY 6 HOURS AS NEEDED.       losartan 25 MG tablet  Commonly known as: COZAAR      Take 1 tablet by mouth 2 (Two) Times a Day.       metoprolol succinate XL 50 MG 24 hr tablet  Commonly known as: TOPROL-XL      TAKE 1 TABLET BY MOUTH TWICE A DAY       multivitamin with minerals tablet tablet      Take 1 tablet by mouth Daily.       omeprazole 40 MG capsule  Commonly known as: priLOSEC      Take 1 capsule by mouth Daily.       oxyCODONE-acetaminophen 5-325 MG per tablet  Commonly known as: PERCOCET      Take 1-2 tablets by mouth Every 4 (Four) Hours As Needed (Pain).       oxyCODONE-acetaminophen 5-325 MG per tablet  Commonly known as: PERCOCET                    As always, it has been a pleasure to participate in your patient's care.      Sincerely,     Mary GUNN

## 2023-02-10 DIAGNOSIS — G62.9 PERIPHERAL POLYNEUROPATHY: Chronic | ICD-10-CM

## 2023-02-10 DIAGNOSIS — G25.81 RLS (RESTLESS LEGS SYNDROME): Chronic | ICD-10-CM

## 2023-02-13 DIAGNOSIS — Z51.81 THERAPEUTIC DRUG MONITORING: Primary | ICD-10-CM

## 2023-02-13 DIAGNOSIS — Z51.81 THERAPEUTIC DRUG MONITORING: ICD-10-CM

## 2023-02-14 RX ORDER — GABAPENTIN 300 MG/1
CAPSULE ORAL
Qty: 270 CAPSULE | Refills: 0 | Status: SHIPPED | OUTPATIENT
Start: 2023-02-14

## 2023-02-15 RX ORDER — LOSARTAN POTASSIUM 50 MG/1
50 TABLET ORAL 2 TIMES DAILY
Qty: 60 TABLET | Refills: 11 | Status: SHIPPED | OUTPATIENT
Start: 2023-02-15 | End: 2023-03-02

## 2023-02-20 LAB — DRUGS UR: NORMAL

## 2023-03-02 ENCOUNTER — TELEPHONE (OUTPATIENT)
Dept: CARDIOLOGY | Facility: CLINIC | Age: 83
End: 2023-03-02
Payer: MEDICARE

## 2023-03-02 RX ORDER — LOSARTAN POTASSIUM 25 MG/1
25 TABLET ORAL 2 TIMES DAILY
Qty: 180 TABLET | Refills: 3 | Status: SHIPPED | OUTPATIENT
Start: 2023-03-02

## 2023-03-02 NOTE — TELEPHONE ENCOUNTER
Spoke with pt. She states since she started the higher dose of Losartan 50mg she has been feeling weak, lightheaded and nausea. She just got a knee replacement 6 weeks ago but doesn't think that has an affect on her. She wonders if she can go back to Losartan 25mg ?

## 2023-03-13 ENCOUNTER — OFFICE VISIT (OUTPATIENT)
Dept: INTERNAL MEDICINE | Age: 83
End: 2023-03-13
Payer: MEDICARE

## 2023-03-13 VITALS
DIASTOLIC BLOOD PRESSURE: 60 MMHG | WEIGHT: 146 LBS | TEMPERATURE: 97.2 F | OXYGEN SATURATION: 99 % | HEIGHT: 67 IN | HEART RATE: 69 BPM | BODY MASS INDEX: 22.91 KG/M2 | SYSTOLIC BLOOD PRESSURE: 142 MMHG

## 2023-03-13 DIAGNOSIS — I10 ESSENTIAL HYPERTENSION: Chronic | ICD-10-CM

## 2023-03-13 DIAGNOSIS — E11.65 TYPE 2 DIABETES MELLITUS WITH HYPERGLYCEMIA, WITHOUT LONG-TERM CURRENT USE OF INSULIN: Chronic | ICD-10-CM

## 2023-03-13 DIAGNOSIS — I25.119 CORONARY ARTERY DISEASE INVOLVING NATIVE CORONARY ARTERY OF NATIVE HEART WITH ANGINA PECTORIS: Chronic | ICD-10-CM

## 2023-03-13 DIAGNOSIS — G25.81 RLS (RESTLESS LEGS SYNDROME): Chronic | ICD-10-CM

## 2023-03-13 DIAGNOSIS — E78.00 PURE HYPERCHOLESTEROLEMIA: Primary | ICD-10-CM

## 2023-03-13 PROCEDURE — 99214 OFFICE O/P EST MOD 30 MIN: CPT | Performed by: INTERNAL MEDICINE

## 2023-03-13 PROCEDURE — 3077F SYST BP >= 140 MM HG: CPT | Performed by: INTERNAL MEDICINE

## 2023-03-13 PROCEDURE — 3078F DIAST BP <80 MM HG: CPT | Performed by: INTERNAL MEDICINE

## 2023-03-13 RX ORDER — ATORVASTATIN CALCIUM 40 MG/1
40 TABLET, FILM COATED ORAL NIGHTLY
Qty: 90 TABLET | Refills: 1 | Status: SHIPPED | OUTPATIENT
Start: 2023-03-13

## 2023-03-13 NOTE — ASSESSMENT & PLAN NOTE
Pre-op A1c was little higher. Check A1c with fasting labs in 2 months.     Lab Results   Component Value Date    HGBA1C 6.40 (H) 01/12/2023    HGBA1C 6.20 (H) 10/31/2022    HGBA1C 6.6 (H) 07/18/2022

## 2023-03-13 NOTE — PROGRESS NOTES
I N T E R N A L  M E D I C I N E    J U N O H  K I M,  M D      ENCOUNTER DATE:  03/13/2023    Reanna Higgins / 83 y.o. / female    CHIEF COMPLAINT / REASON FOR OFFICE VISIT     Diabetes, Hypertension, Dyslipidemia, and Restless Legs Syndrome      ASSESSMENT & PLAN     Problem List Items Addressed This Visit        High    Pure hypercholesterolemia - Primary (Chronic)    Current Assessment & Plan     Goal LDL less than 70.  Increase atorvastatin to 40 mg daily.  Schedule fasting labs for 2 months.    Lab Results   Component Value Date    LDL 90 10/31/2022     (H) 07/18/2022    LDL 84 06/17/2021     Lab Results   Component Value Date    HDL 47 10/31/2022    HDL 50 07/18/2022    HDL 50 06/17/2021     Lab Results   Component Value Date    TRIG 88 10/31/2022    TRIG 105 07/18/2022    TRIG 83 06/17/2021     Lab Results   Component Value Date    CHOLHDLRATIO 3.28 10/31/2022    CHOLHDLRATIO 3.6 07/18/2022    CHOLHDLRATIO 3.00 06/17/2021             Relevant Medications    atorvastatin (LIPITOR) 40 MG tablet    Other Relevant Orders    Lipid Panel With / Chol / HDL Ratio    Comprehensive Metabolic Panel       Medium    Essential hypertension (Chronic)    Overview     Continue losartan 25 mg BID, metoprolol 50 mg BID.         Current Assessment & Plan     Overall blood pressure remained stable.  Systolic blood pressure is mildly suboptimal but diastolic blood pressure limits how aggressive we can be.  Increase losartan 25 mg twice daily and metoprolol succinate 50 mg twice daily.  She takes amlodipine 5 mg as needed only if the blood pressure is greater than 150.    BP Readings from Last 3 Encounters:   03/13/23 142/60   02/08/23 160/73   01/31/23 120/64             Relevant Medications    metoprolol succinate XL (TOPROL-XL) 50 MG 24 hr tablet    amLODIPine (NORVASC) 5 MG tablet    losartan (COZAAR) 25 MG tablet    RLS (restless legs syndrome) (Chronic)    Overview     Continue gabapentin         Current  "Assessment & Plan     Increased leg discomfort since her total knee surgery.  Probably due to iron deficiency.  I advised her to take iron 325 mg daily for 1 month.  She may increase gabapentin 300 mg to 2 capsules at bedtime for short-term.         Relevant Medications    gabapentin (NEURONTIN) 300 MG capsule    Type 2 diabetes mellitus with hyperglycemia, without long-term current use of insulin (HCC) (Chronic)    Overview     Diet controlled         Current Assessment & Plan     Pre-op A1c was little higher. Check A1c with fasting labs in 2 months.     Lab Results   Component Value Date    HGBA1C 6.40 (H) 01/12/2023    HGBA1C 6.20 (H) 10/31/2022    HGBA1C 6.6 (H) 07/18/2022             Relevant Orders    Hemoglobin A1c       Low    Coronary artery disease involving native coronary artery of native heart with angina pectoris (HCC) (Chronic)    Overview     8/2019: s/p 6 vessel CABG    Continue ASA, statin, beta-blocker.          Relevant Medications    metoprolol succinate XL (TOPROL-XL) 50 MG 24 hr tablet    amLODIPine (NORVASC) 5 MG tablet     Orders Placed This Encounter   Procedures   • Lipid Panel With / Chol / HDL Ratio   • Comprehensive Metabolic Panel   • Hemoglobin A1c     New Medications Ordered This Visit   Medications   • atorvastatin (LIPITOR) 40 MG tablet     Sig: Take 1 tablet by mouth Every Night.     Dispense:  90 tablet     Refill:  1       SUMMARY/DISCUSSION  •       Next Appointment with me: 7/24/2023    Return in about 1 month (around 4/13/2023) for Next scheduled follow up.      VITAL SIGNS     Vitals:    03/13/23 0852   BP: 142/60   Pulse: 69   Temp: 97.2 °F (36.2 °C)   SpO2: 99%   Weight: 66.2 kg (146 lb)   Height: 170.2 cm (67\")       BP Readings from Last 3 Encounters:   03/13/23 142/60   02/08/23 160/73   01/31/23 120/64     Wt Readings from Last 3 Encounters:   03/13/23 66.2 kg (146 lb)   02/08/23 70.8 kg (156 lb)   01/17/23 71.7 kg (158 lb)     Body mass index is 22.87 kg/m².    Blood " pressure readings recorded on patient flowsheet:  No flowsheet data found.       MEDICATIONS AT THE TIME OF OFFICE VISIT     Current Outpatient Medications on File Prior to Visit   Medication Sig   • amLODIPine (NORVASC) 5 MG tablet TAKE 1 TABLET BY MOUTH DAILY AS NEEDED (FOR USE FOR BLOOD PRESSURE >150/90).   • aspirin  MG tablet Take 1 tablet by mouth Daily.   • atorvastatin (LIPITOR) 20 MG tablet TAKE 1 TABLET BY MOUTH EVERY DAY AT NIGHT   • Calcium Carbonate-Vitamin D (CALCIUM-D) 600-400 MG-UNIT tablet Take 1 tablet by mouth Daily.   • cholecalciferol (VITAMIN D3) 1000 UNITS tablet Take 1 tablet by mouth Daily.   • Coenzyme Q10 (COQ10) 100 MG capsule Take 1 capsule by mouth Daily.   • cycloSPORINE (RESTASIS) 0.05 % ophthalmic emulsion Administer 1 drop to both eyes 2 (Two) Times a Day.   • gabapentin (NEURONTIN) 300 MG capsule TAKE 1 CAPSULE BY MOUTH THREE TIMES A DAY   • ipratropium (ATROVENT) 0.06 % nasal spray 2 SPRAYS EACH NOSTRIL EVERY 6 HOURS AS NEEDED.   • losartan (COZAAR) 25 MG tablet Take 1 tablet by mouth 2 (Two) Times a Day.   • metoprolol succinate XL (TOPROL-XL) 50 MG 24 hr tablet TAKE 1 TABLET BY MOUTH TWICE A DAY   • multivitamin with minerals tablet tablet Take 1 tablet by mouth Daily.   • omeprazole (priLOSEC) 40 MG capsule Take 1 capsule by mouth Daily.   • docusate sodium (COLACE) 250 MG capsule Take 1 capsule by mouth 2 (Two) Times a Day As Needed for Constipation.   • hydrocortisone 2.5 % cream      No current facility-administered medications on file prior to visit.          HISTORY OF PRESENT ILLNESS     Status post right total knee replacement.  She complains of increased leg discomfort due to restless leg syndrome since the surgery.  She is currently taking gabapentin 300 mg 3 times daily.  Her cardiology team recommended goal LDL of less than 70 and most recent LDL was 90.  She is currently on atorvastatin 20 mg without significant side effects.  Diastolic blood pressure  generally runs less than 60 at home.  Systolic blood pressure occasionally is greater than 140.  Her cardiologist tried increasing losartan to 50 mg twice daily which made her feel poorly.  She is currently taking losartan 25 mg twice daily, metoprolol succinate 50 mg twice daily.  She takes amlodipine 5 mg only if her systolic blood pressure is greater than 150.  She has type 2 diabetes which is diet controlled.  Preoperative A1c was slightly higher at 6.4.  She has coronary artery disease without any complaints of angina or dyspnea on exertion.  She is on aspirin and statin and beta-blocker.      Patient Care Team:  Ted Li MD as PCP - General (Internal Medicine)  To Rivera MD as Consulting Physician (General Surgery)  Arsenio Witt MD as Consulting Physician (Ophthalmology)  Brown Beckman MD as Consulting Physician (Orthopedic Surgery)  Shivam Vallejo MD as Consulting Physician (Neurology)  Francis Mccullough MD as Consulting Physician (Cardiology)    REVIEW OF SYSTEMS     Review of Systems   Right knee pain postop   Denies increased swelling or redness; no pain of lower leg  No chest pain or CHILDS   GI negative     PHYSICAL EXAMINATION     Physical Exam  General: No acute distress  Psych: Normal thought and judgment   Cardiovascular Rate: normal. Rhythm: regular. Heart sounds: normal   Pulm/Chest: Effort normal, breath sounds normal.   Trace bilateral lower extremities edema  Right knee looks good       REVIEWED DATA     Labs:     Lab Results   Component Value Date     (L) 01/18/2023    K 4.7 01/18/2023    CALCIUM 8.6 01/18/2023    AST 17 01/12/2023    ALT 18 01/12/2023    BUN 28 (H) 01/18/2023    CREATININE 0.94 01/18/2023    CREATININE 0.90 01/12/2023    CREATININE 0.80 11/09/2022    EGFRIFNONA 60 01/10/2022    EGFRIFAFRI 69 01/10/2022     Lab Results   Component Value Date     (L) 01/18/2023     01/12/2023     07/22/2022     07/18/2022      01/10/2022         Lab Results   Component Value Date    HGBA1C 6.40 (H) 01/12/2023    HGBA1C 6.20 (H) 10/31/2022    HGBA1C 6.6 (H) 07/18/2022       Lab Results   Component Value Date    LDL 90 10/31/2022     (H) 07/18/2022    LDL 84 06/17/2021    HDL 47 10/31/2022    HDL 50 07/18/2022    TRIG 88 10/31/2022    TRIG 105 07/18/2022       Lab Results   Component Value Date    TSH 3.140 03/01/2021    TSH 2.840 12/12/2019    TSH 4.290 (H) 06/10/2019    FREET4 1.09 03/01/2021    FREET4 1.13 12/12/2019    FREET4 1.02 06/10/2019       Lab Results   Component Value Date    WBC 13.66 (H) 01/18/2023    HGB 11.1 (L) 01/18/2023     01/18/2023        No results found for: MALBCRERATIO       Imaging:           Medical Tests:           Summary of old records / correspondence / consultant report:           Request outside records:             *Examiner was wearing KN95 mask during the entire duration of the visit. Patient was masked the entire time. Minimum social distance of 6 ft maintained entire visit except if physical contact was necessary as documented.       Template created by Arabella Li MD

## 2023-03-13 NOTE — ASSESSMENT & PLAN NOTE
Overall blood pressure remained stable.  Systolic blood pressure is mildly suboptimal but diastolic blood pressure limits how aggressive we can be.  Increase losartan 25 mg twice daily and metoprolol succinate 50 mg twice daily.  She takes amlodipine 5 mg as needed only if the blood pressure is greater than 150.    BP Readings from Last 3 Encounters:   03/13/23 142/60   02/08/23 160/73   01/31/23 120/64

## 2023-03-13 NOTE — ASSESSMENT & PLAN NOTE
Increased leg discomfort since her total knee surgery.  Probably due to iron deficiency.  I advised her to take iron 325 mg daily for 1 month.  She may increase gabapentin 300 mg to 2 capsules at bedtime for short-term.

## 2023-03-13 NOTE — ASSESSMENT & PLAN NOTE
Goal LDL less than 70.  Increase atorvastatin to 40 mg daily.  Schedule fasting labs for 2 months.    Lab Results   Component Value Date    LDL 90 10/31/2022     (H) 07/18/2022    LDL 84 06/17/2021     Lab Results   Component Value Date    HDL 47 10/31/2022    HDL 50 07/18/2022    HDL 50 06/17/2021     Lab Results   Component Value Date    TRIG 88 10/31/2022    TRIG 105 07/18/2022    TRIG 83 06/17/2021     Lab Results   Component Value Date    CHOLHDLRATIO 3.28 10/31/2022    CHOLHDLRATIO 3.6 07/18/2022    CHOLHDLRATIO 3.00 06/17/2021

## 2023-04-17 ENCOUNTER — TELEPHONE (OUTPATIENT)
Dept: CARDIOLOGY | Facility: CLINIC | Age: 83
End: 2023-04-17

## 2023-04-17 NOTE — TELEPHONE ENCOUNTER
I am reviewing [facility] transmission from yesterday 04/16/2023.  Patient has a [employer] BSC PPM programmed DDD @ 60 bpm.  Lead trends are WNL.     Patient has events on [facility].  1 NSVT and 8 ATR episodes.     The NSVT labeled episode is consistent with Non-sustained VT @ ~150 bpm, 17 beat run for 6 seconds in duration. This event was on 03/03/2023.  I could not find a Hx of NSVT in the patients documentation.   Please see EGM below.    Patient also had 8 ATR labeled events on 01/17/2023.  All EGMs are consistent with Atrial lead noise.  Patient had all events on the same day that she had a Right Total Knee Arthroplasty.  I just wanted to make you aware.  She has had no events of this kind before or after.  I have attached a corresponding EGM below, as well.

## 2023-04-19 ENCOUNTER — OFFICE VISIT (OUTPATIENT)
Dept: INTERNAL MEDICINE | Age: 83
End: 2023-04-19
Payer: MEDICARE

## 2023-04-19 VITALS
BODY MASS INDEX: 23.86 KG/M2 | WEIGHT: 152 LBS | HEIGHT: 67 IN | SYSTOLIC BLOOD PRESSURE: 124 MMHG | DIASTOLIC BLOOD PRESSURE: 56 MMHG | HEART RATE: 76 BPM | TEMPERATURE: 97.5 F | OXYGEN SATURATION: 99 %

## 2023-04-19 DIAGNOSIS — D50.9 IRON DEFICIENCY ANEMIA, UNSPECIFIED IRON DEFICIENCY ANEMIA TYPE: Primary | ICD-10-CM

## 2023-04-19 DIAGNOSIS — I25.119 CORONARY ARTERY DISEASE INVOLVING NATIVE CORONARY ARTERY OF NATIVE HEART WITH ANGINA PECTORIS: Chronic | ICD-10-CM

## 2023-04-19 DIAGNOSIS — E78.00 PURE HYPERCHOLESTEROLEMIA: Chronic | ICD-10-CM

## 2023-04-19 DIAGNOSIS — E11.65 TYPE 2 DIABETES MELLITUS WITH HYPERGLYCEMIA, WITHOUT LONG-TERM CURRENT USE OF INSULIN: Chronic | ICD-10-CM

## 2023-04-19 PROCEDURE — 3078F DIAST BP <80 MM HG: CPT | Performed by: INTERNAL MEDICINE

## 2023-04-19 PROCEDURE — 1160F RVW MEDS BY RX/DR IN RCRD: CPT | Performed by: INTERNAL MEDICINE

## 2023-04-19 PROCEDURE — 3074F SYST BP LT 130 MM HG: CPT | Performed by: INTERNAL MEDICINE

## 2023-04-19 PROCEDURE — 1159F MED LIST DOCD IN RCRD: CPT | Performed by: INTERNAL MEDICINE

## 2023-04-19 PROCEDURE — 99214 OFFICE O/P EST MOD 30 MIN: CPT | Performed by: INTERNAL MEDICINE

## 2023-04-19 NOTE — ASSESSMENT & PLAN NOTE
BP Readings from Last 3 Encounters:   04/19/23 124/56   03/13/23 142/60   02/08/23 160/73     Continue losartan 25 mg twice daily, metoprolol succinate 50 mg twice daily and amlodipine 5 mg as needed.

## 2023-04-19 NOTE — ASSESSMENT & PLAN NOTE
May increase gabapentin 300 mg to 2 capsules 3 times daily for maximum of 6/day.  If this is not helpful consider switching to Lyrica or adding duloxetine.

## 2023-04-19 NOTE — PROGRESS NOTES
I N T E R N A L  M E D I C I N E    J U N O H  K I M,  M D      ENCOUNTER DATE:  04/19/2023    Reanna Higgins / 83 y.o. / female    CHIEF COMPLAINT / REASON FOR OFFICE VISIT     Pure hypercholesterolemia , Hypertension, Diabetes, and Restless Legs Syndrome      ASSESSMENT & PLAN     Problem List Items Addressed This Visit        High    Pure hypercholesterolemia (Chronic)    Current Assessment & Plan     Check labs today. Previously increased atorvastatin to 40 mg without problems.     Lab Results   Component Value Date    LDL 90 10/31/2022     (H) 07/18/2022    LDL 84 06/17/2021    TRIG 88 10/31/2022    CHOLHDLRATIO 3.28 10/31/2022             Relevant Medications    atorvastatin (LIPITOR) 40 MG tablet    Other Relevant Orders    Comprehensive Metabolic Panel    Lipid Panel With / Chol / HDL Ratio       Medium    Type 2 diabetes mellitus with hyperglycemia, without long-term current use of insulin (HCC) (Chronic)    Overview     Diet controlled         Current Assessment & Plan     Lab Results   Component Value Date    HGBA1C 6.40 (H) 01/12/2023    HGBA1C 6.20 (H) 10/31/2022    HGBA1C 6.6 (H) 07/18/2022     Check A1c level today.  Currently she is diet controlled only.         Relevant Orders    Hemoglobin A1c       Low    Coronary artery disease involving native coronary artery of native heart with angina pectoris (Chronic)    Overview     8/2019: s/p 6 vessel CABG    Continue ASA, statin, beta-blocker.          Relevant Medications    metoprolol succinate XL (TOPROL-XL) 50 MG 24 hr tablet    amLODIPine (NORVASC) 5 MG tablet   Other Visit Diagnoses     Iron deficiency anemia, unspecified iron deficiency anemia type    -  Primary    Relevant Orders    CBC & Differential    Ferritin    Iron Profile        Orders Placed This Encounter   Procedures   • Comprehensive Metabolic Panel   • Lipid Panel With / Chol / HDL Ratio   • Hemoglobin A1c   • Ferritin   • Iron Profile   • CBC & Differential     No orders  "of the defined types were placed in this encounter.      SUMMARY/DISCUSSION  •       Next Appointment with me: 7/24/2023    Return for Next scheduled follow up.      VITAL SIGNS     Vitals:    04/19/23 1312   BP: 124/56   Pulse: 76   Temp: 97.5 °F (36.4 °C)   SpO2: 99%   Weight: 68.9 kg (152 lb)   Height: 170.2 cm (67\")       BP Readings from Last 3 Encounters:   04/19/23 124/56   03/13/23 142/60   02/08/23 160/73     Wt Readings from Last 3 Encounters:   04/19/23 68.9 kg (152 lb)   03/13/23 66.2 kg (146 lb)   02/08/23 70.8 kg (156 lb)     Body mass index is 23.81 kg/m².    Blood pressure readings recorded on patient flowsheet:       View : No data to display.                   MEDICATIONS AT THE TIME OF OFFICE VISIT     Current Outpatient Medications on File Prior to Visit   Medication Sig   • amLODIPine (NORVASC) 5 MG tablet TAKE 1 TABLET BY MOUTH DAILY AS NEEDED (FOR USE FOR BLOOD PRESSURE >150/90).   • aspirin  MG tablet Take 1 tablet by mouth Daily.   • atorvastatin (LIPITOR) 40 MG tablet Take 1 tablet by mouth Every Night.   • Calcium Carbonate-Vitamin D (CALCIUM-D) 600-400 MG-UNIT tablet Take 1 tablet by mouth Daily.   • cholecalciferol (VITAMIN D3) 1000 UNITS tablet Take 1 tablet by mouth Daily.   • Coenzyme Q10 (COQ10) 100 MG capsule Take 1 capsule by mouth Daily.   • cycloSPORINE (RESTASIS) 0.05 % ophthalmic emulsion Administer 1 drop to both eyes 2 (Two) Times a Day.   • docusate sodium (COLACE) 250 MG capsule Take 1 capsule by mouth 2 (Two) Times a Day As Needed for Constipation.   • gabapentin (NEURONTIN) 300 MG capsule TAKE 1 CAPSULE BY MOUTH THREE TIMES A DAY   • hydrocortisone 2.5 % cream    • ipratropium (ATROVENT) 0.06 % nasal spray 2 SPRAYS EACH NOSTRIL EVERY 6 HOURS AS NEEDED.   • losartan (COZAAR) 25 MG tablet Take 1 tablet by mouth 2 (Two) Times a Day.   • metoprolol succinate XL (TOPROL-XL) 50 MG 24 hr tablet TAKE 1 TABLET BY MOUTH TWICE A DAY   • multivitamin with minerals tablet tablet " Take 1 tablet by mouth Daily.   • omeprazole (priLOSEC) 40 MG capsule Take 1 capsule by mouth Daily.     No current facility-administered medications on file prior to visit.          HISTORY OF PRESENT ILLNESS     She reports to feeling better overall since taking iron supplements.  She was noted to be anemic since her prior knee surgery.  She complains of ongoing discomfort of her feet due to peripheral neuropathy.  She did increase gabapentin to 4 capsules daily without significant improvement.  Blood pressure remains well controlled at home on current blood pressure regimen.  Previously increased atorvastatin to 40 mg without complaints of myalgia.  Will have labs done today.  CAD remains stable without angina or dyspnea on exertion.      Patient Care Team:  Ted Li MD as PCP - General (Internal Medicine)  To Rivera MD as Consulting Physician (General Surgery)  Arsenio Witt MD as Consulting Physician (Ophthalmology)  Brown Beckman MD as Consulting Physician (Orthopedic Surgery)  Shivam Vallejo MD as Consulting Physician (Neurology)  Francis Mccullough MD as Consulting Physician (Cardiology)    REVIEW OF SYSTEMS     Review of Systems       PHYSICAL EXAMINATION     Physical Exam  General: No acute distress  Psych: Normal thought and judgment   Cardiovascular Rate: normal. Rhythm: regular. Heart sounds: normal   Pulm/Chest: Effort normal, breath sounds normal.       REVIEWED DATA     Labs:     Lab Results   Component Value Date     (L) 01/18/2023    K 4.7 01/18/2023    CALCIUM 8.6 01/18/2023    AST 17 01/12/2023    ALT 18 01/12/2023    BUN 28 (H) 01/18/2023    CREATININE 0.94 01/18/2023    CREATININE 0.90 01/12/2023    CREATININE 0.80 11/09/2022    EGFRIFNONA 60 01/10/2022    EGFRIFAFRI 69 01/10/2022     Lab Results   Component Value Date     (L) 01/18/2023     01/12/2023     07/22/2022     07/18/2022     01/10/2022       Lab Results   Component Value  Date    HGBA1C 6.40 (H) 01/12/2023    HGBA1C 6.20 (H) 10/31/2022    HGBA1C 6.6 (H) 07/18/2022       Lab Results   Component Value Date    LDL 90 10/31/2022     (H) 07/18/2022    LDL 84 06/17/2021    HDL 47 10/31/2022    HDL 50 07/18/2022    TRIG 88 10/31/2022    TRIG 105 07/18/2022       Lab Results   Component Value Date    TSH 3.140 03/01/2021    TSH 2.840 12/12/2019    TSH 4.290 (H) 06/10/2019    FREET4 1.09 03/01/2021    FREET4 1.13 12/12/2019    FREET4 1.02 06/10/2019       Lab Results   Component Value Date    WBC 13.66 (H) 01/18/2023    HGB 11.1 (L) 01/18/2023     01/18/2023     Lab Results   Component Value Date    HGB 11.1 (L) 01/18/2023    HGB 13.1 01/12/2023    HGB 14.2 07/18/2022      No results found for: FERRITIN  No results found for: IRON   No results found for: TIBC      Lab Results   Component Value Date    [id number] >2,000 (H) 08/06/2020      No results found for: MALBCRERATIO         Imaging:           Medical Tests:           Summary of old records / correspondence / consultant report:           Request outside records:             *Examiner was wearing KN95 mask during the entire duration of the visit. Patient was masked the entire time. Minimum social distance of 6 ft maintained entire visit except if physical contact was necessary as documented.       Template created by Arabella Li MD

## 2023-04-19 NOTE — ASSESSMENT & PLAN NOTE
Lab Results   Component Value Date    HGBA1C 6.40 (H) 01/12/2023    HGBA1C 6.20 (H) 10/31/2022    HGBA1C 6.6 (H) 07/18/2022     Check A1c level today.  Currently she is diet controlled only.

## 2023-04-19 NOTE — ASSESSMENT & PLAN NOTE
Check labs today. Previously increased atorvastatin to 40 mg without problems.     Lab Results   Component Value Date    LDL 90 10/31/2022     (H) 07/18/2022    LDL 84 06/17/2021    TRIG 88 10/31/2022    CHOLHDLRATIO 3.28 10/31/2022

## 2023-04-20 LAB
ALBUMIN SERPL-MCNC: 4.1 G/DL (ref 3.5–5.2)
ALBUMIN/GLOB SERPL: 1.8 G/DL
ALP SERPL-CCNC: 91 U/L (ref 39–117)
ALT SERPL-CCNC: 13 U/L (ref 1–33)
AST SERPL-CCNC: 17 U/L (ref 1–32)
BASOPHILS # BLD AUTO: 0.03 10*3/MM3 (ref 0–0.2)
BASOPHILS NFR BLD AUTO: 0.5 % (ref 0–1.5)
BILIRUB SERPL-MCNC: 0.3 MG/DL (ref 0–1.2)
BUN SERPL-MCNC: 21 MG/DL (ref 8–23)
BUN/CREAT SERPL: 25.3 (ref 7–25)
CALCIUM SERPL-MCNC: 9.8 MG/DL (ref 8.6–10.5)
CHLORIDE SERPL-SCNC: 105 MMOL/L (ref 98–107)
CHOLEST SERPL-MCNC: 139 MG/DL (ref 0–200)
CHOLEST/HDLC SERPL: 3.39 {RATIO}
CO2 SERPL-SCNC: 26.2 MMOL/L (ref 22–29)
CREAT SERPL-MCNC: 0.83 MG/DL (ref 0.57–1)
EGFRCR SERPLBLD CKD-EPI 2021: 69.2 ML/MIN/1.73
EOSINOPHIL # BLD AUTO: 0.14 10*3/MM3 (ref 0–0.4)
EOSINOPHIL NFR BLD AUTO: 2.4 % (ref 0.3–6.2)
ERYTHROCYTE [DISTWIDTH] IN BLOOD BY AUTOMATED COUNT: 11.7 % (ref 12.3–15.4)
FERRITIN SERPL-MCNC: 53.4 NG/ML (ref 13–150)
GLOBULIN SER CALC-MCNC: 2.3 GM/DL
GLUCOSE SERPL-MCNC: 176 MG/DL (ref 65–99)
HBA1C MFR BLD: 6.4 % (ref 4.8–5.6)
HCT VFR BLD AUTO: 35.9 % (ref 34–46.6)
HDLC SERPL-MCNC: 41 MG/DL (ref 40–60)
HGB BLD-MCNC: 12.1 G/DL (ref 12–15.9)
IMM GRANULOCYTES # BLD AUTO: 0.01 10*3/MM3 (ref 0–0.05)
IMM GRANULOCYTES NFR BLD AUTO: 0.2 % (ref 0–0.5)
IRON SATN MFR SERPL: 16 % (ref 20–50)
IRON SERPL-MCNC: 57 MCG/DL (ref 37–145)
LDLC SERPL CALC-MCNC: 73 MG/DL (ref 0–100)
LYMPHOCYTES # BLD AUTO: 1.56 10*3/MM3 (ref 0.7–3.1)
LYMPHOCYTES NFR BLD AUTO: 26.4 % (ref 19.6–45.3)
MCH RBC QN AUTO: 28.9 PG (ref 26.6–33)
MCHC RBC AUTO-ENTMCNC: 33.7 G/DL (ref 31.5–35.7)
MCV RBC AUTO: 85.7 FL (ref 79–97)
MONOCYTES # BLD AUTO: 0.3 10*3/MM3 (ref 0.1–0.9)
MONOCYTES NFR BLD AUTO: 5.1 % (ref 5–12)
NEUTROPHILS # BLD AUTO: 3.86 10*3/MM3 (ref 1.7–7)
NEUTROPHILS NFR BLD AUTO: 65.4 % (ref 42.7–76)
NRBC BLD AUTO-RTO: 0 /100 WBC (ref 0–0.2)
PLATELET # BLD AUTO: 212 10*3/MM3 (ref 140–450)
POTASSIUM SERPL-SCNC: 4.4 MMOL/L (ref 3.5–5.2)
PROT SERPL-MCNC: 6.4 G/DL (ref 6–8.5)
RBC # BLD AUTO: 4.19 10*6/MM3 (ref 3.77–5.28)
SODIUM SERPL-SCNC: 142 MMOL/L (ref 136–145)
TIBC SERPL-MCNC: 365 MCG/DL
TRIGL SERPL-MCNC: 142 MG/DL (ref 0–150)
UIBC SERPL-MCNC: 308 MCG/DL (ref 112–346)
VLDLC SERPL CALC-MCNC: 25 MG/DL (ref 5–40)
WBC # BLD AUTO: 5.9 10*3/MM3 (ref 3.4–10.8)

## 2023-04-24 DIAGNOSIS — G63 NEUROPATHY DUE TO MEDICAL CONDITION: Primary | Chronic | ICD-10-CM

## 2023-04-24 RX ORDER — PREGABALIN 75 MG/1
75 CAPSULE ORAL 3 TIMES DAILY
Qty: 42 CAPSULE | Refills: 0 | Status: SHIPPED | OUTPATIENT
Start: 2023-04-24

## 2023-04-24 NOTE — ASSESSMENT & PLAN NOTE
She would like to try alternative to gabapentin which has not been that helpful.   Start pregabalin 75 mg TID #42, no refill.  Contract within 14 days.

## 2023-04-26 ENCOUNTER — TELEPHONE (OUTPATIENT)
Dept: INTERNAL MEDICINE | Age: 83
End: 2023-04-26
Payer: MEDICARE

## 2023-04-26 DIAGNOSIS — Z12.31 ENCOUNTER FOR SCREENING MAMMOGRAM FOR BREAST CANCER: Primary | ICD-10-CM

## 2023-04-26 NOTE — TELEPHONE ENCOUNTER
Caller: Reanna Higgins    Relationship: Self    Best call back number:[phone]     What orders are you requesting (i.e. lab or imaging): MAMMOGRAM    In what timeframe would the patient need to come in: PATIENT STATES HER APPOINTMENT IS IN July     Where will you receive your lab/imaging services: [facility]  FAX NUMBER: [phone]    Additional notes: PATIENT STATES [facility] HAS TOLD HER THEY'RE STILL WAITING ON THE ORDER TO BE FAXED OVER    PLEASE ADVISE

## 2023-05-17 DIAGNOSIS — G63 NEUROPATHY DUE TO MEDICAL CONDITION: Chronic | ICD-10-CM

## 2023-05-17 RX ORDER — PREGABALIN 75 MG/1
75 CAPSULE ORAL 3 TIMES DAILY
Qty: 90 CAPSULE | Refills: 2 | Status: SHIPPED | OUTPATIENT
Start: 2023-05-17 | End: 2023-05-18

## 2023-05-18 ENCOUNTER — DOCUMENTATION (OUTPATIENT)
Dept: INTERNAL MEDICINE | Age: 83
End: 2023-05-18
Payer: MEDICARE

## 2023-05-18 ENCOUNTER — TELEPHONE (OUTPATIENT)
Dept: INTERNAL MEDICINE | Age: 83
End: 2023-05-18
Payer: MEDICARE

## 2023-05-18 DIAGNOSIS — I10 ESSENTIAL HYPERTENSION: Primary | Chronic | ICD-10-CM

## 2023-05-18 DIAGNOSIS — G63 NEUROPATHY DUE TO MEDICAL CONDITION: Chronic | ICD-10-CM

## 2023-05-18 RX ORDER — LOSARTAN POTASSIUM 25 MG/1
25 TABLET ORAL DAILY
Start: 2023-05-18

## 2023-05-18 RX ORDER — PREGABALIN 75 MG/1
75 CAPSULE ORAL 2 TIMES DAILY
Start: 2023-05-18

## 2023-05-18 NOTE — TELEPHONE ENCOUNTER
Blood pressure keeps dropping low (110/48 today) when she takes Lyrica 3x a day as prescribed. Would like a call back for further instruction as to whether she should continue the medication.

## 2023-05-19 NOTE — TELEPHONE ENCOUNTER
Discussed with Reanna:    Blood pressure low since increasing Lyrica to TID   Decrease Lyrica to BID   Decrease losartan 25 mg to once daily  Once blood pressure is > 130/80 can increase losartan to BID

## 2023-05-19 NOTE — ASSESSMENT & PLAN NOTE
Telephone:      Blood pressure is running low since increasing Lyrica to TID.   · Decrease Lyrica to BID  · Decrease losartan 25 mg to once daily (taking BID)  · Once blood pressure is consistently > 130/80, may increase losartan to BID

## 2023-06-04 DIAGNOSIS — K21.9 GASTROESOPHAGEAL REFLUX DISEASE, UNSPECIFIED WHETHER ESOPHAGITIS PRESENT: ICD-10-CM

## 2023-06-05 RX ORDER — OMEPRAZOLE 40 MG/1
CAPSULE, DELAYED RELEASE ORAL
Qty: 90 CAPSULE | Refills: 3 | Status: SHIPPED | OUTPATIENT
Start: 2023-06-05

## 2023-07-24 ENCOUNTER — OFFICE VISIT (OUTPATIENT)
Dept: INTERNAL MEDICINE | Age: 83
End: 2023-07-24
Payer: MEDICARE

## 2023-07-24 ENCOUNTER — TELEPHONE (OUTPATIENT)
Dept: INTERNAL MEDICINE | Age: 83
End: 2023-07-24

## 2023-07-24 VITALS
SYSTOLIC BLOOD PRESSURE: 146 MMHG | WEIGHT: 153 LBS | DIASTOLIC BLOOD PRESSURE: 60 MMHG | TEMPERATURE: 96.9 F | OXYGEN SATURATION: 96 % | HEIGHT: 67 IN | BODY MASS INDEX: 24.01 KG/M2 | HEART RATE: 68 BPM

## 2023-07-24 DIAGNOSIS — D50.9 IRON DEFICIENCY ANEMIA, UNSPECIFIED IRON DEFICIENCY ANEMIA TYPE: ICD-10-CM

## 2023-07-24 DIAGNOSIS — E11.65 TYPE 2 DIABETES MELLITUS WITH HYPERGLYCEMIA, WITHOUT LONG-TERM CURRENT USE OF INSULIN: Chronic | ICD-10-CM

## 2023-07-24 DIAGNOSIS — E78.00 PURE HYPERCHOLESTEROLEMIA: Chronic | ICD-10-CM

## 2023-07-24 DIAGNOSIS — I10 ESSENTIAL HYPERTENSION: Chronic | ICD-10-CM

## 2023-07-24 DIAGNOSIS — I25.119 CORONARY ARTERY DISEASE INVOLVING NATIVE CORONARY ARTERY OF NATIVE HEART WITH ANGINA PECTORIS: Chronic | ICD-10-CM

## 2023-07-24 DIAGNOSIS — G25.81 RLS (RESTLESS LEGS SYNDROME): Chronic | ICD-10-CM

## 2023-07-24 DIAGNOSIS — G63 NEUROPATHY DUE TO MEDICAL CONDITION: Chronic | ICD-10-CM

## 2023-07-24 DIAGNOSIS — Z00.00 MEDICARE ANNUAL WELLNESS VISIT, SUBSEQUENT: Primary | ICD-10-CM

## 2023-07-24 DIAGNOSIS — F41.1 GAD (GENERALIZED ANXIETY DISORDER): Chronic | ICD-10-CM

## 2023-07-24 PROCEDURE — 3077F SYST BP >= 140 MM HG: CPT | Performed by: INTERNAL MEDICINE

## 2023-07-24 PROCEDURE — G0439 PPPS, SUBSEQ VISIT: HCPCS | Performed by: INTERNAL MEDICINE

## 2023-07-24 PROCEDURE — 96160 PT-FOCUSED HLTH RISK ASSMT: CPT | Performed by: INTERNAL MEDICINE

## 2023-07-24 PROCEDURE — 99214 OFFICE O/P EST MOD 30 MIN: CPT | Performed by: INTERNAL MEDICINE

## 2023-07-24 PROCEDURE — 3078F DIAST BP <80 MM HG: CPT | Performed by: INTERNAL MEDICINE

## 2023-07-24 PROCEDURE — 1160F RVW MEDS BY RX/DR IN RCRD: CPT | Performed by: INTERNAL MEDICINE

## 2023-07-24 PROCEDURE — 1170F FXNL STATUS ASSESSED: CPT | Performed by: INTERNAL MEDICINE

## 2023-07-24 PROCEDURE — 1159F MED LIST DOCD IN RCRD: CPT | Performed by: INTERNAL MEDICINE

## 2023-07-24 RX ORDER — FERROUS SULFATE 325(65) MG
325 TABLET ORAL EVERY OTHER DAY
COMMUNITY

## 2023-07-24 RX ORDER — ASPIRIN 81 MG/1
TABLET ORAL
COMMUNITY
Start: 2023-07-24

## 2023-07-24 NOTE — ASSESSMENT & PLAN NOTE
BP Readings from Last 3 Encounters:   07/24/23 146/60   04/19/23 124/56   03/13/23 142/60      Blood pressure is high today but well controlled at home. Continue losartan 25 mg and metoprolol succinate 50 mg BID. Monitor blood pressure at home.

## 2023-07-24 NOTE — ASSESSMENT & PLAN NOTE
Taking pregabalin 75 mg BID.  Doing better with iron replacement.  Decrease ferrous sulfate to 325 mg every other day.    Lab Results   Component Value Date    FERRITIN 53.40 04/19/2023     No results found for: IRON   Lab Results   Component Value Date    TIBC 365 04/19/2023

## 2023-07-24 NOTE — ASSESSMENT & PLAN NOTE
Lab Results   Component Value Date    LDL 73 04/19/2023    LDL 90 10/31/2022     (H) 07/18/2022    TRIG 142 04/19/2023    CHOLHDLRATIO 3.39 04/19/2023       Continue atorvastatin 40 mg daily

## 2023-07-24 NOTE — ASSESSMENT & PLAN NOTE
Lab Results   Component Value Date    HGBA1C 6.40 (H) 04/19/2023    HGBA1C 6.40 (H) 01/12/2023    HGBA1C 6.20 (H) 10/31/2022       Continue diet control diabetes management.  Check A1c and urine microalbumin creatinine ratio.

## 2023-07-24 NOTE — PROGRESS NOTES
I N T E R N A L  M E D I C I N E    J U N O H  K I M,  M D      ENCOUNTER DATE:  07/24/2023    Reanna Higgins / 83 y.o. / female    MEDICARE ANNUAL WELLNESS VISIT       Chief Complaint:  AWV     Patient's general assessment of her health since a year ago:     - Compared to one year ago, she feels her physical health is:   [x] About the same  [] Better  [] Little worse  [] Significantly worse  []     - Compared to one year ago, she feels her mental health is   [x] About the same  [] Better  [] Little worse  [] Significantly worse  []     HPI for other active medical problems:       Home blood pressure are good. Compliant with all medications. Blood pressure is high today but she did not take her medications for blood pressure this morning. Restless leg syndrome is doing significantly better with iron replacement, taking 325 mg daily. Still on gabapentin for restless leg syndrome.  Previously switched gabapentin to pregabalin for peripheral neuropathy and this appears to work better for her without significant side effects.  Diet controlled DM 2. Last 2 A1c levels 6.4. Cholesterol is well controlled on atorvastatin without problems.  We previously increased atorvastatin to 40 mg.  Most recent LDL was 73.  CAD s/p CABG 2019 without angina or CHILDS. Take ASA 81 mg 3 days of the week.  Generalized anxiety disorder clinically remained stable off medication.  Denies depression symptoms.        HISTORY       Recent Hospitalizations:    Recent hospitalization?:     If YES, location, date, and diagnoses:     Location:   Date:   Principle Discharge Dx:   Secondary Dx:       Patient Care Team:    Patient Care Team:  Ted Li MD as PCP - General (Internal Medicine)  To Rivera MD as Consulting Physician (General Surgery)  Arsenio Witt MD as Consulting Physician (Ophthalmology)  Brown Beckman MD as Consulting Physician (Orthopedic Surgery)  Shivam Vallejo MD as Consulting Physician  (Neurology)  Francis Mccullough MD as Consulting Physician (Cardiology)      Allergies:  Ancef [cefazolin], Codeine, Penicillins, and Sulfa antibiotics    Medications:  Current Outpatient Medications on File Prior to Visit   Medication Sig Dispense Refill    atorvastatin (LIPITOR) 40 MG tablet Take 1 tablet by mouth Every Night. 90 tablet 1    Calcium Carbonate-Vitamin D (CALCIUM-D) 600-400 MG-UNIT tablet Take 1 tablet by mouth Daily.      cholecalciferol (VITAMIN D3) 1000 UNITS tablet Take 1 tablet by mouth Daily.      Coenzyme Q10 (COQ10) 100 MG capsule Take 1 capsule by mouth Daily.      cycloSPORINE (RESTASIS) 0.05 % ophthalmic emulsion Administer 1 drop to both eyes 2 (Two) Times a Day.      hydrocortisone 2.5 % cream       ipratropium (ATROVENT) 0.06 % nasal spray 2 SPRAYS EACH NOSTRIL EVERY 6 HOURS AS NEEDED.      losartan (COZAAR) 25 MG tablet Take 1 tablet by mouth Daily.      metoprolol succinate XL (TOPROL-XL) 50 MG 24 hr tablet TAKE 1 TABLET BY MOUTH TWICE A  tablet 3    multivitamin with minerals tablet tablet Take 1 tablet by mouth Daily.      omeprazole (priLOSEC) 40 MG capsule TAKE 1 CAPSULE BY MOUTH EVERY DAY 90 capsule 3    pregabalin (LYRICA) 75 MG capsule Take 1 capsule by mouth 2 (Two) Times a Day.      aspirin 81 MG EC tablet Take 3 days a week            PFSH:     The following portions of the patient's history were reviewed and updated as appropriate: Allergies / Current Medications / Past Medical History / Surgical History / Social History / Family History    Problem List:  Patient Active Problem List   Diagnosis    Colon polyp    Gastroesophageal reflux disease    Essential hypertension    Pure hypercholesterolemia    Mitral and aortic valve disease    Heart valve disease    REKHA (generalized anxiety disorder)    Neuropathy due to medical condition    Malignant neoplasm of cheek    Non-rheumatic mitral regurgitation    Osteopenia    SVT (supraventricular tachycardia)    AV block, Mobitz  1    Cardiac pacemaker in situ    RLS (restless legs syndrome)    Type 2 diabetes mellitus with hyperglycemia, without long-term current use of insulin    Mesenteric ischemia due to arterial insufficiency    Sick sinus syndrome    Coronary artery disease involving native coronary artery of native heart with angina pectoris    S/P CABG (coronary artery bypass graft)    Subclinical hypothyroidism    Celiac artery stenosis    History of acute pancreatitis    Chronic pancreatitis    Chronic recurrent pancreatitis    History of adenomatous polyp of colon    Primary osteoarthritis of right knee       Past Medical History:  Past Medical History:   Diagnosis Date    Anesthesia complication     SEVERE SHAKING OF ENTIRE BODY AFTER EGD/ERCP. 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    Restless leg syndrome     Second degree AV block, Mobitz type I     SVT (supraventricular tachycardia)        Past Surgical History:  Past Surgical History:   Procedure Laterality Date    APPENDECTOMY      BILATERAL OOPHORECTOMY Bilateral 1988    BREAST CYST EXCISION Bilateral     4 BREAST SURGERIES    CARDIAC CATHETERIZATION N/A 8/22/2019    Procedure: Left Heart Cath;  Surgeon: Francis Mccullough MD;  [facility]AS[facility];  Service: Cardiology    CARDIAC CATHETERIZATION N/A 8/22/2019    Procedure: Left ventriculography;  Surgeon: Francis Mccullough MD;  [facility];  Service: Cardiology    CARDIAC CATHETERIZATION N/A 8/22/2019    Procedure: Coronary angiography;  Surgeon: Francis Mccullough MD;  Location: [facility] CATH INVASIVE LOCATION;   Service: Cardiology    CARDIAC ELECTROPHYSIOLOGY PROCEDURE N/A 10/10/2016    Procedure: Pacemaker DC new  [place];  Surgeon: Wilfrido Hameed MD;  Location: [facility] CATH INVASIVE LOCATION;  Service:     CARPAL TUNNEL RELEASE Right     CATARACT EXTRACTION      NOVEMBER AND DECEMBER 2014    CHOLECYSTECTOMY      COLONOSCOPY  2015    COLONOSCOPY N/A 1/21/2022    Procedure: COLONOSCOPY into cecum and terminal ileum with hot snare polypectomy x2;  Surgeon: Giovany Redman MD;  Location: [facility] ENDOSCOPY;  Service: Gastroenterology;  Laterality: N/A;  Pre op: History of polyps  Post op: Polyps, Diverticulosis and Hemorrhoids    CORONARY ARTERY BYPASS GRAFT N/A 8/23/2019    Procedure: SUMMER STERNOTOMY CORONARY ARTERY BYPASS GRAFT TIMES 6 USING LEFT INTERNAL MAMMARY ARTERY AND LEFT GREATER SAPHENOUS VEIN GRAFT PER ENDOSCOPIC VEIN HARVESTING AND PRP;  Surgeon: Kem Hawk MD;  Location: [facility] MAIN OR;  Service: Cardiothoracic    ENDOSCOPY N/A 7/18/2019    Procedure: ESOPHAGOGASTRODUODENOSCOPY with biopsy;  Surgeon: Ricky Chew MD;  Location: [facility] ENDOSCOPY;  Service: Gastroenterology    ERCP N/A 9/29/2021    Procedure: ENDOSCOPIC RETROGRADE CHOLANGIOPANCREATOGRAPHY WITH SPHINCTEROTOMY AND BALLOON SWEEP;  Surgeon: Giovany Redman MD;  Location: [facility] ENDOSCOPY;  Service: Gastroenterology;  Laterality: N/A;  PRE- RECURRENT PANCREATITIS  POST- SAME    EYE SURGERY      HYSTERECTOMY      KNEE ARTHROSCOPY Bilateral 04/2014    MENISCAL TEAR    PACEMAKER IMPLANTATION  10/10/2016    SHOULDER SURGERY Right     ROTATOR CUFF SURGERY JUNE 18, 2015    TONSILLECTOMY      TOTAL KNEE ARTHROPLASTY Right 1/17/2023    Procedure: RIGHT TOTAL KNEE ARTHROPLASTY;  Surgeon: To Sands MD;  Location: [facility] OR OSC;  Service: Orthopedics;  Laterality: Right;    TRIGGER FINGER RELEASE      both hands       Social History:  Social History     Socioeconomic History    Marital status:      Spouse name: Rishabh*     "Number of children: 2    Years of education: 13    Highest education level: Some college, no degree   Tobacco Use    Smoking status: Former     Packs/day: 0.50     Years: 14.00     Pack years: 7.00     Types: Cigarettes     Start date:      Quit date:      Years since quittin.5    Smokeless tobacco: Never    Tobacco comments:     quit age 28   Vaping Use    Vaping Use: Never used   Substance and Sexual Activity    Alcohol use: Yes     Comment: rare glass of wine or beer rarely/  No caffeine use    Drug use: Never    Sexual activity: Not Currently       Family History:  Family History   Problem Relation Age of Onset    Thyroid disease Daughter     Lung cancer Brother     Hypertension Mother     Aortic stenosis Mother     Coronary artery disease Mother          78 (smoker)    Hypertension Father     Depression Father     Anxiety disorder Father     Diabetes Father     Heart failure Father     Cirrhosis Father         alcohol    Alcohol abuse Sister     Lupus Sister     Heart disease Sister     No Known Problems Sister     Alcohol abuse Brother     Drug abuse Brother     Heart disease Brother     Cancer Brother         bladder (smoker)    Heart disease Brother     Breast cancer Paternal Aunt     Neuropathy Neg Hx     Colon cancer Neg Hx     Dementia Neg Hx     Malig Hyperthermia Neg Hx          PATIENT ASSESSMENT     Vitals:  Vitals:    23 0800   BP: 146/60   Pulse: 68   Temp: 96.9 °F (36.1 °C)   SpO2: 96%   Weight: 69.4 kg (153 lb)   Height: 170.2 cm (67\")       BP Readings from Last 3 Encounters:   23 146/60   23 124/56   23 142/60     Wt Readings from Last 3 Encounters:   23 69.4 kg (153 lb)   23 68.9 kg (152 lb)   23 66.2 kg (146 lb)      Body mass index is 23.96 kg/m².    Blood pressure readings recorded on patient flowsheet:       No data to display                    Review of Systems:    Review of Systems      Physical Exam:    Physical Exam  General: " No acute distress  Psych: Normal thought and judgment   Cardiovascular Rate: normal. Rhythm: regular. Heart sounds: normal   No carotid bruit.    Pulm/Chest: Effort normal, breath sounds normal.     Reviewed Data:    Labs:   Lab Results   Component Value Date     04/19/2023    K 4.4 04/19/2023    CALCIUM 9.8 04/19/2023    AST 17 04/19/2023    ALT 13 04/19/2023    BUN 21 04/19/2023    CREATININE 0.83 04/19/2023    CREATININE 0.94 01/18/2023    CREATININE 0.90 01/12/2023    EGFRIFNONA 60 01/10/2022    EGFRIFAFRI 69 01/10/2022       Lab Results   Component Value Date    HGBA1C 6.40 (H) 04/19/2023    HGBA1C 6.40 (H) 01/12/2023    HGBA1C 6.20 (H) 10/31/2022       Lab Results   Component Value Date    LDL 73 04/19/2023    LDL 90 10/31/2022     (H) 07/18/2022    HDL 41 04/19/2023    TRIG 142 04/19/2023    CHOLHDLRATIO 3.39 04/19/2023       Lab Results   Component Value Date    TSH 3.140 03/01/2021    FREET4 1.09 03/01/2021       Lab Results   Component Value Date    WBC 5.90 04/19/2023    HGB 12.1 04/19/2023    HGB 11.1 (L) 01/18/2023    HGB 13.1 01/12/2023     04/19/2023                Lab Results   Component Value Date    CREATININE 0.83 04/19/2023    CREATININE 0.94 01/18/2023    CREATININE 0.90 01/12/2023    BUN 21 04/19/2023    EGFRIFNONA 60 01/10/2022    EGFRIFAFRI 69 01/10/2022         Imaging:          Medical Tests:          Screening for Glaucoma:  Previous screening for glaucoma?:   [x] YES  [] NO  []     Hearing Loss Screen:  Finger Rub Hearing Test (right ear):   [x] PASSED  [] FAILED  [] BORDERLINE  [] HAS HEARING AID  [] USING HEARING AID  [] NOT USING HEARING AID  []     Finger Rub Hearing Test (left ear):   [x] PASSED  [] FAILED  [] BORDERLINE  [] HAS HEARING AID  [] USING HEARING AID  [] NOT USING HEARING AID  []     Urinary Incontinence Screen:  Episodes of urinary incontinence? :  [] YES  [x] NO  [] N/A  [] WILL NEED FOLLOWUP  []       Depression Screen:      7/24/2023     8:06 AM    PHQ-2/PHQ-9 Depression Screening   Little Interest or Pleasure in Doing Things 0-->not at all   Feeling Down, Depressed or Hopeless 0-->not at all   PHQ-9: Brief Depression Severity Measure Score 0        PHQ-2:   [x] 0 -      NOT DEPRESSED  [] 1 -      NOT DEPRESSED  [] 2 -      NOT DEPRESSED  [] 3-6 -  DEPRESSED (PROCEED TO PHQ-9)  [] N/A - HAS DEPRESSION AND IS ON TREATMENT  [] N/A - HAS DEPRESSION AND IS NOT ON TREATMENT    PHQ-9:   [x] 0         NEGATIVE SCREENING FOR DEPRESSION  [] 1-4      MINIMAL DEPRESSION  [] 5-9      MILD DEPRESSIONNOT DEPRESSED  [] 10-14  MODERATE DEPRESSION  [] 15-19  MODERATELY SEVERE DEPRESSION  [] 20-27  SEVERE DEPRESSION    FUNCTIONAL, FALL RISK, & COGNITIVE SCREENING (Components below):    DATA:        7/24/2023     8:06 AM   Functional & Cognitive Status   Do you have difficulty preparing food and eating? No   Do you have difficulty bathing yourself, getting dressed or grooming yourself? No   Do you have difficulty using the toilet? No   Do you have difficulty moving around from place to place? No   Do you have trouble with steps or getting out of a bed or a chair? No   Current Diet Well Balanced Diet   Dental Exam Up to date   Eye Exam Up to date   Exercise (times per week) 5 times per week   Current Exercises Include Stationary Bicycling/Spin Class   Do you need help using the phone?  No   Are you deaf or do you have serious difficulty hearing?  No   Do you need help to go to places out of walking distance? No   Do you need help shopping? No   Do you need help preparing meals?  No   Do you need help with housework?  No   Do you need help with laundry? No   Do you need help taking your medications? No   Do you need help managing money? No   Do you ever drive or ride in a car without wearing a seat belt? No   Do you have difficulty concentrating, remembering or making decisions? No         A) Assessment of Functional Ability:  (Assessment of ability to perform ADL's  (showering/bathing, using toilet, dressing, feeding self, moving self around) and IADL's (use telephone, shop, prepare food, housekeep, do laundry, transport independently, take medications independently, and handle finances)    Degree of functional impairment: (based on assessment noted above)  [x] NONE  [] MILD  [] MODERATE  [] SEVERE  [] VERY SEVERE  []     B) Assessment of Fall Risk:  [x] LOW  [] MODERATE  [] HIGH  [] VERY HIGH     Need for further evaluation of gait, strength, and balance? :  [] YES  [x] NO    Timed Up and Go (TUG):   (>= 12 seconds indicates high risk for falling)    Observable abnormalities included:  [x] NORMAL GAIT   [] SLOW CAUTIOUS PACE  [] IMPAIRED BALANCE  [] SHORT STRIDES  [] MINIMAL ARM SWING  [] UNSTEADY (MILD)  [] UNSTEADY (MODERATE)  [] UNSTEADY (SEVERE)  [] SHUFFLING GAIT  [] USES ASSISTIVE DEVICE (CANE) PROPERLY  [] USES ASSISTIVE DEVICE (WALKER) PROPERLY  [] USES ASSISTIVE DEVICE (CANE) PROPERLY  [] DOES NOT USE ASSISTIVE DEVICE PROPERLY  [] IN WHEELCHAIR   [] NOT ABLE TO BE TESTED DUE TO SAFETY CONCERNS  []     C. Assessment of Cognitive Function:    Mini-Cog Test:     1) Registration (3 objects):     [x] YES  [] NO   [] N/A HAS DOCUMENTED DEMENTIA     2) Number of objects recalled:   [x] 3  [] 2  [] 1  [] 0     3) Clock Draw: Passed? :   [] YES  [] NO   [x] N/A  [] N/A HAS DOCUMENTED DEMENTIA     Further evaluation required? :   [] YES  [x] NO   [] CLOSE OBSERVATION NEEDED   [] SCHEDULE APPOINTMENT TO EVALUATE FURTHER   [] CONTINUE REGULAR FOLLOWUP AND MANAGEMENT   []     COUNSELING       A. Identification of Health Risk Factors:    Risk factors include: cardiovascular risk factors      B. Age-Appropriate Screening Schedule:  (Refer to the list below for future screening recommendations based on patient's age, sex and/or medical conditions. Orders for these recommended tests are listed in the plan section. The patient has been provided with a written plan)    Health  Maintenance Topics  Health Maintenance   Topic Date Due    MAMMOGRAM  05/23/2023    COVID-19 Vaccine (7 - Pfizer series) 08/31/2023 (Originally 2/20/2023)    INFLUENZA VACCINE  10/01/2023    DXA SCAN  10/18/2023    LIPID PANEL  04/19/2024    ANNUAL WELLNESS VISIT  07/24/2024    COLORECTAL CANCER SCREENING  01/21/2027    TDAP/TD VACCINES (4 - Td or Tdap) 07/19/2032    Pneumococcal Vaccine 65+  Completed    ZOSTER VACCINE  Completed    HEMOGLOBIN A1C  Discontinued    DIABETIC EYE EXAM  Discontinued    URINE MICROALBUMIN  Discontinued       Health Maintenance Topics Due or Over-Due  Health Maintenance Due   Topic Date Due    MAMMOGRAM  05/23/2023         C. Advanced Care Planning:    Advance Care Planning   ACP discussion was held with the patient during this visit. Patient has an advance directive in EMR which is still valid.        D. Patient Self-Management and Personalized Health Advice:    She has been provided with personalized counseling/information (including brochures/handouts) about:     -- reducing risk for cardiac/vascular events with pre-existing disease, fall prevention, and polypharmacy concerns, side effects of medications      She has been recommended for the following preventative services which has been performed today, will be ordered today or ordered/performed on upcoming follow-up visit:     -- CARDIOVASCULAR disease risk reduction counseling performed, FALL RISK assessment / plan of care completed, URINARY incontinence assessment done, VASCULAR screening recommended (including AAA screening), OSTEOPOROSIS screening DISCUSSED, BREAST CANCER screening DISCUSSED, **COLORECTAL cancer screening (colonoscopy/Cologuard discussed or ordered), COVID-19 vaccination (and/or booster) recommendations provided      E. Miscellaneous Items:    -Aspirin use counseling: Taking ASA appropriately as indicated    -Discussed BMI with her. The BMI is in the acceptable range    -Reviewed use of high risk medication in  the elderly: YES    -Reviewed for potential of harmful drug interactions in the elderly: YES        WRAP UP       Assessment & Plan:    1) MEDICARE ANNUAL WELLNESS VISIT    2) OTHER MEDICAL CONDITIONS ADDRESSED TODAY:            Problem List Items Addressed This Visit          High    Essential hypertension (Chronic)    Current Assessment & Plan     BP Readings from Last 3 Encounters:   07/24/23 146/60   04/19/23 124/56   03/13/23 142/60      Blood pressure is high today but well controlled at home. Continue losartan 25 mg and metoprolol succinate 50 mg BID. Monitor blood pressure at home.          Relevant Medications    metoprolol succinate XL (TOPROL-XL) 50 MG 24 hr tablet    losartan (COZAAR) 25 MG tablet    Pure hypercholesterolemia (Chronic)    Current Assessment & Plan     Lab Results   Component Value Date    LDL 73 04/19/2023    LDL 90 10/31/2022     (H) 07/18/2022    TRIG 142 04/19/2023    CHOLHDLRATIO 3.39 04/19/2023     Continue atorvastatin 40 mg daily         Relevant Medications    atorvastatin (LIPITOR) 40 MG tablet    Type 2 diabetes mellitus with hyperglycemia, without long-term current use of insulin (Chronic)    Overview     Diet controlled         Current Assessment & Plan     Lab Results   Component Value Date    HGBA1C 6.40 (H) 04/19/2023    HGBA1C 6.40 (H) 01/12/2023    HGBA1C 6.20 (H) 10/31/2022     Continue diet control diabetes management.  Check A1c and urine microalbumin creatinine ratio.         Relevant Orders    Microalbumin / Creatinine Urine Ratio - Urine, Clean Catch    Hemoglobin A1c       Medium    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)    Current Assessment & Plan     Doing better with pregabalin 75 mg twice daily.         Relevant Medications    pregabalin (LYRICA) 75 MG capsule    RLS (restless legs syndrome) (Chronic)    Current Assessment & Plan     Taking pregabalin 75 mg BID.  Doing better with iron replacement.  Decrease ferrous sulfate to 325 mg every other  day.    Lab Results   Component Value Date    FERRITIN 53.40 04/19/2023   No results found for: IRON   Lab Results   Component Value Date    TIBC 365 04/19/2023              Relevant Medications    ferrous sulfate 325 (65 FE) MG tablet       Low    REKHA (generalized anxiety disorder) (Chronic)    Current Assessment & Plan     Clinically stable overall.  Continue to monitor off medication.         Coronary artery disease involving native coronary artery of native heart with angina pectoris (Chronic)    Overview     8/2019: s/p 6 vessel CABG    Continue ASA, statin, beta-blocker.          Relevant Medications    metoprolol succinate XL (TOPROL-XL) 50 MG 24 hr tablet    aspirin 81 MG EC tablet     Other Visit Diagnoses       Medicare annual wellness visit, subsequent    -  Primary    Iron deficiency anemia, unspecified iron deficiency anemia type        Relevant Medications    ferrous sulfate 325 (65 FE) MG tablet                      Orders Placed This Encounter   Procedures    Microalbumin / Creatinine Urine Ratio - Urine, Clean Catch    Hemoglobin A1c       Discussion / Summary:        Medications as of TODAY:              Current Outpatient Medications   Medication Sig Dispense Refill    atorvastatin (LIPITOR) 40 MG tablet Take 1 tablet by mouth Every Night. 90 tablet 1    Calcium Carbonate-Vitamin D (CALCIUM-D) 600-400 MG-UNIT tablet Take 1 tablet by mouth Daily.      cholecalciferol (VITAMIN D3) 1000 UNITS tablet Take 1 tablet by mouth Daily.      Coenzyme Q10 (COQ10) 100 MG capsule Take 1 capsule by mouth Daily.      cycloSPORINE (RESTASIS) 0.05 % ophthalmic emulsion Administer 1 drop to both eyes 2 (Two) Times a Day.      hydrocortisone 2.5 % cream       ipratropium (ATROVENT) 0.06 % nasal spray 2 SPRAYS EACH NOSTRIL EVERY 6 HOURS AS NEEDED.      losartan (COZAAR) 25 MG tablet Take 1 tablet by mouth Daily.      metoprolol succinate XL (TOPROL-XL) 50 MG 24 hr tablet TAKE 1 TABLET BY MOUTH TWICE A  tablet 3     multivitamin with minerals tablet tablet Take 1 tablet by mouth Daily.      omeprazole (priLOSEC) 40 MG capsule TAKE 1 CAPSULE BY MOUTH EVERY DAY 90 capsule 3    pregabalin (LYRICA) 75 MG capsule Take 1 capsule by mouth 2 (Two) Times a Day.      aspirin 81 MG EC tablet Take 3 days a week      ferrous sulfate 325 (65 FE) MG tablet Take 1 tablet by mouth Every Other Day. Indications: Iron Deficiency       No current facility-administered medications for this visit.         FOLLOW-UP:            Return in about 6 months (around 1/24/2024) for Reassess chronic medical problems, **COMBINED AWV AND MEDICAL F/U (30 MIN)**.              Future Appointments   Date Time Provider Department Center   7/25/2023  9:30 AM MARLIN CASILLAS [place] DEVICE CHECK [facility]   1/29/2024  8:45 AM Ted Li MD [facility]   7/29/2024  8:00 AM Ted Li MD [facility]           After Visit Summary (AVS) including the Personalized Prevention  Plan Services (PPPS) was either printed and given to the patient at check-out today and/or sent to [facility] for review.

## 2023-07-25 ENCOUNTER — CLINICAL SUPPORT NO REQUIREMENTS (OUTPATIENT)
Dept: CARDIOLOGY | Facility: CLINIC | Age: 83
End: 2023-07-25
Payer: MEDICARE

## 2023-07-25 DIAGNOSIS — I49.5 SICK SINUS SYNDROME: Primary | ICD-10-CM

## 2023-07-25 LAB
ALBUMIN/CREAT UR: 34 MG/G CREAT (ref 0–29)
CREAT UR-MCNC: 143 MG/DL
HBA1C MFR BLD: 6.2 % (ref 4.8–5.6)
MICROALBUMIN UR-MCNC: 48.8 UG/ML

## 2023-07-25 NOTE — PROGRESS NOTES
MyChart:    Here are the result(s) of your test(s):     A1c level for average glucose level is stable     Small amount of protein is present in urine. Will follow.       Please do not hesitate to contact me if you have questions.

## 2023-08-02 ENCOUNTER — TELEPHONE (OUTPATIENT)
Dept: INTERNAL MEDICINE | Age: 83
End: 2023-08-02
Payer: MEDICARE

## 2023-08-02 DIAGNOSIS — Z78.0 POSTMENOPAUSAL: ICD-10-CM

## 2023-08-02 DIAGNOSIS — M85.80 OSTEOPENIA, UNSPECIFIED LOCATION: Primary | ICD-10-CM

## 2023-09-05 DIAGNOSIS — E78.00 PURE HYPERCHOLESTEROLEMIA: ICD-10-CM

## 2023-09-05 RX ORDER — METOPROLOL SUCCINATE 50 MG/1
TABLET, EXTENDED RELEASE ORAL
Qty: 180 TABLET | Refills: 3 | Status: SHIPPED | OUTPATIENT
Start: 2023-09-05

## 2023-09-05 RX ORDER — ATORVASTATIN CALCIUM 40 MG/1
TABLET, FILM COATED ORAL
Qty: 90 TABLET | Refills: 1 | Status: SHIPPED | OUTPATIENT
Start: 2023-09-05

## 2023-09-28 PROCEDURE — 93296 REM INTERROG EVL PM/IDS: CPT | Performed by: INTERNAL MEDICINE

## 2023-09-28 PROCEDURE — 93294 REM INTERROG EVL PM/LDLS PM: CPT | Performed by: INTERNAL MEDICINE

## 2023-09-29 DIAGNOSIS — G63 NEUROPATHY DUE TO MEDICAL CONDITION: Chronic | ICD-10-CM

## 2023-10-02 RX ORDER — PREGABALIN 75 MG/1
CAPSULE ORAL
Qty: 270 CAPSULE | Refills: 0 | Status: SHIPPED | OUTPATIENT
Start: 2023-10-02

## 2023-12-11 NOTE — TELEPHONE ENCOUNTER
Lm Vaccine was updated in chart.    Problem: Discharge Planning  Goal: Discharge to home or other facility with appropriate resources  Outcome: Progressing     Problem: Pain  Goal: Verbalizes/displays adequate comfort level or baseline comfort level  Outcome: Progressing  Flowsheets  Taken 12/10/2023 1557 by Rosemarie Shook RN  Verbalizes/displays adequate comfort level or baseline comfort level: Encourage patient to monitor pain and request assistance  Taken 12/10/2023 1150 by Rosemarie Shook RN  Verbalizes/displays adequate comfort level or baseline comfort level: Encourage patient to monitor pain and request assistance     Problem: Safety - Adult  Goal: Free from fall injury  Outcome: Progressing     Problem: Skin/Tissue Integrity  Goal: Absence of new skin breakdown  Description: 1. Monitor for areas of redness and/or skin breakdown  2. Assess vascular access sites hourly  3. Every 4-6 hours minimum:  Change oxygen saturation probe site  4. Every 4-6 hours:  If on nasal continuous positive airway pressure, respiratory therapy assess nares and determine need for appliance change or resting period.   Outcome: Progressing

## 2023-12-12 RX ORDER — IPRATROPIUM BROMIDE 42 UG/1
SPRAY, METERED NASAL
Qty: 45 ML | Refills: 1 | Status: SHIPPED | OUTPATIENT
Start: 2023-12-12

## 2023-12-12 NOTE — TELEPHONE ENCOUNTER
Pt asking for nasal spray refill ipratropium . He allergist retired   Asking if you can send her a 90 day supply to [facility]    Rx attached

## 2023-12-18 DIAGNOSIS — G63 NEUROPATHY DUE TO MEDICAL CONDITION: Chronic | ICD-10-CM

## 2023-12-18 RX ORDER — PREGABALIN 75 MG/1
CAPSULE ORAL
Qty: 270 CAPSULE | Refills: 0 | Status: SHIPPED | OUTPATIENT
Start: 2023-12-18

## 2023-12-24 NOTE — PROGRESS NOTES
Cardiology Follow-Up Note      Patient Name: Reanna Higgins  Age/Sex: 79 y.o. female  : 1940  MRN: [id number]      Day of Service: 19       Chief Complaint/Follow-up: CAD, status post CABG, postop ileus    S: Doing okay, no nausea or abdominal pain, tolerating regular diet.  She does complain that she had some coughing last night.      Temp:  [97.6 °F (36.4 °C)-98.6 °F (37 °C)] 98.3 °F (36.8 °C)  Heart Rate:  [] 78  Resp:  [17-18] 18  BP: (111-138)/(62-72) 138/70     PHYSICAL EXAM:    General Appearance: No acute distress, well developed and well nourished.   Eyes: Conjunctiva and lids: No erythema, swelling, or discharge. Sclera non-icteric.   HENT: Atraumatic, normocephalic. External eyes, ears, and nose normal.   Respiratory: No signs of respiratory distress. Respiration rhythm and depth normal.   Decreased bases, right greater than left.  Cardiovascular:  Heart Rate and Rhythm: Normal, Heart Sounds: Normal S1 and S2. No S3 or S4 noted.  Murmurs: No murmurs noted. No rubs, thrills, or gallops.   Arterial Pulses: Posterior tibialis and dorsalis pedis pulses normal.   Lower Extremities: No edema noted.  Gastrointestinal:  Abdomen soft, non-distended, non-tender.  Musculoskeletal: Normal movement of extremities  Skin: Warm and dry.   Psychiatric: Patient alert and oriented to person, place, and time. Speech and behavior appropriate. Normal mood and affect.       ECG/TELE: Sinus rhythm    Had brief episode of PAF yesterday and only lasted a couple of hours.      Results from last 7 days   Lab Units 19  0459 19  0437 19  0349   SODIUM mmol/L 136 138 141   POTASSIUM mmol/L 3.2* 3.8 3.6   CHLORIDE mmol/L 100 103 103   CO2 mmol/L 24.3 27.9 25.2   BUN mg/dL 13 15 17   CREATININE mg/dL 0.60 0.64 0.57   GLUCOSE mg/dL 114* 102* 125*   CALCIUM mg/dL 8.6 9.0 8.9     Results from last 7 days   Lab Units 19  0323 19  0413 19  0337   WBC 10*3/mm3 3.55 2.99* 3.78    HEMOGLOBIN g/dL 11.1* 10.8* 10.8*   HEMATOCRIT % 34.6 33.5* 33.3*   PLATELETS 10*3/mm3 237 193 114*                       Current Medications:   Scheduled Meds:  aspirin 81 mg Oral Daily   cholecalciferol 1,000 Units Oral Daily   cycloSPORINE 1 drop Both Eyes BID   enoxaparin 40 mg Subcutaneous Daily   furosemide 40 mg Oral Daily   gabapentin 100 mg Oral TID   gabapentin 200 mg Oral Nightly   guaiFENesin 1,200 mg Oral Q12H   levothyroxine 25 mcg Oral Q AM   metoprolol succinate XL 50 mg Oral Daily   pantoprazole 40 mg Oral Q AM   polyethylene glycol 17 g Oral BID   potassium chloride 20 mEq Oral BID With Meals   pravastatin 40 mg Oral Once per day on Mon Wed Fri Sat   sennosides-docusate sodium 2 tablet Oral Nightly           Coronary artery disease involving native coronary artery of native heart with angina pectoris (CMS/HCC)    Essential hypertension    Mesenteric ischemia due to arterial insufficiency (CMS/HCC)    Chest pain    Sick sinus syndrome (CMS/HCC)    Abnormal dobutamine stress echocardiography       Plan:     -CAD, s/p CABG x 6, POD #7  -EF normal by stress echo 8/8/19  -HTN, controlled  -HLD--restarted pravastatin  -Hx AV block, s/p PPM ([facility]-2016)  -Post op paralytic ileus/partial SBO, resolved, tolerating regular diet.  -Brief episode of PAF on 8/31, only a couple hours in duration, resolved with beta-blocker, continue to monitor.     She did have a cough last night, diuretics per CT surgery.  She she really wants to go to rehab, will see how she is doing with physical therapy however she needs a pre-CERT and this cannot be started until Tuesday 9/3.  Will monitor her progress.    Ana Gannon, APRN  09/01/19  11:34 AM     No

## 2023-12-27 DIAGNOSIS — E78.00 PURE HYPERCHOLESTEROLEMIA: ICD-10-CM

## 2023-12-27 RX ORDER — ATORVASTATIN CALCIUM 40 MG/1
TABLET, FILM COATED ORAL
Qty: 90 TABLET | Refills: 1 | Status: SHIPPED | OUTPATIENT
Start: 2023-12-27

## 2024-01-17 ENCOUNTER — TELEPHONE (OUTPATIENT)
Dept: CARDIOLOGY | Facility: CLINIC | Age: 84
End: 2024-01-17
Payer: MEDICARE

## 2024-01-17 NOTE — TELEPHONE ENCOUNTER
Caller: Reanna Higgins    Relationship: Self    Best call back number: [phone]    What is the best time to reach you: ANY    Who are you requesting to speak with (clinical staff, provider,  specific staff member): ANY        What was the call regarding: PATIENT WAS TOLD THAT HER PACEMAKER BATTERY WAS LOW AT HER LAST APPT AND TO EXPECT A CALL ABOUT THAT IN JANUARY. SHE IS PLANNING A TRIP 1ST OF [facility] AND WOULD LIKE TO FIND OUT WHAT SHE NEEDS TO DO SOON, PLEASE CALL BACK.

## 2024-01-29 ENCOUNTER — OFFICE VISIT (OUTPATIENT)
Dept: INTERNAL MEDICINE | Age: 84
End: 2024-01-29
Payer: MEDICARE

## 2024-01-29 VITALS
BODY MASS INDEX: 25.11 KG/M2 | WEIGHT: 160 LBS | HEART RATE: 54 BPM | TEMPERATURE: 96.9 F | OXYGEN SATURATION: 98 % | DIASTOLIC BLOOD PRESSURE: 62 MMHG | HEIGHT: 67 IN | SYSTOLIC BLOOD PRESSURE: 122 MMHG

## 2024-01-29 DIAGNOSIS — Z51.81 THERAPEUTIC DRUG MONITORING: Primary | ICD-10-CM

## 2024-01-29 DIAGNOSIS — G63 NEUROPATHY DUE TO MEDICAL CONDITION: Chronic | ICD-10-CM

## 2024-01-29 DIAGNOSIS — E11.65 TYPE 2 DIABETES MELLITUS WITH HYPERGLYCEMIA, WITHOUT LONG-TERM CURRENT USE OF INSULIN: Chronic | ICD-10-CM

## 2024-01-29 DIAGNOSIS — G25.81 RLS (RESTLESS LEGS SYNDROME): Chronic | ICD-10-CM

## 2024-01-29 DIAGNOSIS — E78.00 PURE HYPERCHOLESTEROLEMIA: Chronic | ICD-10-CM

## 2024-01-29 DIAGNOSIS — I10 ESSENTIAL HYPERTENSION: Chronic | ICD-10-CM

## 2024-01-29 DIAGNOSIS — E03.8 SUBCLINICAL HYPOTHYROIDISM: ICD-10-CM

## 2024-01-29 PROCEDURE — 3074F SYST BP LT 130 MM HG: CPT | Performed by: INTERNAL MEDICINE

## 2024-01-29 PROCEDURE — G2211 COMPLEX E/M VISIT ADD ON: HCPCS | Performed by: INTERNAL MEDICINE

## 2024-01-29 PROCEDURE — 1160F RVW MEDS BY RX/DR IN RCRD: CPT | Performed by: INTERNAL MEDICINE

## 2024-01-29 PROCEDURE — 99214 OFFICE O/P EST MOD 30 MIN: CPT | Performed by: INTERNAL MEDICINE

## 2024-01-29 PROCEDURE — 3078F DIAST BP <80 MM HG: CPT | Performed by: INTERNAL MEDICINE

## 2024-01-29 PROCEDURE — 1159F MED LIST DOCD IN RCRD: CPT | Performed by: INTERNAL MEDICINE

## 2024-01-29 RX ORDER — PREGABALIN 75 MG/1
75 CAPSULE ORAL 2 TIMES DAILY
Qty: 180 CAPSULE | Refills: 0 | Status: SHIPPED | OUTPATIENT
Start: 2024-01-29

## 2024-01-29 NOTE — PROGRESS NOTES
I N T E R N A L  M E D I C I N E    J U N O H  K I M,  M D      ENCOUNTER DATE:  01/29/2024    Reanna Higgins / 83 y.o. / female    CHIEF COMPLAINT / REASON FOR OFFICE VISIT     Hypertension, Hyperlipidemia, Diabetes,  Neuropathy due to medical condition, and Restless Legs Syndrome      ASSESSMENT & PLAN     Problem List Items Addressed This Visit          High    Essential hypertension (Chronic)    Overview     Losartan 25 mg QD  Metoprolol succinate 50 mg BID          Relevant Medications    losartan (COZAAR) 25 MG tablet    metoprolol succinate XL (TOPROL-XL) 50 MG 24 hr tablet    Other Relevant Orders    Comprehensive Metabolic Panel    Pure hypercholesterolemia (Chronic)    Overview     Continue atorvastatin 40 mg.          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)    Overview     Pregabalin 75 mg BID          Relevant Medications    pregabalin (LYRICA) 75 MG capsule    RLS (restless legs syndrome) (Chronic)    Overview     Pregabalin 75 mg BID   FeSO4 325 mg QOD          Relevant Medications    ferrous sulfate 325 (65 FE) MG tablet    Type 2 diabetes mellitus with hyperglycemia, without long-term current use of insulin (Chronic)    Overview     Diet controlled         Relevant Orders    Comprehensive Metabolic Panel    Hemoglobin A1c       Low    Subclinical hypothyroidism    Relevant Medications    metoprolol succinate XL (TOPROL-XL) 50 MG 24 hr tablet    Other Relevant Orders    TSH+Free T4     Other Visit Diagnoses       Therapeutic drug monitoring    -  Primary    Relevant Orders    Compliance Drug Analysis, Ur - Urine, Clean Catch          Orders Placed This Encounter   Procedures    Compliance Drug Analysis, Ur - Urine, Clean Catch    Comprehensive Metabolic Panel    Lipid Panel With / Chol / HDL Ratio    Hemoglobin A1c     "TSH+Free T4     New Medications Ordered This Visit   Medications    pregabalin (LYRICA) 75 MG capsule     Sig: Take 1 capsule by mouth 2 (Two) Times a Day.     Dispense:  180 capsule     Refill:  0     Not to exceed 4 additional fills before 03/30/2024       SUMMARY/DISCUSSION        Next Appointment with me: Visit date not found    Return for Next scheduled follow up.      VITAL SIGNS     Vitals:    01/29/24 0829   BP: 122/62   Pulse: 54   Temp: 96.9 °F (36.1 °C)   SpO2: 98%   Weight: 72.6 kg (160 lb)   Height: 170.2 cm (67\")       BP Readings from Last 3 Encounters:   01/29/24 122/62   07/24/23 146/60   04/19/23 124/56     Wt Readings from Last 3 Encounters:   01/29/24 72.6 kg (160 lb)   07/24/23 69.4 kg (153 lb)   04/19/23 68.9 kg (152 lb)     Body mass index is 25.06 kg/m².    Blood pressure readings recorded on patient flowsheet:       No data to display                  MEDICATIONS AT THE TIME OF OFFICE VISIT     Current Outpatient Medications on File Prior to Visit   Medication Sig    aspirin 81 MG EC tablet Take 3 days a week    atorvastatin (LIPITOR) 40 MG tablet TAKE 1 TABLET BY MOUTH EVERY DAY AT NIGHT    Calcium Carbonate-Vitamin D (CALCIUM-D) 600-400 MG-UNIT tablet Take 1 tablet by mouth Daily.    cholecalciferol (VITAMIN D3) 1000 UNITS tablet Take 1 tablet by mouth Daily.    Coenzyme Q10 (COQ10) 100 MG capsule Take 1 capsule by mouth Daily.    cycloSPORINE (RESTASIS) 0.05 % ophthalmic emulsion Administer 1 drop to both eyes 2 (Two) Times a Day.    ferrous sulfate 325 (65 FE) MG tablet Take 1 tablet by mouth Every Other Day. Indications: Iron Deficiency    hydrocortisone 2.5 % cream     ipratropium (ATROVENT) 0.06 % nasal spray 2 spray each nostril every 6 hours as needed    losartan (COZAAR) 25 MG tablet Take 1 tablet by mouth Daily.    metoprolol succinate XL (TOPROL-XL) 50 MG 24 hr tablet TAKE 1 TABLET BY MOUTH TWICE A DAY    multivitamin with minerals tablet tablet Take 1 tablet by mouth Daily.    " omeprazole (priLOSEC) 40 MG capsule TAKE 1 CAPSULE BY MOUTH EVERY DAY    [DISCONTINUED] pregabalin (LYRICA) 75 MG capsule TAKE 1 CAPSULE BY MOUTH THREE TIMES A DAY     No current facility-administered medications on file prior to visit.          HISTORY OF PRESENT ILLNESS     Doing well without problems. Mild weight gain noted over winter. DM 2 is diet controlled. RSL/PN stable on pregabalin 75 mg BID. Takes FeSO4 QOD for RLS.     Hypertension and hyperlipidemia are stable on medications.       Patient Care Team:  Ted Li MD as PCP - General (Internal Medicine)  Arsenio Witt MD as Consulting Physician (Ophthalmology)  Francis Mccullough MD as Consulting Physician (Cardiology)  Haven Veras MD as Consulting Physician (Dermatology)    REVIEW OF SYSTEMS     Review of Systems   Constitutional neg except per HPI   Resp neg  CV neg   GI negative   Neuro negative for change  Psych negative for change     PHYSICAL EXAMINATION     Physical Exam  General: No acute distress  Psych: Normal thought and judgment   Cardiovascular Rate: normal. Rhythm: regular. Heart sounds: normal   Pulm/Chest: Effort normal, breath sounds normal.   No carotid bruit       REVIEWED DATA     Labs:     Lab Results   Component Value Date     04/19/2023    K 4.4 04/19/2023    CALCIUM 9.8 04/19/2023    AST 17 04/19/2023    ALT 13 04/19/2023    BUN 21 04/19/2023    CREATININE 0.83 04/19/2023    CREATININE 0.94 01/18/2023    CREATININE 0.90 01/12/2023    EGFRIFNONA 60 01/10/2022    EGFRIFAFRI 69 01/10/2022       Lab Results   Component Value Date    HGBA1C 6.20 (H) 07/24/2023    HGBA1C 6.40 (H) 04/19/2023    HGBA1C 6.40 (H) 01/12/2023       Lab Results   Component Value Date    LDL 73 04/19/2023    LDL 90 10/31/2022     (H) 07/18/2022    HDL 41 04/19/2023    HDL 47 10/31/2022    TRIG 142 04/19/2023    TRIG 88 10/31/2022       Lab Results   Component Value Date    TSH 3.140 03/01/2021    TSH 2.840 12/12/2019    TSH 4.290 (H)  "06/10/2019    FREET4 1.09 03/01/2021    FREET4 1.13 12/12/2019    FREET4 1.02 06/10/2019       Lab Results   Component Value Date    WBC 5.90 04/19/2023    HGB 12.1 04/19/2023     04/19/2023       Lab Results   Component Value Date    MALBCRERATIO 34 (H) 07/24/2023      Lab Results   Component Value Date    FERRITIN 53.40 04/19/2023     No results found for: \"IRON\"   Lab Results   Component Value Date    TIBC 365 04/19/2023         Imaging:           Medical Tests:           Summary of old records / correspondence / consultant report:           Request outside records:             "

## 2024-01-30 LAB
ALBUMIN SERPL-MCNC: 4.2 G/DL (ref 3.7–4.7)
ALBUMIN/GLOB SERPL: 1.7 {RATIO} (ref 1.2–2.2)
ALP SERPL-CCNC: 87 IU/L (ref 44–121)
ALT SERPL-CCNC: 19 IU/L (ref 0–32)
AST SERPL-CCNC: 18 IU/L (ref 0–40)
BILIRUB SERPL-MCNC: 0.5 MG/DL (ref 0–1.2)
BUN SERPL-MCNC: 23 MG/DL (ref 8–27)
BUN/CREAT SERPL: 24 (ref 12–28)
CALCIUM SERPL-MCNC: 9.4 MG/DL (ref 8.7–10.3)
CHLORIDE SERPL-SCNC: 106 MMOL/L (ref 96–106)
CHOLEST SERPL-MCNC: 172 MG/DL (ref 100–199)
CHOLEST/HDLC SERPL: 3.8 RATIO (ref 0–4.4)
CO2 SERPL-SCNC: 26 MMOL/L (ref 20–29)
CREAT SERPL-MCNC: 0.95 MG/DL (ref 0.57–1)
EGFRCR SERPLBLD CKD-EPI 2021: 59 ML/MIN/1.73
GLOBULIN SER CALC-MCNC: 2.5 G/DL (ref 1.5–4.5)
GLUCOSE SERPL-MCNC: 110 MG/DL (ref 70–99)
HBA1C MFR BLD: 6.5 % (ref 4.8–5.6)
HDLC SERPL-MCNC: 45 MG/DL
LDLC SERPL CALC-MCNC: 109 MG/DL (ref 0–99)
POTASSIUM SERPL-SCNC: 4.7 MMOL/L (ref 3.5–5.2)
PROT SERPL-MCNC: 6.7 G/DL (ref 6–8.5)
SODIUM SERPL-SCNC: 145 MMOL/L (ref 134–144)
T4 FREE SERPL-MCNC: 1.17 NG/DL (ref 0.82–1.77)
TRIGL SERPL-MCNC: 99 MG/DL (ref 0–149)
TSH SERPL DL<=0.005 MIU/L-ACNC: 4.21 UIU/ML (ref 0.45–4.5)
VLDLC SERPL CALC-MCNC: 18 MG/DL (ref 5–40)

## 2024-02-02 NOTE — PROGRESS NOTES
MyChart:    Here are the result(s) of your test(s):     Serum sodium level is little high. Increase free water intake.     LDL cholesterol is higher. Maintain low saturated fat/cholesterol diet. Are you taking atorvastatin 40 mg every day??    A1c for average glucose level is higher at 6.5. Maintain a low sugar/starch/carbohydrate diet.     Thyroid level is stable/marginal. Will follow.       Please do not hesitate to contact me if you have questions.

## 2024-02-05 LAB — DRUGS UR: NORMAL

## 2024-02-12 ENCOUNTER — HOSPITAL ENCOUNTER (OUTPATIENT)
Dept: BONE DENSITY | Facility: HOSPITAL | Age: 84
Discharge: HOME | End: 2024-02-12
Admitting: INTERNAL MEDICINE
Payer: MEDICARE

## 2024-02-12 DIAGNOSIS — M85.80 OSTEOPENIA, UNSPECIFIED LOCATION: ICD-10-CM

## 2024-02-12 DIAGNOSIS — Z78.0 POSTMENOPAUSAL: ICD-10-CM

## 2024-02-12 PROCEDURE — 77080 DXA BONE DENSITY AXIAL: CPT

## 2024-02-27 ENCOUNTER — OFFICE VISIT (OUTPATIENT)
Dept: CARDIOLOGY | Facility: CLINIC | Age: 84
End: 2024-02-27
Payer: MEDICARE

## 2024-02-27 VITALS
WEIGHT: 161.8 LBS | HEIGHT: 67 IN | BODY MASS INDEX: 25.39 KG/M2 | HEART RATE: 68 BPM | DIASTOLIC BLOOD PRESSURE: 58 MMHG | SYSTOLIC BLOOD PRESSURE: 138 MMHG

## 2024-02-27 DIAGNOSIS — I34.0 NON-RHEUMATIC MITRAL REGURGITATION: ICD-10-CM

## 2024-02-27 DIAGNOSIS — Z95.0 CARDIAC PACEMAKER IN SITU: ICD-10-CM

## 2024-02-27 DIAGNOSIS — I25.119 CORONARY ARTERY DISEASE INVOLVING NATIVE CORONARY ARTERY OF NATIVE HEART WITH ANGINA PECTORIS: Primary | Chronic | ICD-10-CM

## 2024-02-27 DIAGNOSIS — Z95.1 S/P CABG (CORONARY ARTERY BYPASS GRAFT): ICD-10-CM

## 2024-02-27 PROCEDURE — 99214 OFFICE O/P EST MOD 30 MIN: CPT | Performed by: INTERNAL MEDICINE

## 2024-02-27 PROCEDURE — 93000 ELECTROCARDIOGRAM COMPLETE: CPT | Performed by: INTERNAL MEDICINE

## 2024-02-27 NOTE — PROGRESS NOTES
Date of Office Visit: 24  Encounter Provider: Francis Mccullough MD  Place of Service: [facility] CARDIOLOGY  Patient Name: Reanna Higgins  :1940  [id number]    Chief Complaint   Patient presents with    Coronary Artery Disease   :     HPI: Reanna Higgins is a 84 y.o. female she is a lady who came in 2019 with unstable angina underwent cath and she had three-vessel disease so that really was not amenable to PCI she underwent a bypass x 6 with Alvin Hawk.  This was complicated by a small bowel obstruction that resolved with conservative therapy. she had normal LV function     She is here for follow-up and is doing well no chest pain shortness of breath PND orthopnea edema syncope or palpitations.  She is very active she rode her bike for 5 miles today and then roller skated    Past Medical History:   Diagnosis Date    Anesthesia complication     SEVERE SHAKING OF ENTIRE BODY AFTER EGD/ERCP. STATES THAT ANESTHESIA DID NOT SEE PREOP    Cardiac pacemaker in situ     Carpal tunnel syndrome     Cystic fibroadenosis of breast     Diabetes mellitus     GERD (gastroesophageal reflux disease)     History of ileus 2019    History of skin cancer     Hypercholesterolemia     Hypertension     Osteoarthritis of both hands     Pacemaker     Pancreatitis     Peripheral neuropathy     Phobia, flying 2019    Restless leg syndrome     Second degree AV block, Mobitz type I     SVT (supraventricular tachycardia)        Past Surgical History:   Procedure Laterality Date    APPENDECTOMY      BILATERAL OOPHORECTOMY Bilateral 1988    BREAST CYST EXCISION Bilateral     4 BREAST SURGERIES    CARDIAC CATHETERIZATION N/A 2019    Procedure: Left Heart Cath;  Surgeon: Francis Mccullough MD;  [facility];  Service: Cardiology    CARDIAC CATHETERIZATION N/A 2019    Procedure: Left ventriculography;  Surgeon: Francis Mccullough MD;  Location: [facility] CATH  From: Angel Player  To: Trevor Razon  Sent: 11/17/2022 10:14 AM EST  Subject: RX question    I saw Dr Parisa Grant today, I am not sleeping at night I was advised to speak to you about a RX for Trazadone  I am not currently taking any narcotics and would prefer not to  If you have any questions Dr Parisa Grant said you could call him and he would speak to you   Please advise INVASIVE LOCATION;  Service: Cardiology    CARDIAC CATHETERIZATION N/A 8/22/2019    Procedure: Coronary angiography;  Surgeon: Francis Mccullough MD;  [facility];  Service: Cardiology    CARDIAC ELECTROPHYSIOLOGY PROCEDURE N/A 10/10/2016    Procedure: Pacemaker DC new  [place];  Surgeon: Wilfrido Hameed MD;  [facility];  Service:     CARPAL TUNNEL RELEASE Right     CATARACT EXTRACTION      NOVEMBER AND DECEMBER 2014    CHOLECYSTECTOMY      COLONOSCOPY  2015    COLONOSCOPY N/A 1/21/2022    Procedure: COLONOSCOPY into cecum and terminal ileum with hot snare polypectomy x2;  Surgeon: Giovany Redman MD;  [facility];  Service: Gastroenterology;  Laterality: N/A;  Pre op: History of polyps  Post op: Polyps, Diverticulosis and Hemorrhoids    CORONARY ARTERY BYPASS GRAFT N/A 8/23/2019    Procedure: SUMMER STERNOTOMY CORONARY ARTERY BYPASS GRAFT TIMES 6 USING LEFT INTERNAL MAMMARY ARTERY AND LEFT GREATER SAPHENOUS VEIN GRAFT PER ENDOSCOPIC VEIN HARVESTING AND PRP;  Surgeon: Kem Hawk MD;  [facility];  Service: Cardiothoracic    ENDOSCOPY N/A 7/18/2019    Procedure: ESOPHAGOGASTRODUODENOSCOPY with biopsy;  Surgeon: Ricky Chew MD;  Location: [facility] ENDOSCOPY;  Service: Gastroenterology    ERCP N/A 9/29/2021    Procedure: ENDOSCOPIC RETROGRADE CHOLANGIOPANCREATOGRAPHY WITH SPHINCTEROTOMY AND BALLOON SWEEP;  Surgeon: Giovany Redman MD;  Location: [facility] ENDOSCOPY;  Service: Gastroenterology;  Laterality: N/A;  PRE- RECURRENT PANCREATITIS  POST- SAME    EYE SURGERY      HYSTERECTOMY      KNEE ARTHROSCOPY Bilateral 04/2014    MENISCAL TEAR    PACEMAKER IMPLANTATION  10/10/2016    SHOULDER SURGERY Right     ROTATOR CUFF SURGERY JUNE 18, 2015    TONSILLECTOMY      TOTAL KNEE ARTHROPLASTY Right 1/17/2023    Procedure: RIGHT TOTAL KNEE ARTHROPLASTY;  Surgeon: To Sands MD;  [facility];  Service:  Orthopedics;  Laterality: Right;    TRIGGER FINGER RELEASE      both hands       Social History     Socioeconomic History    Marital status:      Spouse name: Rishabh*    Number of children: 2    Years of education: 13    Highest education level: Some college, no degree   Tobacco Use    Smoking status: Former     Packs/day: 0.50     Years: 14.00     Additional pack years: 0.00     Total pack years: 7.00     Types: Cigarettes     Start date:      Quit date:      Years since quittin.1    Smokeless tobacco: Never    Tobacco comments:     quit age 28   Vaping Use    Vaping Use: Never used   Substance and Sexual Activity    Alcohol use: Yes     Comment: rare glass of wine or beer rarely/  No caffeine use    Drug use: Never    Sexual activity: Not Currently       Family History   Problem Relation Age of Onset    Thyroid disease Daughter     Lung cancer Brother     Hypertension Mother     Aortic stenosis Mother     Coronary artery disease Mother          78 (smoker)    Hypertension Father     Depression Father     Anxiety disorder Father     Diabetes Father     Heart failure Father     Cirrhosis Father         alcohol    Alcohol abuse Sister     Lupus Sister     Heart disease Sister     No Known Problems Sister     Alcohol abuse Brother     Drug abuse Brother     Heart disease Brother     Cancer Brother         bladder (smoker)    Heart disease Brother     Breast cancer Paternal Aunt     Neuropathy Neg Hx     Colon cancer Neg Hx     Dementia Neg Hx     Malig Hyperthermia Neg Hx        Review of Systems   Constitutional: Negative for decreased appetite, fever, malaise/fatigue and weight loss.   HENT:  Negative for nosebleeds.    Eyes:  Negative for double vision.   Cardiovascular:  Negative for chest pain, claudication, cyanosis, dyspnea on exertion, irregular heartbeat, leg swelling, near-syncope, orthopnea, palpitations, paroxysmal nocturnal dyspnea and syncope.   Respiratory:  Negative for cough,  hemoptysis and shortness of breath.    Hematologic/Lymphatic: Negative for bleeding problem.   Skin:  Negative for rash.   Musculoskeletal:  Negative for falls and myalgias.   Gastrointestinal:  Negative for hematochezia, jaundice, melena, nausea and vomiting.   Genitourinary:  Negative for hematuria.   Neurological:  Negative for dizziness and seizures.   Psychiatric/Behavioral:  Negative for altered mental status and memory loss.        Allergies   Allergen Reactions    Ancef [Cefazolin] Other (See Comments)     Hypotension/bradycardia    Codeine Hives    Penicillins Other (See Comments)     Hypotension (Ancef allergy)     Sulfa Antibiotics Hives         Current Outpatient Medications:     aspirin 81 MG EC tablet, Take 3 days a week, Disp: , Rfl:     atorvastatin (LIPITOR) 40 MG tablet, TAKE 1 TABLET BY MOUTH EVERY DAY AT NIGHT, Disp: 90 tablet, Rfl: 1    Calcium Carbonate-Vitamin D (CALCIUM-D) 600-400 MG-UNIT tablet, Take 1 tablet by mouth Daily., Disp: , Rfl:     cholecalciferol (VITAMIN D3) 1000 UNITS tablet, Take 1 tablet by mouth Daily., Disp: , Rfl:     Coenzyme Q10 (COQ10) 100 MG capsule, Take 1 capsule by mouth Daily., Disp: , Rfl:     cycloSPORINE (RESTASIS) 0.05 % ophthalmic emulsion, Administer 1 drop to both eyes 2 (Two) Times a Day., Disp: , Rfl:     ferrous sulfate 325 (65 FE) MG tablet, Take 1 tablet by mouth Every Other Day. Indications: Iron Deficiency, Disp: , Rfl:     hydrocortisone 2.5 % cream, , Disp: , Rfl:     ipratropium (ATROVENT) 0.06 % nasal spray, 2 spray each nostril every 6 hours as needed, Disp: 45 mL, Rfl: 1    losartan (COZAAR) 25 MG tablet, Take 1 tablet by mouth Daily., Disp: , Rfl:     metoprolol succinate XL (TOPROL-XL) 50 MG 24 hr tablet, TAKE 1 TABLET BY MOUTH TWICE A DAY, Disp: 180 tablet, Rfl: 3    multivitamin with minerals tablet tablet, Take 1 tablet by mouth Daily., Disp: , Rfl:     omeprazole (priLOSEC) 40 MG capsule, TAKE 1 CAPSULE BY MOUTH EVERY DAY, Disp: 90  "capsule, Rfl: 3    pregabalin (LYRICA) 75 MG capsule, Take 1 capsule by mouth 2 (Two) Times a Day., Disp: 180 capsule, Rfl: 0      Objective:     Vitals:    02/27/24 1412   BP: 138/58   Pulse: 68   Weight: 73.4 kg (161 lb 12.8 oz)   Height: 170.2 cm (67\")     Body mass index is 25.34 kg/m².    Physical Exam  Constitutional:       Appearance: She is well-developed.   HENT:      Head: Normocephalic.   Eyes:      General: No scleral icterus.  Neck:      Thyroid: No thyromegaly.      Vascular: No JVD.   Cardiovascular:      Rate and Rhythm: Normal rate and regular rhythm.      Heart sounds: Normal heart sounds. No murmur heard.     No friction rub. No gallop.   Pulmonary:      Effort: Pulmonary effort is normal.      Breath sounds: Normal breath sounds. No wheezing or rales.   Chest:       Abdominal:      Palpations: Abdomen is soft.      Tenderness: There is no abdominal tenderness.   Musculoskeletal:         General: Normal range of motion.   Lymphadenopathy:      Cervical: No cervical adenopathy.   Skin:     General: Skin is warm and dry.      Findings: No rash.   Neurological:      Mental Status: She is alert and oriented to person, place, and time.           ECG 12 Lead    Date/Time: 2/27/2024 2:46 PM  Performed by: Francis Mccullough MD    Authorized by: Francis Mccullough MD  Comparison: compared with previous ECG   Rhythm: sinus rhythm  Conduction: 1st degree AV block and non-specific intraventricular conduction delay  Other findings: non-specific ST-T wave changes    Clinical impression: abnormal EKG           Assessment:       Diagnosis Plan   1. Coronary artery disease involving native coronary artery of native heart with angina pectoris        2. Non-rheumatic mitral regurgitation        3. Cardiac pacemaker in situ        4. S/P CABG (coronary artery bypass graft)               Plan:       She is doing really well.  I love how active she is I think that is playing a big role in why she is doing well and her " blood pressure looks good her other medicines are good I would continue doing what ever she is doing because it seems like it is right we will have her come back and see us in a year sooner if she has trouble    As always, it has been a pleasure to participate in your patient's care.      Sincerely,       Francis Mccullough MD

## 2024-03-04 ENCOUNTER — TELEPHONE (OUTPATIENT)
Dept: CARDIOLOGY | Facility: CLINIC | Age: 84
End: 2024-03-04

## 2024-03-04 NOTE — TELEPHONE ENCOUNTER
Caller: Reanna Higgins    Relationship: Self    Best call back number: [phone]     What form or medical record are you requesting: BP LOG    Who is requesting this form or medical record from you: PATIENT    How would you like to receive the form or medical records (pick-up, mail, fax): MAIL    If mail, what is the address: [facility] [address]  [facility] [address]     Timeframe paperwork needed: ASAP    Additional notes: PT REQUESTING IF POSSIBLE TO MAIL HER A COUPLE OF THE BLOOD PRESSURE LOGS THAT THE OFFICE HAS - PT FORGOT TO ASK AT APPT LAST WEEK AND WILL NOT BE IN AREA ANY TIME SOON -

## 2024-03-05 ENCOUNTER — TELEPHONE (OUTPATIENT)
Dept: INTERNAL MEDICINE | Age: 84
End: 2024-03-05

## 2024-03-05 NOTE — TELEPHONE ENCOUNTER
Caller: Reanna Higgins    Relationship: Self    Best call back number: [phone]     PATIENT IS ASKING FOR THE OFFICE TO MAIL HER SOME BLOOD PRESSURE LOGS TO HER HOME ADDRESS.

## 2024-04-14 DIAGNOSIS — K21.9 GASTROESOPHAGEAL REFLUX DISEASE, UNSPECIFIED WHETHER ESOPHAGITIS PRESENT: ICD-10-CM

## 2024-04-15 RX ORDER — OMEPRAZOLE 40 MG/1
CAPSULE, DELAYED RELEASE ORAL
Qty: 90 CAPSULE | Refills: 1 | Status: SHIPPED | OUTPATIENT
Start: 2024-04-15

## 2024-04-18 ENCOUNTER — TELEPHONE (OUTPATIENT)
Dept: INTERNAL MEDICINE | Age: 84
End: 2024-04-18

## 2024-04-18 NOTE — TELEPHONE ENCOUNTER
Caller: Reanna Higgins     Relationship: [unfilled]     Best call back number:          Date/Time Type Contact Phone/Fax           04/18/2024 09:46 AM EDT by Arsenio Parker Helen J (Self)         What is your medical concern?   FEMALE PROBLEM  RESULT OF TAKING AN ANTIBIOTIC    How long has this issue been going on?   FEW DAYS     CAN YOU CALL AFTER 1:15 PM TODAY?

## 2024-04-19 ENCOUNTER — TELEPHONE (OUTPATIENT)
Dept: INTERNAL MEDICINE | Age: 84
End: 2024-04-19
Payer: MEDICARE

## 2024-04-19 ENCOUNTER — OFFICE VISIT (OUTPATIENT)
Dept: INTERNAL MEDICINE | Age: 84
End: 2024-04-19
Payer: MEDICARE

## 2024-04-19 VITALS
OXYGEN SATURATION: 98 % | DIASTOLIC BLOOD PRESSURE: 68 MMHG | TEMPERATURE: 97.1 F | WEIGHT: 160 LBS | HEIGHT: 67 IN | SYSTOLIC BLOOD PRESSURE: 140 MMHG | HEART RATE: 82 BPM | BODY MASS INDEX: 25.11 KG/M2

## 2024-04-19 DIAGNOSIS — B37.31 VAGINAL CANDIDA: Primary | ICD-10-CM

## 2024-04-19 DIAGNOSIS — N89.8 VAGINAL DISCHARGE: Primary | ICD-10-CM

## 2024-04-19 PROCEDURE — 1159F MED LIST DOCD IN RCRD: CPT

## 2024-04-19 PROCEDURE — 99213 OFFICE O/P EST LOW 20 MIN: CPT

## 2024-04-19 PROCEDURE — 1160F RVW MEDS BY RX/DR IN RCRD: CPT

## 2024-04-19 RX ORDER — FLUCONAZOLE 150 MG/1
150 TABLET ORAL ONCE
Qty: 1 TABLET | Refills: 0 | Status: SHIPPED | OUTPATIENT
Start: 2024-04-19 | End: 2024-04-19

## 2024-04-19 NOTE — PROGRESS NOTES
"    I N T E R N A L  M E D I C I N E  [facility]    ENCOUNTER DATE:  04/19/2024    Reanna Higgins / 84 y.o. / female      CHIEF COMPLAINT / REASON FOR OFFICE VISIT     Vaginal Discharge (Took Monistat for7 day. Still itching ,yellow/green discharge.)      ASSESSMENT & PLAN     Diagnoses and all orders for this visit:    1. Vaginal candida (Primary)  -     fluconazole (DIFLUCAN) 150 MG tablet; Take 1 tablet by mouth 1 (One) Time for 1 dose.  Dispense: 1 tablet; Refill: 0         SUMMARY/DISCUSSION  BV/ candida testing sent today.  Will treat empirically with fluconazole 150 mg once PO, which has worked in the past for same symptoms.  Will provide an update on her condition next week.        Next Appointment with me: Visit date not found    Return for Next scheduled follow up.      VITAL SIGNS     Visit Vitals  /68   Pulse 82   Temp 97.1 °F (36.2 °C)   Ht 170.2 cm (67\")   Wt 72.6 kg (160 lb)   SpO2 98%   BMI 25.06 kg/m²             Wt Readings from Last 3 Encounters:   04/19/24 72.6 kg (160 lb)   02/27/24 73.4 kg (161 lb 12.8 oz)   01/29/24 72.6 kg (160 lb)     Body mass index is 25.06 kg/m².        MEDICATIONS AT THE TIME OF OFFICE VISIT     Current Outpatient Medications on File Prior to Visit   Medication Sig Dispense Refill    aspirin 81 MG EC tablet Take 3 days a week      atorvastatin (LIPITOR) 40 MG tablet TAKE 1 TABLET BY MOUTH EVERY DAY AT NIGHT 90 tablet 1    Calcium Carbonate-Vitamin D (CALCIUM-D) 600-400 MG-UNIT tablet Take 1 tablet by mouth Daily.      cholecalciferol (VITAMIN D3) 1000 UNITS tablet Take 1 tablet by mouth Daily.      Coenzyme Q10 (COQ10) 100 MG capsule Take 1 capsule by mouth Daily.      cycloSPORINE (RESTASIS) 0.05 % ophthalmic emulsion Administer 1 drop to both eyes 2 (Two) Times a Day.      ferrous sulfate 325 (65 FE) MG tablet Take 1 tablet by mouth Every Other Day. Indications: Iron Deficiency      ipratropium (ATROVENT) 0.06 % nasal spray 2 spray each nostril every 6 " "hours as needed 45 mL 1    losartan (COZAAR) 25 MG tablet Take 1 tablet by mouth Daily.      metoprolol succinate XL (TOPROL-XL) 50 MG 24 hr tablet TAKE 1 TABLET BY MOUTH TWICE A  tablet 3    multivitamin with minerals tablet tablet Take 1 tablet by mouth Daily.      omeprazole (priLOSEC) 40 MG capsule TAKE 1 CAPSULE BY MOUTH EVERY DAY 90 capsule 1    pregabalin (LYRICA) 75 MG capsule Take 1 capsule by mouth 2 (Two) Times a Day. 180 capsule 0    hydrocortisone 2.5 % cream  (Patient not taking: Reported on 4/19/2024)       No current facility-administered medications on file prior to visit.        HISTORY OF PRESENT ILLNESS     Completed a 7 day course of Levaquin by April 8, 2024 for \"walking pneumonia\" diagnosed by Farrukh CUENCA.  April 2, 2024 Chest XR was normal.  She reports prior history of frequent vaginal candida infections after taking antibiotics.  Soon after completing antibiotics, she developed vaginal pruritus, discharge that is white, yellow/ green in color.  Used Monistat over the counter without symptom resolution.  Denies any urinary complaints, fever, chills, chest pain, dyspnea.        Patient Care Team:  Ted Li MD as PCP - General (Internal Medicine)  Arsenio Witt MD as Consulting Physician (Ophthalmology)  Francis Mccullough MD as Consulting Physician (Cardiology)  Haven Veras MD as Consulting Physician (Dermatology)    REVIEW OF SYSTEMS     Review of Systems   Constitutional:  Negative for chills, fever and unexpected weight change.   Respiratory:  Negative for cough, chest tightness and shortness of breath.    Cardiovascular:  Negative for chest pain, palpitations and leg swelling.   Genitourinary:  Positive for vaginal discharge. Negative for dysuria, frequency, hematuria, urgency, vaginal bleeding and vaginal pain.   Neurological:  Negative for dizziness, weakness, light-headedness and headaches.   Psychiatric/Behavioral:  The patient is not nervous/anxious.     "       PHYSICAL EXAMINATION     Physical Exam  Vitals reviewed. Exam conducted with a chaperone present.   Constitutional:       General: She is not in acute distress.     Appearance: Normal appearance. She is not ill-appearing, toxic-appearing or diaphoretic.   HENT:      Head: Normocephalic and atraumatic.   Cardiovascular:      Rate and Rhythm: Normal rate and regular rhythm.      Heart sounds: Normal heart sounds.   Pulmonary:      Effort: Pulmonary effort is normal.      Breath sounds: Normal breath sounds.   Genitourinary:     Vagina: Normal.      Comments: Thick white vaginal discharge  Neurological:      Mental Status: She is alert and oriented to person, place, and time. Mental status is at baseline.   Psychiatric:         Mood and Affect: Mood normal.         Behavior: Behavior normal.         Thought Content: Thought content normal.         Judgment: Judgment normal.           REVIEWED DATA     Labs:           Imaging:            Medical Tests:           Summary of old records / correspondence / consultant report:           Request outside records:

## 2024-04-20 NOTE — TELEPHONE ENCOUNTER
This was a message relayed via [employer] when the patient was at checkout.  We didn't discuss any Nystatin cream during her appointment.  Veronica - can you please find out why she is taking?

## 2024-04-22 LAB
A VAGINAE DNA VAG QL NAA+PROBE: NORMAL SCORE
BVAB2 DNA VAG QL NAA+PROBE: NORMAL SCORE
C ALBICANS DNA VAG QL NAA+PROBE: NEGATIVE
C GLABRATA DNA VAG QL NAA+PROBE: NEGATIVE
MEGA1 DNA VAG QL NAA+PROBE: NORMAL SCORE

## 2024-05-08 DIAGNOSIS — G63 NEUROPATHY DUE TO MEDICAL CONDITION: Chronic | ICD-10-CM

## 2024-05-08 RX ORDER — PREGABALIN 75 MG/1
75 CAPSULE ORAL 2 TIMES DAILY
Qty: 180 CAPSULE | Refills: 0 | Status: SHIPPED | OUTPATIENT
Start: 2024-05-08

## 2024-05-19 DIAGNOSIS — I10 ESSENTIAL HYPERTENSION: Chronic | ICD-10-CM

## 2024-05-20 RX ORDER — LOSARTAN POTASSIUM 25 MG/1
25 TABLET ORAL 2 TIMES DAILY
Qty: 180 TABLET | Refills: 3 | Status: SHIPPED | OUTPATIENT
Start: 2024-05-20

## 2024-06-21 ENCOUNTER — OFFICE VISIT (OUTPATIENT)
Dept: INTERNAL MEDICINE | Age: 84
End: 2024-06-21
Payer: MEDICARE

## 2024-06-21 VITALS
HEART RATE: 59 BPM | HEIGHT: 67 IN | TEMPERATURE: 96.9 F | OXYGEN SATURATION: 96 % | SYSTOLIC BLOOD PRESSURE: 150 MMHG | DIASTOLIC BLOOD PRESSURE: 64 MMHG | WEIGHT: 159 LBS | BODY MASS INDEX: 24.96 KG/M2

## 2024-06-21 DIAGNOSIS — G63 NEUROPATHY DUE TO MEDICAL CONDITION: Primary | Chronic | ICD-10-CM

## 2024-06-21 PROCEDURE — 99213 OFFICE O/P EST LOW 20 MIN: CPT | Performed by: INTERNAL MEDICINE

## 2024-06-21 PROCEDURE — G2211 COMPLEX E/M VISIT ADD ON: HCPCS | Performed by: INTERNAL MEDICINE

## 2024-06-21 PROCEDURE — 1125F AMNT PAIN NOTED PAIN PRSNT: CPT | Performed by: INTERNAL MEDICINE

## 2024-06-21 NOTE — PROGRESS NOTES
"    I N T E R N A L  M E D I C I N E    J U N O H  K I M,  M D      ENCOUNTER DATE:  06/21/2024    Reanna Higgins / 84 y.o. / female    CHIEF COMPLAINT / REASON FOR OFFICE VISIT     Peripheral Neuropathy (Discuss medication problem(pregabalin))      ASSESSMENT & PLAN     1. Neuropathy due to medical condition      No orders of the defined types were placed in this encounter.    No orders of the defined types were placed in this encounter.      SUMMARY/DISCUSSION  Discussed side effects of pregabalin. She needs the medication and is helping her. She will continue medication.   Watch for increased edema or ongoing weight gain.     Next Appointment with me: 7/29/2024    Return for Next scheduled followup for [facility].        VITAL SIGNS     Vitals:    06/21/24 0816   BP: 150/64   Pulse: 59   Temp: 96.9 °F (36.1 °C)   SpO2: 96%   Weight: 72.1 kg (159 lb)   Height: 170.2 cm (67\")       BP Readings from Last 3 Encounters:   06/21/24 150/64   04/19/24 140/68   02/27/24 138/58     Wt Readings from Last 3 Encounters:   06/21/24 72.1 kg (159 lb)   04/19/24 72.6 kg (160 lb)   02/27/24 73.4 kg (161 lb 12.8 oz)     Body mass index is 24.9 kg/m².    Blood pressure readings recorded on patient flowsheet:       No data to display                MEDICATIONS AT THE TIME OF OFFICE VISIT     Current Outpatient Medications on File Prior to Visit   Medication Sig    aspirin 81 MG EC tablet Take 3 days a week    atorvastatin (LIPITOR) 40 MG tablet TAKE 1 TABLET BY MOUTH EVERY DAY AT NIGHT    Calcium Carbonate-Vitamin D (CALCIUM-D) 600-400 MG-UNIT tablet Take 1 tablet by mouth Daily.    cholecalciferol (VITAMIN D3) 1000 UNITS tablet Take 1 tablet by mouth Daily.    Coenzyme Q10 (COQ10) 100 MG capsule Take 1 capsule by mouth Daily.    cycloSPORINE (RESTASIS) 0.05 % ophthalmic emulsion Administer 1 drop to both eyes 2 (Two) Times a Day.    ferrous sulfate 325 (65 FE) MG tablet Take 1 tablet by mouth Every Other Day. Indications: Iron Deficiency "    hydrocortisone 2.5 % cream     ipratropium (ATROVENT) 0.06 % nasal spray 2 spray each nostril every 6 hours as needed    losartan (COZAAR) 25 MG tablet TAKE 1 TABLET BY MOUTH TWICE A DAY    metoprolol succinate XL (TOPROL-XL) 50 MG 24 hr tablet TAKE 1 TABLET BY MOUTH TWICE A DAY    multivitamin with minerals tablet tablet Take 1 tablet by mouth Daily.    omeprazole (priLOSEC) 40 MG capsule TAKE 1 CAPSULE BY MOUTH EVERY DAY    pregabalin (LYRICA) 75 MG capsule TAKE 1 CAPSULE BY MOUTH TWICE A DAY     No current facility-administered medications on file prior to visit.          HISTORY OF PRESENT ILLNESS     She read about the potential side effects of pregabalin including weight gain.  Her weight has remained relatively stable during the time she has been on the medication.  She has mild edema of lower extremities.  She states that the medication definitely helps with the neuropathy pain and that she needs it.        REVIEW OF SYSTEMS             PHYSICAL EXAMINATION     Physical Exam  Alert with normal thought and judgment.   Weight looks fine.   1+ bilateral lower extremities edema       REVIEWED DATA     Labs:     Lab Results   Component Value Date     (H) 01/29/2024    K 4.7 01/29/2024    CALCIUM 9.4 01/29/2024    AST 18 01/29/2024    ALT 19 01/29/2024    BUN 23 01/29/2024    CREATININE 0.95 01/29/2024    CREATININE 0.83 04/19/2023    CREATININE 0.94 01/18/2023    EGFRRESULT 59 (L) 01/29/2024       Lab Results   Component Value Date    HGBA1C 6.5 (H) 01/29/2024    HGBA1C 6.20 (H) 07/24/2023    HGBA1C 6.40 (H) 04/19/2023       Lab Results   Component Value Date     (H) 01/29/2024    LDL 73 04/19/2023    LDL 90 10/31/2022    HDL 45 01/29/2024    HDL 41 04/19/2023    TRIG 99 01/29/2024    TRIG 142 04/19/2023       Lab Results   Component Value Date    TSH 4.210 01/29/2024    TSH 3.140 03/01/2021    TSH 2.840 12/12/2019    FREET4 1.17 01/29/2024    FREET4 1.09 03/01/2021    FREET4 1.13 12/12/2019        Lab Results   Component Value Date    WBC 5.90 04/19/2023    HGB 12.1 04/19/2023     04/19/2023       Lab Results   Component Value Date    MALBCRERATIO 34 (H) 07/24/2023           Imaging:           Medical Tests:           Summary of old records / correspondence / consultant report:           Request outside records:

## 2024-07-12 ENCOUNTER — TELEPHONE (OUTPATIENT)
Dept: CARDIOLOGY | Facility: CLINIC | Age: 84
End: 2024-07-12
Payer: MEDICARE

## 2024-07-12 NOTE — TELEPHONE ENCOUNTER
Caller: Reanna Higgins    Relationship to patient: Self    Best call back number: [phone]    Chief complaint: DEVICE CHECK    Type of visit:DEVICE CHECK [facility] [facility]     Requested date:07.25.24    If rescheduling, when is the original appointment:      Additional notes:PATIENT STATED SOMEONE FROM [employer] THAT STATED THAT THE BATTERY MAY BE LOW AND NEEDING CHANGED IN APRIL. PATIENT CALLED ABOUT ANNUAL DEVICE CHECK, BUT HUB COULDN'T FIND PROGRESS NOTES ABOUT NEXT CHECK FROM LAST ANNUAL VISIT. PLEASE ADVISE.

## 2024-07-13 NOTE — ANESTHESIA POSTPROCEDURE EVALUATION
Patient: Reanna Higgins    Procedure Summary     Date:  08/23/19 Room / Location:  [facility] OR  / [facility] MAIN OR    Anesthesia Start:  0650 Anesthesia Stop:  1128    Procedure:  SUMMER STERNOTOMY CORONARY ARTERY BYPASS GRAFT TIMES 6 USING LEFT INTERNAL MAMMARY ARTERY AND LEFT GREATER SAPHENOUS VEIN GRAFT PER ENDOSCOPIC VEIN HARVESTING AND PRP (N/A Chest) Diagnosis:       Coronary artery disease involving native coronary artery of native heart with angina pectoris (CMS/HCC)      (Coronary artery disease involving native coronary artery of native heart with angina pectoris (CMS/HCC) [I25.119])    Surgeon:  Kem Hawk MD Provider:  George Blair MD    Anesthesia Type:  general ASA Status:  4          Anesthesia Type: general  Last vitals  BP   156/72 (08/23/19 0528)   Temp   36.7 °C (98.1 °F) (08/23/19 0528)   Pulse   87 (08/23/19 1120)   Resp   17 (08/23/19 0528)     SpO2   100 % (08/23/19 1120)     Post Anesthesia Care and Evaluation    Patient location during evaluation: ICU  Patient participation: waiting for patient participation  Pain management: adequate  Airway patency: patent  Anesthetic complications: No anesthetic complications  PONV Status: none  Cardiovascular status: acceptable  Respiratory status: acceptable, intubated, ventilator and ETT      
no

## 2024-07-15 NOTE — TELEPHONE ENCOUNTER
Caller: Reanna Higgins    Relationship: Self    Best call back number: [phone]    Who are you requesting to speak with (clinical staff, provider,  specific staff member): ANYONE    What was the call regarding: PT IS CALLING TO SCHEDULE A DEVICE CHECK BECAUSE ITS BEEN A YEAR. PLEASE GIVE PT A CALL BACK, SHE SAID ITS OK TO LEAVE A VM

## 2024-07-19 ENCOUNTER — TELEPHONE (OUTPATIENT)
Dept: INTERNAL MEDICINE | Age: 84
End: 2024-07-19
Payer: MEDICARE

## 2024-07-20 DIAGNOSIS — Z12.31 ENCOUNTER FOR SCREENING MAMMOGRAM FOR MALIGNANT NEOPLASM OF BREAST: Primary | ICD-10-CM

## 2024-07-23 ENCOUNTER — CLINICAL SUPPORT NO REQUIREMENTS (OUTPATIENT)
Age: 84
End: 2024-07-23
Payer: MEDICARE

## 2024-07-23 ENCOUNTER — TELEPHONE (OUTPATIENT)
Dept: INTERNAL MEDICINE | Age: 84
End: 2024-07-23

## 2024-07-23 DIAGNOSIS — Z95.0 PRESENCE OF CARDIAC PACEMAKER: Primary | ICD-10-CM

## 2024-07-23 NOTE — TELEPHONE ENCOUNTER
Caller: IRMA    Relationship: Other    Best call back number: [phone]     What orders are you requesting (i.e. lab or imaging): MAMMOGRAM SCREENING     In what timeframe would the patient need to come in: PATIENT'S APPOINTMENT IS 7/25    Where will you receive your lab/imaging services: [facility]  FAX NUMBER: [phone]

## 2024-07-29 ENCOUNTER — OFFICE VISIT (OUTPATIENT)
Dept: INTERNAL MEDICINE | Age: 84
End: 2024-07-29
Payer: MEDICARE

## 2024-07-29 ENCOUNTER — HOSPITAL ENCOUNTER (OUTPATIENT)
Facility: HOSPITAL | Age: 84
Discharge: HOME | End: 2024-07-29
Payer: MEDICARE

## 2024-07-29 VITALS
HEART RATE: 80 BPM | BODY MASS INDEX: 27.64 KG/M2 | OXYGEN SATURATION: 96 % | RESPIRATION RATE: 16 BRPM | SYSTOLIC BLOOD PRESSURE: 120 MMHG | DIASTOLIC BLOOD PRESSURE: 70 MMHG | TEMPERATURE: 97.2 F | HEIGHT: 63 IN | WEIGHT: 156 LBS

## 2024-07-29 DIAGNOSIS — M79.631 RIGHT FOREARM PAIN: ICD-10-CM

## 2024-07-29 DIAGNOSIS — E11.65 TYPE 2 DIABETES MELLITUS WITH HYPERGLYCEMIA, WITHOUT LONG-TERM CURRENT USE OF INSULIN: ICD-10-CM

## 2024-07-29 DIAGNOSIS — I10 ESSENTIAL HYPERTENSION: ICD-10-CM

## 2024-07-29 DIAGNOSIS — M79.631 RIGHT FOREARM PAIN: Primary | ICD-10-CM

## 2024-07-29 DIAGNOSIS — E03.8 SUBCLINICAL HYPOTHYROIDISM: ICD-10-CM

## 2024-07-29 DIAGNOSIS — E78.00 PURE HYPERCHOLESTEROLEMIA: ICD-10-CM

## 2024-07-29 DIAGNOSIS — Z79.899 HIGH RISK MEDICATION USE: ICD-10-CM

## 2024-07-29 DIAGNOSIS — Z00.00 ENCOUNTER FOR ANNUAL WELLNESS VISIT (AWV) IN MEDICARE PATIENT: Primary | ICD-10-CM

## 2024-07-29 PROCEDURE — 1125F AMNT PAIN NOTED PAIN PRSNT: CPT

## 2024-07-29 PROCEDURE — 73090 X-RAY EXAM OF FOREARM: CPT

## 2024-07-29 PROCEDURE — 1170F FXNL STATUS ASSESSED: CPT

## 2024-07-29 PROCEDURE — 96160 PT-FOCUSED HLTH RISK ASSMT: CPT

## 2024-07-29 PROCEDURE — 1159F MED LIST DOCD IN RCRD: CPT

## 2024-07-29 PROCEDURE — 99214 OFFICE O/P EST MOD 30 MIN: CPT

## 2024-07-29 PROCEDURE — G0439 PPPS, SUBSEQ VISIT: HCPCS

## 2024-07-29 PROCEDURE — 1160F RVW MEDS BY RX/DR IN RCRD: CPT

## 2024-07-29 NOTE — PROGRESS NOTES
I N T E R N A L  M E D I C I N E    Sunshine ErnandezLUIS A quintana      ENCOUNTER DATE:  07/29/2024    Reanna Higgins / 84 y.o. / female      MEDICARE ANNUAL WELLNESS VISIT       Chief Complaint:    Chief Complaint   Patient presents with    Medicare Wellness-subsequent         Patient's general assessment of her health since a year ago:     - Compared to one year ago, she feels her physical health is:   STABLE/SAME    - Compared to one year ago, she feels her mental health is:  STABLE/SAME    Recent Hospitalization (within past 365 days) (NO unless indicated)  No      Patient Care Team:    Patient Care Team:  Ted Li MD as PCP - General (Internal Medicine)  Arsenio Witt MD as Consulting Physician (Ophthalmology)  Francis Mccullough MD as Consulting Physician (Cardiology)  Haven Veras MD as Consulting Physician (Dermatology)  Lindsey Farrell APRN as Nurse Practitioner (Nurse Practitioner)    Allergies:  Ancef [cefazolin], Codeine, Penicillins, and Sulfa antibiotics    Medications:  Current Outpatient Medications on File Prior to Visit   Medication Sig Dispense Refill    aspirin 81 MG EC tablet Take 3 days a week      atorvastatin (LIPITOR) 40 MG tablet TAKE 1 TABLET BY MOUTH EVERY DAY AT NIGHT 90 tablet 1    Calcium Carbonate-Vitamin D (CALCIUM-D) 600-400 MG-UNIT tablet Take 1 tablet by mouth Daily.      cholecalciferol (VITAMIN D3) 1000 UNITS tablet Take 1 tablet by mouth Daily.      Coenzyme Q10 (COQ10) 100 MG capsule Take 1 capsule by mouth Daily.      cycloSPORINE (RESTASIS) 0.05 % ophthalmic emulsion Administer 1 drop to both eyes 2 (Two) Times a Day.      hydrocortisone 2.5 % cream       ipratropium (ATROVENT) 0.06 % nasal spray 2 spray each nostril every 6 hours as needed 45 mL 1    losartan (COZAAR) 25 MG tablet TAKE 1 TABLET BY MOUTH TWICE A  tablet 3    metoprolol succinate XL (TOPROL-XL) 50 MG 24 hr tablet TAKE 1 TABLET BY MOUTH TWICE A  tablet 3    multivitamin with minerals  tablet tablet Take 1 tablet by mouth Daily.      omeprazole (priLOSEC) 40 MG capsule TAKE 1 CAPSULE BY MOUTH EVERY DAY 90 capsule 1    pregabalin (LYRICA) 75 MG capsule TAKE 1 CAPSULE BY MOUTH TWICE A  capsule 0    [DISCONTINUED] ferrous sulfate 325 (65 FE) MG tablet Take 1 tablet by mouth Every Other Day. Indications: Iron Deficiency       No current facility-administered medications on file prior to visit.          No opioid medication identified on active medication list. I have reviewed chart for other potential  high risk medication/s and harmful drug interactions in the elderly.       Opioid Pain Assessment: No opioid listed on medication list       HPI for other active medical problems:     HTN: Remains well controlled, 120/70, on losartan 25 mg BID, metoprolol 50 mg BID.  BP elevated today, but she has not had any medicine today.  Followed by cardiology.  Next appointment is March 4, 2025 with LUIS A Olivares.  On aspirin 81 mg three times weekly without signs/ symptoms of bleeding.  Taking three times weekly given concerns for easy bruising with daily use.    Type 2 Diabetes: January 2024 A1C 6.5. Continues to follow low carb diet, engage in regular physical activity.   On pregabalin 75 mg BID for bilateral lower extremity neuropathy with benefit.  Previously tried to decrease dose to once daily but reports significant flair of pain symptoms.       HLD: Remains stable on Atorvastatin 20 mg nightly.  January 2024 lipid panel with mildly elevated ; normal triglycerides 99.      GERD: Symptoms well controlled on omeprazole 40 mg daily.    Mammogram completed last week; awaiting results.      February 12, 2024 DEXA showed osteopenia at the hips, improvement from prior DEXA.  Continues daily Vitamin D supplementation, obtains calcium 1200 mg daily through diet plus supplement.      No longer undergoing colonoscopy evaluation due to age.    Right forearm pain x at least 6 weeks.  Plays  chair volleyball and contributes this to possible overuse of right arm.  Requesting XR imaging today.  No trauma or fall.  No neck or right shoulder pain, numbness, tingling, weakness in upper extremities.          HISTORY     PFSH:     The following portions of the patient's history were reviewed and updated as appropriate: Allergies / Current Medications / Past Medical History / Surgical History / Social History / Family History    Problem List:  Patient Active Problem List   Diagnosis    Colon polyp    Gastroesophageal reflux disease    Essential hypertension    Pure hypercholesterolemia    Mitral and aortic valve disease    Heart valve disease    REKHA (generalized anxiety disorder)    Neuropathy due to medical condition    Malignant neoplasm of cheek    Non-rheumatic mitral regurgitation    Osteopenia    SVT (supraventricular tachycardia)    AV block, Mobitz 1    Cardiac pacemaker in situ    RLS (restless legs syndrome)    Type 2 diabetes mellitus with hyperglycemia, without long-term current use of insulin    Mesenteric ischemia due to arterial insufficiency    Sick sinus syndrome    Coronary artery disease involving native coronary artery of native heart with angina pectoris    S/P CABG (coronary artery bypass graft)    Subclinical hypothyroidism    Celiac artery stenosis    History of acute pancreatitis    Chronic pancreatitis    Chronic recurrent pancreatitis    History of adenomatous polyp of colon    Primary osteoarthritis of right knee       Past Medical History:  Past Medical History:   Diagnosis Date    Anesthesia complication     SEVERE SHAKING OF ENTIRE BODY AFTER EGD/ERCP. 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    Restless leg syndrome     Second degree AV block, Mobitz type I     SVT (supraventricular tachycardia)        Past Surgical History:  Past Surgical History:   Procedure Laterality Date    APPENDECTOMY      BILATERAL OOPHORECTOMY Bilateral 1988    BREAST CYST EXCISION Bilateral     4 BREAST SURGERIES    CARDIAC CATHETERIZATION N/A 8/22/2019    Procedure: Left Heart Cath;  Surgeon: Francis Mccullough MD;  Location: [facility] CATH INVASIVE LOCATION;  Service: Cardiology    CARDIAC CATHETERIZATION N/A 8/22/2019    Procedure: Left ventriculography;  Surgeon: Francis Mccullough MD;  Location: [facility] CATH INVASIVE LOCATION;  Service: Cardiology    CARDIAC CATHETERIZATION N/A 8/22/2019    Procedure: Coronary angiography;  Surgeon: Francis Mccullough MD;  Location: [facility] CATH INVASIVE LOCATION;  Service: Cardiology    CARDIAC ELECTROPHYSIOLOGY PROCEDURE N/A 10/10/2016    Procedure: Pacemaker DC new  [place];  Surgeon: Wilfrido Hameed MD;  Location: [facility] CATH INVASIVE LOCATION;  Service:     CARPAL TUNNEL RELEASE Right     CATARACT EXTRACTION      NOVEMBER AND DECEMBER 2014    CHOLECYSTECTOMY      COLONOSCOPY  2015    COLONOSCOPY N/A 1/21/2022    Procedure: COLONOSCOPY into cecum and terminal ileum with hot snare polypectomy x2;  Surgeon: Giovany Redman MD;  Location: [facility] ENDOSCOPY;  Service: Gastroenterology;  Laterality: N/A;  Pre op: History of polyps  Post op: Polyps, Diverticulosis and Hemorrhoids    CORONARY ARTERY BYPASS GRAFT N/A 8/23/2019    Procedure: SUMMER STERNOTOMY CORONARY ARTERY BYPASS GRAFT TIMES 6 USING LEFT INTERNAL MAMMARY ARTERY AND LEFT GREATER SAPHENOUS VEIN GRAFT PER ENDOSCOPIC VEIN HARVESTING AND PRP;  Surgeon: Kem Hawk MD;  Location: [facility] OR;  Service: Cardiothoracic    ENDOSCOPY N/A 7/18/2019    Procedure: ESOPHAGOGASTRODUODENOSCOPY with biopsy;  Surgeon: Ricky Chew MD;  Location:   [facility];  Service: Gastroenterology    ERCP N/A 2021    Procedure: ENDOSCOPIC RETROGRADE CHOLANGIOPANCREATOGRAPHY WITH SPHINCTEROTOMY AND BALLOON SWEEP;  Surgeon: Giovany Redman MD;  Location:  [facility];  Service: Gastroenterology;  Laterality: N/A;  PRE- RECURRENT PANCREATITIS  POST- SAME    EYE SURGERY      HYSTERECTOMY      KNEE ARTHROSCOPY Bilateral 2014    MENISCAL TEAR    PACEMAKER IMPLANTATION  10/10/2016    SHOULDER SURGERY Right     ROTATOR CUFF SURGERY 2015    TONSILLECTOMY      TOTAL KNEE ARTHROPLASTY Right 2023    Procedure: RIGHT TOTAL KNEE ARTHROPLASTY;  Surgeon: To Sands MD;  Location:  [facility];  Service: Orthopedics;  Laterality: Right;    TRIGGER FINGER RELEASE      both hands       Social History:  Social History     Socioeconomic History    Marital status:      Spouse name: Rishabh*    Number of children: 2    Years of education: 13    Highest education level: Some college, no degree   Tobacco Use    Smoking status: Former     Current packs/day: 0.00     Average packs/day: 0.5 packs/day for 14.0 years (7.0 ttl pk-yrs)     Types: Cigarettes     Start date:      Quit date:      Years since quittin.6    Smokeless tobacco: Never    Tobacco comments:     quit age 28   Vaping Use    Vaping status: Never Used   Substance and Sexual Activity    Alcohol use: Yes     Comment: rare glass of wine or beer rarely/  No caffeine use    Drug use: Never    Sexual activity: Not Currently       Family History:  Family History   Problem Relation Age of Onset    Thyroid disease Daughter     Lung cancer Brother     Hypertension Mother     Aortic stenosis Mother     Coronary artery disease Mother          78 (smoker)    Hypertension Father     Depression Father     Anxiety disorder Father     Diabetes Father     Heart failure Father     Cirrhosis Father         alcohol    Alcohol abuse Sister     Lupus Sister     Heart disease Sister     No  "Known Problems Sister     Alcohol abuse Brother     Drug abuse Brother     Heart disease Brother     Cancer Brother         bladder (smoker)    Heart disease Brother     Breast cancer Paternal Aunt     Neuropathy Neg Hx     Colon cancer Neg Hx     Dementia Neg Hx     Malig Hyperthermia Neg Hx          PATIENT ASSESSMENT     Vitals:  Vitals:    07/29/24 0727   BP: 120/70   Pulse: 80   Resp: 16   Temp: 97.2 °F (36.2 °C)   SpO2: 96%   Weight: 70.8 kg (156 lb)   Height: 159 cm (62.6\")       BP Readings from Last 3 Encounters:   07/29/24 120/70   06/21/24 150/64   04/19/24 140/68     Wt Readings from Last 3 Encounters:   07/29/24 70.8 kg (156 lb)   06/21/24 72.1 kg (159 lb)   04/19/24 72.6 kg (160 lb)      Body mass index is 27.99 kg/m².    [unfilled]        Review of Systems:    Review of Systems   Constitutional:  Negative for chills, fever and unexpected weight change.   Respiratory:  Negative for cough, chest tightness and shortness of breath.    Cardiovascular:  Negative for chest pain, palpitations and leg swelling.   Musculoskeletal:         +Right forearm pain   Neurological:  Negative for dizziness, weakness, light-headedness and headaches.   Psychiatric/Behavioral:  The patient is not nervous/anxious.          Physical Exam:    Physical Exam  Vitals reviewed.   Constitutional:       General: She is not in acute distress.     Appearance: Normal appearance. She is not ill-appearing, toxic-appearing or diaphoretic.   HENT:      Head: Normocephalic and atraumatic.      Right Ear: Tympanic membrane, ear canal and external ear normal. There is no impacted cerumen.      Left Ear: Tympanic membrane, ear canal and external ear normal. There is no impacted cerumen.      Nose: Nose normal. No congestion or rhinorrhea.      Mouth/Throat:      Mouth: Mucous membranes are moist.      Pharynx: Oropharynx is clear. No oropharyngeal exudate or posterior oropharyngeal erythema.   Eyes:      Extraocular Movements: Extraocular " movements intact.      Conjunctiva/sclera: Conjunctivae normal.      Pupils: Pupils are equal, round, and reactive to light.   Cardiovascular:      Rate and Rhythm: Normal rate and regular rhythm.      Pulses: Normal pulses.      Heart sounds: Normal heart sounds.   Pulmonary:      Effort: Pulmonary effort is normal. No respiratory distress.      Breath sounds: Normal breath sounds.   Abdominal:      General: Bowel sounds are normal.      Palpations: Abdomen is soft.      Tenderness: There is no abdominal tenderness.   Musculoskeletal:         General: Normal range of motion.      Right forearm: Normal. No tenderness.      Cervical back: Normal range of motion and neck supple.      Right lower leg: No edema.      Left lower leg: No edema.   Lymphadenopathy:      Cervical: No cervical adenopathy.   Skin:     General: Skin is warm and dry.   Neurological:      General: No focal deficit present.      Mental Status: She is alert and oriented to person, place, and time. Mental status is at baseline.   Psychiatric:         Mood and Affect: Mood normal.         Behavior: Behavior normal.         Thought Content: Thought content normal.         Judgment: Judgment normal.           Reviewed Data:    Labs:   Lab Results   Component Value Date     (H) 01/29/2024    K 4.7 01/29/2024    CALCIUM 9.4 01/29/2024    AST 18 01/29/2024    ALT 19 01/29/2024    BUN 23 01/29/2024    CREATININE 0.95 01/29/2024    CREATININE 0.83 04/19/2023    CREATININE 0.94 01/18/2023    EGFRIFNONA 60 01/10/2022    EGFRIFAFRI 69 01/10/2022       Lab Results   Component Value Date    HGBA1C 6.5 (H) 01/29/2024    HGBA1C 6.20 (H) 07/24/2023    HGBA1C 6.40 (H) 04/19/2023    MICROALBUR 48.8 07/24/2023       Lab Results   Component Value Date     (H) 01/29/2024    LDL 73 04/19/2023    LDL 90 10/31/2022    HDL 45 01/29/2024    TRIG 99 01/29/2024    CHOLHDLRATIO 3.8 01/29/2024       Lab Results   Component Value Date    TSH 4.210 01/29/2024     "FREET4 1.17 01/29/2024          Lab Results   Component Value Date    WBC 5.90 04/19/2023    HGB 12.1 04/19/2023    HGB 11.1 (L) 01/18/2023    HGB 13.1 01/12/2023     04/19/2023                 No results found for: \"PSA\"    Imaging:                Medical Tests:              Summary of old records / correspondence / consultant report:             Request outside records:           SCREENING ASSESSMENT      Screening for Glaucoma:  Previous screening for glaucoma?: Yes    Hearing Loss Screen:  Finger Rub Hearing Test (right ear): Passed  Finger Rub Hearing Test (left ear): Passed    Urinary Incontinence Screen:  Episodes of urinary incontinence? : No    Depression Screen:      7/29/2024     7:36 AM   PHQ-2/PHQ-9 Depression Screening   Little Interest or Pleasure in Doing Things 0-->not at all   Feeling Down, Depressed or Hopeless 0-->not at all   PHQ-9: Brief Depression Severity Measure Score 0        PHQ-2: 0 (Not depressed)    PHQ-9: 0 (Negative screening for depression)         FUNCTIONAL, FALL RISK, & COGNITIVE SCREENING (components below):     A) Functional and cognitive status based on patient responses:        7/29/2024     7:34 AM   Functional & Cognitive Status   Do you have difficulty preparing food and eating? No   Do you have difficulty bathing yourself, getting dressed or grooming yourself? No   Do you have difficulty using the toilet? No   Do you have difficulty moving around from place to place? No   Do you have trouble with steps or getting out of a bed or a chair? No   Current Diet Well Balanced Diet   Dental Exam Up to date   Eye Exam Up to date   Exercise (times per week) 10 times per week   Current Exercises Include Other;Walking   Do you need help using the phone?  No   Are you deaf or do you have serious difficulty hearing?  No   Do you need help to go to places out of walking distance? No   Do you need help shopping? No   Do you need help preparing meals?  No   Do you need help with " housework?  No   Do you need help with laundry? No   Do you need help taking your medications? No   Do you need help managing money? No   Do you ever drive or ride in a car without wearing a seat belt? No   Have you felt unusual stress, anger or loneliness in the last month? No   Who do you live with? Spouse   If you need help, do you have trouble finding someone available to you? No   Have you been bothered in the last four weeks by sexual problems? No   Do you have difficulty concentrating, remembering or making decisions? No       B) Assessment of Fall Risk:    Fall Risk Assessment was completed, and patient is at low risk for falls.    Need for further evaluation of gait, strength, and balance? : NO    Timed Up and Go (TUG): 10 seconds   (>= 12 seconds indicates high risk for falling)    Observable abnormalities included:   Normal balance and gait pattern    C. Assessment of Cognitive Function:    Mini-Cog Test:     1) Registration (3 objects): YES   2) Clock Draw: Passed? : Yes   3) Number of objects recalled: 3 (MA)     Further evaluation required? : No    **OVERALL ASSESSMENT OF FUNCTIONAL ABILITY**  (Assessment of ability to perform ADL's (showering/bathing, using toilet, dressing, feeding self, moving self around) and IADL's (use telephone, shop, prepare food, housekeep, do laundry, transport independently, take medications independently, and handle finances)    DEGREE OF FUNCTIONAL IMPAIRMENT:   NONE (based on assessment noted above)    ABILITY TO LIVE INDEPENDENTLY:    Capable of living independently       COUNSELING       A. Identification of Health Risk Factors:    Risk factors include: cardiovascular risk factors      B. Age-Appropriate Screening Schedule:  (Refer to the list below for future screening recommendations based on patient's age, sex and/or medical conditions. Orders for these recommended tests are listed in the plan section. The patient has been provided with a written plan)    Health  Maintenance Topics  Health Maintenance   Topic Date Due    BMI FOLLOWUP  Never done    COVID-19 Vaccine (9 - 2023-24 season) 03/27/2024    INFLUENZA VACCINE  08/01/2024    LIPID PANEL  01/29/2025    MAMMOGRAM  07/25/2025    ANNUAL WELLNESS VISIT  07/29/2025    DXA SCAN  02/12/2026    COLORECTAL CANCER SCREENING  01/21/2027    TDAP/TD VACCINES (4 - Td or Tdap) 07/19/2032    RSV Vaccine - Adults  Completed    Pneumococcal Vaccine 65+  Completed    ZOSTER VACCINE  Completed    HEMOGLOBIN A1C  Discontinued    DIABETIC EYE EXAM  Discontinued    URINE MICROALBUMIN  Discontinued       Health Maintenance Topics Due or Over-Due  Health Maintenance Due   Topic Date Due    BMI FOLLOWUP  Never done    COVID-19 Vaccine (9 - 2023-24 season) 03/27/2024         C. Advanced Care Planning:    Advance Care Planning   ACP discussion was held with the patient during this visit. Patient has an advance directive in EMR which is still valid.        D. Patient Self-Management and Personalized Health Advice:    She has been provided with PERSONALIZED COUNSELING/INFORMATION (AVS educational information) about:     -- optimizing diet/nutrition plans, improving exercise / conditioning, and reducing risk for cardiac/vascular events with pre-existing disease      She has been recommended for the following PREVENTATIVE SERVICES which has been performed today, will be ordered today or ordered/performed on upcoming follow-up visit:     LIFESTYLE PREVENTATIVE MEASURES   EYE exam for glaucoma screening recommended    CARDIOVASCULAR SCREENING   Has established history of CV disease    CANCER SCREENING   COLORECTAL cancer: screening for colorectal cancer recommended but DEFERRED by patient (risks of not screening explained to patient), BREAST CANCER screening discussed and mammogram every 1-2 years recommended (managed by another provider)    MISC SCREENING  OSTEOPOROSIS screening DISCUSSED    VACCINATION/IMMUNIZATION   vaccination for INFLUENZA  administered/recommended/discussed , COVID-19 vaccination discussed/recommended      E. Miscellaneous Items: [id number]    Aspirin use counseling: Taking ASA but at inappropriate dose (instructed to take 81 mg ECASA daily)    Discussed BMI with her. The BMI is in the acceptable range    Reviewed use of high risk medication in the elderly: YES    Reviewed for potential of harmful drug interactions in the elderly: YES        WRAP UP       Assessment & Plan:    1) MEDICARE ANNUAL WELLNESS VISIT    2) OTHER MEDICAL CONDITIONS ADDRESSED TODAY:            Problem List Items Addressed This Visit       Essential hypertension (Chronic)    Overview     Losartan 25 mg QD  Metoprolol succinate 50 mg BID          Relevant Medications    metoprolol succinate XL (TOPROL-XL) 50 MG 24 hr tablet    losartan (COZAAR) 25 MG tablet    Other Relevant Orders    CBC & Differential    Comprehensive Metabolic Panel    TSH+Free T4    Urinalysis With Microscopic If Indicated (No Culture) - Urine, Clean Catch    Pure hypercholesterolemia (Chronic)    Overview     Continue atorvastatin 40 mg.          Relevant Medications    atorvastatin (LIPITOR) 40 MG tablet    Other Relevant Orders    Comprehensive Metabolic Panel    Lipid Panel With / Chol / HDL Ratio    Type 2 diabetes mellitus with hyperglycemia, without long-term current use of insulin (Chronic)    Overview     Diet controlled         Relevant Orders    Comprehensive Metabolic Panel    Hemoglobin A1c    Subclinical hypothyroidism    Relevant Medications    metoprolol succinate XL (TOPROL-XL) 50 MG 24 hr tablet     Other Visit Diagnoses       Encounter for annual wellness visit (AWV) in Medicare patient    -  Primary    Right forearm pain        Relevant Orders    XR forearm 2 vw right    High risk medication use        Relevant Orders    Compliance Drug Analysis, Ur - Urine, Clean Catch                      Orders Placed This Encounter   Procedures    XR forearm 2 vw right     Comprehensive Metabolic Panel    Hemoglobin A1c    Lipid Panel With / Chol / HDL Ratio    TSH+Free T4    Urinalysis With Microscopic If Indicated (No Culture) - Urine, Clean Catch    Compliance Drug Analysis, Ur - Urine, Clean Catch    CBC & Differential       Discussion / Summary:    She is requesting XR imaging of right arm today given her persistent forearm soreness.  Suspect possible component of cervical/ shoulder arthritis.  Will review XR imaging, and she will consider PT referral upon completion of XR.  Encouraged rest, ice PRN, elevation, gentle stretching.    Encouraged continued low carb, low fat diet.  Regular exercise as tolerated.    Obtain COVID-19 and influenza vaccines this Fall 2024.          BMI is >= 25 and <30. (Overweight) The following options were offered after discussion;: exercise counseling/recommendations and nutrition counseling/recommendations        Medications as of TODAY:              Current Outpatient Medications   Medication Sig Dispense Refill    aspirin 81 MG EC tablet Take 3 days a week      atorvastatin (LIPITOR) 40 MG tablet TAKE 1 TABLET BY MOUTH EVERY DAY AT NIGHT 90 tablet 1    Calcium Carbonate-Vitamin D (CALCIUM-D) 600-400 MG-UNIT tablet Take 1 tablet by mouth Daily.      cholecalciferol (VITAMIN D3) 1000 UNITS tablet Take 1 tablet by mouth Daily.      Coenzyme Q10 (COQ10) 100 MG capsule Take 1 capsule by mouth Daily.      cycloSPORINE (RESTASIS) 0.05 % ophthalmic emulsion Administer 1 drop to both eyes 2 (Two) Times a Day.      hydrocortisone 2.5 % cream       ipratropium (ATROVENT) 0.06 % nasal spray 2 spray each nostril every 6 hours as needed 45 mL 1    losartan (COZAAR) 25 MG tablet TAKE 1 TABLET BY MOUTH TWICE A  tablet 3    metoprolol succinate XL (TOPROL-XL) 50 MG 24 hr tablet TAKE 1 TABLET BY MOUTH TWICE A  tablet 3    multivitamin with minerals tablet tablet Take 1 tablet by mouth Daily.      omeprazole (priLOSEC) 40 MG capsule TAKE 1 CAPSULE BY  MOUTH EVERY DAY 90 capsule 1    pregabalin (LYRICA) 75 MG capsule TAKE 1 CAPSULE BY MOUTH TWICE A  capsule 0     No current facility-administered medications for this visit.         FOLLOW-UP:            Return for 6 month chronic care, 1 year AWV.              Future Appointments   Date Time Provider Department Center   1/27/2025  8:15 AM Ted Li MD [facility]   3/4/2025  2:30 PM [facility] [place] DEVICE CHECK [id number] None   3/4/2025  3:00 PM Lindsey Farrell APRN [id number]   8/4/2025  8:00 AM Ted Li MD [facility]           After Visit Summary (AVS) including the Personalized Prevention  Plan Services (PPPS) was either printed and given to the patient at check-out today and/or sent to [facility] for review.

## 2024-07-30 ENCOUNTER — TELEPHONE (OUTPATIENT)
Dept: INTERNAL MEDICINE | Age: 84
End: 2024-07-30
Payer: MEDICARE

## 2024-07-30 LAB
ALBUMIN SERPL-MCNC: 4.4 G/DL (ref 3.7–4.7)
ALP SERPL-CCNC: 92 IU/L (ref 44–121)
ALT SERPL-CCNC: 16 IU/L (ref 0–32)
APPEARANCE UR: CLEAR
AST SERPL-CCNC: 17 IU/L (ref 0–40)
BACTERIA #/AREA URNS HPF: NORMAL /[HPF]
BASOPHILS # BLD AUTO: 0 X10E3/UL (ref 0–0.2)
BASOPHILS NFR BLD AUTO: 0 %
BILIRUB SERPL-MCNC: 0.6 MG/DL (ref 0–1.2)
BILIRUB UR QL STRIP: NEGATIVE
BUN SERPL-MCNC: 20 MG/DL (ref 8–27)
BUN/CREAT SERPL: 22 (ref 12–28)
CALCIUM SERPL-MCNC: 9.6 MG/DL (ref 8.7–10.3)
CASTS URNS QL MICRO: NORMAL /LPF
CHLORIDE SERPL-SCNC: 104 MMOL/L (ref 96–106)
CHOLEST SERPL-MCNC: 138 MG/DL (ref 100–199)
CHOLEST/HDLC SERPL: 3.5 RATIO (ref 0–4.4)
CO2 SERPL-SCNC: 23 MMOL/L (ref 20–29)
COLOR UR: YELLOW
CREAT SERPL-MCNC: 0.89 MG/DL (ref 0.57–1)
EGFRCR SERPLBLD CKD-EPI 2021: 63 ML/MIN/1.73
EOSINOPHIL # BLD AUTO: 0.1 X10E3/UL (ref 0–0.4)
EOSINOPHIL NFR BLD AUTO: 2 %
EPI CELLS #/AREA URNS HPF: NORMAL /HPF (ref 0–10)
ERYTHROCYTE [DISTWIDTH] IN BLOOD BY AUTOMATED COUNT: 12.8 % (ref 11.7–15.4)
GLOBULIN SER CALC-MCNC: 2.5 G/DL (ref 1.5–4.5)
GLUCOSE SERPL-MCNC: 117 MG/DL (ref 70–99)
GLUCOSE UR QL STRIP: NEGATIVE
HBA1C MFR BLD: 6.5 % (ref 4.8–5.6)
HCT VFR BLD AUTO: 40.7 % (ref 34–46.6)
HDLC SERPL-MCNC: 39 MG/DL
HGB BLD-MCNC: 13.6 G/DL (ref 11.1–15.9)
HGB UR QL STRIP: NEGATIVE
IMM GRANULOCYTES # BLD AUTO: 0 X10E3/UL (ref 0–0.1)
IMM GRANULOCYTES NFR BLD AUTO: 0 %
KETONES UR QL STRIP: NEGATIVE
LDLC SERPL CALC-MCNC: 77 MG/DL (ref 0–99)
LEUKOCYTE ESTERASE UR QL STRIP: ABNORMAL
LYMPHOCYTES # BLD AUTO: 1.5 X10E3/UL (ref 0.7–3.1)
LYMPHOCYTES NFR BLD AUTO: 20 %
MCH RBC QN AUTO: 29.3 PG (ref 26.6–33)
MCHC RBC AUTO-ENTMCNC: 33.4 G/DL (ref 31.5–35.7)
MCV RBC AUTO: 88 FL (ref 79–97)
MICRO URNS: ABNORMAL
MONOCYTES # BLD AUTO: 0.5 X10E3/UL (ref 0.1–0.9)
MONOCYTES NFR BLD AUTO: 7 %
NEUTROPHILS # BLD AUTO: 5.3 X10E3/UL (ref 1.4–7)
NEUTROPHILS NFR BLD AUTO: 71 %
NITRITE UR QL STRIP: NEGATIVE
PH UR STRIP: 6.5 [PH] (ref 5–7.5)
PLATELET # BLD AUTO: 173 X10E3/UL (ref 150–450)
POTASSIUM SERPL-SCNC: 4.5 MMOL/L (ref 3.5–5.2)
PROT SERPL-MCNC: 6.9 G/DL (ref 6–8.5)
PROT UR QL STRIP: NEGATIVE
RBC # BLD AUTO: 4.64 X10E6/UL (ref 3.77–5.28)
RBC #/AREA URNS HPF: NORMAL /HPF (ref 0–2)
SODIUM SERPL-SCNC: 140 MMOL/L (ref 134–144)
SP GR UR STRIP: 1.02 (ref 1–1.03)
T4 FREE SERPL-MCNC: 1.08 NG/DL (ref 0.82–1.77)
TRIGL SERPL-MCNC: 122 MG/DL (ref 0–149)
TSH SERPL DL<=0.005 MIU/L-ACNC: 4.81 UIU/ML (ref 0.45–4.5)
UROBILINOGEN UR STRIP-MCNC: 0.2 MG/DL (ref 0.2–1)
VLDLC SERPL CALC-MCNC: 22 MG/DL (ref 5–40)
WBC # BLD AUTO: 7.4 X10E3/UL (ref 3.4–10.8)
WBC #/AREA URNS HPF: NORMAL /HPF (ref 0–5)

## 2024-07-30 NOTE — TELEPHONE ENCOUNTER
Attempted 3x to call patient's number I the last 5 minutes. Busy tone each time. Need to know what referral she is talking about. Unable to LVM.

## 2024-07-30 NOTE — TELEPHONE ENCOUNTER
Caller: Reanna Higgins    Relationship: Self    Best call back number: [phone]     What is the best time to reach you: ANYTIME    Who are you requesting to speak with (clinical staff, provider,  specific staff member): CLINICAL    What was the call regarding: PATIENT IS WANTING TO KNOW IF SHE CAN GO TO [facility] ON [address], [place] [address] INSTEAD OF  IF INSURANCE WILL ALLOW. PLEASE CALL AND ADVISE PATIENT.

## 2024-08-05 LAB — DRUGS UR: NORMAL

## 2024-08-10 DIAGNOSIS — G63 NEUROPATHY DUE TO MEDICAL CONDITION: Chronic | ICD-10-CM

## 2024-08-12 RX ORDER — PREGABALIN 75 MG/1
75 CAPSULE ORAL 2 TIMES DAILY
Qty: 180 CAPSULE | Refills: 0 | Status: SHIPPED | OUTPATIENT
Start: 2024-08-12

## 2024-08-19 RX ORDER — IPRATROPIUM BROMIDE 42 UG/1
SPRAY, METERED NASAL
Qty: 45 EACH | Refills: 1 | Status: SHIPPED | OUTPATIENT
Start: 2024-08-19

## 2024-09-07 DIAGNOSIS — E78.00 PURE HYPERCHOLESTEROLEMIA: ICD-10-CM

## 2024-09-09 RX ORDER — ATORVASTATIN CALCIUM 40 MG/1
TABLET, FILM COATED ORAL
Qty: 90 TABLET | Refills: 1 | Status: SHIPPED | OUTPATIENT
Start: 2024-09-09

## 2024-09-18 RX ORDER — METOPROLOL SUCCINATE 50 MG/1
TABLET, EXTENDED RELEASE ORAL
Qty: 180 TABLET | Refills: 3 | Status: SHIPPED | OUTPATIENT
Start: 2024-09-18

## 2024-09-20 ENCOUNTER — TELEPHONE (OUTPATIENT)
Age: 84
End: 2024-09-20

## 2024-09-20 DIAGNOSIS — I49.5 SICK SINUS SYNDROME: ICD-10-CM

## 2024-09-20 DIAGNOSIS — Z95.0 PRESENCE OF CARDIAC PACEMAKER: Primary | ICD-10-CM

## 2024-09-20 NOTE — TELEPHONE ENCOUNTER
Pt with [facility] DDD pacer originally implanted by Dr Hameed for SSS. Device RRT as of 9/20/24. Pt is not dependent. AP 37%,  60%. LRL 60, AVDs 220-260ms. Lead trends are stable. RV threshold 1.5V@0.4ms. Pt is not on AC, ASA only.     I spoke with pt. She reports that her  is having surgery 9/30 and will need to schedule her procedure at minimum of a few weeks after.   NS

## 2024-09-24 PROBLEM — Z95.0 PRESENCE OF CARDIAC PACEMAKER: Status: ACTIVE | Noted: 2024-09-20

## 2024-10-02 ENCOUNTER — LAB (OUTPATIENT)
Dept: LAB | Facility: HOSPITAL | Age: 84
End: 2024-10-02
Payer: MEDICARE

## 2024-10-02 DIAGNOSIS — I49.5 SICK SINUS SYNDROME: ICD-10-CM

## 2024-10-02 DIAGNOSIS — Z95.0 PRESENCE OF CARDIAC PACEMAKER: ICD-10-CM

## 2024-10-02 LAB
ANION GAP SERPL CALCULATED.3IONS-SCNC: 12 MMOL/L (ref 5–15)
BUN SERPL-MCNC: 22 MG/DL (ref 8–23)
BUN/CREAT SERPL: 24.4 (ref 7–25)
CALCIUM SPEC-SCNC: 9.5 MG/DL (ref 8.6–10.5)
CHLORIDE SERPL-SCNC: 104 MMOL/L (ref 98–107)
CO2 SERPL-SCNC: 23 MMOL/L (ref 22–29)
CREAT SERPL-MCNC: 0.9 MG/DL (ref 0.57–1)
DEPRECATED RDW RBC AUTO: 40.2 FL (ref 37–54)
EGFRCR SERPLBLD CKD-EPI 2021: 62.4 ML/MIN/1.73
ERYTHROCYTE [DISTWIDTH] IN BLOOD BY AUTOMATED COUNT: 12.2 % (ref 12.3–15.4)
GLUCOSE SERPL-MCNC: 136 MG/DL (ref 65–99)
HCT VFR BLD AUTO: 38.7 % (ref 34–46.6)
HGB BLD-MCNC: 13 G/DL (ref 12–15.9)
MCH RBC QN AUTO: 29.8 PG (ref 26.6–33)
MCHC RBC AUTO-ENTMCNC: 33.6 G/DL (ref 31.5–35.7)
MCV RBC AUTO: 88.8 FL (ref 79–97)
PLATELET # BLD AUTO: 168 10*3/MM3 (ref 140–450)
PMV BLD AUTO: 12.6 FL (ref 6–12)
POTASSIUM SERPL-SCNC: 4.4 MMOL/L (ref 3.5–5.2)
RBC # BLD AUTO: 4.36 10*6/MM3 (ref 3.77–5.28)
SODIUM SERPL-SCNC: 139 MMOL/L (ref 136–145)
WBC NRBC COR # BLD AUTO: 4.14 10*3/MM3 (ref 3.4–10.8)

## 2024-10-02 PROCEDURE — 36415 COLL VENOUS BLD VENIPUNCTURE: CPT

## 2024-10-02 PROCEDURE — 85027 COMPLETE CBC AUTOMATED: CPT

## 2024-10-02 PROCEDURE — 80048 BASIC METABOLIC PNL TOTAL CA: CPT

## 2024-10-10 DIAGNOSIS — G63 NEUROPATHY DUE TO MEDICAL CONDITION: Chronic | ICD-10-CM

## 2024-10-10 RX ORDER — PREGABALIN 75 MG/1
75 CAPSULE ORAL 2 TIMES DAILY
Qty: 180 CAPSULE | Refills: 0 | Status: SHIPPED | OUTPATIENT
Start: 2024-10-10

## 2024-10-18 DIAGNOSIS — K21.9 GASTROESOPHAGEAL REFLUX DISEASE, UNSPECIFIED WHETHER ESOPHAGITIS PRESENT: ICD-10-CM

## 2024-10-18 RX ORDER — OMEPRAZOLE 40 MG/1
40 CAPSULE, DELAYED RELEASE ORAL DAILY
Qty: 90 CAPSULE | Refills: 1 | Status: SHIPPED | OUTPATIENT
Start: 2024-10-18

## 2024-10-24 ENCOUNTER — HOSPITAL ENCOUNTER (OUTPATIENT)
Facility: HOSPITAL | Age: 84
Setting detail: HOSPITAL OUTPATIENT SURGERY
Discharge: HOME | End: 2024-10-24
Attending: INTERNAL MEDICINE | Admitting: INTERNAL MEDICINE
Payer: MEDICARE

## 2024-10-24 VITALS
WEIGHT: 158 LBS | OXYGEN SATURATION: 93 % | HEART RATE: 60 BPM | DIASTOLIC BLOOD PRESSURE: 70 MMHG | BODY MASS INDEX: 24.8 KG/M2 | TEMPERATURE: 98 F | SYSTOLIC BLOOD PRESSURE: 135 MMHG | RESPIRATION RATE: 20 BRPM | HEIGHT: 67 IN

## 2024-10-24 DIAGNOSIS — Z95.0 PRESENCE OF CARDIAC PACEMAKER: ICD-10-CM

## 2024-10-24 DIAGNOSIS — I49.5 SICK SINUS SYNDROME: ICD-10-CM

## 2024-10-24 LAB
GLUCOSE BLDC GLUCOMTR-MCNC: 91 MG/DL (ref 70–130)
QT INTERVAL: 426 MS
QTC INTERVAL: 426 MS

## 2024-10-24 PROCEDURE — 25010000002 MIDAZOLAM PER 1 MG: Performed by: INTERNAL MEDICINE

## 2024-10-24 PROCEDURE — 82948 REAGENT STRIP/BLOOD GLUCOSE: CPT

## 2024-10-24 PROCEDURE — 25010000002 LIDOCAINE 1 % SOLUTION: Performed by: INTERNAL MEDICINE

## 2024-10-24 PROCEDURE — 33228 REMV&REPLC PM GEN DUAL LEAD: CPT | Performed by: INTERNAL MEDICINE

## 2024-10-24 PROCEDURE — 93005 ELECTROCARDIOGRAM TRACING: CPT | Performed by: INTERNAL MEDICINE

## 2024-10-24 PROCEDURE — 25810000003 SODIUM CHLORIDE 0.9 % SOLUTION 250 ML FLEX CONT: Performed by: INTERNAL MEDICINE

## 2024-10-24 PROCEDURE — 25010000002 FENTANYL CITRATE (PF) 50 MCG/ML SOLUTION: Performed by: INTERNAL MEDICINE

## 2024-10-24 PROCEDURE — 25010000002 VANCOMYCIN 1 G RECONSTITUTED SOLUTION 1 EACH VIAL: Performed by: INTERNAL MEDICINE

## 2024-10-24 PROCEDURE — C1785 PMKR, DUAL, RATE-RESP: HCPCS | Performed by: INTERNAL MEDICINE

## 2024-10-24 PROCEDURE — 93010 ELECTROCARDIOGRAM REPORT: CPT | Performed by: INTERNAL MEDICINE

## 2024-10-24 PROCEDURE — S0260 H&P FOR SURGERY: HCPCS | Performed by: INTERNAL MEDICINE

## 2024-10-24 PROCEDURE — 25810000003 SODIUM CHLORIDE 0.9 % SOLUTION: Performed by: INTERNAL MEDICINE

## 2024-10-24 DEVICE — PACEMAKER
Type: IMPLANTABLE DEVICE | Status: FUNCTIONAL
Brand: ACCOLADE™ MRI DR

## 2024-10-24 RX ORDER — NITROGLYCERIN 0.4 MG/1
0.4 TABLET SUBLINGUAL
Status: DISCONTINUED | OUTPATIENT
Start: 2024-10-24 | End: 2024-10-24 | Stop reason: HOSPADM

## 2024-10-24 RX ORDER — MAGNESIUM CARB/ALUMINUM HYDROX 105-160MG
1 TABLET,CHEWABLE ORAL DAILY
COMMUNITY

## 2024-10-24 RX ORDER — ONDANSETRON 2 MG/ML
4 INJECTION INTRAMUSCULAR; INTRAVENOUS EVERY 6 HOURS PRN
Status: DISCONTINUED | OUTPATIENT
Start: 2024-10-24 | End: 2024-10-24 | Stop reason: HOSPADM

## 2024-10-24 RX ORDER — LIDOCAINE HYDROCHLORIDE 10 MG/ML
INJECTION, SOLUTION INFILTRATION; PERINEURAL
Status: DISCONTINUED | OUTPATIENT
Start: 2024-10-24 | End: 2024-10-24 | Stop reason: HOSPADM

## 2024-10-24 RX ORDER — SODIUM CHLORIDE 0.9 % (FLUSH) 0.9 %
10 SYRINGE (ML) INJECTION AS NEEDED
Status: DISCONTINUED | OUTPATIENT
Start: 2024-10-24 | End: 2024-10-24 | Stop reason: HOSPADM

## 2024-10-24 RX ORDER — FENTANYL CITRATE 50 UG/ML
INJECTION, SOLUTION INTRAMUSCULAR; INTRAVENOUS
Status: DISCONTINUED | OUTPATIENT
Start: 2024-10-24 | End: 2024-10-24 | Stop reason: HOSPADM

## 2024-10-24 RX ORDER — SODIUM CHLORIDE 9 MG/ML
75 INJECTION, SOLUTION INTRAVENOUS CONTINUOUS
Status: DISCONTINUED | OUTPATIENT
Start: 2024-10-24 | End: 2024-10-24 | Stop reason: HOSPADM

## 2024-10-24 RX ORDER — MIDAZOLAM HYDROCHLORIDE 1 MG/ML
INJECTION INTRAMUSCULAR; INTRAVENOUS
Status: DISCONTINUED | OUTPATIENT
Start: 2024-10-24 | End: 2024-10-24 | Stop reason: HOSPADM

## 2024-10-24 RX ORDER — METHOHEXITAL IN WATER/PF 100MG/10ML
SYRINGE (ML) INTRAVENOUS
Status: DISCONTINUED | OUTPATIENT
Start: 2024-10-24 | End: 2024-10-24 | Stop reason: HOSPADM

## 2024-10-24 RX ORDER — SODIUM CHLORIDE 0.9 % (FLUSH) 0.9 %
3 SYRINGE (ML) INJECTION EVERY 12 HOURS SCHEDULED
Status: DISCONTINUED | OUTPATIENT
Start: 2024-10-24 | End: 2024-10-24 | Stop reason: HOSPADM

## 2024-10-24 RX ADMIN — SODIUM CHLORIDE 1000 MG: 9 INJECTION, SOLUTION INTRAVENOUS at 15:07

## 2024-10-24 RX ADMIN — SODIUM CHLORIDE 75 ML/HR: 9 INJECTION, SOLUTION INTRAVENOUS at 15:05

## 2024-10-24 NOTE — DISCHARGE INSTRUCTIONS
[facility]   [phone]    Post Pacemaker / Defibrillator Implant Instructions          1. You may shower in 24hours.     2. No lotion/powder/ointment/cream on incision until it is healed.    3. Gently wash incision daily with soap and water and pat dry.    4.You may reapply a dressing if there is drainage, otherwise leave your incision open to air. If you reapply a dressing, please notify the pacemaker clinic.    5.No heavy lifting, pulling, or pushing.    6. The pacemaker clinic will contact you (usually within 1 business day) to schedule a pacemaker/incision check. The check is usually done 7-10 days post-implant. If you have not heard from the pacemaker clinic within 3 days, please call the office.    7.  Please call the office if you experience any of the following:   bleeding or drainage from your incision   swelling, redness, or opening of your incision   fever or chills   pain not relieved with medication   chest pain or difficulty breathing   lightheadness

## 2024-10-24 NOTE — H&P
[facility]   HISTORY AND PHYSICAL    Patient Name:Reanna Higgins  : 1940  MRN: [id number]  Primary Care Physician: Ted Li MD  Date of admission: 10/24/2024    Subjective   Subjective     Chief Complaint:   Pacemaker at [facility]    History of Present Illness   Reanna Higgins is a 84 y.o. female presenting today for generator replacement of dual chamber pacemaker.     She had pacemaker placed in 2016 for listed diagnosis of sick sinus syndrome.     He is not dependent, but has moderate percentage of ventricular and atrial pacing.     She has a history of CAD with CABG in 2019.     She reports that her health has been stable with no significant changes in the past few months.         Review of Systems   Constitutional:  Negative for activity change and fatigue.   Eyes: Negative.    Respiratory:  Negative for chest tightness and shortness of breath.    Cardiovascular:  Negative for chest pain, palpitations and leg swelling.   Gastrointestinal: Negative.    Endocrine: Negative.    Genitourinary: Negative.    Musculoskeletal: Negative.    Skin: Negative.    Neurological:  Negative for dizziness and syncope.   Hematological: Negative.    Psychiatric/Behavioral: Negative.           Personal History     Past Medical History:   Diagnosis Date    Anesthesia complication     SEVERE SHAKING OF ENTIRE BODY AFTER EGD/ERCP. STATES THAT ANESTHESIA DID NOT SEE PREOP    Cardiac pacemaker in situ     Carpal tunnel syndrome     Cystic fibroadenosis of breast     Diabetes mellitus     10/24/24 - patient states she is Pre-diabetic and does not check blood sugar or take any meds    GERD (gastroesophageal reflux disease)     History of ileus 2019    History of skin cancer     Hypercholesterolemia     Hypertension     Osteoarthritis of both hands     Pacemaker     Pancreatitis     Peripheral neuropathy     Phobia, flying 2019    Restless leg syndrome     Second degree AV block, Mobitz type I     SVT (supraventricular  tachycardia)        Past Surgical History:   Procedure Laterality Date    APPENDECTOMY      BILATERAL OOPHORECTOMY Bilateral 1988    BREAST CYST EXCISION Bilateral     4 BREAST SURGERIES    CARDIAC CATHETERIZATION N/A 8/22/2019    Procedure: Left Heart Cath;  Surgeon: Francis Mccullough MD;  Location: [facility] CATH INVASIVE LOCATION;  Service: Cardiology    CARDIAC CATHETERIZATION N/A 8/22/2019    Procedure: Left ventriculography;  Surgeon: Francis Mccullough MD;  Location: [facility] CATH INVASIVE LOCATION;  Service: Cardiology    CARDIAC CATHETERIZATION N/A 8/22/2019    Procedure: Coronary angiography;  Surgeon: Francis Mccullough MD;  Location: [facility] CATH INVASIVE LOCATION;  Service: Cardiology    CARDIAC ELECTROPHYSIOLOGY PROCEDURE N/A 10/10/2016    Procedure: Pacemaker DC new  [place];  Surgeon: Wilfrido Hameed MD;  Location: [facility] CATH INVASIVE LOCATION;  Service:     CARPAL TUNNEL RELEASE Right     CATARACT EXTRACTION      NOVEMBER AND DECEMBER 2014    CHOLECYSTECTOMY      COLONOSCOPY  2015    COLONOSCOPY N/A 1/21/2022    Procedure: COLONOSCOPY into cecum and terminal ileum with hot snare polypectomy x2;  Surgeon: Giovany Redman MD;  Location: [facility] ENDOSCOPY;  Service: Gastroenterology;  Laterality: N/A;  Pre op: History of polyps  Post op: Polyps, Diverticulosis and Hemorrhoids    CORONARY ARTERY BYPASS GRAFT N/A 8/23/2019    Procedure: SUMMER STERNOTOMY CORONARY ARTERY BYPASS GRAFT TIMES 6 USING LEFT INTERNAL MAMMARY ARTERY AND LEFT GREATER SAPHENOUS VEIN GRAFT PER ENDOSCOPIC VEIN HARVESTING AND PRP;  Surgeon: Kem Hawk MD;  Location: [facility] MAIN OR;  Service: Cardiothoracic    ENDOSCOPY N/A 7/18/2019    Procedure: ESOPHAGOGASTRODUODENOSCOPY with biopsy;  Surgeon: Ricky Chew MD;  Location: [facility] ENDOSCOPY;  Service: Gastroenterology    ERCP N/A 9/29/2021    Procedure: ENDOSCOPIC RETROGRADE CHOLANGIOPANCREATOGRAPHY WITH SPHINCTEROTOMY AND BALLOON SWEEP;  Surgeon: Giovany Redman MD;   Location: [facility] ENDOSCOPY;  Service: Gastroenterology;  Laterality: N/A;  PRE- RECURRENT PANCREATITIS  POST- SAME    EYE SURGERY      HYSTERECTOMY      KNEE ARTHROSCOPY Bilateral 04/2014    MENISCAL TEAR    PACEMAKER IMPLANTATION  10/10/2016    SHOULDER SURGERY Right     ROTATOR CUFF SURGERY JUNE 18, 2015    TONSILLECTOMY      TOTAL KNEE ARTHROPLASTY Right 1/17/2023    Procedure: RIGHT TOTAL KNEE ARTHROPLASTY;  Surgeon: To Sands MD;  Location: [facility] OR [facility];  Service: Orthopedics;  Laterality: Right;    TRIGGER FINGER RELEASE      both hands       Family History: Her family history includes Alcohol abuse in her brother and sister; Anxiety disorder in her father; Aortic stenosis in her mother; Breast cancer in her paternal aunt; Cancer in her brother; Cirrhosis in her father; Coronary artery disease in her mother; Depression in her father; Diabetes in her father; Drug abuse in her brother; Heart disease in her brother, brother, and sister; Heart failure in her father; Hypertension in her father and mother; Lung cancer in her brother; Lupus in her sister; No Known Problems in her sister; Thyroid disease in her daughter.     Social History: She  reports that she quit smoking about 56 years ago. Her smoking use included cigarettes. She started smoking about 70 years ago. She has a 7 pack-year smoking history. She has never used smokeless tobacco. She reports that she does not currently use alcohol. She reports that she does not use drugs.    Home Medications:  Calcium-D, CoQ10, Glucosamine-Chondroitin, aspirin, atorvastatin, cholecalciferol, cycloSPORINE, hydrocortisone, ipratropium, losartan, melatonin, metoprolol succinate XL, multivitamin with minerals, omeprazole, and pregabalin    Allergies:  She is allergic to ancef [cefazolin], codeine, penicillins, and sulfa antibiotics.    Objective    Objective     Vitals:    Temp:  [98 °F (36.7 °C)] 98 °F (36.7 °C)  Heart Rate:  [64] 64  Resp:  [18] 18  BP:  (149)/(56) 149/56    Physical Exam  Vitals and nursing note reviewed.   Constitutional:       General: She is not in acute distress.     Appearance: She is not diaphoretic.   HENT:      Mouth/Throat:      Pharynx: No oropharyngeal exudate.   Eyes:      General: No scleral icterus.  Neck:      Trachea: No tracheal deviation.   Cardiovascular:      Rate and Rhythm: Normal rate and regular rhythm.   Pulmonary:      Effort: Pulmonary effort is normal. No respiratory distress.      Breath sounds: Normal breath sounds.   Chest:      Comments: Pacemaker pocket on left chest appears normal  Abdominal:      General: There is no distension.      Palpations: Abdomen is soft.   Skin:     General: Skin is warm and dry.   Neurological:      Mental Status: She is alert and oriented to person, place, and time.   Psychiatric:         Behavior: Behavior normal.         Thought Content: Thought content normal.         Judgment: Judgment normal.          Result Review    Result Review:  I have personally reviewed the results from the time of this admission to 10/24/2024 15:27 EDT and agree with these findings:  []  Laboratory list / accordion  []  Microbiology  []  Radiology  []  EKG/Telemetry   []  Cardiology/Vascular   []  Pathology  []  Old records  []  Other:  Most notable findings include: sinus rhythm with atrial pacing      Assessment & Plan   Assessment / Plan     Brief Patient Summary:  Reanna Higgins is a 84 y.o. female with dual chamber pacemaker and diagnosis of sick sinus syndrome.  She has moderate pacing burden, but is not dependent.  Proceeding with generator replacement.     Active Hospital Problems:  Active Hospital Problems    Diagnosis     **Presence of cardiac pacemaker     Sick sinus syndrome      Plan:   Proceed with generator replacement    Adrián Garnica MD

## 2024-10-28 ENCOUNTER — TELEPHONE (OUTPATIENT)
Dept: CARDIOLOGY | Facility: CLINIC | Age: 84
End: 2024-10-28
Payer: MEDICARE

## 2024-10-28 NOTE — TELEPHONE ENCOUNTER
Caller: Reanna Higgins    Relationship to patient: Self    Best call back number: [phone]    Type of visit: HOSPITAL FU / INCISION CHECK    Requested date: ONE WEEK FROM 10.24.24     Additional notes:PATIENT NEVER RECEIVED CALL FROM PACEMAKER CLINIC TO SCHEDULE HOSPITAL FU AND INCISION CHECK. NO AVAILABLE APPOINTMENT WITH [facility] WITHIN TIME FRAME [facility] UNABLE TO SCHEDULE PLEASE ADVISE.

## 2024-10-31 ENCOUNTER — CLINICAL SUPPORT NO REQUIREMENTS (OUTPATIENT)
Age: 84
End: 2024-10-31
Payer: MEDICARE

## 2024-10-31 DIAGNOSIS — Z95.0 PRESENCE OF CARDIAC PACEMAKER: Primary | ICD-10-CM

## 2024-11-04 ENCOUNTER — TELEPHONE (OUTPATIENT)
Dept: INTERNAL MEDICINE | Age: 84
End: 2024-11-04

## 2024-11-04 NOTE — TELEPHONE ENCOUNTER
Hub staff attempted to follow warm transfer process and was unsuccessful     Caller: Reanna Higgins    Relationship to patient: Self    Best call back number: [phone]    Patient is needing:   RETURNING MISSED CALL TO WILLIAM COYLE

## 2024-11-04 NOTE — TELEPHONE ENCOUNTER
Caller: Reanna Higgins    Relationship: Self    Best call back number: [phone]    What orders are you requesting (i.e. lab or imaging): DEXA SCAN     I  Where will you receive your lab/imaging services: [facility]    Additional notes: FAX: [phone]

## 2024-11-13 ENCOUNTER — TELEPHONE (OUTPATIENT)
Dept: INTERNAL MEDICINE | Age: 84
End: 2024-11-13
Payer: MEDICARE

## 2024-11-13 DIAGNOSIS — E78.00 PURE HYPERCHOLESTEROLEMIA: Primary | ICD-10-CM

## 2024-11-13 NOTE — TELEPHONE ENCOUNTER
Caller: Reanna Higgins    Relationship: Self    Best call back number: [phone]     If a prescription is needed, what is your preferred pharmacy and phone number: [facility] [id number] [place] [address] [phone] [facility] [phone]      Additional notes:  PATIENT IS REFUSING TO TAKE ATORVASTIN DUE TO CRAMPS IN LEGS.  SHE WOULD LIKE A NEW MEDICATION PRESCRIBED. PLEASE CALL TO FOLLOW UP IF NEEDED

## 2024-11-14 RX ORDER — ROSUVASTATIN CALCIUM 5 MG/1
5 TABLET, COATED ORAL DAILY
Qty: 30 TABLET | Refills: 2 | Status: SHIPPED | OUTPATIENT
Start: 2024-11-14

## 2024-11-14 NOTE — TELEPHONE ENCOUNTER
Hub staff attempted to follow warm transfer process and was unsuccessful     Caller: Reanna Higgins    Relationship to patient: Self    Best call back number: [phone]    Patient is needing: PATIENT RETURNED CALL, PLEASE CALL HER BACK.

## 2025-01-06 NOTE — PROGRESS NOTES
Jacky Martinez, this is Demielaina Guadarrama from [place]. 1954 is my birth date. I need to talk to you because they did not order my pre-op strengthening therapy yet and I'm going to be having my knee done, my right knee done with Doctor Silvia on the 27th of January. And I was just wondering if you can get ahold of get that ordered for me so I can do some strengthening therapy prior to my surgery. OK. My phone number is [phone] or my cell phone number is [phone]. I thank you. Taurus.     See scanned session report.

## 2025-01-14 DIAGNOSIS — E78.00 PURE HYPERCHOLESTEROLEMIA: ICD-10-CM

## 2025-01-14 DIAGNOSIS — G63 NEUROPATHY DUE TO MEDICAL CONDITION: Chronic | ICD-10-CM

## 2025-01-14 RX ORDER — ROSUVASTATIN CALCIUM 5 MG/1
5 TABLET, COATED ORAL DAILY
Qty: 90 TABLET | Refills: 1 | Status: SHIPPED | OUTPATIENT
Start: 2025-01-14

## 2025-01-14 RX ORDER — PREGABALIN 75 MG/1
75 CAPSULE ORAL 2 TIMES DAILY
Qty: 180 CAPSULE | Refills: 0 | Status: SHIPPED | OUTPATIENT
Start: 2025-01-14

## 2025-01-27 ENCOUNTER — OFFICE VISIT (OUTPATIENT)
Dept: INTERNAL MEDICINE | Age: 85
End: 2025-01-27
Payer: MEDICARE

## 2025-01-27 VITALS
SYSTOLIC BLOOD PRESSURE: 150 MMHG | HEIGHT: 67 IN | TEMPERATURE: 96.4 F | OXYGEN SATURATION: 98 % | HEART RATE: 70 BPM | DIASTOLIC BLOOD PRESSURE: 70 MMHG | WEIGHT: 161 LBS | BODY MASS INDEX: 25.27 KG/M2

## 2025-01-27 DIAGNOSIS — E11.65 TYPE 2 DIABETES MELLITUS WITH HYPERGLYCEMIA, WITHOUT LONG-TERM CURRENT USE OF INSULIN: Chronic | ICD-10-CM

## 2025-01-27 DIAGNOSIS — E78.00 PURE HYPERCHOLESTEROLEMIA: Primary | Chronic | ICD-10-CM

## 2025-01-27 DIAGNOSIS — I25.119 CORONARY ARTERY DISEASE INVOLVING NATIVE CORONARY ARTERY OF NATIVE HEART WITH ANGINA PECTORIS: Chronic | ICD-10-CM

## 2025-01-27 DIAGNOSIS — Z51.81 THERAPEUTIC DRUG MONITORING: ICD-10-CM

## 2025-01-27 DIAGNOSIS — E03.8 SUBCLINICAL HYPOTHYROIDISM: ICD-10-CM

## 2025-01-27 DIAGNOSIS — G63 NEUROPATHY DUE TO MEDICAL CONDITION: Chronic | ICD-10-CM

## 2025-01-27 DIAGNOSIS — I10 ESSENTIAL HYPERTENSION: Chronic | ICD-10-CM

## 2025-01-27 PROBLEM — R94.6 ABNORMAL THYROID FUNCTION TEST: Chronic | Status: ACTIVE | Noted: 2021-03-01

## 2025-01-27 PROCEDURE — 99214 OFFICE O/P EST MOD 30 MIN: CPT | Performed by: INTERNAL MEDICINE

## 2025-01-27 PROCEDURE — 1159F MED LIST DOCD IN RCRD: CPT | Performed by: INTERNAL MEDICINE

## 2025-01-27 PROCEDURE — 1160F RVW MEDS BY RX/DR IN RCRD: CPT | Performed by: INTERNAL MEDICINE

## 2025-01-27 PROCEDURE — 1125F AMNT PAIN NOTED PAIN PRSNT: CPT | Performed by: INTERNAL MEDICINE

## 2025-01-27 PROCEDURE — G2211 COMPLEX E/M VISIT ADD ON: HCPCS | Performed by: INTERNAL MEDICINE

## 2025-01-27 RX ORDER — LOSARTAN POTASSIUM 25 MG/1
25 TABLET ORAL DAILY
Status: SHIPPED | COMMUNITY
Start: 2025-01-27

## 2025-01-27 NOTE — PROGRESS NOTES
J  U  N  O  H    K  I  M ,   M  D       I  N  T  E  R  N  A  L    M  E  D  I  C  I  N  E         ENCOUNTER DATE:  01/27/2025    Reanna Higgins / 84 y.o. / female    OFFICE VISIT ENCOUNTER       CHIEF COMPLAINT / REASON FOR OFFICE VISIT     Peripheral Neuropathy, Hypertension, Hyperlipidemia, Hypothyroidism, and Diabetes      ASSESSMENT & PLAN     Problem List Items Addressed This Visit          High    Essential hypertension (Chronic)    Overview     Losartan 25 mg QD  Metoprolol succinate 50 mg BID          Current Assessment & Plan     BP Readings from Last 3 Encounters:   01/27/25 150/70   10/24/24 135/70   07/29/24 120/70      Blood pressure is reported < 140/90 at home.   Bring in blood pressure readings from home for review.  Same medications for now.          Relevant Medications    metoprolol succinate XL (TOPROL-XL) 50 MG 24 hr tablet    losartan (COZAAR) 25 MG tablet    Other Relevant Orders    Comprehensive Metabolic Panel    Microalbumin / Creatinine Urine Ratio - Urine, Clean Catch    Pure hypercholesterolemia - Primary (Chronic)    Current Assessment & Plan      Denies significant myalgia side effects after switch to rosuvastatin from atorvastatin.  Taking rosuvastatin 5 mg daily.  Check labs today.         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  Maintain low carbohydrate diet.           Relevant Orders    Hemoglobin A1c    Microalbumin / Creatinine Urine Ratio - Urine, Clean Catch       Low    Coronary artery disease involving native coronary artery of native heart with angina pectoris (Chronic)    Overview     8/2019: s/p 6 vessel CABG    Continue ASA, statin, beta-blocker.     Continue cardiology follow-up.         Relevant Medications    aspirin 81 MG EC tablet    metoprolol succinate XL (TOPROL-XL) 50 MG 24 hr tablet    Abnormal thyroid function test (Chronic)    Current Assessment & Plan     Check thyroid function    Lab Results   Component Value Date    TSH 4.810 (H) 07/29/2024    TSH 4.210 01/29/2024    TSH 3.140 03/01/2021    FREET4 1.08 07/29/2024    FREET4 1.17 01/29/2024    FREET4 1.09 03/01/2021              Other Visit Diagnoses       Therapeutic drug monitoring        Relevant Orders    Compliance Drug Analysis, Ur - Urine, Clean Catch          Orders Placed This Encounter   Procedures    Compliance Drug Analysis, Ur - Urine, Clean Catch     Order Specific Question:   Release to patient     Answer:   Routine Release [[id number]]    Comprehensive Metabolic Panel     Order Specific Question:   Release to patient     Answer:   Routine Release [[id number]]    Lipid Panel With / Chol / HDL Ratio     Order Specific Question:   Release to patient     Answer:   Routine Release [[id number]]    Hemoglobin A1c     Order Specific Question:   Release to patient     Answer:   Routine Release [[id number]]    Microalbumin / Creatinine Urine Ratio - Urine, Clean Catch     Order Specific Question:   Release to patient     Answer:   Routine Release [[id number]]    TSH+Free T4     Order Specific Question:   Release to patient     Answer:   Routine Release [[id number]]     No orders of the defined types were placed in this  "encounter.      SUMMARY/DISCUSSION        TOTAL TIME OF ENCOUNTER:        Next Appointment with me: Visit date not found    Return in about 4 months (around 5/27/2025) for Reassess chronic medical problems.      VITAL SIGNS     Vitals:    01/27/25 0809   BP: 150/70   Pulse: 70   Temp: 96.4 °F (35.8 °C)   SpO2: 98%   Weight: 73 kg (161 lb)   Height: 170.2 cm (67\")       BP Readings from Last 3 Encounters:   01/27/25 150/70   10/24/24 135/70   07/29/24 120/70     Wt Readings from Last 3 Encounters:   01/27/25 73 kg (161 lb)   10/24/24 71.7 kg (158 lb)   07/29/24 70.8 kg (156 lb)     Body mass index is 25.22 kg/m².    Blood pressure readings recorded on patient flowsheet:       No data to display                  MEDICATIONS AT THE TIME OF OFFICE VISIT     Current Outpatient Medications on File Prior to Visit   Medication Sig    aspirin 81 MG EC tablet Take 3 days a week    Calcium Carbonate-Vitamin D (CALCIUM-D) 600-400 MG-UNIT tablet Take 1 tablet by mouth Daily.    cholecalciferol (VITAMIN D3) 1000 UNITS tablet Take 1 tablet by mouth Daily.    Coenzyme Q10 (COQ10) 100 MG capsule Take 1 capsule by mouth Daily.    cycloSPORINE (RESTASIS) 0.05 % ophthalmic emulsion Administer 1 drop to both eyes 2 (Two) Times a Day.    Glucosamine-Chondroitin 750-600 MG tablet Take 1 tablet by mouth Daily.    ipratropium (ATROVENT) 0.06 % nasal spray USE 2 SPRAYS IN EACH NOSTRIL EVERY 6 HRS AS NEEDED    losartan (COZAAR) 25 MG tablet Take 1 tablet by mouth Daily.    melatonin 5 MG sublingual tablet sublingual tablet Place 1 tablet under the tongue Every Night.    metoprolol succinate XL (TOPROL-XL) 50 MG 24 hr tablet TAKE 1 TABLET BY MOUTH TWICE A DAY    multivitamin with minerals tablet tablet Take 1 tablet by mouth Daily.    omeprazole (priLOSEC) 40 MG capsule Take 1 capsule by mouth Daily.    pregabalin (LYRICA) 75 MG capsule TAKE 1 CAPSULE BY MOUTH TWICE A DAY    rosuvastatin (CRESTOR) 5 MG tablet TAKE 1 TABLET BY MOUTH EVERY DAY "    [DISCONTINUED] losartan (COZAAR) 25 MG tablet TAKE 1 TABLET BY MOUTH TWICE A DAY (Patient taking differently: Take 1 tablet by mouth 2 (Two) Times a Day. Once a day)    hydrocortisone 2.5 % cream  (Patient not taking: Reported on 1/27/2025)     No current facility-administered medications on file prior to visit.          HISTORY OF PRESENT ILLNESS     Previously switched atorvastatin to rosuvastatin 5 mg due to complaints of myalgia.  Since the change in medication her symptoms have abated.  She is taking losartan 25 mg daily and metoprolol succinate 50 mg twice daily.  Patient states that her blood pressure is consistently less than 140/90 at home.  In the past she became lightheaded with higher dose of losartan 25 mg twice daily.  She has history of coronary artery disease which remains stable without angina or dyspnea exertion and is followed by cardiology.  She takes pregabalin 75 mg twice daily for neuropathy and the medication continues to benefit her pain as well as restless leg syndrome.    Patient Care Team:  Ted Li MD as PCP - General (Internal Medicine)  Arsenio Witt MD as Consulting Physician (Ophthalmology)  Francis Mccullough MD as Consulting Physician (Cardiology)  Haven Veras MD as Consulting Physician (Dermatology)  Lindsey Farrell APRN as Nurse Practitioner (Nurse Practitioner)    REVIEW OF SYSTEMS     Review of Systems       PHYSICAL EXAMINATION     Physical Exam  Alert with normal thought and judgment.   Cardiovascular: Normal rate, regular rhythm.   Pulm/Chest: Effort normal, breath sounds normal.   No carotid bruit  No lower extremity edema       REVIEWED DATA     Labs:     Lab Results   Component Value Date     10/02/2024    K 4.4 10/02/2024    CALCIUM 9.5 10/02/2024    AST 17 07/29/2024    ALT 16 07/29/2024    BUN 22 10/02/2024    CREATININE 0.90 10/02/2024    CREATININE 0.89 07/29/2024    CREATININE 0.95 01/29/2024    EGFRRESULT 63 07/29/2024     Lab Results    Component Value Date    HGBA1C 6.5 (H) 07/29/2024    HGBA1C 6.5 (H) 01/29/2024    HGBA1C 6.20 (H) 07/24/2023     Lab Results   Component Value Date    LDL 77 07/29/2024     (H) 01/29/2024    LDL 73 04/19/2023    HDL 39 (L) 07/29/2024    HDL 45 01/29/2024    TRIG 122 07/29/2024    TRIG 99 01/29/2024     Lab Results   Component Value Date    TSH 4.810 (H) 07/29/2024    TSH 4.210 01/29/2024    TSH 3.140 03/01/2021    FREET4 1.08 07/29/2024    FREET4 1.17 01/29/2024    FREET4 1.09 03/01/2021     Lab Results   Component Value Date    WBC 4.14 10/02/2024    HGB 13.0 10/02/2024     10/02/2024     Lab Results   Component Value Date    MALBCRERATIO 34 (H) 07/24/2023           Imaging:               Medical Tests:               Summary of old records / correspondence / consultant report:             Request outside records:

## 2025-01-27 NOTE — ASSESSMENT & PLAN NOTE
Denies significant myalgia side effects after switch to rosuvastatin from atorvastatin.  Taking rosuvastatin 5 mg daily.  Check labs today.

## 2025-01-27 NOTE — ASSESSMENT & PLAN NOTE
Lab Results   Component Value Date    HGBA1C 6.5 (H) 07/29/2024    HGBA1C 6.5 (H) 01/29/2024    HGBA1C 6.20 (H) 07/24/2023     Check A1c level today.  Maintain low carbohydrate diet.

## 2025-01-27 NOTE — ASSESSMENT & PLAN NOTE
BP Readings from Last 3 Encounters:   01/27/25 150/70   10/24/24 135/70   07/29/24 120/70      Blood pressure is reported < 140/90 at home.   Bring in blood pressure readings from home for review.  Same medications for now.

## 2025-01-27 NOTE — ASSESSMENT & PLAN NOTE
Check thyroid function    Lab Results   Component Value Date    TSH 4.810 (H) 07/29/2024    TSH 4.210 01/29/2024    TSH 3.140 03/01/2021    FREET4 1.08 07/29/2024    FREET4 1.17 01/29/2024    FREET4 1.09 03/01/2021

## 2025-01-28 LAB
ALBUMIN SERPL-MCNC: 4.4 G/DL (ref 3.7–4.7)
ALBUMIN/CREAT UR: 42 MG/G CREAT (ref 0–29)
ALP SERPL-CCNC: 93 IU/L (ref 44–121)
ALT SERPL-CCNC: 16 IU/L (ref 0–32)
AST SERPL-CCNC: 25 IU/L (ref 0–40)
BILIRUB SERPL-MCNC: 0.4 MG/DL (ref 0–1.2)
BUN SERPL-MCNC: 20 MG/DL (ref 8–27)
BUN/CREAT SERPL: 21 (ref 12–28)
CALCIUM SERPL-MCNC: 9.8 MG/DL (ref 8.7–10.3)
CHLORIDE SERPL-SCNC: 103 MMOL/L (ref 96–106)
CHOLEST SERPL-MCNC: 140 MG/DL (ref 100–199)
CHOLEST/HDLC SERPL: 2.9 RATIO (ref 0–4.4)
CO2 SERPL-SCNC: 24 MMOL/L (ref 20–29)
CREAT SERPL-MCNC: 0.97 MG/DL (ref 0.57–1)
CREAT UR-MCNC: 124.3 MG/DL
EGFRCR SERPLBLD CKD-EPI 2021: 57 ML/MIN/1.73
GLOBULIN SER CALC-MCNC: 2.6 G/DL (ref 1.5–4.5)
GLUCOSE SERPL-MCNC: 116 MG/DL (ref 70–99)
HBA1C MFR BLD: 6.4 % (ref 4.8–5.6)
HDLC SERPL-MCNC: 48 MG/DL
LDLC SERPL CALC-MCNC: 74 MG/DL (ref 0–99)
MICROALBUMIN UR-MCNC: 52.3 UG/ML
POTASSIUM SERPL-SCNC: 4.7 MMOL/L (ref 3.5–5.2)
PROT SERPL-MCNC: 7 G/DL (ref 6–8.5)
SODIUM SERPL-SCNC: 142 MMOL/L (ref 134–144)
T4 FREE SERPL-MCNC: 1.14 NG/DL (ref 0.82–1.77)
TRIGL SERPL-MCNC: 94 MG/DL (ref 0–149)
TSH SERPL DL<=0.005 MIU/L-ACNC: 4.47 UIU/ML (ref 0.45–4.5)
VLDLC SERPL CALC-MCNC: 18 MG/DL (ref 5–40)

## 2025-01-28 NOTE — PROGRESS NOTES
A1c level for blood sugar average is slightly lower.     Cholesterol is overall stable.    Labs for kidney, liver and electrolytes are stable (i.e.  normal, stable, improved or without clinically significant worsening)     Thyroid level is stable (or normal).     Urine with stable mild microalbuminuria (protein in urine).

## 2025-01-30 NOTE — Clinical Note
No in lab complications Post Operative Visit    Breast history  LEFT breast cancer, IDC grade 2, ER/TN pos, HER2 neg, locally advanced to lymph nodes     Surgical/treatment record  Date Procedure   1/24/25 BILATERAL nipple sparing mastectomy via IMF incision by Dr. Tafoya  BILATERAL direct to implant with Galaflex and nerve grafting                 History of Present Illness  Today I had the pleasure of seeing Emily Fontenot who presents today for a post operative visit. Doing well. Pain is well controlled.    Physical Exam  Visit Vitals  LMP 08/01/2024 (Approximate)     General appearance: Alert, well appearing, and in no distress.   Focused detailed exam of surgical sites:   Bilateral tegaderm bra in place and cdi  NAC wwp  Drains are ss/benign    Surgical pathology:   A. Breast, Left Sentinal Node A at 1322:  -1 lymph node, positive for metastatic carcinoma; size of largest metastatic focus measures 7 mm (1/1)     B. Breast, Left Sentinal Node B at 1322:  -1 benign lymph node (0/1)     C. Breast, Left Sentinal Node C at 1325:  -1 lymph node positive for metastatic carcinoma; size of largest metastatic focus measures 5 mm (1/1)     D. Left breast mastectomy single stitch axillary tail, double stitch nipple at 1418:  -Invasive ductal carcinoma, grade 2 (of 3) with lobular features, measuring 40 mm in maximum dimension, s/p neoadjuvant chemotherapy; invasive ductal carcinoma extends to the anterior superior margin of resection; not true margin; the invasive ductal carcinoma is present at a distance of 2.0 mm from the shaved nipple margin (true margin)  -Ductal carcinoma in situ (DCIS), grade 2, solid type with ductal calcifications; DCIS is present in 9 blocks; benign margin of resection for ductal carcinoma in situ; closest margin for DCIS is the anterior superior margin at a distance of less than 1 mm (not true margin)   -Treatment-related changes and biopsy site changes present  -Renetta  identified grossly   -Uninvolved breast  parenchyma with fibrocystic changes  -See synopsis      E. Anterior Margin To Tumor Ink Bernardo New Margin at 1418:  -Benign margins of resection; no tumor detected in the tissue     F. Left Nipple Core at 1423:  -Nipple core tissue with multiple foci of vascular invasion, see comment     Comment: Immunostain for CD34 demonstrates the vascular endothelium.  The tumor cells stain strongly and diffusely positive for ER.  Immunostains for D2-40 and CK7 are negative.  This part specimen was subjected to intradepartmental QA review.     G. Axillary Tail Margin Ink Bernardo New Margin at 1423:  -Benign margin of resection; no tumor detected in the tissue     H. Right Nipple Core at 1428:  -Benign nipple     I. Lateral Margin Ink Bernardo New Margin at 1447:  -Benign margin of resection; no tumor detected in the tissue     J. Right breast mastectomy single stitch axillary tail, double stitch nipple at 1431:  -Benign breast parenchyma, 170g  -Unremarkable margins     K.  Right chest port:  -Right chest port, gross only    Tissue expander/implant record:    Implant record Right Left   Silicone implant  [x] Registered with NBIR [place] Boost moderate plus profile smooth silicone implant  210 CC 10.3 x 3.5 cm  REF SMPB-210   [id number] [place] Boost moderate plus profile smooth silicone implant  265 CC 11.2 x 3.6 cm  REF SMPB-265   [id number]   Tissue/mesh scaffold    Galaflex LITE 13 cm, REF [id number], LOT/ID [id number]  Galaflex LITE 15 cm, REF [id number], LOT/ID [id number] Galaflex LITE 13 cm, REF [id number], LOT/ID [id number]  Galaflex LITE 15 cm, REF [id number], LOT/ID [id number]   Nerve allograft Avance Nerve Graft 1-2mm x 70mm  REF [id number]  LOT [id number]     Axogen nerve protector (half)  2mm x 20mm  REF [id number]  LOT [id number] Avance Nerve Graft 1-2mm x 70mm  REF [id number]  LOT [id number]     Axogen nerve protector (half)  2mm x 20mm  REF [id number]  LOT [id number]     Assessment and Recommendations    Emily Fontenot is a 34 year old female here for a  post operative visit. She is doing well.     1 drain removed from each side.  Pain regimen was discussed:, Tylenol, Celebrex, Gabapentin  Continue drain care, strip, and measure twice daily.  May shower.  No submerging in water.  Continue Tegederm bra.  Has completed course of prophylactic antibiotics for implants.  High dose Vitamin C 1000mg TID for 2 weeks or until healed.  Complete multivitamin with trace minerals (e.g. Centrum Silver) until healed.  High protein diet until healed.  Singulair 10mg at bedtime for 6 months for capsular contracture prophylaxis/treatment.  Continue activity restrictions. Ambulation is encouraged.   Continue driving restrictions.   May NOT return to work.   Adjuvant endocrine or immunotherapy per medical oncologist.  May NOT yet proceed with adjuvant radiation.  Estimated readiness in 30 days  Silicone implant surveillance begins at 5-6 years post implantation and then every 2-3 years thereafter with ultrasound or MRI. Diagnostic testing can be performed sooner if there are any specific concerns.  Continue with oncologic surveillance per usual schedule as directed by the multidisciplinary oncology teams.  Future anticipated surgeries: fat grafting and revision. Projected time frame: tbd    No orders of the defined types were placed in this encounter.    No follow-ups on file.  Future Appointments   Date Time Provider Department Center   2/3/2025 10:20 AM Ester Anaya MD IMCPROC [facility] PRON   2/3/2025 11:00 AM Adrienne Tafoya MD [id number] [facility] CAC   2/6/2025 12:30 PM Juliette Pate PA-C [id number]   2/25/2025  9:45 AM Griselda Núñez PT [id number] [facility]   3/4/2025  2:00 PM Stacey Dawson MD [id number]     Stacey Dawson MD     A licensed medical  was used during this encounter.

## 2025-02-02 LAB — DRUGS UR: NORMAL

## 2025-03-04 ENCOUNTER — CLINICAL SUPPORT NO REQUIREMENTS (OUTPATIENT)
Age: 85
End: 2025-03-04
Payer: MEDICARE

## 2025-03-04 ENCOUNTER — OFFICE VISIT (OUTPATIENT)
Dept: CARDIOLOGY | Facility: CLINIC | Age: 85
End: 2025-03-04
Payer: MEDICARE

## 2025-03-04 VITALS
HEART RATE: 59 BPM | SYSTOLIC BLOOD PRESSURE: 130 MMHG | WEIGHT: 159 LBS | BODY MASS INDEX: 24.96 KG/M2 | DIASTOLIC BLOOD PRESSURE: 62 MMHG | HEIGHT: 67 IN

## 2025-03-04 DIAGNOSIS — I10 ESSENTIAL HYPERTENSION: Chronic | ICD-10-CM

## 2025-03-04 DIAGNOSIS — Z95.1 S/P CABG (CORONARY ARTERY BYPASS GRAFT): ICD-10-CM

## 2025-03-04 DIAGNOSIS — Z95.0 PRESENCE OF CARDIAC PACEMAKER: Primary | ICD-10-CM

## 2025-03-04 DIAGNOSIS — Z95.0 PRESENCE OF CARDIAC PACEMAKER: ICD-10-CM

## 2025-03-04 DIAGNOSIS — E78.00 PURE HYPERCHOLESTEROLEMIA: Chronic | ICD-10-CM

## 2025-03-04 DIAGNOSIS — I25.10 CORONARY ARTERY DISEASE INVOLVING NATIVE CORONARY ARTERY OF NATIVE HEART WITHOUT ANGINA PECTORIS: Primary | ICD-10-CM

## 2025-03-04 DIAGNOSIS — I49.5 SICK SINUS SYNDROME: ICD-10-CM

## 2025-03-04 DIAGNOSIS — R01.1 SYSTOLIC MURMUR: ICD-10-CM

## 2025-03-04 DIAGNOSIS — I35.9 AORTIC VALVE CALCIFICATION: ICD-10-CM

## 2025-03-04 PROCEDURE — 93280 PM DEVICE PROGR EVAL DUAL: CPT | Performed by: INTERNAL MEDICINE

## 2025-03-04 PROCEDURE — 99214 OFFICE O/P EST MOD 30 MIN: CPT | Performed by: NURSE PRACTITIONER

## 2025-03-04 PROCEDURE — 1159F MED LIST DOCD IN RCRD: CPT | Performed by: NURSE PRACTITIONER

## 2025-03-04 PROCEDURE — 1160F RVW MEDS BY RX/DR IN RCRD: CPT | Performed by: NURSE PRACTITIONER

## 2025-03-04 PROCEDURE — 93000 ELECTROCARDIOGRAM COMPLETE: CPT | Performed by: NURSE PRACTITIONER

## 2025-03-04 NOTE — PROGRESS NOTES
"  Date of Office Visit: 2025  Encounter Provider: LUIS A Lemon  Place of Service: [facility] CARDIOLOGY  Patient Name: Reanna Higgins  :1940    Chief Complaint   Patient presents with    Coronary Artery Disease   :     HPI: Reanna Higgins is a 85 y.o. female patient of Dr. Mccullough's with coronary artery disease (history of CABG x 6 in 2019).  She also has a history of sick sinus syndrome and is status post permanent pacemaker.    She was last seen in the office by Dr. Mccullough in 2024 at which time she was doing well.  No changes are made to her regimen, she was advised follow-up in 1 year.    Overall, she has been doing well.  She denies any chest pain, palpitations, edema, dizziness, syncope.  When I asked her about shortness of breath she says she \"wears out easily when walking.\"  She enjoys rollerskating which she actually did earlier today.    Past Medical History:   Diagnosis Date    Anesthesia complication     SEVERE SHAKING OF ENTIRE BODY AFTER EGD/ERCP. STATES THAT ANESTHESIA DID NOT SEE PREOP    Cardiac pacemaker in situ     Carpal tunnel syndrome     Cystic fibroadenosis of breast     Diabetes mellitus     10/24/24 - patient states she is Pre-diabetic and does not check blood sugar or take any meds    GERD (gastroesophageal reflux disease)     History of ileus 2019    History of skin cancer     Hypercholesterolemia     Hypertension     Osteoarthritis of both hands     Pacemaker     Pancreatitis     Peripheral neuropathy     Phobia, flying 2019    Restless leg syndrome     Second degree AV block, Mobitz type I     SVT (supraventricular tachycardia)        Past Surgical History:   Procedure Laterality Date    APPENDECTOMY      BILATERAL OOPHORECTOMY Bilateral     BREAST CYST EXCISION Bilateral     4 BREAST SURGERIES    CARDIAC CATHETERIZATION N/A 2019    Procedure: Left Heart Cath;  Surgeon: Francis Mccullough MD;  Location: [facility]" CATH INVASIVE LOCATION;  Service: Cardiology    CARDIAC CATHETERIZATION N/A 8/22/2019    Procedure: Left ventriculography;  Surgeon: Francis Mccullough MD;  Location: [facility] CATH INVASIVE LOCATION;  Service: Cardiology    CARDIAC CATHETERIZATION N/A 8/22/2019    Procedure: Coronary angiography;  Surgeon: Francis Mccullough MD;  Location: [facility] CATH INVASIVE LOCATION;  Service: Cardiology    CARDIAC ELECTROPHYSIOLOGY PROCEDURE N/A 10/10/2016    Procedure: Pacemaker DC new  [place];  Surgeon: Wilfrido Hameed MD;  Location: [facility] CATH INVASIVE LOCATION;  Service:     CARPAL TUNNEL RELEASE Right     CATARACT EXTRACTION      NOVEMBER AND DECEMBER 2014    CHOLECYSTECTOMY      COLONOSCOPY  2015    COLONOSCOPY N/A 1/21/2022    Procedure: COLONOSCOPY into cecum and terminal ileum with hot snare polypectomy x2;  Surgeon: Giovany Redman MD;  Location: [facility] ENDOSCOPY;  Service: Gastroenterology;  Laterality: N/A;  Pre op: History of polyps  Post op: Polyps, Diverticulosis and Hemorrhoids    CORONARY ARTERY BYPASS GRAFT N/A 8/23/2019    Procedure: SUMMER STERNOTOMY CORONARY ARTERY BYPASS GRAFT TIMES 6 USING LEFT INTERNAL MAMMARY ARTERY AND LEFT GREATER SAPHENOUS VEIN GRAFT PER ENDOSCOPIC VEIN HARVESTING AND PRP;  Surgeon: Kem Hawk MD;  Location: [facility] MAIN OR;  Service: Cardiothoracic    ENDOSCOPY N/A 7/18/2019    Procedure: ESOPHAGOGASTRODUODENOSCOPY with biopsy;  Surgeon: Ricky Chew MD;  Location: [facility] ENDOSCOPY;  Service: Gastroenterology    ERCP N/A 9/29/2021    Procedure: ENDOSCOPIC RETROGRADE CHOLANGIOPANCREATOGRAPHY WITH SPHINCTEROTOMY AND BALLOON SWEEP;  Surgeon: Giovany Redman MD;  Location: [facility] ENDOSCOPY;  Service: Gastroenterology;  Laterality: N/A;  PRE- RECURRENT PANCREATITIS  POST- SAME    EYE SURGERY      HYSTERECTOMY      KNEE ARTHROSCOPY Bilateral 04/2014    MENISCAL TEAR    PACEMAKER IMPLANTATION  10/10/2016    PACEMAKER REPLACEMENT N/A 10/24/2024    Procedure: PPM  generator change - dual [place];  Surgeon: Adrián Garnica MD;  Location:  [facility] CATH INVASIVE LOCATION;  Service: Cardiology;  Laterality: N/A;  [employer]    SHOULDER SURGERY Right     ROTATOR CUFF SURGERY 2015    TONSILLECTOMY      TOTAL KNEE ARTHROPLASTY Right 2023    Procedure: RIGHT TOTAL KNEE ARTHROPLASTY;  Surgeon: To Sands MD;  Location:  [facility] OR OSC;  Service: Orthopedics;  Laterality: Right;    TRIGGER FINGER RELEASE      both hands       Social History     Socioeconomic History    Marital status:      Spouse name: Rishabh*    Number of children: 2    Years of education: 13    Highest education level: Some college, no degree   Tobacco Use    Smoking status: Former     Current packs/day: 0.00     Average packs/day: 0.5 packs/day for 14.0 years (7.0 ttl pk-yrs)     Types: Cigarettes     Start date:      Quit date:      Years since quittin.2    Smokeless tobacco: Never    Tobacco comments:     quit age 28   Vaping Use    Vaping status: Never Used   Substance and Sexual Activity    Alcohol use: Not Currently     Comment: rare glass of wine or beer rarely/  No caffeine use    Drug use: Never    Sexual activity: Defer       Family History   Problem Relation Age of Onset    Thyroid disease Daughter     Lung cancer Brother     Hypertension Mother     Aortic stenosis Mother     Coronary artery disease Mother          78 (smoker)    Hypertension Father     Depression Father     Anxiety disorder Father     Diabetes Father     Heart failure Father     Cirrhosis Father         alcohol    Alcohol abuse Sister     Lupus Sister     Heart disease Sister     No Known Problems Sister     Alcohol abuse Brother     Drug abuse Brother     Heart disease Brother     Cancer Brother         bladder (smoker)    Heart disease Brother     Breast cancer Paternal Aunt     Neuropathy Neg Hx     Colon cancer Neg Hx     Dementia Neg Hx     Malig Hyperthermia Neg Hx        Review of  Systems   Constitutional: Negative.   Cardiovascular: Negative.  Negative for chest pain, dyspnea on exertion, leg swelling, orthopnea, paroxysmal nocturnal dyspnea and syncope.   Respiratory: Negative.     Hematologic/Lymphatic: Negative for bleeding problem.   Musculoskeletal:  Negative for falls.   Gastrointestinal:  Negative for melena.   Neurological:  Negative for dizziness and light-headedness.       Allergies   Allergen Reactions    Ancef [Cefazolin] Other (See Comments)     Hypotension/bradycardia    Codeine Hives    Penicillins Other (See Comments)     Hypotension (Ancef allergy)     Sulfa Antibiotics Hives         Current Outpatient Medications:     aspirin 81 MG EC tablet, Take 3 days a week, Disp: , Rfl:     Calcium Carbonate-Vitamin D (CALCIUM-D) 600-400 MG-UNIT tablet, Take 1 tablet by mouth Daily., Disp: , Rfl:     cholecalciferol (VITAMIN D3) 1000 UNITS tablet, Take 1 tablet by mouth Daily., Disp: , Rfl:     Coenzyme Q10 (COQ10) 100 MG capsule, Take 1 capsule by mouth Daily., Disp: , Rfl:     cycloSPORINE (RESTASIS) 0.05 % ophthalmic emulsion, Administer 1 drop to both eyes 2 (Two) Times a Day., Disp: , Rfl:     Glucosamine-Chondroitin 750-600 MG tablet, Take 1 tablet by mouth Daily., Disp: , Rfl:     hydrocortisone 2.5 % cream, , Disp: , Rfl:     ipratropium (ATROVENT) 0.06 % nasal spray, USE 2 SPRAYS IN EACH NOSTRIL EVERY 6 HRS AS NEEDED, Disp: 45 each, Rfl: 1    losartan (COZAAR) 25 MG tablet, Take 1 tablet by mouth Daily., Disp: , Rfl:     melatonin 5 MG sublingual tablet sublingual tablet, Place 1 tablet under the tongue Every Night., Disp: , Rfl:     metoprolol succinate XL (TOPROL-XL) 50 MG 24 hr tablet, TAKE 1 TABLET BY MOUTH TWICE A DAY, Disp: 180 tablet, Rfl: 3    multivitamin with minerals tablet tablet, Take 1 tablet by mouth Daily., Disp: , Rfl:     omeprazole (priLOSEC) 40 MG capsule, Take 1 capsule by mouth Daily., Disp: 90 capsule, Rfl: 1    pregabalin (LYRICA) 75 MG capsule, TAKE 1  "CAPSULE BY MOUTH TWICE A DAY, Disp: 180 capsule, Rfl: 0    rosuvastatin (CRESTOR) 5 MG tablet, TAKE 1 TABLET BY MOUTH EVERY DAY, Disp: 90 tablet, Rfl: 1      Objective:     Vitals:    03/04/25 1456   BP: 130/62   Pulse: 59   Weight: 72.1 kg (159 lb)   Height: 170.2 cm (67\")     Body mass index is 24.9 kg/m².    PHYSICAL EXAM:    Neck:      Vascular: No JVD.   Pulmonary:      Effort: Pulmonary effort is normal.      Breath sounds: Normal breath sounds.   Cardiovascular:      Normal rate. Regular rhythm.      Murmurs: There is a harsh midsystolic murmur at the URSB, radiating to the neck.      No gallop.  No click. No rub.   Pulses:     Intact distal pulses.           ECG 12 Lead    Date/Time: 3/4/2025 3:02 PM  Performed by: Lindsey Farrell APRN    Authorized by: Lindsey Farrell APRN  Comparison: compared with previous ECG from 10/24/2024  Similar to previous ECG  Rhythm: paced  Rate: normal  BPM: 59  T inversion: I, aVL and II  Other findings: non-specific ST-T wave changes            Assessment:       Diagnosis Plan   1. Coronary artery disease involving native coronary artery of native heart without angina pectoris  ECG 12 Lead      2. S/P CABG (coronary artery bypass graft)        3. Sick sinus syndrome        4. Presence of cardiac pacemaker        5. Essential hypertension        6. Pure hypercholesterolemia        7. Aortic valve calcification  Adult Transthoracic Echo Complete W/ Cont if Necessary Per Protocol      8. Systolic murmur  Adult Transthoracic Echo Complete W/ Cont if Necessary Per Protocol        Orders Placed This Encounter   Procedures    ECG 12 Lead     This order was created via procedure documentation     Order Specific Question:   Release to patient     Answer:   Routine Release [[id number]]    Adult Transthoracic Echo Complete W/ Cont if Necessary Per Protocol     Standing Status:   Future     Standing Expiration Date:   3/4/2026     Order Specific Question:   Reason for exam?     " Answer:   Murmur or Click     Order Specific Question:   Release to patient     Answer:   Routine Release [[id number]]          Plan:       1.  Coronary artery disease.  She denies any symptoms of angina.  Continue aspirin and rosuvastatin.  Of note, she has been intolerant of higher doses of rosuvastatin.      2.  Sick sinus syndrome.  Status post permanent pacemaker.  Device check today normal.      3.  Hypertension.  Her blood pressure looks great.  Her log from home demonstrates stable systolics in the 120s and 130s.  Continue losartan and Toprol.      4.  Hyperlipidemia.  Lipid panel from January demonstrated an LDL of 74 and an HDL of 48.  She is not interested in Zetia.        5.  Systolic murmur.  She did have aortic valve calcification noted on the echocardiogram in 2019.  I recommended repeating an echocardiogram.      Overall I think she is doing well.  Further recommendations will be made pending the results of the echocardiogram.      As always, it has been a pleasure to participate in your patient's care.      Sincerely,         LUIS A Parikh

## 2025-03-18 ENCOUNTER — HOSPITAL ENCOUNTER (OUTPATIENT)
Dept: CARDIOLOGY | Facility: HOSPITAL | Age: 85
Discharge: HOME | End: 2025-03-18
Admitting: NURSE PRACTITIONER
Payer: MEDICARE

## 2025-03-18 VITALS
BODY MASS INDEX: 24.96 KG/M2 | DIASTOLIC BLOOD PRESSURE: 76 MMHG | WEIGHT: 159 LBS | SYSTOLIC BLOOD PRESSURE: 130 MMHG | HEIGHT: 67 IN

## 2025-03-18 DIAGNOSIS — R01.1 SYSTOLIC MURMUR: ICD-10-CM

## 2025-03-18 DIAGNOSIS — I35.9 AORTIC VALVE CALCIFICATION: ICD-10-CM

## 2025-03-18 LAB
AORTIC ARCH: 3.1 CM
AORTIC DIMENSIONLESS INDEX: 0.58 (DI)
ASCENDING AORTA: 2.8 CM
AV MEAN PRESS GRAD SYS DOP V1V2: 7 MMHG
AV VMAX SYS DOP: 175 CM/SEC
BH CV ECHO MEAS - ACS: 1.4 CM
BH CV ECHO MEAS - AI P1/2T: 477.4 MSEC
BH CV ECHO MEAS - AO MAX PG: 12.3 MMHG
BH CV ECHO MEAS - AO ROOT DIAM: 2.9 CM
BH CV ECHO MEAS - AO V2 VTI: 43.9 CM
BH CV ECHO MEAS - AVA(I,D): 1.83 CM2
BH CV ECHO MEAS - EDV(CUBED): 74.1 ML
BH CV ECHO MEAS - EDV(MOD-SP2): 110 ML
BH CV ECHO MEAS - EDV(MOD-SP4): 91 ML
BH CV ECHO MEAS - EF(MOD-SP2): 59.1 %
BH CV ECHO MEAS - EF(MOD-SP4): 54.9 %
BH CV ECHO MEAS - ESV(CUBED): 22 ML
BH CV ECHO MEAS - ESV(MOD-SP2): 45 ML
BH CV ECHO MEAS - ESV(MOD-SP4): 41 ML
BH CV ECHO MEAS - FS: 33.3 %
BH CV ECHO MEAS - IVS/LVPW: 1 CM
BH CV ECHO MEAS - IVSD: 1 CM
BH CV ECHO MEAS - LAT PEAK E' VEL: 9.1 CM/SEC
BH CV ECHO MEAS - LV DIASTOLIC VOL/BSA (35-75): 49.6 CM2
BH CV ECHO MEAS - LV MASS(C)D: 137.2 GRAMS
BH CV ECHO MEAS - LV MAX PG: 3.9 MMHG
BH CV ECHO MEAS - LV MEAN PG: 2 MMHG
BH CV ECHO MEAS - LV SYSTOLIC VOL/BSA (12-30): 22.4 CM2
BH CV ECHO MEAS - LV V1 MAX: 98.5 CM/SEC
BH CV ECHO MEAS - LV V1 VTI: 25.6 CM
BH CV ECHO MEAS - LVIDD: 4.2 CM
BH CV ECHO MEAS - LVIDS: 2.8 CM
BH CV ECHO MEAS - LVOT AREA: 3.1 CM2
BH CV ECHO MEAS - LVOT DIAM: 2 CM
BH CV ECHO MEAS - LVPWD: 1 CM
BH CV ECHO MEAS - MED PEAK E' VEL: 4.7 CM/SEC
BH CV ECHO MEAS - MR MAX PG: 107.7 MMHG
BH CV ECHO MEAS - MR MAX VEL: 519 CM/SEC
BH CV ECHO MEAS - MV A DUR: 0.15 SEC
BH CV ECHO MEAS - MV A MAX VEL: 104 CM/SEC
BH CV ECHO MEAS - MV DEC SLOPE: 453 CM/SEC2
BH CV ECHO MEAS - MV DEC TIME: 0.23 SEC
BH CV ECHO MEAS - MV E MAX VEL: 100 CM/SEC
BH CV ECHO MEAS - MV E/A: 0.96
BH CV ECHO MEAS - MV MAX PG: 4.2 MMHG
BH CV ECHO MEAS - MV MEAN PG: 2 MMHG
BH CV ECHO MEAS - MV P1/2T: 69.2 MSEC
BH CV ECHO MEAS - MV V2 VTI: 35 CM
BH CV ECHO MEAS - MVA(P1/2T): 3.2 CM2
BH CV ECHO MEAS - MVA(VTI): 2.3 CM2
BH CV ECHO MEAS - PA ACC TIME: 0.23 SEC
BH CV ECHO MEAS - PA V2 MAX: 88.2 CM/SEC
BH CV ECHO MEAS - RAP SYSTOLE: 3 MMHG
BH CV ECHO MEAS - RV MAX PG: 0.7 MMHG
BH CV ECHO MEAS - RV V1 MAX: 41.8 CM/SEC
BH CV ECHO MEAS - RV V1 VTI: 10.8 CM
BH CV ECHO MEAS - RVSP: 23.6 MMHG
BH CV ECHO MEAS - SV(LVOT): 80.4 ML
BH CV ECHO MEAS - SV(MOD-SP2): 65 ML
BH CV ECHO MEAS - SV(MOD-SP4): 50 ML
BH CV ECHO MEAS - SVI(LVOT): 43.8 ML/M2
BH CV ECHO MEAS - SVI(MOD-SP2): 35.4 ML/M2
BH CV ECHO MEAS - SVI(MOD-SP4): 27.3 ML/M2
BH CV ECHO MEAS - TAPSE (>1.6): 2.4 CM
BH CV ECHO MEAS - TR MAX PG: 20.6 MMHG
BH CV ECHO MEAS - TR MAX VEL: 227 CM/SEC
BH CV ECHO MEASUREMENTS AVERAGE E/E' RATIO: 14.49
BH CV XLRA - RV BASE: 3.3 CM
BH CV XLRA - RV LENGTH: 8.6 CM
BH CV XLRA - RV MID: 3 CM
BH CV XLRA - TDI S': 7.8 CM/SEC
LEFT ATRIUM VOLUME INDEX: 38 ML/M2
LV EF BIPLANE MOD: 58.7 %
SINUS: 2.9 CM
STJ: 2.16 CM

## 2025-03-18 PROCEDURE — 93306 TTE W/DOPPLER COMPLETE: CPT

## 2025-04-19 DIAGNOSIS — K21.9 GASTROESOPHAGEAL REFLUX DISEASE, UNSPECIFIED WHETHER ESOPHAGITIS PRESENT: ICD-10-CM

## 2025-04-22 RX ORDER — OMEPRAZOLE 40 MG/1
40 CAPSULE, DELAYED RELEASE ORAL DAILY
Qty: 90 CAPSULE | Refills: 1 | Status: SHIPPED | OUTPATIENT
Start: 2025-04-22

## 2025-04-28 ENCOUNTER — OFFICE VISIT (OUTPATIENT)
Dept: INTERNAL MEDICINE | Age: 85
End: 2025-04-28
Payer: MEDICARE

## 2025-04-28 VITALS
HEIGHT: 67 IN | HEART RATE: 60 BPM | WEIGHT: 157 LBS | OXYGEN SATURATION: 96 % | TEMPERATURE: 96.9 F | DIASTOLIC BLOOD PRESSURE: 60 MMHG | SYSTOLIC BLOOD PRESSURE: 122 MMHG | BODY MASS INDEX: 24.64 KG/M2

## 2025-04-28 DIAGNOSIS — E78.00 PURE HYPERCHOLESTEROLEMIA: Chronic | ICD-10-CM

## 2025-04-28 DIAGNOSIS — G25.81 RLS (RESTLESS LEGS SYNDROME): Chronic | ICD-10-CM

## 2025-04-28 DIAGNOSIS — E11.65 TYPE 2 DIABETES MELLITUS WITH HYPERGLYCEMIA, WITHOUT LONG-TERM CURRENT USE OF INSULIN: Chronic | ICD-10-CM

## 2025-04-28 DIAGNOSIS — I10 ESSENTIAL HYPERTENSION: Primary | Chronic | ICD-10-CM

## 2025-04-28 DIAGNOSIS — R79.9 ABNORMAL FINDING OF BLOOD CHEMISTRY, UNSPECIFIED: ICD-10-CM

## 2025-04-28 DIAGNOSIS — G63 NEUROPATHY DUE TO MEDICAL CONDITION: Chronic | ICD-10-CM

## 2025-04-28 DIAGNOSIS — I25.10 CORONARY ARTERY DISEASE INVOLVING NATIVE CORONARY ARTERY OF NATIVE HEART WITHOUT ANGINA PECTORIS: Chronic | ICD-10-CM

## 2025-04-28 NOTE — PROGRESS NOTES
J  U  N  O  H    K  I  M ,   M  D                               I  N  T  E  R  N  A  L    M  E  D  I  C  I  N  E         ENCOUNTER DATE:  04/28/2025    Reanna Higgins / 85 y.o. / female    OFFICE VISIT ENCOUNTER       CHIEF COMPLAINT / REASON FOR OFFICE VISIT     Pure hypercholesterolemia, Hypertension, Neuropathy due to medical condition, and Cough      ASSESSMENT & PLAN     Problem List Items Addressed This Visit          High    Essential hypertension - Primary (Chronic)    Overview   Losartan 25 mg QD  Metoprolol succinate 50 mg BID          Relevant Medications    metoprolol succinate XL (TOPROL-XL) 50 MG 24 hr tablet    losartan (COZAAR) 25 MG tablet    Pure hypercholesterolemia (Chronic)    Overview   Continue rosuvastatin 5 mg         Relevant Medications    rosuvastatin (CRESTOR) 5 MG tablet       Medium    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)    Overview   Pregabalin 75 mg BID          Relevant Medications    pregabalin (LYRICA) 75 MG capsule    Type 2 diabetes mellitus with hyperglycemia, without long-term current use of insulin (Chronic)    Overview   Diet controlled         Current Assessment & Plan   Lab Results   Component Value Date    HGBA1C 6.4 (H) 01/27/2025    HGBA1C 6.5 (H) 07/29/2024    HGBA1C 6.5 (H) 01/29/2024    CREATININE 0.97 01/27/2025    LDL 74 01/27/2025    MALBCRERATIO 42 (H) 01/27/2025      Check A1c and microalbumin/creatinine ratio   Continue dietary modifications          Relevant Orders    Hemoglobin A1c    Microalbumin / Creatinine Urine Ratio - Urine, Clean Catch       Low    RLS (restless legs syndrome) (Chronic)    Overview   Pregabalin 75 mg BID          Relevant Orders    Ferritin    Iron Profile    Coronary artery disease involving native coronary artery of native heart without angina pectoris (Chronic)    Overview   8/2019: s/p 6 vessel CABG    Continue ASA, statin, beta-blocker.     Continue cardiology follow-up.       "   Relevant Medications    aspirin 81 MG EC tablet    metoprolol succinate XL (TOPROL-XL) 50 MG 24 hr tablet     Other Visit Diagnoses         Abnormal finding of blood chemistry, unspecified        Relevant Orders    Ferritin    Iron Profile          Orders Placed This Encounter   Procedures    Ferritin     Release to patient:   Routine Release [[id number]]    Iron Profile     Release to patient:   Routine Release [[id number]]    Hemoglobin A1c     Release to patient:   Routine Release [[id number]]    Microalbumin / Creatinine Urine Ratio - Urine, Clean Catch     Release to patient:   Routine Release [[id number]]     No orders of the defined types were placed in this encounter.      SUMMARY/DISCUSSION        TOTAL TIME OF ENCOUNTER:        Next Appointment with me: 11/17/2025     Return for **SCHEDULE COMBINED AWV AND MEDICAL F/U**.      VITAL SIGNS     Vitals:    04/28/25 0905   BP: 122/60   Pulse: 60   Temp: 96.9 °F (36.1 °C)   SpO2: 96%   Weight: 71.2 kg (157 lb)   Height: 170.2 cm (67\")       BP Readings from Last 3 Encounters:   04/28/25 122/60   03/18/25 130/76   03/04/25 130/62     Wt Readings from Last 3 Encounters:   04/28/25 71.2 kg (157 lb)   03/18/25 72.1 kg (159 lb)   03/04/25 72.1 kg (159 lb)     Body mass index is 24.59 kg/m².    Blood pressure readings recorded on patient flowsheet:       No data to display                  MEDICATIONS AT THE TIME OF OFFICE VISIT     Current Outpatient Medications on File Prior to Visit   Medication Sig    aspirin 81 MG EC tablet Take 3 days a week    Berberine Chloride (BERBERINE HCI PO) Take  by mouth.    Calcium Carbonate-Vitamin D (CALCIUM-D) 600-400 MG-UNIT tablet Take 1 tablet by mouth Daily.    cholecalciferol (VITAMIN D3) 1000 UNITS tablet Take 1 tablet by mouth Daily.    Coenzyme Q10 (COQ10) 100 MG capsule Take 1 capsule by mouth Daily.    cycloSPORINE (RESTASIS) 0.05 % ophthalmic emulsion Administer 1 drop to both eyes 2 (Two) Times a Day.    " Glucosamine-Chondroitin 750-600 MG tablet Take 1 tablet by mouth Daily.    hydrocortisone 2.5 % cream     ipratropium (ATROVENT) 0.06 % nasal spray USE 2 SPRAYS IN EACH NOSTRIL EVERY 6 HRS AS NEEDED    losartan (COZAAR) 25 MG tablet Take 1 tablet by mouth Daily.    melatonin 5 MG sublingual tablet sublingual tablet Place 1 tablet under the tongue Every Night.    metoprolol succinate XL (TOPROL-XL) 50 MG 24 hr tablet TAKE 1 TABLET BY MOUTH TWICE A DAY    multivitamin with minerals tablet tablet Take 1 tablet by mouth Daily.    omeprazole (priLOSEC) 40 MG capsule TAKE 1 CAPSULE BY MOUTH EVERY DAY    pregabalin (LYRICA) 75 MG capsule TAKE 1 CAPSULE BY MOUTH TWICE A DAY    rosuvastatin (CRESTOR) 5 MG tablet TAKE 1 TABLET BY MOUTH EVERY DAY     No current facility-administered medications on file prior to visit.          HISTORY OF PRESENT ILLNESS     Patient complains of approximately 1 week history of congestion, sneezing, postnasal drainage and nonproductive cough.  Denies any fever or chills and reports improvement of symptoms over last several days.  Her spouse Rishabh also has had similar symptoms.  Denies any chest pain or shortness of breath.  Diabetes remains diet controlled.  Blood pressure remains stable on losartan 25 mg daily.  She takes rosuvastatin 5 mg for hyperlipidemia.  She is on pregabalin 75 mg twice daily for peripheral neuropathy and restless leg syndrome.  She is followed by her cardiologist for coronary artery disease with history of 6 vessel CABG in 2019.  Denies any exertional chest pain, dyspnea exertion or palpitations.  She remains on metoprolol succinate 50 mg twice daily.    Patient Care Team:  Ted Li MD as PCP - General (Internal Medicine)  Arsenio Witt MD as Consulting Physician (Ophthalmology)  Francis Mccullough MD as Consulting Physician (Cardiology)  Haven Veras MD as Consulting Physician (Dermatology)  Lindsey Farrell APRN as Nurse Practitioner (Nurse  Practitioner)    REVIEW OF SYSTEMS     Review of Systems       PHYSICAL EXAMINATION     Physical Exam  Feet:      Comments: Diabetic Foot Exam Performed and Monofilament Test Performed     Monofilament test is mildly abnormal.       Alert with normal thought and judgment.   Cardiovascular: Normal rate, regular rhythm.   Pulm/Chest: Effort normal, breath sounds normal.       REVIEWED DATA     Labs:     Lab Results   Component Value Date     01/27/2025    K 4.7 01/27/2025    CALCIUM 9.8 01/27/2025    AST 25 01/27/2025    ALT 16 01/27/2025    BUN 20 01/27/2025    CREATININE 0.97 01/27/2025    CREATININE 0.90 10/02/2024    CREATININE 0.89 07/29/2024    EGFR 57 (L) 01/27/2025     Lab Results   Component Value Date    HGBA1C 6.4 (H) 01/27/2025    HGBA1C 6.5 (H) 07/29/2024    HGBA1C 6.5 (H) 01/29/2024     Lab Results   Component Value Date    MALBCRERATIO 42 (H) 01/27/2025     Lab Results   Component Value Date    LDL 74 01/27/2025    LDL 77 07/29/2024     (H) 01/29/2024    HDL 48 01/27/2025    HDL 39 (L) 07/29/2024    TRIG 94 01/27/2025    TRIG 122 07/29/2024     Lab Results   Component Value Date    TSH 4.470 01/27/2025    TSH 4.810 (H) 07/29/2024    TSH 4.210 01/29/2024    FREET4 1.14 01/27/2025    FREET4 1.08 07/29/2024    FREET4 1.17 01/29/2024     Lab Results   Component Value Date    WBC 4.14 10/02/2024    HGB 13.0 10/02/2024     10/02/2024     Lab Results   Component Value Date    FERRITIN 53.40 04/19/2023    IRONSERUM 57 04/19/2023    LABIRON 16 (L) 04/19/2023    TIBC 365 04/19/2023          Imaging:               Medical Tests:               Summary of old records / correspondence / consultant report:             Request outside records:

## 2025-04-28 NOTE — ASSESSMENT & PLAN NOTE
Lab Results   Component Value Date    HGBA1C 6.4 (H) 01/27/2025    HGBA1C 6.5 (H) 07/29/2024    HGBA1C 6.5 (H) 01/29/2024    CREATININE 0.97 01/27/2025    LDL 74 01/27/2025    MALBCRERATIO 42 (H) 01/27/2025      Check A1c and microalbumin/creatinine ratio   Continue dietary modifications

## 2025-04-29 LAB
ALBUMIN/CREAT UR: 12 MG/G CREAT (ref 0–29)
CREAT UR-MCNC: 84.4 MG/DL
FERRITIN SERPL-MCNC: 96 NG/ML (ref 13–150)
HBA1C MFR BLD: 6.5 % (ref 4.8–5.6)
IRON SATN MFR SERPL: 20 % (ref 20–50)
IRON SERPL-MCNC: 75 MCG/DL (ref 37–145)
MICROALBUMIN UR-MCNC: 10.1 UG/ML
TIBC SERPL-MCNC: 370 MCG/DL
UIBC SERPL-MCNC: 295 MCG/DL (ref 112–346)

## 2025-04-30 DIAGNOSIS — G63 NEUROPATHY DUE TO MEDICAL CONDITION: Chronic | ICD-10-CM

## 2025-05-02 RX ORDER — PREGABALIN 75 MG/1
75 CAPSULE ORAL 2 TIMES DAILY
Qty: 180 CAPSULE | Refills: 0 | Status: SHIPPED | OUTPATIENT
Start: 2025-05-02

## 2025-06-12 NOTE — PLAN OF CARE
Continuity of Care Form    Patient Name: Rosario Riley   :  1960  MRN:  [id number]    Admit date:  2025  Discharge date:  25  .    Code Status Order: Full Code   Advance Directives:     Admitting Physician:  Gaye Willson MD  PCP: Promise Taylor PA    Discharging Nurse: Disha   Discharging Hospital Unit/Room#: 0165/0165-01  Discharging Unit Phone Number: [phone]    Emergency Contact:   Extended Emergency Contact Information  Primary Emergency Contact: Cathleen Latif  Address: [address] [place] [address] [place] [address] [facility]  Home Phone: [phone]  Mobile Phone: [phone]  Relation: Child  Secondary Emergency Contact: RosemaryKellyReva           [facility]  Home Phone: [phone]  Relation: Child    Past Surgical History:  Past Surgical History:   Procedure Laterality Date    CARDIAC PROCEDURE N/A 2025    Left and right heart cath / coronary angiography [facility]ARDIAC [facility]ATH LAB     SECTION      CHEST SURGERY N/A 2025    CHEST HARDWARE REMOVAL performed by Claudia Justice MD at [facility]    CORONARY ARTERY BYPASS GRAFT N/A 2025    CORONARY ARTERY BYPASS GRAFT TIMES TWO, RIGHT AND LEFT LEG ENDOSCOPIC SAPHENOUS VEIN HARVEST, TOTAL CARDIOPULMONARY BYPASS, TRANSESOPHAGEAL ECHOCARDIOGRAM. POSTERIOR PERICARDIOTOMY, LEFT ATRIAL APPENDAGE CLIP PLACEMENT AND BILATERAL PECTORALIS BLOCKS performed by Claudia Justice MD at [facility]    HARDWARE REMOVAL N/A 2025    REMOVAL RETAINED CHEST TUBE REMOVAL ONE STERNAL WIRE performed by Claudia Justice MD at [facility]    KNEE ARTHROSCOPY Left     KNEE SURGERY  2022    [facility]    TONSILLECTOMY  1963    TOTAL HIP ARTHROPLASTY Left 2024    LEFT ANTERIOR TOTAL HIP REPLACEMENT performed by Nick Greenberg MD at [facility] OR       Immunization History:   Immunization History   Administered Date(s) Administered    COVID-19, PFIZER PURPLE top, DILUTE  Problem: Patient Care Overview  Goal: Plan of Care Review  Outcome: Ongoing (interventions implemented as appropriate)   09/01/19 1104   Coping/Psychosocial   Plan of Care Reviewed With patient;spouse;family   OTHER   Outcome Summary pt amb today w/assist of 1, no standing rest req; does fatigue quickly but improving; plans SNU as she lives in [place] and is concerned about endurance and safety          Therapy:  {Therapy; copd oxygen:41004}  Ventilator:    { CC Vent List:801357453}    Rehab Therapies: {THERAPEUTIC INTERVENTION:4928681348}  Weight Bearing Status/Restrictions: { CC Weight Bearin}  Other Medical Equipment (for information only, NOT a DME order):  {EQUIPMENT:243808524}  Other Treatments: ***    Patient's personal belongings (please select all that are sent with patient):  {CHP DME Belongings:297248415}    RN SIGNATURE:  {Esignature:584782994}    CASE MANAGEMENT/SOCIAL WORK SECTION    Inpatient Status Date: 25    Readmission Risk Assessment Score:  [facility] RISK OF UNPLANNED READMISSION 2.0             18.2 Total Score        Discharging to Facility/ Agency   [facility] Connections  Services: [facility]  [address] [place] [address]  [phone]     / signature: Electronically signed by Marlin Humphrey RN on 25 at 12:45 PM EDT    PHYSICIAN SECTION    Prognosis: {Prognosis:9749751600}    Condition at Discharge: { Patient Condition:241320474}    Rehab Potential (if transferring to Rehab): {Prognosis:4621740422}    Recommended Labs or Other Treatments After Discharge: ***    Physician Certification: I certify the above information and transfer of Rosario Riley  is necessary for the continuing treatment of the diagnosis listed and that she requires {Admit to Appropriate Level of Care:30221} for {GREATER/LESS:078699954} 30 days.     Update Admission H&P: {CHP DME Changes in HandP:950549738}    PHYSICIAN SIGNATURE:  {Esignature:324318970}

## 2025-07-11 DIAGNOSIS — I10 ESSENTIAL HYPERTENSION: Chronic | ICD-10-CM

## 2025-07-11 RX ORDER — LOSARTAN POTASSIUM 25 MG/1
25 TABLET ORAL DAILY
Qty: 90 TABLET | Refills: 3 | Status: SHIPPED | OUTPATIENT
Start: 2025-07-11

## 2025-07-19 DIAGNOSIS — E78.00 PURE HYPERCHOLESTEROLEMIA: ICD-10-CM

## 2025-07-21 RX ORDER — ROSUVASTATIN CALCIUM 5 MG/1
5 TABLET, COATED ORAL DAILY
Qty: 90 TABLET | Refills: 0 | Status: SHIPPED | OUTPATIENT
Start: 2025-07-21

## 2025-07-27 DIAGNOSIS — G63 NEUROPATHY DUE TO MEDICAL CONDITION: Chronic | ICD-10-CM

## 2025-07-28 RX ORDER — METOPROLOL SUCCINATE 50 MG/1
50 TABLET, EXTENDED RELEASE ORAL EVERY 12 HOURS SCHEDULED
Qty: 180 TABLET | Refills: 1 | Status: SHIPPED | OUTPATIENT
Start: 2025-07-28

## 2025-07-29 ENCOUNTER — TELEPHONE (OUTPATIENT)
Dept: INTERNAL MEDICINE | Age: 85
End: 2025-07-29
Payer: MEDICARE

## 2025-07-29 DIAGNOSIS — Z12.31 ENCOUNTER FOR SCREENING MAMMOGRAM FOR MALIGNANT NEOPLASM OF BREAST: Primary | ICD-10-CM

## 2025-07-29 NOTE — TELEPHONE ENCOUNTER
Spoke to Meka with [facility], requesting a Mammo Screening order, patient scheduled appointment for tomorrow.     Call back number is [phone].  Fax # is [phone].    Thanks!

## 2025-07-30 RX ORDER — PREGABALIN 75 MG/1
75 CAPSULE ORAL 2 TIMES DAILY
Qty: 180 CAPSULE | Refills: 0 | Status: SHIPPED | OUTPATIENT
Start: 2025-07-30

## 2025-08-06 LAB
MC_CV_MDC_IDC_RATE_1: 160
MC_CV_MDC_IDC_ZONE_ID: 1
MDC_IDC_MSMT_BATTERY_REMAINING_LONGEVITY: 84 MO
MDC_IDC_MSMT_BATTERY_REMAINING_PERCENTAGE: 100 %
MDC_IDC_MSMT_BATTERY_STATUS: NORMAL
MDC_IDC_MSMT_LEADCHNL_RA_DTM: NORMAL
MDC_IDC_MSMT_LEADCHNL_RA_IMPEDANCE_VALUE: 1029
MDC_IDC_MSMT_LEADCHNL_RA_PACING_THRESHOLD_AMPLITUDE: 0.6
MDC_IDC_MSMT_LEADCHNL_RA_PACING_THRESHOLD_POLARITY: NORMAL
MDC_IDC_MSMT_LEADCHNL_RA_PACING_THRESHOLD_PULSEWIDTH: 0.4
MDC_IDC_MSMT_LEADCHNL_RA_SENSING_INTR_AMPL: 1.8
MDC_IDC_MSMT_LEADCHNL_RV_DTM: NORMAL
MDC_IDC_MSMT_LEADCHNL_RV_IMPEDANCE_VALUE: 620
MDC_IDC_MSMT_LEADCHNL_RV_PACING_THRESHOLD_AMPLITUDE: 1.8
MDC_IDC_MSMT_LEADCHNL_RV_PACING_THRESHOLD_POLARITY: NORMAL
MDC_IDC_MSMT_LEADCHNL_RV_PACING_THRESHOLD_PULSEWIDTH: 0.4
MDC_IDC_MSMT_LEADCHNL_RV_SENSING_INTR_AMPL: 17.7
MDC_IDC_PG_IMPLANT_DTM: NORMAL
MDC_IDC_PG_MFG: NORMAL
MDC_IDC_PG_MODEL: NORMAL
MDC_IDC_PG_SERIAL: NORMAL
MDC_IDC_PG_TYPE: NORMAL
MDC_IDC_SESS_DTM: NORMAL
MDC_IDC_SESS_TYPE: NORMAL
MDC_IDC_SET_BRADY_AT_MODE_SWITCH_RATE: 150
MDC_IDC_SET_BRADY_LOWRATE: 60
MDC_IDC_SET_BRADY_MAX_TRACKING_RATE: 120
MDC_IDC_SET_BRADY_MODE: NORMAL
MDC_IDC_SET_BRADY_PAV_DELAY: 220
MDC_IDC_SET_BRADY_SAV_DELAY: 220
MDC_IDC_SET_LEADCHNL_RA_PACING_AMPLITUDE: 2
MDC_IDC_SET_LEADCHNL_RA_PACING_POLARITY: NORMAL
MDC_IDC_SET_LEADCHNL_RA_PACING_PULSEWIDTH: 0.4
MDC_IDC_SET_LEADCHNL_RA_SENSING_POLARITY: NORMAL
MDC_IDC_SET_LEADCHNL_RA_SENSING_SENSITIVITY: 0.25
MDC_IDC_SET_LEADCHNL_RV_PACING_AMPLITUDE: 3.6
MDC_IDC_SET_LEADCHNL_RV_PACING_POLARITY: NORMAL
MDC_IDC_SET_LEADCHNL_RV_PACING_PULSEWIDTH: 0.4
MDC_IDC_SET_LEADCHNL_RV_SENSING_POLARITY: NORMAL
MDC_IDC_SET_LEADCHNL_RV_SENSING_SENSITIVITY: 2.5
MDC_IDC_SET_ZONE_STATUS: NORMAL
MDC_IDC_SET_ZONE_TYPE: NORMAL
MDC_IDC_STAT_AT_BURDEN_PERCENT: 0
MDC_IDC_STAT_BRADY_RA_PERCENT_PACED: 36
MDC_IDC_STAT_BRADY_RV_PERCENT_PACED: 67

## 2025-08-06 PROCEDURE — 93296 REM INTERROG EVL PM/IDS: CPT | Performed by: INTERNAL MEDICINE

## 2025-08-06 PROCEDURE — 93294 REM INTERROG EVL PM/LDLS PM: CPT | Performed by: INTERNAL MEDICINE

## (undated) DEVICE — Device

## (undated) DEVICE — LOU PACE DEFIB: Brand: MEDLINE INDUSTRIES, INC.

## (undated) DEVICE — CORONARY ARTERY BYPASS GRAFT MARKERS, STAINLESS STEEL, DISTAL, WITHOUT HOLDER: Brand: ANASTOMARK CORONARY ARTERY BYPASS GRAFT MARKERS, STAINLESS STEEL, DISTAL

## (undated) DEVICE — ST. SORBAVIEW ULTIMATE IJ SYSTEM A,C: Brand: CENTURION

## (undated) DEVICE — ERBE NESSY®PLATE 170 SPLIT; 168CM²; CABLE 3M: Brand: ERBE

## (undated) DEVICE — TUBING, SUCTION, 1/4" X 10', STRAIGHT: Brand: MEDLINE

## (undated) DEVICE — DUAL CUT SAGITTAL BLADE

## (undated) DEVICE — DRSNG GZ PETROLTM XEROFORM CURAD 1X8IN STRL

## (undated) DEVICE — Device: Brand: LEVEL 1

## (undated) DEVICE — ROTATING SURGICAL PUNCHES, 1 PER POUCH: Brand: A&E MEDICAL / ROTATING SURGICAL PUNCHES

## (undated) DEVICE — DRAPE,REIN 53X77,STERILE: Brand: MEDLINE

## (undated) DEVICE — KT MANIFLD CARDIAC

## (undated) DEVICE — BLOWER/MISTER AXIOUS OPCAB W/TBG

## (undated) DEVICE — APPL CHLORAPREP HI/LITE 26ML ORNG

## (undated) DEVICE — TRIPLE LUMEN NEEDLE KNIFE: Brand: RX NEEDLE KNIFE XL

## (undated) DEVICE — ADAPT CLN BIOGUARD AIR/H2O DISP

## (undated) DEVICE — CANN O2 ETCO2 FITS ALL CONN CO2 SMPL A/ 7IN DISP LF

## (undated) DEVICE — DRSNG WND STRIP OPTIFOAM AG SUPRABS A/MIC 4X12IN STRL

## (undated) DEVICE — THE PHOTONBLADE WITH ADAPTIVE SMOKE EVACUATION IS A MONOPOLAR RF DEVICE COUPLED WITH ILLUMINATION THAT IS INDICATED FOR CUTTING AND COAGULATION OF TISSUE DURING GENERAL SURGICAL PROCEDURES AND FOR REMOVING SMOKE GENERATED BY ELECTROSURGERY WHEN USED IN CONJUNCTION WITH AN EFFECTIVE SMOKE EVACUATION SYSTEM.: Brand: PHOTONBLADE WITH ADAPTIVE SMOKE EVACUATION

## (undated) DEVICE — GLV SURG BIOGEL LTX PF 7

## (undated) DEVICE — 28 FR RIGHT ANGLE – SOFT PVC CATHETER: Brand: PVC THORACIC CATHETERS

## (undated) DEVICE — SPHINCTEROTOME: Brand: HYDRATOME RX 44

## (undated) DEVICE — PK KN TOTL 40

## (undated) DEVICE — CATH DIAG CARD PERFORM 145 D PIG 4F110CM

## (undated) DEVICE — SENSR O2 OXIMAX FNGR A/ 18IN NONSTR

## (undated) DEVICE — KT DRN EVAC WND PVC PCH WTROC RND 10F400

## (undated) DEVICE — PK CATH CARD 40

## (undated) DEVICE — SUT PROLN MO.5 7/0 DBLARM BV175 6 2X30 BX/12

## (undated) DEVICE — BNDG ELAS ELITE V/CLOSE 4IN 5YD LF STRL

## (undated) DEVICE — SENSR CERBRL O2 PK/2

## (undated) DEVICE — SNAR POLYP SENSATION STDOVL 27 240 BX40

## (undated) DEVICE — GLV SURG BIOGEL M LTX PF 7 1/2

## (undated) DEVICE — CANN VESL FREE FLO 2MM

## (undated) DEVICE — DRSNG SURESITE WNDW 2.38X2.75

## (undated) DEVICE — AMD ANTIMICROBIAL GAUZE SPONGES,12 PLY USP TYPE VII, 0.2% POLYHEXAMETHYLENE BIGUANIDE HCI (PHMB): Brand: CURITY

## (undated) DEVICE — KT ORCA ORCAPOD DISP STRL

## (undated) DEVICE — AVID DUAL STAGE VENOUS DRAINAGE CANNULA: Brand: AVID DUAL STAGE VENOUS DRAINAGE CANNULA

## (undated) DEVICE — GLV SURG PREMIERPRO ORTHO LTX PF SZ7.5 BRN

## (undated) DEVICE — SOL ISO/ALC RUB 70PCT 4OZ

## (undated) DEVICE — SUT SILK 0 CT1 CR8 18IN C021D

## (undated) DEVICE — ADHS SKIN DERMABOND TOP ADVANCED

## (undated) DEVICE — CANN ART EOPA 3D NV W/CONN 20F

## (undated) DEVICE — CANN NASL CO2 TRULINK W/O2 A/

## (undated) DEVICE — INSUFFLATION TUBING,LAPAROSCOPIC: Brand: DEROYAL

## (undated) DEVICE — SOL IRR H2O BTL 1000ML STRL

## (undated) DEVICE — PK ATS CUST W CARDIOTOMY RESEVOIR

## (undated) DEVICE — BITEBLOCK OMNI BLOC

## (undated) DEVICE — ADHS SKIN SURG TISS VISC PREMIERPRO EXOFIN HI/VISC FAST/DRY

## (undated) DEVICE — CONN STR 1/2INX3/8IN

## (undated) DEVICE — GLV SURG SENSICARE W/ALOE PF LF 7.5 STRL

## (undated) DEVICE — SYRINGE, LUER LOCK, 60ML: Brand: MEDLINE

## (undated) DEVICE — PK PERFUS CUST W/CARDIOPLEGIA

## (undated) DEVICE — HEMOCONCENTRATOR PERFUS LPS06

## (undated) DEVICE — VASOVIEW HEMOPRO: Brand: VASOVIEW HEMOPRO

## (undated) DEVICE — Device: Brand: DEFENDO AIR/WATER/SUCTION AND BIOPSY VALVE

## (undated) DEVICE — SOL IRR NACL 0.9PCT BT 1000ML

## (undated) DEVICE — ANTIBACTERIAL UNDYED BRAIDED (POLYGLACTIN 910), SYNTHETIC ABSORBABLE SUTURE: Brand: COATED VICRYL

## (undated) DEVICE — 3M™ MEDIPORE™ H SOFT CLOTH SURGICAL TAPE 2862, 2 INCH X 10 YARD (5CM X 9,1M), 12 ROLLS/CASE: Brand: 3M™ MEDIPORE™

## (undated) DEVICE — UNDERCAST PADDING: Brand: DEROYAL

## (undated) DEVICE — HARMONIC SYNERGY DISSECTING HOOK WITH TORQUE WRENCH. FOR USE WITH BLUE HAND PIECE ONLY: Brand: HARMONIC SYNERGY

## (undated) DEVICE — LOU OPEN HEART DR POLLOCK: Brand: MEDLINE INDUSTRIES, INC.

## (undated) DEVICE — PK HEART OPN 40

## (undated) DEVICE — GLIDESHEATH BASIC HYDROPHILIC COATED INTRODUCER SHEATH: Brand: GLIDESHEATH

## (undated) DEVICE — CONTAINER,SPECIMEN,OR STERILE,4OZ: Brand: MEDLINE

## (undated) DEVICE — GW EMR FIX EXCHG J STD .035 3MM 260CM

## (undated) DEVICE — STPLR SKIN VISISTAT WD 35CT

## (undated) DEVICE — DECANTER BAG 9": Brand: MEDLINE INDUSTRIES, INC.

## (undated) DEVICE — GLV SURG BIOGEL SENSR LTX PF SZ7.5

## (undated) DEVICE — PENCL ES MEGADINE EZ/CLEAN BUTN W/HOLSTR 10FT

## (undated) DEVICE — DRSNG WND GEL FIBR OPTICELL AG PLS W/SLV LF 4X5IN  STRL

## (undated) DEVICE — CATH DIAG CARD PERFORM JR4.0 BT 4F100CM

## (undated) DEVICE — GLV SURG BIOGEL LTX PF 6 1/2

## (undated) DEVICE — LN SMPL CO2 SHTRM SD STREAM W/M LUER

## (undated) DEVICE — OASIS DRAIN, SINGLE, INLINE & ATS COMPATIBLE: Brand: OASIS

## (undated) DEVICE — SUT VIC 0 CT1 CR8 27IN JJ41G

## (undated) DEVICE — DEV LK WIREGUIDE FUSN OLYMP SCP

## (undated) DEVICE — 28 FR STRAIGHT – SOFT PVC CATHETER: Brand: PVC THORACIC CATHETERS

## (undated) DEVICE — BIOPATCH™ ANTIMICROBIAL DRESSING WITH CHLORHEXIDINE GLUCONATE IS A HYDROPHILLIC POLYURETHANE ABSORPTIVE FOAM WITH CHLORHEXIDINE GLUCONATE (CHG) WHICH INHIBITS BACTERIAL GROWTH UNDER THE DRESSING. THE DRESSING IS INTENDED TO BE USED TO ABSORB EXUDATE, COVER A WOUND CAUSED BY VASCULAR AND NONVASCULAR PERCUTANEOUS MEDICAL DEVICES DURING SURGERY, AS WELL AS REDUCE LOCAL INFECTION AND COLONIZATION OF MICROORGANISMS.: Brand: BIOPATCH

## (undated) DEVICE — SYS PERFUS SEP PLATLT W TIPS CUST

## (undated) DEVICE — GLV SURG BIOGEL LTX PF 7 1/2

## (undated) DEVICE — PAD,ABDOMINAL,8"X10",ST,LF: Brand: MEDLINE

## (undated) DEVICE — THE SINGLE USE ETRAP – POLYP TRAP IS USED FOR SUCTION RETRIEVAL OF ENDOSCOPICALLY REMOVED POLYPS.: Brand: ETRAP

## (undated) DEVICE — CLAMP INSERT: Brand: STEALTH® CLAMP INSERT

## (undated) DEVICE — NEEDLE, QUINCKE, 20GX3.5": Brand: MEDLINE

## (undated) DEVICE — CATH DIAG CARD PERFORMA JL3.5 BT 4F100CM

## (undated) DEVICE — FRCP BX RADJAW4 NDL 2.8 240CM LG OG BX40